# Patient Record
Sex: FEMALE | Race: WHITE | NOT HISPANIC OR LATINO | Employment: OTHER | URBAN - METROPOLITAN AREA
[De-identification: names, ages, dates, MRNs, and addresses within clinical notes are randomized per-mention and may not be internally consistent; named-entity substitution may affect disease eponyms.]

---

## 2017-01-24 ENCOUNTER — OFFICE VISIT - HEALTHEAST (OUTPATIENT)
Dept: FAMILY MEDICINE | Facility: CLINIC | Age: 67
End: 2017-01-24

## 2017-01-24 DIAGNOSIS — J06.9 URI (UPPER RESPIRATORY INFECTION): ICD-10-CM

## 2017-01-24 DIAGNOSIS — J02.9 SORE THROAT: ICD-10-CM

## 2017-01-24 ASSESSMENT — MIFFLIN-ST. JEOR: SCORE: 1316.47

## 2017-01-27 ENCOUNTER — COMMUNICATION - HEALTHEAST (OUTPATIENT)
Dept: FAMILY MEDICINE | Facility: CLINIC | Age: 67
End: 2017-01-27

## 2017-02-15 ENCOUNTER — COMMUNICATION - HEALTHEAST (OUTPATIENT)
Dept: ENDOCRINOLOGY | Facility: CLINIC | Age: 67
End: 2017-02-15

## 2017-02-15 DIAGNOSIS — E11.9 TYPE 2 DIABETES MELLITUS (H): ICD-10-CM

## 2017-02-27 ENCOUNTER — OFFICE VISIT - HEALTHEAST (OUTPATIENT)
Dept: FAMILY MEDICINE | Facility: CLINIC | Age: 67
End: 2017-02-27

## 2017-02-27 DIAGNOSIS — F41.9 ANXIETY: ICD-10-CM

## 2017-02-27 RX ORDER — ESTRADIOL 10 UG/1
TABLET VAGINAL
Refills: 2 | Status: SHIPPED | COMMUNITY
Start: 2017-02-22 | End: 2023-03-23

## 2017-02-27 ASSESSMENT — MIFFLIN-ST. JEOR: SCORE: 1321.01

## 2017-03-24 ENCOUNTER — HOSPITAL ENCOUNTER (OUTPATIENT)
Dept: MAMMOGRAPHY | Facility: HOSPITAL | Age: 67
Discharge: HOME | End: 2017-03-24
Attending: NURSE PRACTITIONER

## 2017-03-24 DIAGNOSIS — Z12.31 VISIT FOR SCREENING MAMMOGRAM: ICD-10-CM

## 2017-03-30 ENCOUNTER — COMMUNICATION - HEALTHEAST (OUTPATIENT)
Dept: FAMILY MEDICINE | Facility: CLINIC | Age: 67
End: 2017-03-30

## 2017-04-20 ENCOUNTER — AMBULATORY - HEALTHEAST (OUTPATIENT)
Dept: ENDOCRINOLOGY | Facility: CLINIC | Age: 67
End: 2017-04-20

## 2017-04-20 ENCOUNTER — COMMUNICATION - HEALTHEAST (OUTPATIENT)
Dept: ADMINISTRATIVE | Facility: CLINIC | Age: 67
End: 2017-04-20

## 2017-04-22 ENCOUNTER — COMMUNICATION - HEALTHEAST (OUTPATIENT)
Dept: SCHEDULING | Facility: CLINIC | Age: 67
End: 2017-04-22

## 2017-05-17 ENCOUNTER — COMMUNICATION - HEALTHEAST (OUTPATIENT)
Dept: ADMINISTRATIVE | Facility: CLINIC | Age: 67
End: 2017-05-17

## 2017-05-17 DIAGNOSIS — E11.9 TYPE 2 DIABETES MELLITUS (H): ICD-10-CM

## 2017-05-22 ENCOUNTER — COMMUNICATION - HEALTHEAST (OUTPATIENT)
Dept: FAMILY MEDICINE | Facility: CLINIC | Age: 67
End: 2017-05-22

## 2017-05-22 DIAGNOSIS — E78.5 HYPERLIPIDEMIA: ICD-10-CM

## 2017-05-30 ENCOUNTER — OFFICE VISIT - HEALTHEAST (OUTPATIENT)
Dept: FAMILY MEDICINE | Facility: CLINIC | Age: 67
End: 2017-05-30

## 2017-05-30 DIAGNOSIS — R19.7 DIARRHEA: ICD-10-CM

## 2017-05-30 ASSESSMENT — MIFFLIN-ST. JEOR: SCORE: 1311.94

## 2017-06-08 ENCOUNTER — COMMUNICATION - HEALTHEAST (OUTPATIENT)
Dept: ADMINISTRATIVE | Facility: CLINIC | Age: 67
End: 2017-06-08

## 2017-06-08 DIAGNOSIS — E11.9 TYPE II DIABETES MELLITUS (H): ICD-10-CM

## 2017-06-13 ENCOUNTER — COMMUNICATION - HEALTHEAST (OUTPATIENT)
Dept: ENDOCRINOLOGY | Facility: CLINIC | Age: 67
End: 2017-06-13

## 2017-06-13 DIAGNOSIS — E11.9 TYPE II DIABETES MELLITUS (H): ICD-10-CM

## 2017-06-14 ENCOUNTER — COMMUNICATION - HEALTHEAST (OUTPATIENT)
Dept: EDUCATION SERVICES | Facility: CLINIC | Age: 67
End: 2017-06-14

## 2017-06-14 DIAGNOSIS — E11.9 DIABETES MELLITUS TYPE 2, CONTROLLED (H): ICD-10-CM

## 2017-06-20 ENCOUNTER — AMBULATORY - HEALTHEAST (OUTPATIENT)
Dept: LAB | Facility: CLINIC | Age: 67
End: 2017-06-20

## 2017-06-20 DIAGNOSIS — E11.9 TYPE II DIABETES MELLITUS (H): ICD-10-CM

## 2017-06-20 LAB — HBA1C MFR BLD: 8.7 % (ref 3.5–6)

## 2017-06-29 ENCOUNTER — RECORDS - HEALTHEAST (OUTPATIENT)
Dept: ADMINISTRATIVE | Facility: OTHER | Age: 67
End: 2017-06-29

## 2017-06-29 ENCOUNTER — OFFICE VISIT - HEALTHEAST (OUTPATIENT)
Dept: ENDOCRINOLOGY | Facility: CLINIC | Age: 67
End: 2017-06-29

## 2017-06-29 DIAGNOSIS — E11.9 DIABETES MELLITUS TYPE 2, CONTROLLED (H): ICD-10-CM

## 2017-06-29 DIAGNOSIS — E11.9 TYPE 2 DIABETES MELLITUS (H): ICD-10-CM

## 2017-06-29 DIAGNOSIS — E11.9 TYPE II DIABETES MELLITUS (H): ICD-10-CM

## 2017-06-29 ASSESSMENT — MIFFLIN-ST. JEOR: SCORE: 1313.75

## 2017-07-05 ENCOUNTER — COMMUNICATION - HEALTHEAST (OUTPATIENT)
Dept: FAMILY MEDICINE | Facility: CLINIC | Age: 67
End: 2017-07-05

## 2017-08-21 ENCOUNTER — OFFICE VISIT - HEALTHEAST (OUTPATIENT)
Dept: SURGERY | Facility: CLINIC | Age: 67
End: 2017-08-21

## 2017-08-21 DIAGNOSIS — L08.9 ABRASION, LEG W/ INFECTION, LEFT, INITIAL ENCOUNTER: ICD-10-CM

## 2017-08-21 DIAGNOSIS — S80.812A ABRASION, LEG W/ INFECTION, LEFT, INITIAL ENCOUNTER: ICD-10-CM

## 2017-08-21 ASSESSMENT — MIFFLIN-ST. JEOR: SCORE: 1324.41

## 2017-08-31 ENCOUNTER — COMMUNICATION - HEALTHEAST (OUTPATIENT)
Dept: LAB | Facility: CLINIC | Age: 67
End: 2017-08-31

## 2017-08-31 DIAGNOSIS — E11.9 TYPE II DIABETES MELLITUS (H): ICD-10-CM

## 2017-10-02 ENCOUNTER — COMMUNICATION - HEALTHEAST (OUTPATIENT)
Dept: FAMILY MEDICINE | Facility: CLINIC | Age: 67
End: 2017-10-02

## 2017-10-02 DIAGNOSIS — K21.9 GERD (GASTROESOPHAGEAL REFLUX DISEASE): ICD-10-CM

## 2017-10-03 ENCOUNTER — COMMUNICATION - HEALTHEAST (OUTPATIENT)
Dept: FAMILY MEDICINE | Facility: CLINIC | Age: 67
End: 2017-10-03

## 2017-10-03 ENCOUNTER — AMBULATORY - HEALTHEAST (OUTPATIENT)
Dept: LAB | Facility: CLINIC | Age: 67
End: 2017-10-03

## 2017-10-03 ENCOUNTER — COMMUNICATION - HEALTHEAST (OUTPATIENT)
Dept: ENDOCRINOLOGY | Facility: CLINIC | Age: 67
End: 2017-10-03

## 2017-10-03 DIAGNOSIS — E11.9 TYPE 2 DIABETES MELLITUS (H): ICD-10-CM

## 2017-10-03 DIAGNOSIS — E11.9 TYPE II DIABETES MELLITUS (H): ICD-10-CM

## 2017-10-03 LAB
CHOLEST SERPL-MCNC: 282 MG/DL
FASTING STATUS PATIENT QL REPORTED: YES
HBA1C MFR BLD: 8.5 % (ref 3.5–6)
HDLC SERPL-MCNC: 60 MG/DL
LDLC SERPL CALC-MCNC: 172 MG/DL
TRIGL SERPL-MCNC: 252 MG/DL

## 2017-10-17 ENCOUNTER — OFFICE VISIT - HEALTHEAST (OUTPATIENT)
Dept: ENDOCRINOLOGY | Facility: CLINIC | Age: 67
End: 2017-10-17

## 2017-10-17 DIAGNOSIS — E11.9 TYPE II DIABETES MELLITUS (H): ICD-10-CM

## 2017-10-17 ASSESSMENT — MIFFLIN-ST. JEOR: SCORE: 1320.1

## 2017-11-13 ENCOUNTER — COMMUNICATION - HEALTHEAST (OUTPATIENT)
Dept: ENDOCRINOLOGY | Facility: CLINIC | Age: 67
End: 2017-11-13

## 2017-11-13 DIAGNOSIS — E11.9 TYPE 2 DIABETES MELLITUS (H): ICD-10-CM

## 2017-11-13 DIAGNOSIS — E11.9 TYPE II DIABETES MELLITUS (H): ICD-10-CM

## 2018-01-09 ENCOUNTER — COMMUNICATION - HEALTHEAST (OUTPATIENT)
Dept: FAMILY MEDICINE | Facility: CLINIC | Age: 68
End: 2018-01-09

## 2018-01-09 DIAGNOSIS — K21.9 GERD (GASTROESOPHAGEAL REFLUX DISEASE): ICD-10-CM

## 2018-01-18 ENCOUNTER — COMMUNICATION - HEALTHEAST (OUTPATIENT)
Dept: FAMILY MEDICINE | Facility: CLINIC | Age: 68
End: 2018-01-18

## 2018-01-18 DIAGNOSIS — E78.5 HYPERLIPIDEMIA: ICD-10-CM

## 2018-01-22 ENCOUNTER — RECORDS - HEALTHEAST (OUTPATIENT)
Dept: ADMINISTRATIVE | Facility: OTHER | Age: 68
End: 2018-01-22

## 2018-03-14 ENCOUNTER — COMMUNICATION - HEALTHEAST (OUTPATIENT)
Dept: ENDOCRINOLOGY | Facility: CLINIC | Age: 68
End: 2018-03-14

## 2018-03-26 ENCOUNTER — HOSPITAL ENCOUNTER (OUTPATIENT)
Dept: MAMMOGRAPHY | Facility: CLINIC | Age: 68
Discharge: HOME | End: 2018-03-26
Attending: FAMILY MEDICINE

## 2018-03-26 DIAGNOSIS — Z12.31 VISIT FOR SCREENING MAMMOGRAM: ICD-10-CM

## 2018-03-30 ENCOUNTER — AMBULATORY - HEALTHEAST (OUTPATIENT)
Dept: LAB | Facility: CLINIC | Age: 68
End: 2018-03-30

## 2018-03-30 ENCOUNTER — AMBULATORY - HEALTHEAST (OUTPATIENT)
Dept: ENDOCRINOLOGY | Facility: CLINIC | Age: 68
End: 2018-03-30

## 2018-03-30 DIAGNOSIS — E11.9 TYPE II DIABETES MELLITUS (H): ICD-10-CM

## 2018-03-30 LAB — HBA1C MFR BLD: 9.6 % (ref 3.5–6)

## 2018-04-10 ENCOUNTER — OFFICE VISIT - HEALTHEAST (OUTPATIENT)
Dept: ENDOCRINOLOGY | Facility: CLINIC | Age: 68
End: 2018-04-10

## 2018-04-10 ENCOUNTER — AMBULATORY - HEALTHEAST (OUTPATIENT)
Dept: ENDOCRINOLOGY | Facility: CLINIC | Age: 68
End: 2018-04-10

## 2018-04-10 DIAGNOSIS — E11.9 TYPE II DIABETES MELLITUS (H): ICD-10-CM

## 2018-04-10 DIAGNOSIS — E11.9 TYPE 2 DIABETES MELLITUS (H): ICD-10-CM

## 2018-04-10 DIAGNOSIS — E11.9 DIABETES MELLITUS TYPE 2, CONTROLLED (H): ICD-10-CM

## 2018-04-10 ASSESSMENT — MIFFLIN-ST. JEOR: SCORE: 1313.75

## 2018-04-17 ENCOUNTER — COMMUNICATION - HEALTHEAST (OUTPATIENT)
Dept: ADMINISTRATIVE | Facility: CLINIC | Age: 68
End: 2018-04-17

## 2018-04-17 ENCOUNTER — AMBULATORY - HEALTHEAST (OUTPATIENT)
Dept: EDUCATION SERVICES | Facility: CLINIC | Age: 68
End: 2018-04-17

## 2018-04-17 DIAGNOSIS — E11.9 TYPE 2 DIABETES MELLITUS (H): ICD-10-CM

## 2018-05-31 ENCOUNTER — COMMUNICATION - HEALTHEAST (OUTPATIENT)
Dept: FAMILY MEDICINE | Facility: CLINIC | Age: 68
End: 2018-05-31

## 2018-05-31 DIAGNOSIS — R19.7 DIARRHEA: ICD-10-CM

## 2018-06-01 ENCOUNTER — RECORDS - HEALTHEAST (OUTPATIENT)
Dept: ADMINISTRATIVE | Facility: OTHER | Age: 68
End: 2018-06-01

## 2018-06-15 ENCOUNTER — COMMUNICATION - HEALTHEAST (OUTPATIENT)
Dept: ADMINISTRATIVE | Facility: CLINIC | Age: 68
End: 2018-06-15

## 2018-06-15 DIAGNOSIS — E11.9 TYPE II DIABETES MELLITUS (H): ICD-10-CM

## 2018-07-14 ENCOUNTER — COMMUNICATION - HEALTHEAST (OUTPATIENT)
Dept: FAMILY MEDICINE | Facility: CLINIC | Age: 68
End: 2018-07-14

## 2018-07-14 DIAGNOSIS — K21.9 GERD (GASTROESOPHAGEAL REFLUX DISEASE): ICD-10-CM

## 2018-08-20 ENCOUNTER — RECORDS - HEALTHEAST (OUTPATIENT)
Dept: ADMINISTRATIVE | Facility: OTHER | Age: 68
End: 2018-08-20

## 2018-09-28 ENCOUNTER — COMMUNICATION - HEALTHEAST (OUTPATIENT)
Dept: FAMILY MEDICINE | Facility: CLINIC | Age: 68
End: 2018-09-28

## 2018-09-28 DIAGNOSIS — K21.9 GERD (GASTROESOPHAGEAL REFLUX DISEASE): ICD-10-CM

## 2018-09-28 DIAGNOSIS — E78.5 HYPERLIPIDEMIA: ICD-10-CM

## 2018-10-01 ENCOUNTER — RECORDS - HEALTHEAST (OUTPATIENT)
Dept: ADMINISTRATIVE | Facility: OTHER | Age: 68
End: 2018-10-01

## 2018-10-10 ENCOUNTER — AMBULATORY - HEALTHEAST (OUTPATIENT)
Dept: LAB | Facility: CLINIC | Age: 68
End: 2018-10-10

## 2018-10-10 DIAGNOSIS — E11.9 TYPE II DIABETES MELLITUS (H): ICD-10-CM

## 2018-10-10 LAB
ANION GAP SERPL CALCULATED.3IONS-SCNC: 9 MMOL/L (ref 5–18)
BUN SERPL-MCNC: 16 MG/DL (ref 8–22)
CALCIUM SERPL-MCNC: 9.8 MG/DL (ref 8.5–10.5)
CHLORIDE BLD-SCNC: 104 MMOL/L (ref 98–107)
CO2 SERPL-SCNC: 27 MMOL/L (ref 22–31)
CREAT SERPL-MCNC: 0.81 MG/DL (ref 0.6–1.1)
CREAT UR-MCNC: 101.1 MG/DL
GFR SERPL CREATININE-BSD FRML MDRD: >60 ML/MIN/1.73M2
GLUCOSE BLD-MCNC: 130 MG/DL (ref 70–125)
HBA1C MFR BLD: 8.6 % (ref 3.5–6)
MICROALBUMIN UR-MCNC: 0.82 MG/DL (ref 0–1.99)
MICROALBUMIN/CREAT UR: 8.1 MG/G
POTASSIUM BLD-SCNC: 3.9 MMOL/L (ref 3.5–5)
SODIUM SERPL-SCNC: 140 MMOL/L (ref 136–145)
TSH SERPL DL<=0.005 MIU/L-ACNC: 1.55 UIU/ML (ref 0.3–5)

## 2018-10-16 ENCOUNTER — OFFICE VISIT - HEALTHEAST (OUTPATIENT)
Dept: ENDOCRINOLOGY | Facility: CLINIC | Age: 68
End: 2018-10-16

## 2018-10-16 ENCOUNTER — AMBULATORY - HEALTHEAST (OUTPATIENT)
Dept: ENDOCRINOLOGY | Facility: CLINIC | Age: 68
End: 2018-10-16

## 2018-10-16 DIAGNOSIS — E11.9 TYPE II DIABETES MELLITUS (H): ICD-10-CM

## 2018-10-16 DIAGNOSIS — E11.9 TYPE 2 DIABETES MELLITUS (H): ICD-10-CM

## 2018-10-16 ASSESSMENT — MIFFLIN-ST. JEOR: SCORE: 1327.36

## 2018-10-23 ENCOUNTER — COMMUNICATION - HEALTHEAST (OUTPATIENT)
Dept: ADMINISTRATIVE | Facility: CLINIC | Age: 68
End: 2018-10-23

## 2018-11-15 ENCOUNTER — RECORDS - HEALTHEAST (OUTPATIENT)
Dept: ADMINISTRATIVE | Facility: OTHER | Age: 68
End: 2018-11-15

## 2018-12-18 ENCOUNTER — OFFICE VISIT - HEALTHEAST (OUTPATIENT)
Dept: EDUCATION SERVICES | Facility: CLINIC | Age: 68
End: 2018-12-18

## 2018-12-18 DIAGNOSIS — E11.9 TYPE II DIABETES MELLITUS (H): ICD-10-CM

## 2018-12-27 ENCOUNTER — OFFICE VISIT - HEALTHEAST (OUTPATIENT)
Dept: FAMILY MEDICINE | Facility: CLINIC | Age: 68
End: 2018-12-27

## 2018-12-27 DIAGNOSIS — J02.9 SORE THROAT: ICD-10-CM

## 2018-12-27 DIAGNOSIS — J06.9 VIRAL URI: ICD-10-CM

## 2018-12-27 LAB — DEPRECATED S PYO AG THROAT QL EIA: NORMAL

## 2018-12-27 ASSESSMENT — MIFFLIN-ST. JEOR: SCORE: 1332.63

## 2018-12-28 ENCOUNTER — COMMUNICATION - HEALTHEAST (OUTPATIENT)
Dept: FAMILY MEDICINE | Facility: CLINIC | Age: 68
End: 2018-12-28

## 2018-12-28 LAB — GROUP A STREP BY PCR: NORMAL

## 2019-01-17 ENCOUNTER — COMMUNICATION - HEALTHEAST (OUTPATIENT)
Dept: FAMILY MEDICINE | Facility: CLINIC | Age: 69
End: 2019-01-17

## 2019-01-17 DIAGNOSIS — K21.9 GERD (GASTROESOPHAGEAL REFLUX DISEASE): ICD-10-CM

## 2019-01-17 DIAGNOSIS — E78.5 HYPERLIPIDEMIA: ICD-10-CM

## 2019-02-13 ENCOUNTER — AMBULATORY - HEALTHEAST (OUTPATIENT)
Dept: ENDOCRINOLOGY | Facility: CLINIC | Age: 69
End: 2019-02-13

## 2019-02-13 DIAGNOSIS — E11.9 TYPE II DIABETES MELLITUS (H): ICD-10-CM

## 2019-02-21 ENCOUNTER — COMMUNICATION - HEALTHEAST (OUTPATIENT)
Dept: ADMINISTRATIVE | Facility: CLINIC | Age: 69
End: 2019-02-21

## 2019-02-25 ENCOUNTER — COMMUNICATION - HEALTHEAST (OUTPATIENT)
Dept: ADMINISTRATIVE | Facility: CLINIC | Age: 69
End: 2019-02-25

## 2019-02-25 DIAGNOSIS — E11.9 TYPE II DIABETES MELLITUS (H): ICD-10-CM

## 2019-03-04 ENCOUNTER — COMMUNICATION - HEALTHEAST (OUTPATIENT)
Dept: ENDOCRINOLOGY | Facility: CLINIC | Age: 69
End: 2019-03-04

## 2019-03-04 DIAGNOSIS — E11.9 TYPE II DIABETES MELLITUS (H): ICD-10-CM

## 2019-03-04 RX ORDER — FLASH GLUCOSE SENSOR
1 KIT MISCELLANEOUS
Qty: 1 EACH | Refills: 0 | Status: SHIPPED | OUTPATIENT
Start: 2019-03-04

## 2019-03-06 ENCOUNTER — COMMUNICATION - HEALTHEAST (OUTPATIENT)
Dept: ADMINISTRATIVE | Facility: CLINIC | Age: 69
End: 2019-03-06

## 2019-03-06 DIAGNOSIS — E11.65 TYPE 2 DIABETES MELLITUS WITH HYPERGLYCEMIA, WITH LONG-TERM CURRENT USE OF INSULIN (H): ICD-10-CM

## 2019-03-06 DIAGNOSIS — Z79.4 TYPE 2 DIABETES MELLITUS WITH HYPERGLYCEMIA, WITH LONG-TERM CURRENT USE OF INSULIN (H): ICD-10-CM

## 2019-03-07 RX ORDER — FLASH GLUCOSE SENSOR
1 KIT MISCELLANEOUS DAILY
Qty: 2 KIT | Refills: 5 | Status: SHIPPED | OUTPATIENT
Start: 2019-03-07 | End: 2023-02-08

## 2019-03-19 ENCOUNTER — RECORDS - HEALTHEAST (OUTPATIENT)
Dept: HEALTH INFORMATION MANAGEMENT | Facility: CLINIC | Age: 69
End: 2019-03-19

## 2019-03-25 ENCOUNTER — COMMUNICATION - HEALTHEAST (OUTPATIENT)
Dept: ADMINISTRATIVE | Facility: CLINIC | Age: 69
End: 2019-03-25

## 2019-03-29 ENCOUNTER — AMBULATORY - HEALTHEAST (OUTPATIENT)
Dept: LAB | Facility: CLINIC | Age: 69
End: 2019-03-29

## 2019-03-29 DIAGNOSIS — E11.9 TYPE II DIABETES MELLITUS (H): ICD-10-CM

## 2019-03-29 LAB
ANION GAP SERPL CALCULATED.3IONS-SCNC: 7 MMOL/L (ref 5–18)
BUN SERPL-MCNC: 13 MG/DL (ref 8–22)
CALCIUM SERPL-MCNC: 9.9 MG/DL (ref 8.5–10.5)
CHLORIDE BLD-SCNC: 104 MMOL/L (ref 98–107)
CO2 SERPL-SCNC: 30 MMOL/L (ref 22–31)
CREAT SERPL-MCNC: 0.86 MG/DL (ref 0.6–1.1)
CREAT UR-MCNC: 97.5 MG/DL
GFR SERPL CREATININE-BSD FRML MDRD: >60 ML/MIN/1.73M2
GLUCOSE BLD-MCNC: 300 MG/DL (ref 70–125)
HBA1C MFR BLD: 8.7 % (ref 3.5–6)
LDLC SERPL CALC-MCNC: 161 MG/DL
MICROALBUMIN UR-MCNC: 0.89 MG/DL (ref 0–1.99)
MICROALBUMIN/CREAT UR: 9.1 MG/G
POTASSIUM BLD-SCNC: 4 MMOL/L (ref 3.5–5)
SODIUM SERPL-SCNC: 141 MMOL/L (ref 136–145)

## 2019-04-16 ENCOUNTER — OFFICE VISIT - HEALTHEAST (OUTPATIENT)
Dept: ENDOCRINOLOGY | Facility: CLINIC | Age: 69
End: 2019-04-16

## 2019-04-16 ENCOUNTER — AMBULATORY - HEALTHEAST (OUTPATIENT)
Dept: ENDOCRINOLOGY | Facility: CLINIC | Age: 69
End: 2019-04-16

## 2019-04-16 DIAGNOSIS — E11.22 TYPE 2 DIABETES MELLITUS WITH STAGE 1 CHRONIC KIDNEY DISEASE, WITHOUT LONG-TERM CURRENT USE OF INSULIN (H): ICD-10-CM

## 2019-04-16 DIAGNOSIS — Z79.4 TYPE 2 DIABETES MELLITUS WITHOUT COMPLICATION, WITH LONG-TERM CURRENT USE OF INSULIN (H): ICD-10-CM

## 2019-04-16 DIAGNOSIS — E11.9 TYPE 2 DIABETES MELLITUS WITHOUT COMPLICATION, WITH LONG-TERM CURRENT USE OF INSULIN (H): ICD-10-CM

## 2019-04-16 DIAGNOSIS — N18.1 TYPE 2 DIABETES MELLITUS WITH STAGE 1 CHRONIC KIDNEY DISEASE, WITHOUT LONG-TERM CURRENT USE OF INSULIN (H): ICD-10-CM

## 2019-04-16 DIAGNOSIS — E11.9 TYPE II DIABETES MELLITUS (H): ICD-10-CM

## 2019-04-16 DIAGNOSIS — E11.9 TYPE 2 DIABETES MELLITUS (H): ICD-10-CM

## 2019-04-16 ASSESSMENT — MIFFLIN-ST. JEOR: SCORE: 1330.99

## 2019-05-16 ENCOUNTER — COMMUNICATION - HEALTHEAST (OUTPATIENT)
Dept: FAMILY MEDICINE | Facility: CLINIC | Age: 69
End: 2019-05-16

## 2019-05-16 DIAGNOSIS — F41.9 ANXIETY: ICD-10-CM

## 2019-05-16 DIAGNOSIS — R19.7 DIARRHEA: ICD-10-CM

## 2019-05-20 RX ORDER — DIAZEPAM 5 MG
TABLET ORAL
Qty: 30 TABLET | Refills: 1 | Status: SHIPPED | OUTPATIENT
Start: 2019-05-20 | End: 2023-02-08

## 2019-05-24 ENCOUNTER — RECORDS - HEALTHEAST (OUTPATIENT)
Dept: ADMINISTRATIVE | Facility: OTHER | Age: 69
End: 2019-05-24

## 2019-05-28 ENCOUNTER — RECORDS - HEALTHEAST (OUTPATIENT)
Dept: ADMINISTRATIVE | Facility: OTHER | Age: 69
End: 2019-05-28

## 2019-06-04 ENCOUNTER — RECORDS - HEALTHEAST (OUTPATIENT)
Dept: ADMINISTRATIVE | Facility: OTHER | Age: 69
End: 2019-06-04

## 2019-06-24 ENCOUNTER — COMMUNICATION - HEALTHEAST (OUTPATIENT)
Dept: FAMILY MEDICINE | Facility: CLINIC | Age: 69
End: 2019-06-24

## 2019-06-24 ENCOUNTER — RECORDS - HEALTHEAST (OUTPATIENT)
Dept: ADMINISTRATIVE | Facility: OTHER | Age: 69
End: 2019-06-24

## 2019-07-02 ENCOUNTER — RECORDS - HEALTHEAST (OUTPATIENT)
Dept: ADMINISTRATIVE | Facility: OTHER | Age: 69
End: 2019-07-02

## 2019-09-07 ENCOUNTER — COMMUNICATION - HEALTHEAST (OUTPATIENT)
Dept: SCHEDULING | Facility: CLINIC | Age: 69
End: 2019-09-07

## 2019-09-09 ENCOUNTER — COMMUNICATION - HEALTHEAST (OUTPATIENT)
Dept: FAMILY MEDICINE | Facility: CLINIC | Age: 69
End: 2019-09-09

## 2019-09-09 DIAGNOSIS — Z93.3 COLOSTOMY STATUS (H): ICD-10-CM

## 2019-09-17 ENCOUNTER — OFFICE VISIT - HEALTHEAST (OUTPATIENT)
Dept: WOUND CARE | Facility: HOSPITAL | Age: 69
End: 2019-09-17

## 2019-09-17 DIAGNOSIS — Z93.3 COLOSTOMY STATUS (H): ICD-10-CM

## 2019-10-07 ENCOUNTER — COMMUNICATION - HEALTHEAST (OUTPATIENT)
Dept: FAMILY MEDICINE | Facility: CLINIC | Age: 69
End: 2019-10-07

## 2019-10-11 ENCOUNTER — AMBULATORY - HEALTHEAST (OUTPATIENT)
Dept: LAB | Facility: CLINIC | Age: 69
End: 2019-10-11

## 2019-10-11 DIAGNOSIS — E11.22 TYPE 2 DIABETES MELLITUS WITH STAGE 1 CHRONIC KIDNEY DISEASE, WITHOUT LONG-TERM CURRENT USE OF INSULIN (H): ICD-10-CM

## 2019-10-11 DIAGNOSIS — N18.1 TYPE 2 DIABETES MELLITUS WITH STAGE 1 CHRONIC KIDNEY DISEASE, WITHOUT LONG-TERM CURRENT USE OF INSULIN (H): ICD-10-CM

## 2019-10-11 LAB
ANION GAP SERPL CALCULATED.3IONS-SCNC: 9 MMOL/L (ref 5–18)
BUN SERPL-MCNC: 17 MG/DL (ref 8–28)
CALCIUM SERPL-MCNC: 9.9 MG/DL (ref 8.5–10.5)
CHLORIDE BLD-SCNC: 105 MMOL/L (ref 98–107)
CO2 SERPL-SCNC: 26 MMOL/L (ref 22–31)
CREAT SERPL-MCNC: 0.94 MG/DL (ref 0.6–1.1)
GFR SERPL CREATININE-BSD FRML MDRD: 59 ML/MIN/1.73M2
GLUCOSE BLD-MCNC: 246 MG/DL (ref 70–125)
HBA1C MFR BLD: 8.6 % (ref 3.5–6)
POTASSIUM BLD-SCNC: 4.3 MMOL/L (ref 3.5–5)
SODIUM SERPL-SCNC: 140 MMOL/L (ref 136–145)

## 2019-10-16 ENCOUNTER — COMMUNICATION - HEALTHEAST (OUTPATIENT)
Dept: ENDOCRINOLOGY | Facility: CLINIC | Age: 69
End: 2019-10-16

## 2019-10-16 DIAGNOSIS — E11.9 TYPE II DIABETES MELLITUS (H): ICD-10-CM

## 2019-10-30 ENCOUNTER — OFFICE VISIT - HEALTHEAST (OUTPATIENT)
Dept: ENDOCRINOLOGY | Facility: CLINIC | Age: 69
End: 2019-10-30

## 2019-10-30 DIAGNOSIS — E11.9 TYPE 2 DIABETES MELLITUS WITHOUT COMPLICATION, WITH LONG-TERM CURRENT USE OF INSULIN (H): ICD-10-CM

## 2019-10-30 DIAGNOSIS — Z79.4 TYPE 2 DIABETES MELLITUS WITHOUT COMPLICATION, WITH LONG-TERM CURRENT USE OF INSULIN (H): ICD-10-CM

## 2019-10-30 ASSESSMENT — MIFFLIN-ST. JEOR: SCORE: 1335.52

## 2019-11-13 ENCOUNTER — TRANSFERRED RECORDS (OUTPATIENT)
Dept: HEALTH INFORMATION MANAGEMENT | Facility: CLINIC | Age: 69
End: 2019-11-13

## 2019-11-13 ENCOUNTER — RECORDS - HEALTHEAST (OUTPATIENT)
Dept: ADMINISTRATIVE | Facility: OTHER | Age: 69
End: 2019-11-13

## 2019-11-13 LAB
RETINOPATHY: NEGATIVE
RETINOPATHY: NEGATIVE

## 2019-11-14 ENCOUNTER — COMMUNICATION - HEALTHEAST (OUTPATIENT)
Dept: ENDOCRINOLOGY | Facility: CLINIC | Age: 69
End: 2019-11-14

## 2019-11-14 DIAGNOSIS — E11.9 DIABETES MELLITUS TYPE 2, CONTROLLED (H): ICD-10-CM

## 2019-11-18 ENCOUNTER — OFFICE VISIT - HEALTHEAST (OUTPATIENT)
Dept: FAMILY MEDICINE | Facility: CLINIC | Age: 69
End: 2019-11-18

## 2019-11-18 DIAGNOSIS — E11.9 TYPE 2 DIABETES MELLITUS WITHOUT COMPLICATION, WITH LONG-TERM CURRENT USE OF INSULIN (H): ICD-10-CM

## 2019-11-18 DIAGNOSIS — R07.89 CHEST PRESSURE: ICD-10-CM

## 2019-11-18 DIAGNOSIS — J06.9 VIRAL URI WITH COUGH: ICD-10-CM

## 2019-11-18 DIAGNOSIS — J02.9 SORE THROAT: ICD-10-CM

## 2019-11-18 DIAGNOSIS — Z79.4 TYPE 2 DIABETES MELLITUS WITHOUT COMPLICATION, WITH LONG-TERM CURRENT USE OF INSULIN (H): ICD-10-CM

## 2019-11-18 LAB — DEPRECATED S PYO AG THROAT QL EIA: NORMAL

## 2019-11-19 LAB — GROUP A STREP BY PCR: NORMAL

## 2019-11-20 ENCOUNTER — RECORDS - HEALTHEAST (OUTPATIENT)
Dept: HEALTH INFORMATION MANAGEMENT | Facility: CLINIC | Age: 69
End: 2019-11-20

## 2019-11-25 ENCOUNTER — COMMUNICATION - HEALTHEAST (OUTPATIENT)
Dept: ENDOCRINOLOGY | Facility: CLINIC | Age: 69
End: 2019-11-25

## 2019-11-25 DIAGNOSIS — E11.9 TYPE II DIABETES MELLITUS (H): ICD-10-CM

## 2020-01-09 ENCOUNTER — COMMUNICATION - HEALTHEAST (OUTPATIENT)
Dept: FAMILY MEDICINE | Facility: CLINIC | Age: 70
End: 2020-01-09

## 2020-01-09 DIAGNOSIS — R19.7 DIARRHEA: ICD-10-CM

## 2020-01-09 DIAGNOSIS — K21.9 GERD (GASTROESOPHAGEAL REFLUX DISEASE): ICD-10-CM

## 2020-01-15 ENCOUNTER — OFFICE VISIT - HEALTHEAST (OUTPATIENT)
Dept: FAMILY MEDICINE | Facility: CLINIC | Age: 70
End: 2020-01-15

## 2020-01-15 DIAGNOSIS — Z12.31 VISIT FOR SCREENING MAMMOGRAM: ICD-10-CM

## 2020-01-15 DIAGNOSIS — J01.90 ACUTE SINUSITIS WITH SYMPTOMS > 10 DAYS: ICD-10-CM

## 2020-02-12 ENCOUNTER — RECORDS - HEALTHEAST (OUTPATIENT)
Dept: ADMINISTRATIVE | Facility: OTHER | Age: 70
End: 2020-02-12

## 2020-02-24 ENCOUNTER — HOSPITAL ENCOUNTER (OUTPATIENT)
Dept: MAMMOGRAPHY | Facility: CLINIC | Age: 70
Discharge: HOME | End: 2020-02-24
Attending: FAMILY MEDICINE

## 2020-02-24 DIAGNOSIS — Z12.31 VISIT FOR SCREENING MAMMOGRAM: ICD-10-CM

## 2020-03-04 ENCOUNTER — AMBULATORY - HEALTHEAST (OUTPATIENT)
Dept: ENDOCRINOLOGY | Facility: CLINIC | Age: 70
End: 2020-03-04

## 2020-03-04 DIAGNOSIS — Z79.4 TYPE 2 DIABETES MELLITUS WITHOUT COMPLICATION, WITH LONG-TERM CURRENT USE OF INSULIN (H): ICD-10-CM

## 2020-03-04 DIAGNOSIS — E11.9 TYPE 2 DIABETES MELLITUS WITHOUT COMPLICATION, WITH LONG-TERM CURRENT USE OF INSULIN (H): ICD-10-CM

## 2020-03-29 ENCOUNTER — COMMUNICATION - HEALTHEAST (OUTPATIENT)
Dept: ENDOCRINOLOGY | Facility: CLINIC | Age: 70
End: 2020-03-29

## 2020-03-29 DIAGNOSIS — E11.9 TYPE II DIABETES MELLITUS (H): ICD-10-CM

## 2020-04-08 ENCOUNTER — COMMUNICATION - HEALTHEAST (OUTPATIENT)
Dept: FAMILY MEDICINE | Facility: CLINIC | Age: 70
End: 2020-04-08

## 2020-04-08 DIAGNOSIS — K21.9 GERD (GASTROESOPHAGEAL REFLUX DISEASE): ICD-10-CM

## 2020-04-16 ENCOUNTER — COMMUNICATION - HEALTHEAST (OUTPATIENT)
Dept: ENDOCRINOLOGY | Facility: CLINIC | Age: 70
End: 2020-04-16

## 2020-04-16 DIAGNOSIS — E11.9 TYPE II DIABETES MELLITUS (H): ICD-10-CM

## 2020-04-21 ENCOUNTER — AMBULATORY - HEALTHEAST (OUTPATIENT)
Dept: LAB | Facility: CLINIC | Age: 70
End: 2020-04-21

## 2020-04-21 ENCOUNTER — COMMUNICATION - HEALTHEAST (OUTPATIENT)
Dept: SCHEDULING | Facility: CLINIC | Age: 70
End: 2020-04-21

## 2020-04-21 DIAGNOSIS — E11.9 TYPE 2 DIABETES MELLITUS WITHOUT COMPLICATION, WITH LONG-TERM CURRENT USE OF INSULIN (H): ICD-10-CM

## 2020-04-21 DIAGNOSIS — Z79.4 TYPE 2 DIABETES MELLITUS WITHOUT COMPLICATION, WITH LONG-TERM CURRENT USE OF INSULIN (H): ICD-10-CM

## 2020-04-21 LAB
ANION GAP SERPL CALCULATED.3IONS-SCNC: 10 MMOL/L (ref 5–18)
BUN SERPL-MCNC: 12 MG/DL (ref 8–28)
CALCIUM SERPL-MCNC: 9.2 MG/DL (ref 8.5–10.5)
CHLORIDE BLD-SCNC: 102 MMOL/L (ref 98–107)
CO2 SERPL-SCNC: 23 MMOL/L (ref 22–31)
CREAT SERPL-MCNC: 0.96 MG/DL (ref 0.6–1.1)
CREAT UR-MCNC: 71.6 MG/DL
GFR SERPL CREATININE-BSD FRML MDRD: 57 ML/MIN/1.73M2
GLUCOSE BLD-MCNC: 443 MG/DL (ref 70–125)
HBA1C MFR BLD: 10.3 % (ref 3.5–6)
LDLC SERPL CALC-MCNC: 185 MG/DL
MICROALBUMIN UR-MCNC: 0.56 MG/DL (ref 0–1.99)
MICROALBUMIN/CREAT UR: 7.8 MG/G
POTASSIUM BLD-SCNC: 4.3 MMOL/L (ref 3.5–5)
SODIUM SERPL-SCNC: 135 MMOL/L (ref 136–145)

## 2020-04-24 ENCOUNTER — COMMUNICATION - HEALTHEAST (OUTPATIENT)
Dept: ENDOCRINOLOGY | Facility: CLINIC | Age: 70
End: 2020-04-24

## 2020-04-24 DIAGNOSIS — E11.9 TYPE II DIABETES MELLITUS (H): ICD-10-CM

## 2020-04-28 ENCOUNTER — OFFICE VISIT - HEALTHEAST (OUTPATIENT)
Dept: ENDOCRINOLOGY | Facility: CLINIC | Age: 70
End: 2020-04-28

## 2020-04-28 DIAGNOSIS — E11.9 TYPE 2 DIABETES MELLITUS (H): ICD-10-CM

## 2020-04-28 DIAGNOSIS — E11.9 TYPE II DIABETES MELLITUS (H): ICD-10-CM

## 2020-05-28 ENCOUNTER — RECORDS - HEALTHEAST (OUTPATIENT)
Dept: ADMINISTRATIVE | Facility: OTHER | Age: 70
End: 2020-05-28

## 2020-06-08 ENCOUNTER — RECORDS - HEALTHEAST (OUTPATIENT)
Dept: ADMINISTRATIVE | Facility: OTHER | Age: 70
End: 2020-06-08

## 2020-06-08 ENCOUNTER — AMBULATORY - HEALTHEAST (OUTPATIENT)
Dept: SURGERY | Facility: AMBULATORY SURGERY CENTER | Age: 70
End: 2020-06-08

## 2020-06-08 ENCOUNTER — COMMUNICATION - HEALTHEAST (OUTPATIENT)
Dept: FAMILY MEDICINE | Facility: CLINIC | Age: 70
End: 2020-06-08

## 2020-06-08 DIAGNOSIS — Z11.59 ENCOUNTER FOR SCREENING FOR OTHER VIRAL DISEASES: ICD-10-CM

## 2020-06-10 ENCOUNTER — NURSE TRIAGE (OUTPATIENT)
Dept: NURSING | Facility: CLINIC | Age: 70
End: 2020-06-10

## 2020-06-10 ENCOUNTER — OFFICE VISIT - HEALTHEAST (OUTPATIENT)
Dept: FAMILY MEDICINE | Facility: CLINIC | Age: 70
End: 2020-06-10

## 2020-06-10 DIAGNOSIS — Z01.818 PRE-OP EXAM: ICD-10-CM

## 2020-06-10 DIAGNOSIS — E11.9 TYPE 2 DIABETES MELLITUS WITHOUT COMPLICATION, WITH LONG-TERM CURRENT USE OF INSULIN (H): ICD-10-CM

## 2020-06-10 DIAGNOSIS — Z79.4 TYPE 2 DIABETES MELLITUS WITHOUT COMPLICATION, WITH LONG-TERM CURRENT USE OF INSULIN (H): ICD-10-CM

## 2020-06-10 DIAGNOSIS — K50.111 CROHN'S DISEASE OF COLON WITH RECTAL BLEEDING (H): ICD-10-CM

## 2020-06-10 DIAGNOSIS — Z93.3 COLOSTOMY STATUS (H): ICD-10-CM

## 2020-06-10 LAB
CREAT SERPL-MCNC: 0.91 MG/DL (ref 0.6–1.1)
GFR SERPL CREATININE-BSD FRML MDRD: >60 ML/MIN/1.73M2
HBA1C MFR BLD: 10.4 % (ref 3.5–6)
HGB BLD-MCNC: 10.2 G/DL (ref 12–16)

## 2020-06-10 ASSESSMENT — MIFFLIN-ST. JEOR: SCORE: 1304.23

## 2020-06-10 NOTE — ASSESSMENT & PLAN NOTE
Is currently taking Glargine 14 units two times a day, last note by Endocrine CNP indicates that she was to increase Glargine dose to 20 units two times a day. She was also to use slidding scale Humalog before meals. In regards to fasting in conjunction with Marissa Falcon; Pharm D; instructed patient to take her normal insulin Glargine doses the day before but then the morning of surgery to decrease Glargine by 1/2 (since she is only taking 14 units then to 7 units) She is to hold her Humalog during the day of Fasting.     4/2020 Hemglobin A1C was 10  Recheckedtoday and hemoglobin A1C was still 10.4.  Patient doesn't practice any sort of carbohydrate restriction and eats a diet very high in refined CHO, sugar sweetened beverages and candy bars. She is aware this is very directly contributing to the cause of diabetes Type 2. Offered that if she ever changes her mind regarding her dietary practices and would like to better control her diabetes that I would be willing to work with her. At this point she isn't interested.

## 2020-06-10 NOTE — TELEPHONE ENCOUNTER
Patient calls stating she has surgery on 6/17/20 and needs to get COVID19 testing beforehand. She was told the order was already placed.  Verified with [facility] nurse that order was in [facility] and transferred patient to [id number] Scheduling.     TOMMY Flannery RN

## 2020-06-11 LAB
ATRIAL RATE - MUSE: 89 BPM
DIASTOLIC BLOOD PRESSURE - MUSE: NORMAL
INTERPRETATION ECG - MUSE: NORMAL
P AXIS - MUSE: 44 DEGREES
PR INTERVAL - MUSE: 188 MS
QRS DURATION - MUSE: 82 MS
QT - MUSE: 390 MS
QTC - MUSE: 474 MS
R AXIS - MUSE: -24 DEGREES
SYSTOLIC BLOOD PRESSURE - MUSE: NORMAL
T AXIS - MUSE: 54 DEGREES
VENTRICULAR RATE- MUSE: 89 BPM

## 2020-06-14 ENCOUNTER — AMBULATORY - HEALTHEAST (OUTPATIENT)
Dept: FAMILY MEDICINE | Facility: CLINIC | Age: 70
End: 2020-06-14

## 2020-06-14 DIAGNOSIS — Z11.59 ENCOUNTER FOR SCREENING FOR OTHER VIRAL DISEASES: ICD-10-CM

## 2020-06-15 ASSESSMENT — MIFFLIN-ST. JEOR
SCORE: 1300.6
SCORE: 1300.6

## 2020-06-16 ENCOUNTER — ANESTHESIA - HEALTHEAST (OUTPATIENT)
Dept: SURGERY | Facility: AMBULATORY SURGERY CENTER | Age: 70
End: 2020-06-16

## 2020-06-17 ENCOUNTER — HOSPITAL ENCOUNTER (OUTPATIENT)
Dept: SURGERY | Facility: AMBULATORY SURGERY CENTER | Age: 70
Discharge: HOME | End: 2020-06-17
Attending: COLON & RECTAL SURGERY | Admitting: COLON & RECTAL SURGERY
Payer: COMMERCIAL

## 2020-06-17 ENCOUNTER — SURGERY - HEALTHEAST (OUTPATIENT)
Dept: SURGERY | Facility: AMBULATORY SURGERY CENTER | Age: 70
End: 2020-06-17

## 2020-06-17 DIAGNOSIS — K94.11 HEMORRHAGE FROM ILEOSTOMY (H): ICD-10-CM

## 2020-06-17 LAB
GLUCOSE BLDC GLUCOMTR-MCNC: 184 MG/DL (ref 70–125)
GLUCOSE BLDC GLUCOMTR-MCNC: 196 MG/DL (ref 70–125)

## 2020-06-17 ASSESSMENT — MIFFLIN-ST. JEOR
SCORE: 1300.6
SCORE: 1300.6

## 2020-06-19 ENCOUNTER — COMMUNICATION - HEALTHEAST (OUTPATIENT)
Dept: ADMINISTRATIVE | Facility: CLINIC | Age: 70
End: 2020-06-19

## 2020-06-30 ENCOUNTER — COMMUNICATION - HEALTHEAST (OUTPATIENT)
Dept: EDUCATION SERVICES | Facility: CLINIC | Age: 70
End: 2020-06-30

## 2020-07-02 ENCOUNTER — COMMUNICATION - HEALTHEAST (OUTPATIENT)
Dept: FAMILY MEDICINE | Facility: CLINIC | Age: 70
End: 2020-07-02

## 2020-07-02 DIAGNOSIS — K21.9 GERD (GASTROESOPHAGEAL REFLUX DISEASE): ICD-10-CM

## 2020-07-20 ENCOUNTER — RECORDS - HEALTHEAST (OUTPATIENT)
Dept: ADMINISTRATIVE | Facility: OTHER | Age: 70
End: 2020-07-20

## 2020-07-30 ENCOUNTER — RECORDS - HEALTHEAST (OUTPATIENT)
Dept: ADMINISTRATIVE | Facility: OTHER | Age: 70
End: 2020-07-30

## 2020-09-01 ENCOUNTER — COMMUNICATION - HEALTHEAST (OUTPATIENT)
Dept: ENDOCRINOLOGY | Facility: CLINIC | Age: 70
End: 2020-09-01

## 2020-09-01 DIAGNOSIS — E11.9 TYPE 2 DIABETES MELLITUS (H): ICD-10-CM

## 2020-09-02 ENCOUNTER — RECORDS - HEALTHEAST (OUTPATIENT)
Dept: ADMINISTRATIVE | Facility: OTHER | Age: 70
End: 2020-09-02

## 2020-09-03 ENCOUNTER — AMBULATORY - HEALTHEAST (OUTPATIENT)
Dept: ENDOCRINOLOGY | Facility: CLINIC | Age: 70
End: 2020-09-03

## 2020-09-03 DIAGNOSIS — Z79.4 TYPE 2 DIABETES MELLITUS WITHOUT COMPLICATION, WITH LONG-TERM CURRENT USE OF INSULIN (H): ICD-10-CM

## 2020-09-03 DIAGNOSIS — E11.9 TYPE 2 DIABETES MELLITUS WITHOUT COMPLICATION, WITH LONG-TERM CURRENT USE OF INSULIN (H): ICD-10-CM

## 2020-09-08 ENCOUNTER — COMMUNICATION - HEALTHEAST (OUTPATIENT)
Dept: FAMILY MEDICINE | Facility: CLINIC | Age: 70
End: 2020-09-08

## 2020-09-08 ENCOUNTER — RECORDS - HEALTHEAST (OUTPATIENT)
Dept: ADMINISTRATIVE | Facility: OTHER | Age: 70
End: 2020-09-08

## 2020-09-08 DIAGNOSIS — R19.7 DIARRHEA: ICD-10-CM

## 2020-09-08 LAB
ALT SERPL W/O P-5'-P-CCNC: 60 U/L (ref 0–78)
AST SERPL-CCNC: 52 U/L (ref 0–37)
HEP C HIM: NORMAL

## 2020-09-16 ENCOUNTER — COMMUNICATION - HEALTHEAST (OUTPATIENT)
Dept: ADMINISTRATIVE | Facility: CLINIC | Age: 70
End: 2020-09-16

## 2020-09-17 ENCOUNTER — RECORDS - HEALTHEAST (OUTPATIENT)
Dept: HEALTH INFORMATION MANAGEMENT | Facility: CLINIC | Age: 70
End: 2020-09-17

## 2020-09-21 ENCOUNTER — RECORDS - HEALTHEAST (OUTPATIENT)
Dept: ADMINISTRATIVE | Facility: OTHER | Age: 70
End: 2020-09-21

## 2020-10-07 ENCOUNTER — RECORDS - HEALTHEAST (OUTPATIENT)
Dept: ADMINISTRATIVE | Facility: OTHER | Age: 70
End: 2020-10-07

## 2020-10-16 ENCOUNTER — COMMUNICATION - HEALTHEAST (OUTPATIENT)
Dept: FAMILY MEDICINE | Facility: CLINIC | Age: 70
End: 2020-10-16

## 2020-10-16 DIAGNOSIS — R19.7 DIARRHEA: ICD-10-CM

## 2020-10-20 ENCOUNTER — RECORDS - HEALTHEAST (OUTPATIENT)
Dept: ADMINISTRATIVE | Facility: OTHER | Age: 70
End: 2020-10-20

## 2020-10-22 ENCOUNTER — OFFICE VISIT - HEALTHEAST (OUTPATIENT)
Dept: FAMILY MEDICINE | Facility: CLINIC | Age: 70
End: 2020-10-22

## 2020-10-22 DIAGNOSIS — M79.662 PAIN OF LEFT LOWER LEG: ICD-10-CM

## 2020-10-22 DIAGNOSIS — K50.119 CROHN'S DISEASE OF COLON WITH COMPLICATION (H): ICD-10-CM

## 2020-10-22 DIAGNOSIS — K74.69 OTHER CIRRHOSIS OF LIVER (H): ICD-10-CM

## 2020-10-22 DIAGNOSIS — E11.9 TYPE 2 DIABETES MELLITUS WITHOUT COMPLICATION, WITH LONG-TERM CURRENT USE OF INSULIN (H): ICD-10-CM

## 2020-10-22 DIAGNOSIS — Z79.4 TYPE 2 DIABETES MELLITUS WITHOUT COMPLICATION, WITH LONG-TERM CURRENT USE OF INSULIN (H): ICD-10-CM

## 2020-10-22 RX ORDER — DIPHENOXYLATE HCL/ATROPINE 2.5-.025MG
TABLET ORAL
Qty: 30 TABLET | Refills: 11 | Status: SHIPPED | OUTPATIENT
Start: 2020-10-22 | End: 2021-11-03

## 2020-10-22 ASSESSMENT — MIFFLIN-ST. JEOR: SCORE: 1300.6

## 2020-10-27 ENCOUNTER — AMBULATORY - HEALTHEAST (OUTPATIENT)
Dept: LAB | Facility: CLINIC | Age: 70
End: 2020-10-27

## 2020-10-27 DIAGNOSIS — E11.9 TYPE 2 DIABETES MELLITUS WITHOUT COMPLICATION, WITH LONG-TERM CURRENT USE OF INSULIN (H): ICD-10-CM

## 2020-10-27 DIAGNOSIS — Z79.4 TYPE 2 DIABETES MELLITUS WITHOUT COMPLICATION, WITH LONG-TERM CURRENT USE OF INSULIN (H): ICD-10-CM

## 2020-10-27 LAB
ANION GAP SERPL CALCULATED.3IONS-SCNC: 8 MMOL/L (ref 5–18)
BUN SERPL-MCNC: 16 MG/DL (ref 8–28)
CALCIUM SERPL-MCNC: 9.3 MG/DL (ref 8.5–10.5)
CHLORIDE BLD-SCNC: 105 MMOL/L (ref 98–107)
CO2 SERPL-SCNC: 25 MMOL/L (ref 22–31)
CREAT SERPL-MCNC: 1.04 MG/DL (ref 0.6–1.1)
GFR SERPL CREATININE-BSD FRML MDRD: 52 ML/MIN/1.73M2
GLUCOSE BLD-MCNC: 416 MG/DL (ref 70–125)
HBA1C MFR BLD: 10 %
POTASSIUM BLD-SCNC: 4.3 MMOL/L (ref 3.5–5)
SODIUM SERPL-SCNC: 138 MMOL/L (ref 136–145)

## 2020-11-03 ENCOUNTER — OFFICE VISIT - HEALTHEAST (OUTPATIENT)
Dept: ENDOCRINOLOGY | Facility: CLINIC | Age: 70
End: 2020-11-03

## 2020-11-03 DIAGNOSIS — Z79.4 TYPE 2 DIABETES MELLITUS WITH DIABETIC NEPHROPATHY, WITH LONG-TERM CURRENT USE OF INSULIN (H): ICD-10-CM

## 2020-11-03 DIAGNOSIS — N18.31 STAGE 3A CHRONIC KIDNEY DISEASE (H): ICD-10-CM

## 2020-11-03 DIAGNOSIS — E11.21 TYPE 2 DIABETES MELLITUS WITH DIABETIC NEPHROPATHY, WITH LONG-TERM CURRENT USE OF INSULIN (H): ICD-10-CM

## 2021-01-11 ENCOUNTER — RECORDS - HEALTHEAST (OUTPATIENT)
Dept: ADMINISTRATIVE | Facility: OTHER | Age: 71
End: 2021-01-11

## 2021-01-11 LAB
ALT SERPL W/O P-5'-P-CCNC: 50 U/L (ref 0–78)
AST SERPL-CCNC: 53 U/L (ref 0–37)

## 2021-01-14 ENCOUNTER — RECORDS - HEALTHEAST (OUTPATIENT)
Dept: HEALTH INFORMATION MANAGEMENT | Facility: CLINIC | Age: 71
End: 2021-01-14

## 2021-01-15 ENCOUNTER — COMMUNICATION - HEALTHEAST (OUTPATIENT)
Dept: ENDOCRINOLOGY | Facility: CLINIC | Age: 71
End: 2021-01-15

## 2021-01-15 DIAGNOSIS — E11.9 TYPE 2 DIABETES MELLITUS (H): ICD-10-CM

## 2021-01-15 RX ORDER — INSULIN GLARGINE 100 [IU]/ML
INJECTION, SOLUTION SUBCUTANEOUS
Qty: 45 ML | Refills: 1 | Status: SHIPPED | OUTPATIENT
Start: 2021-01-15 | End: 2021-12-27

## 2021-01-19 ENCOUNTER — RECORDS - HEALTHEAST (OUTPATIENT)
Dept: ADMINISTRATIVE | Facility: OTHER | Age: 71
End: 2021-01-19

## 2021-01-20 ENCOUNTER — RECORDS - HEALTHEAST (OUTPATIENT)
Dept: ADMINISTRATIVE | Facility: OTHER | Age: 71
End: 2021-01-20

## 2021-02-05 ENCOUNTER — COMMUNICATION - HEALTHEAST (OUTPATIENT)
Dept: FAMILY MEDICINE | Facility: CLINIC | Age: 71
End: 2021-02-05

## 2021-02-05 DIAGNOSIS — K21.9 GERD (GASTROESOPHAGEAL REFLUX DISEASE): ICD-10-CM

## 2021-02-05 RX ORDER — OMEPRAZOLE 40 MG/1
CAPSULE, DELAYED RELEASE ORAL
Qty: 90 CAPSULE | Refills: 2 | Status: SHIPPED | OUTPATIENT
Start: 2021-02-05 | End: 2021-12-23

## 2021-03-03 ENCOUNTER — AMBULATORY - HEALTHEAST (OUTPATIENT)
Dept: NURSING | Facility: CLINIC | Age: 71
End: 2021-03-03

## 2021-03-05 ENCOUNTER — HOSPITAL ENCOUNTER (OUTPATIENT)
Dept: WOUND CARE | Facility: CLINIC | Age: 71
Discharge: HOME | End: 2021-03-05
Attending: FAMILY MEDICINE | Admitting: FAMILY MEDICINE
Payer: COMMERCIAL

## 2021-03-05 PROCEDURE — G0463 HOSPITAL OUTPT CLINIC VISIT: HCPCS

## 2021-03-24 ENCOUNTER — AMBULATORY - HEALTHEAST (OUTPATIENT)
Dept: NURSING | Facility: CLINIC | Age: 71
End: 2021-03-24

## 2021-03-24 ENCOUNTER — COMMUNICATION - HEALTHEAST (OUTPATIENT)
Dept: FAMILY MEDICINE | Facility: CLINIC | Age: 71
End: 2021-03-24

## 2021-04-13 ENCOUNTER — HOSPITAL ENCOUNTER (OUTPATIENT)
Dept: MAMMOGRAPHY | Facility: CLINIC | Age: 71
Discharge: HOME | End: 2021-04-13
Attending: FAMILY MEDICINE

## 2021-04-13 DIAGNOSIS — Z12.31 VISIT FOR SCREENING MAMMOGRAM: ICD-10-CM

## 2021-04-22 ENCOUNTER — COMMUNICATION - HEALTHEAST (OUTPATIENT)
Dept: LAB | Facility: CLINIC | Age: 71
End: 2021-04-22

## 2021-04-22 DIAGNOSIS — Z79.4 TYPE 2 DIABETES MELLITUS WITHOUT COMPLICATION, WITH LONG-TERM CURRENT USE OF INSULIN (H): ICD-10-CM

## 2021-04-22 DIAGNOSIS — E11.9 TYPE 2 DIABETES MELLITUS WITHOUT COMPLICATION, WITH LONG-TERM CURRENT USE OF INSULIN (H): ICD-10-CM

## 2021-04-26 ENCOUNTER — RECORDS - HEALTHEAST (OUTPATIENT)
Dept: ADMINISTRATIVE | Facility: OTHER | Age: 71
End: 2021-04-26

## 2021-04-27 ENCOUNTER — AMBULATORY - HEALTHEAST (OUTPATIENT)
Dept: LAB | Facility: CLINIC | Age: 71
End: 2021-04-27

## 2021-04-27 DIAGNOSIS — E11.9 TYPE 2 DIABETES MELLITUS WITHOUT COMPLICATION, WITH LONG-TERM CURRENT USE OF INSULIN (H): ICD-10-CM

## 2021-04-27 DIAGNOSIS — Z79.4 TYPE 2 DIABETES MELLITUS WITHOUT COMPLICATION, WITH LONG-TERM CURRENT USE OF INSULIN (H): ICD-10-CM

## 2021-04-27 LAB
ANION GAP SERPL CALCULATED.3IONS-SCNC: 10 MMOL/L (ref 5–18)
BUN SERPL-MCNC: 13 MG/DL (ref 8–28)
CALCIUM SERPL-MCNC: 9.1 MG/DL (ref 8.5–10.5)
CHLORIDE BLD-SCNC: 104 MMOL/L (ref 98–107)
CO2 SERPL-SCNC: 23 MMOL/L (ref 22–31)
CREAT SERPL-MCNC: 0.92 MG/DL (ref 0.6–1.1)
CREAT UR-MCNC: 92.3 MG/DL
GFR SERPL CREATININE-BSD FRML MDRD: 60 ML/MIN/1.73M2
GLUCOSE BLD-MCNC: 382 MG/DL (ref 70–125)
HBA1C MFR BLD: 10.7 %
LDLC SERPL CALC-MCNC: 155 MG/DL
MICROALBUMIN UR-MCNC: 0.73 MG/DL (ref 0–1.99)
MICROALBUMIN/CREAT UR: 7.9 MG/G
POTASSIUM BLD-SCNC: 4.2 MMOL/L (ref 3.5–5)
SODIUM SERPL-SCNC: 137 MMOL/L (ref 136–145)

## 2021-05-04 ENCOUNTER — OFFICE VISIT - HEALTHEAST (OUTPATIENT)
Dept: ENDOCRINOLOGY | Facility: CLINIC | Age: 71
End: 2021-05-04

## 2021-05-04 DIAGNOSIS — E11.9 TYPE II DIABETES MELLITUS (H): ICD-10-CM

## 2021-05-04 DIAGNOSIS — Z79.4 TYPE 2 DIABETES MELLITUS WITH DIABETIC NEPHROPATHY, WITH LONG-TERM CURRENT USE OF INSULIN (H): ICD-10-CM

## 2021-05-04 DIAGNOSIS — E11.21 TYPE 2 DIABETES MELLITUS WITH DIABETIC NEPHROPATHY, WITH LONG-TERM CURRENT USE OF INSULIN (H): ICD-10-CM

## 2021-05-04 RX ORDER — CLOBETASOL PROPIONATE 0.5 MG/G
OINTMENT TOPICAL
Status: SHIPPED | COMMUNITY
Start: 2021-04-22 | End: 2021-07-28

## 2021-05-04 RX ORDER — METRONIDAZOLE 7.5 MG/G
GEL VAGINAL
Status: SHIPPED | COMMUNITY
Start: 2021-04-22 | End: 2021-07-28

## 2021-05-25 ENCOUNTER — RECORDS - HEALTHEAST (OUTPATIENT)
Dept: ADMINISTRATIVE | Facility: CLINIC | Age: 71
End: 2021-05-25

## 2021-05-26 ENCOUNTER — RECORDS - HEALTHEAST (OUTPATIENT)
Dept: ADMINISTRATIVE | Facility: CLINIC | Age: 71
End: 2021-05-26

## 2021-05-26 NOTE — PROGRESS NOTES
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.         Voices not complaints with Ileostomy and pouching system except for irritation @ MCJ  @ 6 o'clock          Machelle Pouching system: denies leakage and voices no interest in changing system       Date of last WOC photo: 3-5-21           RLQ Ileostomy                                                    Ileostomy J                         Stoma size: approx 1 1/2\"    Stoma appearance: rounded, red, moist, protrudes 2.5\", lumen @ apex    Mucocutaneous junction:  Healed with small area hyperplasia/irritation from 4-7 o'clock    Peristomal skin: generally intact with superficial varicies, no bleeding    Abdominal assessment:  Possible RLQ parastomal hernia    Output: Moderate semi-liquid    Pain:  Burning @ J site    WOC Assessment    Ileostomy: viable, functioning and slightly prolapsed  Myranda-stomal skin:  Small amt of hyperplasia vs Irritation from 4-7 o'clock                                Evidence of caput medusa without bleeding. Possbily aggravated by presence of hernia  Abdominal assessment: visible appearance of RLQ parastomal hernia  Prosthetic refitting:  No change to current system       WO Interventions/Plan     Interventions:  1. Silver nitrated area of hyperplasia from 4-7 o'clock  2. No interventions required for stomal prolapse and hernia  3. Education; discussed s/s to seek care for hernia and stomal prolapse: darkening of stoma mucosa, abd pain in area of hernia, no outpu;      -Monitor bleeding from varicies      -Reminded pt not to pull up barrier to tight to distal mucosa. This could be a contributing factor to MCJ irritation   4. Offered some updated supplies from [employer]. Pt not interested in changing system. Pt to " continue her routine changing     Follow-up  1. Pt to call with any ?/concerns  2. F/u prn appt as needed.      Mili Mittal RN, CWOCN

## 2021-05-27 ENCOUNTER — RECORDS - HEALTHEAST (OUTPATIENT)
Dept: ADMINISTRATIVE | Facility: OTHER | Age: 71
End: 2021-05-27

## 2021-05-27 NOTE — TELEPHONE ENCOUNTER
Patient received a new monitor and doesn't know how to use it.  She can't get in to see you until 4/17.

## 2021-05-27 NOTE — TELEPHONE ENCOUNTER
Spoke with pt. All questions were answered. Pt will call back if she has any more questions or trouble using the meter.

## 2021-05-27 NOTE — PROGRESS NOTES
"[facility]  ENDOCRINOLOGY    Diabetes Note 4/17/2019    Zara Webster, 1950, [id number]          Reason for visit      1. Type 2 diabetes mellitus (H)    2. Type II diabetes mellitus (H)        HPI     Zara Webster is a very pleasant 68 y.o. old female who presents for follow up.  SUMMARY:  Zara returns today in f/u for DM 2. Her A1c is up just a bit from her last and is 8.7.  She reports today that she now has a Freestyle Naila and that she loves it.  Report shows that she had an ave BG of 211 over the last two weeks.  Her levels are staying quite flat overnight, but rise during the day. She is checking quite often because it is easy to do so.  She is a self exclaimed \"Carbaholic\" and knows that her diet is heavy with it.  Because of her Colostomy and gut, she does better with more CHO than she should, and there are obvious consequences.  She is complaining about having to use more Humalog, which is causing weight gain.  She [place] and [place] 8 lbs.  She is about to start back into Golf season, and will be playing and walking a lot.  She reports that she has acquired an essential tremor that has been worked up, and is benign.        Blood glucose data:  Ave: 211,     Past Medical History     Patient Active Problem List   Diagnosis     Type II diabetes mellitus (H)     Post Colectomy     Hypercholesteremia     H/O Crohn's disease     BMI 28.0-28.9,adult     Aspirin contraindicated        Family History       family history includes Cancer in her father; Diabetes in her father and mother; Heart disease in her mother; Hyperlipidemia in her mother.    Social History      reports that she has never smoked. She has never used smokeless tobacco. She reports that she drinks alcohol. She reports that she does not use drugs.      Review of Systems     Patient has no polyuria or polydipsia, no chest pain, dyspnea or TIA's, no numbness, tingling or pain in extremities  Remainder negative " "except as noted in HPI.    Vital Signs     /74 (Patient Site: Right Arm, Patient Position: Sitting, Cuff Size: Adult Regular)   Pulse 94   Ht 5' 6\" (1.676 m)   Wt 176 lb 3.2 oz (79.9 kg)   LMP 03/24/1991   BMI 28.44 kg/m    Wt Readings from Last 3 Encounters:   04/16/19 176 lb 3.2 oz (79.9 kg)   12/27/18 176 lb 9 oz (80.1 kg)   10/16/18 175 lb 6.4 oz (79.6 kg)       Physical Exam     Constitutional:  Well developed, Well nourished  HENT:  Normocephalic,   Neck: Thyroid normal, No lymph nodes, Supple  Eyes:  PERRL, Conjunctiva pink  Respiratory:  Normal breath sounds, No respiratory distress  Cardiovascular:  Normal heart rate, Normal rhythm, No murmurs  GI:  Bowel sounds normal, Soft, No tenderness  Musculoskeletal:  No gross deformity or lesions, normal dorsalis pedis pulses  Skin: No acanthosis nigricans, lipoatrophy or lipodystrophy  Neurologic:  Alert & oriented x 3, nonfocal  Psychiatric:  Affect, Mood, Insight appropriate  Diabetic foot exam: no ulcers, charcot's or high risk calluses, Normal monofilament exam          Assessment     1. Type 2 diabetes mellitus (H)    2. Type II diabetes mellitus (H)        Plan     Zara's control has slightly worsened.  Her goal is 8. Instructed to increase her Lantus dosage to 16 units two times a day. She will increase her dosage at night by 2 units every other day, until she reaches a FBS of 150.  She will f/u with me in 6 months. Time spent with pt today: 25 min with >50% spent in counseling and coordination of care.          Katiana BASS Endocrinology  4/17/2019  6:56 AM        Lab Results     Hemoglobin A1c   Date Value Ref Range Status   03/29/2019 8.7 (H) 3.5 - 6.0 % Final   10/10/2018 8.6 (H) 3.5 - 6.0 % Final   03/30/2018 9.6 (H) 3.5 - 6.0 % Final   10/03/2017 8.5 (H) 3.5 - 6.0 % Final   06/20/2017 8.7 (H) 3.5 - 6.0 % Final     Creatinine   Date Value Ref Range Status   03/29/2019 0.86 0.60 - 1.10 mg/dL Final   10/10/2018 0.81 0.60 - 1.10 mg/dL " "Final   06/20/2017 0.84 0.60 - 1.10 mg/dL Final     Microalbumin, Random Urine   Date Value Ref Range Status   03/29/2019 0.89 0.00 - 1.99 mg/dL Final       Cholesterol   Date Value Ref Range Status   10/03/2017 282 (H) <=199 mg/dL Final     HDL Cholesterol   Date Value Ref Range Status   10/03/2017 60 >=50 mg/dL Final     LDL Calculated   Date Value Ref Range Status   10/03/2017 172 (H) <=129 mg/dL Final     Triglycerides   Date Value Ref Range Status   10/03/2017 252 (H) <=149 mg/dL Final       Lab Results   Component Value Date    ALT 73 (H) 08/14/2014    AST 61 (H) 08/14/2014    ALKPHOS 94 08/14/2014    BILITOT 0.5 08/14/2014         Current Medications     Outpatient Medications Prior to Visit   Medication Sig Dispense Refill     diphenoxylate-atropine (LOMOTIL) 2.5-0.025 mg per tablet TAKE 1 TABLET BY MOUTH 4 TIMES PER DAY AS NEEDED FOR DIARRHEA  30 tablet 0     flash glucose scanning reader (FREESTYLE VIDA 14 DAY READER) Misc Use 1 Units As Directed every 14 (fourteen) days. 1 each 0     flash glucose sensor (FREESTYLE VIDA 14 DAY SENSOR) Kit Use 1 Units As Directed daily. 2 kit 5     ibuprofen (ADVIL,MOTRIN) 200 MG tablet Take 400 mg by mouth 3 (three) times a day.       omeprazole (PRILOSEC) 40 MG capsule Take 1 capsule (40 mg) by mouth once daily  90 capsule 3     ostomy adhesive Pste Apply as needed 4 Tube 3     pen needle, diabetic 32 gauge x 5/32\" Ndle TEST BLOOD SUGARS UP TO 4 TIMES A DAY. 380 each 1     pravastatin (PRAVACHOL) 40 MG tablet TAKE 1 TABLET BY MOUTH EVERY NIGHT AT BEDTIME  90 tablet 3     YUVAFEM 10 mcg Tab INSERT 1 TABLET VAGINALLY TWICE WEEKLY, AS NEEDED  2     blood glucose test (CONTOUR NEXT TEST STRIPS) strips Use 1 each As Directed as needed (four times per day). Dispense brand per patient's insurance at pharmacy discretion. 200 strip 1     blood-glucose meter (CONTOUR NEXT METER) Misc Four times per day 1 each 1     generic lancets (ACCU-CHEK MULTICLIX LANCET) Use 1 each As " Directed as needed (four timers per dya). Dispense brand per patient's insurance at pharmacy discretion. 200 each 1     insulin glargine (LANTUS SOLOSTAR U-100 INSULIN) 100 unit/mL (3 mL) pen Take 14 units twice daily 30 mL 1     insulin lispro (HUMALOG KWIKPEN INSULIN) 100 unit/mL pen INJECT 6 UNITS UNDER THE SKIN WITH EACH MEAL UP TO 40 UNITS DAILY (Patient taking differently: INJECT 6 UNITS UNDER THE SKIN WITH EACH MEAL UP TO 60 UNITS DAILY      ) 55 mL 0     No facility-administered medications prior to visit.

## 2021-05-28 ENCOUNTER — RECORDS - HEALTHEAST (OUTPATIENT)
Dept: ADMINISTRATIVE | Facility: CLINIC | Age: 71
End: 2021-05-28

## 2021-05-28 NOTE — TELEPHONE ENCOUNTER
Controlled Substance Refill Request  Medication:   Requested Prescriptions     Pending Prescriptions Disp Refills     diphenoxylate-atropine (LOMOTIL) 2.5-0.025 mg per tablet [Pharmacy Med Name: Diphenoxylate-Atropine Oral Tablet 2.5-0.025 MG] 30 tablet 0     Sig: TAKE 1 TABLET BY MOUTH 4 TIMES PER DAY AS NEEDED FOR DIARRHEA     diazePAM (VALIUM) 5 MG tablet [Pharmacy Med Name: diazePAM Oral Tablet 5 MG] 30 tablet 1     Sig: TAKE 1 TABLET BY MOUTH EVERY 6 HOURS AS NEEDED FOR ANXIETY     Date Last Fill: 5/31/18 lomotil; diazepam not on med list  [facility]   Submit electronically to pharmacy  Controlled Substance Agreement on File:   Encounter-Level CSA Scan Date:    There are no encounter-level csa scan date.       Last office visit: Last office visit pertaining to requested medication was 12/27/18.

## 2021-05-29 NOTE — TELEPHONE ENCOUNTER
Medication Request  Medication name:      Disp Refills Start End    ostomy adhesive Pste 4 Tube 3 11/12/2015     Sig: Apply as needed    Class: Print      Ostomy bag    Ostomy powder. Stomahesive powder     Cavilon no sting flim     Pharmacy Name and Location: [facility]. fax number [phone]  Reason for request: As needed per the patient for ostomy supplies. Patient would like the same order as last time supplies were ordered.  When did you use medication last?:  6/24/2019  Patient offered appointment:  patient declined  Okay to leave a detailed message: yes

## 2021-05-30 VITALS — BODY MASS INDEX: 27.8 KG/M2 | HEIGHT: 66 IN | WEIGHT: 173 LBS

## 2021-05-30 VITALS — HEIGHT: 66 IN | WEIGHT: 174 LBS | BODY MASS INDEX: 27.97 KG/M2

## 2021-05-31 VITALS — WEIGHT: 173.8 LBS | HEIGHT: 66 IN | BODY MASS INDEX: 27.93 KG/M2

## 2021-05-31 VITALS — WEIGHT: 172.4 LBS | HEIGHT: 66 IN | BODY MASS INDEX: 27.71 KG/M2

## 2021-05-31 VITALS — BODY MASS INDEX: 27.64 KG/M2 | WEIGHT: 172 LBS | HEIGHT: 66 IN

## 2021-05-31 VITALS — WEIGHT: 174.75 LBS | BODY MASS INDEX: 28.09 KG/M2 | HEIGHT: 66 IN

## 2021-06-01 VITALS — BODY MASS INDEX: 27.71 KG/M2 | HEIGHT: 66 IN | WEIGHT: 172.4 LBS

## 2021-06-01 NOTE — TELEPHONE ENCOUNTER
"RN Assessment/Reason for Call:   Okay to leave Detailed Message  Patient calling in, colostomy 1987; bag chgd, stoma pulled away from skin.  Was changing the bag; tonight it really pulled. Under side of colostomy.  Bleeding 45\" 3 kotex pads.Bright red.  Denies dizziness or lightheadedness.  Still bleeding after 10 min of pressure.    RN Action/Disposition:  No Protocol; nurse  recommends go to ER..  Call back if worse symptoms  Discussed home care measures.  Offered flu immunization.  Agrees to plan.     Jenna Farley, PIPER    [facility] Triage/med refill  9/7/2019  9:38 PM      "

## 2021-06-01 NOTE — TELEPHONE ENCOUNTER
Referral Request  Type of referral: Ostomy Wound Care  Who s requesting: Patient  Why the request: She is having trouble with ostomy  Have you been seen for this request: N/A, she spoke to triage over the weekend for a problem  Does patient have a preference on a group/provider? [facility]  Okay to leave a detailed message?  Yes

## 2021-06-01 NOTE — PATIENT INSTRUCTIONS - HE
OUTPATIENT OSTOMY INSTRUCTIONS    Type of Stoma: Ileostomy    Supplier: [employer] [phone]    Equipment:   Product # Brand Description   Pouch  [id number] Coloplast 1 3/8 inch convex light   Flange      Paste 65257 Coloplast 1 1/8 inch protective seal   Powder      Deodorizer            Optional pouch 0765 Machelle 1 1/2 inch soft convex     Procedure:  Close the bottom of the pouch and then remove backings from adhesive surfaces.  Remove the soiled pouch and discard.  Wash skin with water and dry.  Apply ring: Around stoma  Then: Apply pouching system to stoma site and hold in place for 2-5 minutes.    Pouch change: Every 3 days  _____________________________________________________________________    [facility] Nurse Specialist: Ranjit Roach RN [facility]   Questions: [phone]      I have received a copy of these instructions, have had an opportunity to ask questions, and have had them answered to my satisfaction.    ________________________________________________________________  Patient Signature and Date    Follow-up Appointment: 1 year or as needed   To schedule an appointment call [facility] Central Scheduling at [phone]

## 2021-06-02 VITALS — BODY MASS INDEX: 28.38 KG/M2 | HEIGHT: 66 IN | WEIGHT: 176.56 LBS

## 2021-06-02 VITALS — BODY MASS INDEX: 28.19 KG/M2 | HEIGHT: 66 IN | WEIGHT: 175.4 LBS

## 2021-06-02 VITALS — BODY MASS INDEX: 28.32 KG/M2 | HEIGHT: 66 IN | WEIGHT: 176.2 LBS

## 2021-06-02 NOTE — TELEPHONE ENCOUNTER
Orders being requested: Stoma bag and rings under the bag  Reason service is needed/diagnosis: colostomy supplies  When are orders needed by: as soon as possible   Where to send Orders: [email or web address]   Okay to leave detailed message?  Yes  [phone]      FYI: Patient stated that [email or web address] had sent an order for the above and have not received an response. Patient stated to keep an eye out as she will call [facility] to have them refax the orders. Patient stated that this requires Obdulia Chamorro MD's signature and date.

## 2021-06-02 NOTE — TELEPHONE ENCOUNTER
"Form faxed to [employer] at [phone].  Pt notified form was faxed.  Copy in the \"jic\" and sent to scan for chart.    "

## 2021-06-02 NOTE — PROGRESS NOTES
[facility]  ENDOCRINOLOGY    Diabetes Note 11/3/2019    Zara Webster, 1950, [id number]          Reason for visit      1. Type 2 diabetes mellitus without complication, with long-term current use of insulin (H)        HPI     Zara Webster is a very pleasant 69 y.o. old female who presents for follow up.  SUMMARY:  Zara returns today in f/u for DM 2. Her current A1c is 8.6 and has been for the last year. There is something to be said for control. She is currently using the Freestyle Naila and with it, has been able to use her Humalog up to 8 times a day in order to micro-manage her BG elevations.  She continues to struggle with her diet 2/2 to her Crohn's for which she has a Colostomy.  She does better with a higher CHO content to her diet. She is trying to get more protein in with low fat turkey and cheese sticks. She is very active, walking 3-4 times a week and going to the Gym 3 times a week. She is taking 25-30 units of Lantus, twice a day. Her Freestyle download showed an ave BG of 180 over the last two weeks. She was above range 80% of the time and in target 20%.         Past Medical History     Patient Active Problem List   Diagnosis     Type II diabetes mellitus (H)     Post Colectomy     Hypercholesteremia     BMI 28.0-28.9,adult     Aspirin contraindicated     Crohn's disease of colon (H)        Family History       family history includes Cancer in her father; Diabetes in her father and mother; Heart disease in her mother; Hyperlipidemia in her mother.    Social History      reports that she has never smoked. She has never used smokeless tobacco. She reports current alcohol use. She reports that she does not use drugs.      Review of Systems     Patient has no polyuria or polydipsia, no chest pain, dyspnea or TIA's, no numbness, tingling or pain in extremities  Remainder negative except as noted in HPI.    Vital Signs     /74 (Patient Site: Right Arm, Patient Position: Sitting, Cuff Size:  "Adult Regular)   Pulse 96   Ht 5' 6\" (1.676 m)   Wt 177 lb 3.2 oz (80.4 kg)   LMP 03/24/1991   BMI 28.60 kg/m    Wt Readings from Last 3 Encounters:   10/30/19 177 lb 3.2 oz (80.4 kg)   04/16/19 176 lb 3.2 oz (79.9 kg)   12/27/18 176 lb 9 oz (80.1 kg)       Physical Exam     Constitutional:  Well developed, Well nourished  HENT:  Normocephalic,   Neck: Thyroid normal, No lymph nodes, Supple  Eyes:  PERRL, Conjunctiva pink  Respiratory:  Normal breath sounds, No respiratory distress  Cardiovascular:  Normal heart rate, Normal rhythm, No murmurs  GI:  Bowel sounds normal, Soft, No tenderness  Musculoskeletal:  No gross deformity or lesions, normal dorsalis pedis pulses  Skin: No acanthosis nigricans, lipoatrophy or lipodystrophy  Neurologic:  Alert & oriented x 3, nonfocal  Psychiatric:  Affect, Mood, Insight appropriate  Diabetic foot exam: no ulcers, charcot's or high risk calluses, Normal monofilament exam          Assessment     1. Type 2 diabetes mellitus without complication, with long-term current use of insulin (H)        Plan     Zara is currently stable. She will continue to use her Freestyle and manage with dosaging with her Humalog several times a day.  F/u with me in 6 months. Time spent with pt today: 25 min with >50% spent in counseling and coordination of care.          Katiana BASS Endocrinology  11/3/2019  6:43 AM        Lab Results     Hemoglobin A1c   Date Value Ref Range Status   10/11/2019 8.6 (H) 3.5 - 6.0 % Final   03/29/2019 8.7 (H) 3.5 - 6.0 % Final   10/10/2018 8.6 (H) 3.5 - 6.0 % Final   03/30/2018 9.6 (H) 3.5 - 6.0 % Final   10/03/2017 8.5 (H) 3.5 - 6.0 % Final     Creatinine   Date Value Ref Range Status   10/11/2019 0.94 0.60 - 1.10 mg/dL Final   03/29/2019 0.86 0.60 - 1.10 mg/dL Final   10/10/2018 0.81 0.60 - 1.10 mg/dL Final     Microalbumin, Random Urine   Date Value Ref Range Status   03/29/2019 0.89 0.00 - 1.99 mg/dL Final       Cholesterol   Date Value Ref Range Status " "  10/03/2017 282 (H) <=199 mg/dL Final     HDL Cholesterol   Date Value Ref Range Status   10/03/2017 60 >=50 mg/dL Final     LDL Calculated   Date Value Ref Range Status   10/03/2017 172 (H) <=129 mg/dL Final     Triglycerides   Date Value Ref Range Status   10/03/2017 252 (H) <=149 mg/dL Final       Lab Results   Component Value Date    ALT 73 (H) 08/14/2014    AST 61 (H) 08/14/2014    ALKPHOS 94 08/14/2014    BILITOT 0.5 08/14/2014         Current Medications     Outpatient Medications Prior to Visit   Medication Sig Dispense Refill     diphenoxylate-atropine (LOMOTIL) 2.5-0.025 mg per tablet TAKE 1 TABLET BY MOUTH 4 TIMES PER DAY AS NEEDED FOR DIARRHEA 30 tablet 0     flash glucose scanning reader (FREESTYLE VIDA 14 DAY READER) Misc Use 1 Units As Directed every 14 (fourteen) days. 1 each 0     flash glucose sensor (FREESTYLE VIDA 14 DAY SENSOR) Kit Use 1 Units As Directed daily. 2 kit 5     ibuprofen (ADVIL,MOTRIN) 200 MG tablet Take 400 mg by mouth 3 (three) times a day.       insulin glargine (LANTUS SOLOSTAR U-100 INSULIN) 100 unit/mL (3 mL) pen Take twice daily, up to 60 units 45 mL 5     insulin lispro (HUMALOG KWIKPEN INSULIN) 100 unit/mL pen INJECT 6 UNITS UNDER THE SKIN WITH EACH MEAL UP TO 60 UNITS DAILY 18 mL 0     omeprazole (PRILOSEC) 40 MG capsule Take 1 capsule (40 mg) by mouth once daily  90 capsule 3     ostomy adhesive Pste Apply as needed 4 Tube 3     pen needle, diabetic 32 gauge x 5/32\" Ndle TEST BLOOD SUGARS UP TO 4 TIMES A DAY. 380 each 1     pravastatin (PRAVACHOL) 40 MG tablet TAKE 1 TABLET BY MOUTH EVERY NIGHT AT BEDTIME  90 tablet 3     YUVAFEM 10 mcg Tab INSERT 1 TABLET VAGINALLY TWICE WEEKLY, AS NEEDED  2     diazePAM (VALIUM) 5 MG tablet TAKE 1 TABLET BY MOUTH EVERY 6 HOURS AS NEEDED FOR ANXIETY  (Patient taking differently: For flying      ) 30 tablet 1     No facility-administered medications prior to visit.            "

## 2021-06-03 ENCOUNTER — RECORDS - HEALTHEAST (OUTPATIENT)
Dept: ADMINISTRATIVE | Facility: CLINIC | Age: 71
End: 2021-06-03

## 2021-06-03 VITALS
SYSTOLIC BLOOD PRESSURE: 141 MMHG | TEMPERATURE: 97.9 F | HEART RATE: 108 BPM | DIASTOLIC BLOOD PRESSURE: 84 MMHG | WEIGHT: 174 LBS | BODY MASS INDEX: 28.08 KG/M2 | OXYGEN SATURATION: 96 % | RESPIRATION RATE: 18 BRPM

## 2021-06-03 VITALS
BODY MASS INDEX: 28.48 KG/M2 | HEART RATE: 96 BPM | HEIGHT: 66 IN | WEIGHT: 177.2 LBS | DIASTOLIC BLOOD PRESSURE: 74 MMHG | SYSTOLIC BLOOD PRESSURE: 124 MMHG

## 2021-06-04 VITALS
WEIGHT: 173 LBS | BODY MASS INDEX: 28.82 KG/M2 | WEIGHT: 173 LBS | BODY MASS INDEX: 28.82 KG/M2 | HEIGHT: 65 IN | HEIGHT: 65 IN

## 2021-06-04 VITALS
RESPIRATION RATE: 16 BRPM | HEART RATE: 94 BPM | WEIGHT: 173.8 LBS | SYSTOLIC BLOOD PRESSURE: 130 MMHG | BODY MASS INDEX: 28.96 KG/M2 | DIASTOLIC BLOOD PRESSURE: 83 MMHG | HEIGHT: 65 IN | TEMPERATURE: 97.8 F

## 2021-06-04 VITALS
DIASTOLIC BLOOD PRESSURE: 72 MMHG | BODY MASS INDEX: 28.44 KG/M2 | HEART RATE: 100 BPM | SYSTOLIC BLOOD PRESSURE: 126 MMHG | WEIGHT: 176.2 LBS | TEMPERATURE: 97.8 F

## 2021-06-05 VITALS
WEIGHT: 173 LBS | RESPIRATION RATE: 16 BRPM | BODY MASS INDEX: 28.82 KG/M2 | HEIGHT: 65 IN | SYSTOLIC BLOOD PRESSURE: 116 MMHG | TEMPERATURE: 97.9 F | DIASTOLIC BLOOD PRESSURE: 74 MMHG | HEART RATE: 108 BPM

## 2021-06-05 NOTE — TELEPHONE ENCOUNTER
Controlled Substance Refill Request  Medication Name:   Requested Prescriptions     Pending Prescriptions Disp Refills     diphenoxylate-atropine (LOMOTIL) 2.5-0.025 mg per tablet 30 tablet 0     Sig: TAKE 1 TABLET BY MOUTH 4 TIMES PER DAY AS NEEDED FOR DIARRHEA     omeprazole (PRILOSEC) 40 MG capsule 90 capsule 3     Sig: Take 1 capsule (40 mg) by mouth once daily     Date Last Fill:  5/20/19  #30 R-0  Requested Pharmacy: [facility]  Submit electronically to pharmacy  Controlled Substance Agreement on file:   Encounter-Level CSA Scan Date:    There are no encounter-level csa scan date.        Last office visit:  12/27/2018      Lizzie Banks RN Triage Nurse Advisor

## 2021-06-05 NOTE — TELEPHONE ENCOUNTER
Patient is using new pharmacy [facility] # [id number] .  Refill Request  Did you contact pharmacy: No  Medication name:   Requested Prescriptions     Pending Prescriptions Disp Refills     diphenoxylate-atropine (LOMOTIL) 2.5-0.025 mg per tablet 30 tablet 0     Sig: TAKE 1 TABLET BY MOUTH 4 TIMES PER DAY AS NEEDED FOR DIARRHEA     omeprazole (PRILOSEC) 40 MG capsule 90 capsule 3     Sig: Take 1 capsule (40 mg) by mouth once daily   Who prescribed the medication: Obdulia Chamorro MD  Requested Pharmacy: [facility] # [id number]  Is patient out of medication: Yes  Patient notified refills processed in 3 business days:  yes  Okay to leave a detailed message: no

## 2021-06-05 NOTE — TELEPHONE ENCOUNTER
RN cannot approve Refill Request    RN can NOT refill omeprazole  PCP messaged that patient is overdue for Office Visit. Last office visit: 5/30/2017 Obdulia Chamorro MD Last Physical: Visit date not found Last MTM visit: Visit date not found Last visit same specialty: 12/27/2018 Farheen Gonzalez DO.  Next visit within 3 mo: Visit date not found  Next physical within 3 mo: Visit date not found      Lizzie Banks, [facility] Triage/Med Refill 1/13/2020    Requested Prescriptions   Pending Prescriptions Disp Refills     diphenoxylate-atropine (LOMOTIL) 2.5-0.025 mg per tablet 30 tablet 0     Sig: TAKE 1 TABLET BY MOUTH 4 TIMES PER DAY AS NEEDED FOR DIARRHEA       Controlled Substances Refill Protocol Failed - 1/9/2020 11:37 AM        Failed - Route all Controlled Substance Requests to Provider        Failed - Patient has controlled substance agreement in past 12 months     Encounter-Level CSA Scan Date:    There are no encounter-level csa scan date.               Failed - Visit with PCP or prescribing provider visit in past 12 months      Last office visit with prescriber/PCP: 5/30/2017 Obdulia Chamorro MD OR same dept: Visit date not found OR same specialty: 12/27/2018 Farheen Gonzalez DO Last physical: Visit date not found Last MTM visit: Visit date not found    Next visit within 3 mo: Visit date not found  Next physical within 3 mo: Visit date not found  Prescriber OR PCP: Obdulia Chamorro MD  Last diagnosis associated with med order: 1. Diarrhea  - diphenoxylate-atropine (LOMOTIL) 2.5-0.025 mg per tablet; TAKE 1 TABLET BY MOUTH 4 TIMES PER DAY AS NEEDED FOR DIARRHEA  Dispense: 30 tablet; Refill: 0    2. GERD (gastroesophageal reflux disease)  - omeprazole (PRILOSEC) 40 MG capsule; Take 1 capsule (40 mg) by mouth once daily  Dispense: 90 capsule; Refill: 3               omeprazole (PRILOSEC) 40 MG capsule 90 capsule 3     Sig: Take 1 capsule (40 mg) by mouth once daily       GI Medications Refill Protocol Failed -  1/9/2020 11:37 AM        Failed - PCP or prescribing provider visit in last 12 or next 3 months.     Last office visit with prescriber/PCP: 5/30/2017 Obdulia Chamorro MD OR same dept: Visit date not found OR same specialty: 12/27/2018 Farheen Gonzalez DO  Last physical: Visit date not found Last MTM visit: Visit date not found   Next visit within 3 mo: Visit date not found  Next physical within 3 mo: Visit date not found  Prescriber OR PCP: Obdulia Chamorro MD  Last diagnosis associated with med order: 1. Diarrhea  - diphenoxylate-atropine (LOMOTIL) 2.5-0.025 mg per tablet; TAKE 1 TABLET BY MOUTH 4 TIMES PER DAY AS NEEDED FOR DIARRHEA  Dispense: 30 tablet; Refill: 0    2. GERD (gastroesophageal reflux disease)  - omeprazole (PRILOSEC) 40 MG capsule; Take 1 capsule (40 mg) by mouth once daily  Dispense: 90 capsule; Refill: 3    If protocol passes may refill for 12 months if within 3 months of last provider visit (or a total of 15 months).

## 2021-06-07 NOTE — TELEPHONE ENCOUNTER
[facility] lab calling ([phone])    Critical lab result- blood glucose 443 drawn today at 10:27 am.     History: Type 2 DM  PCP is Obdulia Chamorro MD at [facility].    Writer tried calling the patient x 2 to get a current blood glucose reading. Patient did not answer phone, left voicemail message to call us back when she receives message, did notify patient on message that a nurse will be here all night.     RN paged on call provider for [facility], Angela Del Angel MD via answering service at 9:15 pm to call RN back directly at [phone].    Provider called back- provider asked for patient's A1c level and recent blood glucose levels. Values given to provider.     Provider recommended when patient calls back to check if she is having symptoms of extremely high blood sugar, and triage accordingly, if patient is feeling stable, to recheck blood glucose and call the clinic tomorrow to set up a virtual visit with provider at the clinic tomorrow to discuss her diabetes and lab values. Provider wants me to route this message to patient's PCP.     Monica Freitas RN/[facility]        Reason for Disposition    Lab or radiology calling with CRITICAL test results    Protocols used: PCP CALL - NO TRIAGE-A-

## 2021-06-07 NOTE — TELEPHONE ENCOUNTER
Refill Approved    Rx renewed per Medication Renewal Policy. Medication was last renewed on 1.30.19.    Jo Ann Meyers, [facility] Connection Triage/Med Refill 4/9/2020     Requested Prescriptions   Pending Prescriptions Disp Refills     omeprazole (PRILOSEC) 40 MG capsule [Pharmacy Med Name: OMEPRAZOLE DR 40 MG CAPSULE] 90 capsule 0     Sig: TAKE 1 CAPSULE BY MOUTH ONCE DAILY       GI Medications Refill Protocol Passed - 4/8/2020  1:31 PM        Passed - PCP or prescribing provider visit in last 12 or next 3 months.     Last office visit with prescriber/PCP: 5/30/2017 Obdulia Chamorro MD OR same dept: 1/15/2020 Farheen Gonzalez DO OR same specialty: 1/15/2020 Farheen Gonzalez DO  Last physical: Visit date not found Last MTM visit: Visit date not found   Next visit within 3 mo: Visit date not found  Next physical within 3 mo: Visit date not found  Prescriber OR PCP: Obdulia Chamorro MD  Last diagnosis associated with med order: 1. GERD (gastroesophageal reflux disease)  - omeprazole (PRILOSEC) 40 MG capsule [Pharmacy Med Name: OMEPRAZOLE DR 40 MG CAPSULE]; TAKE 1 CAPSULE BY MOUTH ONCE DAILY  Dispense: 90 capsule; Refill: 0    If protocol passes may refill for 12 months if within 3 months of last provider visit (or a total of 15 months).

## 2021-06-07 NOTE — PROGRESS NOTES
"Zara Webster is a 69 y.o. female who is being evaluated via a billable telephone visit.      The patient has been notified of following:     \"This telephone visit will be conducted via a call between you and your physician/provider. We have found that certain health care needs can be provided without the need for a physical exam.  This service lets us provide the care you need with a short phone conversation.  If a prescription is necessary we can send it directly to your pharmacy.  If lab work is needed we can place an order for that and you can then stop by our lab to have the test done at a later time.    Telephone visits are billed at different rates depending on your insurance coverage. During this emergency period, for some insurers they may be billed the same as an in-person visit.  Please reach out to your insurance provider with any questions.    If during the course of the call the physician/provider feels a telephone visit is not appropriate, you will not be charged for this service.\"    Patient has given verbal consent to a Telephone visit? Yes    Patient would like to receive their AVS by AVS Preference: Mail a copy.    Additional provider notes:      Reason for visit      1. Type 2 diabetes mellitus (H)    2. Type II diabetes mellitus (H)        HPI     Zara Webster is a very pleasant 69 y.o. old female who presents for follow up.  SUMMARY:    Mitchel is contacted today in f/u for DM 2. Her current A1c is 10.3 and up significantly from 8.6.  She reports that she has been \"eating terrible\" and that she has been far less active because of the Winter and COVID.  She is currently taking 14 units of Lantus twice a day. She is also taking 6 units of insulin with meals, and in between meals, and in the middle of the night because her BG have been so high. She is using the Freestyle Naila system, but has written down her fasting numbers. She is having BG above 300 first thing in the morning. She also notes " "that she is having some numbness in one of her legs and states that it \"feels like water is running down it\".      Blood glucose data:  4-28  6:30a 250    4-27  7:30a 347    4-26  5:26a 302    4-25  6:00a 284    4-24  6:30a 265    4-23  5:30a 344    4-22  6:30a 304    4-21  5:30a 286      Past Medical History     Patient Active Problem List   Diagnosis     Type II diabetes mellitus (H)     Post Colectomy     Hypercholesteremia     BMI 28.0-28.9,adult     Aspirin contraindicated     Crohn's disease of colon (H)     Gastrointestinal hemorrhage        Family History       family history includes Cancer in her father; Diabetes in her father and mother; Heart disease in her mother; Hyperlipidemia in her mother.    Social History      reports that she has never smoked. She has never used smokeless tobacco. She reports current alcohol use. She reports that she does not use drugs.      Review of Systems     Patient has no polyuria or polydipsia, no chest pain, dyspnea or TIA's, no numbness, tingling or pain in extremities  Remainder negative except as noted in HPI.    Vital Signs     LMP 03/24/1991   Wt Readings from Last 3 Encounters:   01/15/20 176 lb 3.2 oz (79.9 kg)   11/18/19 174 lb (78.9 kg)   10/30/19 177 lb 3.2 oz (80.4 kg)             Assessment     1. Type 2 diabetes mellitus (H)    2. Type II diabetes mellitus (H)        Plan     Will increase her Lantus to 20 units two times a day and have her take 10 units of Novolog with meals.  Encouraged to increase the Lantus as needed, 2 units every other day until BG readings improve.  F/u with me in 6 months.   .       Lab Results     Hemoglobin A1c   Date Value Ref Range Status   04/21/2020 10.3 (H) 3.5 - 6.0 % Final   10/11/2019 8.6 (H) 3.5 - 6.0 % Final   03/29/2019 8.7 (H) 3.5 - 6.0 % Final   10/10/2018 8.6 (H) 3.5 - 6.0 % Final   03/30/2018 9.6 (H) 3.5 - 6.0 % Final     Creatinine   Date Value Ref Range Status   04/21/2020 0.96 0.60 - 1.10 mg/dL Final   10/11/2019 " "0.94 0.60 - 1.10 mg/dL Final   03/29/2019 0.86 0.60 - 1.10 mg/dL Final     Microalbumin, Random Urine   Date Value Ref Range Status   04/21/2020 0.56 0.00 - 1.99 mg/dL Final       Cholesterol   Date Value Ref Range Status   10/03/2017 282 (H) <=199 mg/dL Final     HDL Cholesterol   Date Value Ref Range Status   10/03/2017 60 >=50 mg/dL Final     LDL Calculated   Date Value Ref Range Status   10/03/2017 172 (H) <=129 mg/dL Final     Triglycerides   Date Value Ref Range Status   10/03/2017 252 (H) <=149 mg/dL Final       Lab Results   Component Value Date    ALT 73 (H) 08/14/2014    AST 61 (H) 08/14/2014    ALKPHOS 94 08/14/2014    BILITOT 0.5 08/14/2014         Current Medications     Outpatient Medications Prior to Visit   Medication Sig Dispense Refill     flash glucose scanning reader (FREESTYLE VIDA 14 DAY READER) Misc Use 1 Units As Directed every 14 (fourteen) days. 1 each 0     flash glucose sensor (FREESTYLE VIDA 14 DAY SENSOR) Kit Use 1 Units As Directed daily. 2 kit 5     ibuprofen (ADVIL,MOTRIN) 200 MG tablet Take 400 mg by mouth 3 (three) times a day.       insulin glargine (LANTUS SOLOSTAR U-100 INSULIN) 100 unit/mL (3 mL) pen Take twice daily, up to 60 units 45 mL 5     omeprazole (PRILOSEC) 40 MG capsule TAKE 1 CAPSULE BY MOUTH ONCE DAILY 90 capsule 0     ostomy adhesive Pste Apply as needed 4 Tube 3     pen needle, diabetic (ULTICARE PEN NEEDLE) 32 gauge x 5/32\" Ndle TEST BLOOD SUGARS UP TO 4 TIMES A DAY. 300 each 0     pravastatin (PRAVACHOL) 40 MG tablet TAKE 1 TABLET BY MOUTH EVERY NIGHT AT BEDTIME  90 tablet 3     insulin lispro (HUMALOG KWIKPEN INSULIN) 100 unit/mL pen INJECT 6 UNITS INTRAVENOUSLY WITH EACH MEALS UP TO 60 UNITS DAILY 3 adj dose pen 0     diazePAM (VALIUM) 5 MG tablet TAKE 1 TABLET BY MOUTH EVERY 6 HOURS AS NEEDED FOR ANXIETY  (Patient taking differently: For flying      ) 30 tablet 1     diphenoxylate-atropine (LOMOTIL) 2.5-0.025 mg per tablet TAKE 1 TABLET BY MOUTH 4 TIMES PER " DAY AS NEEDED FOR DIARRHEA 30 tablet 0     YUVAFEM 10 mcg Tab INSERT 1 TABLET VAGINALLY TWICE WEEKLY, AS NEEDED  2     No facility-administered medications prior to visit.        Total time of call between patient and provider was 20 minutes    Sonia NIX

## 2021-06-08 NOTE — ANESTHESIA POSTPROCEDURE EVALUATION
Patient: Zara Webster  Procedure(s):  ILEOSTOMY REVISION, PROCTOSCOPY, ILEOSCOPY  Anesthesia type: general    Patient location: Phase II Recovery  Last vitals:   Vitals Value Taken Time   /69 6/17/2020 10:30 AM   Temp 36.4  C (97.5  F) 6/17/2020  9:28 AM   Pulse 75 6/17/2020 11:31 AM   Resp 16 6/17/2020  9:28 AM   SpO2 98 % 6/17/2020 11:31 AM   Vitals shown include unvalidated device data.  Post vital signs: stable  Level of consciousness: awake and responds to simple questions  Post-anesthesia pain: pain controlled  Post-anesthesia nausea and vomiting: no  Pulmonary: unassisted, return to baseline  Cardiovascular: stable and blood pressure at baseline  Hydration: adequate  Anesthetic events: no    QCDR Measures:  ASA# 11 - Myranda-op Cardiac Arrest: ASA11B - Patient did NOT experience unanticipated cardiac arrest  ASA# 12 - Myranda-op Mortality Rate: ASA12B - Patient did NOT die  ASA# 13 - PACU Re-Intubation Rate: ASA13B - Patient did NOT require a new airway mgmt  ASA# 10 - Composite Anes Safety: ASA10A - No serious adverse event    Additional Notes:

## 2021-06-08 NOTE — PROGRESS NOTES
"Assessment/ Plan     1. Sore throat  Her rapid strep was negative.  Backup culture was sent and she will be notified only if positive.  I suspect this is all viral.  - Rapid Strep A Screen-Throat  - Group A Strep, RNA Direct Detection, Throat    2. URI (upper respiratory infection)  Patient symptoms and exam are consistent with viral process. No antibiotics are warranted at this time.  Discussed symptomatic cares they can try at home.  Recommended rest, push fluids, and ibuprofen or tylenol if fevers.  Advised to follow up in clinic if no improvement is symptoms over the next 1-2 weeks.  Would want to see them back sooner if have persistent fevers or worsening symptoms.        Subjective:       Zara Webster is a 66 y.o. female who presents for evaluation of an acute illness.  She states her symptoms started about 3 days ago.  She had been on a [place] [facility].  She started with a cough, headache, and sore throat.  She had a low-grade fever.  The fever has now resolved.  She states that there were some passengers that were sick on the cruise, but no specific exposures that she knows of.  She denies chest pain or shortness of breath.  She is a non-smoker.  She has no history of asthma or wheezing.  She has not had any vomiting or diarrhea.    The following portions of the patient's history were reviewed and updated as appropriate: allergies, current medications, past family history, past medical history, past social history, past surgical history and problem list.    Review of Systems   A 12 point comprehensive review of systems was negative except as noted.      Current Outpatient Prescriptions   Medication Sig Dispense Refill     BD ULTRA-FINE LOLY PEN NEEDLES 32 gauge x 5/32\" Ndle USE 1 PEN NEEDLE WITH EACH INJECTION EVERY NIGHT AT BEDTIME AS DIRECTED 100 each 1     blood glucose test (ACCU-CHEK SMARTVIEW TEST STRIP) strips Please test 4 times daily 100 strip 5     CHROM JOSS/BRINDAL BERRY (GARCINIA " "CAMBOGIA ORAL) Take by mouth.       diphenoxylate-atropine (LOMOTIL) 2.5-0.025 mg per tablet Take 1 tablet by mouth 4 (four) times a day as needed for diarrhea.       ibuprofen (ADVIL,MOTRIN) 200 MG tablet Take 400 mg by mouth 3 (three) times a day.       insulin glargine (LANTUS SOLOSTAR) 100 unit/mL (3 mL) pen Inject 20 Units under the skin bedtime. Do not mix Lantus with any other insulin 10 adj dose pen 0     insulin lispro (HUMALOG KWIKPEN) 100 unit/mL pen 6 units with each meal; up to 30 units daily 5 adj dose pen 0     insulin needles, disposable, 32 x 5/32 \" Ndle TEST BLOOD SUGARS UP TO 4 TIMES A DAY. 380 each 1     lancets Misc 3 (three) times a day. Use 1 lancet. Accu-Chek FastClix Lancets Miscellaneous       omeprazole (PRILOSEC) 40 MG capsule TAKE ONE CAPSULE BY MOUTH EVERY DAY 90 capsule 3     ostomy adhesive Pste Apply as needed 4 Tube 3     pravastatin (PRAVACHOL) 40 MG tablet TAKE 1 TABLET BY MOUTH EVERY NIGHT AT BEDTIME 90 tablet 3     No current facility-administered medications for this visit.        Objective:        Visit Vitals     /80     Pulse 72     Temp 98.7  F (37.1  C) (Oral)     Resp 14     Ht 5' 6\" (1.676 m)     Wt 173 lb (78.5 kg)     BMI 27.92 kg/m2         General appearance: alert, appears stated age and cooperative  Head: Normocephalic, without obvious abnormality, atraumatic  Eyes: conjunctivae/corneas clear.  Ears: normal TM's and external ear canals both ears  Nose: Nares normal. Septum midline. Mucosa normal. No drainage or sinus tenderness.  Throat: lips, mucosa, and tongue normal; teeth and gums normal  Neck: no adenopathy  Lungs: clear to auscultation bilaterally  Heart: regular rate and rhythm, S1, S2 normal, no murmur, click, rub or gallop      Recent Results (from the past 168 hour(s))   Rapid Strep A Screen-Throat   Result Value Ref Range    Rapid Strep A Antigen No Group A Strep detected No Group A Strep detected          This note has been dictated using voice " recognition software. Any grammatical or context distortions are unintentional and inherent to the software

## 2021-06-08 NOTE — BRIEF OP NOTE
[place] [facility]    Brief Operative Note    Pre-operative diagnosis: Hemorrhage from ileostomy (H) [K94.11]  Post-operative diagnosis Same as pre-operative diagnosis    Procedure: ILEOSTOMY REVISION, PROCTOSCOPY, ILEOSCOPY  Surgeon: Surgeon(s) and Role:     * Devang Moon MD - Primary  Anesthesia: General   Estimated Blood Loss: Less than 10 ml    Drains:      Specimens:   ID Type Source Tests Collected by Time   A :  Biopsy Rectum, Biopsy SURGICAL PATHOLOGY EXAM Devang Moon MD 6/17/2020 0842     Findings:   Brisk bleeding from inferior aspect of mucocutaneous junction.  Complications: None.  Implants:         @[facility]@

## 2021-06-08 NOTE — OP NOTE
DATE OF SERVICE: 06/17/2020    PREOPERATIVE DIAGNOSIS:  Ileostomy hemorrhage.    POSTOPERATIVE DIAGNOSIS:  Ileostomy hemorrhage.    PROCEDURE:  Ileostomy revision and proctoscopy.    SURGEON:  Devang Moon MD    ANESTHESIA:  General.    HISTORY:  The patient is a woman who is status post  near total proctocolectomy with  end ileostomy for Crohn's disease many, many years ago.  Recently, she has developed  profuse peristomal bleeding.  She has not had recent ileoscopy or evaluation of her  residual small rectal cuff.  Presents for ileoscopy, possible revision of the  ileostomy to control hemorrhage and proctoscopy.  Risks, benefits, expected outcome  discussed.  She is eager to proceed.    DESCRIPTION OF PROCEDURE:  After the uneventful induction of general endotracheal  anesthesia, the patient was placed comfortably in supine position on operating  table.  The ileostomy was performed with the  pediatric colonoscope up to about 15  cm and that appeared that the mucosa of the ileum was pristine.  We then prepped and  draped the ileostomy and evaluated it.  There was some granulation tissue in the  sides and inferior aspect of this fairly protuberant ____.  The ileostomy protrudes  about 5 cm above the mucocutaneous junction.  The process of fulgurating this  granulation tissue profuse bleeding was encountered in multiple areas along the  mucocutaneous junction inferiorly.  This seemed to be abnormal.  As best I could,  devascularized the inferior aspect of the ileostomy and then reapproximated with  interrupted 2-0 and 3-0 Vicryl.  Hemostasis was achieved.  Confirmation was not  changed significantly.  The hope is that this vascular interruption and  reapproximation will cut down on the abnormal ____.  It is my clinical impression  that this represents an element of portal hypertension and she will need to be  evaluated for portal hypertension before any further revision of the ileostomy is  envisioned.   Clean  ileostomy appliance was applied.  Patient returned to recovery  room in good condition.      NADIA BEST MD  sn  D 06/17/2020 08:57:10  T 06/17/2020 10:20:17  R 06/17/2020 15:56:10  [id number]        cc:NADIA LUNA MD

## 2021-06-08 NOTE — TELEPHONE ENCOUNTER
New Appointment Needed  What is the reason for the visit:    Pre-Op Appt Request  When is the surgery? :  6/17  Where is the surgery?:   [facility]  Who is the surgeon? :  Dr. Garrett  What type of surgery is being done?: exploratory surgery belly  Provider Preference: PCP only  How soon do you need to be seen?: Monday 6/15 or Tuesday 6/16 is preferred.   Waitlist offered?: No  Okay to leave a detailed message:  Yes    PRE OP - EXPLORATORY SURGERY [place] 6/17 AT [facility] BEST

## 2021-06-08 NOTE — ANESTHESIA PREPROCEDURE EVALUATION
Anesthesia Evaluation      Patient summary reviewed   No history of anesthetic complications     Airway   Mallampati: II   Pulmonary - negative ROS and normal exam                          Cardiovascular - negative ROS and normal exam  Rhythm: regular  Rate: normal,         Neuro/Psych - negative ROS     Endo/Other    (+) diabetes mellitus (A1c=10.4) poorly controlled,      Comments: anemia    GI/Hepatic/Renal - negative ROS      Other findings: Results for HUSAM MCGILL (MRN [id number]) as of 6/17/2020 07:30    6/14/2020 09:09  2019-nCOV: Not Detected  COVID-19 VIRUS SPECIMEN SOURCE: Nasopharyngeal  Results for HUSAM MCGILL (MRN [id number]) as of 6/17/2020 07:30    6/10/2020 14:04  Creatinine: 0.91Results for HUSAM MCGILL (MRN [id number]) as of 6/17/2020 07:30    6/10/2020 14:04  Hemoglobin: 10.2 (L)    GFR MDRD Af Amer: >60  GFR MDRD Non Af Amer: >60  Hemoglobin A1c: 10.4 (H)        Dental - normal exam                        Anesthesia Plan  Planned anesthetic: general endotracheal  GAETT  Declines scopolamine  Antiemetics  Soft bite block  Positioning TBD by surgeon  Tylenol po pre op  toradol at the end of the case if okay with the surgeon  ASA 2   Induction: intravenous   Anesthetic plan and risks discussed with: patient    Post-op plan: routine recovery

## 2021-06-08 NOTE — ANESTHESIA CARE TRANSFER NOTE
Last vitals:   Vitals:    06/17/20 0859   BP: 138/63   Pulse: 86   Resp: 16   Temp: 36.7  C (98  F)   SpO2: 100%     Patient's level of consciousness is drowsy  Spontaneous respirations: yes  Maintains airway independently: yes  Dentition unchanged: yes  Oropharynx: oropharynx clear of all foreign objects    QCDR Measures:  ASA# 20 - Surgical Safety Checklist: WHO surgical safety checklist completed prior to induction    PQRS# 430 - Adult PONV Prevention: 4558F - Pt received => 2 anti-emetic agents (different classes) preop & intraop  ASA# 8 - Peds PONV Prevention: NA - Not pediatric patient, not GA or 2 or more risk factors NOT present  PQRS# 424 - Myranda-op Temp Management: 4559F - At least one body temp DOCUMENTED => 35.5C or 95.9F within required timeframe  PQRS# 426 - PACU Transfer Protocol: - Transfer of care checklist used  ASA# 14 - Acute Post-op Pain: ASA14B - Patient did NOT experience pain >= 7 out of 10

## 2021-06-08 NOTE — PROGRESS NOTES
Scheduled Procedure: Exploratory for colostomy  Surgery Date:  6/17  Surgery Location: [facility], fax [phone]    Surgeon:  Dr Moon    Assessment/Plan:     1. Pre-op exam  - Electrocardiogram Perform and Read  - COVID-19 Virus Antibody; Future  - Hemoglobin  - Creatinine    2. Colostomy status (H)  Bleeding around the stoma site, scheduled for surgery    3. History of Crohn's disease of colon with rectal bleeding (H)  Scheduled for surgery    4. Type 2 diabetes mellitus uncontrolled, with long-term current use of insulin (H)  - Glycosylated Hemoglobin A1C    Is currently taking Glargine 14 units two times a day, last note by Endocrine CNP indicates that she was to increase Glargine dose to 20 units two times a day. She was also to use sliding scale Humalog before meals. In regards to fasting before surgery in conjunction with Marissa Falcon; Pharm D; instructed patient to take her normal insulin Glargine doses the day before surgery  but then the morning of surgery to decrease Glargine by 1/2 (since she is only taking 14 units then to 7 units) She is to hold her Humalog during the day of Fasting.     4/2020 Hemglobin A1C was 10  Rechecked today and hemoglobin A1C was still 10.4.  Patient doesn't practice any sort of carbohydrate restriction and eats a diet very high in refined CHO, sugar sweetened beverages and candy bars. She is aware this is very directly contributing to the cause of diabetes Type 2. Offered that if she ever changes her mind regarding her dietary practices and would like to better control her diabetes that I would be willing to work with her. At this point she isn't interested.     Surgical Procedure Risk: moderatere    Have you had prior anesthesia?: Yes  Have you or any family members had a previous anesthesia reaction:  No  Do you or any family members have a history of a clotting or bleeding disorder?: No  Cardiac Risk Assessment: Geriatric Sensitive Perioperative Cardiac  risk: 0.7%; has uncontrolled DMT2, but otherwise EKG is normal sinus rhythm no other history of CAD, no current chest pain,or  exertional shortness of breath. Can do heavy work around the house such as scrubbing floors or lifting or moving heavy furniture or climb two flights of stairs (between 4 and 10 METs).        PT APPROVED FOR SURGERY APPROVAL GIVEN to proceed with proposed procedure, without further diagnostic evaluation.    Please Note: Patient has DMT2 that is uncontrolled; Hgb A1C in 4/2020 and drawn again today was 10. She is fully aware that she doesn't practice any sort of carbohydrate restricted diet that is necessary to control DMT2. In April her insulin was increased to 20 units of Glargine two times a day by the endocrine CNP  but she is only dosing herself 14 IU two times a day and uses short acting humalog before breakfast, lunch and dinner. Preoperative, during surgery, and posteroperatively she may need to have better glucose control that she has currently. In conjunction with Marissa Falcon, PharmD we instructed her to use her night dose of Glargine but 1/2 her morning dose to 10 units of Glargine (or 7 units as patient is using), to hold humalog while fasting.      Functional Status: Independent  Patient plans to recover at home with family.     Subjective:      Zara Webtser is a 69 y.o. female who presents for a preoperative consultation.  She has a history of colostomy for 30 years; original colectomy from colitis. She has increased bleeding from around the stoma; patient states the stoma itself is fine but she continues to lose blood from around the stoma site. She is to have a exploritory surgery to find out where the bleeding is coming from and to try to fix it. Her hemoglobin was low last week when she tried to give blood. Hemoglobin is lower this week than last time is was checked. 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     All other systems reviewed and  "are negative, other than those listed in the HPI.    Pertinent History  Do you have difficulty breathing or chest pain after walking up a flight of stairs: No  History of obstructive sleep apnea: No  Steroid use in the last 6 months: No  Frequent Aspirin/NSAID use: No  Prior Blood Transfusion: No  Prior Blood Transfusion Reaction: No  If for some reason prior to, during or after the procedure, if it is medically indicated, would you be willing to have a blood transfusion?:  There is no transfusion refusal.    Current Outpatient Medications   Medication Sig Dispense Refill     diazePAM (VALIUM) 5 MG tablet TAKE 1 TABLET BY MOUTH EVERY 6 HOURS AS NEEDED FOR ANXIETY  (Patient taking differently: For flying      ) 30 tablet 1     diphenoxylate-atropine (LOMOTIL) 2.5-0.025 mg per tablet TAKE 1 TABLET BY MOUTH 4 TIMES PER DAY AS NEEDED FOR DIARRHEA 30 tablet 0     flash glucose scanning reader (FREESTYLE VIDA 14 DAY READER) Misc Use 1 Units As Directed every 14 (fourteen) days. 1 each 0     flash glucose sensor (FREESTYLE VIDA 14 DAY SENSOR) Kit Use 1 Units As Directed daily. 2 kit 5     ibuprofen (ADVIL,MOTRIN) 200 MG tablet Take 400 mg by mouth 3 (three) times a day.       insulin glargine (LANTUS SOLOSTAR U-100 INSULIN) 100 unit/mL (3 mL) pen Take twice daily, up to 60 units 45 mL 5     insulin lispro (HUMALOG KWIKPEN INSULIN) 100 unit/mL pen INJECT 6 UNITS INTRAVENOUSLY WITH EACH MEALS UP TO 60 UNITS DAILY 54 mL 1     omeprazole (PRILOSEC) 40 MG capsule TAKE 1 CAPSULE BY MOUTH ONCE DAILY 90 capsule 0     ostomy adhesive Pste Apply as needed 4 Tube 3     pen needle, diabetic (ULTICARE PEN NEEDLE) 32 gauge x 5/32\" Ndle TEST BLOOD SUGARS UP TO 4 TIMES A DAY. 300 each 0     pravastatin (PRAVACHOL) 40 MG tablet TAKE 1 TABLET BY MOUTH EVERY NIGHT AT BEDTIME  90 tablet 3     YUVAFEM 10 mcg Tab INSERT 1 TABLET VAGINALLY TWICE WEEKLY, AS NEEDED  2     No current facility-administered medications for this visit.     "     Allergies   Allergen Reactions     Compazine [Prochlorperazine]      Metformin      Prochlorperazine Edisylate      Victoza [Liraglutide]        Patient Active Problem List   Diagnosis     Type II diabetes mellitus (H)     Post Colectomy     Hypercholesteremia     BMI 28.0-28.9,adult     Aspirin contraindicated     Crohn's disease of colon (H)     Gastrointestinal hemorrhage       No past medical history on file.    Past Surgical History:   Procedure Laterality Date     BREAST EXCISIONAL BIOPSY Left 1980     BREAST EXCISIONAL BIOPSY Left 1985     HYSTERECTOMY  1991     KIDNEY STONE REMOVAL      NOT SURE WHICH SIDE, DR. CHANEY     OOPHORECTOMY Bilateral 1991     IN REMOVAL COLON/ILEOSTOMY      Description: Total Abdominal Colectomy;  Recorded: 12/15/2011;     IN REMOVAL COLON/PROCTECTOMY/ILEOSTOMY      Description: Total Proctocolectomy;  Recorded: 08/10/2011;     IN VAG HYST, W/VAGINECTOMY      Description: Vaginal Hysterectomy With Colpectomy;  Recorded: 08/14/2014;       Social History     Socioeconomic History     Marital status:      Spouse name: Not on file     Number of children: Not on file     Years of education: Not on file     Highest education level: Not on file   Occupational History     Not on file   Social Needs     Financial resource strain: Not on file     Food insecurity     Worry: Not on file     Inability: Not on file     Transportation needs     Medical: Not on file     Non-medical: Not on file   Tobacco Use     Smoking status: Never Smoker     Smokeless tobacco: Never Used   Substance and Sexual Activity     Alcohol use: Yes     Comment: A glass of wine 1-2 times week     Drug use: No     Sexual activity: Not on file   Lifestyle     Physical activity     Days per week: Not on file     Minutes per session: Not on file     Stress: Not on file   Relationships     Social connections     Talks on phone: Not on file     Gets together: Not on file     Attends Gnosticism service: Not on file  "    Active member of club or organization: Not on file     Attends meetings of clubs or organizations: Not on file     Relationship status: Not on file     Intimate partner violence     Fear of current or ex partner: Not on file     Emotionally abused: Not on file     Physically abused: Not on file     Forced sexual activity: Not on file   Other Topics Concern     Not on file   Social History Narrative     Not on file       Patient Care Team:  Obdulia Chamorro MD as PCP - Farheen Elise DO as Assigned PCP          Objective:     Vitals:    06/10/20 1303   BP: 130/83   Pulse: 94   Resp: 16   Temp: 97.8  F (36.6  C)   TempSrc: Oral   Weight: 173 lb 12.8 oz (78.8 kg)   Height: 5' 5\" (1.651 m)   LMP: 03/24/1991         Physical Exam:  General Appearance:  Alert, cooperative, no distress, appears stated age   Head:  Normocephalic, without obvious abnormality, atraumatic   Eyes:  PERRL, conjunctiva/corneas clear, EOM's intact   Ears:  Normal TM's and external ear canals, both ears   Nose: Nares normal, septum midline, mucosa normal, no drainage   Throat: Lips, mucosa, and tongue normal; teeth and gums normal   Neck: Supple, symmetrical, trachea midline, no adenopathy, thyroid: not enlarged, symmetric, no tenderness/mass/nodules   Back:   Symmetric, no curvature, ROM normal, no CVA tenderness   Lungs:   Clear to auscultation bilaterally, respirations unlabored   Chest Wall:  No tenderness or deformity   Heart:  Regular rate and rhythm, S1, S2 normal, no murmur, rub or gallop   Abdomen:   Soft, non-tender, bowel sounds active all four quadrants,  no masses, no organomegaly   Extremities: Extremities normal, atraumatic, no cyanosis or edema   Skin: Skin color, texture, turgor normal, no rashes or lesions   Lymph nodes: Cervical and supraclavicular normal   Neurologic: Normal,Coordinated, smooth gait, balance, rapid alternating movements, sensory functioning, and cranial nerves II-XII grossly intact. DTR's+2 " bilaterally brachioradialis, knee, ankle.          Patient instructions:  Pravastatin and omeprazole take the day before surgery but not the day of surgery.  Insulin take normal Glargine dose the day before surgery; take 1/2 dose (7 units of Glargine) in the morning of Surgery; Hold humalog day of surgery while fasted.     No ibuprofen or aspirin from now until surgery.  EKG: self reviewed normal sinus rhythm     Labs:  Recent Results (from the past 24 hour(s))   Electrocardiogram Perform and Read   Result Value Ref Range    SYSTOLIC BLOOD PRESSURE      DIASTOLIC BLOOD PRESSURE      VENTRICULAR RATE 89 BPM    ATRIAL RATE 89 BPM    P-R INTERVAL 188 ms    QRS DURATION 82 ms    Q-T INTERVAL 390 ms    QTC CALCULATION (BEZET) 474 ms    P Axis 44 degrees    R AXIS -24 degrees    T AXIS 54 degrees    MUSE DIAGNOSIS       Normal sinus rhythm  Normal ECG  When compared with ECG of 08-AUG-2011 10:22,  No significant change was found     Hemoglobin   Result Value Ref Range    Hemoglobin 10.2 (L) 12.0 - 16.0 g/dL   Creatinine   Result Value Ref Range    Creatinine 0.91 0.60 - 1.10 mg/dL    GFR MDRD Af Amer >60 >60 mL/min/1.73m2    GFR MDRD Non Af Amer >60 >60 mL/min/1.73m2   Glycosylated Hemoglobin A1C   Result Value Ref Range    Hemoglobin A1c 10.4 (H) 3.5 - 6.0 %         Immunization History   Administered Date(s) Administered     Hep A, Adult IM (19yr & older) 11/09/2010     Influenza high dose,seasonal,PF, 65+ yrs 10/04/2016, 10/26/2017     Influenza, inj, historic,unspecified 11/02/2015     Influenza, seasonal,quad inj 6-35 mos 09/20/2010     Pneumo Conj 13-V (2010&after) 10/04/2016     Tdap 08/18/2010     Typhoid, Inj, Inactive 11/09/2010     ZOSTER, LIVE 10/04/2016           Electronically signed by Bell Gomes CNP 06/10/20 1:01 PM

## 2021-06-08 NOTE — PATIENT INSTRUCTIONS - HE
Pravastatin and omeprazole take the day before surgery but not the day of surgery.  Insulin take normal Glargine dose the day before surgery; take 1/2 dose (7 units of Glargine) in the morning of Surgery; Hold humalog day of surgery while fasted.     No ibuprofen or aspirin from now until surgery.

## 2021-06-08 NOTE — H&P (VIEW-ONLY)
Scheduled Procedure: Exploratory for colostomy  Surgery Date:  6/17  Surgery Location: [facility], fax [phone]    Surgeon:  Dr Moon    Assessment/Plan:     1. Pre-op exam  - Electrocardiogram Perform and Read  - COVID-19 Virus Antibody; Future  - Hemoglobin  - Creatinine    2. Colostomy status (H)  Bleeding around the stoma site, scheduled for surgery    3. History of Crohn's disease of colon with rectal bleeding (H)  Scheduled for surgery    4. Type 2 diabetes mellitus uncontrolled, with long-term current use of insulin (H)  - Glycosylated Hemoglobin A1C    Is currently taking Glargine 14 units two times a day, last note by Endocrine CNP indicates that she was to increase Glargine dose to 20 units two times a day. She was also to use sliding scale Humalog before meals. In regards to fasting before surgery in conjunction with Marissa Falcon; Pharm D; instructed patient to take her normal insulin Glargine doses the day before surgery  but then the morning of surgery to decrease Glargine by 1/2 (since she is only taking 14 units then to 7 units) She is to hold her Humalog during the day of Fasting.     4/2020 Hemglobin A1C was 10  Rechecked today and hemoglobin A1C was still 10.4.  Patient doesn't practice any sort of carbohydrate restriction and eats a diet very high in refined CHO, sugar sweetened beverages and candy bars. She is aware this is very directly contributing to the cause of diabetes Type 2. Offered that if she ever changes her mind regarding her dietary practices and would like to better control her diabetes that I would be willing to work with her. At this point she isn't interested.     Surgical Procedure Risk: moderatere    Have you had prior anesthesia?: Yes  Have you or any family members had a previous anesthesia reaction:  No  Do you or any family members have a history of a clotting or bleeding disorder?: No  Cardiac Risk Assessment: Geriatric Sensitive Perioperative Cardiac  risk: 0.7%; has uncontrolled DMT2, but otherwise EKG is normal sinus rhythm no other history of CAD, no current chest pain,or  exertional shortness of breath. Can do heavy work around the house such as scrubbing floors or lifting or moving heavy furniture or climb two flights of stairs (between 4 and 10 METs).        PT APPROVED FOR SURGERY APPROVAL GIVEN to proceed with proposed procedure, without further diagnostic evaluation.    Please Note: Patient has DMT2 that is uncontrolled; Hgb A1C in 4/2020 and drawn again today was 10. She is fully aware that she doesn't practice any sort of carbohydrate restricted diet that is necessary to control DMT2. In April her insulin was increased to 20 units of Glargine two times a day by the endocrine CNP  but she is only dosing herself 14 IU two times a day and uses short acting humalog before breakfast, lunch and dinner. Preoperative, during surgery, and posteroperatively she may need to have better glucose control that she has currently. In conjunction with Marissa Falcon, PharmD we instructed her to use her night dose of Glargine but 1/2 her morning dose to 10 units of Glargine (or 7 units as patient is using), to hold humalog while fasting.      Functional Status: Independent  Patient plans to recover at home with family.     Subjective:      Zara Webster is a 69 y.o. female who presents for a preoperative consultation.  She has a history of colostomy for 30 years; original colectomy from colitis. She has increased bleeding from around the stoma; patient states the stoma itself is fine but she continues to lose blood from around the stoma site. She is to have a exploritory surgery to find out where the bleeding is coming from and to try to fix it. Her hemoglobin was low last week when she tried to give blood. Hemoglobin is lower this week than last time is was checked. 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     All other systems reviewed and  "are negative, other than those listed in the HPI.    Pertinent History  Do you have difficulty breathing or chest pain after walking up a flight of stairs: No  History of obstructive sleep apnea: No  Steroid use in the last 6 months: No  Frequent Aspirin/NSAID use: No  Prior Blood Transfusion: No  Prior Blood Transfusion Reaction: No  If for some reason prior to, during or after the procedure, if it is medically indicated, would you be willing to have a blood transfusion?:  There is no transfusion refusal.    Current Outpatient Medications   Medication Sig Dispense Refill     diazePAM (VALIUM) 5 MG tablet TAKE 1 TABLET BY MOUTH EVERY 6 HOURS AS NEEDED FOR ANXIETY  (Patient taking differently: For flying      ) 30 tablet 1     diphenoxylate-atropine (LOMOTIL) 2.5-0.025 mg per tablet TAKE 1 TABLET BY MOUTH 4 TIMES PER DAY AS NEEDED FOR DIARRHEA 30 tablet 0     flash glucose scanning reader (FREESTYLE VIDA 14 DAY READER) Misc Use 1 Units As Directed every 14 (fourteen) days. 1 each 0     flash glucose sensor (FREESTYLE VIDA 14 DAY SENSOR) Kit Use 1 Units As Directed daily. 2 kit 5     ibuprofen (ADVIL,MOTRIN) 200 MG tablet Take 400 mg by mouth 3 (three) times a day.       insulin glargine (LANTUS SOLOSTAR U-100 INSULIN) 100 unit/mL (3 mL) pen Take twice daily, up to 60 units 45 mL 5     insulin lispro (HUMALOG KWIKPEN INSULIN) 100 unit/mL pen INJECT 6 UNITS INTRAVENOUSLY WITH EACH MEALS UP TO 60 UNITS DAILY 54 mL 1     omeprazole (PRILOSEC) 40 MG capsule TAKE 1 CAPSULE BY MOUTH ONCE DAILY 90 capsule 0     ostomy adhesive Pste Apply as needed 4 Tube 3     pen needle, diabetic (ULTICARE PEN NEEDLE) 32 gauge x 5/32\" Ndle TEST BLOOD SUGARS UP TO 4 TIMES A DAY. 300 each 0     pravastatin (PRAVACHOL) 40 MG tablet TAKE 1 TABLET BY MOUTH EVERY NIGHT AT BEDTIME  90 tablet 3     YUVAFEM 10 mcg Tab INSERT 1 TABLET VAGINALLY TWICE WEEKLY, AS NEEDED  2     No current facility-administered medications for this visit.     "     Allergies   Allergen Reactions     Compazine [Prochlorperazine]      Metformin      Prochlorperazine Edisylate      Victoza [Liraglutide]        Patient Active Problem List   Diagnosis     Type II diabetes mellitus (H)     Post Colectomy     Hypercholesteremia     BMI 28.0-28.9,adult     Aspirin contraindicated     Crohn's disease of colon (H)     Gastrointestinal hemorrhage       No past medical history on file.    Past Surgical History:   Procedure Laterality Date     BREAST EXCISIONAL BIOPSY Left 1980     BREAST EXCISIONAL BIOPSY Left 1985     HYSTERECTOMY  1991     KIDNEY STONE REMOVAL      NOT SURE WHICH SIDE, DR. CHANEY     OOPHORECTOMY Bilateral 1991     HI REMOVAL COLON/ILEOSTOMY      Description: Total Abdominal Colectomy;  Recorded: 12/15/2011;     HI REMOVAL COLON/PROCTECTOMY/ILEOSTOMY      Description: Total Proctocolectomy;  Recorded: 08/10/2011;     HI VAG HYST, W/VAGINECTOMY      Description: Vaginal Hysterectomy With Colpectomy;  Recorded: 08/14/2014;       Social History     Socioeconomic History     Marital status:      Spouse name: Not on file     Number of children: Not on file     Years of education: Not on file     Highest education level: Not on file   Occupational History     Not on file   Social Needs     Financial resource strain: Not on file     Food insecurity     Worry: Not on file     Inability: Not on file     Transportation needs     Medical: Not on file     Non-medical: Not on file   Tobacco Use     Smoking status: Never Smoker     Smokeless tobacco: Never Used   Substance and Sexual Activity     Alcohol use: Yes     Comment: A glass of wine 1-2 times week     Drug use: No     Sexual activity: Not on file   Lifestyle     Physical activity     Days per week: Not on file     Minutes per session: Not on file     Stress: Not on file   Relationships     Social connections     Talks on phone: Not on file     Gets together: Not on file     Attends Confucianism service: Not on file  "    Active member of club or organization: Not on file     Attends meetings of clubs or organizations: Not on file     Relationship status: Not on file     Intimate partner violence     Fear of current or ex partner: Not on file     Emotionally abused: Not on file     Physically abused: Not on file     Forced sexual activity: Not on file   Other Topics Concern     Not on file   Social History Narrative     Not on file       Patient Care Team:  Obdulia Chamorro MD as PCP - Farheen Elise DO as Assigned PCP          Objective:     Vitals:    06/10/20 1303   BP: 130/83   Pulse: 94   Resp: 16   Temp: 97.8  F (36.6  C)   TempSrc: Oral   Weight: 173 lb 12.8 oz (78.8 kg)   Height: 5' 5\" (1.651 m)   LMP: 03/24/1991         Physical Exam:  General Appearance:  Alert, cooperative, no distress, appears stated age   Head:  Normocephalic, without obvious abnormality, atraumatic   Eyes:  PERRL, conjunctiva/corneas clear, EOM's intact   Ears:  Normal TM's and external ear canals, both ears   Nose: Nares normal, septum midline, mucosa normal, no drainage   Throat: Lips, mucosa, and tongue normal; teeth and gums normal   Neck: Supple, symmetrical, trachea midline, no adenopathy, thyroid: not enlarged, symmetric, no tenderness/mass/nodules   Back:   Symmetric, no curvature, ROM normal, no CVA tenderness   Lungs:   Clear to auscultation bilaterally, respirations unlabored   Chest Wall:  No tenderness or deformity   Heart:  Regular rate and rhythm, S1, S2 normal, no murmur, rub or gallop   Abdomen:   Soft, non-tender, bowel sounds active all four quadrants,  no masses, no organomegaly   Extremities: Extremities normal, atraumatic, no cyanosis or edema   Skin: Skin color, texture, turgor normal, no rashes or lesions   Lymph nodes: Cervical and supraclavicular normal   Neurologic: Normal,Coordinated, smooth gait, balance, rapid alternating movements, sensory functioning, and cranial nerves II-XII grossly intact. DTR's+2 " bilaterally brachioradialis, knee, ankle.          Patient instructions:  Pravastatin and omeprazole take the day before surgery but not the day of surgery.  Insulin take normal Glargine dose the day before surgery; take 1/2 dose (7 units of Glargine) in the morning of Surgery; Hold humalog day of surgery while fasted.     No ibuprofen or aspirin from now until surgery.  EKG: self reviewed normal sinus rhythm     Labs:  Recent Results (from the past 24 hour(s))   Electrocardiogram Perform and Read   Result Value Ref Range    SYSTOLIC BLOOD PRESSURE      DIASTOLIC BLOOD PRESSURE      VENTRICULAR RATE 89 BPM    ATRIAL RATE 89 BPM    P-R INTERVAL 188 ms    QRS DURATION 82 ms    Q-T INTERVAL 390 ms    QTC CALCULATION (BEZET) 474 ms    P Axis 44 degrees    R AXIS -24 degrees    T AXIS 54 degrees    MUSE DIAGNOSIS       Normal sinus rhythm  Normal ECG  When compared with ECG of 08-AUG-2011 10:22,  No significant change was found     Hemoglobin   Result Value Ref Range    Hemoglobin 10.2 (L) 12.0 - 16.0 g/dL   Creatinine   Result Value Ref Range    Creatinine 0.91 0.60 - 1.10 mg/dL    GFR MDRD Af Amer >60 >60 mL/min/1.73m2    GFR MDRD Non Af Amer >60 >60 mL/min/1.73m2   Glycosylated Hemoglobin A1C   Result Value Ref Range    Hemoglobin A1c 10.4 (H) 3.5 - 6.0 %         Immunization History   Administered Date(s) Administered     Hep A, Adult IM (19yr & older) 11/09/2010     Influenza high dose,seasonal,PF, 65+ yrs 10/04/2016, 10/26/2017     Influenza, inj, historic,unspecified 11/02/2015     Influenza, seasonal,quad inj 6-35 mos 09/20/2010     Pneumo Conj 13-V (2010&after) 10/04/2016     Tdap 08/18/2010     Typhoid, Inj, Inactive 11/09/2010     ZOSTER, LIVE 10/04/2016           Electronically signed by Bell Gomes CNP 06/10/20 1:01 PM

## 2021-06-09 NOTE — TELEPHONE ENCOUNTER
Refill Approved    Rx renewed per Medication Renewal Policy. Medication was last renewed on 4/9/2020.    Mendy Gao, [facility] Triage/Med Refill 7/2/2020     Requested Prescriptions   Pending Prescriptions Disp Refills     omeprazole (PRILOSEC) 40 MG capsule [Pharmacy Med Name: OMEPRAZOLE DR 40 MG CAPSULE] 90 capsule 0     Sig: TAKE 1 CAPSULE BY MOUTH EVERY DAY       GI Medications Refill Protocol Passed - 7/2/2020 12:31 AM        Passed - PCP or prescribing provider visit in last 12 or next 3 months.     Last office visit with prescriber/PCP: 5/30/2017 Obdulia Chamorro MD OR same dept: 1/15/2020 Farheen Gonzalez DO OR same specialty: 1/15/2020 Farheen Gonzalez DO  Last physical: Visit date not found Last MTM visit: Visit date not found   Next visit within 3 mo: Visit date not found  Next physical within 3 mo: Visit date not found  Prescriber OR PCP: Obdulia Chamorro MD  Last diagnosis associated with med order: 1. GERD (gastroesophageal reflux disease)  - omeprazole (PRILOSEC) 40 MG capsule [Pharmacy Med Name: OMEPRAZOLE DR 40 MG CAPSULE]; TAKE 1 CAPSULE BY MOUTH EVERY DAY  Dispense: 90 capsule; Refill: 0    If protocol passes may refill for 12 months if within 3 months of last provider visit (or a total of 15 months).

## 2021-06-09 NOTE — TELEPHONE ENCOUNTER
Telephoned patient to follow up and check on BG readings after insulin adjustment made on 6/19/20  Patient reported she is doing well- blood sugars have been improved since increase and she has had no hypoglycemic episodes  Did not have specific blood sugars available at time of call to go over   Zara stated she had no further questions/ concerns and I advised her to call if something came up     Call was <15 minutes in [facility]

## 2021-06-09 NOTE — TELEPHONE ENCOUNTER
Patient had surgery this week and her blood sugars have been high ever since.    Please advise on what to do.    Ayala @ [phone]

## 2021-06-09 NOTE — PROGRESS NOTES
Assessment/ Plan     1. Anxiety/phobia  Patient specifically has a fear of flying.  She has used diazepam in the past and it has worked well.  She does not want to be on any long-term medication as it is only related to flying.  She plans on traveling to [place] and then going to the [facility].  She does not want the symptoms to inhibit her activities.  I did warn that it can cause drowsiness, not to mix with alcohol or drive while using the product.  We did briefly discuss about seeing a counselor for specific phobia, but she would like to hold off for now.  - diazePAM (VALIUM) 5 MG tablet; Take 1 tablet (5 mg total) by mouth every 6 (six) hours as needed for anxiety.  Dispense: 30 tablet; Refill: 2      Subjective:       Zara Webster is a 66 y.o. female who presents for discussion of treatment of anxiety.  Patient states she has very specific phobia of flying on airplanes.  She has been really good about this and frequently flu without any difficulties.  Now she states over the last year she does have an overwhelming fear when she is flying.  She just retired this last year and wants to do a lot of things.  She does not want to have this keeping her from doing things that she wants to do.  She has used diazepam in the past and it worked well for her.  She did not have any adverse effects.  We did discuss that she should not mix with alcohol or drive while she is under the influence.  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  She states that her blood sugars have been under better control.  She has been seen at the endocrinology office.  She just joined the [employer] and is exercising 4 times a week and her insulin needs have come down.  She is not quite due for an A1c today.    The following portions of the patient's history were reviewed and updated as appropriate: allergies, current medications, past family history, past medical history, past social history, past surgical history and problem list.    Review of Systems  "  A 12 point comprehensive review of systems was negative except as noted.      Current Outpatient Prescriptions   Medication Sig Dispense Refill     BD ULTRA-FINE LOLY PEN NEEDLES 32 gauge x 5/32\" Ndle USE 1 PEN NEEDLE WITH EACH INJECTION EVERY NIGHT AT BEDTIME AS DIRECTED 100 each 1     blood glucose test (ACCU-CHEK SMARTVIEW TEST STRIP) strips Please test 4 times daily 360 strip 1     diphenoxylate-atropine (LOMOTIL) 2.5-0.025 mg per tablet Take 1 tablet by mouth 4 (four) times a day as needed for diarrhea.       ibuprofen (ADVIL,MOTRIN) 200 MG tablet Take 400 mg by mouth 3 (three) times a day.       insulin glargine (LANTUS SOLOSTAR) 100 unit/mL (3 mL) pen Inject 20 Units under the skin bedtime. Do not mix Lantus with any other insulin 10 adj dose pen 0     insulin lispro (HUMALOG KWIKPEN) 100 unit/mL pen 6 units with each meal; up to 30 units daily 5 adj dose pen 0     insulin needles, disposable, 32 x 5/32 \" Ndle TEST BLOOD SUGARS UP TO 4 TIMES A DAY. 380 each 1     lancets Misc 3 (three) times a day. Use 1 lancet. Accu-Chek FastClix Lancets Miscellaneous       omeprazole (PRILOSEC) 40 MG capsule TAKE ONE CAPSULE BY MOUTH EVERY DAY 90 capsule 3     ostomy adhesive Pste Apply as needed 4 Tube 3     pravastatin (PRAVACHOL) 40 MG tablet TAKE 1 TABLET BY MOUTH EVERY NIGHT AT BEDTIME 90 tablet 3     YUVAFEM 10 mcg Tab INSERT 1 TABLET VAGINALLY TWICE WEEKLY  2     CHROM JOSS/BRINDAL BERRY (GARCINIA CAMBOGIA ORAL) Take by mouth.       diazePAM (VALIUM) 5 MG tablet Take 1 tablet (5 mg total) by mouth every 6 (six) hours as needed for anxiety. 30 tablet 2     No current facility-administered medications for this visit.        Objective:        Visit Vitals     /76 (Patient Site: Right Arm, Patient Position: Sitting, Cuff Size: Adult Regular)     Pulse 100     Temp 98  F (36.7  C) (Oral)     Resp 16     Ht 5' 6\" (1.676 m)     Wt 174 lb (78.9 kg)     BMI 28.08 kg/m2         General appearance: alert, appears stated " age and cooperative  DEJAN 7 equals 6, only related to flying.    No results found for this or any previous visit (from the past 168 hour(s)).       This note has been dictated using voice recognition software. Any grammatical or context distortions are unintentional and inherent to the software

## 2021-06-09 NOTE — TELEPHONE ENCOUNTER
Was able to reach patient margie in afternoon - per her report, she ahd a revision of colostomy on 6/17 and blood sugars since then have been high- consistently in the 200 -300 range  Reviewed current insulin doses with her  She is taking 14 units of Lantus BID and 6 units of Novolog with meals ( will administer an additional 5- 6 units in evening or at HS if BG are still high)   Informed her that in doing my chart review ogf her last visit with Harmony Zee NP  On 4/28/20 she had been instructed to increase Lantus to 20 units BID and 10 units of Novolog with meals   Zara stated she had not done this.  Instructed her today to increase Lantus to 16 units BID and if fasting blood sugars remained over 200 in the next 2-3 following days to increase again by 2 units to 18 units BID. In addition increase Novolog from 6 units to 8 units. She stated she felt comfortable doing this - instructed to call back if, after these increases BG remain elevated

## 2021-06-10 NOTE — PROGRESS NOTES
Assessment/ Plan     1. Diarrhea  Patient has now had diarrhea for several months that she cannot get older.  She has a past history of Crohn's disease and has an ostomy.  In the past the Lomotil has helped with flares.  She has had about 6 flares in the last 2 years.  She does not feel like she is dehydrated at this point.  She will keep me posted if things are not improving or if becoming more dehydrated.  Would consider GI consult at that time.  She has a follow-up appointment with endocrinologist for her diabetes, so would like to wait on blood work.  - diphenoxylate-atropine (LOMOTIL) 2.5-0.025 mg per tablet; Take 1 tablet by mouth 4 (four) times a day as needed for diarrhea.  Dispense: 30 tablet; Refill: 1      Subjective:       Zara Webster is a 66 y.o. female who presents for discussion of diarrhea.  Patient has had a colostomy now for 35 years.  She had fairly severe Crohn's disease.  She does not think the left anything behind that could come affected.  She states now for the last couple months she has had ongoing watery diarrhea.  There has been no blood in the stool.  She has not felt sick in any way.  She has been pushing fluids and keeping hydrated.  She denies feeling tachycardic or lightheaded.  In the past, she is used Lomotil for flares.  She has had about 6 flares in the last 2 years.  She ran out of medication and it was actually  from .  She wants to start with a refill first.  She thinks she is okay not doing labs today and would like to wait until she sees the endocrinologist in a couple of weeks.    The following portions of the patient's history were reviewed and updated as appropriate: allergies, current medications, past family history, past medical history, past social history, past surgical history and problem list.    Review of Systems   A 12 point comprehensive review of systems was negative except as noted.      Current Outpatient Prescriptions   Medication Sig Dispense  "Refill     BD ULTRA-FINE LOLY PEN NEEDLES 32 gauge x 5/32\" Ndle USE 1 PEN NEEDLE WITH EACH INJECTION EVERY NIGHT AT BEDTIME AS DIRECTED 100 each 1     blood glucose test (ACCU-CHEK SMARTVIEW TEST STRIP) strips Please test 4 times daily 360 strip 1     diphenoxylate-atropine (LOMOTIL) 2.5-0.025 mg per tablet Take 1 tablet by mouth 4 (four) times a day as needed for diarrhea. 30 tablet 1     ibuprofen (ADVIL,MOTRIN) 200 MG tablet Take 400 mg by mouth 3 (three) times a day.       insulin glargine (LANTUS SOLOSTAR) 100 unit/mL (3 mL) pen Inject 20 Units under the skin at bedtime. Do not mix Lantus with any other insulin 6 adj dose pen 0     insulin lispro (HUMALOG KWIKPEN) 100 unit/mL pen 6 units with each meal; up to 30 units daily 5 adj dose pen 0     insulin needles, disposable, 32 x 5/32 \" Ndle TEST BLOOD SUGARS UP TO 4 TIMES A DAY. 380 each 1     lancets Misc 3 (three) times a day. Use 1 lancet. Accu-Chek FastClix Lancets Miscellaneous       omeprazole (PRILOSEC) 40 MG capsule TAKE ONE CAPSULE BY MOUTH EVERY DAY 90 capsule 0     ostomy adhesive Pste Apply as needed 4 Tube 3     pravastatin (PRAVACHOL) 40 MG tablet TAKE 1 TABLET BY MOUTH EVERY NIGHT AT BEDTIME 90 tablet 1     YUVAFEM 10 mcg Tab INSERT 1 TABLET VAGINALLY TWICE WEEKLY  2     No current facility-administered medications for this visit.        Objective:      /70  Pulse 100  Temp 98.1  F (36.7  C) (Oral)   Resp 18  Ht 5' 6\" (1.676 m)  Wt 172 lb (78 kg)  LMP 03/24/1991  BMI 27.76 kg/m2      General appearance: alert, appears stated age and cooperative  Lungs: clear to auscultation bilaterally  Heart: regular rate and rhythm, S1, S2 normal, no murmur, click, rub or gallop  Abdomen: soft, non-tender; bowel sounds normal; no masses,  no organomegaly    No results found for this or any previous visit (from the past 168 hour(s)).       This note has been dictated using voice recognition software. Any grammatical or context distortions are " unintentional and inherent to the software

## 2021-06-11 NOTE — TELEPHONE ENCOUNTER
Patient called. Please fax clinical notes to [facility]. They need this information to process refill request for the  14 day ra sensors.    Fax @ [phone]    Zara @ [phone]

## 2021-06-11 NOTE — TELEPHONE ENCOUNTER
Controlled Substance Refill Request  Medication Name:   Requested Prescriptions     Pending Prescriptions Disp Refills     diphenoxylate-atropine (LOMOTIL) 2.5-0.025 mg per tablet [Pharmacy Med Name: DIPHENOXYLATE-ATROP 2.5-0.025] 30 tablet 0     Sig: TAKE 1 TABLET BY MOUTH 4 TIMES PER DAY AS NEEDED FOR DIARRHEA     Date Last Fill: 1/16/20  Requested [facility]acy: [facility]  Submit electronically to pharmacy  Controlled Substance Agreement on file:   Encounter-Level CSA Scan Date:    There are no encounter-level csa scan date.        Last office visit:  6/10/20

## 2021-06-11 NOTE — PROGRESS NOTES
[facility]  ENDOCRINOLOGY    Diabetes Note 7/6/2017    Zara Webster, 1950, [id number]          Reason for visit      1. Type 2 diabetes mellitus    2. Diabetes mellitus type 2, controlled    3. Type II diabetes mellitus        HPI     Zara Webster is a very pleasant 66 y.o. old female who presents for follow up.  SUMMARY:  Zara returns today in f/u for DM 2.  Her A1c has traveled up to 8.7.  She continues to play golf frequently and remains active, but given her meter download, with only 9 readings on it, it is clear that she is not paying enough attention.  She should be testing with every meal and getting a fasting number first thing.  Of the 9 tests, she has an average BG of 191. Her weight is up and she has a hard time of things because of her colectomy.  She states that she is hungry all of the time and that if she doesn't load up on CHO that she doesn't stay satiated.  She eats ice cream at night frequently, and she is a known [employer] consumer.  She does take insulin when she eats, but is usually unaware of her blood sugars.  Blood glucose data:  Ave 191 SD: 27    Past Medical History     Patient Active Problem List   Diagnosis     Type II diabetes mellitus     Post Colectomy     Hypercholesteremia     H/O Crohn's disease     BMI 28.0-28.9,adult        Family History       family history includes Cancer in her father; Diabetes in her father and mother; Heart disease in her mother; Hyperlipidemia in her mother.    Social History      reports that she has never smoked. She has never used smokeless tobacco. She reports that she drinks alcohol. She reports that she does not use illicit drugs.      Review of Systems     Patient has no polyuria or polydipsia, no chest pain, dyspnea or TIA's, no numbness, tingling or pain in extremities  Remainder negative except as noted in HPI.    Vital Signs     /70 (Patient Site: Right Arm, Patient Position: Sitting, Cuff Size: Adult Regular)  Pulse  "80  Ht 5' 6\" (1.676 m)  Wt 172 lb 6.4 oz (78.2 kg)  LMP 03/24/1991  BMI 27.83 kg/m2  Wt Readings from Last 3 Encounters:   06/29/17 172 lb 6.4 oz (78.2 kg)   05/30/17 172 lb (78 kg)   02/27/17 174 lb (78.9 kg)       Physical Exam     Constitutional:  Well developed, Well nourished  HENT:  Normocephalic,   Neck: Thyroid normal, No lymph nodes, Supple  Eyes:  PERRL, Conjunctiva pink  Respiratory:  Normal breath sounds, No respiratory distress  Cardiovascular:  Normal heart rate, Normal rhythm, No murmurs  GI:  Bowel sounds normal, Soft, No tenderness  Musculoskeletal:  No gross deformity or lesions, normal dorsalis pedis pulses  Skin: No acanthosis nigricans, lipoatrophy or lipodystrophy  Neurologic:  Alert & oriented x 3, nonfocal  Psychiatric:  Affect, Mood, Insight appropriate  Diabetic foot exam: no ulcers, charcot's or high risk calluses, Normal monofilament exam          Assessment     1. Type 2 diabetes mellitus    2. Diabetes mellitus type 2, controlled    3. Type II diabetes mellitus        Plan     Zara really needs to be testing more often and this is a common theme with her.  If she were more aware of her blood sugars, she would be doing more about them.  I have also instructed her to split her Lantus dose in half and take half in the morning (10 units) and half in the evening, 10 units. I will see her back in 3 months.  Time spent with pt today: 25 min with >50% spent in counseling and coordination of care.         Katiana BASS Endocrinology  7/6/2017  10:10 AM        Lab Results     Hemoglobin A1c   Date Value Ref Range Status   06/20/2017 8.7 (H) 3.5 - 6.0 % Final   12/12/2016 8.0 (H) 3.5 - 6.0 % Final   09/07/2016 8.2 (H) 3.5 - 6.0 % Final   04/27/2015 8.5 (H) 3.5 - 6.0 % Final   03/16/2015 10.2 (H) 3.5 - 6.0 % Final     Creatinine   Date Value Ref Range Status   06/20/2017 0.84 0.60 - 1.10 mg/dL Final   09/07/2016 0.81 0.60 - 1.10 mg/dL Final   08/14/2014 0.84 0.60 - 1.10 mg/dL Final " "    Microalbumin, Random Urine   Date Value Ref Range Status   09/07/2016 4.55 (H) 0.00 - 1.99 mg/dL Final       Cholesterol   Date Value Ref Range Status   08/14/2014 265 (H) <=199 mg/dL Final     HDL Cholesterol   Date Value Ref Range Status   08/14/2014 58 >=40 mg/dL Final     LDL Calculated   Date Value Ref Range Status   08/14/2014 172 (H) 0 - 129 mg/dL Final     Triglycerides   Date Value Ref Range Status   08/14/2014 173 (H) <=149 mg/dL Final       Lab Results   Component Value Date    ALT 73 (H) 08/14/2014    AST 61 (H) 08/14/2014    ALKPHOS 94 08/14/2014    BILITOT 0.5 08/14/2014         Current Medications     Outpatient Medications Prior to Visit   Medication Sig Dispense Refill     BD ULTRA-FINE LOLY PEN NEEDLES 32 gauge x 5/32\" Ndle USE WITH EACH INJECTION EVERY NIGHT AT BEDTIME AS DIRECTED 100 each 2     diphenoxylate-atropine (LOMOTIL) 2.5-0.025 mg per tablet Take 1 tablet by mouth 4 (four) times a day as needed for diarrhea. 30 tablet 1     ibuprofen (ADVIL,MOTRIN) 200 MG tablet Take 400 mg by mouth 3 (three) times a day.       lancets Misc 3 (three) times a day. Use 1 lancet. Accu-Chek FastClix Lancets Miscellaneous       omeprazole (PRILOSEC) 40 MG capsule TAKE ONE CAPSULE BY MOUTH EVERY DAY 90 capsule 0     ostomy adhesive Pste Apply as needed 4 Tube 3     pravastatin (PRAVACHOL) 40 MG tablet TAKE 1 TABLET BY MOUTH EVERY NIGHT AT BEDTIME 90 tablet 1     YUVAFEM 10 mcg Tab INSERT 1 TABLET VAGINALLY TWICE WEEKLY  2     blood glucose test (ACCU-CHEK SMARTVIEW TEST STRIP) strips Please test 4 times daily 360 strip 1     insulin glargine (LANTUS SOLOSTAR) 100 unit/mL (3 mL) pen Inject 20 Units under the skin at bedtime. Do not mix Lantus with any other insulin 6 adj dose pen 0     insulin lispro (HUMALOG KWIKPEN) 100 unit/mL pen 6 units with each meal; up to 30 units daily 5 adj dose pen 1     insulin needles, disposable, 32 x 5/32 \" Ndle TEST BLOOD SUGARS UP TO 4 TIMES A DAY. 380 each 1     No " facility-administered medications prior to visit.
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
## 2021-06-12 NOTE — TELEPHONE ENCOUNTER
Controlled Substance Refill Request  Medication Name:   Requested Prescriptions     Pending Prescriptions Disp Refills     diphenoxylate-atropine (LOMOTIL) 2.5-0.025 mg per tablet [Pharmacy Med Name: DIPHENOXYLATE-ATROP 2.5-0.025] 30 tablet 0     Sig: TAKE 1 TABLET BY MOUTH 4 TIMES PER DAY AS NEEDED FOR DIARRHEA     Date Last Fill: 9/9/20  [facility]  Submit electronically to pharmacy  Controlled Substance Agreement on file:   Encounter-Level CSA Scan Date:    There are no encounter-level csa scan date.        Last office visit:  6/10/20  Blessing Beckett RN, MA  [facility]    Triage Nurse Advisor

## 2021-06-12 NOTE — PROGRESS NOTES
Assessment/ Plan     1. Pain of left lower leg  Zara is having some symptoms of pain shooting down her left leg that sounds consistent with radiculopathy versus sciatica.  She prefers to treat this conservatively.  We will try Medrol Dosepak and she continue with ibuprofen.  She has some exercises that she will do at home.  If she is not having any improvement in symptoms, could consider formal physical therapy.  Marijuana consider imaging, but she is very claustrophobic and would prefer to avoid an MRI if at all possible.  She is not having any red flag symptoms such as bowel and bladder dysfunction or weakness.  - methylPREDNISolone (MEDROL DOSEPACK) 4 mg tablet; follow package directions  Dispense: 21 tablet; Refill: 0    2. Type 2 diabetes mellitus without complication, with long-term current use of insulin (H)  She is following with endocrinology.  She has not had very good control of her diabetes with her most recent A1c of 10.    3. Crohn's disease of colon with complication (H)  She has a colectomy and recently had a revision over the summer.  She treats her symptoms with omeprazole and Lomotil.  She needs refills today.    Subjective:       Zara Webster is a 70 y.o. female who presents for discussion of left leg pain.  She states that about 20 years ago she had a work-related back injury.  She remembers getting some steroid injections and it was quite helpful.  She is now dealing with some left leg pain for the last month or so.  She will have a shooting pain that starts in her left buttock goes down her leg into her foot and toes.  Sometimes she will feel pins-and-needles.  She typically feels it after she has been sitting for a long time or laying down for long time and then gets up.  She states it takes about 5 to 10 minutes when she is up and moving for the symptoms to resolve.  She is active, playing golf and doing a lot of walking.  It does not bother her when she is walking.  She is not having  "any weakness.  She not had any bowel or bladder dysfunction.  She prefers to treat this conservatively.  She is been taking some ibuprofen.  She was recently diagnosed with cirrhosis of unclear etiology and portal hypertension.  We discussed avoiding Tylenol.    Relevant past medical, family, surgical, and social history reviewed with patient, unless noted in HPI, not pertinent for this visit.  Medications were discussed and reconciled.   Review of Systems   A 12 point comprehensive review of systems was negative except as noted.      Current Outpatient Medications   Medication Sig Dispense Refill     flash glucose scanning reader (FREESTYLE VIDA 14 DAY READER) Misc Use 1 Units As Directed every 14 (fourteen) days. 1 each 0     flash glucose sensor (FREESTYLE VIDA 14 DAY SENSOR) Kit Use 1 Units As Directed daily. 2 kit 5     ibuprofen (ADVIL,MOTRIN) 200 MG tablet Take 400 mg by mouth 3 (three) times a day.       insulin glargine (LANTUS SOLOSTAR U-100 INSULIN) 100 unit/mL (3 mL) pen Take twice daily, up to 60 units 45 mL 0     insulin lispro (HUMALOG KWIKPEN INSULIN) 100 unit/mL pen INJECT 6 UNITS INTRAVENOUSLY WITH EACH MEALS UP TO 60 UNITS DAILY 54 mL 1     omeprazole (PRILOSEC) 40 MG capsule TAKE 1 CAPSULE BY MOUTH EVERY DAY 90 capsule 1     ostomy adhesive Pste Apply as needed 4 Tube 3     pen needle, diabetic (ULTICARE PEN NEEDLE) 32 gauge x 5/32\" Ndle TEST BLOOD SUGARS UP TO 4 TIMES A DAY. 300 each 0     YUVAFEM 10 mcg Tab INSERT 1 TABLET VAGINALLY TWICE WEEKLY, AS NEEDED  2     diazePAM (VALIUM) 5 MG tablet TAKE 1 TABLET BY MOUTH EVERY 6 HOURS AS NEEDED FOR ANXIETY  (Patient taking differently: For flying      ) 30 tablet 1     diphenoxylate-atropine (LOMOTIL) 2.5-0.025 mg per tablet TAKE 1 TABLET BY MOUTH 4 TIMES PER DAY AS NEEDED FOR DIARRHEA 30 tablet 11     methylPREDNISolone (MEDROL DOSEPACK) 4 mg tablet follow package directions 21 tablet 0     No current facility-administered medications for this " "visit.        Objective:      /74   Pulse (!) 108   Temp 97.9  F (36.6  C) (Oral)   Resp 16   Ht 5' 5\" (1.651 m)   Wt 173 lb (78.5 kg)   LMP 03/24/1991   BMI 28.79 kg/m        General appearance: alert, appears stated age and cooperative  Lungs: clear to auscultation bilaterally  Heart: regular rate and rhythm, S1, S2 normal, no murmur, click, rub or gallop  Back: Full range of motion without difficulty.  No tenderness to palpation.  Mild tenderness over left buttock.  Full strength of the lower extremities, 5 out of 5 throughout.  Reflexes are 2+ and symmetric.  Negative straight leg raise.    No results found for this or any previous visit (from the past 168 hour(s)).       This note has been dictated using voice recognition software. Any grammatical or context distortions are unintentional and inherent to the software  "

## 2021-06-12 NOTE — PROGRESS NOTES
"Zara Webster is a 70 y.o. female who is being evaluated via a billable telephone visit.      The patient has been notified of following:     \"This telephone visit will be conducted via a call between you and your physician/provider. We have found that certain health care needs can be provided without the need for a physical exam.  This service lets us provide the care you need with a short phone conversation.  If a prescription is necessary we can send it directly to your pharmacy.  If lab work is needed we can place an order for that and you can then stop by our lab to have the test done at a later time.    Telephone visits are billed at different rates depending on your insurance coverage. During this emergency period, for some insurers they may be billed the same as an in-person visit.  Please reach out to your insurance provider with any questions.    If during the course of the call the physician/provider feels a telephone visit is not appropriate, you will not be charged for this service.\"    Patient has given verbal consent to a Telephone visit? Yes    What phone number would you like to be contacted at? [phone]    Patient would like to receive their AVS by AVS Preference: Mail a copy.    Additional provider notes:       Reason for visit      1. Type 2 diabetes mellitus with diabetic nephropathy, with long-term current use of insulin (H)    2. Stage 3a chronic kidney disease        HPI     Zara Webster is a very pleasant 70 y.o. old female who presents for follow up.  SUMMARY:    Zara is contacted today in f/u for DM 2. Her current A1c is 10 and is a bit lower than her last at 10.4. She reports that this is \"all about her diet\". She doesn't eat any veggies or fruit 2/2 taste and her Colostomy. She reports that she \"lives on CHO\".  She recently had bleeding around the stoma and had to have some repair work done. In the meantime, it was found that she has cirrhosis and portal hypertension. She notes " that she is feeling well and remains quite active. She loves to play Golf and will be doing so this week. She also goes to the  3-4 times a week for Silver Sneakers. She is taking 14 units of Lantus two times a day and she also takes Humalog, 6 units just about every  4 hours. She has just started to show changes in her GFR.     Blood Sugar Readings    Average 8:00a 192  Average 8:00p 237  Average glucose 234      Past Medical History     Patient Active Problem List   Diagnosis     Type II diabetes mellitus (H)     Post Colectomy     Hypercholesteremia     BMI 28.0-28.9,adult     Aspirin contraindicated     Crohn's disease of colon (H)     Gastrointestinal hemorrhage     Other cirrhosis of liver (H) - unclear etiology,[facility]     Chronic kidney disease, stage 3        Family History       family history includes Cancer in her father; Diabetes in her father and mother; Heart disease in her mother; Hyperlipidemia in her mother.    Social History      reports that she has never smoked. She has never used smokeless tobacco. She reports current alcohol use. She reports that she does not use drugs.      Review of Systems     Patient has no polyuria or polydipsia, no chest pain, dyspnea or TIA's, no numbness, tingling or pain in extremities  Remainder negative except as noted in HPI.    Vital Signs     LMP 03/24/1991   Wt Readings from Last 3 Encounters:   10/22/20 173 lb (78.5 kg)   06/17/20 173 lb (78.5 kg)   06/10/20 173 lb 12.8 oz (78.8 kg)             Assessment     1. Type 2 diabetes mellitus with diabetic nephropathy, with long-term current use of insulin (H)    2. Stage 3a chronic kidney disease        Plan     Instructed to increase her Lantus doses to 18 units two times a day. She will continue with her Humalog as she has been doing. Encouraged her to work on her diet. F/u with me in 6 months.         Lab Results     Hemoglobin A1c   Date Value Ref Range Status   10/27/2020 10.0 (H) <=5.6 % Final     Comment:      "Normal <5.7% Prediabete 5.7-6.4% Diabletes 6.5% or higher - adopted from ADA consensus guidelines   06/10/2020 10.4 (H) 3.5 - 6.0 % Final   04/21/2020 10.3 (H) 3.5 - 6.0 % Final   10/11/2019 8.6 (H) 3.5 - 6.0 % Final   03/29/2019 8.7 (H) 3.5 - 6.0 % Final     Creatinine   Date Value Ref Range Status   10/27/2020 1.04 0.60 - 1.10 mg/dL Final   06/10/2020 0.91 0.60 - 1.10 mg/dL Final   04/21/2020 0.96 0.60 - 1.10 mg/dL Final     Microalbumin, Random Urine   Date Value Ref Range Status   04/21/2020 0.56 0.00 - 1.99 mg/dL Final       Cholesterol   Date Value Ref Range Status   10/03/2017 282 (H) <=199 mg/dL Final     HDL Cholesterol   Date Value Ref Range Status   10/03/2017 60 >=50 mg/dL Final     LDL Calculated   Date Value Ref Range Status   10/03/2017 172 (H) <=129 mg/dL Final     Triglycerides   Date Value Ref Range Status   10/03/2017 252 (H) <=149 mg/dL Final       Lab Results   Component Value Date    ALT 60 09/08/2020    AST 52 (!) 09/08/2020    ALKPHOS 94 08/14/2014    BILITOT 0.5 08/14/2014         Current Medications     Outpatient Medications Prior to Visit   Medication Sig Dispense Refill     flash glucose scanning reader (FREESTYLE VIDA 14 DAY READER) Misc Use 1 Units As Directed every 14 (fourteen) days. 1 each 0     flash glucose sensor (FREESTYLE VIDA 14 DAY SENSOR) Kit Use 1 Units As Directed daily. 2 kit 5     ibuprofen (ADVIL,MOTRIN) 200 MG tablet Take 400 mg by mouth 3 (three) times a day.       insulin glargine (LANTUS SOLOSTAR U-100 INSULIN) 100 unit/mL (3 mL) pen Take twice daily, up to 60 units 45 mL 0     insulin lispro (HUMALOG KWIKPEN INSULIN) 100 unit/mL pen INJECT 6 UNITS INTRAVENOUSLY WITH EACH MEALS UP TO 60 UNITS DAILY 54 mL 1     omeprazole (PRILOSEC) 40 MG capsule TAKE 1 CAPSULE BY MOUTH EVERY DAY 90 capsule 1     ostomy adhesive Pste Apply as needed 4 Tube 3     pen needle, diabetic (ULTICARE PEN NEEDLE) 32 gauge x 5/32\" Ndle TEST BLOOD SUGARS UP TO 4 TIMES A DAY. 300 each 0     " diazePAM (VALIUM) 5 MG tablet TAKE 1 TABLET BY MOUTH EVERY 6 HOURS AS NEEDED FOR ANXIETY  (Patient taking differently: For flying      ) 30 tablet 1     diphenoxylate-atropine (LOMOTIL) 2.5-0.025 mg per tablet TAKE 1 TABLET BY MOUTH 4 TIMES PER DAY AS NEEDED FOR DIARRHEA 30 tablet 11     YUVAFEM 10 mcg Tab INSERT 1 TABLET VAGINALLY TWICE WEEKLY, AS NEEDED  2     No facility-administered medications prior to visit.                Phone call duration: 18 minutes    Sonia NIX

## 2021-06-13 NOTE — PROGRESS NOTES
[facility]  ENDOCRINOLOGY    Diabetes Note 10/17/2017    Zara Webster, 1950, [id number]          Reason for visit      1. Type II diabetes mellitus        HPI     Zara Webster is a very pleasant 67 y.o. old female who presents for follow up.  SUMMARY:  Zara returns today in f/u for DM 2.  She looks good today.  Her A1c is down a smidge from 8.7 to 8.5.  She reports that she has gained weight.  She is quite active, and plans a round of golf in a couple of days.  She has joined the [employer] and is there at least 4 times a week, walking.  She isn't very happy about the weight gain, so that prompts the conversation about how difficult it becomes as women age to lose weight without working a lot harder at it.  She is having some sort of freezing procedure in a couple of weeks.  We will see what that brings for her.  She comments that if she eats ice cream before bed, she ends up awakening about 1:00 AM and if she doesn't, she will sleep through the night.  That can't possibly be contributory to her weight gain?! She does tend to eat a lot of CHO because it is easier on her system - she has a Colostomy.  Her meter download shows numbers between 133 and 211.  She does not test as she should and we have discussed this.  She is taking 20 units of Lantus daily, and about 6 units of Humalog with meals. She has had no hypoglycemia.      Blood glucose data:  133 - 211    Past Medical History     Patient Active Problem List   Diagnosis     Type II diabetes mellitus     Post Colectomy     Hypercholesteremia     H/O Crohn's disease     BMI 28.0-28.9,adult        Family History       family history includes Cancer in her father; Diabetes in her father and mother; Heart disease in her mother; Hyperlipidemia in her mother.    Social History      reports that she has never smoked. She has never used smokeless tobacco. She reports that she drinks alcohol. She reports that she does not use illicit drugs.      Review of Systems  "    Patient has no polyuria or polydipsia, no chest pain, dyspnea or TIA's, no numbness, tingling or pain in extremities  Remainder negative except as noted in HPI.    Vital Signs     /68 (Patient Site: Right Arm, Patient Position: Sitting, Cuff Size: Adult Regular)  Pulse 78  Ht 5' 6\" (1.676 m)  Wt 173 lb 12.8 oz (78.8 kg)  LMP 03/24/1991  BMI 28.05 kg/m2  Wt Readings from Last 3 Encounters:   10/17/17 173 lb 12.8 oz (78.8 kg)   08/21/17 174 lb 12 oz (79.3 kg)   06/29/17 172 lb 6.4 oz (78.2 kg)       Physical Exam     Constitutional:  Well developed, Well nourished  HENT:  Normocephalic,   Neck: Thyroid normal, No lymph nodes, Supple  Eyes:  PERRL, Conjunctiva pink  Respiratory:  Normal breath sounds, No respiratory distress  Cardiovascular:  Normal heart rate, Normal rhythm, No murmurs  GI:  Bowel sounds normal, Soft, No tenderness  Musculoskeletal:  No gross deformity or lesions, normal dorsalis pedis pulses  Skin: No acanthosis nigricans, lipoatrophy or lipodystrophy  Neurologic:  Alert & oriented x 3, nonfocal  Psychiatric:  Affect, Mood, Insight appropriate  Diabetic foot exam: no ulcers, charcot's or high risk calluses, Normal monofilament exam          Assessment     1. Type II diabetes mellitus        Plan     We discussed weight loss and food choices today.  I think that with a few adjustments in behaviors, she could get her A1c down below 8.  That remains to be seen, however.  I have encouraged her to continue with her movement and to step away from the ice cream.  She will continue with the same insulin dosages.  F/u with me in 6 months.  Time spent with pt today: 25 min with >50% spent in counseling and coordination of care.        Katiana BASS Endocrinology  10/17/2017  9:32 AM        Lab Results     Hemoglobin A1c   Date Value Ref Range Status   10/03/2017 8.5 (H) 3.5 - 6.0 % Final   06/20/2017 8.7 (H) 3.5 - 6.0 % Final   12/12/2016 8.0 (H) 3.5 - 6.0 % Final   09/07/2016 8.2 (H) 3.5 " "- 6.0 % Final   04/27/2015 8.5 (H) 3.5 - 6.0 % Final     Creatinine   Date Value Ref Range Status   06/20/2017 0.84 0.60 - 1.10 mg/dL Final   09/07/2016 0.81 0.60 - 1.10 mg/dL Final   08/14/2014 0.84 0.60 - 1.10 mg/dL Final     Microalbumin, Random Urine   Date Value Ref Range Status   10/03/2017 1.91 0.00 - 1.99 mg/dL Final       Cholesterol   Date Value Ref Range Status   10/03/2017 282 (H) <=199 mg/dL Final     HDL Cholesterol   Date Value Ref Range Status   10/03/2017 60 >=50 mg/dL Final     LDL Calculated   Date Value Ref Range Status   10/03/2017 172 (H) <=129 mg/dL Final     Triglycerides   Date Value Ref Range Status   10/03/2017 252 (H) <=149 mg/dL Final       Lab Results   Component Value Date    ALT 73 (H) 08/14/2014    AST 61 (H) 08/14/2014    ALKPHOS 94 08/14/2014    BILITOT 0.5 08/14/2014         Current Medications     Outpatient Medications Prior to Visit   Medication Sig Dispense Refill     BD ULTRA-FINE LOLY PEN NEEDLES 32 gauge x 5/32\" Ndle USE WITH EACH INJECTION EVERY NIGHT AT BEDTIME AS DIRECTED 100 each 2     blood glucose test (ACCU-CHEK SMARTVIEW TEST STRIP) strips Please test 4 times daily 360 strip 1     diphenoxylate-atropine (LOMOTIL) 2.5-0.025 mg per tablet Take 1 tablet by mouth 4 (four) times a day as needed for diarrhea. 30 tablet 1     ibuprofen (ADVIL,MOTRIN) 200 MG tablet Take 400 mg by mouth 3 (three) times a day.       insulin glargine (LANTUS SOLOSTAR) 100 unit/mL (3 mL) pen Inject 20 Units under the skin at bedtime. Do not mix Lantus with any other insulin 5 adj dose pen 5     insulin lispro (HUMALOG KWIKPEN) 100 unit/mL pen 6 units with each meal; up to 30 units daily 5 adj dose pen 5     lancets Misc 3 (three) times a day. Use 1 lancet. Accu-Chek FastClix Lancets Miscellaneous       omeprazole (PRILOSEC) 40 MG capsule Take 1 capsule (40 mg total) by mouth daily. 90 capsule 0     ostomy adhesive Pste Apply as needed 4 Tube 3     pen needle, diabetic 32 gauge x 5/32\" Ndle TEST " BLOOD SUGARS UP TO 4 TIMES A DAY. 380 each 1     pravastatin (PRAVACHOL) 40 MG tablet TAKE 1 TABLET BY MOUTH EVERY NIGHT AT BEDTIME 90 tablet 1     YUVAFEM 10 mcg Tab INSERT 1 TABLET VAGINALLY TWICE WEEKLY  2     cephalexin (KEFLEX) 250 MG capsule Take 3 capsules (750 mg total) by mouth 2 (two) times a day. 20 capsule 1     No facility-administered medications prior to visit.

## 2021-06-15 NOTE — TELEPHONE ENCOUNTER
Refill Approved    Rx renewed per Medication Renewal Policy. Medication was last renewed on 7/2/20.    Patrick Blount, [facility] Triage/Med Refill 2/5/2021     Requested Prescriptions   Pending Prescriptions Disp Refills     omeprazole (PRILOSEC) 40 MG capsule 90 capsule 1     Sig: TAKE 1 CAPSULE BY MOUTH EVERY DAY       GI Medications Refill Protocol Passed - 2/5/2021 11:06 AM        Passed - PCP or prescribing provider visit in last 12 or next 3 months.     Last office visit with prescriber/PCP: 10/22/2020 Obdulia Chamorro MD OR same dept: 10/22/2020 Obdulia Chamorro MD OR same specialty: 10/22/2020 Obdulia Chamorro MD  Last physical: Visit date not found Last MTM visit: Visit date not found   Next visit within 3 mo: Visit date not found  Next physical within 3 mo: Visit date not found  Prescriber OR PCP: Obdulia Chamorro MD  Last diagnosis associated with med order: 1. GERD (gastroesophageal reflux disease)  - omeprazole (PRILOSEC) 40 MG capsule; TAKE 1 CAPSULE BY MOUTH EVERY DAY  Dispense: 90 capsule; Refill: 1    If protocol passes may refill for 12 months if within 3 months of last provider visit (or a total of 15 months).

## 2021-06-16 PROBLEM — Z53.09 ASPIRIN CONTRAINDICATED: Status: ACTIVE | Noted: 2019-04-02

## 2021-06-16 PROBLEM — K74.69 OTHER CIRRHOSIS OF LIVER (H): Status: ACTIVE | Noted: 2020-10-22

## 2021-06-16 PROBLEM — K50.10 CROHN'S DISEASE OF COLON (H): Status: ACTIVE | Noted: 2019-11-03

## 2021-06-16 PROBLEM — K92.2 GASTROINTESTINAL HEMORRHAGE: Status: ACTIVE | Noted: 2020-04-29

## 2021-06-16 PROBLEM — N18.30 CHRONIC KIDNEY DISEASE, STAGE 3 (H): Status: ACTIVE | Noted: 2020-11-03

## 2021-06-16 NOTE — TELEPHONE ENCOUNTER
Incoming call from [facility] prior authorization team, wondering what the diagnoses is for diphenoxylate-atropine (LOMOTIL) 2.5-0.025 mg, and if patient has tried or failed any other antidiarrheal medications. Informed them diarrhea R19.7 is the diagnoses.  I reviewed patient's medication history and I did not see any other antidiarrheal medications listed.

## 2021-06-17 NOTE — PATIENT INSTRUCTIONS - HE
Patient Instructions by Kristal Medley CNP at 11/18/2019 10:30 AM     Author: Kristal Medley CNP Service: -- Author Type: Nurse Practitioner    Filed: 11/18/2019 11:30 AM Encounter Date: 11/18/2019 Status: Addendum    : Kristal Medley CNP (Nurse Practitioner)    Related Notes: Original Note by Kristal Medley CNP (Nurse Practitioner) filed at 11/18/2019 11:30 AM       Consider talking to family doctor re: chest pain.      Dayquil and Nyquil.      Return if fevers.  Recheck your throat if still painful once most of the cold is better.          Patient Education     Viral Upper Respiratory Illness (Adult)  You have a viral upper respiratory illness (URI), which is another term for the common cold. This illness is contagious during the first few days. It is spread through the air by coughing and sneezing. It may also be spread by direct contact (touching the sick person and then touching your own eyes, nose, or mouth). Frequent handwashing will decrease risk of spread. Most viral illnesses go away within 7 to 10 days with rest and simple home remedies. Sometimes the illness may last for several weeks. Antibiotics will not kill a virus, and they are generally not prescribed for this condition.    Home care    If symptoms are severe, rest at home for the first 2 to 3 days. When you resume activity, don't let yourself get too tired.    Avoid being exposed to cigarette smoke (yours or others).    You may use acetaminophen or ibuprofen to control pain and fever, unless another medicine was prescribed. If you have chronic liver or kidney disease, have ever had a stomach ulcer or gastrointestinal bleeding, or are taking blood-thinning medicines, talk with your healthcare provider before using these medicines. Aspirin should never be given to anyone under 18 years of age who is ill with a viral infection or fever. It may cause severe liver or brain damage.    Your appetite may be poor, so a light diet is fine.  Avoid dehydration by drinking 6 to 8 glasses of fluids per day (water, soft drinks, juices, tea, or soup). Extra fluids will help loosen secretions in the nose and lungs.    Over-the-counter cold medicines will not shorten the length of time youre sick, but they may be helpful for the following symptoms: cough, sore throat, and nasal and sinus congestion. (Note: Do not use decongestants if you have high blood pressure.)  Follow-up care  Follow up with your healthcare provider, or as advised.  When to seek medical advice  Call your healthcare provider right away if any of these occur:    Cough with lots of colored sputum (mucus)    Severe headache; face, neck, or ear pain    Difficulty swallowing due to throat pain    Fever of 100.4 F (38 C) or higher, or as directed by your healthcare provider  Call 911  Call 911 if any of these occur:    Chest pain, shortness of breath, wheezing, or difficulty breathing    Coughing up blood    Inability to swallow due to throat pain  Date Last Reviewed: 9/13/2015 2000-2017 [employer] [address] [place] [address], [place] [address]. All rights reserved. This information is not intended as a substitute for professional medical care. Always follow your healthcare professional's instructions.

## 2021-06-17 NOTE — PROGRESS NOTES
"[facility]  ENDOCRINOLOGY    Diabetes Note 4/10/2018    Zara Webster, 1950, [id number]          Reason for visit      1. Type II diabetes mellitus    2. Type 2 diabetes mellitus    3. Diabetes mellitus type 2, controlled        HPI     Zara Webster is a very pleasant 67 y.o. old female who presents for follow up.  SUMMARY:  Zara returns today in f/u for DM 2.  Her A1c is the highest that it has been in some time.  It is 9.6, up from 8.5 last Fall.  She notes that she has been taking more insulin, because of the elevations.  She is working on not eating after 6 PM.  She reports that she had the flu in the last 3 months, for about 4 days, and it was the gastro kind.  She is paying a lot of money for her insulin ($1,000) a month and isn't terribly happy about it.  She cannot take GLP-1s.  Her meter download shows that she is not testing more than twice a day.  Most of her numbers are in the higher 100s and low to mid two hundreds.     Blood glucose data:      Past Medical History     Patient Active Problem List   Diagnosis     Type II diabetes mellitus     Post Colectomy     Hypercholesteremia     H/O Crohn's disease     BMI 28.0-28.9,adult        Family History       family history includes Cancer in her father; Diabetes in her father and mother; Heart disease in her mother; Hyperlipidemia in her mother.    Social History      reports that she has never smoked. She has never used smokeless tobacco. She reports that she drinks alcohol. She reports that she does not use illicit drugs.      Review of Systems     Patient has no polyuria or polydipsia, no chest pain, dyspnea or TIA's, no numbness, tingling or pain in extremities  Remainder negative except as noted in HPI.    Vital Signs     /82 (Patient Site: Right Arm, Patient Position: Sitting, Cuff Size: Adult Regular)  Pulse 84  Ht 5' 6\" (1.676 m)  Wt 172 lb 6.4 oz (78.2 kg)  LMP 03/24/1991  BMI 27.83 kg/m2  Wt Readings from Last 3 Encounters: "   04/10/18 172 lb 6.4 oz (78.2 kg)   10/17/17 173 lb 12.8 oz (78.8 kg)   08/21/17 174 lb 12 oz (79.3 kg)       Physical Exam     Constitutional:  Well developed, Well nourished  HENT:  Normocephalic,   Neck: Thyroid normal, No lymph nodes, Supple  Eyes:  PERRL, Conjunctiva pink  Respiratory:  Normal breath sounds, No respiratory distress  Cardiovascular:  Normal heart rate, Normal rhythm, No murmurs  GI:  Bowel sounds normal, Soft, No tenderness  Musculoskeletal:  No gross deformity or lesions, normal dorsalis pedis pulses  Skin: No acanthosis nigricans, lipoatrophy or lipodystrophy  Neurologic:  Alert & oriented x 3, nonfocal  Psychiatric:  Affect, Mood, Insight appropriate      Assessment     1. Type II diabetes mellitus    2. Type 2 diabetes mellitus    3. Diabetes mellitus type 2, controlled        Plan       I have instructed Zara to split her insulin dosage and increase to 15 units BID. Hopefully, this and her return to the links as it warms up, her numbers will drop back to where they need to be.  She will f/u with me in 6 months.  Refills provided today.  Time spent with pt today: 25 min with >50% spent in counseling and coordination of care.      Katiana BASS Endocrinology  4/10/2018  12:48 PM          Lab Results     Hemoglobin A1c   Date Value Ref Range Status   03/30/2018 9.6 (H) 3.5 - 6.0 % Final   10/03/2017 8.5 (H) 3.5 - 6.0 % Final   06/20/2017 8.7 (H) 3.5 - 6.0 % Final   12/12/2016 8.0 (H) 3.5 - 6.0 % Final   09/07/2016 8.2 (H) 3.5 - 6.0 % Final     Creatinine   Date Value Ref Range Status   06/20/2017 0.84 0.60 - 1.10 mg/dL Final   09/07/2016 0.81 0.60 - 1.10 mg/dL Final   08/14/2014 0.84 0.60 - 1.10 mg/dL Final     Microalbumin, Random Urine   Date Value Ref Range Status   10/03/2017 1.91 0.00 - 1.99 mg/dL Final       Cholesterol   Date Value Ref Range Status   10/03/2017 282 (H) <=199 mg/dL Final     HDL Cholesterol   Date Value Ref Range Status   10/03/2017 60 >=50 mg/dL Final     LDL  "Calculated   Date Value Ref Range Status   10/03/2017 172 (H) <=129 mg/dL Final     Triglycerides   Date Value Ref Range Status   10/03/2017 252 (H) <=149 mg/dL Final       Lab Results   Component Value Date    ALT 73 (H) 08/14/2014    AST 61 (H) 08/14/2014    ALKPHOS 94 08/14/2014    BILITOT 0.5 08/14/2014         Current Medications     Outpatient Medications Prior to Visit   Medication Sig Dispense Refill     diphenoxylate-atropine (LOMOTIL) 2.5-0.025 mg per tablet Take 1 tablet by mouth 4 (four) times a day as needed for diarrhea. 30 tablet 1     ibuprofen (ADVIL,MOTRIN) 200 MG tablet Take 400 mg by mouth 3 (three) times a day.       lancets Misc 3 (three) times a day. Use 1 lancet. Accu-Chek FastClix Lancets Miscellaneous       omeprazole (PRILOSEC) 40 MG capsule Take 1 capsule (40 mg) by mouth daily. 90 capsule 1     ostomy adhesive Pste Apply as needed 4 Tube 3     pravastatin (PRAVACHOL) 40 MG tablet TAKE 1 TABLET BY MOUTH EVERY NIGHT AT BEDTIME 90 tablet 1     YUVAFEM 10 mcg Tab INSERT 1 TABLET VAGINALLY TWICE WEEKLY  2     BD ULTRA-FINE LOLY PEN NEEDLES 32 gauge x 5/32\" Ndle USE WITH EACH INJECTION EVERY NIGHT AT BEDTIME AS DIRECTED 100 each 2     blood glucose test (ACCU-CHEK SMARTVIEW TEST STRIP) strips Please test 4 times daily 360 strip 1     HUMALOG KWIKPEN 100 unit/mL pen INJECT 6 UNITS UNDER THE SKIN WITH EACH MEAL UP TO 30 UNITS DAILY (Patient taking differently: INJECT 12 UNITS UNDER THE SKIN WITH EACH MEAL UP TO 60 UNITS DAILY) 15 mL 1     LANTUS SOLOSTAR 100 unit/mL (3 mL) pen INJECT 20 UNITS SUBCUTANEOUSLY EVERY NIGHT AT BEDTIME 30 mL 1     pen needle, diabetic 32 gauge x 5/32\" Ndle TEST BLOOD SUGARS UP TO 4 TIMES A DAY. 380 each 1     No facility-administered medications prior to visit.            "

## 2021-06-17 NOTE — PROGRESS NOTES
"Zara Webster is a 70 y.o. female who is being evaluated via a billable telephone visit.      What phone number would you like to be contacted at? [phone]  How would you like to obtain your AVS? AVS Preference: [facility].       Reason for visit      1. Type 2 diabetes mellitus with diabetic nephropathy, with long-term current use of insulin (H)    2. Type II diabetes mellitus (H)        HPI     Zara Webster is a very pleasant 70 y.o. old female who presents for follow up.  SUMMARY:    Zara is contacted today in f/u for DM 2. Her A1c is 10.7 this time and up from her last at 10. She reports that this is \"all her diet\". She has had the better part of a year to make changes, as this has been discussed. She reports that her attention went to what happened to her stoma and that \"I just haven't really put energy into the Diabetes.\"  We are noticing some mild Renal function changes. She is taking 10-14 units of Lantus two times a day and takes about 6 units of Humalog about every 4 hours. She is using the Freestyle Naila to follow her BG. She has had both doses of the COVID vaccine.       Blood glucose data:  Blood glucose readings for last week    226  281  334  211  389  213  280  158  256  260  228  224      Past Medical History     Patient Active Problem List   Diagnosis     Type II diabetes mellitus (H)     Post Colectomy     Hypercholesteremia     BMI 28.0-28.9,adult     Aspirin contraindicated     Crohn's disease of colon (H)     Gastrointestinal hemorrhage     Other cirrhosis of liver (H) - unclear etiology,[facility]     Chronic kidney disease, stage 3        Family History       family history includes Cancer in her father; Diabetes in her father and mother; Heart disease in her mother; Hyperlipidemia in her mother.    Social History      reports that she has never smoked. She has never used smokeless tobacco. She reports current alcohol use. She reports that she does not use drugs.      Review of Systems "     Patient has no polyuria or polydipsia, no chest pain, dyspnea or TIA's, no numbness, tingling or pain in extremities  Remainder negative except as noted in HPI.    Vital Signs     LMP 03/24/1991   Wt Readings from Last 3 Encounters:   10/22/20 173 lb (78.5 kg)   06/17/20 173 lb (78.5 kg)   06/10/20 173 lb 12.8 oz (78.8 kg)       Physical Exam           Assessment     1. Type 2 diabetes mellitus with diabetic nephropathy, with long-term current use of insulin (H)    2. Type II diabetes mellitus (H)        Plan     Will add Jardiance 10 mg to her medications today. Discussed possible side effects and expectations. She will continue with her current insulin doses. F/u with me in 6 months.     Katiana BASS Endocrinology  5/4/2021  9:43 AM        Lab Results     Hemoglobin A1c   Date Value Ref Range Status   04/27/2021 10.7 (H) <=5.6 % Final   10/27/2020 10.0 (H) <=5.6 % Final     Comment:     Normal <5.7% Prediabete 5.7-6.4% Diabletes 6.5% or higher - adopted from ADA consensus guidelines   06/10/2020 10.4 (H) 3.5 - 6.0 % Final   04/21/2020 10.3 (H) 3.5 - 6.0 % Final   10/11/2019 8.6 (H) 3.5 - 6.0 % Final     Creatinine   Date Value Ref Range Status   04/27/2021 0.92 0.60 - 1.10 mg/dL Final   10/27/2020 1.04 0.60 - 1.10 mg/dL Final   06/10/2020 0.91 0.60 - 1.10 mg/dL Final     Microalbumin, Random Urine   Date Value Ref Range Status   04/27/2021 0.73 0.00 - 1.99 mg/dL Final       Cholesterol   Date Value Ref Range Status   10/03/2017 282 (H) <=199 mg/dL Final     HDL Cholesterol   Date Value Ref Range Status   10/03/2017 60 >=50 mg/dL Final     LDL Calculated   Date Value Ref Range Status   10/03/2017 172 (H) <=129 mg/dL Final     Triglycerides   Date Value Ref Range Status   10/03/2017 252 (H) <=149 mg/dL Final       Lab Results   Component Value Date    ALT 50 01/11/2021    AST 53 (!) 01/11/2021    ALKPHOS 94 08/14/2014    BILITOT 0.5 08/14/2014         Current Medications     Outpatient Medications Prior  "to Visit   Medication Sig Dispense Refill     diazePAM (VALIUM) 5 MG tablet TAKE 1 TABLET BY MOUTH EVERY 6 HOURS AS NEEDED FOR ANXIETY  (Patient taking differently: For flying      ) 30 tablet 1     diphenoxylate-atropine (LOMOTIL) 2.5-0.025 mg per tablet TAKE 1 TABLET BY MOUTH 4 TIMES PER DAY AS NEEDED FOR DIARRHEA 30 tablet 11     flash glucose scanning reader (FREESTYLE VIDA 14 DAY READER) Misc Use 1 Units As Directed every 14 (fourteen) days. 1 each 0     flash glucose sensor (FREESTYLE VIDA 14 DAY SENSOR) Kit Use 1 Units As Directed daily. 2 kit 5     ibuprofen (ADVIL,MOTRIN) 200 MG tablet Take 400 mg by mouth 3 (three) times a day.       insulin glargine (LANTUS SOLOSTAR U-100 INSULIN) 100 unit/mL (3 mL) pen INJECT SUBCUTANEOUSLY TWICE DAILY, UP TO 60 UNITS 45 mL 1     omeprazole (PRILOSEC) 40 MG capsule TAKE 1 CAPSULE BY MOUTH EVERY DAY 90 capsule 2     ostomy adhesive Pste Apply as needed 4 Tube 3     pen needle, diabetic (ULTICARE PEN NEEDLE) 32 gauge x 5/32\" Ndle TEST BLOOD SUGARS UP TO 4 TIMES A DAY. 300 each 0     insulin lispro (HUMALOG KWIKPEN INSULIN) 100 unit/mL pen INJECT 6 UNITS INTRAVENOUSLY WITH EACH MEALS UP TO 60 UNITS DAILY 54 mL 1     clobetasoL (TEMOVATE) 0.05 % ointment        metroNIDAZOLE (METROGEL) 0.75 % vaginal gel        YUVAFEM 10 mcg Tab INSERT 1 TABLET VAGINALLY TWICE WEEKLY, AS NEEDED  2     No facility-administered medications prior to visit.            Phone call duration: 20 minutes    "

## 2021-06-20 ENCOUNTER — HEALTH MAINTENANCE LETTER (OUTPATIENT)
Age: 71
End: 2021-06-20

## 2021-06-21 NOTE — PROGRESS NOTES
"[facility]  ENDOCRINOLOGY    Diabetes Note 10/17/2018    Zara Webster, 1950, [id number]          Reason for visit      1. Type II diabetes mellitus (H)    2. Type 2 diabetes mellitus (H)        HPI     Zara Webster is a very pleasant 68 y.o. old female who presents for follow up.  SUMMARY:  Zara returns today in f/u for DM 2.  Her current A1c is 8.6, which is down from 9.6.  She reports that her mother passed recently. She states that it has actually improved her stress level and that this has helped her blood sugars. She is not testing her blood sugars regularly, and there is little to download from her meter today. She continues taking 14 units of Lantus twice daily, and between 6 and 8 units of Humalog with meals. She always mentions that she eats a lot of CHO, largely because of her lack of Colon and problematic digestive tract.      Blood glucose data:  Unavailable    Past Medical History     Patient Active Problem List   Diagnosis     Type II diabetes mellitus (H)     Post Colectomy     Hypercholesteremia     H/O Crohn's disease     BMI 28.0-28.9,adult        Family History       family history includes Cancer in her father; Diabetes in her father and mother; Heart disease in her mother; Hyperlipidemia in her mother.    Social History      reports that she has never smoked. She has never used smokeless tobacco. She reports that she drinks alcohol. She reports that she does not use illicit drugs.      Review of Systems     Patient has no polyuria or polydipsia, no chest pain, dyspnea or TIA's, no numbness, tingling or pain in extremities  Remainder negative except as noted in HPI.    Vital Signs     /70 (Patient Site: Right Arm, Patient Position: Sitting, Cuff Size: Adult Regular)  Pulse 88  Ht 5' 6\" (1.676 m)  Wt 175 lb 6.4 oz (79.6 kg)  LMP 03/24/1991  BMI 28.31 kg/m2  Wt Readings from Last 3 Encounters:   10/16/18 175 lb 6.4 oz (79.6 kg)   04/10/18 172 lb 6.4 oz (78.2 kg)   10/17/17 " 173 lb 12.8 oz (78.8 kg)       Physical Exam     Constitutional:  Well developed, Well nourished  HENT:  Normocephalic,   Neck: Thyroid normal, No lymph nodes, Supple  Eyes:  PERRL, Conjunctiva pink  Respiratory:  Normal breath sounds, No respiratory distress  Cardiovascular:  Normal heart rate, Normal rhythm, No murmurs  GI:  Bowel sounds normal, Soft, No tenderness  Musculoskeletal:  No gross deformity or lesions, normal dorsalis pedis pulses  Skin: No acanthosis nigricans, lipoatrophy or lipodystrophy  Neurologic:  Alert & oriented x 3, nonfocal  Psychiatric:  Affect, Mood, Insight appropriate  Diabetic foot exam: no ulcers, charcot's or high risk calluses, Normal monofilament exam        Assessment     1. Type II diabetes mellitus (H)    2. Type 2 diabetes mellitus (H)        Plan     Zara will continue doing what she has been doing to manage her BG.  Encouraged per usual to keep up the movement, although this changes in the winter because of not being able to play Golf.  She will f/u with me in 6 months. Time spent with pt today: 25 min with >50% spent in counseling and coordination of care.        Katiana BASS Endocrinology  10/17/2018  7:13 AM      Lab Results     Hemoglobin A1c   Date Value Ref Range Status   10/10/2018 8.6 (H) 3.5 - 6.0 % Final   03/30/2018 9.6 (H) 3.5 - 6.0 % Final   10/03/2017 8.5 (H) 3.5 - 6.0 % Final   06/20/2017 8.7 (H) 3.5 - 6.0 % Final   12/12/2016 8.0 (H) 3.5 - 6.0 % Final     Creatinine   Date Value Ref Range Status   10/10/2018 0.81 0.60 - 1.10 mg/dL Final   06/20/2017 0.84 0.60 - 1.10 mg/dL Final   09/07/2016 0.81 0.60 - 1.10 mg/dL Final     Microalbumin, Random Urine   Date Value Ref Range Status   10/10/2018 0.82 0.00 - 1.99 mg/dL Final       Cholesterol   Date Value Ref Range Status   10/03/2017 282 (H) <=199 mg/dL Final     HDL Cholesterol   Date Value Ref Range Status   10/03/2017 60 >=50 mg/dL Final     LDL Calculated   Date Value Ref Range Status   10/03/2017 172  "(H) <=129 mg/dL Final     Triglycerides   Date Value Ref Range Status   10/03/2017 252 (H) <=149 mg/dL Final       Lab Results   Component Value Date    ALT 73 (H) 08/14/2014    AST 61 (H) 08/14/2014    ALKPHOS 94 08/14/2014    BILITOT 0.5 08/14/2014         Current Medications     Outpatient Medications Prior to Visit   Medication Sig Dispense Refill     blood glucose test (ACCU-CHEK SMARTVIEW TEST STRIP) strips Please test 4 times daily 360 strip 1     diphenoxylate-atropine (LOMOTIL) 2.5-0.025 mg per tablet TAKE 1 TABLET BY MOUTH 4 TIMES PER DAY AS NEEDED FOR DIARRHEA  30 tablet 0     ibuprofen (ADVIL,MOTRIN) 200 MG tablet Take 400 mg by mouth 3 (three) times a day.       lancets Misc 3 (three) times a day. Use 1 lancet. Accu-Chek FastClix Lancets Miscellaneous       omeprazole (PRILOSEC) 40 MG capsule Take 1 capsule (40 mg) by mouth once daily 90 capsule 0     ostomy adhesive Pste Apply as needed 4 Tube 3     pen needle, diabetic 32 gauge x 5/32\" Ndle TEST BLOOD SUGARS UP TO 4 TIMES A DAY. 380 each 1     pravastatin (PRAVACHOL) 40 MG tablet TAKE 1 TABLET BY MOUTH EVERY NIGHT AT BEDTIME 90 tablet 0     YUVAFEM 10 mcg Tab INSERT 1 TABLET VAGINALLY TWICE WEEKLY  2     insulin glargine (LANTUS SOLOSTAR U-100 INSULIN) 100 unit/mL (3 mL) pen Take 15 units twice daily (Patient taking differently: Take 14 units twice daily) 30 mL 1     insulin lispro (HUMALOG KWIKPEN INSULIN) 100 unit/mL pen INJECT 12 UNITS UNDER THE SKIN WITH EACH MEAL UP TO 60 UNITS DAILY (Patient taking differently: INJECT 6 UNITS UNDER THE SKIN WITH EACH MEAL UP TO 40 UNITS DAILY) 55 mL 0     No facility-administered medications prior to visit.            "

## 2021-06-22 NOTE — PROGRESS NOTES
Assessment: pt seen today for Naila start. She just received everything in the mail. Pt was able to insert her own sensor w/o difficulty. Reviewed 12 hour warm up. Reviewed basics of using scanner, troubleshooting and when to check BG.   Pt to change sensor every 14 days.   She is currently taking lantus 14 units two times a day and Humalog 6 units with meals. She notes she is usually taking it 6x/day though, every time she eats. Pt denies any symptoms of low BG. Discussed stacking insulin and risk for lows. Pt states she has really never had trouble with lows her whole life. Hopefully, naila will provide valuable insight in to how any insulin adjustments can be made.     Plan: call if any issues with Naila, bring Naila to f/u visits with Harmony so this can be uploaded     Subjective and Objective:      Zara Webster is referred by Harmony Goodwin NP for Diabetes Education.     Lab Results   Component Value Date    HGBA1C 8.6 (H) 10/10/2018       Follow up:   PRN with CDE       Education:     Monitoring   Meter (per above goals): Assessed and Discussed  Monitoring: Assessed and Discussed  BG goals: Discussed    Nutrition Management  Nutrition Management: Assessed and Discussed  Weight: Not addressed  Portions/Balance: Assessed and Discussed  Carb ID/Count: Assessed and Discussed  Label Reading: Assessed and Discussed  Heart Healthy Fats: Not addressed  Menu Planning: Assessed and Discussed  Dining Out: Assessed and Discussed  Physical Activity: Assessed and Discussed  Medications: Discussed  Orals: Discussed  Injected Medications: Discussed   Storage/Exp:Not addressed   Site Rotation: Discussed   Sites Assessed: yes    Diabetes Disease Process: Not addressed    Acute Complications: Prevent, Detect, Treat:  Hypoglycemia: Assessed and Discussed  Hyperglycemia: Assessed and Discussed  Sick Days: Discussed  Driving: Not addressed    Chronic Complications  Foot Care:Not addressed  Skin Care: Not addressed  Eye: Not  addressed  ABC: Discussed  Teeth:Not addressed  Goal Setting and Problem Solving: Assessed and Discussed  Barriers: Assessed and Discussed  Psychosocial Adjustments: Assessed and Discussed      Time spent with the patient: 60 minutes for diabetes education and counseling.   Previous Education: yes  Visit Type:DSMT  Hours Remaining: DSMT 1 and MNT 1      Keara Vasquez  12/18/2018      I agree with the aforementioned diabetes plan.  Katiana PENA [facility] Endocrinology  12/18/2018  3:09 PM

## 2021-06-22 NOTE — PROGRESS NOTES
[facility] Clinic Note    Zara Webster  1950   [id number]    Zara Webster is a 68 y.o. female presenting to discuss the following:     CC:   Chief Complaint   Patient presents with     Cough     Sore Throat       HPI:  Not feeling well for last few days, started Meagan Zakia. Around sick family members.   Having severe sore throat, rib pain from coughing, pain bilateral lower anterior ribs.   Is using Nyquil and Dayquil, using humidifier.     No fevers or chills. A little fatigued. Appetite is good.   Has rhinorrhea, productive cough, a little ear pain and pressure.   No shortness of breath.     HEALTH MAINTENANCE:   - flu shot: had flu shot this year.   - pneumonia shot: Had PCV-13 in 2016, due for PSV-23.   - Shingles Shot: Has Zostavax 2016, has not had Shingrix  - DXA scan: is interested once feeling better     ROS:   See HPI above.     PMH:   Patient Active Problem List   Diagnosis     Type II diabetes mellitus (H)     Post Colectomy     Hypercholesteremia     H/O Crohn's disease     BMI 28.0-28.9,adult     MEDICATIONS:   Current Outpatient Medications on File Prior to Visit   Medication Sig Dispense Refill     blood glucose test (ACCU-CHEK SMARTVIEW TEST STRIP) strips Please test 4 times daily 360 strip 1     flash glucose scanning reader (FREESTYLE VIDA 14 DAY READER) Misc Use 1 Units As Directed every 14 (fourteen) days. 1 each 0     flash glucose sensor (FREESTYLE VIDA 14 DAY SENSOR) Kit Use 1 Units As Directed daily. 1 kit 11     insulin glargine (LANTUS SOLOSTAR U-100 INSULIN) 100 unit/mL (3 mL) pen Take 14 units twice daily 30 mL 1     insulin lispro (HUMALOG KWIKPEN INSULIN) 100 unit/mL pen INJECT 6 UNITS UNDER THE SKIN WITH EACH MEAL UP TO 40 UNITS DAILY 55 mL 0     lancets Misc 3 (three) times a day. Use 1 lancet. Accu-Chek FastClix Lancets Miscellaneous       omeprazole (PRILOSEC) 40 MG capsule Take 1 capsule (40 mg) by mouth once daily 90 capsule 0     ostomy adhesive Pste Apply as  "needed 4 Tube 3     pen needle, diabetic 32 gauge x 5/32\" Ndle TEST BLOOD SUGARS UP TO 4 TIMES A DAY. 380 each 1     pravastatin (PRAVACHOL) 40 MG tablet TAKE 1 TABLET BY MOUTH EVERY NIGHT AT BEDTIME 90 tablet 0     YUVAFEM 10 mcg Tab INSERT 1 TABLET VAGINALLY TWICE WEEKLY  2     diphenoxylate-atropine (LOMOTIL) 2.5-0.025 mg per tablet TAKE 1 TABLET BY MOUTH 4 TIMES PER DAY AS NEEDED FOR DIARRHEA  30 tablet 0     ibuprofen (ADVIL,MOTRIN) 200 MG tablet Take 400 mg by mouth 3 (three) times a day.       No current facility-administered medications on file prior to visit.        ALLERGIES:  Allergies   Allergen Reactions     Compazine [Prochlorperazine]      Metformin      Prochlorperazine Edisylate      Victoza [Liraglutide]        PHYSICAL EXAM:   /70   Pulse 93   Temp 98.4  F (36.9  C) (Oral)   Resp 20   Ht 5' 6\" (1.676 m)   Wt 176 lb 9 oz (80.1 kg)   LMP 03/24/1991   SpO2 96%   BMI 28.50 kg/m     GENERAL: Zara is a pleasant, overweight female, appears stated age, no acute distress.  HEENT: Sclera white, no eye discharge, bilateral clear rhinorrhea present, bilateral tympanic membranes are dull without bulging or retraction.  Oropharynx is mildly erythematous.  No cervical lymphadenopathy.  HEART: Regular rate and rhythm, no murmurs.  LUNGS: Clear to auscultation bilaterally, unlabored.    LABS:   Recent Results (from the past 240 hour(s))   Rapid Strep A Screen-Throat   Result Value Ref Range    Rapid Strep A Antigen No Group A Strep detected, presumptive negative No Group A Strep detected, presumptive negative     ASSESSMENT & PLAN:     Zara Webster is a 68-year-old female presenting today for evaluation of upper respiratory symptoms.    1. Sore throat  - Rapid Strep A Screen-Throat  - Group A Strep, RNA Direct Detection, Throat    2. Viral URI  Symptoms are consistent with a viral upper respiratory infection.  Recommended symptomatic treatment with ibuprofen, Tylenol, cough drops, tea with honey " to soothe throat, and cough syrup as needed.    We will contact her if strep confirmation comes back positive and prescribe antibiotics.    If symptoms are not improving by the middle of next week, she should let me know.     HM: Discussed health maintenance today.  -She states she is up-to-date on flu shot.  -I recommended she check with her insurance company to see where shingles shot is cheapest to get, in clinic versus pharmacy.  -Recommended she receive the PPSV23 when feeling better, can make nurse only visit for this.  -Additionally recommended DEXA scan to evaluate for osteoporosis/osteopenia.  She has a history of kidney stones and therefore does not like the idea of taking a calcium supplement.  She will contact us to arrange this when she would like to proceed.    RTC:    Farheen Gonzalez, DO

## 2021-06-23 NOTE — TELEPHONE ENCOUNTER
Refill Approved    Rx renewed per Medication Renewal Policy. Medication was last renewed on 9/30/18.    Kate Guzman, [facility] Triage/Med Refill 1/18/2019     Requested Prescriptions   Pending Prescriptions Disp Refills     pravastatin (PRAVACHOL) 40 MG tablet [Pharmacy Med Name: Pravastatin Sodium Oral Tablet 40 MG] 90 tablet 0     Sig: TAKE 1 TABLET BY MOUTH EVERY NIGHT AT BEDTIME    Statins Refill Protocol (Hmg CoA Reductase Inhibitors) Passed - 1/17/2019  9:05 AM       Passed - PCP or prescribing provider visit in past 12 months     Last office visit with prescriber/PCP: 5/30/2017 Obdulia Chamorro MD OR same dept: 12/27/2018 Farheen Gonzalez DO OR same specialty: 12/27/2018 Farheen Gonzalez DO  Last physical: Visit date not found Last MTM visit: Visit date not found   Next visit within 3 mo: Visit date not found  Next physical within 3 mo: Visit date not found  Prescriber OR PCP: Obdulia Chamorro MD  Last diagnosis associated with med order: 1. Hyperlipidemia  - pravastatin (PRAVACHOL) 40 MG tablet [Pharmacy Med Name: Pravastatin Sodium Oral Tablet 40 MG]; TAKE 1 TABLET BY MOUTH EVERY NIGHT AT BEDTIME  Dispense: 90 tablet; Refill: 0    2. GERD (gastroesophageal reflux disease)  - omeprazole (PRILOSEC) 40 MG capsule [Pharmacy Med Name: Omeprazole Oral Capsule Delayed Release 40 MG]; Take 1 capsule (40 mg) by mouth once daily  Dispense: 90 capsule; Refill: 0    If protocol passes may refill for 12 months if within 3 months of last provider visit (or a total of 15 months).             omeprazole (PRILOSEC) 40 MG capsule [Pharmacy Med Name: Omeprazole Oral Capsule Delayed Release 40 MG] 90 capsule 0     Sig: Take 1 capsule (40 mg) by mouth once daily    GI Medications Refill Protocol Passed - 1/17/2019  9:05 AM       Passed - PCP or prescribing provider visit in last 12 or next 3 months.    Last office visit with prescriber/PCP: 5/30/2017 Obdulia Chamorro MD OR same dept: 12/27/2018 Farheen Gonzalez DO OR same  specialty: 12/27/2018 Farheen Gonzalez DO  Last physical: Visit date not found Last MTM visit: Visit date not found   Next visit within 3 mo: Visit date not found  Next physical within 3 mo: Visit date not found  Prescriber OR PCP: Obdulia Chamorro MD  Last diagnosis associated with med order: 1. Hyperlipidemia  - pravastatin (PRAVACHOL) 40 MG tablet [Pharmacy Med Name: Pravastatin Sodium Oral Tablet 40 MG]; TAKE 1 TABLET BY MOUTH EVERY NIGHT AT BEDTIME  Dispense: 90 tablet; Refill: 0    2. GERD (gastroesophageal reflux disease)  - omeprazole (PRILOSEC) 40 MG capsule [Pharmacy Med Name: Omeprazole Oral Capsule Delayed Release 40 MG]; Take 1 capsule (40 mg) by mouth once daily  Dispense: 90 capsule; Refill: 0    If protocol passes may refill for 12 months if within 3 months of last provider visit (or a total of 15 months).

## 2021-06-24 NOTE — TELEPHONE ENCOUNTER
I called the pt, she informs me that she woke up very dizzy and nauseous. The pt also reports a HA and mild weakness. She denies a fever or polyuria. The pt has no other sx. The pt has taken her usual dose of humalog. The pt BG when she awoke was 200 and is now 257. The pt reports that her BG is usually between 200-300. I informed that I will speak to ADRIANA Antunez and contact her back.

## 2021-06-24 NOTE — TELEPHONE ENCOUNTER
ROEL TEAM    In regards of: Naila 10 day disc?    Patients [employer] called. And states she will need a PA for refills. [place] will be going out of the [place], leaving 03.08[place]2019.    Please send PA request as URGENT so that pt can get it in a timely manner.    [phone], provider PA number.    Patient can be reached @ [phone]

## 2021-06-24 NOTE — TELEPHONE ENCOUNTER
I called the pt, she said no to all of the below questions. I recommend she contact her PCP, especially if the dizziness continues.

## 2021-06-24 NOTE — TELEPHONE ENCOUNTER
I called the pharmacy, they do not know if the insurance covers the 14 day. I will initiate a PA via cover my meds.

## 2021-06-24 NOTE — TELEPHONE ENCOUNTER
Monica- Nurse Care Manager w/ [facility] called.    Prescription for Free Style Sensor will need to be sent to a DME facility and not the pharmacy. Patient has worked with [facility] previously.  DME facility will be in touch with [facility] if PA is needed. Please watch for a f/u fax.     Please submit prescription to patients preferred DME.       Monica @ [phone]

## 2021-06-24 NOTE — TELEPHONE ENCOUNTER
Doesn't sound like diabetes related per se - anything else going on?  Diarrhea? Weird food yesterday? Been around anyone with a GI bug? Eat some spoiled food?

## 2021-06-24 NOTE — TELEPHONE ENCOUNTER
[facility] Team    In regards of: blood glucose test (ACCU-CHEK SMARTVIEW TEST STRIP) strips    Due to her insurance change, will no longer cover it. They will cover a different test strips. But patient patients to use this one. [facility] pharmacy will send over some paperwork. Please review and fill out asap.    She will be leaving the country Friday 03.08.2019    She can be reached , ok to  msg

## 2021-06-24 NOTE — TELEPHONE ENCOUNTER
Attempted to contact [facility] pharmacist to inquire on situation but pharmacist was on break until 2:30ish.  Will try calling after that to inquire if PA is required since we have yet to see paperwork from them.  Also inquire if Naila needs PA because it's 10 day rather than 14 day.

## 2021-06-25 ENCOUNTER — AMBULATORY - HEALTHEAST (OUTPATIENT)
Dept: LAB | Facility: CLINIC | Age: 71
End: 2021-06-25

## 2021-06-25 DIAGNOSIS — Z01.818 PREOP GENERAL PHYSICAL EXAM: ICD-10-CM

## 2021-06-26 LAB
SARS-COV-2 PCR COMMENT: NORMAL
SARS-COV-2 RNA SPEC QL NAA+PROBE: NEGATIVE
SARS-COV-2 VIRUS SPECIMEN SOURCE: NORMAL

## 2021-06-27 ENCOUNTER — COMMUNICATION - HEALTHEAST (OUTPATIENT)
Dept: SCHEDULING | Facility: CLINIC | Age: 71
End: 2021-06-27

## 2021-06-28 NOTE — PROGRESS NOTES
Progress Notes by Kristal Medley CNP at 11/18/2019 10:30 AM     Author: Kristal Medley CNP Service: -- Author Type: Nurse Practitioner    Filed: 11/20/2019  8:51 AM Encounter Date: 11/18/2019 Status: Signed    : Kristal Medley CNP (Nurse Practitioner)       Chief Complaint   Patient presents with   ? Cough     sore throat, noticed over the weekend, no fever, no headache (pressure)       ASSESSMENT & PLAN:   Diagnoses and all orders for this visit:    Viral URI with cough    Sore throat  -     Rapid Strep A Screen-Throat swab  -     Group A Strep, RNA Direct Detection, Throat    Chest pressure    Type 2 diabetes mellitus without complication, with long-term current use of insulin (H)        MDM:  Patient mostly concerned about ruling out strep due to recent exposure from grandchildren.  Patient is a type II diabetic with suboptimal control reporting a 30 minutes of chest pressure last night.  Patient states she believes it was related to anxiety as she did feel anxious at that time.  Patient states she has not experienced chest pain recently with working out or activity.  Offered work-up to include EKG and troponin.  Patient states she is not concerned and will follow-up if this returns.    Strep is negative today. Vital signs, history, and presentation with normal lung sounds consistent with viral URI.      Supportive care discussed.  See discharge instructions below for specific recommendations given.    At the end of the encounter, I discussed results, diagnosis, medications. Discussed red flags for immediate return to clinic/ER, as well as indications for follow up if no improvement. Patient and/or caregiver understood and agreed to plan. Patient was stable for discharge.    SUBJECTIVE    HPI:  HPI  Zara Webster presents to the walk-in clinic with sore throat starting 6 days ago.  Had chest pressure last night as well that she attributes to anxiety - has had workup for similar several years ago  which was neg.  Does go to the [facility] and hasn't had CP with exercising lately.      Associated with: strep exposure from grandkids, cough productive of yellow sputum, hx DM type II.   Glucoses under control.  Non smoker.     Strep neg upon entry into room.     Denies: fevers      See ROS for additional symptoms and/or pertinent negatives.       History obtained from the patient.    No past medical history on file.    Active Ambulatory (Non-Hospital) Problems    Diagnosis   ? Crohn's disease of colon (H)   ? Aspirin contraindicated   ? Hypercholesteremia   ? BMI 28.0-28.9,adult   ? Type II diabetes mellitus (H)   ? Post Colectomy       Family History   Problem Relation Age of Onset   ? Diabetes Mother    ? Heart disease Mother    ? Hyperlipidemia Mother    ? Diabetes Father    ? Cancer Father         malignant gallbladder       Social History     Tobacco Use   ? Smoking status: Never Smoker   ? Smokeless tobacco: Never Used   Substance Use Topics   ? Alcohol use: Yes     Comment: A glass of wine 1-2 times week       Review of Systems   Constitutional: Negative for fever.   HENT: Positive for congestion, ear pain (pressure ), rhinorrhea and sore throat.    Eyes: Negative for discharge.   Respiratory: Positive for cough.    Cardiovascular: Positive for chest pain (at night pressure in chest x 30 minutes last night ).       OBJECTIVE    Vitals:    11/18/19 1021 11/18/19 1023   BP: 145/79 141/84   Pulse: (!) 108    Resp: 18    Temp: 97.9  F (36.6  C)    TempSrc: Oral    SpO2: 96%    Weight: 174 lb (78.9 kg)        Physical Exam  Constitutional:       General: She is not in acute distress.     Appearance: She is well-developed.   HENT:      Right Ear: External ear normal.      Left Ear: External ear normal.      Nose: Congestion present.      Mouth/Throat:      Pharynx: Posterior oropharyngeal erythema (mild ) present.   Eyes:      General:         Right eye: No discharge.         Left eye: No discharge.       Conjunctiva/sclera: Conjunctivae normal.   Cardiovascular:      Rate and Rhythm: Normal rate and regular rhythm.      Pulses: Normal pulses.      Heart sounds: Normal heart sounds.   Pulmonary:      Effort: Pulmonary effort is normal.      Breath sounds: Normal breath sounds.   Musculoskeletal: Normal range of motion.   Skin:     General: Skin is warm and dry.      Capillary Refill: Capillary refill takes less than 2 seconds.   Neurological:      Mental Status: She is alert and oriented to person, place, and time.   Psychiatric:         Behavior: Behavior normal.         Thought Content: Thought content normal.         Judgment: Judgment normal.         Labs:  Recent Results (from the past 240 hour(s))   Rapid Strep A Screen-Throat swab   Result Value Ref Range    Rapid Strep A Antigen No Group A Strep detected, presumptive negative No Group A Strep detected, presumptive negative   Group A Strep, RNA Direct Detection, Throat   Result Value Ref Range    Group A Strep by PCR No Group A Strep rRNA detected No Group A Strep rRNA detected         Radiology:    No results found.    PATIENT INSTRUCTIONS:   Patient Instructions   Consider talking to family doctor re: chest pain.      Dayquil and Nyquil.      Return if fevers.  Recheck your throat if still painful once most of the cold is better.          Patient Education     Viral Upper Respiratory Illness (Adult)  You have a viral upper respiratory illness (URI), which is another term for the common cold. This illness is contagious during the first few days. It is spread through the air by coughing and sneezing. It may also be spread by direct contact (touching the sick person and then touching your own eyes, nose, or mouth). Frequent handwashing will decrease risk of spread. Most viral illnesses go away within 7 to 10 days with rest and simple home remedies. Sometimes the illness may last for several weeks. Antibiotics will not kill a virus, and they are generally not  prescribed for this condition.    Home care    If symptoms are severe, rest at home for the first 2 to 3 days. When you resume activity, don't let yourself get too tired.    Avoid being exposed to cigarette smoke (yours or others).    You may use acetaminophen or ibuprofen to control pain and fever, unless another medicine was prescribed. If you have chronic liver or kidney disease, have ever had a stomach ulcer or gastrointestinal bleeding, or are taking blood-thinning medicines, talk with your healthcare provider before using these medicines. Aspirin should never be given to anyone under 18 years of age who is ill with a viral infection or fever. It may cause severe liver or brain damage.    Your appetite may be poor, so a light diet is fine. Avoid dehydration by drinking 6 to 8 glasses of fluids per day (water, soft drinks, juices, tea, or soup). Extra fluids will help loosen secretions in the nose and lungs.    Over-the-counter cold medicines will not shorten the length of time youre sick, but they may be helpful for the following symptoms: cough, sore throat, and nasal and sinus congestion. (Note: Do not use decongestants if you have high blood pressure.)  Follow-up care  Follow up with your healthcare provider, or as advised.  When to seek medical advice  Call your healthcare provider right away if any of these occur:    Cough with lots of colored sputum (mucus)    Severe headache; face, neck, or ear pain    Difficulty swallowing due to throat pain    Fever of 100.4 F (38 C) or higher, or as directed by your healthcare provider  Call 911  Call 911 if any of these occur:    Chest pain, shortness of breath, wheezing, or difficulty breathing    Coughing up blood    Inability to swallow due to throat pain  Date Last Reviewed: 9/13/2015 2000-2017 [employer]. [address] [place] [address], [place] [address]. All rights reserved. This information is not intended as a substitute for professional medical  care. Always follow your healthcare professional's instructions.

## 2021-06-28 NOTE — PROGRESS NOTES
Progress Notes by Ranjit Roach RN, WOC at 9/17/2019  9:30 AM     Author: Ranjit Roach RN, WOC Service: -- Author Type: Registered Nurse    Filed: 9/17/2019 12:08 PM Encounter Date: 9/17/2019 Status: Signed    : Ranjit Roach RN, WOC (Registered Nurse)       OUTPATIENT OSTOMY ASSESSMENT    Patients mode of arrival: Ambulatory    INTAKE  Type of Stoma: Permanent Ileostomy  Anticipated date of takedown: NA    Diagnosis Pertinent to Stoma:Crohn's Disease Date of Diagnosis: In her 20s  Type of Surgery: Ileostomy    Surgery Date: 1987  Surgeon[facility]: [facility]    Purpose of this visit:Leaking Problem and Peristomal Complications    Present for Teaching Session: Patient   Present: NA    Pertinent Information: Significant bleeding over the weekend    Current Equipment:    Product # Brand Description   Pouch [address] 1 1/8 inch convex   Flange      Paste   Convatec paste   Powder      Deodorizer                    Pouch Change Frequency: Every 2-3 days  Provider of Care: Emptying: self Pouch Change: self    ASSESSMENT  Stoma Size: Oval 1 1/8 x 1 1/4 inches  Protrusion: 5cm-patient reports her stoma has always protruded this much and this is not a prolapse  Vascularity: Pink, one granuloma at 9-10 o'clock measuring 0.8x0.5cm, scattered areas of pinpoint bleeding on stoma.  Also appears to have a healed laceration to stoma from ill fitting pouch.  Mucocutaneous Juncture: Intact  Peristomal Skin: Intact, does have some scarring and skin dips slightly at 8-9'clock    Wound: No  Current Wound Care: NA    TREATMENT  Silver Nitrate to : granuloma and bleeding areas # of Sticks used: 1    INTERVENTIONS  Refitting done, Educated on peristomal skin treatment and Educated on pouch change procedure  Miscellaneous Interventions: Signed up for [employer] [facility] or [employer]    INSTRUCTIONS GIVEN  WOC Role, Anatomy, Clothing, Fluids: Drink 6-8 glasses of fluids daily ,  Pouching Products: Showed pouching system  and Ordering supplies    PLAN  Change in Supplies: See Outpatient Ostomy Instructions      Total Time Spent with Patient: 35 minutes.  Education time: 15 minutes.

## 2021-07-03 NOTE — ADDENDUM NOTE
Addendum Note by Mariam Trejo RN at 3/7/2019  2:47 PM     Author: Mariam Trejo RN Service: -- Author Type: Registered Nurse    Filed: 3/7/2019  2:47 PM Encounter Date: 2/25/2019 Status: Signed    : Mariam Trejo RN (Registered Nurse)    Addended by: MARIAM TREJO on: 3/7/2019 02:47 PM        Modules accepted: Orders

## 2021-07-13 ENCOUNTER — RECORDS - HEALTHEAST (OUTPATIENT)
Dept: ADMINISTRATIVE | Facility: CLINIC | Age: 71
End: 2021-07-13

## 2021-07-14 ENCOUNTER — TRANSFERRED RECORDS (OUTPATIENT)
Dept: HEALTH INFORMATION MANAGEMENT | Facility: CLINIC | Age: 71
End: 2021-07-14

## 2021-07-14 LAB
ALT SERPL-CCNC: 66 U/L (ref 0–78)
AST SERPL-CCNC: 68 U/L (ref 0–37)
CREATININE (EXTERNAL): 0.87 MG/DL (ref 0.6–1.02)
GLUCOSE (EXTERNAL): 399 MG/DL (ref 74–100)
INR (EXTERNAL): 1 (ref 0.9–1.1)
POTASSIUM (EXTERNAL): 4.2 MMOL/L (ref 3.5–5.1)

## 2021-07-19 ENCOUNTER — TRANSFERRED RECORDS (OUTPATIENT)
Dept: HEALTH INFORMATION MANAGEMENT | Facility: CLINIC | Age: 71
End: 2021-07-19

## 2021-07-21 ENCOUNTER — RECORDS - HEALTHEAST (OUTPATIENT)
Dept: ADMINISTRATIVE | Facility: CLINIC | Age: 71
End: 2021-07-21

## 2021-07-28 ENCOUNTER — OFFICE VISIT (OUTPATIENT)
Dept: FAMILY MEDICINE | Facility: CLINIC | Age: 71
End: 2021-07-28
Payer: COMMERCIAL

## 2021-07-28 VITALS
HEIGHT: 65 IN | HEART RATE: 84 BPM | WEIGHT: 168 LBS | BODY MASS INDEX: 27.99 KG/M2 | SYSTOLIC BLOOD PRESSURE: 132 MMHG | DIASTOLIC BLOOD PRESSURE: 70 MMHG

## 2021-07-28 DIAGNOSIS — M54.16 LUMBAR RADICULOPATHY: Primary | ICD-10-CM

## 2021-07-28 PROCEDURE — 99213 OFFICE O/P EST LOW 20 MIN: CPT | Performed by: FAMILY MEDICINE

## 2021-07-28 RX ORDER — BACLOFEN 10 MG/1
5-10 TABLET ORAL 3 TIMES DAILY PRN
Qty: 30 TABLET | Refills: 1 | Status: SHIPPED | OUTPATIENT
Start: 2021-07-28 | End: 2021-09-22

## 2021-07-28 ASSESSMENT — MIFFLIN-ST. JEOR: SCORE: 1272.92

## 2021-07-28 NOTE — PROGRESS NOTES
Assessment & Plan     1. Lumbar radiculopathy  - baclofen (LIORESAL) 10 MG tablet; Take 0.5-1 tablets (5-10 mg) by mouth 3 times daily as needed for muscle spasms  Dispense: 30 tablet; Refill: 1      Distribution of symptoms consistent with L5 or S1 dermatome.  History of remote steroid injections for back pain and radiculopathy. No red flag symptoms present to warrant urgent MRI.  S/p Medrol dose pack (had old prescription at home), symptoms improved but not resolved.  NSAIDS relatively contraindicated.  Takes tylenol, hepatic dose per GI specialist.  Recommend trial of muscle relaxers. Continue with gentle back stretches.  If symptoms not improving, recommend referral to physical therapy and to the spine clinic.  I will check in with patient in 1 week to follow up symptoms.    Return in about 1 month (around 8/28/2021) for medicare wellness exam / sooner as needed .    Farheen Gonzalez, [facility]    Yobani Zuñiga is a 71 year old who presents for the following health issues:    Chief Complaints and History of Present Illnesses   Patient presents with     Numbness     Left leg       HPI     Had TIPS procedure 3 weeks ago. Saw Dr. Chamorro for preop, had mentioned numb sensation lateral left leg.  Feels numb, is painful, buzzes, feels like ice cube down leg. Tried medrol dose pack, somewhat helpful but not really.     Does have to sit on stool when making coffee. Prior operating nurse, used to being on her feet all day. Once goes for walk, fine for the rest of the day. Bothersome from 7-9am.     Symptoms present for the last 2 weeks. Prior back injury from nursing career, has had [place] injections. Retired last 3 years. Hasn't had back problems since.  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     No bladder incontinence. Symptoms localized to the left size.   Starts middle of butt, down lateral leg down to the ankle. Feels like ice cube or hot poker, foot  "buzzes.    Review of Systems   See HPI above.       Objective    /70   Pulse 84   Ht 1.651 m (5' 5\")   Wt 76.2 kg (168 lb)   LMP  (LMP Unknown)   BMI 27.96 kg/m    Body mass index is 27.96 kg/m .  Physical Exam   GENERAL: healthy, alert and no distress  EYES: Eyes grossly normal to inspection, PERRL and conjunctivae and sclerae normal  RESP: lungs clear to auscultation - no rales, rhonchi or wheezes  CV: regular rate and rhythm, normal S1 S2, no S3 or S4, no murmur, click or rub, no peripheral edema and peripheral pulses strong  ABDOMEN: soft, nontender, no hepatosplenomegaly, no masses and bowel sounds normal  MS: normal muscle tone, normal range of motion, no edema and peripheral pulses normal. Positive straight leg raise test on the left.  NEURO: Strength 5/5 L2-S1 bilaterally, sensation grossly intact L2-S1. Patellar DTR 1/4 bilaterally. Normal gait.    "

## 2021-08-16 ENCOUNTER — TELEPHONE (OUTPATIENT)
Dept: ENDOCRINOLOGY | Facility: CLINIC | Age: 71
End: 2021-08-16

## 2021-08-16 ENCOUNTER — OFFICE VISIT (OUTPATIENT)
Dept: FAMILY MEDICINE | Facility: CLINIC | Age: 71
End: 2021-08-16
Payer: COMMERCIAL

## 2021-08-16 ENCOUNTER — NURSE TRIAGE (OUTPATIENT)
Dept: NURSING | Facility: CLINIC | Age: 71
End: 2021-08-16

## 2021-08-16 VITALS
DIASTOLIC BLOOD PRESSURE: 70 MMHG | HEIGHT: 65 IN | HEART RATE: 97 BPM | WEIGHT: 163.9 LBS | RESPIRATION RATE: 14 BRPM | OXYGEN SATURATION: 96 % | TEMPERATURE: 98.6 F | BODY MASS INDEX: 27.31 KG/M2 | SYSTOLIC BLOOD PRESSURE: 132 MMHG

## 2021-08-16 DIAGNOSIS — G57.02 PIRIFORMIS SYNDROME, LEFT: Primary | ICD-10-CM

## 2021-08-16 PROCEDURE — 99213 OFFICE O/P EST LOW 20 MIN: CPT | Performed by: FAMILY MEDICINE

## 2021-08-16 RX ORDER — GABAPENTIN 300 MG/1
300 CAPSULE ORAL 2 TIMES DAILY PRN
Qty: 60 CAPSULE | Refills: 1 | Status: SHIPPED | OUTPATIENT
Start: 2021-08-16 | End: 2021-09-22

## 2021-08-16 ASSESSMENT — MIFFLIN-ST. JEOR: SCORE: 1253.71

## 2021-08-16 NOTE — TELEPHONE ENCOUNTER
Left detailed message for pt that Harmony is out of office today, encouraged pt to call PCP to be seen today or could go to urgent care or ER to have leg pain further evaluated.     Notified pt I would still sent pts message to Harmony to review when she is back in clinic and to call back with questions

## 2021-08-16 NOTE — TELEPHONE ENCOUNTER
"Pt calling to report worsening left leg pain affecting her ability to sit or stand for long periods of time. She cancelled plans she had today, which she reports is \"unusual for her\".   She was seen by her PCP for this pain approximately 10 days ago and completed steroids and this has not resolved.     Disposition: See today in office. Care advise given. Pt will go to ER if no appointments are available.     Erin Benson RN  [facility] Nurse Advisor 8:57 AM 8/16/2021    Reason for Disposition    Back pain radiating (shooting) into leg(s)    SEVERE back pain (e.g., excruciating, unable to do any normal activities) and not improved after pain medicine and CARE ADVICE    Additional Information    Negative: Looks like a broken bone or dislocated joint (e.g., crooked or deformed)    Negative: Sounds like a life-threatening emergency to the triager    Negative: Followed a hip injury    Negative: Followed a knee injury    Negative: Followed an ankle or foot injury    Negative: Passed out (i.e., fainted, collapsed and was not responding)    Negative: Shock suspected (e.g., cold/pale/clammy skin, too weak to stand, low BP, rapid pulse)    Negative: Sounds like a life-threatening emergency to the triager    Negative: Major injury to the back (e.g., MVA, fall > 10 feet or 3 meters, penetrating injury, etc.)    Negative: Pain in the upper back over the ribs (rib cage) that radiates (travels) into the chest    Negative: Pain in the upper back over the ribs (rib cage) and worsened by coughing (or clearly increases with breathing)    Negative: SEVERE back pain of sudden onset and age > 60    Negative: SEVERE abdominal pain (e.g., excruciating)    Negative: Abdominal pain and age > 60    Negative: Unable to urinate (or only a few drops) and bladder feels very full    Negative: Loss of bladder or bowel control (urine or bowel incontinence; wetting self, leaking stool) of new onset    Negative: Numbness (loss of sensation) in " groin or rectal area    Negative: Blood in urine (red, pink, or tea-colored)    Negative: Pain radiates into groin, scrotum    Negative: Vomiting and pain over lower ribs of back (i.e., flank - kidney area)    Negative: Weakness of a leg or foot (e.g., unable to bear weight, dragging foot)    Negative: Patient sounds very sick or weak to the triager    Negative: Fever > 100.4 F (38.0 C) and flank pain    Negative: Pain or burning with passing urine (urination)    Protocols used: LEG PAIN-A-OH, BACK PAIN-A-OH    COVID 19 Nurse Triage Plan/Patient Instructions    Please be aware that novel coronavirus (COVID-19) may be circulating in the community. If you develop symptoms such as fever, cough, or SOB or if you have concerns about the presence of another infection including coronavirus (COVID-19), please contact your health care provider or visit [email or web address].     Disposition/Instructions    In-Person Visit with provider recommended. Reference Visit Selection Guide.    Thank you for taking steps to prevent the spread of this virus.  o Limit your contact with others.  o Wear a simple mask to cover your cough.  o Wash your hands well and often.    Resources    [facility]: About COVID-19: [email or web address]    CDC: What to Do If You're Sick: www.cdc.gov/coronavirus/2019-ncov/about/steps-when-sick.html    CDC: Ending Home Isolation: www.cdc.gov/coronavirus/2019-ncov/hcp/disposition-in-home-patients.html     CDC: Caring for Someone: www.cdc.gov/coronavirus/2019-ncov/if-you-are-sick/care-for-someone.html     [facility]: Interim Guidance for Hospital Discharge to Home: www.health.[email or web address]/diseases/coronavirus/hcp/hospdischarge.pdf    [facility] clinical trials (COVID-19 research studies): [email or web address]     Below are the COVID-19 hotlines at the [place] Department of Health ([facility]). Interpreters are available.   o For health questions: Call  [phone] or 1-149.413.9463 (7 a.m. to 7 p.m.)  o For questions about schools and childcare: Call 381-726-1682 or 1-831.622.4393 (7 a.m. to 7 p.m.)

## 2021-08-16 NOTE — PROGRESS NOTES
"Assessment & Plan     Piriformis syndrome, left  Handouts about piriformis syndrome and home exercises.   - Physical Therapy Referral; Future  - gabapentin (NEURONTIN) 300 MG capsule; Take 1 capsule (300 mg) by mouth 2 times daily as needed (leg pain)   :[id number]}     BMI:   Estimated body mass index is 27.31 kg/m  as calculated from the following:    Height as of this encounter: 1.65 m (5' 4.96\").    Weight as of this encounter: 74.3 kg (163 lb 14.4 oz).       Jeaneth Whitney MD  [facility]    Yobani Webster is a 71 year old female who presents today for the following   health issues:    She is here with Left numbness leg and Pain 10/10 off and on for 3 weeks (she [place])  \"Feels like hot poker and other times like ice cube\"  Pain with sitting and walking and standing  Lying on heating pad helps  Sleeps w/o pain except to get up to go to the bathroom and standing or sitting on toilet is painful  Seen on 7/28/21 and given baclofen for lumbar radiculopathy- did not help  Activity during the day seems to help  No injury recently   Back injury 25 yrs ago s/p [place] injection that resolved the pain   She feels the sx are diabetes related - Last A1c 11.1 despite being on insulin    Colostomy for 40 years   It was bleeding and had a procedure at [facility] for a TIPS and stent in liver via jugular with IR about 3 weeks ago         Review of Systems     Please see above.  The rest of the review of systems are negative for all systems.    Current Outpatient Medications   Medication Instructions     baclofen (LIORESAL) 5-10 mg, Oral, 3 TIMES DAILY PRN     diazePAM (VALIUM) 5 MG tablet [DIAZEPAM (VALIUM) 5 MG TABLET] TAKE 1 TABLET BY MOUTH EVERY 6 HOURS AS NEEDED FOR ANXIETY      diphenoxylate-atropine (LOMOTIL) 2.5-0.025 mg per tablet [DIPHENOXYLATE-ATROPINE (LOMOTIL) 2.5-0.025 MG PER TABLET] TAKE 1 TABLET BY MOUTH 4 TIMES PER DAY AS NEEDED FOR DIARRHEA " "    flash glucose scanning reader (FREESTYLE VIDA 14 DAY READER) Misc 1 Units, Does not apply, EVERY 14 DAYS     flash glucose sensor (FREESTYLE VIDA 14 DAY SENSOR) Kit 1 Units, Does not apply, DAILY     insulin glargine (LANTUS SOLOSTAR U-100 INSULIN) 100 unit/mL (3 mL) pen [INSULIN GLARGINE (LANTUS SOLOSTAR U-100 INSULIN) 100 UNIT/ML (3 ML) PEN] INJECT SUBCUTANEOUSLY TWICE DAILY, UP TO 60 UNITS     insulin lispro (HUMALOG KWIKPEN INSULIN) 100 unit/mL pen [INSULIN LISPRO (HUMALOG KWIKPEN INSULIN) 100 UNIT/ML PEN] INJECT 6 UNITS INTRAVENOUSLY WITH EACH MEALS UP TO 60 UNITS DAILY     omeprazole (PRILOSEC) 40 MG capsule [OMEPRAZOLE (PRILOSEC) 40 MG CAPSULE] TAKE 1 CAPSULE BY MOUTH EVERY DAY     ostomy adhesive Pste [OSTOMY ADHESIVE PSTE] Apply as needed     pen needle, diabetic (ULTICARE PEN NEEDLE) 32 gauge x 5/32\" Ndle [PEN NEEDLE, DIABETIC (ULTICARE PEN NEEDLE) 32 GAUGE X 5/32\" NDLE] TEST BLOOD SUGARS UP TO 4 TIMES A DAY.     YUVAFEM 10 mcg Tab [YUVAFEM 10 MCG TAB] INSERT 1 TABLET VAGINALLY TWICE WEEKLY, AS NEEDED         Objective   Vitals:    08/16/21 1532   BP: 132/70   Pulse: 97   Resp: 14   Temp: 98.6  F (37  C)   TempSrc: Oral   SpO2: 96%   Weight: 74.3 kg (163 lb 14.4 oz)   Height: 1.65 m (5' 4.96\")       Body mass index is 27.31 kg/m .    Physical Exam    OBJECTIVE:  PHYSICAL EXAM   General Appearance: Awake and alert, in no acute distress  HEENT: neck is supple  CV: regular rate  Resp: No respiratory distress. Breathing comfortably  Skin: no rashes noted  MS: normal back exam, able to bend forward and touch toes and extend back, twist at the waist and bend side-to-side, negative straight leg raises, no bony tenderness along the spine but pain to palpate buttock and along sciatic nerve          Recent Results (from the past 1008 hour(s))   Glucose (External Result)    Collection Time: 07/14/21 12:33 PM   Result Value Ref Range    Glucose (External) 399 (A) 74 - 100 mg/dL   Creatinine (External Result)    " Collection Time: 07/14/21 12:33 PM   Result Value Ref Range    Creatinine (External) 0.87 0.60 - 1.02 mg/dL   Potassium (External Result)    Collection Time: 07/14/21 12:33 PM   Result Value Ref Range    Potassium (External) 4.2 3.5 - 5.1 mmol/L   AST (External Result)    Collection Time: 07/14/21 12:33 PM   Result Value Ref Range    AST (External) 68 (A) 0 - 37 U/L   ALT (External Result)    Collection Time: 07/14/21 12:33 PM   Result Value Ref Range    ALT (External) 66 0 - 78 U/L   INR (External Result)    Collection Time: 07/14/21 12:33 PM   Result Value Ref Range    INR (External) 1.0 0.9 - 1.1

## 2021-08-16 NOTE — TELEPHONE ENCOUNTER
fyi- Patient called asking to speak with Harmony about leg pain. She can lay down but can't stand on it. This has been going on for about a week now and her PCP has given her muscle relaxer, but it's not working. She is thinking about going to the ER, but wanted Harmony's opinion to make sure she is going down the right path. I informed her  Harmony is not in on Mondays and the message will go to her CSS team. She said that since Harmony is not in today, she will most likely do something else or call some one else since she is in pain today.

## 2021-08-17 ENCOUNTER — TELEPHONE (OUTPATIENT)
Dept: FAMILY MEDICINE | Facility: CLINIC | Age: 71
End: 2021-08-17

## 2021-08-17 ENCOUNTER — HOSPITAL ENCOUNTER (OUTPATIENT)
Dept: PHYSICAL THERAPY | Facility: REHABILITATION | Age: 71
End: 2021-08-17
Attending: FAMILY MEDICINE
Payer: COMMERCIAL

## 2021-08-17 DIAGNOSIS — G57.02 PIRIFORMIS SYNDROME, LEFT: ICD-10-CM

## 2021-08-17 DIAGNOSIS — G57.02 PIRIFORMIS SYNDROME, LEFT: Primary | ICD-10-CM

## 2021-08-17 PROCEDURE — 97140 MANUAL THERAPY 1/> REGIONS: CPT | Mod: GP | Performed by: PHYSICAL THERAPIST

## 2021-08-17 PROCEDURE — 97110 THERAPEUTIC EXERCISES: CPT | Mod: GP | Performed by: PHYSICAL THERAPIST

## 2021-08-17 PROCEDURE — 97161 PT EVAL LOW COMPLEX 20 MIN: CPT | Mod: GP | Performed by: PHYSICAL THERAPIST

## 2021-08-17 NOTE — PROGRESS NOTES
Rehabilitation Services          OUTPATIENT PHYSICAL THERAPY ORTHOPEDIC EVALUATION  PLAN OF TREATMENT FOR OUTPATIENT REHABILITATION  (COMPLETE FOR INITIAL CLAIMS ONLY)  Patient's Last Name, First Name, M.I.  YOB: 1950  DarinZara  [place]    Provider s Name:  Abbi Kemp PT   Medical Record No.  [id number]   Start of Care Date:  (P) 08/17/21   Onset Date:  (P) 07/27/21   Type:     _X__PT   ___OT   ___SLP Medical Diagnosis:  (P) piriformis syndrome     PT Diagnosis:  (P) left superior sacral shear with LBP, SI dysfunction and tight piriformis   Visits from SOC:  1      _________________________________________________________________________________  Plan of Treatment/Functional Goals:  (P) balance training, gait training, joint mobilization, manual therapy, neuromuscular re-education, ROM, strengthening, stretching     (P) Cryotherapy, Electrical stimulation, Hot packs, TENS, Traction, Ultrasound  (P) check precautions first  Goals  Goal Identifier: (P) HEP  Goal Description: (P) The patient will demonstrate independence in HEP to aid in home management of symptoms.in 12 weeks  Target Date: (P) 11/15/21    Goal Identifier: (P) sitting straight  Goal Description: (P) The patient will demonstrate proper posture with sitting for 30 minutes keeping both feet on the floor so as to keep her back in a neutral position. using back support if needed in 12 weeks  Target Date: (P) 11/15/21    Goal Identifier: (P) stand with weight evenly on both feet  Goal Description: (P) The patient will stand with weight through both of her legs evenly and stand for 10 minutes with only 3-4/10 pain  Target Date: (P) 11/15/21       Therapy Frequency:  (P) 2 times/Week  Predicted Duration of Therapy Intervention:  (P) 12    Abbi Kemp PT                 I CERTIFY THE NEED FOR THESE SERVICES FURNISHED UNDER        THIS PLAN OF TREATMENT AND WHILE UNDER MY CARE     (Physician co-signature of this document  indicates review and certification of the therapy plan).                       Certification Date From:  (P) 08/17/21   Certification Date To:  (P) 11/15/21    Referring Provider:  YOANA) Jeaneth Whitney MD    Initial Assessment        See Epic Evaluation Start of Care Date: (P) 08/17/21 08/17/21 0800   General Information   Type of Visit Initial OP Ortho PT Evaluation   Start of Care Date 08/17/21   Referring Physician Jeaneth Whitney MD   Patient/Family Goals Statement out of pain   Orders Evaluate and Treat   Orders Comment piriformis syndrome   Date of Order 08/16/21   Certification Required? Yes   Medical Diagnosis piriformis syndrome   Surgical/Medical history reviewed Other (see commments)   Precautions/Limitations   (stent in liver pust in June 29)   Special Instructions stent in liver pust in June 29   General Information Comments patient has a colostomy bag   Body Part(s)   Body Part(s) Hip;Lumbar Spine/SI   Presentation and Etiology   Pertinent history of current problem (include personal factors and/or comorbidities that impact the POC) 3 weeks ago started having pain when she would get up to go to the bathroom at night pain in the buttokc and the lateral thigh to the ankle and it was 10/10 numbness tingling and pain.  laying to standing the pain have to sit to put hair tnad make up on and and sit in the  bathtub.  pain for 1 hour in the AM better as the day goes on. Able to drive herself here.  Typically don't drive early.  decreasing her walk.  now doing 1 mile and previously was doing 3 miles.  Have gabapentin and it helped her sleep.  diabetic uncontrolled as of yet.  more pain with crossing her left over her right leg.  pain is on the left side. Standing tolerance pain with initially standing and it gets worse as she stands.  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  unable to go to [employer] .    Impairments A. Pain;B. Decreased WB tolerance;F. Decreased strength and endurance;J.  Burning;K. Numbness;L. Tingling   Functional Limitations perform activities of daily living;perform desired leisure / sports activities   Symptom Location left buttock and left lateral thigh and lower leg   How/Where did it occur From insidious onset   Onset date of current episode/exacerbation 07/27/21   Chronicity New   Pain rating (0-10 point scale) Best (/10);Worst (/10)   Best (/10) 3/10   Worst (/10) 10/10   Pain quality A. Sharp;D. Burning;E. Shooting   Frequency of pain/symptoms A. Constant   Pain/symptoms are: Worse in the morning   Pain/symptoms exacerbated by B. Walking;K. Home tasks   Pain/symptoms eased by C. Rest;K. Other   Pain eased by comment LAYING   Progression of symptoms since onset: Worsened   Prior Level of Function   Prior Level of Function-Mobility WNL   Fall Risk Screen   Fall screen completed by PT   Have you fallen 2 or more times in the past year? No   Have you fallen and had an injury in the past year? No   Is patient a fall risk? No   Abuse Screen (yes response referral indicated)   Feels Unsafe at Home or Work/School no   Feels Threatened by Someone no   Does Anyone Try to Keep You From Having Contact with Others or Doing Things Outside Your Home? no   Physical Signs of Abuse Present no   System Outcome Measures   Outcome Measures Low Back Pain (see [place] and [place])   Hip Objective Findings   Side (if bilateral, select both right and left) Left   Observation patient sits with her legs crossed right over the left and is able to cross the left over the right but has some discomfort.   Posture sits crossed legged and leaning to the left   Lumbar Spine/SI Objective Findings   Observation left high illiac crest and + left forward bend decreased weightbearing on the left with standing pain 4/10 pain. right rotated L5 and then neutral then right rotated T11,T10 9,8,7,6,5. then left rotated T4,3,2,level T1 when assessed in standing .    Hip Screen IR on the left pain on the lateral thigh    Hip Flexion (L2) Strength 5/5   Knee Flexion Strength 5/5 right 5-/5 left   Knee Extension (L3) Strength 5/5 bilaterally   Ankle Dorsiflexion (L4) Strength 5/5 bilaterally   Great Toe Extension (L5) Strength 5/5 bilaterally   SLR + at 60 degrees pain in the posterior hamstring region , 70 degrees on the right no pain   Lumbar/SI Special Tests Comments left high illiac crest and + left forward bend    Palpation decreased weightbearing on the left with standing pain 4/10 pain. right rotated L5 and then neutral then right rotated T11,T10 9,8,7,6,5. then left rotated T4,3,2,level T1 when assessed in standing .    Planned Therapy Interventions   Planned Therapy Interventions balance training;gait training;joint mobilization;manual therapy;neuromuscular re-education;ROM;strengthening;stretching   Planned Modality Interventions   Planned Modality Interventions Cryotherapy;Electrical stimulation;Hot packs;TENS;Traction;Ultrasound   Planned Modality Interventions Comments check precautions first   Clinical Impression   Criteria for Skilled Therapeutic Interventions Met yes, treatment indicated   PT Diagnosis left superior sacral shear with LBP, SI dysfunction and tight piriformis   Influenced by the following impairments A. Pain;B. Decreased WB tolerance;F. Decreased strength and endurance;J. Burning;K. Numbness;L. Tingling   Functional limitations due to impairments perform activities of daily living;perform desired leisure / sports activities   Clinical Presentation Stable/Uncomplicated   Clinical Decision Making (Complexity) Low complexity   Therapy Frequency 2 times/Week   Predicted Duration of Therapy Intervention (days/wks) 12   Risk & Benefits of therapy have been explained Yes   Patient, Family & other staff in agreement with plan of care Yes   ORTHO GOALS   PT Ortho Eval Goals 1;2;3   Ortho Goal 1   Goal Identifier HEP   Goal Description The patient will demonstrate independence in HEP to aid in home management of  symptoms.in 12 weeks   Target Date 11/15/21   Ortho Goal 2   Goal Identifier sitting straight   Goal Description The patient will demonstrate proper posture with sitting for 30 minutes keeping both feet on the floor so as to keep her back in a neutral position. using back support if needed in 12 weeks   Target Date 11/15/21   Ortho Goal 3   Goal Identifier stand with weight evenly on both feet   Goal Description The patient will stand with weight through both of her legs evenly and stand for 10 minutes with only 3-4/10 pain   Target Date 11/15/21   Total Evaluation Time   PT Eval, Low Complexity Minutes (39812) 35   Therapy Certification   Certification date from 08/17/21   Certification date to 11/15/21   Medical Diagnosis piriformis syndrome

## 2021-08-17 NOTE — TELEPHONE ENCOUNTER
Reason for Call: Request for an order or referral:    Order or referral being requested: Acupuncture      Date needed: as soon as possible    Has the patient been seen by the PCP for this problem? Not Applicable    Additional comments: Patient stated that she started her PT already but she prefer the acupuncture instead if pcp can put in an order for that and schedule it.    Phone number Patient can be reached at:  Home number on file [phone] (home)    Best Time:  Anytime     Can we leave a detailed message on this number?  YES    Call taken on 8/17/2021 at 12:15 PM by MINE Adams

## 2021-08-17 NOTE — TELEPHONE ENCOUNTER
Referral for acupuncture at [place] [facility] ordered.   She should be getting a call from them but also Please give patient the number for that clinic.       Thanks,   Dr. Jeaneth Whitney  8/17/2021

## 2021-08-31 ENCOUNTER — TELEPHONE (OUTPATIENT)
Dept: PHYSICAL THERAPY | Facility: REHABILITATION | Age: 71
End: 2021-08-31

## 2021-08-31 NOTE — TELEPHONE ENCOUNTER
Spoke with the patient and she reported that she scheduled herself too tight and was doing a lot better and the ice was working well for her but the pelvic tilts caused her to bleed again under her colostomy.  She will call to reschedule.    Melissa Kemp PT

## 2021-09-22 ENCOUNTER — TRANSFERRED RECORDS (OUTPATIENT)
Dept: HEALTH INFORMATION MANAGEMENT | Facility: CLINIC | Age: 71
End: 2021-09-22

## 2021-09-22 ENCOUNTER — TELEPHONE (OUTPATIENT)
Dept: FAMILY MEDICINE | Facility: CLINIC | Age: 71
End: 2021-09-22

## 2021-09-22 ENCOUNTER — OFFICE VISIT (OUTPATIENT)
Dept: FAMILY MEDICINE | Facility: CLINIC | Age: 71
End: 2021-09-22
Payer: COMMERCIAL

## 2021-09-22 VITALS
TEMPERATURE: 97.9 F | SYSTOLIC BLOOD PRESSURE: 106 MMHG | DIASTOLIC BLOOD PRESSURE: 68 MMHG | BODY MASS INDEX: 27.81 KG/M2 | WEIGHT: 166.9 LBS | RESPIRATION RATE: 12 BRPM | HEART RATE: 72 BPM | HEIGHT: 65 IN

## 2021-09-22 DIAGNOSIS — Z01.818 PREOPERATIVE EXAMINATION: Primary | ICD-10-CM

## 2021-09-22 DIAGNOSIS — Z93.2 ILEOSTOMY STATUS (H): ICD-10-CM

## 2021-09-22 DIAGNOSIS — K50.118 CROHN'S DISEASE OF COLON WITH OTHER COMPLICATION (H): ICD-10-CM

## 2021-09-22 DIAGNOSIS — K74.60 NON-ALCOHOLIC CIRRHOSIS (H): ICD-10-CM

## 2021-09-22 DIAGNOSIS — Z79.4 TYPE 2 DIABETES MELLITUS WITH HYPERGLYCEMIA, WITH LONG-TERM CURRENT USE OF INSULIN (H): ICD-10-CM

## 2021-09-22 DIAGNOSIS — Z01.818 PREOP GENERAL PHYSICAL EXAM: ICD-10-CM

## 2021-09-22 DIAGNOSIS — E11.65 TYPE 2 DIABETES MELLITUS WITH HYPERGLYCEMIA, WITH LONG-TERM CURRENT USE OF INSULIN (H): ICD-10-CM

## 2021-09-22 LAB
ANION GAP SERPL CALCULATED.3IONS-SCNC: 12 MMOL/L (ref 5–18)
ATRIAL RATE - MUSE: 74 BPM
BUN SERPL-MCNC: 10 MG/DL (ref 8–28)
CALCIUM SERPL-MCNC: 10.1 MG/DL (ref 8.5–10.5)
CHLORIDE BLD-SCNC: 106 MMOL/L (ref 98–107)
CO2 SERPL-SCNC: 24 MMOL/L (ref 22–31)
CREAT SERPL-MCNC: 0.81 MG/DL (ref 0.6–1.1)
DIASTOLIC BLOOD PRESSURE - MUSE: 68 MMHG
GFR SERPL CREATININE-BSD FRML MDRD: 73 ML/MIN/1.73M2
GLUCOSE BLD-MCNC: 162 MG/DL (ref 70–125)
HGB BLD-MCNC: 11.8 G/DL (ref 11.7–15.7)
INTERPRETATION ECG - MUSE: NORMAL
P AXIS - MUSE: 36 DEGREES
POTASSIUM BLD-SCNC: 3.7 MMOL/L (ref 3.5–5)
PR INTERVAL - MUSE: 202 MS
QRS DURATION - MUSE: 84 MS
QT - MUSE: 428 MS
QTC - MUSE: 475 MS
R AXIS - MUSE: -23 DEGREES
SODIUM SERPL-SCNC: 142 MMOL/L (ref 136–145)
SYSTOLIC BLOOD PRESSURE - MUSE: 106 MMHG
T AXIS - MUSE: 38 DEGREES
VENTRICULAR RATE- MUSE: 74 BPM

## 2021-09-22 PROCEDURE — 93010 ELECTROCARDIOGRAM REPORT: CPT | Performed by: INTERNAL MEDICINE

## 2021-09-22 PROCEDURE — 85018 HEMOGLOBIN: CPT | Performed by: FAMILY MEDICINE

## 2021-09-22 PROCEDURE — 80048 BASIC METABOLIC PNL TOTAL CA: CPT | Performed by: FAMILY MEDICINE

## 2021-09-22 PROCEDURE — 93005 ELECTROCARDIOGRAM TRACING: CPT | Performed by: FAMILY MEDICINE

## 2021-09-22 PROCEDURE — 36415 COLL VENOUS BLD VENIPUNCTURE: CPT | Performed by: FAMILY MEDICINE

## 2021-09-22 PROCEDURE — 99214 OFFICE O/P EST MOD 30 MIN: CPT | Performed by: FAMILY MEDICINE

## 2021-09-22 RX ORDER — EMPAGLIFLOZIN 10 MG/1
10 TABLET, FILM COATED ORAL DAILY
COMMUNITY
Start: 2021-05-04 | End: 2022-03-22

## 2021-09-22 ASSESSMENT — MIFFLIN-ST. JEOR: SCORE: 1263.96

## 2021-09-22 NOTE — PATIENT INSTRUCTIONS

## 2021-09-22 NOTE — PROGRESS NOTES
[place] [address]  [facility] [address]  Phone: [phone]  Fax: [phone]  [facility]  Pre-op Performing Provider: VENITA LEGER      PREOPERATIVE EVALUATION:  Today's date: 9/22/2021    Zara Webster is a 71 year old female who presents for a preoperative evaluation.    Surgical Information:  Surgery/Procedure: TIPPS Revision  Surgery Location: [facility]  Surgeon: Dr. Dontrell Benito  Surgery Date: 9/27/21  Time of Surgery: AM  Where patient plans to recover: At home with family  Fax number for surgical facility: [phone]    Type of Anesthesia Anticipated: to be determined    Assessment & Plan     The proposed surgical procedure is considered LOW risk.    Problem List Items Addressed This Visit        Digestive    Crohn's disease of colon (H)    Relevant Medications    empagliflozin (JARDIANCE) 10 MG TABS tablet    Other Relevant Orders    Hemoglobin (Completed)    Non-alcoholic cirrhosis (H)     Continue care with GI            Endocrine    Type II diabetes mellitus (H)     Overall home readings have improved over the past 2 months.  She is found much better control with the use of continuous glucose monitoring.  Continue current plan.         Relevant Medications    empagliflozin (JARDIANCE) 10 MG TABS tablet    Other Relevant Orders    Hemoglobin A1c POCT    Basic metabolic panel       Other    Ileostomy status (H)      Other Visit Diagnoses     Preoperative examination    -  Primary    Relevant Orders    EKG 12-lead, tracing only (Completed)    Hemoglobin A1c POCT    Basic metabolic panel    Hemoglobin (Completed)    Preop general physical exam                   Risks and Recommendations:  The patient has the following additional risks and recommendations for perioperative complications:   - No identified additional risk factors other than previously addressed      RECOMMENDATION:  APPROVAL  GIVEN to proceed with proposed procedure pending review of diagnostic evaluation.      Subjective     HPI related to upcoming procedure: Patient with longstanding history of Crohn's disease and total colectomy with permanent ileostomy.  However continues to get blood in her colostomy bag.  This is the second time she will have this procedure done.  The first was successful in reducing the amount of blood.  She is hopeful that with this next procedure it will fully resolve.  Notes from her gastroenterologist and colorectal surgeon were reviewed.  Of note her diabetes by her home glucometer readings are now below 150 in the morning with the addition of Jardiance.  She is taking daily.  Also with continuous glucometer she has better control with her insulin usage as well.  She is also improved up on her diet though she admits she still occasionally will have a soda she is not doing them as regularly as she was prior.    Preop Questions 9/22/2021   1. Have you ever had a heart attack or stroke? No   2. Have you ever had surgery on your heart or blood vessels, such as a stent placement, a coronary artery bypass, or surgery on an artery in your head, neck, heart, or legs? No   3. Do you have chest pain with activity? No   4. Do you have a history of  heart failure? No   5. Do you currently have a cold, bronchitis or symptoms of other infection? No   6. Do you have a cough, shortness of breath, or wheezing? No   7. Do you or anyone in your family have previous history of blood clots? No   8. Do you or does anyone in your family have a serious bleeding problem such as prolonged bleeding following surgeries or cuts? No   9. Have you ever had problems with anemia or been told to take iron pills? No   10. Have you had any abnormal blood loss such as black, tarry or bloody stools, or abnormal vaginal bleeding? YES - this is the reason for the planned procedure   11. Have you ever had a blood transfusion? No   12. Are you  willing to have a blood transfusion if it is medically needed before, during, or after your surgery? Yes   13. Have you or any of your relatives ever had problems with anesthesia? No   14. Do you have sleep apnea, excessive snoring or daytime drowsiness? No   15. Do you have any artifical heart valves or other implanted medical devices like a pacemaker, defibrillator, or continuous glucose monitor? No   16. Do you have artificial joints? No   17. Are you allergic to latex? No       Health Care Directive:  Patient does not have a Health Care Directive or Living Will: Patient states has Advance Directive and will bring in a copy to clinic.    Preoperative Review of :   reviewed - controlled substances reflected in medication list.      Review of Systems   All other systems reviewed and are negative.        Patient Active Problem List    Diagnosis Date Noted     Chronic kidney disease, stage 3 11/03/2020     Priority: Medium     Other cirrhosis of liver (H) - unclear etiology,[facility] 10/22/2020     Priority: Medium     Type II diabetes mellitus (H)      Priority: Medium     Created by Conversion         Gastrointestinal hemorrhage 04/29/2020     Priority: Medium     Crohn's disease of colon (H) 11/03/2019     Priority: Medium     Aspirin contraindicated 04/02/2019     Priority: Medium     Hypercholesteremia 10/06/2016     Priority: Medium     BMI 28.0-28.9,adult 10/06/2016     Priority: Medium     Post Colectomy      Priority: Medium     Created by Conversion          Past Medical History:   Diagnosis Date     Anemia      Diabetes mellitus (H)      Past Surgical History:   Procedure Laterality Date     BREAST EXCISIONAL BIOPSY Left 1980     BREAST EXCISIONAL BIOPSY Left 1985     C REMOVAL COLON/ILEOSTOMY      Description: Total Abdominal Colectomy;  Recorded: 12/15/2011;     C REMOVAL COLON/PROCTECTOMY/ILEOSTOMY      Description: Total Proctocolectomy;  Recorded: 08/10/2011;     C VAG HYST, W/VAGINECTOMY       "Description: Vaginal Hysterectomy With Colpectomy;  Recorded: 08/14/2014;     HYSTERECTOMY  1991     OOPHORECTOMY Bilateral 1991     OTHER SURGICAL HISTORY      KIDNEY STONE REMOVALNOT SURE WHICH SIDE, DR. SHIV SALINAS PROCTOSIGMOIDOSCOPY,RIGID,DIAGNOS N/A 6/17/2020    Procedure: ILEOSTOMY REVISION, PROCTOSCOPY, ILEOSCOPY;  Surgeon: Devang Moon MD;  [facility];  Service: General     Current Outpatient Medications   Medication Sig Dispense Refill     diazePAM (VALIUM) 5 MG tablet [DIAZEPAM (VALIUM) 5 MG TABLET] TAKE 1 TABLET BY MOUTH EVERY 6 HOURS AS NEEDED FOR ANXIETY  30 tablet 1     diphenoxylate-atropine (LOMOTIL) 2.5-0.025 mg per tablet [DIPHENOXYLATE-ATROPINE (LOMOTIL) 2.5-0.025 MG PER TABLET] TAKE 1 TABLET BY MOUTH 4 TIMES PER DAY AS NEEDED FOR DIARRHEA 30 tablet 11     empagliflozin (JARDIANCE) 10 MG TABS tablet Take 10 mg by mouth daily       flash glucose scanning reader (FREESTYLE VIDA 14 DAY READER) Misc [FLASH GLUCOSE SCANNING READER (FREESTYLE VIDA 14 DAY READER) MISC] Use 1 Units As Directed every 14 (fourteen) days. 1 each 0     flash glucose sensor (FREESTYLE VIDA 14 DAY SENSOR) Kit [FLASH GLUCOSE SENSOR (FREESTYLE VIDA 14 DAY SENSOR) KIT] Use 1 Units As Directed daily. 2 kit 5     insulin glargine (LANTUS SOLOSTAR U-100 INSULIN) 100 unit/mL (3 mL) pen [INSULIN GLARGINE (LANTUS SOLOSTAR U-100 INSULIN) 100 UNIT/ML (3 ML) PEN] INJECT SUBCUTANEOUSLY TWICE DAILY, UP TO 60 UNITS 45 mL 1     insulin lispro (HUMALOG KWIKPEN INSULIN) 100 unit/mL pen [INSULIN LISPRO (HUMALOG KWIKPEN INSULIN) 100 UNIT/ML PEN] INJECT 6 UNITS INTRAVENOUSLY WITH EACH MEALS UP TO 60 UNITS DAILY 60 mL 3     omeprazole (PRILOSEC) 40 MG capsule [OMEPRAZOLE (PRILOSEC) 40 MG CAPSULE] TAKE 1 CAPSULE BY MOUTH EVERY DAY 90 capsule 2     ostomy adhesive Pste [OSTOMY ADHESIVE PSTE] Apply as needed 4 Tube 3     pen needle, diabetic (ULTICARE PEN NEEDLE) 32 gauge x 5/32\" Ndle [PEN NEEDLE, DIABETIC (ULTICARE PEN " "NEEDLE) 32 GAUGE X 5/32\" NDLE] TEST BLOOD SUGARS UP TO 4 TIMES A DAY. 300 each 0     YUVAFEM 10 mcg Tab [YUVAFEM 10 MCG TAB] INSERT 1 TABLET VAGINALLY TWICE WEEKLY, AS NEEDED  2     baclofen (LIORESAL) 10 MG tablet Take 0.5-1 tablets (5-10 mg) by mouth 3 times daily as needed for muscle spasms 30 tablet 1     gabapentin (NEURONTIN) 300 MG capsule Take 1 capsule (300 mg) by mouth 2 times daily as needed (leg pain) 60 capsule 1       Allergies   Allergen Reactions     Prochlorperazine Edisylate [Prochlorperazine] Other (See Comments)     Saw things     Compazine [Prochlorperazine] Unknown     Metformin Unknown     Victoza [Liraglutide] Unknown        Social History     Tobacco Use     Smoking status: Never Smoker     Smokeless tobacco: Never Used   Substance Use Topics     Alcohol use: Not Currently       History   Drug Use No         Objective     /68 (BP Location: Left arm, Patient Position: Sitting, Cuff Size: Adult Large)   Pulse 72   Temp 97.9  F (36.6  C) (Oral)   Resp 12   Ht 1.645 m (5' 4.75\")   Wt 75.7 kg (166 lb 14.4 oz)   LMP  (LMP Unknown)   Breastfeeding No   BMI 27.99 kg/m      Physical Exam  Vitals and nursing note reviewed.   Constitutional:       General: She is not in acute distress.     Appearance: Normal appearance.   HENT:      Head: Normocephalic and atraumatic.      Right Ear: Tympanic membrane, ear canal and external ear normal.      Left Ear: Tympanic membrane, ear canal and external ear normal.      Nose: Nose normal.      Mouth/Throat:      Mouth: Mucous membranes are moist.      Pharynx: Oropharynx is clear. No oropharyngeal exudate.   Eyes:      General: No scleral icterus.     Extraocular Movements: Extraocular movements intact.      Conjunctiva/sclera: Conjunctivae normal.   Neck:      Vascular: No carotid bruit.   Cardiovascular:      Rate and Rhythm: Normal rate and regular rhythm.      Pulses: Normal pulses.      Heart sounds: Normal heart sounds. No murmur heard.   No " friction rub. No gallop.    Pulmonary:      Effort: Pulmonary effort is normal.      Breath sounds: Normal breath sounds. No wheezing, rhonchi or rales.   Musculoskeletal:         General: No swelling. Normal range of motion.      Cervical back: Normal range of motion.      Right lower leg: No edema.      Left lower leg: No edema.   Skin:     General: Skin is warm and dry.      Capillary Refill: Capillary refill takes less than 2 seconds.      Findings: No rash.   Neurological:      General: No focal deficit present.      Mental Status: She is alert and oriented to person, place, and time.      Cranial Nerves: No cranial nerve deficit.      Gait: Gait is intact. Gait normal.      Deep Tendon Reflexes: Reflexes normal.      Reflex Scores:       Bicep reflexes are 2+ on the right side and 2+ on the left side.       Patellar reflexes are 2+ on the right side and 2+ on the left side.  Psychiatric:         Mood and Affect: Mood normal.         Thought Content: Thought content normal.           Recent Labs   Lab Test 07/14/21  1233 06/22/21  1016 04/27/21  0849 10/27/20  0947 06/10/20  1404   HGB  --  10.9*  --   --  10.2*   PLT  --  104*  --   --   --    INR 1.0  --   --   --   --    NA  --  135* 137   < >  --    POTASSIUM  --  4.1 4.2   < >  --    CR  --  0.89 0.92   < > 0.91   A1C  --  11.1* 10.7*   < > 10.4*    < > = values in this interval not displayed.        Diagnostics:  Labs pending at this time.  Results will be reviewed when available.   EKG required for age and not completed in the last 90 days.     Revised Cardiac Risk Index (RCRI):  The patient has the following serious cardiovascular risks for perioperative complications:   - No serious cardiac risks = 0 points     RCRI Interpretation: 0 points: Class I (very low risk - 0.4% complication rate)           Signed Electronically by: Jayant Weir DO  Copy of this evaluation report is provided to requesting physician.

## 2021-09-22 NOTE — ASSESSMENT & PLAN NOTE
Overall home readings have improved over the past 2 months.  She is found much better control with the use of continuous glucose monitoring.  Continue current plan.

## 2021-09-22 NOTE — TELEPHONE ENCOUNTER
Reason for Call:  Same Day Appointment, Requested Provider:  PRE OP    PCP: Siria, [facility]    Reason for visit: PRE OP    Duration of symptoms: UNKNOWN    Have you been treated for this in the past? UNKNOWN    Additional comments: PLEASE CALL PT, TO SCHEDULE PRE OP WITH DR. LUNA, SURGERY DATE 9/27/21 WITH DR[facility] K[facility]    Can we leave a detailed message on this number? YES    Phone number patient can be reached at: Home number on file [phone] (home)    Best Time: ANYTIME    Call taken on 9/22/2021 at 9:34 AM by Adali Barrios

## 2021-11-01 DIAGNOSIS — R19.7 DIARRHEA: ICD-10-CM

## 2021-11-02 NOTE — TELEPHONE ENCOUNTER
Routing refill request to provider for review/approval because:  Controlled substance request.  Clarify PCP in EPIC    Last Written Prescription Date:  10/22/20  Last Fill Quantity: 30,  # refills: 11   Last office visit provider:  9/22/21     Requested Prescriptions   Pending Prescriptions Disp Refills     diphenoxylate-atropine (LOMOTIL) 2.5-0.025 MG tablet [Pharmacy Med Name: Diphenoxylate-Atropine Oral Tablet 2.5-0.025 MG] 30 tablet 0     Sig: TAKE ONE TABLET BY MOUTH FOUR TIMES DAILY AS NEEDED FOR DIARRHEA       There is no refill protocol information for this order          Ranjana Schrader RN 11/02/21 3:25 PM

## 2021-11-03 RX ORDER — DIPHENOXYLATE HCL/ATROPINE 2.5-.025MG
TABLET ORAL
Qty: 30 TABLET | Refills: 0 | Status: SHIPPED | OUTPATIENT
Start: 2021-11-03 | End: 2021-12-27

## 2021-11-03 NOTE — TELEPHONE ENCOUNTER
Due for follow up in clinic for diabetes.    30 day refill sent to pharmacy for bridge.    Please contact and help arrange appointment. And please arrange appointment with the provider she is determining to be her PCP (Devonte Saxena Bowen or Me)

## 2021-11-15 ENCOUNTER — TELEPHONE (OUTPATIENT)
Dept: ENDOCRINOLOGY | Facility: CLINIC | Age: 71
End: 2021-11-15
Payer: COMMERCIAL

## 2021-11-15 NOTE — TELEPHONE ENCOUNTER
Yes and yes. The appointment would have to be on a Tuesday in [facility]. Whenever there is an opening.

## 2021-11-15 NOTE — TELEPHONE ENCOUNTER
Hello!    Will we DB somewhere? As I believe the CGM refill appts, need to be in-clinic, right? With labs 1 week prior as well?

## 2021-11-17 NOTE — TELEPHONE ENCOUNTER
Date: 12/14/2021 Status: Scheduled   Time: 8:00 AM Length: 30   Visit Type: RETURN DIABETES [[id number]] Copay: $0.00   Provider: Katiana Goodwin NP [facility]   Bill Area: Diabetic Education [facility]       Pt states that she will run out before appt date. Only has enough for 2 weeks.

## 2021-12-08 ENCOUNTER — TELEPHONE (OUTPATIENT)
Dept: ENDOCRINOLOGY | Facility: CLINIC | Age: 71
End: 2021-12-08
Payer: COMMERCIAL

## 2021-12-08 DIAGNOSIS — Z79.4 TYPE 2 DIABETES MELLITUS WITH HYPERGLYCEMIA, WITH LONG-TERM CURRENT USE OF INSULIN (H): Primary | ICD-10-CM

## 2021-12-08 DIAGNOSIS — E11.65 TYPE 2 DIABETES MELLITUS WITH HYPERGLYCEMIA, WITH LONG-TERM CURRENT USE OF INSULIN (H): Primary | ICD-10-CM

## 2021-12-08 NOTE — TELEPHONE ENCOUNTER
Patient has upcoming appointment with Harmony Goodwin NP in Endocrinology on 12/14/21.  The patient has not completed ordered A1c lab.      Please call patient to schedule a lab appointment at least 48 hours prior to clinic appointment or have patient reschedule clinic appointment with a lab appointment preferably 1 week prior.Thank you.

## 2021-12-10 ENCOUNTER — LAB (OUTPATIENT)
Dept: LAB | Facility: CLINIC | Age: 71
End: 2021-12-10
Payer: COMMERCIAL

## 2021-12-10 DIAGNOSIS — E11.65 TYPE 2 DIABETES MELLITUS WITH HYPERGLYCEMIA, WITH LONG-TERM CURRENT USE OF INSULIN (H): ICD-10-CM

## 2021-12-10 DIAGNOSIS — Z79.4 TYPE 2 DIABETES MELLITUS WITH HYPERGLYCEMIA, WITH LONG-TERM CURRENT USE OF INSULIN (H): ICD-10-CM

## 2021-12-10 LAB — HBA1C MFR BLD: 11 % (ref 0–5.6)

## 2021-12-10 PROCEDURE — 83036 HEMOGLOBIN GLYCOSYLATED A1C: CPT

## 2021-12-10 PROCEDURE — 36415 COLL VENOUS BLD VENIPUNCTURE: CPT

## 2021-12-14 ENCOUNTER — OFFICE VISIT (OUTPATIENT)
Dept: ENDOCRINOLOGY | Facility: CLINIC | Age: 71
End: 2021-12-14
Payer: COMMERCIAL

## 2021-12-14 VITALS
WEIGHT: 165.7 LBS | BODY MASS INDEX: 27.79 KG/M2 | HEART RATE: 96 BPM | SYSTOLIC BLOOD PRESSURE: 128 MMHG | DIASTOLIC BLOOD PRESSURE: 66 MMHG

## 2021-12-14 DIAGNOSIS — Z79.4 TYPE 2 DIABETES MELLITUS WITH HYPERGLYCEMIA, WITH LONG-TERM CURRENT USE OF INSULIN (H): Primary | ICD-10-CM

## 2021-12-14 DIAGNOSIS — E11.65 TYPE 2 DIABETES MELLITUS WITH HYPERGLYCEMIA, WITH LONG-TERM CURRENT USE OF INSULIN (H): Primary | ICD-10-CM

## 2021-12-14 PROBLEM — K75.81 NONALCOHOLIC STEATOHEPATITIS: Status: ACTIVE | Noted: 2021-07-19

## 2021-12-14 PROBLEM — R93.2 ABNORMAL LIVER ULTRASOUND: Status: ACTIVE | Noted: 2021-12-14

## 2021-12-14 PROBLEM — K76.6 PORTAL HYPERTENSION (H): Status: ACTIVE | Noted: 2021-12-14

## 2021-12-14 PROCEDURE — 95251 CONT GLUC MNTR ANALYSIS I&R: CPT | Performed by: NURSE PRACTITIONER

## 2021-12-14 PROCEDURE — 99214 OFFICE O/P EST MOD 30 MIN: CPT | Performed by: NURSE PRACTITIONER

## 2021-12-14 NOTE — PROGRESS NOTES
"  [facility] ENDOCRINOLOGY    Diabetes Note 12/14/2021    Zara Webster, 1950, [id number]          Reason for visit      1. Type 2 diabetes mellitus with hyperglycemia, with long-term current use of insulin (H)        HPI     Zara Webster is a very pleasant 71 year old old female who presents for follow up.  SUMMARY:    Zara is here today in f/u for DM 2. Her current A1c is 11. She is using the Naila 2 and the download shows that she was in range only 2% of the time over the last two weeks. She had an ave BG of 323. She has had a Colostomy for over 30 years and had some recent problems with bleeding. They did a revision, and if she is careful, she doesn't have any bleeding. It is because of this that she does a lot better with CHO than other foods, especially roughage. She continues to take 14 units of Lantus BID and takes 6 units of Humalog with meals, and for correction. Jardiance was ordered for her at the last appointment and because she was afraid of the side effects that were listed, so she didn't start it. She reports today that she is experiencing a \"buzzing\" in her feet.       Blood glucose data:      Past Medical History     Patient Active Problem List   Diagnosis     Type II diabetes mellitus (H)     Post Colectomy     Hypercholesteremia     BMI 28.0-28.9,adult     Aspirin contraindicated     Crohn's disease of colon (H)     Gastrointestinal hemorrhage     Other cirrhosis of liver (H) - unclear etiology,[facility]     Chronic kidney disease, stage 3 (H)     Ileostomy status (H)     Non-alcoholic cirrhosis (H)     Abnormal liver ultrasound     Nonalcoholic steatohepatitis     Portal hypertension (H)        Family History       family history includes Cancer in her father; Diabetes in her father and mother; Heart Disease in her mother; Hyperlipidemia in her mother; No Known Problems in her sister and sister.    Social History      reports that she has never smoked. She has never used smokeless " tobacco. She reports previous alcohol use. She reports that she does not use drugs.      Review of Systems     Patient has no polyuria or polydipsia, no chest pain, dyspnea or TIA's, no numbness, tingling or pain in extremities  Remainder negative except as noted in HPI.      Vital Signs     /66 (BP Location: Right arm, Patient Position: Sitting, Cuff Size: Adult Regular)   Pulse 96   Wt 75.2 kg (165 lb 11.2 oz)   LMP  (LMP Unknown)   BMI 27.79 kg/m    Wt Readings from Last 3 Encounters:   12/14/21 75.2 kg (165 lb 11.2 oz)   09/22/21 75.7 kg (166 lb 14.4 oz)   08/16/21 74.3 kg (163 lb 14.4 oz)       Physical Exam     Constitutional:  Well developed, Well nourished  HENT:  Normocephalic,   Neck: Thyroid normal, No lymph nodes, Supple  Eyes:  PERRL, Conjunctiva pink  Respiratory:  Normal breath sounds, No respiratory distress  Cardiovascular:  Normal heart rate, Normal rhythm, No murmurs  GI:  Bowel sounds normal, Soft, No tenderness  Musculoskeletal:  No gross deformity or lesions, normal dorsalis pedis pulses  Skin: No acanthosis nigricans, lipoatrophy or lipodystrophy  Neurologic:  Alert & oriented x 3, nonfocal  Psychiatric:  Affect, Mood, Insight appropriate        Assessment     1. Type 2 diabetes mellitus with hyperglycemia, with long-term current use of insulin (H)        Plan     She is going to start the Jardiance. No changes to her Insulin doses. F/u with me in 3 months.      Katiana Goodwin NP   Endocrinology  12/14/2021  12:21 PM          Lab Results     Microalbumin Urine mg/dL   Date Value Ref Range Status   04/27/2021 0.73 0.00 - 1.99 mg/dL Final       Cholesterol   Date Value Ref Range Status   10/03/2017 282 (H) <=199 mg/dL Final     Direct Measure HDL   Date Value Ref Range Status   10/03/2017 60 >=50 mg/dL Final     Triglycerides   Date Value Ref Range Status   10/03/2017 252 (H) <=149 mg/dL Final             Current Medications     Outpatient Medications Prior to Visit   Medication Sig  "Dispense Refill     diazePAM (VALIUM) 5 MG tablet [DIAZEPAM (VALIUM) 5 MG TABLET] TAKE 1 TABLET BY MOUTH EVERY 6 HOURS AS NEEDED FOR ANXIETY  30 tablet 1     diphenoxylate-atropine (LOMOTIL) 2.5-0.025 MG tablet TAKE ONE TABLET BY MOUTH FOUR TIMES DAILY AS NEEDED FOR DIARRHEA  30 tablet 0     flash glucose scanning reader (FREESTYLE VIDA 14 DAY READER) Misc [FLASH GLUCOSE SCANNING READER (FREESTYLE VIDA 14 DAY READER) MISC] Use 1 Units As Directed every 14 (fourteen) days. 1 each 0     flash glucose sensor (FREESTYLE VIDA 14 DAY SENSOR) Kit [FLASH GLUCOSE SENSOR (FREESTYLE VIDA 14 DAY SENSOR) KIT] Use 1 Units As Directed daily. 2 kit 5     insulin glargine (LANTUS SOLOSTAR U-100 INSULIN) 100 unit/mL (3 mL) pen [INSULIN GLARGINE (LANTUS SOLOSTAR U-100 INSULIN) 100 UNIT/ML (3 ML) PEN] INJECT SUBCUTANEOUSLY TWICE DAILY, UP TO 60 UNITS 45 mL 1     insulin lispro (HUMALOG KWIKPEN INSULIN) 100 unit/mL pen [INSULIN LISPRO (HUMALOG KWIKPEN INSULIN) 100 UNIT/ML PEN] INJECT 6 UNITS INTRAVENOUSLY WITH EACH MEALS UP TO 60 UNITS DAILY 60 mL 3     omeprazole (PRILOSEC) 40 MG capsule [OMEPRAZOLE (PRILOSEC) 40 MG CAPSULE] TAKE 1 CAPSULE BY MOUTH EVERY DAY 90 capsule 2     pen needle, diabetic (ULTICARE PEN NEEDLE) 32 gauge x 5/32\" Ndle [PEN NEEDLE, DIABETIC (ULTICARE PEN NEEDLE) 32 GAUGE X 5/32\" NDLE] TEST BLOOD SUGARS UP TO 4 TIMES A DAY. 300 each 0     YUVAFEM 10 mcg Tab [YUVAFEM 10 MCG TAB] INSERT 1 TABLET VAGINALLY TWICE WEEKLY, AS NEEDED  2     empagliflozin (JARDIANCE) 10 MG TABS tablet Take 10 mg by mouth daily (Patient not taking: Reported on 12/14/2021)       ostomy adhesive Pste [OSTOMY ADHESIVE PSTE] Apply as needed 4 Tube 3     No facility-administered medications prior to visit.                         "

## 2021-12-23 DIAGNOSIS — R19.7 DIARRHEA: ICD-10-CM

## 2021-12-23 DIAGNOSIS — K21.9 GERD (GASTROESOPHAGEAL REFLUX DISEASE): ICD-10-CM

## 2021-12-23 RX ORDER — OMEPRAZOLE 40 MG/1
CAPSULE, DELAYED RELEASE ORAL
Qty: 90 CAPSULE | Refills: 0 | Status: SHIPPED | OUTPATIENT
Start: 2021-12-23 | End: 2022-04-13

## 2021-12-25 NOTE — TELEPHONE ENCOUNTER
Routing refill request to provider for review/approval because:  Drug not on the [facility] refill protocol     Last Written Prescription Date:  11/3/21  Last Fill Quantity: 30,  # refills: 0   Last office visit provider:  9/22/21     Requested Prescriptions   Pending Prescriptions Disp Refills     diphenoxylate-atropine (LOMOTIL) 2.5-0.025 MG tablet [Pharmacy Med Name: Diphenoxylate-Atropine Oral Tablet 2.5-0.025 MG] 30 tablet 0     Sig: TAKE 1 TABLET BY MOUTH 4 TIMES DAILY AS NEEDED FOR DIARRHEA       There is no refill protocol information for this order          Monica Degroot RN 12/25/21 11:33 AM

## 2021-12-27 ENCOUNTER — TELEPHONE (OUTPATIENT)
Dept: FAMILY MEDICINE | Facility: CLINIC | Age: 71
End: 2021-12-27
Payer: COMMERCIAL

## 2021-12-27 DIAGNOSIS — E11.9 TYPE 2 DIABETES MELLITUS (H): ICD-10-CM

## 2021-12-27 RX ORDER — DIPHENOXYLATE HCL/ATROPINE 2.5-.025MG
TABLET ORAL
Qty: 30 TABLET | Refills: 0 | Status: SHIPPED | OUTPATIENT
Start: 2021-12-27 | End: 2023-05-30

## 2021-12-27 RX ORDER — INSULIN GLARGINE 100 [IU]/ML
14 INJECTION, SOLUTION SUBCUTANEOUS 2 TIMES DAILY
Qty: 45 ML | Refills: 0 | Status: SHIPPED | OUTPATIENT
Start: 2021-12-27 | End: 2021-12-28

## 2021-12-27 NOTE — TELEPHONE ENCOUNTER
PA needed for Diphenoxylate-Atropine 2.5-0.025mg Tab    [employer]  [phone]  ID: [id number]

## 2021-12-27 NOTE — TELEPHONE ENCOUNTER
I saw patient once for a preop in September that was needed when PCP was not available. I do not feel comfortable refilling as I only went over her history well enough for preop clearance and not to be able to determine the long term use of the medication.

## 2021-12-28 ENCOUNTER — TELEPHONE (OUTPATIENT)
Dept: ENDOCRINOLOGY | Facility: CLINIC | Age: 71
End: 2021-12-28
Payer: COMMERCIAL

## 2021-12-28 DIAGNOSIS — E11.9 TYPE 2 DIABETES MELLITUS (H): ICD-10-CM

## 2021-12-28 RX ORDER — INSULIN GLARGINE 100 [IU]/ML
14 INJECTION, SOLUTION SUBCUTANEOUS 2 TIMES DAILY
Qty: 30 ML | Refills: 0 | Status: SHIPPED | OUTPATIENT
Start: 2021-12-28 | End: 2022-03-22

## 2021-12-28 NOTE — TELEPHONE ENCOUNTER
[facility] Call Center    Phone Message    May a detailed message be left on voicemail: yes     Reason for Call: Medication Question or concern regarding medication   Prescription Clarification  Name of Medication: Lantus  Prescribing Provider: Katiana Goodwin   Pharmacy: [id number] - [place] [address] [address]   What on the order needs clarification? Dosage clarification needed is it  14 units 2 times a day or 60 units daily please call to confirm correct dosage           Action Taken: Other: ENDO    Travel Screening: Not Applicable

## 2021-12-28 NOTE — TELEPHONE ENCOUNTER
Central Prior Authorization Team   Phone: [phone]    PA Initiation    Medication: Diphenoxylate-Atropine 2.5-0.025MG tablets  Insurance Company: [facility] - Phone [phone] Fax [phone]  Pharmacy Filling the Rx: [facility][id number] - [place], [place] - [facility]  Filling Pharmacy Phone: [phone]  Filling Pharmacy Fax:    Start Date: 12/28/2021

## 2021-12-31 NOTE — TELEPHONE ENCOUNTER
PRIOR AUTHORIZATION DENIED    Medication: Diphenoxylate-Atropine 2.5-0.025MG tablets    Denial Date: 12/31/2021    Denial Rational:          Appeal Information:  If provider would like to appeal please provide a letter of medical necessity and route back to PA team.

## 2022-01-03 ENCOUNTER — TELEPHONE (OUTPATIENT)
Dept: FAMILY MEDICINE | Facility: CLINIC | Age: 72
End: 2022-01-03
Payer: COMMERCIAL

## 2022-01-03 DIAGNOSIS — R19.7 DIARRHEA DUE TO MALABSORPTION: Primary | ICD-10-CM

## 2022-01-03 DIAGNOSIS — K90.9 DIARRHEA DUE TO MALABSORPTION: Primary | ICD-10-CM

## 2022-01-03 NOTE — TELEPHONE ENCOUNTER
Patients insurance company is denying covering the   diphenoxylate-atropine (LOMOTIL) 2.5-0.025 MG tablet  They told the patient it is too strong of a medication.  They recommended her take Loperamide capsule.  Patient is ok with trying that medication.  Can Dr Chamorro please prescribe that to her?  Please send to   [id number] - [place], [place] - [address]

## 2022-01-05 RX ORDER — LOPERAMIDE HYDROCHLORIDE 2 MG/1
2 TABLET ORAL 4 TIMES DAILY PRN
Qty: 120 TABLET | Refills: 3 | Status: SHIPPED | OUTPATIENT
Start: 2022-01-05 | End: 2023-02-02

## 2022-01-11 ENCOUNTER — MEDICAL CORRESPONDENCE (OUTPATIENT)
Dept: HEALTH INFORMATION MANAGEMENT | Facility: CLINIC | Age: 72
End: 2022-01-11
Payer: COMMERCIAL

## 2022-01-21 NOTE — PROGRESS NOTES
[facility] Rehabilitation Service    Outpatient Physical Therapy Discharge Note  Patient: Zara Webster  : 1950    Beginning/End Dates of Reporting Period:  21 to 21 telephone call Spoke with the patient and she reported that she scheduled herself too tight and was doing a lot better and the ice was working well for her but the pelvic tilts caused her to bleed again under her colostomy.  She will call to reschedule.  No call back so will discharge PT at this time.     Referring Provider: Jeaneth Campbell Diagnosis: left superior sacral shear with LBP, SI dysfunction and tight piriformis     Client Self Report: see evaluation    Objective Measurements:  Objective Measure: lumbar and SI position     Objective Measure: FW bend test     Objective Measure: standing and sitting positioning         Goals:  Goal Identifier HEP   Goal Description The patient will demonstrate independence in HEP to aid in home management of symptoms.in 12 weeks   Target Date 11/15/21   Date Met      Progress (detail required for progress note):       Goal Identifier sitting straight   Goal Description The patient will demonstrate proper posture with sitting for 30 minutes keeping both feet on the floor so as to keep her back in a neutral position. using back support if needed in 12 weeks   Target Date 11/15/21   Date Met      Progress (detail required for progress note):       Goal Identifier stand with weight evenly on both feet   Goal Description The patient will stand with weight through both of her legs evenly and stand for 10 minutes with only 3-4/10 pain   Target Date 11/15/21   Date Met      Progress (detail required for progress note):       Goal Identifier     Goal Description     Target Date     Date Met      Progress (detail required for progress note):       Goal Identifier     Goal Description     Target Date     Date Met       Progress (detail required for progress note):       Goal Identifier     Goal Description     Target Date     Date Met      Progress (detail required for progress note):       Goal Identifier     Goal Description     Target Date     Date Met      Progress (detail required for progress note):       Goal Identifier     Goal Description     Target Date     Date Met      Progress (detail required for progress note):             Plan:  Discharge from therapy.    Discharge:    Reason for Discharge: Patient chooses to discontinue therapy.    Discharge Plan: Patient to continue home program.     08/17/21 0800   Signing Clinician's Name / Credentials   Signing clinician's name / credentials Melissa Viridiana PT   Session Number   Session Number 1   Progress Note/Recertification   Progress Note Due Date 09/16/21   Recertification Due Date 11/15/21   Adult Goals   PT Ortho Eval Goals 1;2;3   Ortho Goal 1   Goal Identifier HEP   Goal Description The patient will demonstrate independence in HEP to aid in home management of symptoms.in 12 weeks   Target Date 11/15/21   Ortho Goal 2   Goal Identifier sitting straight   Goal Description The patient will demonstrate proper posture with sitting for 30 minutes keeping both feet on the floor so as to keep her back in a neutral position. using back support if needed in 12 weeks   Target Date 11/15/21   Ortho Goal 3   Goal Identifier stand with weight evenly on both feet   Goal Description The patient will stand with weight through both of her legs evenly and stand for 10 minutes with only 3-4/10 pain   Target Date 11/15/21   Subjective Report   Subjective Report see evaluation   Objective Measures   Objective Measures Objective Measure 1;Objective Measure 2;Objective Measure 3   Objective Measure 1   Objective Measure lumbar and SI position   Objective Measure 2   Objective Measure FW bend test   Objective Measure 3   Objective Measure standing and sitting positioning   System  Outcome Measures   Outcome Measures Low Back Pain (see [facility] and [place])   Treatment Interventions   Interventions Therapeutic Procedure/Exercise;Manual Therapy   Therapeutic Procedure/exercise   Therapeutic Procedures: strength, endurance, ROM, flexibillity minutes (42059) 20   Skilled Intervention lateral fascial line stretching,  piriformis stretch, prone lying and or EDVIN   Patient Response patient was able to get the SI to move normally after doing the exercises and the manual treatment   Manual Therapy   Manual Therapy: Mobilization, MFR, MLD, friction massage minutes (06694) 10   Skilled Intervention gentle manual STM, MFR to the lateral fascial lline as well as to the piriformis    Plan   Home program lateral fascial line stretching,  piriformis stretch, prone lying and or EDVIN   Plan for next session Add in Pelvic tilt if not already given to the patient, Reassess spine position and SI as well as forward bend go over HEP discuss with patient proper neutal spine posiition in sitting and standing go over body mechanics as well as add in stabilization and hip strengthening,  assess dura and nerve gliding and also assess the hip as needed , check IT band and psoas   Total Session Time   Timed Code Treatment Minutes 30   Total Treatment Time (sum of timed and untimed services) 60   AMBULATORY CLINICS ONLY-MEDICAL AND TREATMENT DIAGNOSIS   Medical Diagnosis piriformis syndrome   PT Diagnosis left superior sacral shear with LBP, SI dysfunction and tight piriformis

## 2022-01-21 NOTE — ADDENDUM NOTE
Encounter addended by: Abbi Kemp, PT on: 1/21/2022 5:00 PM   Actions taken: Pend clinical note, Flowsheet accepted, Clinical Note Signed, Episode resolved

## 2022-03-08 ENCOUNTER — TELEPHONE (OUTPATIENT)
Dept: ENDOCRINOLOGY | Facility: CLINIC | Age: 72
End: 2022-03-08
Payer: COMMERCIAL

## 2022-03-08 DIAGNOSIS — E55.9 VITAMIN D DEFICIENCY: ICD-10-CM

## 2022-03-08 DIAGNOSIS — E11.9 TYPE 2 DIABETES MELLITUS (H): Primary | ICD-10-CM

## 2022-03-08 NOTE — TELEPHONE ENCOUNTER
Patient has upcoming virtual appointment with Harmony Goodwin NP in Endocrinology on 3/22/22.  The patient has not completed ordered labs.      Please call patient to schedule a lab appointment at least 48 hours prior to clinic appointment or have patient reschedule clinic appointment with a lab appointment preferably 1 week prior.Thank you.

## 2022-03-21 ENCOUNTER — LAB (OUTPATIENT)
Dept: LAB | Facility: CLINIC | Age: 72
End: 2022-03-21
Payer: COMMERCIAL

## 2022-03-21 DIAGNOSIS — E11.21 TYPE 2 DIABETES MELLITUS WITH DIABETIC NEPHROPATHY, WITH LONG-TERM CURRENT USE OF INSULIN (H): ICD-10-CM

## 2022-03-21 DIAGNOSIS — E11.65 TYPE 2 DIABETES MELLITUS WITH HYPERGLYCEMIA, WITH LONG-TERM CURRENT USE OF INSULIN (H): ICD-10-CM

## 2022-03-21 DIAGNOSIS — Z79.4 TYPE 2 DIABETES MELLITUS WITH DIABETIC NEPHROPATHY, WITH LONG-TERM CURRENT USE OF INSULIN (H): ICD-10-CM

## 2022-03-21 DIAGNOSIS — E11.9 TYPE 2 DIABETES MELLITUS (H): ICD-10-CM

## 2022-03-21 DIAGNOSIS — Z79.4 TYPE 2 DIABETES MELLITUS WITH HYPERGLYCEMIA, WITH LONG-TERM CURRENT USE OF INSULIN (H): ICD-10-CM

## 2022-03-21 DIAGNOSIS — E55.9 VITAMIN D DEFICIENCY: ICD-10-CM

## 2022-03-21 LAB
ALBUMIN SERPL-MCNC: 3.3 G/DL (ref 3.5–5)
ALP SERPL-CCNC: 214 U/L (ref 45–120)
ALT SERPL W P-5'-P-CCNC: 34 U/L (ref 0–45)
ANION GAP SERPL CALCULATED.3IONS-SCNC: 12 MMOL/L (ref 5–18)
AST SERPL W P-5'-P-CCNC: 60 U/L (ref 0–40)
BILIRUB SERPL-MCNC: 0.4 MG/DL (ref 0–1)
BUN SERPL-MCNC: 10 MG/DL (ref 8–28)
CALCIUM SERPL-MCNC: 9.3 MG/DL (ref 8.5–10.5)
CHLORIDE BLD-SCNC: 110 MMOL/L (ref 98–107)
CO2 SERPL-SCNC: 20 MMOL/L (ref 22–31)
CREAT SERPL-MCNC: 0.81 MG/DL (ref 0.6–1.1)
GFR SERPL CREATININE-BSD FRML MDRD: 77 ML/MIN/1.73M2
GLUCOSE BLD-MCNC: 187 MG/DL (ref 70–125)
HBA1C MFR BLD: 8.1 % (ref 0–5.6)
POTASSIUM BLD-SCNC: 4 MMOL/L (ref 3.5–5)
PROT SERPL-MCNC: 6.8 G/DL (ref 6–8)
SODIUM SERPL-SCNC: 142 MMOL/L (ref 136–145)

## 2022-03-21 PROCEDURE — 82306 VITAMIN D 25 HYDROXY: CPT

## 2022-03-21 PROCEDURE — 36415 COLL VENOUS BLD VENIPUNCTURE: CPT

## 2022-03-21 PROCEDURE — 83036 HEMOGLOBIN GLYCOSYLATED A1C: CPT

## 2022-03-21 PROCEDURE — 80053 COMPREHEN METABOLIC PANEL: CPT

## 2022-03-22 ENCOUNTER — OFFICE VISIT (OUTPATIENT)
Dept: ENDOCRINOLOGY | Facility: CLINIC | Age: 72
End: 2022-03-22
Payer: COMMERCIAL

## 2022-03-22 VITALS
DIASTOLIC BLOOD PRESSURE: 60 MMHG | SYSTOLIC BLOOD PRESSURE: 126 MMHG | BODY MASS INDEX: 27.74 KG/M2 | HEART RATE: 84 BPM | WEIGHT: 165.4 LBS

## 2022-03-22 DIAGNOSIS — E11.41 TYPE 2 DIABETES MELLITUS WITH DIABETIC MONONEUROPATHY, WITH LONG-TERM CURRENT USE OF INSULIN (H): ICD-10-CM

## 2022-03-22 DIAGNOSIS — R74.8 ELEVATED ALKALINE PHOSPHATASE LEVEL: ICD-10-CM

## 2022-03-22 DIAGNOSIS — E11.65 TYPE 2 DIABETES MELLITUS WITH HYPERGLYCEMIA, WITH LONG-TERM CURRENT USE OF INSULIN (H): Primary | ICD-10-CM

## 2022-03-22 DIAGNOSIS — Z79.4 TYPE 2 DIABETES MELLITUS WITH HYPERGLYCEMIA, WITH LONG-TERM CURRENT USE OF INSULIN (H): Primary | ICD-10-CM

## 2022-03-22 DIAGNOSIS — Z79.4 TYPE 2 DIABETES MELLITUS WITH DIABETIC MONONEUROPATHY, WITH LONG-TERM CURRENT USE OF INSULIN (H): ICD-10-CM

## 2022-03-22 DIAGNOSIS — E55.9 VITAMIN D DEFICIENCY: Primary | ICD-10-CM

## 2022-03-22 LAB — DEPRECATED CALCIDIOL+CALCIFEROL SERPL-MC: 25 UG/L (ref 30–80)

## 2022-03-22 PROCEDURE — 99213 OFFICE O/P EST LOW 20 MIN: CPT | Performed by: NURSE PRACTITIONER

## 2022-03-22 RX ORDER — INSULIN GLARGINE 100 [IU]/ML
14 INJECTION, SOLUTION SUBCUTANEOUS 2 TIMES DAILY
Qty: 30 ML | Refills: 0 | Status: SHIPPED | OUTPATIENT
Start: 2022-03-22 | End: 2022-08-09

## 2022-03-22 RX ORDER — INSULIN LISPRO 100 [IU]/ML
INJECTION, SOLUTION INTRAVENOUS; SUBCUTANEOUS
Qty: 30 ML | Refills: 3 | Status: SHIPPED | OUTPATIENT
Start: 2022-03-22 | End: 2022-09-22

## 2022-03-22 NOTE — PROGRESS NOTES
"[facility] ENDOCRINOLOGY    Diabetes Note 3/22/2022    Zara Webster, 1950, [id number]          Reason for visit      1. Type 2 diabetes mellitus with hyperglycemia, with long-term current use of insulin (H)    2. Type 2 diabetes mellitus with diabetic mononeuropathy, with long-term current use of insulin (H)        HPI     Zara Webster is a very pleasant 71 year old old female who presents for follow up.  SUMMARY:    Zara is here today in f/u for DM 2. Her A1c is currently 8.1 and down considerably from her last at 11. She started Jardiance 10 mg at her last appointment and it has clearly done a great job for her. She has managed to cut her Humalog dosing from 7-8 times a day to 3-4 times a day. She remain on 14 units of Lantus BID. She notes that she has been eating less and trying to continue with her walking. She reports no hypoglycemia. Her Naila report shows that she was within range 57% of the time over the last two weeks.  She was only \"very High\" 1% of the time, which is quite an improvement. She notes that she is now having some symptoms of Neuropathy in her feet. She reports a sensation of \"buzzing\". This is not keeping her up at night and she isn't ready to start treatment at this time. She is being treated for PRAJAPATI currently and noted, her Alk Phos was elevated with her CMP. I have added on a Vit D level to see if this is associated with low Vit D, or PRAJAPATI.       Blood glucose data:      Past Medical History     Patient Active Problem List   Diagnosis     Type II diabetes mellitus (H)     Post Colectomy     Hypercholesteremia     BMI 28.0-28.9,adult     Aspirin contraindicated     Crohn's disease of colon (H)     Gastrointestinal hemorrhage     Other cirrhosis of liver (H) - unclear etiology,[facility]     Chronic kidney disease, stage 3 (H)     Ileostomy status (H)     Non-alcoholic cirrhosis (H)     Abnormal liver ultrasound     Nonalcoholic steatohepatitis     Portal hypertension (H)    "     Family History       family history includes Cancer in her father; Diabetes in her father and mother; Heart Disease in her mother; Hyperlipidemia in her mother; No Known Problems in her sister and sister.    Social History      reports that she has never smoked. She has never used smokeless tobacco. She reports previous alcohol use. She reports that she does not use drugs.      Review of Systems     Patient has no polyuria or polydipsia, no chest pain, dyspnea or TIA's, no numbness, tingling or pain in extremities  Remainder negative except as noted in HPI.      Vital Signs     /60 (BP Location: Right arm, Patient Position: Sitting, Cuff Size: Adult Regular)   Pulse 84   Wt 75 kg (165 lb 6.4 oz)   LMP  (LMP Unknown)   BMI 27.74 kg/m    Wt Readings from Last 3 Encounters:   03/22/22 75 kg (165 lb 6.4 oz)   12/14/21 75.2 kg (165 lb 11.2 oz)   09/22/21 75.7 kg (166 lb 14.4 oz)       Physical Exam     Constitutional:  Well developed, Well nourished  HENT:  Normocephalic,   Neck: normal in appearance  Eyes:  PERRL, Conjunctiva pink  Respiratory:  No respiratory distress  Skin: No acanthosis nigricans, lipoatrophy or lipodystrophy  Neurologic:  Alert & oriented x 3, nonfocal  Psychiatric:  Affect, Mood, Insight appropriate          Assessment     1. Type 2 diabetes mellitus with hyperglycemia, with long-term current use of insulin (H)    2. Type 2 diabetes mellitus with diabetic mononeuropathy, with long-term current use of insulin (H)        Plan     Zara's BG control has improved considerably. She will remain on her current medication regimen.  We will find out about her Vit D level and start her on Ergocalciferol as needed. F/u with me in 6 months.      Katiana Goodwin NP   Endocrinology  3/22/2022  10:08 AM        Lab Results     Microalbumin Urine mg/dL   Date Value Ref Range Status   04/27/2021 0.73 0.00 - 1.99 mg/dL Final       Cholesterol   Date Value Ref Range Status   10/03/2017 282 (H) <=199  "mg/dL Final     Direct Measure HDL   Date Value Ref Range Status   10/03/2017 60 >=50 mg/dL Final     Triglycerides   Date Value Ref Range Status   10/03/2017 252 (H) <=149 mg/dL Final             Current Medications     Outpatient Medications Prior to Visit   Medication Sig Dispense Refill     diazePAM (VALIUM) 5 MG tablet [DIAZEPAM (VALIUM) 5 MG TABLET] TAKE 1 TABLET BY MOUTH EVERY 6 HOURS AS NEEDED FOR ANXIETY  30 tablet 1     diphenoxylate-atropine (LOMOTIL) 2.5-0.025 MG tablet TAKE 1 TABLET BY MOUTH 4 TIMES DAILY AS NEEDED FOR DIARRHEA 30 tablet 0     flash glucose scanning reader (FREESTYLE VIDA 14 DAY READER) Misc [FLASH GLUCOSE SCANNING READER (FREESTYLE VIDA 14 DAY READER) MISC] Use 1 Units As Directed every 14 (fourteen) days. 1 each 0     flash glucose sensor (FREESTYLE VIDA 14 DAY SENSOR) Kit [FLASH GLUCOSE SENSOR (FREESTYLE VIDA 14 DAY SENSOR) KIT] Use 1 Units As Directed daily. 2 kit 5     loperamide (IMODIUM A-D) 2 MG tablet Take 1 tablet (2 mg) by mouth 4 times daily as needed for diarrhea 120 tablet 3     omeprazole (PRILOSEC) 40 MG DR capsule TAKE ONE CAPSULE BY MOUTH ONCE DAILY 90 capsule 0     ostomy adhesive Pste [OSTOMY ADHESIVE PSTE] Apply as needed 4 Tube 3     pen needle, diabetic (ULTICARE PEN NEEDLE) 32 gauge x 5/32\" Ndle [PEN NEEDLE, DIABETIC (ULTICARE PEN NEEDLE) 32 GAUGE X 5/32\" NDLE] TEST BLOOD SUGARS UP TO 4 TIMES A DAY. 300 each 0     YUVAFEM 10 mcg Tab [YUVAFEM 10 MCG TAB] INSERT 1 TABLET VAGINALLY TWICE WEEKLY, AS NEEDED  2     empagliflozin (JARDIANCE) 10 MG TABS tablet Take 10 mg by mouth daily        insulin glargine (LANTUS SOLOSTAR) 100 UNIT/ML pen Inject 14 Units Subcutaneous 2 times daily 30 mL 0     insulin lispro (HUMALOG KWIKPEN INSULIN) 100 unit/mL pen [INSULIN LISPRO (HUMALOG KWIKPEN INSULIN) 100 UNIT/ML PEN] INJECT 6 UNITS INTRAVENOUSLY WITH EACH MEALS UP TO 60 UNITS DAILY (Patient taking differently: 3 Units ) 60 mL 3     No facility-administered medications prior " to visit.

## 2022-03-22 NOTE — LETTER
"    3/22/2022         RE: Zara Webster  [address] [place]  [place] [address]        Dear Colleague,    Thank you for referring your patient, Zara Webster, to the [facility]. Please see a copy of my visit note below.    [facility] ENDOCRINOLOGY    Diabetes Note 3/22/2022    Zara Webster, 1950, [id number]          Reason for visit      1. Type 2 diabetes mellitus with hyperglycemia, with long-term current use of insulin (H)    2. Type 2 diabetes mellitus with diabetic mononeuropathy, with long-term current use of insulin (H)        HPI     Zara Webster is a very pleasant 71 year old old female who presents for follow up.  SUMMARY:    Zara is here today in f/u for DM 2. Her A1c is currently 8.1 and down considerably from her last at 11. She started Jardiance 10 mg at her last appointment and it has clearly done a great job for her. She has managed to cut her Humalog dosing from 7-8 times a day to 3-4 times a day. She remain on 14 units of Lantus BID. She notes that she has been eating less and trying to continue with her walking. She reports no hypoglycemia. Her Naila report shows that she was within range 57% of the time over the last two weeks.  She was only \"very High\" 1% of the time, which is quite an improvement. She notes that she is now having some symptoms of Neuropathy in her feet. She reports a sensation of \"buzzing\". This is not keeping her up at night and she isn't ready to start treatment at this time. She is being treated for PRAJAPATI currently and noted, her Alk Phos was elevated with her CMP. I have added on a Vit D level to see if this is associated with low Vit D, or PRAJAPATI.       Blood glucose data:      Past Medical History     Patient Active Problem List   Diagnosis     Type II diabetes mellitus (H)     Post Colectomy     Hypercholesteremia     BMI 28.0-28.9,adult     Aspirin contraindicated     Crohn's disease of colon (H)     Gastrointestinal " hemorrhage     Other cirrhosis of liver (H) - unclear etiology,[facility]     Chronic kidney disease, stage 3 (H)     Ileostomy status (H)     Non-alcoholic cirrhosis (H)     Abnormal liver ultrasound     Nonalcoholic steatohepatitis     Portal hypertension (H)        Family History       family history includes Cancer in her father; Diabetes in her father and mother; Heart Disease in her mother; Hyperlipidemia in her mother; No Known Problems in her sister and sister.    Social History      reports that she has never smoked. She has never used smokeless tobacco. She reports previous alcohol use. She reports that she does not use drugs.      Review of Systems     Patient has no polyuria or polydipsia, no chest pain, dyspnea or TIA's, no numbness, tingling or pain in extremities  Remainder negative except as noted in HPI.      Vital Signs     /60 (BP Location: Right arm, Patient Position: Sitting, Cuff Size: Adult Regular)   Pulse 84   Wt 75 kg (165 lb 6.4 oz)   LMP  (LMP Unknown)   BMI 27.74 kg/m    Wt Readings from Last 3 Encounters:   03/22/22 75 kg (165 lb 6.4 oz)   12/14/21 75.2 kg (165 lb 11.2 oz)   09/22/21 75.7 kg (166 lb 14.4 oz)       Physical Exam     Constitutional:  Well developed, Well nourished  HENT:  Normocephalic,   Neck: normal in appearance  Eyes:  PERRL, Conjunctiva pink  Respiratory:  No respiratory distress  Skin: No acanthosis nigricans, lipoatrophy or lipodystrophy  Neurologic:  Alert & oriented x 3, nonfocal  Psychiatric:  Affect, Mood, Insight appropriate          Assessment     1. Type 2 diabetes mellitus with hyperglycemia, with long-term current use of insulin (H)    2. Type 2 diabetes mellitus with diabetic mononeuropathy, with long-term current use of insulin (H)        Plan     Zara's BG control has improved considerably. She will remain on her current medication regimen.  We will find out about her Vit D level and start her on Ergocalciferol as needed. F/u with me in 6  "months.      Katiana Goodwin NP   Endocrinology  3/22/2022  10:08 AM        Lab Results     Microalbumin Urine mg/dL   Date Value Ref Range Status   04/27/2021 0.73 0.00 - 1.99 mg/dL Final       Cholesterol   Date Value Ref Range Status   10/03/2017 282 (H) <=199 mg/dL Final     Direct Measure HDL   Date Value Ref Range Status   10/03/2017 60 >=50 mg/dL Final     Triglycerides   Date Value Ref Range Status   10/03/2017 252 (H) <=149 mg/dL Final             Current Medications     Outpatient Medications Prior to Visit   Medication Sig Dispense Refill     diazePAM (VALIUM) 5 MG tablet [DIAZEPAM (VALIUM) 5 MG TABLET] TAKE 1 TABLET BY MOUTH EVERY 6 HOURS AS NEEDED FOR ANXIETY  30 tablet 1     diphenoxylate-atropine (LOMOTIL) 2.5-0.025 MG tablet TAKE 1 TABLET BY MOUTH 4 TIMES DAILY AS NEEDED FOR DIARRHEA 30 tablet 0     flash glucose scanning reader (FREESTYLE VIDA 14 DAY READER) Misc [FLASH GLUCOSE SCANNING READER (FREESTYLE VIDA 14 DAY READER) MISC] Use 1 Units As Directed every 14 (fourteen) days. 1 each 0     flash glucose sensor (FREESTYLE VIDA 14 DAY SENSOR) Kit [FLASH GLUCOSE SENSOR (FREESTYLE VIDA 14 DAY SENSOR) KIT] Use 1 Units As Directed daily. 2 kit 5     loperamide (IMODIUM A-D) 2 MG tablet Take 1 tablet (2 mg) by mouth 4 times daily as needed for diarrhea 120 tablet 3     omeprazole (PRILOSEC) 40 MG DR capsule TAKE ONE CAPSULE BY MOUTH ONCE DAILY 90 capsule 0     ostomy adhesive Pste [OSTOMY ADHESIVE PSTE] Apply as needed 4 Tube 3     pen needle, diabetic (ULTICARE PEN NEEDLE) 32 gauge x 5/32\" Ndle [PEN NEEDLE, DIABETIC (ULTICARE PEN NEEDLE) 32 GAUGE X 5/32\" NDLE] TEST BLOOD SUGARS UP TO 4 TIMES A DAY. 300 each 0     YUVAFEM 10 mcg Tab [YUVAFEM 10 MCG TAB] INSERT 1 TABLET VAGINALLY TWICE WEEKLY, AS NEEDED  2     empagliflozin (JARDIANCE) 10 MG TABS tablet Take 10 mg by mouth daily        insulin glargine (LANTUS SOLOSTAR) 100 UNIT/ML pen Inject 14 Units Subcutaneous 2 times daily 30 mL 0     insulin " lispro (HUMALOG KWIKPEN INSULIN) 100 unit/mL pen [INSULIN LISPRO (HUMALOG KWIKPEN INSULIN) 100 UNIT/ML PEN] INJECT 6 UNITS INTRAVENOUSLY WITH EACH MEALS UP TO 60 UNITS DAILY (Patient taking differently: 3 Units ) 60 mL 3     No facility-administered medications prior to visit.                       Again, thank you for allowing me to participate in the care of your patient.        Sincerely,        Katiana Goodwin NP

## 2022-03-24 ENCOUNTER — TRANSFERRED RECORDS (OUTPATIENT)
Dept: HEALTH INFORMATION MANAGEMENT | Facility: CLINIC | Age: 72
End: 2022-03-24
Payer: COMMERCIAL

## 2022-03-24 LAB
ALT SERPL-CCNC: 31 IU/L (ref 0–32)
AST SERPL-CCNC: 54 IU/L (ref 0–40)
CREATININE (EXTERNAL): 0.65 MG/DL (ref 0.57–1)
GFR ESTIMATED (EXTERNAL): 93 ML/MIN/1.73
INR (EXTERNAL): 1 (ref 0.9–1.2)

## 2022-04-11 DIAGNOSIS — K21.9 GERD (GASTROESOPHAGEAL REFLUX DISEASE): ICD-10-CM

## 2022-04-13 RX ORDER — OMEPRAZOLE 40 MG/1
CAPSULE, DELAYED RELEASE ORAL
Qty: 90 CAPSULE | Refills: 0 | Status: SHIPPED | OUTPATIENT
Start: 2022-04-13 | End: 2022-06-30

## 2022-04-13 NOTE — TELEPHONE ENCOUNTER
"Last Written Prescription Date:  12/23/2021  Last Fill Quantity: 90,  # refills: 0   Last office visit provider:  06/22/2021     Requested Prescriptions   Pending Prescriptions Disp Refills     omeprazole (PRILOSEC) 40 MG DR capsule [Pharmacy Med Name: Omeprazole Oral Capsule Delayed Release 40 MG] 90 capsule 0     Sig: TAKE 1 CAPSULE BY MOUTH ONCE DAILY       PPI Protocol Passed - 4/11/2022  9:26 AM        Passed - Not on Clopidogrel (unless Pantoprazole ordered)        Passed - No diagnosis of osteoporosis on record        Passed - Recent (12 mo) or future (30 days) visit within the authorizing provider's specialty     Patient has had an office visit with the authorizing provider or a provider within the authorizing providers department within the previous 12 mos or has a future within next 30 days. See \"Patient Info\" tab in inbasket, or \"Choose Columns\" in Meds & Orders section of the refill encounter.              Passed - Medication is active on med list        Passed - Patient is age 18 or older        Passed - No active pregnacy on record        Passed - No positive pregnancy test in past 12 months             Vonnie Rai RN 04/13/22 9:30 AM  "

## 2022-04-18 ENCOUNTER — ANCILLARY PROCEDURE (OUTPATIENT)
Dept: MAMMOGRAPHY | Facility: CLINIC | Age: 72
End: 2022-04-18
Attending: FAMILY MEDICINE
Payer: COMMERCIAL

## 2022-04-18 DIAGNOSIS — Z12.31 VISIT FOR SCREENING MAMMOGRAM: ICD-10-CM

## 2022-04-18 PROCEDURE — 77067 SCR MAMMO BI INCL CAD: CPT

## 2022-04-26 ENCOUNTER — HOSPITAL ENCOUNTER (OUTPATIENT)
Dept: MRI IMAGING | Facility: HOSPITAL | Age: 72
Discharge: HOME | End: 2022-04-26
Attending: INTERNAL MEDICINE | Admitting: INTERNAL MEDICINE
Payer: COMMERCIAL

## 2022-04-26 DIAGNOSIS — K83.8 DILATED CBD, ACQUIRED: ICD-10-CM

## 2022-04-26 PROCEDURE — A9585 GADOBUTROL INJECTION: HCPCS | Performed by: INTERNAL MEDICINE

## 2022-04-26 PROCEDURE — 74183 MRI ABD W/O CNTR FLWD CNTR: CPT

## 2022-04-26 PROCEDURE — 255N000002 HC RX 255 OP 636: Performed by: INTERNAL MEDICINE

## 2022-04-26 RX ORDER — GADOBUTROL 604.72 MG/ML
8 INJECTION INTRAVENOUS ONCE
Status: COMPLETED | OUTPATIENT
Start: 2022-04-26 | End: 2022-04-26

## 2022-04-26 RX ADMIN — GADOBUTROL 8 ML: 604.72 INJECTION INTRAVENOUS at 19:49

## 2022-05-06 ENCOUNTER — TRANSFERRED RECORDS (OUTPATIENT)
Dept: HEALTH INFORMATION MANAGEMENT | Facility: CLINIC | Age: 72
End: 2022-05-06
Payer: COMMERCIAL

## 2022-05-16 ENCOUNTER — TRANSFERRED RECORDS (OUTPATIENT)
Dept: HEALTH INFORMATION MANAGEMENT | Facility: CLINIC | Age: 72
End: 2022-05-16
Payer: COMMERCIAL

## 2022-06-14 ENCOUNTER — TELEPHONE (OUTPATIENT)
Dept: ENDOCRINOLOGY | Facility: CLINIC | Age: 72
End: 2022-06-14
Payer: COMMERCIAL

## 2022-06-14 NOTE — TELEPHONE ENCOUNTER
[facility] Call Center    Phone Message    May a detailed message be left on voicemail: yes     Reason for Call: Form or Letter   Type or form/letter needing completion:  clinical notes    Provider: Christa Valencia form needed: whenever possible  Once completed: Fax form to: [phone]     Jyoti chauhan [facility] most recent clinical notes sent. Call [phone] with questions.   account# [id number]    Action Taken: Message routed to:  Other: endo    Travel Screening: Not Applicable

## 2022-06-20 NOTE — TELEPHONE ENCOUNTER
Marcela from [facility] calling need more info on pt adjustments.     Ph: [phone]  Fax: [phone]

## 2022-06-20 NOTE — TELEPHONE ENCOUNTER
Spoke to lyric hernandez, stating a form was sent over.     Notes are missing the amount of adjustments a day pt is doing - informed them notes do state 3-4 times.     Lexii also states there was something else from part B that was missing but unable  To verify what was missing.     Will fax form back- may take 1-2 days.

## 2022-06-30 DIAGNOSIS — K21.9 GERD (GASTROESOPHAGEAL REFLUX DISEASE): ICD-10-CM

## 2022-06-30 RX ORDER — OMEPRAZOLE 40 MG/1
CAPSULE, DELAYED RELEASE ORAL
Qty: 90 CAPSULE | Refills: 0 | Status: SHIPPED | OUTPATIENT
Start: 2022-06-30 | End: 2022-10-24

## 2022-07-01 NOTE — TELEPHONE ENCOUNTER
"Last Written Prescription Date:  4/13/22  Last Fill Quantity: 90,  # refills: 0   Last office visit provider:  9/22/21     Requested Prescriptions   Pending Prescriptions Disp Refills     omeprazole (PRILOSEC) 40 MG DR capsule [Pharmacy Med Name: Omeprazole Oral Capsule Delayed Release 40 MG] 90 capsule 0     Sig: TAKE 1 CAPSULE BY MOUTH ONCE DAILY       PPI Protocol Passed - 6/30/2022  7:55 AM        Passed - Not on Clopidogrel (unless Pantoprazole ordered)        Passed - No diagnosis of osteoporosis on record        Passed - Recent (12 mo) or future (30 days) visit within the authorizing provider's specialty     Patient has had an office visit with the authorizing provider or a provider within the authorizing providers department within the previous 12 mos or has a future within next 30 days. See \"Patient Info\" tab in inbasket, or \"Choose Columns\" in Meds & Orders section of the refill encounter.              Passed - Medication is active on med list        Passed - Patient is age 18 or older        Passed - No active pregnacy on record        Passed - No positive pregnancy test in past 12 months             Blessing Beckett 06/30/22 7:55 PM  "

## 2022-07-12 ENCOUNTER — TELEPHONE (OUTPATIENT)
Dept: ENDOCRINOLOGY | Facility: CLINIC | Age: 72
End: 2022-07-12

## 2022-07-12 NOTE — TELEPHONE ENCOUNTER
Spoke with patient and she preferred application be emailed to her. Emailed to [email or web address] and included my contact info should she have issues/questions. She will complete and send to BI directly.     Faxed provider portion to rodrigue at [phone].

## 2022-07-29 NOTE — TELEPHONE ENCOUNTER
Called [facility] ([phone]) and spoke with Sanaz griffin. No record of patient or provider portion on file.

## 2022-08-05 NOTE — TELEPHONE ENCOUNTER
Called [facility] ([phone]) and spoke with repJo Ann, no record of patient or provider portion on file.

## 2022-08-08 DIAGNOSIS — E11.65 TYPE 2 DIABETES MELLITUS WITH HYPERGLYCEMIA, WITH LONG-TERM CURRENT USE OF INSULIN (H): ICD-10-CM

## 2022-08-08 DIAGNOSIS — Z79.4 TYPE 2 DIABETES MELLITUS WITH HYPERGLYCEMIA, WITH LONG-TERM CURRENT USE OF INSULIN (H): ICD-10-CM

## 2022-08-09 RX ORDER — INSULIN GLARGINE 100 [IU]/ML
INJECTION, SOLUTION SUBCUTANEOUS
Qty: 20 ML | Refills: 0 | Status: SHIPPED | OUTPATIENT
Start: 2022-08-09 | End: 2022-09-22

## 2022-08-10 ENCOUNTER — TELEPHONE (OUTPATIENT)
Dept: ENDOCRINOLOGY | Facility: CLINIC | Age: 72
End: 2022-08-10

## 2022-08-10 NOTE — TELEPHONE ENCOUNTER
[facility] Assistance Program form mailed to patient per her request.

## 2022-08-11 NOTE — TELEPHONE ENCOUNTER
Received the following fax from [employer] [facility] Patient Assistance Program.  States they can't process the request at this time due to missing information.  Called and spoke with patient.  She confirms that she has yet to send in her Proof of Income.  Gave patient the contact information over the phone and mailed her a copy of the below form.  She will call them and send them the necessary information.

## 2022-09-18 ENCOUNTER — HEALTH MAINTENANCE LETTER (OUTPATIENT)
Age: 72
End: 2022-09-18

## 2022-09-19 ENCOUNTER — LAB (OUTPATIENT)
Dept: LAB | Facility: CLINIC | Age: 72
End: 2022-09-19
Payer: COMMERCIAL

## 2022-09-19 DIAGNOSIS — E55.9 VITAMIN D DEFICIENCY: ICD-10-CM

## 2022-09-19 DIAGNOSIS — Z79.4 TYPE 2 DIABETES MELLITUS WITH DIABETIC MONONEUROPATHY, WITH LONG-TERM CURRENT USE OF INSULIN (H): Primary | ICD-10-CM

## 2022-09-19 DIAGNOSIS — R74.8 ELEVATED ALKALINE PHOSPHATASE LEVEL: ICD-10-CM

## 2022-09-19 DIAGNOSIS — E11.41 TYPE 2 DIABETES MELLITUS WITH DIABETIC MONONEUROPATHY, WITH LONG-TERM CURRENT USE OF INSULIN (H): Primary | ICD-10-CM

## 2022-09-19 LAB
ALBUMIN SERPL BCG-MCNC: 3.8 G/DL (ref 3.5–5.2)
ALP SERPL-CCNC: 192 U/L (ref 35–104)
ALT SERPL W P-5'-P-CCNC: 42 U/L (ref 10–35)
ANION GAP SERPL CALCULATED.3IONS-SCNC: 12 MMOL/L (ref 7–15)
AST SERPL W P-5'-P-CCNC: 55 U/L (ref 10–35)
BILIRUB SERPL-MCNC: 0.6 MG/DL
BUN SERPL-MCNC: 11.6 MG/DL (ref 8–23)
CALCIUM SERPL-MCNC: 9.3 MG/DL (ref 8.8–10.2)
CHLORIDE SERPL-SCNC: 105 MMOL/L (ref 98–107)
CREAT SERPL-MCNC: 0.66 MG/DL (ref 0.51–0.95)
DEPRECATED CALCIDIOL+CALCIFEROL SERPL-MC: 29 UG/L (ref 20–75)
DEPRECATED HCO3 PLAS-SCNC: 22 MMOL/L (ref 22–29)
GFR SERPL CREATININE-BSD FRML MDRD: >90 ML/MIN/1.73M2
GLUCOSE SERPL-MCNC: 282 MG/DL (ref 70–99)
HBA1C MFR BLD: 8.8 % (ref 0–5.6)
POTASSIUM SERPL-SCNC: 3.8 MMOL/L (ref 3.4–5.3)
PROT SERPL-MCNC: 7 G/DL (ref 6.4–8.3)
SODIUM SERPL-SCNC: 139 MMOL/L (ref 136–145)

## 2022-09-19 PROCEDURE — 36415 COLL VENOUS BLD VENIPUNCTURE: CPT

## 2022-09-19 PROCEDURE — 80053 COMPREHEN METABOLIC PANEL: CPT

## 2022-09-19 PROCEDURE — 83036 HEMOGLOBIN GLYCOSYLATED A1C: CPT

## 2022-09-19 PROCEDURE — 82306 VITAMIN D 25 HYDROXY: CPT

## 2022-09-22 ENCOUNTER — OFFICE VISIT (OUTPATIENT)
Dept: ENDOCRINOLOGY | Facility: CLINIC | Age: 72
End: 2022-09-22
Payer: COMMERCIAL

## 2022-09-22 VITALS — BODY MASS INDEX: 28.01 KG/M2 | SYSTOLIC BLOOD PRESSURE: 120 MMHG | WEIGHT: 167 LBS | DIASTOLIC BLOOD PRESSURE: 60 MMHG

## 2022-09-22 DIAGNOSIS — E11.41 TYPE 2 DIABETES MELLITUS WITH DIABETIC MONONEUROPATHY, WITH LONG-TERM CURRENT USE OF INSULIN (H): Primary | ICD-10-CM

## 2022-09-22 DIAGNOSIS — Z79.4 TYPE 2 DIABETES MELLITUS WITH DIABETIC MONONEUROPATHY, WITH LONG-TERM CURRENT USE OF INSULIN (H): Primary | ICD-10-CM

## 2022-09-22 PROBLEM — K83.9 DISORDER OF BILIARY TRACT: Status: ACTIVE | Noted: 2022-04-04

## 2022-09-22 PROCEDURE — 99213 OFFICE O/P EST LOW 20 MIN: CPT | Performed by: NURSE PRACTITIONER

## 2022-09-22 RX ORDER — INSULIN GLARGINE 100 [IU]/ML
INJECTION, SOLUTION SUBCUTANEOUS
Qty: 20 ML | Refills: 5 | Status: SHIPPED | OUTPATIENT
Start: 2022-09-22 | End: 2022-10-07

## 2022-09-22 RX ORDER — INSULIN LISPRO 100 [IU]/ML
INJECTION, SOLUTION INTRAVENOUS; SUBCUTANEOUS
Qty: 30 ML | Refills: 5 | Status: SHIPPED | OUTPATIENT
Start: 2022-09-22 | End: 2023-06-23

## 2022-09-22 ASSESSMENT — ENCOUNTER SYMPTOMS
HYPOTENSION: 0
SYNCOPE: 0
LEG PAIN: 0
EXERCISE INTOLERANCE: 0
SLEEP DISTURBANCES DUE TO BREATHING: 0
ORTHOPNEA: 0
LIGHT-HEADEDNESS: 0
HYPERTENSION: 0
PALPITATIONS: 0

## 2022-09-22 NOTE — PROGRESS NOTES
"[facility] ENDOCRINOLOGY    Diabetes Note 9/22/2022    Zara Webster, 1950, [id number]          Reason for visit      1. Type 2 diabetes mellitus with diabetic mononeuropathy, with long-term current use of insulin (H)        HPI     Zara Webster is a very pleasant 72 year old old female who presents for follow up.  SUMMARY:    Zara is here today in f/u for DM 2. Her current A1c is 8.8 and up from her previous at 8.1. She has been having fun with family all summer and \"making memories\". This includes a lot of eating. She steers towards CHO anyway because of her Ileostomy because her gut handles that better than other choices. She reports today that she would like to stop the Jardiance. It has been causing a lot of Candidiasis and she is just done with all of that. She is a little disappointed because she was taking less insulin while on it. She is taking Lantus, 14 units BID. She takes Humalog 4-5 times a day to manage. Her Naila download shows that she does pretty well overnight, but remains high during the daylight hours. She was within range only 26% of the time over the last two weeks, with an ave BG of 245. She has also been having some family stressors that have been weighing on her. She gave up wine because of the PRAJAPATI dx, but is substituting full strength Pepsi. She reports that she \"just drinks the small ones\".       Blood glucose data:      Past Medical History     Patient Active Problem List   Diagnosis     Type II diabetes mellitus (H)     Post Colectomy     Hypercholesteremia     BMI 28.0-28.9,adult     Aspirin contraindicated     Crohn's disease of colon (H)     Gastrointestinal hemorrhage     Other cirrhosis of liver (H) - unclear etiology,[facility]     Chronic kidney disease, stage 3 (H)     Ileostomy status (H)     Non-alcoholic cirrhosis (H)     Abnormal liver ultrasound     Nonalcoholic steatohepatitis     Portal hypertension (H)     Disorder of biliary tract        Family History "       family history includes Cancer in her father; Diabetes in her father and mother; Heart Disease in her mother; Hyperlipidemia in her mother; No Known Problems in her sister and sister.    Social History      reports that she has never smoked. She has never used smokeless tobacco. She reports previous alcohol use. She reports that she does not use drugs.      Review of Systems     Patient has no polyuria or polydipsia, no chest pain, dyspnea or TIA's, no numbness, tingling or pain in extremities  Remainder negative except as noted in HPI.      Vital Signs     /60 (BP Location: Right arm, Patient Position: Sitting, Cuff Size: Adult Large)   Wt 75.8 kg (167 lb)   LMP  (LMP Unknown)   BMI 28.01 kg/m    Wt Readings from Last 3 Encounters:   09/22/22 75.8 kg (167 lb)   03/22/22 75 kg (165 lb 6.4 oz)   12/14/21 75.2 kg (165 lb 11.2 oz)       Physical Exam     Constitutional:  Well developed, Well nourished  HENT:  Normocephalic,   Neck: Thyroid normal, No lymph nodes, Supple  Eyes:  PERRL, Conjunctiva pink  Respiratory:  Normal breath sounds, No respiratory distress  Cardiovascular:  Normal heart rate, Normal rhythm, No murmurs  GI:  Bowel sounds normal, Soft, No tenderness  Musculoskeletal:  No gross deformity or lesions, normal dorsalis pedis pulses  Skin: No acanthosis nigricans, lipoatrophy or lipodystrophy, mild dermopathy on inside of L ankle  Neurologic:  Alert & oriented x 3, nonfocal  Psychiatric:  Affect, Mood, Insight appropriate  Diabetic foot exam: no ulcers, charcot's or high risk calluses,         Assessment     1. Type 2 diabetes mellitus with diabetic mononeuropathy, with long-term current use of insulin (H)        Plan       When the family stuff settles down, she will be able to focus on herself again. No changes to her medication regimen. She will f/u with me in 6 months.       Katiana Goodwin NP   Endocrinology  9/22/2022  1:07 PM        Lab Results     Microalbumin Urine mg/dL   Date  "Value Ref Range Status   04/27/2021 0.73 0.00 - 1.99 mg/dL Final       Cholesterol   Date Value Ref Range Status   10/03/2017 282 (H) <=199 mg/dL Final     Direct Measure HDL   Date Value Ref Range Status   10/03/2017 60 >=50 mg/dL Final     Triglycerides   Date Value Ref Range Status   10/03/2017 252 (H) <=149 mg/dL Final             Current Medications     Outpatient Medications Prior to Visit   Medication Sig Dispense Refill     diazePAM (VALIUM) 5 MG tablet [DIAZEPAM (VALIUM) 5 MG TABLET] TAKE 1 TABLET BY MOUTH EVERY 6 HOURS AS NEEDED FOR ANXIETY  30 tablet 1     diphenoxylate-atropine (LOMOTIL) 2.5-0.025 MG tablet TAKE 1 TABLET BY MOUTH 4 TIMES DAILY AS NEEDED FOR DIARRHEA 30 tablet 0     empagliflozin (JARDIANCE) 10 MG TABS tablet Take 1 tablet (10 mg) by mouth daily 90 tablet 3     flash glucose scanning reader (FREESTYLE VIDA 14 DAY READER) Misc [FLASH GLUCOSE SCANNING READER (FREESTYLE VIDA 14 DAY READER) MISC] Use 1 Units As Directed every 14 (fourteen) days. 1 each 0     flash glucose sensor (FREESTYLE VIDA 14 DAY SENSOR) Kit [FLASH GLUCOSE SENSOR (FREESTYLE VIDA 14 DAY SENSOR) KIT] Use 1 Units As Directed daily. 2 kit 5     loperamide (IMODIUM A-D) 2 MG tablet Take 1 tablet (2 mg) by mouth 4 times daily as needed for diarrhea 120 tablet 3     omeprazole (PRILOSEC) 40 MG DR capsule TAKE 1 CAPSULE BY MOUTH ONCE DAILY 90 capsule 0     ostomy adhesive Pste [OSTOMY ADHESIVE PSTE] Apply as needed 4 Tube 3     pen needle, diabetic (ULTICARE PEN NEEDLE) 32 gauge x 5/32\" Ndle [PEN NEEDLE, DIABETIC (ULTICARE PEN NEEDLE) 32 GAUGE X 5/32\" NDLE] TEST BLOOD SUGARS UP TO 4 TIMES A DAY. 300 each 0     YUVAFEM 10 mcg Tab [YUVAFEM 10 MCG TAB] INSERT 1 TABLET VAGINALLY TWICE WEEKLY, AS NEEDED  2     insulin lispro (HUMALOG KWIKPEN) 100 UNIT/ML (1 unit dial) KWIKPEN Take 6 units up to 4 times daily for BG management 30 mL 3     LANTUS SOLOSTAR 100 UNIT/ML soln INJECT 14 UNITS SUBCUTANEOUSLY 2 TIMES A DAY 20 mL 0     No " facility-administered medications prior to visit.             Naila:                Answers for HPI/ROS submitted by the patient on 9/22/2022  General Symptoms: No  Skin Symptoms: No  HENT Symptoms: No  EYE SYMPTOMS: No  HEART SYMPTOMS: Yes  LUNG SYMPTOMS: No  INTESTINAL SYMPTOMS: No  URINARY SYMPTOMS: No  GYNECOLOGIC SYMPTOMS: No  BREAST SYMPTOMS: No  SKELETAL SYMPTOMS: No  BLOOD SYMPTOMS: No  NERVOUS SYSTEM SYMPTOMS: No  MENTAL HEALTH SYMPTOMS: No  Chest pain or pressure: Yes  Fast or irregular heartbeat: No  Pain in legs with walking: No  Trouble breathing while lying down: No  Fingers or toes appear blue: No  High blood pressure: No  Low blood pressure: No  Fainting: No  Murmurs: No  Pacemaker: No  Varicose veins: Yes  Edema or swelling: No  Wake up at night with shortness of breath: No  Light-headedness: No  Exercise intolerance: No

## 2022-09-22 NOTE — LETTER
"    9/22/2022         RE: Zara Webster  [address] [place]  [place] [id number]        Dear Colleague,    Thank you for referring your patient, Zara Webster, to the [facility]. Please see a copy of my visit note below.    [facility] ENDOCRINOLOGY    Diabetes Note 9/22/2022    Zara Webster, 1950, [id number]          Reason for visit      1. Type 2 diabetes mellitus with diabetic mononeuropathy, with long-term current use of insulin (H)        HPI     Zara Webster is a very pleasant 72 year old old female who presents for follow up.  SUMMARY:    Zara is here today in f/u for DM 2. Her current A1c is 8.8 and up from her previous at 8.1. She has been having fun with family all summer and \"making memories\". This includes a lot of eating. She steers towards CHO anyway because of her Ileostomy because her gut handles that better than other choices. She reports today that she would like to stop the Jardiance. It has been causing a lot of Candidiasis and she is just done with all of that. She is a little disappointed because she was taking less insulin while on it. She is taking Lantus, 14 units BID. She takes Humalog 4-5 times a day to manage. Her Naila download shows that she does pretty well overnight, but remains high during the daylight hours. She was within range only 26% of the time over the last two weeks, with an ave BG of 245. She has also been having some family stressors that have been weighing on her. She gave up wine because of the PRAJAPATI dx, but is substituting full strength Pepsi. She reports that she \"just drinks the small ones\".       Blood glucose data:      Past Medical History     Patient Active Problem List   Diagnosis     Type II diabetes mellitus (H)     Post Colectomy     Hypercholesteremia     BMI 28.0-28.9,adult     Aspirin contraindicated     Crohn's disease of colon (H)     Gastrointestinal hemorrhage     Other cirrhosis of liver (H) - " unclear etiology,[facility]     Chronic kidney disease, stage 3 (H)     Ileostomy status (H)     Non-alcoholic cirrhosis (H)     Abnormal liver ultrasound     Nonalcoholic steatohepatitis     Portal hypertension (H)     Disorder of biliary tract        Family History       family history includes Cancer in her father; Diabetes in her father and mother; Heart Disease in her mother; Hyperlipidemia in her mother; No Known Problems in her sister and sister.    Social History      reports that she has never smoked. She has never used smokeless tobacco. She reports previous alcohol use. She reports that she does not use drugs.      Review of Systems     Patient has no polyuria or polydipsia, no chest pain, dyspnea or TIA's, no numbness, tingling or pain in extremities  Remainder negative except as noted in HPI.      Vital Signs     /60 (BP Location: Right arm, Patient Position: Sitting, Cuff Size: Adult Large)   Wt 75.8 kg (167 lb)   LMP  (LMP Unknown)   BMI 28.01 kg/m    Wt Readings from Last 3 Encounters:   09/22/22 75.8 kg (167 lb)   03/22/22 75 kg (165 lb 6.4 oz)   12/14/21 75.2 kg (165 lb 11.2 oz)       Physical Exam     Constitutional:  Well developed, Well nourished  HENT:  Normocephalic,   Neck: Thyroid normal, No lymph nodes, Supple  Eyes:  PERRL, Conjunctiva pink  Respiratory:  Normal breath sounds, No respiratory distress  Cardiovascular:  Normal heart rate, Normal rhythm, No murmurs  GI:  Bowel sounds normal, Soft, No tenderness  Musculoskeletal:  No gross deformity or lesions, normal dorsalis pedis pulses  Skin: No acanthosis nigricans, lipoatrophy or lipodystrophy, mild dermopathy on inside of L ankle  Neurologic:  Alert & oriented x 3, nonfocal  Psychiatric:  Affect, Mood, Insight appropriate  Diabetic foot exam: no ulcers, charcot's or high risk calluses,         Assessment     1. Type 2 diabetes mellitus with diabetic mononeuropathy, with long-term current use of insulin (H)        Plan       When the  "family stuff settles down, she will be able to focus on herself again. No changes to her medication regimen. She will f/u with me in 6 months.       Katiana Goodwin NP  [facility] Endocrinology  9/22/2022  1:07 PM        Lab Results     Microalbumin Urine mg/dL   Date Value Ref Range Status   04/27/2021 0.73 0.00 - 1.99 mg/dL Final       Cholesterol   Date Value Ref Range Status   10/03/2017 282 (H) <=199 mg/dL Final     Direct Measure HDL   Date Value Ref Range Status   10/03/2017 60 >=50 mg/dL Final     Triglycerides   Date Value Ref Range Status   10/03/2017 252 (H) <=149 mg/dL Final             Current Medications     Outpatient Medications Prior to Visit   Medication Sig Dispense Refill     diazePAM (VALIUM) 5 MG tablet [DIAZEPAM (VALIUM) 5 MG TABLET] TAKE 1 TABLET BY MOUTH EVERY 6 HOURS AS NEEDED FOR ANXIETY  30 tablet 1     diphenoxylate-atropine (LOMOTIL) 2.5-0.025 MG tablet TAKE 1 TABLET BY MOUTH 4 TIMES DAILY AS NEEDED FOR DIARRHEA 30 tablet 0     empagliflozin (JARDIANCE) 10 MG TABS tablet Take 1 tablet (10 mg) by mouth daily 90 tablet 3     flash glucose scanning reader (FREESTYLE VIDA 14 DAY READER) Misc [FLASH GLUCOSE SCANNING READER (FREESTYLE VIDA 14 DAY READER) MISC] Use 1 Units As Directed every 14 (fourteen) days. 1 each 0     flash glucose sensor (FREESTYLE VIDA 14 DAY SENSOR) Kit [FLASH GLUCOSE SENSOR (FREESTYLE VIDA 14 DAY SENSOR) KIT] Use 1 Units As Directed daily. 2 kit 5     loperamide (IMODIUM A-D) 2 MG tablet Take 1 tablet (2 mg) by mouth 4 times daily as needed for diarrhea 120 tablet 3     omeprazole (PRILOSEC) 40 MG DR capsule TAKE 1 CAPSULE BY MOUTH ONCE DAILY 90 capsule 0     ostomy adhesive Pste [OSTOMY ADHESIVE PSTE] Apply as needed 4 Tube 3     pen needle, diabetic (ULTICARE PEN NEEDLE) 32 gauge x 5/32\" Ndle [PEN NEEDLE, DIABETIC (ULTICARE PEN NEEDLE) 32 GAUGE X 5/32\" NDLE] TEST BLOOD SUGARS UP TO 4 TIMES A DAY. 300 each 0     YUVAFEM 10 mcg Tab [YUVAFEM 10 MCG TAB] INSERT 1 TABLET " VAGINALLY TWICE WEEKLY, AS NEEDED  2     insulin lispro (HUMALOG KWIKPEN) 100 UNIT/ML (1 unit dial) KWIKPEN Take 6 units up to 4 times daily for BG management 30 mL 3     LANTUS SOLOSTAR 100 UNIT/ML soln INJECT 14 UNITS SUBCUTANEOUSLY 2 TIMES A DAY 20 mL 0     No facility-administered medications prior to visit.             Naila:                Answers for HPI/ROS submitted by the patient on 9/22/2022  General Symptoms: No  Skin Symptoms: No  HENT Symptoms: No  EYE SYMPTOMS: No  HEART SYMPTOMS: Yes  LUNG SYMPTOMS: No  INTESTINAL SYMPTOMS: No  URINARY SYMPTOMS: No  GYNECOLOGIC SYMPTOMS: No  BREAST SYMPTOMS: No  SKELETAL SYMPTOMS: No  BLOOD SYMPTOMS: No  NERVOUS SYSTEM SYMPTOMS: No  MENTAL HEALTH SYMPTOMS: No  Chest pain or pressure: Yes  Fast or irregular heartbeat: No  Pain in legs with walking: No  Trouble breathing while lying down: No  Fingers or toes appear blue: No  High blood pressure: No  Low blood pressure: No  Fainting: No  Murmurs: No  Pacemaker: No  Varicose veins: Yes  Edema or swelling: No  Wake up at night with shortness of breath: No  Light-headedness: No  Exercise intolerance: No          Again, thank you for allowing me to participate in the care of your patient.        Sincerely,        Katiana Goodwin NP

## 2022-09-28 RX ORDER — INSULIN GLARGINE 100 [IU]/ML
INJECTION, SOLUTION SUBCUTANEOUS
Qty: 15 ML | Refills: 0 | OUTPATIENT
Start: 2022-09-28

## 2022-09-29 ENCOUNTER — OFFICE VISIT (OUTPATIENT)
Dept: FAMILY MEDICINE | Facility: CLINIC | Age: 72
End: 2022-09-29
Payer: COMMERCIAL

## 2022-09-29 VITALS
SYSTOLIC BLOOD PRESSURE: 131 MMHG | HEIGHT: 65 IN | HEART RATE: 85 BPM | RESPIRATION RATE: 16 BRPM | WEIGHT: 170.8 LBS | BODY MASS INDEX: 28.46 KG/M2 | DIASTOLIC BLOOD PRESSURE: 63 MMHG

## 2022-09-29 DIAGNOSIS — K50.118 CROHN'S DISEASE OF COLON WITH OTHER COMPLICATION (H): ICD-10-CM

## 2022-09-29 DIAGNOSIS — E11.41 TYPE 2 DIABETES MELLITUS WITH DIABETIC MONONEUROPATHY, WITH LONG-TERM CURRENT USE OF INSULIN (H): ICD-10-CM

## 2022-09-29 DIAGNOSIS — Z93.2 ILEOSTOMY STATUS (H): ICD-10-CM

## 2022-09-29 DIAGNOSIS — R07.9 CHEST PAIN, UNSPECIFIED TYPE: Primary | ICD-10-CM

## 2022-09-29 DIAGNOSIS — Z79.4 TYPE 2 DIABETES MELLITUS WITH DIABETIC MONONEUROPATHY, WITH LONG-TERM CURRENT USE OF INSULIN (H): ICD-10-CM

## 2022-09-29 DIAGNOSIS — N18.31 STAGE 3A CHRONIC KIDNEY DISEASE (H): ICD-10-CM

## 2022-09-29 DIAGNOSIS — R25.1 TREMOR: ICD-10-CM

## 2022-09-29 DIAGNOSIS — F41.9 ANXIETY: ICD-10-CM

## 2022-09-29 PROBLEM — K76.6 PORTAL HYPERTENSION (H): Status: RESOLVED | Noted: 2021-12-14 | Resolved: 2022-09-29

## 2022-09-29 LAB
ATRIAL RATE - MUSE: 77 BPM
CREAT UR-MCNC: 37.1 MG/DL
DIASTOLIC BLOOD PRESSURE - MUSE: NORMAL MMHG
INTERPRETATION ECG - MUSE: NORMAL
MICROALBUMIN UR-MCNC: <12 MG/L
MICROALBUMIN/CREAT UR: NORMAL MG/G{CREAT}
P AXIS - MUSE: 44 DEGREES
PR INTERVAL - MUSE: 194 MS
QRS DURATION - MUSE: 86 MS
QT - MUSE: 420 MS
QTC - MUSE: 475 MS
R AXIS - MUSE: -42 DEGREES
SYSTOLIC BLOOD PRESSURE - MUSE: NORMAL MMHG
T AXIS - MUSE: 64 DEGREES
TSH SERPL DL<=0.005 MIU/L-ACNC: 0.86 UIU/ML (ref 0.3–4.2)
VENTRICULAR RATE- MUSE: 77 BPM

## 2022-09-29 PROCEDURE — 36415 COLL VENOUS BLD VENIPUNCTURE: CPT | Performed by: FAMILY MEDICINE

## 2022-09-29 PROCEDURE — 84443 ASSAY THYROID STIM HORMONE: CPT | Performed by: FAMILY MEDICINE

## 2022-09-29 PROCEDURE — 93010 ELECTROCARDIOGRAM REPORT: CPT | Performed by: INTERNAL MEDICINE

## 2022-09-29 PROCEDURE — 93005 ELECTROCARDIOGRAM TRACING: CPT | Performed by: FAMILY MEDICINE

## 2022-09-29 PROCEDURE — 82043 UR ALBUMIN QUANTITATIVE: CPT | Performed by: FAMILY MEDICINE

## 2022-09-29 PROCEDURE — 99214 OFFICE O/P EST MOD 30 MIN: CPT | Performed by: FAMILY MEDICINE

## 2022-09-29 RX ORDER — INSULIN GLARGINE 100 [IU]/ML
INJECTION, SOLUTION SUBCUTANEOUS
Qty: 20 ML | Refills: 5 | OUTPATIENT
Start: 2022-09-29

## 2022-09-29 RX ORDER — HYDROXYZINE PAMOATE 25 MG/1
25 CAPSULE ORAL 3 TIMES DAILY PRN
Qty: 60 CAPSULE | Refills: 3 | Status: SHIPPED | OUTPATIENT
Start: 2022-09-29 | End: 2023-03-23

## 2022-09-29 RX ORDER — PROPRANOLOL HCL 60 MG
60 CAPSULE, EXTENDED RELEASE 24HR ORAL DAILY
Qty: 90 CAPSULE | Refills: 3 | Status: SHIPPED | OUTPATIENT
Start: 2022-09-29 | End: 2023-08-31

## 2022-09-29 NOTE — TELEPHONE ENCOUNTER
Pending Prescriptions:                       Disp   Refills    insulin glargine (LANTUS SOLOSTAR) 100 UN*20 mL  5            Sig: INJECT 14 UNITS SUBCUTANEOUSLY 2 TIMES A DAY    Message from pharmacy: We don't have ny Rx's on file please send new Rx.

## 2022-09-29 NOTE — PROGRESS NOTES
Assessment/ Plan     1. Chest pain, unspecified type  Zara had a several month history of atypical chest pain.  This is typically happening at night and she feels like it is related to stress.  However, she does have risk factors and that she has had uncontrolled type 2 diabetes for many years.  Her EKG has some new changes from a year ago.  Therefore we will send her for a nuclear medicine treadmill stress test.  I would want to rule out coronary artery disease.  If it is just anxiety, we will have her treated as needed with hydroxyzine.  - EKG 12-lead, tracing only  - [place] MPI Treadmill; Future    2. Tremor  Patient has an essential tremor.  She seen neurology in the past and she has no concerns for Parkinson's.  Its to the point where it is affecting things so would like to try medication.  We will start with propranolol 60 mg.  She can titrate this up to a maximum of 320 over time if needed.  We will check a TSH as she has not had this done in a long time.  If the propranolol is not effective, can try primidone.  - propranolol ER (INDERAL LA) 60 MG 24 hr capsule; Take 1 capsule (60 mg) by mouth daily  Dispense: 90 capsule; Refill: 3  - TSH with free T4 reflex; Future  - TSH with free T4 reflex    3. Anxiety  She has been struggling with some anxiety and occasionally is taking a Xanax that we had given her for flying.  We will switch this over to hydroxyzine as this would be a safer option for her at this point.  She does not think it is bad enough that she would want to take a controller type anxiety medication.  She states it is happening maybe twice a week.  - hydrOXYzine (VISTARIL) 25 MG capsule; Take 1 capsule (25 mg) by mouth 3 times daily as needed for anxiety  Dispense: 60 capsule; Refill: 3    4. Type 2 diabetes mellitus with diabetic mononeuropathy, with long-term current use of insulin (H)  She follows with an endocrinologist and her A1c is currently around 8.9.      5. Ileostomy status (H)  She had a  ileostomy secondary to complications with Crohn's disease.  She follows with [facility].    6. Crohn's disease of colon with other complication (H)  As above    7. Stage 3a chronic kidney disease (H)  Her kidney function has been stable.      Subjective:      Zara Webster is a 72 year old female who presents for several issues.  The first of which is her tremor.  She states she has had a tremor that is been pretty slight for a long time.  She did see a neurologist at one point and they labeled it and essential tremor.  It just has progressed and is more bothersome now.  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  Her writing has gotten really bad.  She has no other signs or symptoms of Parkinson's or movement disorder.  Its to the point where she think she would like to try treatment.    She is also been having some chest pain.  Its left sided chest pain that will radiate into her right jaw.  It happens at nighttime.  She feels like it is related to stress and anxiety because its typically when it bothers her.  She does not have any diaphoresis or shortness of breath.  She does not have any nausea with it.  If she takes a half a Valium that seems to help.  She is pretty active and that she bikes and she will walk and 18-hole golf course and does not have any symptoms at that time.  Her risk factor is uncontrolled diabetes for many years.    Relevant past medical, family, surgical, and social history reviewed with patient, unless noted in HPI, not pertinent for this visit.  Medications were discussed and reconciled.   Review of Systems   A 12 point comprehensive review of systems was negative except as noted.      Current Outpatient Medications   Medication Sig Dispense Refill     hydrOXYzine (VISTARIL) 25 MG capsule Take 1 capsule (25 mg) by mouth 3 times daily as needed for anxiety 60 capsule 3     propranolol ER (INDERAL LA) 60 MG 24 hr capsule Take 1 capsule (60 mg) by mouth  "daily 90 capsule 3     diazePAM (VALIUM) 5 MG tablet [DIAZEPAM (VALIUM) 5 MG TABLET] TAKE 1 TABLET BY MOUTH EVERY 6 HOURS AS NEEDED FOR ANXIETY  30 tablet 1     diphenoxylate-atropine (LOMOTIL) 2.5-0.025 MG tablet TAKE 1 TABLET BY MOUTH 4 TIMES DAILY AS NEEDED FOR DIARRHEA 30 tablet 0     flash glucose scanning reader (FREESTYLE VIDA 14 DAY READER) Misc [FLASH GLUCOSE SCANNING READER (FREESTYLE VIDA 14 DAY READER) MISC] Use 1 Units As Directed every 14 (fourteen) days. 1 each 0     flash glucose sensor (FREESTYLE VIDA 14 DAY SENSOR) Kit [FLASH GLUCOSE SENSOR (FREESTYLE VIDA 14 DAY SENSOR) KIT] Use 1 Units As Directed daily. 2 kit 5     insulin glargine (LANTUS SOLOSTAR) 100 UNIT/ML pen INJECT 14 UNITS SUBCUTANEOUSLY 2 TIMES A DAY 20 mL 5     insulin lispro (HUMALOG KWIKPEN) 100 UNIT/ML (1 unit dial) KWIKPEN Take 6 units up to 4 times daily for BG management 30 mL 5     loperamide (IMODIUM A-D) 2 MG tablet Take 1 tablet (2 mg) by mouth 4 times daily as needed for diarrhea 120 tablet 3     omeprazole (PRILOSEC) 40 MG DR capsule TAKE 1 CAPSULE BY MOUTH ONCE DAILY 90 capsule 0     ostomy adhesive Pste [OSTOMY ADHESIVE PSTE] Apply as needed 4 Tube 3     pen needle, diabetic (ULTICARE PEN NEEDLE) 32 gauge x 5/32\" Ndle [PEN NEEDLE, DIABETIC (ULTICARE PEN NEEDLE) 32 GAUGE X 5/32\" NDLE] TEST BLOOD SUGARS UP TO 4 TIMES A DAY. 300 each 0     YUVAFEM 10 mcg Tab [YUVAFEM 10 MCG TAB] INSERT 1 TABLET VAGINALLY TWICE WEEKLY, AS NEEDED  2         Objective:     /63   Pulse 85   Resp 16   Ht 1.645 m (5' 4.75\")   Wt 77.5 kg (170 lb 12.8 oz)   LMP  (LMP Unknown)   BMI 28.64 kg/m      Body mass index is 28.64 kg/m .       General appearance: alert, appears stated age and cooperative  Lungs: clear to auscultation bilaterally  Heart: regular rate and rhythm, S1, S2 normal, no murmur, click, rub or gallop  Extremities: Mild tremor, worse with intention  Gait: Normal  Muscle movements are smooth, no cogwheeling    EKG: " Personally reviewed, she does have some changes when compared to previous.    Recent Results (from the past 168 hour(s))   EKG 12-lead, tracing only   Result Value Ref Range    Systolic Blood Pressure  mmHg    Diastolic Blood Pressure  mmHg    Ventricular Rate 77 BPM    Atrial Rate 77 BPM    HI Interval 194 ms    QRS Duration 86 ms     ms    QTc 475 ms    P Axis 44 degrees    R AXIS -42 degrees    T Axis 64 degrees    Interpretation ECG       Sinus rhythm  Left axis deviation  Minimal voltage criteria for LVH, may be normal variant  Septal infarct , age undetermined  Abnormal ECG  When compared with ECG of 22-SEP-2021 15:06,  Septal infarct is now Present            This note has been dictated using voice recognition software. Any grammatical or context distortions are unintentional and inherent to the software  Answers for HPI/ROS submitted by the patient on 9/29/2022  How many servings of fruits and vegetables do you eat daily?: 4 or more  On average, how many sweetened beverages do you drink each day (Examples: soda, juice, sweet tea, etc.  Do NOT count diet or artificially sweetened beverages)?: 3  How many minutes a day do you exercise enough to make your heart beat faster?: 30 to 60  How many days a week do you exercise enough to make your heart beat faster?: 6  How many days per week do you miss taking your medication?: 0  What is the reason for your visit today?: Heart

## 2022-10-04 ASSESSMENT — ANXIETY QUESTIONNAIRES
5. BEING SO RESTLESS THAT IT IS HARD TO SIT STILL: NOT AT ALL
7. FEELING AFRAID AS IF SOMETHING AWFUL MIGHT HAPPEN: SEVERAL DAYS
3. WORRYING TOO MUCH ABOUT DIFFERENT THINGS: MORE THAN HALF THE DAYS
1. FEELING NERVOUS, ANXIOUS, OR ON EDGE: MORE THAN HALF THE DAYS
GAD7 TOTAL SCORE: 6
4. TROUBLE RELAXING: SEVERAL DAYS
GAD7 TOTAL SCORE: 6
IF YOU CHECKED OFF ANY PROBLEMS ON THIS QUESTIONNAIRE, HOW DIFFICULT HAVE THESE PROBLEMS MADE IT FOR YOU TO DO YOUR WORK, TAKE CARE OF THINGS AT HOME, OR GET ALONG WITH OTHER PEOPLE: SOMEWHAT DIFFICULT
2. NOT BEING ABLE TO STOP OR CONTROL WORRYING: NOT AT ALL
6. BECOMING EASILY ANNOYED OR IRRITABLE: NOT AT ALL

## 2022-10-05 ENCOUNTER — TELEPHONE (OUTPATIENT)
Dept: ENDOCRINOLOGY | Facility: CLINIC | Age: 72
End: 2022-10-05

## 2022-10-05 ENCOUNTER — MEDICAL CORRESPONDENCE (OUTPATIENT)
Dept: HEALTH INFORMATION MANAGEMENT | Facility: CLINIC | Age: 72
End: 2022-10-05

## 2022-10-05 NOTE — TELEPHONE ENCOUNTER
The following form completed and faxed to [employer] at fax# [phone] for patient's CGM.

## 2022-10-21 ENCOUNTER — HOSPITAL ENCOUNTER (OUTPATIENT)
Dept: NUCLEAR MEDICINE | Facility: HOSPITAL | Age: 72
Discharge: HOME | End: 2022-10-21
Attending: FAMILY MEDICINE
Payer: COMMERCIAL

## 2022-10-21 ENCOUNTER — HOSPITAL ENCOUNTER (OUTPATIENT)
Dept: CARDIOLOGY | Facility: HOSPITAL | Age: 72
Discharge: HOME | End: 2022-10-21
Attending: FAMILY MEDICINE
Payer: COMMERCIAL

## 2022-10-21 ENCOUNTER — TELEPHONE (OUTPATIENT)
Dept: FAMILY MEDICINE | Facility: CLINIC | Age: 72
End: 2022-10-21

## 2022-10-21 DIAGNOSIS — K21.9 GERD (GASTROESOPHAGEAL REFLUX DISEASE): ICD-10-CM

## 2022-10-21 DIAGNOSIS — R07.9 CHEST PAIN, UNSPECIFIED TYPE: ICD-10-CM

## 2022-10-21 LAB
CV STRESS CURRENT BP HE: NORMAL
CV STRESS CURRENT HR HE: 101
CV STRESS CURRENT HR HE: 104
CV STRESS CURRENT HR HE: 110
CV STRESS CURRENT HR HE: 117
CV STRESS CURRENT HR HE: 123
CV STRESS CURRENT HR HE: 126
CV STRESS CURRENT HR HE: 132
CV STRESS CURRENT HR HE: 138
CV STRESS CURRENT HR HE: 138
CV STRESS CURRENT HR HE: 141
CV STRESS CURRENT HR HE: 141
CV STRESS CURRENT HR HE: 80
CV STRESS CURRENT HR HE: 87
CV STRESS CURRENT HR HE: 90
CV STRESS CURRENT HR HE: 90
CV STRESS CURRENT HR HE: 91
CV STRESS CURRENT HR HE: 92
CV STRESS CURRENT HR HE: 93
CV STRESS CURRENT HR HE: 93
CV STRESS CURRENT HR HE: 99
CV STRESS DEVIATION TIME HE: NORMAL
CV STRESS ECHO PERCENT HR HE: NORMAL
CV STRESS EXERCISE STAGE HE: NORMAL
CV STRESS EXERCISE STAGE REACHED HE: NORMAL
CV STRESS FINAL RESTING BP HE: NORMAL
CV STRESS FINAL RESTING HR HE: 90
CV STRESS MAX HR HE: 141
CV STRESS MAX TREADMILL GRADE HE: 12
CV STRESS MAX TREADMILL SPEED HE: 2.5
CV STRESS PEAK DIA BP HE: NORMAL
CV STRESS PEAK SYS BP HE: NORMAL
CV STRESS PHASE HE: NORMAL
CV STRESS PROTOCOL HE: NORMAL
CV STRESS RESTING PT POSITION HE: NORMAL
CV STRESS RESTING PT POSITION HE: NORMAL
CV STRESS ST DEVIATION AMOUNT HE: NORMAL
CV STRESS ST DEVIATION ELEVATION HE: NORMAL
CV STRESS ST EVELATION AMOUNT HE: NORMAL
CV STRESS TEST TYPE HE: NORMAL
CV STRESS TOTAL STAGE TIME MIN 1 HE: NORMAL
RATE PRESSURE PRODUCT: NORMAL
STRESS ANGINA INDEX: 0
STRESS ECHO BASELINE DIASTOLIC HE: 72
STRESS ECHO BASELINE HR: 80
STRESS ECHO BASELINE SYSTOLIC BP: 144
STRESS ECHO CALCULATED PERCENT HR: 96 %
STRESS ECHO LAST STRESS DIASTOLIC BP: 70
STRESS ECHO LAST STRESS HR: 141
STRESS ECHO LAST STRESS SYSTOLIC BP: 180
STRESS ECHO POST ESTIMATED WORKLOAD: 6.4
STRESS ECHO POST EXERCISE DUR MIN: 4
STRESS ECHO POST EXERCISE DUR SEC: 30
STRESS ECHO TARGET HR: 147

## 2022-10-21 PROCEDURE — 93016 CV STRESS TEST SUPVJ ONLY: CPT | Performed by: INTERNAL MEDICINE

## 2022-10-21 PROCEDURE — 343N000001 HC RX 343: Performed by: FAMILY MEDICINE

## 2022-10-21 PROCEDURE — 93017 CV STRESS TEST TRACING ONLY: CPT

## 2022-10-21 PROCEDURE — 78452 HT MUSCLE IMAGE SPECT MULT: CPT

## 2022-10-21 PROCEDURE — A9500 TC99M SESTAMIBI: HCPCS | Performed by: FAMILY MEDICINE

## 2022-10-21 PROCEDURE — 78452 HT MUSCLE IMAGE SPECT MULT: CPT | Mod: 26 | Performed by: GENERAL ACUTE CARE HOSPITAL

## 2022-10-21 PROCEDURE — 93018 CV STRESS TEST I&R ONLY: CPT | Performed by: GENERAL ACUTE CARE HOSPITAL

## 2022-10-21 RX ADMIN — Medication 31.3 MILLICURIE: at 08:42

## 2022-10-21 RX ADMIN — Medication 8.6 MILLICURIE: at 07:15

## 2022-10-21 NOTE — TELEPHONE ENCOUNTER
Reason for Call:  Other results    Detailed comments: patient wondering about her stress test done earlier today. My chart said results were ready but patient didn't see any online. Read the interpretation to her. Advised not a doctor so just reading the report. 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 Read results. No need to send message to provider she said.     Phone Number Patient can be reached at: Home number on file [phone] (home)    Best Time: anytime     Can we leave a detailed message on this number? YES    Call taken on 10/21/2022 at 3:22 PM by Devi Clements

## 2022-10-24 RX ORDER — OMEPRAZOLE 40 MG/1
40 CAPSULE, DELAYED RELEASE ORAL DAILY
Qty: 90 CAPSULE | Refills: 3 | Status: SHIPPED | OUTPATIENT
Start: 2022-10-24 | End: 2023-08-31

## 2022-10-24 NOTE — TELEPHONE ENCOUNTER
"Last Written Prescription Date:  6/30/22  Last Fill Quantity: 90,  # refills: 0   Last office visit provider:  9/29/22     Requested Prescriptions   Pending Prescriptions Disp Refills     omeprazole (PRILOSEC) 40 MG DR capsule [Pharmacy Med Name: Omeprazole Oral Capsule Delayed Release 40 MG] 90 capsule 0     Sig: TAKE 1 CAPSULE BY MOUTH ONCE DAILY       PPI Protocol Passed - 10/24/2022  1:52 PM        Passed - Not on Clopidogrel (unless Pantoprazole ordered)        Passed - No diagnosis of osteoporosis on record        Passed - Recent (12 mo) or future (30 days) visit within the authorizing provider's specialty     Patient has had an office visit with the authorizing provider or a provider within the authorizing providers department within the previous 12 mos or has a future within next 30 days. See \"Patient Info\" tab in inbasket, or \"Choose Columns\" in Meds & Orders section of the refill encounter.              Passed - Medication is active on med list        Passed - Patient is age 18 or older        Passed - No active pregnacy on record        Passed - No positive pregnancy test in past 12 months             Patrick Blount RN 10/24/22 1:53 PM  "

## 2022-11-14 DIAGNOSIS — E11.9 DIABETES MELLITUS TYPE 2, CONTROLLED (H): ICD-10-CM

## 2022-11-14 RX ORDER — PEN NEEDLE, DIABETIC 32GX 5/32"
NEEDLE, DISPOSABLE MISCELLANEOUS
Qty: 300 EACH | Refills: 0 | Status: SHIPPED | OUTPATIENT
Start: 2022-11-14 | End: 2024-02-15

## 2022-11-15 ENCOUNTER — HOSPITAL ENCOUNTER (OUTPATIENT)
Dept: ULTRASOUND IMAGING | Facility: HOSPITAL | Age: 72
Discharge: HOME | End: 2022-11-15
Attending: INTERNAL MEDICINE
Payer: COMMERCIAL

## 2022-11-15 ENCOUNTER — LAB (OUTPATIENT)
Dept: LAB | Facility: HOSPITAL | Age: 72
End: 2022-11-15
Attending: INTERNAL MEDICINE
Payer: COMMERCIAL

## 2022-11-15 DIAGNOSIS — K74.60 LIVER CIRRHOSIS SECONDARY TO NASH (NONALCOHOLIC STEATOHEPATITIS) (H): Primary | ICD-10-CM

## 2022-11-15 DIAGNOSIS — K74.60 UNSPECIFIED CIRRHOSIS OF LIVER (H): ICD-10-CM

## 2022-11-15 DIAGNOSIS — K74.60 LIVER CIRRHOSIS SECONDARY TO NASH (H): ICD-10-CM

## 2022-11-15 DIAGNOSIS — K75.81 LIVER CIRRHOSIS SECONDARY TO NASH (NONALCOHOLIC STEATOHEPATITIS) (H): Primary | ICD-10-CM

## 2022-11-15 DIAGNOSIS — K75.81 LIVER CIRRHOSIS SECONDARY TO NASH (H): ICD-10-CM

## 2022-11-15 LAB
AFP SERPL-MCNC: 3.2 NG/ML
ALBUMIN SERPL BCG-MCNC: 3.7 G/DL (ref 3.5–5.2)
ALP SERPL-CCNC: 201 U/L (ref 35–104)
ALT SERPL W P-5'-P-CCNC: 33 U/L (ref 10–35)
AST SERPL W P-5'-P-CCNC: 53 U/L (ref 10–35)
BILIRUB DIRECT SERPL-MCNC: <0.2 MG/DL (ref 0–0.3)
BILIRUB SERPL-MCNC: 0.7 MG/DL
CREAT SERPL-MCNC: 0.58 MG/DL (ref 0.51–0.95)
ERYTHROCYTE [DISTWIDTH] IN BLOOD BY AUTOMATED COUNT: 15.5 % (ref 10–15)
FERRITIN SERPL-MCNC: 13 NG/ML (ref 11–328)
GFR SERPL CREATININE-BSD FRML MDRD: >90 ML/MIN/1.73M2
HCT VFR BLD AUTO: 38.1 % (ref 35–47)
HGB BLD-MCNC: 11.9 G/DL (ref 11.7–15.7)
INR PPP: 1.03 (ref 0.85–1.15)
IRON BINDING CAPACITY (ROCHE): 377 UG/DL (ref 240–430)
IRON SATN MFR SERPL: 19 % (ref 15–46)
IRON SERPL-MCNC: 71 UG/DL (ref 37–145)
MCH RBC QN AUTO: 27.3 PG (ref 26.5–33)
MCHC RBC AUTO-ENTMCNC: 31.2 G/DL (ref 31.5–36.5)
MCV RBC AUTO: 87 FL (ref 78–100)
PLATELET # BLD AUTO: 149 10E3/UL (ref 150–450)
PROT SERPL-MCNC: 6.9 G/DL (ref 6.4–8.3)
RBC # BLD AUTO: 4.36 10E6/UL (ref 3.8–5.2)
WBC # BLD AUTO: 3.9 10E3/UL (ref 4–11)

## 2022-11-15 PROCEDURE — 83550 IRON BINDING TEST: CPT

## 2022-11-15 PROCEDURE — 80076 HEPATIC FUNCTION PANEL: CPT

## 2022-11-15 PROCEDURE — 85610 PROTHROMBIN TIME: CPT

## 2022-11-15 PROCEDURE — 36415 COLL VENOUS BLD VENIPUNCTURE: CPT

## 2022-11-15 PROCEDURE — 82105 ALPHA-FETOPROTEIN SERUM: CPT

## 2022-11-15 PROCEDURE — 82728 ASSAY OF FERRITIN: CPT

## 2022-11-15 PROCEDURE — 85027 COMPLETE CBC AUTOMATED: CPT

## 2022-11-15 PROCEDURE — 82565 ASSAY OF CREATININE: CPT

## 2022-11-15 PROCEDURE — 93975 VASCULAR STUDY: CPT

## 2022-11-21 ENCOUNTER — OFFICE VISIT (OUTPATIENT)
Dept: CARDIOLOGY | Facility: CLINIC | Age: 72
End: 2022-11-21
Payer: COMMERCIAL

## 2022-11-21 VITALS
HEIGHT: 65 IN | WEIGHT: 171 LBS | BODY MASS INDEX: 28.49 KG/M2 | HEART RATE: 100 BPM | RESPIRATION RATE: 16 BRPM | SYSTOLIC BLOOD PRESSURE: 132 MMHG | DIASTOLIC BLOOD PRESSURE: 68 MMHG

## 2022-11-21 DIAGNOSIS — R07.9 CHEST PAIN, UNSPECIFIED TYPE: Primary | ICD-10-CM

## 2022-11-21 PROCEDURE — 99204 OFFICE O/P NEW MOD 45 MIN: CPT | Performed by: INTERNAL MEDICINE

## 2022-11-21 RX ORDER — CLOBETASOL PROPIONATE 0.5 MG/G
OINTMENT TOPICAL
COMMUNITY
Start: 2022-11-19 | End: 2023-03-23

## 2022-11-21 NOTE — LETTER
"11/21/2022    Obdulia Chamorro MD  [address]  [facility] [address]    RE: Zara Webster       Dear Colleague,     I had the pleasure of seeing Zara Webster in the [facility].    HEART CARE ENCOUNTER NOTE      [facility]  [phone]      Assessment/Recommendations   Assessment:   1.Left upper chest pain, probably musculoskeletal origin. Exercise nuclear myocardial perfusion scan looks normal.   2. \"Fullness\" under left pectoral muscle, probably normal.    Plan:   1.Will get CXR to r/o pulmonary or bone lesion  2. Echocardiogram to r/o structural cardial lesion  If both of these are normal no further cardiac workup planned.    Bartolo Gorman MD on 11/21/2022 at 9:33 AM             History of Present Illness/Subjective    HPI: Zara Webster is a 72 year old female with no prior cardiac history referred for evaluation of left-sided chest pain.  She is normally pretty active physically she can walk 18 holes of golf without limitation.  More recently she has been having an aching sensation in the left upper chest area usually when she lays down at night.  It wakes her up at 2 or 3 in the morning and she is unable to get to sleep without taking some Tylenol.  She does not use aspirin or nonsteroidals due to her history of nonalcoholic cirrhosis.  Her cardiac risk factor profile aside from diabetes is negligible.  No history of hypertension.  There is no family history of premature coronary disease.  She has never been a smoker.    Past Medical History:   Diagnosis Date     Anemia      Diabetes mellitus (H)        Exercise Nuke study reviewed:   The exercise nuclear stress test is negative for inducible myocardial ischemia or infarction.     The stress electrocardiogram is negative for inducible ischemic EKG changes.     The patient's exercise capacity is average for age and gender. The patient exercised for 4:30 minutes on the Doron protocol, achieving 6.4 METs and 95% " "the age-predicted maximum heart rate. The patient did not experience chest pain.     The left ventricular ejection fraction at stress is greater than 70%.     The patient is at a low risk of future cardiac ischemic events.     There is no prior study for comparison.       Physical Examination  Review of Systems   Vitals: /68 (BP Location: Right arm, Patient Position: Sitting, Cuff Size: Adult Regular)   Pulse 100   Resp 16   Ht 1.638 m (5' 4.5\")   Wt 77.6 kg (171 lb)   LMP  (LMP Unknown)   BMI 28.90 kg/m    BMI= Body mass index is 28.9 kg/m .  Wt Readings from Last 3 Encounters:   11/21/22 77.6 kg (171 lb)   09/29/22 77.5 kg (170 lb 12.8 oz)   09/22/22 75.8 kg (167 lb)     Pleasant female no apparent distress.  Lungs are clear.  Left pectoral area feels firm compared to the right but no palpable mass or nodule.  Palpation does partially reproduce the pain.  There is no bony crepitance.  CV: Rhythm is regular.  No significant murmurs since venous pressure looks normal.  Abdomen is soft nontender.  She has an ileostomy in place.  Extremities have no edema the ankles.  Pedal pulses are normal.  Radial pulses are full and equal.     Please refer above for cardiac ROS details.        Medical History  Surgical History Family History Social History   Past Medical History:   Diagnosis Date     Anemia      Diabetes mellitus (H)      Past Surgical History:   Procedure Laterality Date     BREAST EXCISIONAL BIOPSY Left 1980     BREAST EXCISIONAL BIOPSY Left 1985     HYSTERECTOMY  1991     OOPHORECTOMY Bilateral 1991     OTHER SURGICAL HISTORY      KIDNEY STONE REMOVALNOT SURE WHICH SIDE, DR. SHIV SALINAS PROCTOSIGMOIDOSCOPY,RIGID,DIAGNOS N/A 6/17/2020    Procedure: ILEOSTOMY REVISION, PROCTOSCOPY, ILEOSCOPY;  Surgeon: Devang Moon MD;  Location: [facility];  Service: General     ZZC REMOVAL COLON/ILEOSTOMY      Description: Total Abdominal Colectomy;  Recorded: 12/15/2011;     ZZC REMOVAL " COLON/PROCTECTOMY/ILEOSTOMY      Description: Total Proctocolectomy;  Recorded: 08/10/2011;     ZZC VAG HYST, W/VAGINECTOMY      Description: Vaginal Hysterectomy With Colpectomy;  Recorded: 08/14/2014;     Family History   Problem Relation Age of Onset     Diabetes Mother      Heart Disease Mother      Hyperlipidemia Mother      Diabetes Father      Cancer Father         malignant gallbladder     No Known Problems Sister      No Known Problems Sister         Social History     Socioeconomic History     Marital status:      Spouse name: Not on file     Number of children: 2     Years of education: 16     Highest education level: Bachelor's degree (e.g., BA, AB, BS)   Occupational History     Occupation: Retired   Tobacco Use     Smoking status: Never     Smokeless tobacco: Never   Vaping Use     Vaping Use: Never used   Substance and Sexual Activity     Alcohol use: Not Currently     Drug use: No     Sexual activity: Not on file   Other Topics Concern     Not on file   Social History Narrative     Not on file     Social Determinants of Health     Financial Resource Strain: Not on file   Food Insecurity: Not on file   Transportation Needs: Not on file   Physical Activity: Not on file   Stress: Not on file   Social Connections: Not on file   Intimate Partner Violence: Not on file   Housing Stability: Not on file           Medications  Allergies   Current Outpatient Medications   Medication Sig Dispense Refill     BD LOLY U/F 32G X 4 MM insulin pen needle TEST 4 TIMES A  each 0     clobetasol (TEMOVATE) 0.05 % external ointment APPLY A THIN LAYER TO THE AFFECTED AREA daily FOR 4-6 weeks,  then as needed       diazePAM (VALIUM) 5 MG tablet [DIAZEPAM (VALIUM) 5 MG TABLET] TAKE 1 TABLET BY MOUTH EVERY 6 HOURS AS NEEDED FOR ANXIETY  30 tablet 1     diphenoxylate-atropine (LOMOTIL) 2.5-0.025 MG tablet TAKE 1 TABLET BY MOUTH 4 TIMES DAILY AS NEEDED FOR DIARRHEA 30 tablet 0     flash glucose scanning reader  (FREESTYLE VIDA 14 DAY READER) Misc [FLASH GLUCOSE SCANNING READER (FREESTYLE VIDA 14 DAY READER) MISC] Use 1 Units As Directed every 14 (fourteen) days. 1 each 0     flash glucose sensor (FREESTYLE VIDA 14 DAY SENSOR) Kit [FLASH GLUCOSE SENSOR (FREESTYLE VIDA 14 DAY SENSOR) KIT] Use 1 Units As Directed daily. 2 kit 5     insulin lispro (HUMALOG KWIKPEN) 100 UNIT/ML (1 unit dial) KWIKPEN Take 6 units up to 4 times daily for BG management 30 mL 5     LANTUS SOLOSTAR 100 UNIT/ML soln INJECT 14 UNITS SUBCUTANEOUSLY 2 TIMES A DAY 30 mL 1     loperamide (IMODIUM A-D) 2 MG tablet Take 1 tablet (2 mg) by mouth 4 times daily as needed for diarrhea 120 tablet 3     omeprazole (PRILOSEC) 40 MG DR capsule Take 1 capsule (40 mg) by mouth daily 90 capsule 3     ostomy adhesive Pste [OSTOMY ADHESIVE PSTE] Apply as needed 4 Tube 3     YUVAFEM 10 mcg Tab [YUVAFEM 10 MCG TAB] INSERT 1 TABLET VAGINALLY TWICE WEEKLY, AS NEEDED  2     hydrOXYzine (VISTARIL) 25 MG capsule Take 1 capsule (25 mg) by mouth 3 times daily as needed for anxiety (Patient not taking: Reported on 11/21/2022) 60 capsule 3     propranolol ER (INDERAL LA) 60 MG 24 hr capsule Take 1 capsule (60 mg) by mouth daily (Patient not taking: Reported on 11/21/2022) 90 capsule 3       Allergies   Allergen Reactions     Prochlorperazine Edisylate [Prochlorperazine] Other (See Comments)     Saw things     Compazine [Prochlorperazine] Unknown          Lab Results    Chemistry/lipid CBC Cardiac Enzymes/BNP/TSH/INR   Recent Labs   Lab Test 04/27/21  0849 03/29/19  1444 10/03/17  0842   CHOL  --   --  282*   HDL  --   --  60   *   < > 172*   TRIG  --   --  252*    < > = values in this interval not displayed.     Recent Labs   Lab Test 04/27/21  0849 04/21/20  1027 03/29/19  1444   * 185* 161*     Recent Labs   Lab Test 11/15/22  0812 09/19/22  0820   NA  --  139   POTASSIUM  --  3.8   CHLORIDE  --  105   CO2  --  22   GLC  --  282*   BUN  --  11.6   CR 0.58 0.66    GFRESTIMATED >90 >90   HANNA  --  9.3     Recent Labs   Lab Test 11/15/22  0812 09/19/22  0820 03/21/22  1404   CR 0.58 0.66 0.81     Recent Labs   Lab Test 09/19/22  0823 03/21/22  1404 12/10/21  1415   A1C 8.8* 8.1* 11.0*          Recent Labs   Lab Test 11/15/22  0807   WBC 3.9*   HGB 11.9   HCT 38.1   MCV 87   *     Recent Labs   Lab Test 11/15/22  0807 09/22/21  1544 06/22/21  1016   HGB 11.9 11.8 10.9*    No results for input(s): TROPONINI in the last 69315 hours.  No results for input(s): BNP, NTBNPI, NTBNP in the last 64142 hours.  Recent Labs   Lab Test 09/29/22  1005   TSH 0.86     Recent Labs   Lab Test 11/15/22  0812 03/24/22  1202 07/14/21  1233   INR 1.03 1.0 1.0        Bartolo Gorman MD  Thank you for allowing me to participate in the care of your patient.      Sincerely,     Bartolo Gorman MD     [facility]  cc:   Referred Self,

## 2022-11-21 NOTE — PROGRESS NOTES
"  HEART CARE ENCOUNTER NOTE      [facility]  [phone]      Assessment/Recommendations   Assessment:   1.Left upper chest pain, probably musculoskeletal origin. Exercise nuclear myocardial perfusion scan looks normal.   2. \"Fullness\" under left pectoral muscle, probably normal.    Plan:   1.Will get CXR to r/o pulmonary or bone lesion  2. Echocardiogram to r/o structural cardial lesion  If both of these are normal no further cardiac workup planned.    Bartolo Gorman MD on 11/21/2022 at 9:33 AM             History of Present Illness/Subjective    HPI: Zara Webster is a 72 year old female with no prior cardiac history referred for evaluation of left-sided chest pain.  She is normally pretty active physically she can walk 18 holes of golf without limitation.  More recently she has been having an aching sensation in the left upper chest area usually when she lays down at night.  It wakes her up at 2 or 3 in the morning and she is unable to get to sleep without taking some Tylenol.  She does not use aspirin or nonsteroidals due to her history of nonalcoholic cirrhosis.  Her cardiac risk factor profile aside from diabetes is negligible.  No history of hypertension.  There is no family history of premature coronary disease.  She has never been a smoker.    Past Medical History:   Diagnosis Date     Anemia      Diabetes mellitus (H)        Exercise Nuke study reviewed:   The exercise nuclear stress test is negative for inducible myocardial ischemia or infarction.     The stress electrocardiogram is negative for inducible ischemic EKG changes.     The patient's exercise capacity is average for age and gender. The patient exercised for 4:30 minutes on the Doron protocol, achieving 6.4 METs and 95% the age-predicted maximum heart rate. The patient did not experience chest pain.     The left ventricular ejection fraction at stress is greater than 70%.     The patient is at a low risk of future " "cardiac ischemic events.     There is no prior study for comparison.       Physical Examination  Review of Systems   Vitals: /68 (BP Location: Right arm, Patient Position: Sitting, Cuff Size: Adult Regular)   Pulse 100   Resp 16   Ht 1.638 m (5' 4.5\")   Wt 77.6 kg (171 lb)   LMP  (LMP Unknown)   BMI 28.90 kg/m    BMI= Body mass index is 28.9 kg/m .  Wt Readings from Last 3 Encounters:   11/21/22 77.6 kg (171 lb)   09/29/22 77.5 kg (170 lb 12.8 oz)   09/22/22 75.8 kg (167 lb)     Pleasant female no apparent distress.  Lungs are clear.  Left pectoral area feels firm compared to the right but no palpable mass or nodule.  Palpation does partially reproduce the pain.  There is no bony crepitance.  CV: Rhythm is regular.  No significant murmurs since venous pressure looks normal.  Abdomen is soft nontender.  She has an ileostomy in place.  Extremities have no edema the ankles.  Pedal pulses are normal.  Radial pulses are full and equal.     Please refer above for cardiac ROS details.        Medical History  Surgical History Family History Social History   Past Medical History:   Diagnosis Date     Anemia      Diabetes mellitus (H)      Past Surgical History:   Procedure Laterality Date     BREAST EXCISIONAL BIOPSY Left 1980     BREAST EXCISIONAL BIOPSY Left 1985     HYSTERECTOMY  1991     OOPHORECTOMY Bilateral 1991     OTHER SURGICAL HISTORY      KIDNEY STONE REMOVALNOT SURE WHICH SIDE, DR. SHIV SALINAS PROCTOSIGMOIDOSCOPY,RIGID,DIAGNOS N/A 6/17/2020    Procedure: ILEOSTOMY REVISION, PROCTOSCOPY, ILEOSCOPY;  Surgeon: Devang Moon MD;  Location: [facility];  Service: General     [facility] [email or web address] REMOVAL COLON/ILEOSTOMY      Description: Total Abdominal Colectomy;  Recorded: 12/15/2011;     ZZC REMOVAL COLON/PROCTECTOMY/ILEOSTOMY      Description: Total Proctocolectomy;  Recorded: 08/10/2011;     ZZC VAG HYST, W/VAGINECTOMY      Description: Vaginal Hysterectomy With Colpectomy;  Recorded: 08/14/2014;     " Family History   Problem Relation Age of Onset     Diabetes Mother      Heart Disease Mother      Hyperlipidemia Mother      Diabetes Father      Cancer Father         malignant gallbladder     No Known Problems Sister      No Known Problems Sister         Social History     Socioeconomic History     Marital status:      Spouse name: Not on file     Number of children: 2     Years of education: 16     Highest education level: Bachelor's degree (e.g., BA, AB, BS)   Occupational History     Occupation: Retired   Tobacco Use     Smoking status: Never     Smokeless tobacco: Never   Vaping Use     Vaping Use: Never used   Substance and Sexual Activity     Alcohol use: Not Currently     Drug use: No     Sexual activity: Not on file   Other Topics Concern     Not on file   Social History Narrative     Not on file     Social Determinants of Health     Financial Resource Strain: Not on file   Food Insecurity: Not on file   Transportation Needs: Not on file   Physical Activity: Not on file   Stress: Not on file   Social Connections: Not on file   Intimate Partner Violence: Not on file   Housing Stability: Not on file           Medications  Allergies   Current Outpatient Medications   Medication Sig Dispense Refill     BD LOLY U/F 32G X 4 MM insulin pen needle TEST 4 TIMES A  each 0     clobetasol (TEMOVATE) 0.05 % external ointment APPLY A THIN LAYER TO THE AFFECTED AREA daily FOR 4-6 weeks,  then as needed       diazePAM (VALIUM) 5 MG tablet [DIAZEPAM (VALIUM) 5 MG TABLET] TAKE 1 TABLET BY MOUTH EVERY 6 HOURS AS NEEDED FOR ANXIETY  30 tablet 1     diphenoxylate-atropine (LOMOTIL) 2.5-0.025 MG tablet TAKE 1 TABLET BY MOUTH 4 TIMES DAILY AS NEEDED FOR DIARRHEA 30 tablet 0     flash glucose scanning reader (FREESTYLE VIDA 14 DAY READER) Misc [FLASH GLUCOSE SCANNING READER (FREESTYLE VIDA 14 DAY READER) MISC] Use 1 Units As Directed every 14 (fourteen) days. 1 each 0     flash glucose sensor (FREESTYLE VIDA 14  DAY SENSOR) Kit [FLASH GLUCOSE SENSOR (FREESTYLE VIDA 14 DAY SENSOR) KIT] Use 1 Units As Directed daily. 2 kit 5     insulin lispro (HUMALOG KWIKPEN) 100 UNIT/ML (1 unit dial) KWIKPEN Take 6 units up to 4 times daily for BG management 30 mL 5     LANTUS SOLOSTAR 100 UNIT/ML soln INJECT 14 UNITS SUBCUTANEOUSLY 2 TIMES A DAY 30 mL 1     loperamide (IMODIUM A-D) 2 MG tablet Take 1 tablet (2 mg) by mouth 4 times daily as needed for diarrhea 120 tablet 3     omeprazole (PRILOSEC) 40 MG DR capsule Take 1 capsule (40 mg) by mouth daily 90 capsule 3     ostomy adhesive Pste [OSTOMY ADHESIVE PSTE] Apply as needed 4 Tube 3     YUVAFEM 10 mcg Tab [YUVAFEM 10 MCG TAB] INSERT 1 TABLET VAGINALLY TWICE WEEKLY, AS NEEDED  2     hydrOXYzine (VISTARIL) 25 MG capsule Take 1 capsule (25 mg) by mouth 3 times daily as needed for anxiety (Patient not taking: Reported on 11/21/2022) 60 capsule 3     propranolol ER (INDERAL LA) 60 MG 24 hr capsule Take 1 capsule (60 mg) by mouth daily (Patient not taking: Reported on 11/21/2022) 90 capsule 3       Allergies   Allergen Reactions     Prochlorperazine Edisylate [Prochlorperazine] Other (See Comments)     Saw things     Compazine [Prochlorperazine] Unknown          Lab Results    Chemistry/lipid CBC Cardiac Enzymes/BNP/TSH/INR   Recent Labs   Lab Test 04/27/21  0849 03/29/19  1444 10/03/17  0842   CHOL  --   --  282*   HDL  --   --  60   *   < > 172*   TRIG  --   --  252*    < > = values in this interval not displayed.     Recent Labs   Lab Test 04/27/21  0849 04/21/20  1027 03/29/19  1444   * 185* 161*     Recent Labs   Lab Test 11/15/22  0812 09/19/22  0820   NA  --  139   POTASSIUM  --  3.8   CHLORIDE  --  105   CO2  --  22   GLC  --  282*   BUN  --  11.6   CR 0.58 0.66   GFRESTIMATED >90 >90   HANNA  --  9.3     Recent Labs   Lab Test 11/15/22  0812 09/19/22  0820 03/21/22  1404   CR 0.58 0.66 0.81     Recent Labs   Lab Test 09/19/22  0823 03/21/22  1404 12/10/21  1415   A1C  8.8* 8.1* 11.0*          Recent Labs   Lab Test 11/15/22  0807   WBC 3.9*   HGB 11.9   HCT 38.1   MCV 87   *     Recent Labs   Lab Test 11/15/22  0807 09/22/21  1544 06/22/21  1016   HGB 11.9 11.8 10.9*    No results for input(s): TROPONINI in the last 47063 hours.  No results for input(s): BNP, NTBNPI, NTBNP in the last 32178 hours.  Recent Labs   Lab Test 09/29/22  1005   TSH 0.86     Recent Labs   Lab Test 11/15/22  0812 03/24/22  1202 07/14/21  1233   INR 1.03 1.0 1.0        Bartolo Gorman MD

## 2022-11-21 NOTE — PATIENT INSTRUCTIONS
Nuclear stress test looks normal.  Will get a CXR and echo cardiogram to complete the evaluation and rule out other causes of chest pain.  3. No new medicines

## 2022-11-22 ENCOUNTER — TRANSFERRED RECORDS (OUTPATIENT)
Dept: HEALTH INFORMATION MANAGEMENT | Facility: CLINIC | Age: 72
End: 2022-11-22

## 2022-11-29 ENCOUNTER — TRANSFERRED RECORDS (OUTPATIENT)
Dept: HEALTH INFORMATION MANAGEMENT | Facility: CLINIC | Age: 72
End: 2022-11-29

## 2022-12-09 ENCOUNTER — TRANSFERRED RECORDS (OUTPATIENT)
Dept: HEALTH INFORMATION MANAGEMENT | Facility: CLINIC | Age: 72
End: 2022-12-09

## 2022-12-09 LAB — RETINOPATHY: NEGATIVE

## 2022-12-15 ENCOUNTER — TELEPHONE (OUTPATIENT)
Dept: ENDOCRINOLOGY | Facility: CLINIC | Age: 72
End: 2022-12-15

## 2022-12-21 ENCOUNTER — HOSPITAL ENCOUNTER (OUTPATIENT)
Dept: RADIOLOGY | Facility: HOSPITAL | Age: 72
Discharge: HOME | End: 2022-12-21
Attending: INTERNAL MEDICINE
Payer: COMMERCIAL

## 2022-12-21 ENCOUNTER — HOSPITAL ENCOUNTER (OUTPATIENT)
Dept: CARDIOLOGY | Facility: HOSPITAL | Age: 72
Discharge: HOME | End: 2022-12-21
Attending: INTERNAL MEDICINE
Payer: COMMERCIAL

## 2022-12-21 DIAGNOSIS — R07.9 CHEST PAIN, UNSPECIFIED TYPE: ICD-10-CM

## 2022-12-21 LAB — LVEF ECHO: NORMAL

## 2022-12-21 PROCEDURE — 71046 X-RAY EXAM CHEST 2 VIEWS: CPT

## 2022-12-21 PROCEDURE — 93306 TTE W/DOPPLER COMPLETE: CPT

## 2022-12-21 PROCEDURE — 93306 TTE W/DOPPLER COMPLETE: CPT | Mod: 26 | Performed by: INTERNAL MEDICINE

## 2022-12-28 ENCOUNTER — TELEPHONE (OUTPATIENT)
Dept: FAMILY MEDICINE | Facility: CLINIC | Age: 72
End: 2022-12-28

## 2022-12-28 NOTE — TELEPHONE ENCOUNTER
Reason for Call:  Other appointment    Detailed comments: Patient has a virtual appointment end of January but would like to be seen sooner if possible virtual or in person    Phone Number Patient can be reached at: Cell number on file:    Telephone Information:   Mobile [phone]       Best Time: Anytime    Can we leave a detailed message on this number? YES    Call taken on 12/28/2022 at 2:03 PM by Edelmira Jones

## 2022-12-29 ENCOUNTER — OFFICE VISIT (OUTPATIENT)
Dept: FAMILY MEDICINE | Facility: CLINIC | Age: 72
End: 2022-12-29
Payer: COMMERCIAL

## 2022-12-29 VITALS
WEIGHT: 170.2 LBS | SYSTOLIC BLOOD PRESSURE: 144 MMHG | BODY MASS INDEX: 28.36 KG/M2 | RESPIRATION RATE: 16 BRPM | DIASTOLIC BLOOD PRESSURE: 70 MMHG | HEART RATE: 87 BPM | HEIGHT: 65 IN | OXYGEN SATURATION: 99 %

## 2022-12-29 DIAGNOSIS — K74.60 NON-ALCOHOLIC CIRRHOSIS (H): ICD-10-CM

## 2022-12-29 DIAGNOSIS — R07.89 ATYPICAL CHEST PAIN: Primary | ICD-10-CM

## 2022-12-29 PROCEDURE — 99213 OFFICE O/P EST LOW 20 MIN: CPT | Performed by: FAMILY MEDICINE

## 2022-12-29 NOTE — TELEPHONE ENCOUNTER
Dr. Chamorro,    Scheduling note refers to going over test results. Unsure how critical it is that she is seen sooner. Her recent echo and CXR were normal.    Sukhwinder Card RN     [facility]

## 2022-12-29 NOTE — PROGRESS NOTES
Assessment/ Plan     1. Atypical chest pain  Zara returns for follow-up of some atypical chest pain that she has had for about 6 months.  Her cardiac work-up was negative including a nuclear stress test, echocardiogram, and chest x-ray.  Its mainly happening at night when she is laying down 2-3 times per week with no associated trigger.  We discussed further work-up which would possibly include a chest CT and if that was normal, referral to GI.  She is up-to-date on mammogram so no concerns for a breast cause.  When she takes a half of Valium, this typically helps with the discomfort.  I am wondering if possibly she is having esophageal spasms.  She does see GI every 6 months for her cirrhosis and will bring this up to them the next time she sees them.  She did not want to pursue any further work-up at this time but will let me know if things are becoming more severe, would then take neck steps.    2. Non-alcoholic cirrhosis (H)  Follows with GI.  3.  Essential tremor.  When I saw her last time I sent in a prescription of propranolol for essential tremor.  She admits that she forgot to start this but would like to try it.  We will start at 60 mg and can increase this to 120 if needed.    Subjective:      Zara Webster is a 72 year old female who presents for follow-up of chest pain.  When I saw her in September she had been having symptoms for couple of months.  She describes it as under her left breast and occasionally goes into her jaw.  It can be quite severe in nature.  She states a couple of times she almost called 911 and then it went away.  It is always with laying down at night.  Its not when she first falls asleep but when she wakes up in the middle of the night.  She does have some reflux and takes Prilosec and it does not feel like her typical reflux symptoms.  She thought maybe when I talked her last time that when she was more stressed out it seemed to happen more often.  If she takes a Tylenol, it  really does not help with the discomfort.  She has noticed if she takes a half of Valium, that seems to help resolve the discomfort.  She had a nuclear stress test, echocardiogram, and chest x-ray.  She is up-to-date on her mammogram.  She saw cardiology and they thought it was musculoskeletal.  She does see GI on a regular basis due to her cirrhosis.    Relevant past medical, family, surgical, and social history reviewed with patient, unless noted in HPI, not pertinent for this visit.  Medications were discussed and reconciled.   Review of Systems   A 12 point comprehensive review of systems was negative except as noted.      Current Outpatient Medications   Medication Sig Dispense Refill     BD LOLY U/F 32G X 4 MM insulin pen needle TEST 4 TIMES A  each 0     clobetasol (TEMOVATE) 0.05 % external ointment APPLY A THIN LAYER TO THE AFFECTED AREA daily FOR 4-6 weeks,  then as needed       diazePAM (VALIUM) 5 MG tablet [DIAZEPAM (VALIUM) 5 MG TABLET] TAKE 1 TABLET BY MOUTH EVERY 6 HOURS AS NEEDED FOR ANXIETY  30 tablet 1     diphenoxylate-atropine (LOMOTIL) 2.5-0.025 MG tablet TAKE 1 TABLET BY MOUTH 4 TIMES DAILY AS NEEDED FOR DIARRHEA 30 tablet 0     flash glucose scanning reader (FREESTYLE VIDA 14 DAY READER) Misc [FLASH GLUCOSE SCANNING READER (FREESTYLE VIDA 14 DAY READER) MISC] Use 1 Units As Directed every 14 (fourteen) days. 1 each 0     flash glucose sensor (FREESTYLE VIDA 14 DAY SENSOR) Kit [FLASH GLUCOSE SENSOR (FREESTYLE VIDA 14 DAY SENSOR) KIT] Use 1 Units As Directed daily. 2 kit 5     hydrOXYzine (VISTARIL) 25 MG capsule Take 1 capsule (25 mg) by mouth 3 times daily as needed for anxiety 60 capsule 3     insulin lispro (HUMALOG KWIKPEN) 100 UNIT/ML (1 unit dial) KWIKPEN Take 6 units up to 4 times daily for BG management 30 mL 5     LANTUS SOLOSTAR 100 UNIT/ML soln INJECT 14 UNITS SUBCUTANEOUSLY 2 TIMES A DAY 30 mL 1     loperamide (IMODIUM A-D) 2 MG tablet Take 1 tablet (2 mg) by mouth 4  "times daily as needed for diarrhea 120 tablet 3     omeprazole (PRILOSEC) 40 MG DR capsule Take 1 capsule (40 mg) by mouth daily 90 capsule 3     ostomy adhesive Pste [OSTOMY ADHESIVE PSTE] Apply as needed 4 Tube 3     propranolol ER (INDERAL LA) 60 MG 24 hr capsule Take 1 capsule (60 mg) by mouth daily 90 capsule 3     YUVAFEM 10 mcg Tab [YUVAFEM 10 MCG TAB] INSERT 1 TABLET VAGINALLY TWICE WEEKLY, AS NEEDED  2         Objective:     BP (!) 144/70   Pulse 87   Resp 16   Ht 1.638 m (5' 4.5\")   Wt 77.2 kg (170 lb 3.2 oz)   LMP  (LMP Unknown)   SpO2 99%   BMI 28.76 kg/m      Body mass index is 28.76 kg/m .       General appearance: alert, appears stated age and cooperative    No results found for this or any previous visit (from the past 168 hour(s)).       This note has been dictated using voice recognition software. Any grammatical or context distortions are unintentional and inherent to the software  Answers for HPI/ROS submitted by the patient on 12/29/2022  What is the reason for your visit today? : results of cardiac workup  How many servings of fruits and vegetables do you eat daily?: 2-3  On average, how many sweetened beverages do you drink each day (Examples: soda, juice, sweet tea, etc.  Do NOT count diet or artificially sweetened beverages)?: 2  How many minutes a day do you exercise enough to make your heart beat faster?: 20 to 29  How many days a week do you exercise enough to make your heart beat faster?: 6  How many days per week do you miss taking your medication?: 0      "

## 2023-02-01 ENCOUNTER — TELEPHONE (OUTPATIENT)
Dept: ENDOCRINOLOGY | Facility: CLINIC | Age: 73
End: 2023-02-01
Payer: COMMERCIAL

## 2023-02-01 DIAGNOSIS — K90.9 DIARRHEA DUE TO MALABSORPTION: ICD-10-CM

## 2023-02-01 DIAGNOSIS — R19.7 DIARRHEA DUE TO MALABSORPTION: ICD-10-CM

## 2023-02-01 NOTE — TELEPHONE ENCOUNTER
Received a request from [facility] Medical Documentation Team stating they are providing the patient with Continous Glucose Monitoring supplies and are requesting last clinic notes for date range 7/31/22 to present to satisfy Medicare requirements.     Clinic notes from 9/22/22 signed and dated by the provider.  Faxed to # [phone].  # [phone].

## 2023-02-02 DIAGNOSIS — R19.7 DIARRHEA DUE TO MALABSORPTION: Primary | ICD-10-CM

## 2023-02-02 DIAGNOSIS — K90.9 DIARRHEA DUE TO MALABSORPTION: Primary | ICD-10-CM

## 2023-02-02 RX ORDER — LOPERAMIDE HCL 2 MG
2 CAPSULE ORAL 4 TIMES DAILY PRN
Qty: 120 CAPSULE | Refills: 3 | Status: SHIPPED | OUTPATIENT
Start: 2023-02-02 | End: 2023-03-22

## 2023-02-02 RX ORDER — LOPERAMIDE HYDROCHLORIDE 2 MG/1
2 TABLET ORAL 4 TIMES DAILY PRN
Qty: 120 TABLET | Refills: 3 | Status: SHIPPED | OUTPATIENT
Start: 2023-02-02 | End: 2023-06-06

## 2023-02-02 NOTE — TELEPHONE ENCOUNTER
Routing refill request to provider for review/approval because:  Drug not on the [facility] refill protocol     Last Written Prescription Date:  1/5/22  Last Fill Quantity: 120,  # refills: 3   Last office visit provider:  12/29/22     Requested Prescriptions   Pending Prescriptions Disp Refills     loperamide (IMODIUM A-D) 2 MG tablet 120 tablet 3     Sig: Take 1 tablet (2 mg) by mouth 4 times daily as needed for diarrhea       There is no refill protocol information for this order          Patrick Blount RN 02/02/23 9:21 AM

## 2023-02-02 NOTE — TELEPHONE ENCOUNTER
Received fax from pharmacy - requesting that we resend for capsules per insurance. I cued up. Thanks

## 2023-02-03 DIAGNOSIS — F41.9 ANXIETY: ICD-10-CM

## 2023-02-08 RX ORDER — DIAZEPAM 5 MG
TABLET ORAL
Qty: 30 TABLET | Refills: 0 | Status: SHIPPED | OUTPATIENT
Start: 2023-02-08 | End: 2023-03-23

## 2023-02-27 ENCOUNTER — MEDICAL CORRESPONDENCE (OUTPATIENT)
Dept: HEALTH INFORMATION MANAGEMENT | Facility: CLINIC | Age: 73
End: 2023-02-27

## 2023-03-13 ENCOUNTER — DOCUMENTATION ONLY (OUTPATIENT)
Dept: LAB | Facility: CLINIC | Age: 73
End: 2023-03-13
Payer: COMMERCIAL

## 2023-03-13 DIAGNOSIS — Z13.220 SCREENING FOR HYPERLIPIDEMIA: ICD-10-CM

## 2023-03-13 DIAGNOSIS — E11.9 TYPE II DIABETES MELLITUS (H): ICD-10-CM

## 2023-03-13 NOTE — PROGRESS NOTES
Zara Webster has an upcoming lab appointment:    Future Appointments   Date Time Provider Department Center   3/20/2023  8:30 AM TS LAB [facility]ABR [facility]   3/22/2023  8:00 AM [facility]   4/20/2023 10:00 AM [id number] [facility] MPLW     Patient is scheduled for the following lab(s):     Lipid and A1C already ordered per HMPO. Harmony Goodwin NP to please review and add any other needed labs. Thanks!     There is no order available. Please review and place either future orders or HMPO (Review of Health Maintenance Protocol Orders), as appropriate.    Megha Arzola

## 2023-03-20 ENCOUNTER — LAB (OUTPATIENT)
Dept: LAB | Facility: CLINIC | Age: 73
End: 2023-03-20
Payer: COMMERCIAL

## 2023-03-20 DIAGNOSIS — Z13.220 SCREENING FOR HYPERLIPIDEMIA: ICD-10-CM

## 2023-03-20 DIAGNOSIS — E11.9 TYPE II DIABETES MELLITUS (H): ICD-10-CM

## 2023-03-20 LAB
CHOLEST SERPL-MCNC: 243 MG/DL
HBA1C MFR BLD: 11.7 % (ref 0–5.6)
HDLC SERPL-MCNC: 58 MG/DL
LDLC SERPL CALC-MCNC: 137 MG/DL
NONHDLC SERPL-MCNC: 185 MG/DL
TRIGL SERPL-MCNC: 238 MG/DL

## 2023-03-20 PROCEDURE — 80061 LIPID PANEL: CPT

## 2023-03-20 PROCEDURE — 83036 HEMOGLOBIN GLYCOSYLATED A1C: CPT

## 2023-03-20 PROCEDURE — 36415 COLL VENOUS BLD VENIPUNCTURE: CPT

## 2023-03-22 ENCOUNTER — OFFICE VISIT (OUTPATIENT)
Dept: ENDOCRINOLOGY | Facility: CLINIC | Age: 73
End: 2023-03-22
Payer: COMMERCIAL

## 2023-03-22 VITALS
WEIGHT: 167.8 LBS | DIASTOLIC BLOOD PRESSURE: 60 MMHG | SYSTOLIC BLOOD PRESSURE: 120 MMHG | HEART RATE: 72 BPM | BODY MASS INDEX: 28.36 KG/M2

## 2023-03-22 DIAGNOSIS — E11.41 TYPE 2 DIABETES MELLITUS WITH DIABETIC MONONEUROPATHY, WITH LONG-TERM CURRENT USE OF INSULIN (H): ICD-10-CM

## 2023-03-22 DIAGNOSIS — Z79.4 TYPE 2 DIABETES MELLITUS WITH DIABETIC MONONEUROPATHY, WITH LONG-TERM CURRENT USE OF INSULIN (H): ICD-10-CM

## 2023-03-22 PROCEDURE — 99214 OFFICE O/P EST MOD 30 MIN: CPT | Performed by: NURSE PRACTITIONER

## 2023-03-22 RX ORDER — INFLUENZA A VIRUS A/MICHIGAN/45/2015 X-275 (H1N1) ANTIGEN (FORMALDEHYDE INACTIVATED), INFLUENZA A VIRUS A/SINGAPORE/INFIMH-16-0019/2016 IVR-186 (H3N2) ANTIGEN (FORMALDEHYDE INACTIVATED), INFLUENZA B VIRUS B/PHUKET/3073/2013 ANTIGEN (FORMALDEHYDE INACTIVATED), AND INFLUENZA B VIRUS B/MARYLAND/15/2016 BX-69A ANTIGEN (FORMALDEHYDE INACTIVATED) 60; 60; 60; 60 UG/.7ML; UG/.7ML; UG/.7ML; UG/.7ML
INJECTION, SUSPENSION INTRAMUSCULAR
COMMUNITY
Start: 2022-10-18 | End: 2023-05-30

## 2023-03-22 NOTE — PROGRESS NOTES
"[facility] ENDOCRINOLOGY    Diabetes Note 3/23/2023    Zara Webster, 1950, [id number]          Reason for visit      1. Type 2 diabetes mellitus with diabetic mononeuropathy, with long-term current use of insulin (H)        HPI     Zara Webster is a very pleasant 72 year old old female who presents for follow up.  SUMMARY:    Zara is here today in f/u for DM 2. Her current A1c is 11.7 and a bit reflective of some recent fun in [place].  Her last was 8.8. [place] the [place] Her Naila download shows a GMI of 7.5 for the last two weeks, so there's that. Her TIR was 57%.  She continues to take Lantus, 14 units BID. She uses Humalog and takes according to what she is eating.  Her diet is CHO heavy 2/2 her Colostomy as things \"just turn out better\" that way. She has started Propranolol for an essential tremor, and states that it has been helping a lot. 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 She has stopping drinking all together because of PRAJAPATI and things are stable. She is having no problems referable to her feet.       Blood glucose data:      Past Medical History     Patient Active Problem List   Diagnosis     Type II diabetes mellitus (H)     Post Colectomy     Hypercholesteremia     BMI 28.0-28.9,adult     Aspirin contraindicated     Crohn's disease of colon (H)     Gastrointestinal hemorrhage     Other cirrhosis of liver (H) - unclear etiology,[facility]     Chronic kidney disease, stage 3 (H)     Ileostomy status (H)     Non-alcoholic cirrhosis (H)     Abnormal liver ultrasound     Nonalcoholic steatohepatitis     Disorder of biliary tract        Family History       family history includes Cancer in her father; Diabetes in her father and mother; Heart Disease in her mother; Hyperlipidemia in her mother; No Known Problems in her sister and sister.    Social History      reports that she has never smoked. She has never used smokeless " tobacco. She reports that she does not currently use alcohol. She reports that she does not use drugs.      Review of Systems     Patient has no polyuria or polydipsia, no chest pain, dyspnea or TIA's, no numbness, tingling or pain in extremities  Remainder negative except as noted in HPI.      Vital Signs     /60   Pulse 72   Wt 76.1 kg (167 lb 12.8 oz)   LMP  (LMP Unknown)   BMI 28.36 kg/m    Wt Readings from Last 3 Encounters:   03/22/23 76.1 kg (167 lb 12.8 oz)   12/29/22 77.2 kg (170 lb 3.2 oz)   11/21/22 77.6 kg (171 lb)       Physical Exam     Constitutional:  Well developed, Well nourished  HENT:  Normocephalic,   Neck: Thyroid normal, No lymph nodes, Supple  Eyes:  PERRL, Conjunctiva pink  Respiratory:  Normal breath sounds, No respiratory distress  Cardiovascular:  Normal heart rate, Normal rhythm, No murmurs  GI:  Bowel sounds normal, Soft, No tenderness  Musculoskeletal:  No gross deformity or lesions, normal dorsalis pedis pulses  Skin: No acanthosis nigricans, lipoatrophy or lipodystrophy  Neurologic:  Alert & oriented x 3, nonfocal  Psychiatric:  Affect, Mood, Insight appropriate  Diabetic foot exam: no ulcers, charcot's or high risk calluses,         Assessment     1. Type 2 diabetes mellitus with diabetic mononeuropathy, with long-term current use of insulin (H)        Plan     Zara's current control is good. She is going to keep her  with her overnight instead of somewhere else in the house, so I can see her overnight data. No changes to her medication regimen today. F/u with me in 6 months.           Katiana Goodwin NP   Endocrinology  3/23/2023  8:59 AM      Lab Results     Microalbumin Urine mg/dL   Date Value Ref Range Status   04/27/2021 0.73 0.00 - 1.99 mg/dL Final       Cholesterol   Date Value Ref Range Status   03/20/2023 243 (H) <200 mg/dL Final     Direct Measure HDL   Date Value Ref Range Status   03/20/2023 58 >=50 mg/dL Final     Triglycerides   Date Value Ref  Range Status   03/20/2023 238 (H) <150 mg/dL Final       [unfilled]      Current Medications     Outpatient Medications Prior to Visit   Medication Sig Dispense Refill     BD LOLY U/F 32G X 4 MM insulin pen needle TEST 4 TIMES A  each 0     continuous blood glucose monitoring (FREESTYLE VIDA) sensor 1 Units daily 2 each 3     diphenoxylate-atropine (LOMOTIL) 2.5-0.025 MG tablet TAKE 1 TABLET BY MOUTH 4 TIMES DAILY AS NEEDED FOR DIARRHEA 30 tablet 0     flash glucose scanning reader (FREESTYLE VIDA 14 DAY READER) Misc [FLASH GLUCOSE SCANNING READER (FREESTYLE VIDA 14 DAY READER) MISC] Use 1 Units As Directed every 14 (fourteen) days. 1 each 0     insulin lispro (HUMALOG KWIKPEN) 100 UNIT/ML (1 unit dial) KWIKPEN Take 6 units up to 4 times daily for BG management 30 mL 5     LANTUS SOLOSTAR 100 UNIT/ML soln INJECT 14 UNITS SUBCUTANEOUSLY 2 TIMES A DAY 30 mL 1     loperamide (IMODIUM A-D) 2 MG tablet Take 1 tablet (2 mg) by mouth 4 times daily as needed for diarrhea 120 tablet 3     omeprazole (PRILOSEC) 40 MG DR capsule Take 1 capsule (40 mg) by mouth daily 90 capsule 3     ostomy adhesive Pste [OSTOMY ADHESIVE PSTE] Apply as needed 4 Tube 3     propranolol ER (INDERAL LA) 60 MG 24 hr capsule Take 1 capsule (60 mg) by mouth daily 90 capsule 3     FLUZONE HIGH-DOSE QUADRIVALENT 0.7 ML KADE injection        clobetasol (TEMOVATE) 0.05 % external ointment APPLY A THIN LAYER TO THE AFFECTED AREA daily FOR 4-6 weeks,  then as needed (Patient not taking: Reported on 3/22/2023)       diazepam (VALIUM) 5 MG tablet TAKE 1 TABLET BY MOUTH EVERY 6 HOURS AS NEEDED FOR ANXIETY (Patient not taking: Reported on 3/22/2023) 30 tablet 0     hydrOXYzine (VISTARIL) 25 MG capsule Take 1 capsule (25 mg) by mouth 3 times daily as needed for anxiety (Patient not taking: Reported on 3/22/2023) 60 capsule 3     loperamide (IMODIUM) 2 MG capsule Take 1 capsule (2 mg) by mouth 4 times daily as needed for diarrhea 120 capsule 3      YUVAFEM 10 mcg Tab [YUVAFEM 10 MCG TAB] INSERT 1 TABLET VAGINALLY TWICE WEEKLY, AS NEEDED (Patient not taking: Reported on 3/22/2023)  2     No facility-administered medications prior to visit.

## 2023-03-22 NOTE — LETTER
"    3/22/2023         RE: Zara Webster  [address] [place]  [place] [address]        Dear Colleague,    Thank you for referring your patient, Zara Webster, to the [facility]. Please see a copy of my visit note below.    [facility] ENDOCRINOLOGY    Diabetes Note 3/23/2023    Zara Webster, 1950, [id number]          Reason for visit      1. Type 2 diabetes mellitus with diabetic mononeuropathy, with long-term current use of insulin (H)        HPI     Zara Webster is a very pleasant 72 year old old female who presents for follow up.  SUMMARY:    Zara is here today in f/u for DM 2. Her current A1c is 11.7 and a bit reflective of some recent fun in [place].  Her last was 8.8. [place] the [place] Her Naila download shows a GMI of 7.5 for the last two weeks, so there's that. Her TIR was 57%.  She continues to take Lantus, 14 units BID. She uses Humalog and takes according to what she is eating.  Her diet is CHO heavy 2/2 her Colostomy as things \"just turn out better\" that way. She has started Propranolol for an essential tremor, and states that it has been helping a lot. 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 She has stopping drinking all together because of PRAJAPATI and things are stable. She is having no problems referable to her feet.       Blood glucose data:      Past Medical History     Patient Active Problem List   Diagnosis     Type II diabetes mellitus (H)     Post Colectomy     Hypercholesteremia     BMI 28.0-28.9,adult     Aspirin contraindicated     Crohn's disease of colon (H)     Gastrointestinal hemorrhage     Other cirrhosis of liver (H) - unclear etiology,[facility]     Chronic kidney disease, stage 3 (H)     Ileostomy status (H)     Non-alcoholic cirrhosis (H)     Abnormal liver ultrasound     Nonalcoholic steatohepatitis     Disorder of biliary tract        Family History       family history " includes Cancer in her father; Diabetes in her father and mother; Heart Disease in her mother; Hyperlipidemia in her mother; No Known Problems in her sister and sister.    Social History      reports that she has never smoked. She has never used smokeless tobacco. She reports that she does not currently use alcohol. She reports that she does not use drugs.      Review of Systems     Patient has no polyuria or polydipsia, no chest pain, dyspnea or TIA's, no numbness, tingling or pain in extremities  Remainder negative except as noted in HPI.      Vital Signs     /60   Pulse 72   Wt 76.1 kg (167 lb 12.8 oz)   LMP  (LMP Unknown)   BMI 28.36 kg/m    Wt Readings from Last 3 Encounters:   03/22/23 76.1 kg (167 lb 12.8 oz)   12/29/22 77.2 kg (170 lb 3.2 oz)   11/21/22 77.6 kg (171 lb)       Physical Exam     Constitutional:  Well developed, Well nourished  HENT:  Normocephalic,   Neck: Thyroid normal, No lymph nodes, Supple  Eyes:  PERRL, Conjunctiva pink  Respiratory:  Normal breath sounds, No respiratory distress  Cardiovascular:  Normal heart rate, Normal rhythm, No murmurs  GI:  Bowel sounds normal, Soft, No tenderness  Musculoskeletal:  No gross deformity or lesions, normal dorsalis pedis pulses  Skin: No acanthosis nigricans, lipoatrophy or lipodystrophy  Neurologic:  Alert & oriented x 3, nonfocal  Psychiatric:  Affect, Mood, Insight appropriate  Diabetic foot exam: no ulcers, charcot's or high risk calluses,         Assessment     1. Type 2 diabetes mellitus with diabetic mononeuropathy, with long-term current use of insulin (H)        Plan     Zara's current control is good. She is going to keep her  with her overnight instead of somewhere else in the house, so I can see her overnight data. No changes to her medication regimen today. F/u with me in 6 months.           Katiana Goodwin NP   Endocrinology  3/23/2023  8:59 AM      Lab Results     Microalbumin Urine mg/dL   Date Value Ref Range  Status   04/27/2021 0.73 0.00 - 1.99 mg/dL Final       Cholesterol   Date Value Ref Range Status   03/20/2023 243 (H) <200 mg/dL Final     Direct Measure HDL   Date Value Ref Range Status   03/20/2023 58 >=50 mg/dL Final     Triglycerides   Date Value Ref Range Status   03/20/2023 238 (H) <150 mg/dL Final       [unfilled]      Current Medications     Outpatient Medications Prior to Visit   Medication Sig Dispense Refill     BD LOLY U/F 32G X 4 MM insulin pen needle TEST 4 TIMES A  each 0     continuous blood glucose monitoring (FREESTYLE VIDA) sensor 1 Units daily 2 each 3     diphenoxylate-atropine (LOMOTIL) 2.5-0.025 MG tablet TAKE 1 TABLET BY MOUTH 4 TIMES DAILY AS NEEDED FOR DIARRHEA 30 tablet 0     flash glucose scanning reader (FREESTYLE VIDA 14 DAY READER) Misc [FLASH GLUCOSE SCANNING READER (FREESTYLE VIDA 14 DAY READER) MISC] Use 1 Units As Directed every 14 (fourteen) days. 1 each 0     insulin lispro (HUMALOG KWIKPEN) 100 UNIT/ML (1 unit dial) KWIKPEN Take 6 units up to 4 times daily for BG management 30 mL 5     LANTUS SOLOSTAR 100 UNIT/ML soln INJECT 14 UNITS SUBCUTANEOUSLY 2 TIMES A DAY 30 mL 1     loperamide (IMODIUM A-D) 2 MG tablet Take 1 tablet (2 mg) by mouth 4 times daily as needed for diarrhea 120 tablet 3     omeprazole (PRILOSEC) 40 MG DR capsule Take 1 capsule (40 mg) by mouth daily 90 capsule 3     ostomy adhesive Pste [OSTOMY ADHESIVE PSTE] Apply as needed 4 Tube 3     propranolol ER (INDERAL LA) 60 MG 24 hr capsule Take 1 capsule (60 mg) by mouth daily 90 capsule 3     FLUZONE HIGH-DOSE QUADRIVALENT 0.7 ML KADE injection        clobetasol (TEMOVATE) 0.05 % external ointment APPLY A THIN LAYER TO THE AFFECTED AREA daily FOR 4-6 weeks,  then as needed (Patient not taking: Reported on 3/22/2023)       diazepam (VALIUM) 5 MG tablet TAKE 1 TABLET BY MOUTH EVERY 6 HOURS AS NEEDED FOR ANXIETY (Patient not taking: Reported on 3/22/2023) 30 tablet 0     hydrOXYzine (VISTARIL) 25 MG capsule  Take 1 capsule (25 mg) by mouth 3 times daily as needed for anxiety (Patient not taking: Reported on 3/22/2023) 60 capsule 3     loperamide (IMODIUM) 2 MG capsule Take 1 capsule (2 mg) by mouth 4 times daily as needed for diarrhea 120 capsule 3     YUVAFEM 10 mcg Tab [YUVAFEM 10 MCG TAB] INSERT 1 TABLET VAGINALLY TWICE WEEKLY, AS NEEDED (Patient not taking: Reported on 3/22/2023)  2     No facility-administered medications prior to visit.                     Again, thank you for allowing me to participate in the care of your patient.        Sincerely,        Katiana Goodwin NP

## 2023-04-20 ENCOUNTER — ANCILLARY PROCEDURE (OUTPATIENT)
Dept: MAMMOGRAPHY | Facility: CLINIC | Age: 73
End: 2023-04-20
Attending: FAMILY MEDICINE
Payer: COMMERCIAL

## 2023-04-20 DIAGNOSIS — Z12.31 VISIT FOR SCREENING MAMMOGRAM: ICD-10-CM

## 2023-04-20 PROCEDURE — 77067 SCR MAMMO BI INCL CAD: CPT

## 2023-04-21 ENCOUNTER — MEDICAL CORRESPONDENCE (OUTPATIENT)
Dept: HEALTH INFORMATION MANAGEMENT | Facility: CLINIC | Age: 73
End: 2023-04-21
Payer: COMMERCIAL

## 2023-04-26 ENCOUNTER — LAB (OUTPATIENT)
Dept: LAB | Facility: HOSPITAL | Age: 73
End: 2023-04-26
Attending: INTERNAL MEDICINE
Payer: COMMERCIAL

## 2023-04-26 ENCOUNTER — HOSPITAL ENCOUNTER (OUTPATIENT)
Dept: ULTRASOUND IMAGING | Facility: HOSPITAL | Age: 73
Discharge: HOME | End: 2023-04-26
Attending: INTERNAL MEDICINE
Payer: COMMERCIAL

## 2023-04-26 DIAGNOSIS — Z79.4 TYPE 2 DIABETES MELLITUS WITH DIABETIC MONONEUROPATHY, WITH LONG-TERM CURRENT USE OF INSULIN (H): ICD-10-CM

## 2023-04-26 DIAGNOSIS — K74.60 UNSPECIFIED CIRRHOSIS OF LIVER (H): ICD-10-CM

## 2023-04-26 DIAGNOSIS — E11.41 TYPE 2 DIABETES MELLITUS WITH DIABETIC MONONEUROPATHY, WITH LONG-TERM CURRENT USE OF INSULIN (H): ICD-10-CM

## 2023-04-26 DIAGNOSIS — K74.60 LIVER CIRRHOSIS SECONDARY TO NASH (H): ICD-10-CM

## 2023-04-26 DIAGNOSIS — K75.81 LIVER CIRRHOSIS SECONDARY TO NASH (H): ICD-10-CM

## 2023-04-26 LAB
ALBUMIN SERPL BCG-MCNC: 3.6 G/DL (ref 3.5–5.2)
ALP SERPL-CCNC: 211 U/L (ref 35–104)
ALT SERPL W P-5'-P-CCNC: 32 U/L (ref 10–35)
ANION GAP SERPL CALCULATED.3IONS-SCNC: <1 MMOL/L (ref 7–15)
AST SERPL W P-5'-P-CCNC: 60 U/L (ref 10–35)
BILIRUB SERPL-MCNC: 1 MG/DL
BUN SERPL-MCNC: 11.3 MG/DL (ref 8–23)
CALCIUM SERPL-MCNC: 9.5 MG/DL (ref 8.8–10.2)
CHLORIDE SERPL-SCNC: 104 MMOL/L (ref 98–107)
CREAT SERPL-MCNC: 0.61 MG/DL (ref 0.51–0.95)
DEPRECATED CALCIDIOL+CALCIFEROL SERPL-MC: 27 UG/L (ref 20–75)
DEPRECATED HCO3 PLAS-SCNC: 34 MMOL/L (ref 22–29)
GFR SERPL CREATININE-BSD FRML MDRD: >90 ML/MIN/1.73M2
GLUCOSE SERPL-MCNC: 223 MG/DL (ref 70–99)
HBA1C MFR BLD: 11.8 %
POTASSIUM SERPL-SCNC: 3.8 MMOL/L (ref 3.4–5.3)
PROT SERPL-MCNC: 7 G/DL (ref 6.4–8.3)
SODIUM SERPL-SCNC: 138 MMOL/L (ref 136–145)
TSH SERPL DL<=0.005 MIU/L-ACNC: 0.95 UIU/ML (ref 0.3–4.2)

## 2023-04-26 PROCEDURE — 84443 ASSAY THYROID STIM HORMONE: CPT

## 2023-04-26 PROCEDURE — 82306 VITAMIN D 25 HYDROXY: CPT

## 2023-04-26 PROCEDURE — 80053 COMPREHEN METABOLIC PANEL: CPT

## 2023-04-26 PROCEDURE — 36415 COLL VENOUS BLD VENIPUNCTURE: CPT

## 2023-04-26 PROCEDURE — 76705 ECHO EXAM OF ABDOMEN: CPT

## 2023-04-26 PROCEDURE — 83036 HEMOGLOBIN GLYCOSYLATED A1C: CPT

## 2023-05-09 DIAGNOSIS — Z79.4 TYPE 2 DIABETES MELLITUS WITH DIABETIC MONONEUROPATHY, WITH LONG-TERM CURRENT USE OF INSULIN (H): ICD-10-CM

## 2023-05-09 DIAGNOSIS — E11.41 TYPE 2 DIABETES MELLITUS WITH DIABETIC MONONEUROPATHY, WITH LONG-TERM CURRENT USE OF INSULIN (H): ICD-10-CM

## 2023-05-09 RX ORDER — INSULIN GLARGINE 100 [IU]/ML
INJECTION, SOLUTION SUBCUTANEOUS
Qty: 30 ML | Refills: 0 | Status: SHIPPED | OUTPATIENT
Start: 2023-05-09 | End: 2023-09-05

## 2023-05-09 NOTE — TELEPHONE ENCOUNTER
"Requested Prescriptions   Pending Prescriptions Disp Refills     LANTUS SOLOSTAR 100 UNIT/ML soln [Pharmacy Med Name: Lantus SoloStar Subcutaneous Solution Pen-injector 100 UNIT/ML] 30 mL 0     Sig: INJECT 14 UNITS SUBCUTANEOUSLY 2 TIMES A DAY       Long Acting Insulin Protocol Passed - 5/9/2023  1:56 PM        Passed - Serum creatinine on file in past 12 months     Recent Labs   Lab Test 04/26/23  0910   CR 0.61       Ok to refill medication if creatinine is low          Passed - HgbA1C in past 3 or 6 months     If HgbA1C is 8 or greater, it needs to be on file within the past 3 months.  If less than 8, must be on file within the past 6 months.     Recent Labs   Lab Test 04/26/23  0910   A1C 11.8*             Passed - Medication is active on med list        Passed - Patient is age 18 or older        Passed - Recent (6 mo) or future (30 days) visit within the authorizing provider's specialty     Patient had office visit in the last 6 months or has a visit in the next 30 days with authorizing provider or within the authorizing provider's specialty.  See \"Patient Info\" tab in inbasket, or \"Choose Columns\" in Meds & Orders section of the refill encounter.                 "

## 2023-05-30 ENCOUNTER — VIRTUAL VISIT (OUTPATIENT)
Dept: FAMILY MEDICINE | Facility: CLINIC | Age: 73
End: 2023-05-30
Payer: COMMERCIAL

## 2023-05-30 DIAGNOSIS — K50.118 CROHN'S DISEASE OF COLON WITH OTHER COMPLICATION (H): ICD-10-CM

## 2023-05-30 DIAGNOSIS — K90.9 DIARRHEA DUE TO MALABSORPTION: ICD-10-CM

## 2023-05-30 DIAGNOSIS — R19.7 DIARRHEA DUE TO MALABSORPTION: ICD-10-CM

## 2023-05-30 DIAGNOSIS — Z93.2 ILEOSTOMY STATUS (H): ICD-10-CM

## 2023-05-30 DIAGNOSIS — R19.7 DIARRHEA DUE TO MALABSORPTION: Primary | ICD-10-CM

## 2023-05-30 DIAGNOSIS — G25.0 ESSENTIAL TREMOR: ICD-10-CM

## 2023-05-30 DIAGNOSIS — K74.60 NON-ALCOHOLIC CIRRHOSIS (H): ICD-10-CM

## 2023-05-30 DIAGNOSIS — K90.9 DIARRHEA DUE TO MALABSORPTION: Primary | ICD-10-CM

## 2023-05-30 PROBLEM — N18.30 CHRONIC KIDNEY DISEASE, STAGE 3 (H): Status: RESOLVED | Noted: 2020-11-03 | Resolved: 2023-05-30

## 2023-05-30 PROCEDURE — 99213 OFFICE O/P EST LOW 20 MIN: CPT | Mod: VID | Performed by: FAMILY MEDICINE

## 2023-05-30 RX ORDER — TERCONAZOLE 0.4 %
CREAM WITH APPLICATOR VAGINAL
COMMUNITY
Start: 2023-05-13 | End: 2023-08-31

## 2023-05-30 RX ORDER — LOPERAMIDE HYDROCHLORIDE 2 MG/1
2 TABLET ORAL 4 TIMES DAILY PRN
Qty: 120 TABLET | Refills: 3 | OUTPATIENT
Start: 2023-05-30

## 2023-05-30 RX ORDER — CLOBETASOL PROPIONATE 0.5 MG/G
OINTMENT TOPICAL PRN
COMMUNITY
Start: 2023-05-13

## 2023-05-30 RX ORDER — ESTRADIOL 10 UG/1
INSERT VAGINAL
COMMUNITY
Start: 2023-05-13 | End: 2023-08-31

## 2023-05-30 RX ORDER — DIPHENOXYLATE HCL/ATROPINE 2.5-.025MG
1 TABLET ORAL 4 TIMES DAILY PRN
Qty: 30 TABLET | Refills: 3 | Status: SHIPPED | OUTPATIENT
Start: 2023-05-30 | End: 2024-04-09

## 2023-05-30 NOTE — PROGRESS NOTES
Zara is a 72 year old who is being evaluated via a billable video visit.      How would you like to obtain your AVS? MyChart  If the video visit is dropped, the invitation should be resent by: Send to e-mail at: [email or web address]  Will anyone else be joining your video visit? No        1. Diarrhea due to malabsorption  Zara is asking for refills of Lomotil.  She only uses this a handful of times a year but needs a refill.  She has had a colostomy for over 40 years for a history of Crohn's disease and has followed with [place] [facility].  - diphenoxylate-atropine (LOMOTIL) 2.5-0.025 MG tablet; Take 1 tablet by mouth 4 times daily as needed for diarrhea  Dispense: 30 tablet; Refill: 3    2. Essential tremor  Improved with propranolol 60 mg.    3. Ileostomy status (H)  She no longer needs colonoscopies.    4. Non-alcoholic cirrhosis (H)  Stable, follows with GI    5. Crohn's disease of colon with other complication (H)  Status post colectomy.      Subjective   Zara is a 72 year old, presenting for the following health issues:  Med check        5/30/2023     4:41 PM   Additional Questions   Roomed by      HPI   Zara presents virtually for medication check.  She would like refills of Lomotil.  This had been prescribed by her gastroenterologist.  She follows with them regularly but just needs some refills.  She only uses it a few times a year.  She has a colectomy and a colostomy bag for the last 40 years from having Crohn's disease.  She is following with her type 2 diabetes with endocrine.  When I saw her last, she is struggling with an essential tremor.  It we started on propranolol and she states this has been super helpful.  He feels like the dose is adequate.      Objective           Vitals:  No vitals were obtained today due to virtual visit.    Physical Exam   GENERAL: Healthy, alert and no distress  PSYCH: Mentation appears normal, affect normal/bright, judgement and insight intact, normal  speech and appearance well-groomed.            Video-Visit Details    Type of service:  Video Visit   Video Start Time: 5:14  Video End Time:5:27 PM    Originating Location (pt. Location): Home  Distant Location (provider location):  On-site  Platform used for Video Visit: Lucas

## 2023-05-31 ENCOUNTER — TRANSFERRED RECORDS (OUTPATIENT)
Dept: HEALTH INFORMATION MANAGEMENT | Facility: CLINIC | Age: 73
End: 2023-05-31
Payer: COMMERCIAL

## 2023-06-02 ENCOUNTER — TRANSFERRED RECORDS (OUTPATIENT)
Dept: HEALTH INFORMATION MANAGEMENT | Facility: CLINIC | Age: 73
End: 2023-06-02
Payer: COMMERCIAL

## 2023-06-05 ENCOUNTER — TRANSFERRED RECORDS (OUTPATIENT)
Dept: HEALTH INFORMATION MANAGEMENT | Facility: CLINIC | Age: 73
End: 2023-06-05
Payer: COMMERCIAL

## 2023-06-05 DIAGNOSIS — K90.9 DIARRHEA DUE TO MALABSORPTION: ICD-10-CM

## 2023-06-05 DIAGNOSIS — R19.7 DIARRHEA DUE TO MALABSORPTION: ICD-10-CM

## 2023-06-06 RX ORDER — LOPERAMIDE HYDROCHLORIDE 2 MG/1
2 TABLET ORAL 4 TIMES DAILY PRN
Qty: 120 TABLET | Refills: 3 | Status: SHIPPED | OUTPATIENT
Start: 2023-06-06

## 2023-06-12 ENCOUNTER — HOSPITAL ENCOUNTER (OUTPATIENT)
Dept: MRI IMAGING | Facility: HOSPITAL | Age: 73
Discharge: HOME | End: 2023-06-12
Attending: INTERNAL MEDICINE | Admitting: INTERNAL MEDICINE
Payer: COMMERCIAL

## 2023-06-12 DIAGNOSIS — K76.9 LIVER LESION: ICD-10-CM

## 2023-06-12 PROCEDURE — 255N000002 HC RX 255 OP 636: Performed by: INTERNAL MEDICINE

## 2023-06-12 PROCEDURE — A9585 GADOBUTROL INJECTION: HCPCS | Performed by: INTERNAL MEDICINE

## 2023-06-12 PROCEDURE — 74183 MRI ABD W/O CNTR FLWD CNTR: CPT

## 2023-06-12 RX ORDER — GADOBUTROL 604.72 MG/ML
8 INJECTION INTRAVENOUS ONCE
Status: COMPLETED | OUTPATIENT
Start: 2023-06-12 | End: 2023-06-12

## 2023-06-12 RX ADMIN — GADOBUTROL 8 ML: 604.72 INJECTION INTRAVENOUS at 06:53

## 2023-06-21 ENCOUNTER — TELEPHONE (OUTPATIENT)
Dept: FAMILY MEDICINE | Facility: CLINIC | Age: 73
End: 2023-06-21
Payer: COMMERCIAL

## 2023-06-21 NOTE — TELEPHONE ENCOUNTER
Reason for Call:  Appointment Request    Patient requesting this type of appt: Pre-op    Requested provider: Obdulia Chamorro    Reason patient unable to be scheduled: Not within requested timeframe    When does patient want to be seen/preferred time: 1-2 days    Comments: Pt is having a procedure at [facility] for a liver tumor on 6/28. Pt was wondering if she could be fit in with her PCP.    Could we send this information to you in [facility] or would you prefer to receive a phone call?:   Patient would prefer a phone call   Okay to leave a detailed message?: Yes at Home number on file [phone] (home)    Call taken on 6/21/2023 at 3:06 PM by Selma Lopez

## 2023-06-21 NOTE — TELEPHONE ENCOUNTER
Please review request - do you think we could squeeze her into a 20 minute? There is a 40min (2 same day slots) next Tuesday too.  Not sure how close we should cut to biopsy scheduled on that Wednesday though.

## 2023-06-23 DIAGNOSIS — E11.41 TYPE 2 DIABETES MELLITUS WITH DIABETIC MONONEUROPATHY, WITH LONG-TERM CURRENT USE OF INSULIN (H): Primary | ICD-10-CM

## 2023-06-23 DIAGNOSIS — Z79.4 TYPE 2 DIABETES MELLITUS WITH DIABETIC MONONEUROPATHY, WITH LONG-TERM CURRENT USE OF INSULIN (H): Primary | ICD-10-CM

## 2023-06-23 RX ORDER — INSULIN LISPRO 100 [IU]/ML
INJECTION, SOLUTION INTRAVENOUS; SUBCUTANEOUS
Qty: 30 ML | Refills: 0 | Status: SHIPPED | OUTPATIENT
Start: 2023-06-23 | End: 2023-11-30

## 2023-06-23 NOTE — TELEPHONE ENCOUNTER
"Requested Prescriptions   Pending Prescriptions Disp Refills     insulin lispro (HUMALOG KWIKPEN) 100 UNIT/ML (1 unit dial) KWIKPEN [Pharmacy Med Name: HumaLOG KwikPen Subcutaneous Solution Pen-injector 100 UNIT/ML] 30 mL 0     Sig: inject 6 units subcutaneously up to 4 times daily for BG management       Short Acting Insulin Protocol Passed - 6/23/2023  9:23 AM        Passed - Serum creatinine on file in past 12 months     Recent Labs   Lab Test 04/26/23  0910   CR 0.61       Ok to refill medication if creatinine is low          Passed - HgbA1C in past 3 or 6 months     If HgbA1C is 8 or greater, it needs to be on file within the past 3 months.  If less than 8, must be on file within the past 6 months.     Recent Labs   Lab Test 04/26/23  0910   A1C 11.8*             Passed - Medication is active on med list        Passed - Patient is age 18 or older        Passed - Recent (6 mo) or future (30 days) visit within the authorizing provider's specialty     Patient had office visit in the last 6 months or has a visit in the next 30 days with authorizing provider or within the authorizing provider's specialty.  See \"Patient Info\" tab in inbasket, or \"Choose Columns\" in Meds & Orders section of the refill encounter.                 "

## 2023-06-26 ENCOUNTER — OFFICE VISIT (OUTPATIENT)
Dept: FAMILY MEDICINE | Facility: CLINIC | Age: 73
End: 2023-06-26
Payer: COMMERCIAL

## 2023-06-26 VITALS
HEIGHT: 65 IN | DIASTOLIC BLOOD PRESSURE: 58 MMHG | SYSTOLIC BLOOD PRESSURE: 115 MMHG | BODY MASS INDEX: 28.46 KG/M2 | TEMPERATURE: 97.8 F | HEART RATE: 75 BPM | WEIGHT: 170.8 LBS | RESPIRATION RATE: 16 BRPM | OXYGEN SATURATION: 96 %

## 2023-06-26 DIAGNOSIS — Z79.4 TYPE 2 DIABETES MELLITUS WITH DIABETIC MONONEUROPATHY, WITH LONG-TERM CURRENT USE OF INSULIN (H): ICD-10-CM

## 2023-06-26 DIAGNOSIS — K74.60 NON-ALCOHOLIC CIRRHOSIS (H): ICD-10-CM

## 2023-06-26 DIAGNOSIS — Z01.818 PREOP GENERAL PHYSICAL EXAM: Primary | ICD-10-CM

## 2023-06-26 DIAGNOSIS — E11.41 TYPE 2 DIABETES MELLITUS WITH DIABETIC MONONEUROPATHY, WITH LONG-TERM CURRENT USE OF INSULIN (H): ICD-10-CM

## 2023-06-26 DIAGNOSIS — R93.2 ABNORMAL LIVER ULTRASOUND: ICD-10-CM

## 2023-06-26 DIAGNOSIS — G25.0 ESSENTIAL TREMOR: ICD-10-CM

## 2023-06-26 LAB
ANION GAP SERPL CALCULATED.3IONS-SCNC: 8 MMOL/L (ref 7–15)
BUN SERPL-MCNC: 11.1 MG/DL (ref 8–23)
CALCIUM SERPL-MCNC: 9.7 MG/DL (ref 8.8–10.2)
CHLORIDE SERPL-SCNC: 100 MMOL/L (ref 98–107)
CREAT SERPL-MCNC: 0.68 MG/DL (ref 0.51–0.95)
DEPRECATED HCO3 PLAS-SCNC: 23 MMOL/L (ref 22–29)
GFR SERPL CREATININE-BSD FRML MDRD: >90 ML/MIN/1.73M2
GLUCOSE SERPL-MCNC: 488 MG/DL (ref 70–99)
HGB BLD-MCNC: 12.1 G/DL (ref 11.7–15.7)
POTASSIUM SERPL-SCNC: 4.7 MMOL/L (ref 3.4–5.3)
SODIUM SERPL-SCNC: 131 MMOL/L (ref 136–145)

## 2023-06-26 PROCEDURE — 85018 HEMOGLOBIN: CPT | Performed by: FAMILY MEDICINE

## 2023-06-26 PROCEDURE — 36415 COLL VENOUS BLD VENIPUNCTURE: CPT | Performed by: FAMILY MEDICINE

## 2023-06-26 PROCEDURE — 80048 BASIC METABOLIC PNL TOTAL CA: CPT | Performed by: FAMILY MEDICINE

## 2023-06-26 PROCEDURE — 99214 OFFICE O/P EST MOD 30 MIN: CPT | Performed by: FAMILY MEDICINE

## 2023-06-26 NOTE — PROGRESS NOTES
[facility]  [address] [facility] [address]  Phone: [phone]  Fax: [phone]  Primary Provider: Obdulia Luna  Pre-op Performing Provider: OBDULIA LUNA      PREOPERATIVE EVALUATION:  Today's date: 6/26/2023    Zara Webster is a 72 year old female who presents for a preoperative evaluation.      6/26/2023     9:20 AM   Additional Questions   Roomed by      Surgical Information:  Surgery/Procedure: Liver biopsy  Surgery Location: [facility]  Surgeon: Dr. TABITHA Chopra  Surgery Date: 6/28/23  Time of Surgery: TBD  Where patient plans to recover: At home with family  Fax number for surgical facility: [phone]      1. Preop general physical exam  Zara is approved for surgery with general anesthesia.  She will hold her mealtime insulin and her morning long-term insulin.  Just take two thirds the amount the night before.  - Hemoglobin; Future  - Basic metabolic panel; Future  - Hemoglobin  - Basic metabolic panel    2. Non-alcoholic cirrhosis (H)  Follows with [facility].    3. Abnormal liver ultrasound      4. Type 2 diabetes mellitus with diabetic mononeuropathy, with long-term current use of insulin (H)  She follows with endocrinology but her diabetes is not under good control.  Most recent A1c was over 11.  She will talk to her endocrinologist when she goes in next month but may be she be a good candidate for GLP-1.    5. Essential tremor  Improved with starting propranolol.        Subjective       HPI related to upcoming procedure: Zara presents for preop physical today.  She has been following with [place] [facility] for nonalcoholic cirrhosis.  She had some type of mass on her liver noted that they have been following.  She is planning on having a biopsy performed.  She has had no problems with anesthesia in the past.  She has no known coronary artery disease and denies chest pain or shortness of breath.  She has type 2 diabetes and follows with  endocrinology.  She does mealtime and  long-acting insulin.  Despite this, her A1c is not under adequate control with her most recent A1c over 11.  She has an essential tremor that has now improved with starting propranolol.        6/26/2023     9:18 AM   Preop Questions   1. Have you ever had a heart attack or stroke? No   2. Have you ever had surgery on your heart or blood vessels, such as a stent placement, a coronary artery bypass, or surgery on an artery in your head, neck, heart, or legs? No   3. Do you have chest pain with activity? No   4. Do you have a history of  heart failure? No   5. Do you currently have a cold, bronchitis or symptoms of other infection? No   6. Do you have a cough, shortness of breath, or wheezing? No   7. Do you or anyone in your family have previous history of blood clots? No   8. Do you or does anyone in your family have a serious bleeding problem such as prolonged bleeding following surgeries or cuts? No   9. Have you ever had problems with anemia or been told to take iron pills? No   10. Have you had any abnormal blood loss such as black, tarry or bloody stools, or abnormal vaginal bleeding? No   11. Have you ever had a blood transfusion? No   12. Are you willing to have a blood transfusion if it is medically needed before, during, or after your surgery? Yes   13. Have you or any of your relatives ever had problems with anesthesia? No   14. Do you have sleep apnea, excessive snoring or daytime drowsiness? No   15. Do you have any artifical heart valves or other implanted medical devices like a pacemaker, defibrillator, or continuous glucose monitor? No   16. Do you have artificial joints? No   17. Are you allergic to latex? No       Health Care Directive:  Patient does not have a Health Care Directive or Living Will: Patient states has Advance Directive and will bring in a copy to clinic.    Preoperative Review of :   reviewed - controlled substances reflected in medication  list.      Status of Chronic Conditions:  See problem list for active medical problems.  Problems all longstanding and stable, except as noted/documented.  See ROS for pertinent symptoms related to these conditions.      Review of Systems  CONSTITUTIONAL: NEGATIVE for fever, chills, change in weight  INTEGUMENTARY/SKIN: NEGATIVE for worrisome rashes, moles or lesions  EYES: NEGATIVE for vision changes or irritation  ENT/MOUTH: NEGATIVE for ear, mouth and throat problems  RESP: NEGATIVE for significant cough or SOB  CV: NEGATIVE for chest pain, palpitations or peripheral edema  GI: NEGATIVE for nausea, abdominal pain, heartburn, or change in bowel habits  : NEGATIVE for frequency, dysuria, or hematuria  MUSCULOSKELETAL: NEGATIVE for significant arthralgias or myalgia  NEURO: NEGATIVE for weakness, dizziness or paresthesias  ENDOCRINE: NEGATIVE for temperature intolerance, skin/hair changes  HEME: NEGATIVE for bleeding problems  PSYCHIATRIC: NEGATIVE for changes in mood or affect    Patient Active Problem List    Diagnosis Date Noted     Essential tremor 05/30/2023     Priority: Medium     Disorder of biliary tract 04/04/2022     Priority: Medium     Abnormal liver ultrasound 12/14/2021     Priority: Medium     Ileostomy status (H) 09/22/2021     Priority: Medium     Non-alcoholic cirrhosis (H) 09/22/2021     Priority: Medium     Nonalcoholic steatohepatitis 07/19/2021     Priority: Medium     Other cirrhosis of liver (H) - unclear etiology,[facility] 10/22/2020     Priority: Medium     Type II diabetes mellitus (H)      Priority: Medium     Created by Conversion         Gastrointestinal hemorrhage 04/29/2020     Priority: Medium     Crohn's disease of colon (H) 11/03/2019     Priority: Medium     Aspirin contraindicated 04/02/2019     Priority: Medium     Hypercholesteremia 10/06/2016     Priority: Medium     BMI 28.0-28.9,adult 10/06/2016     Priority: Medium     Post Colectomy      Priority: Medium     Created by  Conversion          Past Medical History:   Diagnosis Date     Anemia      Diabetes mellitus (H)      Past Surgical History:   Procedure Laterality Date     BREAST EXCISIONAL BIOPSY Left 1980     BREAST EXCISIONAL BIOPSY Left 1985     HYSTERECTOMY  1991     OOPHORECTOMY Bilateral 1991     OTHER SURGICAL HISTORY      KIDNEY STONE REMOVALNOT SURE WHICH SIDE, DR. SHIV SALINAS PROCTOSIGMOIDOSCOPY,RIGID,DIAGNOS N/A 6/17/2020    Procedure: ILEOSTOMY REVISION, PROCTOSCOPY, ILEOSCOPY;  Surgeon: Devang Moon MD;  Location: [facility];  Service: General     ZZC REMOVAL COLON/ILEOSTOMY      Description: Total Abdominal Colectomy;  Recorded: 12/15/2011;     ZZC REMOVAL COLON/PROCTECTOMY/ILEOSTOMY      Description: Total Proctocolectomy;  Recorded: 08/10/2011;     ZZC VAG HYST, W/VAGINECTOMY      Description: Vaginal Hysterectomy With Colpectomy;  Recorded: 08/14/2014;     Current Outpatient Medications   Medication Sig Dispense Refill     BD LOLY U/F 32G X 4 MM insulin pen needle TEST 4 TIMES A  each 0     clobetasol (TEMOVATE) 0.05 % external ointment APPLY A THIN LAYER TO THE AFFECTED AREA daily x 4-6 weeks then as needed.*       continuous blood glucose monitoring (FREESTYLE VIDA) sensor 1 Units daily 2 each 3     diphenoxylate-atropine (LOMOTIL) 2.5-0.025 MG tablet Take 1 tablet by mouth 4 times daily as needed for diarrhea 30 tablet 3     estradiol (VAGIFEM) 10 MCG TABS vaginal tablet Insert 1 tablet by vaginal route nightly*       flash glucose scanning reader (FREESTYLE VIDA 14 DAY READER) Misc [FLASH GLUCOSE SCANNING READER (FREESTYLE VIDA 14 DAY READER) MISC] Use 1 Units As Directed every 14 (fourteen) days. 1 each 0     insulin lispro (HUMALOG KWIKPEN) 100 UNIT/ML (1 unit dial) KWIKPEN inject 6 units subcutaneously up to 4 times daily for BG management 30 mL 0     LANTUS SOLOSTAR 100 UNIT/ML soln INJECT 14 UNITS SUBCUTANEOUSLY 2 TIMES A DAY 30 mL 0     loperamide (IMODIUM A-D) 2 MG tablet Take 1  "tablet (2 mg) by mouth 4 times daily as needed for diarrhea 120 tablet 3     omeprazole (PRILOSEC) 40 MG DR capsule Take 1 capsule (40 mg) by mouth daily 90 capsule 3     ostomy adhesive Pste [OSTOMY ADHESIVE PSTE] Apply as needed 4 Tube 3     propranolol ER (INDERAL LA) 60 MG 24 hr capsule Take 1 capsule (60 mg) by mouth daily 90 capsule 3     terconazole (TERAZOL 7) 0.4 % vaginal cream Insert 1 applicatorful every day by vaginal route for 7 days.*         Allergies   Allergen Reactions     Prochlorperazine Edisylate [Prochlorperazine] Other (See Comments)     Saw things     Compazine [Prochlorperazine] Unknown        Social History     Tobacco Use     Smoking status: Never     Smokeless tobacco: Never   Substance Use Topics     Alcohol use: Not Currently       History   Drug Use No         Objective     /58   Pulse 75   Temp 97.8  F (36.6  C)   Resp 16   Ht 1.638 m (5' 4.5\")   Wt 77.5 kg (170 lb 12.8 oz)   LMP  (LMP Unknown)   SpO2 96%   BMI 28.86 kg/m      Physical Exam    GENERAL APPEARANCE: healthy, alert and no distress     EYES: EOMI, PERRL     HENT: ear canals and TM's normal and nose and mouth without ulcers or lesions     NECK: no adenopathy, no asymmetry, masses, or scars and thyroid normal to palpation     RESP: lungs clear to auscultation - no rales, rhonchi or wheezes     CV: regular rates and rhythm, normal S1 S2, no S3 or S4 and no murmur, click or rub     ABDOMEN:  soft, nontender, no HSM or masses and bowel sounds normal     MS: extremities normal- no gross deformities noted, no evidence of inflammation in joints, FROM in all extremities.     SKIN: no suspicious lesions or rashes     NEURO: Normal strength and tone, sensory exam grossly normal, mentation intact and speech normal     PSYCH: mentation appears normal. and affect normal/bright     LYMPHATICS: No cervical adenopathy    Recent Labs   Lab Test 04/26/23  0910 03/20/23  0829 11/15/22  0812 11/15/22  0807 09/19/22  0823 " 09/19/22  0820 03/24/22  1202 12/10/21  1415 09/22/21  1544   HGB  --   --   --  11.9  --   --   --   --  11.8   PLT  --   --   --  149*  --   --   --   --   --    INR  --   --  1.03  --   --   --  1.0  --   --      --   --   --   --  139  --    < > 142   POTASSIUM 3.8  --   --   --   --  3.8  --    < > 3.7   CR 0.61  --  0.58  --   --  0.66  --    < > 0.81   A1C 11.8* 11.7*  --   --    < >  --   --    < >  --     < > = values in this interval not displayed.        Diagnostics:  Recent Results (from the past 24 hour(s))   Hemoglobin    Collection Time: 06/26/23  9:48 AM   Result Value Ref Range    Hemoglobin 12.1 11.7 - 15.7 g/dL      No EKG required, no history of coronary heart disease, significant arrhythmia, peripheral arterial disease or other structural heart disease.    Revised Cardiac Risk Index (RCRI):  The patient has the following serious cardiovascular risks for perioperative complications:   - Diabetes Mellitus (on Insulin) = 1 point     RCRI Interpretation: 1 point: Class II (low risk - 0.9% complication rate)           Signed Electronically by: Obdulia Chamorro MD  Copy of this evaluation report is provided to requesting physician.

## 2023-07-24 ENCOUNTER — LAB REQUISITION (OUTPATIENT)
Dept: LAB | Facility: CLINIC | Age: 73
End: 2023-07-24
Payer: COMMERCIAL

## 2023-07-24 DIAGNOSIS — R30.0 DYSURIA: ICD-10-CM

## 2023-07-24 PROCEDURE — 87086 URINE CULTURE/COLONY COUNT: CPT | Mod: ORL | Performed by: OBSTETRICS & GYNECOLOGY

## 2023-07-26 LAB — BACTERIA UR CULT: NORMAL

## 2023-08-28 ENCOUNTER — TELEPHONE (OUTPATIENT)
Dept: FAMILY MEDICINE | Facility: CLINIC | Age: 73
End: 2023-08-28
Payer: COMMERCIAL

## 2023-08-28 RX ORDER — KETOROLAC TROMETHAMINE 10 MG/1
TABLET, FILM COATED ORAL
COMMUNITY
Start: 2023-08-07 | End: 2023-08-31

## 2023-08-28 NOTE — TELEPHONE ENCOUNTER
Patient calling in with concern over running out of pain medication s/p liver biopsy and microwave treatment.    She has been taking Toradol daily and was told that she should reach out to PCP to discuss ongoing pain management. She wants to avoid narcotic pain medicine. She has taken acetaminophen without much relief.    She is agreeable to coming in for a visit to discuss further.    Please advise,    Sukhwinder Card RN     [facility]

## 2023-08-29 NOTE — TELEPHONE ENCOUNTER
Called and spoke with patient. She currently has 2 Toradol left and is worried that she will end up in significant pain between now and her currently scheduled 9/7 visit with PCP. Had gotten the prescription from an interventional radiologist who defers further refills to PCP. Scheduled patient with PCP for 8/31 to discuss medication.    Natasha Roth RN

## 2023-08-29 NOTE — TELEPHONE ENCOUNTER
Typically whoever does the procedure is supposed to manage the pain.  Are they not willing to refill her meds?  I would need to see her to discuss if she wants me to do that.

## 2023-08-31 ENCOUNTER — OFFICE VISIT (OUTPATIENT)
Dept: FAMILY MEDICINE | Facility: CLINIC | Age: 73
End: 2023-08-31
Payer: COMMERCIAL

## 2023-08-31 ENCOUNTER — TELEPHONE (OUTPATIENT)
Dept: FAMILY MEDICINE | Facility: CLINIC | Age: 73
End: 2023-08-31

## 2023-08-31 VITALS
DIASTOLIC BLOOD PRESSURE: 64 MMHG | SYSTOLIC BLOOD PRESSURE: 124 MMHG | WEIGHT: 165.6 LBS | HEIGHT: 65 IN | OXYGEN SATURATION: 96 % | TEMPERATURE: 98 F | HEART RATE: 68 BPM | BODY MASS INDEX: 27.59 KG/M2 | RESPIRATION RATE: 16 BRPM

## 2023-08-31 DIAGNOSIS — C22.0 HCC (HEPATOCELLULAR CARCINOMA) (H): Primary | ICD-10-CM

## 2023-08-31 DIAGNOSIS — R25.1 TREMOR: ICD-10-CM

## 2023-08-31 DIAGNOSIS — K21.9 GASTROESOPHAGEAL REFLUX DISEASE WITHOUT ESOPHAGITIS: ICD-10-CM

## 2023-08-31 PROCEDURE — 99213 OFFICE O/P EST LOW 20 MIN: CPT | Performed by: FAMILY MEDICINE

## 2023-08-31 RX ORDER — PROPRANOLOL HCL 60 MG
60 CAPSULE, EXTENDED RELEASE 24HR ORAL DAILY
Qty: 90 CAPSULE | Refills: 3 | Status: SHIPPED | OUTPATIENT
Start: 2023-08-31 | End: 2023-12-05

## 2023-08-31 RX ORDER — OMEPRAZOLE 40 MG/1
40 CAPSULE, DELAYED RELEASE ORAL DAILY
Qty: 90 CAPSULE | Refills: 3 | Status: ON HOLD | OUTPATIENT
Start: 2023-08-31 | End: 2024-02-04

## 2023-08-31 RX ORDER — KETOROLAC TROMETHAMINE 10 MG/1
TABLET, FILM COATED ORAL
Qty: 20 TABLET | Refills: 1 | Status: SHIPPED | OUTPATIENT
Start: 2023-08-31 | End: 2023-12-21

## 2023-08-31 NOTE — TELEPHONE ENCOUNTER
FYI - Status Update    Who is Calling: patient    Update: Patient called in to see if we called in her Toradol. I did tell her that we did send this off but that we are just waiting on a prior auth to go through for the prescription.      Does caller want a call/response back: Yes     Could we send this information to you in [facility] or would you prefer to receive a phone call?:   Patient would prefer a phone call   Okay to leave a detailed message?: Yes at Cell number on file:    Telephone Information:   Mobile [phone]

## 2023-08-31 NOTE — PROGRESS NOTES
Assessment/ Plan     1. HCC (hepatocellular carcinoma) (H)  Zara is having some ongoing chest pain after having a radioablation of hepatocellular carcinoma about a month ago.  It is slowly improving and she has no red flag symptoms such as shortness of breath, fever, etc.  The Toradol she is taking just once to 2 times per day in the evenings if it is bothersome.  Refills are sent today.  She will let me know if anything changes such as worsening symptoms or not improving.  - ketorolac (TORADOL) 10 MG tablet; Take 1 Tablet  by mouth every 6 hours if needed for Pain for up to 5 days. Maximum of  4 tablets (40 mg) in 24 hours.*  Dispense: 20 tablet; Refill: 1    2. Tremor  This is improved her essential tremor.  - propranolol ER (INDERAL LA) 60 MG 24 hr capsule; Take 1 capsule (60 mg) by mouth daily  Dispense: 90 capsule; Refill: 3    3. Gastroesophageal reflux disease without esophagitis  Refill sent.  - omeprazole (PRILOSEC) 40 MG DR capsule; Take 1 capsule (40 mg) by mouth daily  Dispense: 90 capsule; Refill: 3      Subjective:      Zara Webster is a 73 year old female who presents for discussion of pain.  She was diagnosed with hepatocellular carcinoma several months ago.  Around a month ago, she had a procedure through interventional radiology on the tumor.  Sounds like they did some type of ablation procedure.  She states since then, she has had a chest pain/pressure.  It is bilateral around her rib cage.  It is really mainly bothering her at night when she settles down for the evening.  She has been playing golf and walking 18 holes without any chest pain or shortness of breath.  She has not had any fevers or cough.  They gave her just a handful of Toradol and that is been helpful but now she is running out and they would not refill it.  She is really only taking it most days once in the evening.  If its not too bad she will take a Tylenol or and ibuprofen.  It sounds like she can have another similar  "procedure in November.    Relevant past medical, family, surgical, and social history reviewed with patient, unless noted in HPI, not pertinent for this visit.  Medications were discussed and reconciled.   Review of Systems   A 12 point comprehensive review of systems was negative except as noted.      Current Outpatient Medications   Medication Sig Dispense Refill    BD LOLY U/F 32G X 4 MM insulin pen needle TEST 4 TIMES A  each 0    clobetasol (TEMOVATE) 0.05 % external ointment APPLY A THIN LAYER TO THE AFFECTED AREA daily x 4-6 weeks then as needed.*      continuous blood glucose monitoring (FREESTYLE VIDA) sensor 1 Units daily 2 each 3    diphenoxylate-atropine (LOMOTIL) 2.5-0.025 MG tablet Take 1 tablet by mouth 4 times daily as needed for diarrhea 30 tablet 3    flash glucose scanning reader (FREESTYLE VIDA 14 DAY READER) Misc [FLASH GLUCOSE SCANNING READER (FREESTYLE VIDA 14 DAY READER) MISC] Use 1 Units As Directed every 14 (fourteen) days. 1 each 0    insulin lispro (HUMALOG KWIKPEN) 100 UNIT/ML (1 unit dial) KWIKPEN inject 6 units subcutaneously up to 4 times daily for BG management 30 mL 0    ketorolac (TORADOL) 10 MG tablet Take 1 Tablet  by mouth every 6 hours if needed for Pain for up to 5 days. Maximum of  4 tablets (40 mg) in 24 hours.* 20 tablet 1    LANTUS SOLOSTAR 100 UNIT/ML soln INJECT 14 UNITS SUBCUTANEOUSLY 2 TIMES A DAY 30 mL 0    loperamide (IMODIUM A-D) 2 MG tablet Take 1 tablet (2 mg) by mouth 4 times daily as needed for diarrhea 120 tablet 3    omeprazole (PRILOSEC) 40 MG DR capsule Take 1 capsule (40 mg) by mouth daily 90 capsule 3    ostomy adhesive Pste [OSTOMY ADHESIVE PSTE] Apply as needed 4 Tube 3    propranolol ER (INDERAL LA) 60 MG 24 hr capsule Take 1 capsule (60 mg) by mouth daily 90 capsule 3         Objective:     /64   Pulse 68   Temp 98  F (36.7  C)   Resp 16   Ht 1.638 m (5' 4.5\")   Wt 75.1 kg (165 lb 9.6 oz)   LMP  (LMP Unknown)   SpO2 96%   BMI " 27.99 kg/m      Body mass index is 27.99 kg/m .       General appearance: alert, appears stated age and cooperative    Lungs: clear to auscultation bilaterally  Heart: regular rate and rhythm, S1, S2 normal, no murmur, click, rub or gallop  Abdomen: soft, non-tender; bowel sounds normal; no masses,  no organomegaly      No results found for this or any previous visit (from the past 168 hour(s)).       This note has been dictated using voice recognition software. Any grammatical or context distortions are unintentional and inherent to the software

## 2023-09-01 ENCOUNTER — TELEPHONE (OUTPATIENT)
Dept: FAMILY MEDICINE | Facility: CLINIC | Age: 73
End: 2023-09-01
Payer: COMMERCIAL

## 2023-09-01 DIAGNOSIS — R07.89 ATYPICAL CHEST PAIN: Primary | ICD-10-CM

## 2023-09-04 DIAGNOSIS — E11.41 TYPE 2 DIABETES MELLITUS WITH DIABETIC MONONEUROPATHY, WITH LONG-TERM CURRENT USE OF INSULIN (H): ICD-10-CM

## 2023-09-04 DIAGNOSIS — Z79.4 TYPE 2 DIABETES MELLITUS WITH DIABETIC MONONEUROPATHY, WITH LONG-TERM CURRENT USE OF INSULIN (H): ICD-10-CM

## 2023-09-05 DIAGNOSIS — E11.41 TYPE 2 DIABETES MELLITUS WITH DIABETIC MONONEUROPATHY, WITH LONG-TERM CURRENT USE OF INSULIN (H): ICD-10-CM

## 2023-09-05 DIAGNOSIS — Z79.4 TYPE 2 DIABETES MELLITUS WITH DIABETIC MONONEUROPATHY, WITH LONG-TERM CURRENT USE OF INSULIN (H): ICD-10-CM

## 2023-09-05 RX ORDER — INSULIN GLARGINE 100 [IU]/ML
INJECTION, SOLUTION SUBCUTANEOUS
Qty: 30 ML | Refills: 0 | OUTPATIENT
Start: 2023-09-05

## 2023-09-05 RX ORDER — INSULIN GLARGINE 100 [IU]/ML
INJECTION, SOLUTION SUBCUTANEOUS
Qty: 30 ML | Refills: 0 | Status: SHIPPED | OUTPATIENT
Start: 2023-09-05 | End: 2024-01-02

## 2023-09-05 NOTE — TELEPHONE ENCOUNTER
PRIOR AUTHORIZATION DENIED    Medication: KETOROLAC TROMETHAMINE 10 MG PO TABS  Insurance Company: [employer] [place] - Phone [phone] Fax [phone]  Denial Date: 9/1/2023  Denial Rational:     Appeal Information:     Patient Notified: No

## 2023-09-05 NOTE — TELEPHONE ENCOUNTER
Can you please let patient know that her PA was denied for Toradol.  You can ask her if she wants to try any of the alternatives.  I did recommend either diclofenac or Celebrex if she wants to try either of those.  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

## 2023-09-05 NOTE — TELEPHONE ENCOUNTER
"  Requested Prescriptions   Pending Prescriptions Disp Refills    LANTUS SOLOSTAR 100 UNIT/ML soln [Pharmacy Med Name: Lantus SoloStar Subcutaneous Solution Pen-injector 100 UNIT/ML] 30 mL 0     Sig: INJECT 14 UNITS SUBCUTANEOUSLY 2 TIMES A DAY       Long Acting Insulin Protocol Failed - 9/5/2023  2:10 PM        Failed - HgbA1C in past 3 or 6 months     If HgbA1C is 8 or greater, it needs to be on file within the past 3 months.  If less than 8, must be on file within the past 6 months.     Recent Labs   Lab Test 04/26/23  0910   A1C 11.8*             Passed - Serum creatinine on file in past 12 months     Recent Labs   Lab Test 06/26/23  0948   CR 0.68       Ok to refill medication if creatinine is low          Passed - Medication is active on med list        Passed - Patient is age 18 or older        Passed - Recent (6 mo) or future (30 days) visit within the authorizing provider's specialty     Patient had office visit in the last 6 months or has a visit in the next 30 days with authorizing provider or within the authorizing provider's specialty.  See \"Patient Info\" tab in inbasket, or \"Choose Columns\" in Meds & Orders section of the refill encounter.                 "

## 2023-09-05 NOTE — TELEPHONE ENCOUNTER
"  Requested Prescriptions   Pending Prescriptions Disp Refills    LANTUS SOLOSTAR 100 UNIT/ML soln [Pharmacy Med Name: Lantus SoloStar Subcutaneous Solution Pen-injector 100 UNIT/ML] 30 mL 0     Sig: INJECT 14 UNITS SUBCUTANEOUSLY 2 TIMES A DAY       Long Acting Insulin Protocol Failed - 9/4/2023  8:14 AM        Failed - HgbA1C in past 3 or 6 months     If HgbA1C is 8 or greater, it needs to be on file within the past 3 months.  If less than 8, must be on file within the past 6 months.     Recent Labs   Lab Test 04/26/23  0910   A1C 11.8*             Passed - Serum creatinine on file in past 12 months     Recent Labs   Lab Test 06/26/23  0948   CR 0.68       Ok to refill medication if creatinine is low          Passed - Medication is active on med list        Passed - Patient is age 18 or older        Passed - Recent (6 mo) or future (30 days) visit within the authorizing provider's specialty     Patient had office visit in the last 6 months or has a visit in the next 30 days with authorizing provider or within the authorizing provider's specialty.  See \"Patient Info\" tab in inbasket, or \"Choose Columns\" in Meds & Orders section of the refill encounter.                 "

## 2023-09-06 RX ORDER — CELECOXIB 100 MG/1
100 CAPSULE ORAL 2 TIMES DAILY
Qty: 60 CAPSULE | Refills: 0 | Status: SHIPPED | OUTPATIENT
Start: 2023-09-06 | End: 2023-12-05

## 2023-09-06 NOTE — TELEPHONE ENCOUNTER
Called and spoke with patient, relayed PA decision. Patient stated understanding and would be interested in trying the Celebrex or diclofenac, whichever one PCP feels like she would benefit the most from. Pharmacy pended, please advise.    Natasha Roth RN

## 2023-09-15 ENCOUNTER — DOCUMENTATION ONLY (OUTPATIENT)
Dept: ENDOCRINOLOGY | Facility: CLINIC | Age: 73
End: 2023-09-15
Payer: COMMERCIAL

## 2023-09-15 DIAGNOSIS — E11.41 TYPE 2 DIABETES MELLITUS WITH DIABETIC MONONEUROPATHY, WITH LONG-TERM CURRENT USE OF INSULIN (H): Primary | ICD-10-CM

## 2023-09-15 DIAGNOSIS — Z79.4 TYPE 2 DIABETES MELLITUS WITH DIABETIC MONONEUROPATHY, WITH LONG-TERM CURRENT USE OF INSULIN (H): Primary | ICD-10-CM

## 2023-09-15 NOTE — PROGRESS NOTES
Zara MONICA Webster has an upcoming lab appointment:    Future Appointments   Date Time Provider Department Center   9/19/2023  8:30 AM \[facility] [place] [facility]\"" LAB [facility]   9/26/2023  8:30 AM [facility] Goodwin [facility]     Patient is scheduled for the following lab(s): [facility]    There is no order available. Please review and place either future orders or HMPO (Review of Health Maintenance Protocol Orders), as appropriate.    Health Maintenance Due   Topic    MICROALBUMIN     ANNUAL REVIEW OF HM ORDERS      Juliana Hannah    
no

## 2023-09-19 ENCOUNTER — LAB (OUTPATIENT)
Dept: LAB | Facility: CLINIC | Age: 73
End: 2023-09-19
Payer: COMMERCIAL

## 2023-09-19 DIAGNOSIS — Z79.4 TYPE 2 DIABETES MELLITUS WITH DIABETIC MONONEUROPATHY, WITH LONG-TERM CURRENT USE OF INSULIN (H): ICD-10-CM

## 2023-09-19 DIAGNOSIS — E11.41 TYPE 2 DIABETES MELLITUS WITH DIABETIC MONONEUROPATHY, WITH LONG-TERM CURRENT USE OF INSULIN (H): ICD-10-CM

## 2023-09-19 LAB
ANION GAP SERPL CALCULATED.3IONS-SCNC: 9 MMOL/L (ref 7–15)
BUN SERPL-MCNC: 11.4 MG/DL (ref 8–23)
CALCIUM SERPL-MCNC: 9.4 MG/DL (ref 8.8–10.2)
CHLORIDE SERPL-SCNC: 103 MMOL/L (ref 98–107)
CREAT SERPL-MCNC: 0.6 MG/DL (ref 0.51–0.95)
DEPRECATED HCO3 PLAS-SCNC: 23 MMOL/L (ref 22–29)
EGFRCR SERPLBLD CKD-EPI 2021: >90 ML/MIN/1.73M2
GLUCOSE SERPL-MCNC: 381 MG/DL (ref 70–99)
HBA1C MFR BLD: 13.5 % (ref 0–5.6)
POTASSIUM SERPL-SCNC: 4.1 MMOL/L (ref 3.4–5.3)
SODIUM SERPL-SCNC: 135 MMOL/L (ref 136–145)

## 2023-09-19 PROCEDURE — 80048 BASIC METABOLIC PNL TOTAL CA: CPT

## 2023-09-19 PROCEDURE — 83036 HEMOGLOBIN GLYCOSYLATED A1C: CPT

## 2023-09-19 PROCEDURE — 36415 COLL VENOUS BLD VENIPUNCTURE: CPT

## 2023-09-26 ENCOUNTER — OFFICE VISIT (OUTPATIENT)
Dept: ENDOCRINOLOGY | Facility: CLINIC | Age: 73
End: 2023-09-26
Payer: COMMERCIAL

## 2023-09-26 VITALS
BODY MASS INDEX: 27.73 KG/M2 | SYSTOLIC BLOOD PRESSURE: 126 MMHG | HEART RATE: 82 BPM | DIASTOLIC BLOOD PRESSURE: 60 MMHG | WEIGHT: 164.1 LBS | OXYGEN SATURATION: 94 %

## 2023-09-26 DIAGNOSIS — Z79.4 TYPE 2 DIABETES MELLITUS WITH DIABETIC MONONEUROPATHY, WITH LONG-TERM CURRENT USE OF INSULIN (H): Primary | ICD-10-CM

## 2023-09-26 DIAGNOSIS — B37.31 CANDIDIASIS OF VAGINA: ICD-10-CM

## 2023-09-26 DIAGNOSIS — E11.41 TYPE 2 DIABETES MELLITUS WITH DIABETIC MONONEUROPATHY, WITH LONG-TERM CURRENT USE OF INSULIN (H): Primary | ICD-10-CM

## 2023-09-26 PROBLEM — K76.9 LESION OF LIVER: Status: ACTIVE | Noted: 2023-09-26

## 2023-09-26 PROCEDURE — 99214 OFFICE O/P EST MOD 30 MIN: CPT | Performed by: NURSE PRACTITIONER

## 2023-09-26 RX ORDER — FLUCONAZOLE 150 MG/1
TABLET ORAL
Qty: 2 TABLET | Refills: 2 | Status: SHIPPED | OUTPATIENT
Start: 2023-09-26 | End: 2023-11-21

## 2023-09-26 RX ORDER — TERCONAZOLE 8 MG/G
CREAM VAGINAL
COMMUNITY
Start: 2023-09-25 | End: 2023-12-05

## 2023-09-26 NOTE — LETTER
"    9/26/2023         RE: Zara Webster  [address] [place]  [place] [address]        Dear Colleague,    Thank you for referring your patient, Zara Webster, to the [facility]. Please see a copy of my visit note below.    [facility] ENDOCRINOLOGY    Diabetes Note 9/26/2023    Zara Webster, 1950, [id number]          Reason for visit      1. Type 2 diabetes mellitus with diabetic mononeuropathy, with long-term current use of insulin (H)    2. Candidiasis of vagina        HPI     Zara Webster is a very pleasant 73 year old old female who presents for follow up.  SUMMARY:    Zara is seen today in follow-up for DM 2. Her current A1c is 13.5 and increased from her last at 11.8.  She is using the [employer] ([employer] 14 day CGM, which was downloaded and data reviewed.    Active time for CGM was 17%. She reports that she only uses it when she \"feels\" like something is wrong. Of this time period, she had no TIR, and was \"very high\". During that 17% of the time, her BG was 435.  GMI was not calculated.     She reports that she \"kind of eats pretty much the same way\" all of the time, so she doesn't feel that she needs to keep an eye on her BG.     She notes that she has been drinking \"2 regular Sprites and a Pepsi\" a day, in lori of not drinking any alcoholic beverages because of her Liver.     She remains on Lantus, 7 units BID and takes about 6 units of Humalog 4 times a day.     Pt is endorsing neuropathy in her feet. They are not painful.     Pt reports that she saw her GYN yesterday and that she is being treated for Vaginal Candidiasis. She is interested in getting Diflucan as \"what [she] gives me usually doesn't work\".     She has recently had a \"microwave ablation\" to a lesion in her Liver that went from \"the size of a Ping Pong Ball\" to the \"size of a Baseball\".  She has been given 1-4 years of survival.       Blood glucose data:      Past Medical History     Patient Active Problem " List   Diagnosis     Type II diabetes mellitus (H)     Post Colectomy     Hypercholesteremia     BMI 28.0-28.9,adult     Aspirin contraindicated     Crohn's disease of colon (H)     Gastrointestinal hemorrhage     Other cirrhosis of liver (H) - unclear etiology,[facility]     Ileostomy status (H)     Non-alcoholic cirrhosis (H)     Abnormal liver ultrasound     Nonalcoholic steatohepatitis     Disorder of biliary tract     Essential tremor     HCC (hepatocellular carcinoma) (H)     Lesion of liver        Family History       family history includes Cancer in her father; Diabetes in her father and mother; Heart Disease in her mother; Hyperlipidemia in her mother; No Known Problems in her sister and sister.    Social History      reports that she has never smoked. She has never used smokeless tobacco. She reports that she does not currently use alcohol. She reports that she does not use drugs.      Review of Systems     Patient has no polyuria or polydipsia, no chest pain, dyspnea or TIA's, no numbness, tingling or pain in extremities  Remainder negative except as noted in HPI.    Vital Signs     /60 (BP Location: Right arm, Patient Position: Sitting, Cuff Size: Adult Regular)   Pulse 82   Wt 74.4 kg (164 lb 1.6 oz)   LMP  (LMP Unknown)   SpO2 94%   BMI 27.73 kg/m    Wt Readings from Last 3 Encounters:   09/26/23 74.4 kg (164 lb 1.6 oz)   08/31/23 75.1 kg (165 lb 9.6 oz)   06/26/23 77.5 kg (170 lb 12.8 oz)       Physical Exam     Constitutional:  Well developed, Well nourished  HENT:  Normocephalic,   Neck: normal in appearance  Eyes:  PERRL, Conjunctiva pink  Respiratory:  No respiratory distress  Skin: No acanthosis nigricans, lipoatrophy or lipodystrophy  Neurologic:  Alert & oriented x 3, nonfocal  Psychiatric:  Affect, Mood, Insight appropriate        Assessment     1. Type 2 diabetes mellitus with diabetic mononeuropathy, with long-term current use of insulin (H)    2. Candidiasis of vagina        Plan  "    Zara is going to \"work on\" her BG. I recommended that she actually use the Anila to watch her BG. She will try.     Diflucan ordered for the Vaginal Candidiasis.     Follow-up with me in 3 months.         Katiana Goodwin NP  [facility]  9/26/2023  8:59 AM    Lab Results     Microalbumin Urine mg/dL   Date Value Ref Range Status   04/27/2021 0.73 0.00 - 1.99 mg/dL Final       Cholesterol   Date Value Ref Range Status   03/20/2023 243 (H) <200 mg/dL Final     Direct Measure HDL   Date Value Ref Range Status   03/20/2023 58 >=50 mg/dL Final     Triglycerides   Date Value Ref Range Status   03/20/2023 238 (H) <150 mg/dL Final       [unfilled]      Current Medications     Outpatient Medications Prior to Visit   Medication Sig Dispense Refill     BD LOLY U/F 32G X 4 MM insulin pen needle TEST 4 TIMES A  each 0     celecoxib (CELEBREX) 100 MG capsule Take 1 capsule (100 mg) by mouth 2 times daily 60 capsule 0     clobetasol (TEMOVATE) 0.05 % external ointment APPLY A THIN LAYER TO THE AFFECTED AREA daily x 4-6 weeks then as needed.*       continuous blood glucose monitoring (FREESTYLE NAILA) sensor 1 Units daily 2 each 3     diphenoxylate-atropine (LOMOTIL) 2.5-0.025 MG tablet Take 1 tablet by mouth 4 times daily as needed for diarrhea 30 tablet 3     flash glucose scanning reader (FREESTYLE NAILA 14 DAY READER) Misc [FLASH GLUCOSE SCANNING READER (FREESTYLE NAILA 14 DAY READER) MISC] Use 1 Units As Directed every 14 (fourteen) days. 1 each 0     insulin lispro (HUMALOG KWIKPEN) 100 UNIT/ML (1 unit dial) KWIKPEN inject 6 units subcutaneously up to 4 times daily for BG management 30 mL 0     ketorolac (TORADOL) 10 MG tablet Take 1 Tablet  by mouth every 6 hours if needed for Pain for up to 5 days. Maximum of  4 tablets (40 mg) in 24 hours.* 20 tablet 1     LANTUS SOLOSTAR 100 UNIT/ML soln INJECT 14 UNITS SUBCUTANEOUSLY 2 TIMES A DAY 30 mL 0     loperamide (IMODIUM A-D) 2 MG tablet Take 1 tablet (2 mg) by " mouth 4 times daily as needed for diarrhea 120 tablet 3     omeprazole (PRILOSEC) 40 MG DR capsule Take 1 capsule (40 mg) by mouth daily 90 capsule 3     ostomy adhesive Pste [OSTOMY ADHESIVE PSTE] Apply as needed 4 Tube 3     propranolol ER (INDERAL LA) 60 MG 24 hr capsule Take 1 capsule (60 mg) by mouth daily 90 capsule 3     terconazole (TERAZOL 3) 0.8 % vaginal cream        No facility-administered medications prior to visit.                   Again, thank you for allowing me to participate in the care of your patient.        Sincerely,        Katiana Goodwin NP

## 2023-09-26 NOTE — PROGRESS NOTES
"[facility] ENDOCRINOLOGY    Diabetes Note 9/26/2023    Zara Webster, 1950, [id number]          Reason for visit      1. Type 2 diabetes mellitus with diabetic mononeuropathy, with long-term current use of insulin (H)    2. Candidiasis of vagina        HPI     Zara Webster is a very pleasant 73 year old old female who presents for follow up.  SUMMARY:    Zara is seen today in follow-up for DM 2. Her current A1c is 13.5 and increased from her last at 11.8.  She is using the [employer] 14 day CGM, which was downloaded and data reviewed.    Active time for CGM was 17%. She reports that she only uses it when she \"feels\" like something is wrong. Of this time period, she had no TIR, and was \"very high\". During that 17% of the time, her BG was 435.  GMI was not calculated.     She reports that she \"kind of eats pretty much the same way\" all of the time, so she doesn't feel that she needs to keep an eye on her BG.     She notes that she has been drinking \"2 regular Sprites and a Pepsi\" a day, in lori of not drinking any alcoholic beverages because of her Liver.     She remains on Lantus, 7 units BID and takes about 6 units of Humalog 4 times a day.     Pt is endorsing neuropathy in her feet. They are not painful.     Pt reports that she saw her GYN yesterday and that she is being treated for Vaginal Candidiasis. She is interested in getting Diflucan as \"what [she] gives me usually doesn't work\".     She has recently had a \"microwave ablation\" to a lesion in her Liver that went from \"the size of a Ping Pong Ball\" to the \"size of a Baseball\".  She has been given 1-4 years of survival.       Blood glucose data:      Past Medical History     Patient Active Problem List   Diagnosis    Type II diabetes mellitus (H)    Post Colectomy    Hypercholesteremia    BMI 28.0-28.9,adult    Aspirin contraindicated    Crohn's disease of colon (H)    Gastrointestinal hemorrhage    Other cirrhosis of liver (H) - unclear " "etiology,[facility]    Ileostomy status (H)    Non-alcoholic cirrhosis (H)    Abnormal liver ultrasound    Nonalcoholic steatohepatitis    Disorder of biliary tract    Essential tremor    HCC (hepatocellular carcinoma) (H)    Lesion of liver        Family History       family history includes Cancer in her father; Diabetes in her father and mother; Heart Disease in her mother; Hyperlipidemia in her mother; No Known Problems in her sister and sister.    Social History      reports that she has never smoked. She has never used smokeless tobacco. She reports that she does not currently use alcohol. She reports that she does not use drugs.      Review of Systems     Patient has no polyuria or polydipsia, no chest pain, dyspnea or TIA's, no numbness, tingling or pain in extremities  Remainder negative except as noted in HPI.    Vital Signs     /60 (BP Location: Right arm, Patient Position: Sitting, Cuff Size: Adult Regular)   Pulse 82   Wt 74.4 kg (164 lb 1.6 oz)   LMP  (LMP Unknown)   SpO2 94%   BMI 27.73 kg/m    Wt Readings from Last 3 Encounters:   09/26/23 74.4 kg (164 lb 1.6 oz)   08/31/23 75.1 kg (165 lb 9.6 oz)   06/26/23 77.5 kg (170 lb 12.8 oz)       Physical Exam     Constitutional:  Well developed, Well nourished  HENT:  Normocephalic,   Neck: normal in appearance  Eyes:  PERRL, Conjunctiva pink  Respiratory:  No respiratory distress  Skin: No acanthosis nigricans, lipoatrophy or lipodystrophy  Neurologic:  Alert & oriented x 3, nonfocal  Psychiatric:  Affect, Mood, Insight appropriate        Assessment     1. Type 2 diabetes mellitus with diabetic mononeuropathy, with long-term current use of insulin (H)    2. Candidiasis of vagina        Plan     Zara is going to \"work on\" her BG. I recommended that she actually use the Naila to watch her BG. She will try.     Diflucan ordered for the Vaginal Candidiasis.     Follow-up with me in 3 months.         Katiana Goodwin NP  [facility]  9/26/2023  8:59 " AM    Lab Results     Microalbumin Urine mg/dL   Date Value Ref Range Status   04/27/2021 0.73 0.00 - 1.99 mg/dL Final       Cholesterol   Date Value Ref Range Status   03/20/2023 243 (H) <200 mg/dL Final     Direct Measure HDL   Date Value Ref Range Status   03/20/2023 58 >=50 mg/dL Final     Triglycerides   Date Value Ref Range Status   03/20/2023 238 (H) <150 mg/dL Final       [unfilled]      Current Medications     Outpatient Medications Prior to Visit   Medication Sig Dispense Refill    BD LOLY U/F 32G X 4 MM insulin pen needle TEST 4 TIMES A  each 0    celecoxib (CELEBREX) 100 MG capsule Take 1 capsule (100 mg) by mouth 2 times daily 60 capsule 0    clobetasol (TEMOVATE) 0.05 % external ointment APPLY A THIN LAYER TO THE AFFECTED AREA daily x 4-6 weeks then as needed.*      continuous blood glucose monitoring (FREESTYLE VIDA) sensor 1 Units daily 2 each 3    diphenoxylate-atropine (LOMOTIL) 2.5-0.025 MG tablet Take 1 tablet by mouth 4 times daily as needed for diarrhea 30 tablet 3    flash glucose scanning reader (FREESTYLE VIDA 14 DAY READER) Misc [FLASH GLUCOSE SCANNING READER (FREESTYLE VIDA 14 DAY READER) MISC] Use 1 Units As Directed every 14 (fourteen) days. 1 each 0    insulin lispro (HUMALOG KWIKPEN) 100 UNIT/ML (1 unit dial) KWIKPEN inject 6 units subcutaneously up to 4 times daily for BG management 30 mL 0    ketorolac (TORADOL) 10 MG tablet Take 1 Tablet  by mouth every 6 hours if needed for Pain for up to 5 days. Maximum of  4 tablets (40 mg) in 24 hours.* 20 tablet 1    LANTUS SOLOSTAR 100 UNIT/ML soln INJECT 14 UNITS SUBCUTANEOUSLY 2 TIMES A DAY 30 mL 0    loperamide (IMODIUM A-D) 2 MG tablet Take 1 tablet (2 mg) by mouth 4 times daily as needed for diarrhea 120 tablet 3    omeprazole (PRILOSEC) 40 MG DR capsule Take 1 capsule (40 mg) by mouth daily 90 capsule 3    ostomy adhesive Pste [OSTOMY ADHESIVE PSTE] Apply as needed 4 Tube 3    propranolol ER (INDERAL LA) 60 MG 24 hr capsule Take  1 capsule (60 mg) by mouth daily 90 capsule 3    terconazole (TERAZOL 3) 0.8 % vaginal cream        No facility-administered medications prior to visit.

## 2023-10-02 ENCOUNTER — TRANSFERRED RECORDS (OUTPATIENT)
Dept: HEALTH INFORMATION MANAGEMENT | Facility: CLINIC | Age: 73
End: 2023-10-02
Payer: COMMERCIAL

## 2023-10-13 ENCOUNTER — TELEPHONE (OUTPATIENT)
Dept: ENDOCRINOLOGY | Facility: CLINIC | Age: 73
End: 2023-10-13
Payer: COMMERCIAL

## 2023-10-13 NOTE — TELEPHONE ENCOUNTER
I called the pt, she has refills available and should contact her pharmacy to process. Pt understands.

## 2023-10-13 NOTE — TELEPHONE ENCOUNTER
[facility] Call Center    Phone Message    May a detailed message be left on voicemail: yes     Reason for Call: Other: Pt called in regards to the antibiotics she was prescribed last time for vaginal pain and itching-Diflucan. She's experiencing those symptoms again and would like to request a refill if possible. Please call pt back to discuss     Action Taken: Other: [facility]    Travel Screening: Not Applicable

## 2023-11-20 DIAGNOSIS — E11.41 TYPE 2 DIABETES MELLITUS WITH DIABETIC MONONEUROPATHY, WITH LONG-TERM CURRENT USE OF INSULIN (H): ICD-10-CM

## 2023-11-20 DIAGNOSIS — Z79.4 TYPE 2 DIABETES MELLITUS WITH DIABETIC MONONEUROPATHY, WITH LONG-TERM CURRENT USE OF INSULIN (H): ICD-10-CM

## 2023-11-20 RX ORDER — FLUCONAZOLE 150 MG/1
TABLET ORAL
Qty: 2 TABLET | Refills: 0 | OUTPATIENT
Start: 2023-11-20

## 2023-11-20 NOTE — TELEPHONE ENCOUNTER
Pt requesting call back to discuss script of Diflucan. Writer read the [employer] message to the pt and pt stated that her PCP knows nothing about the medication and pt stated she had a discussion with Katiana Goodwin and this medication works for the pt and pt would like to get another refill

## 2023-11-21 DIAGNOSIS — Z79.4 TYPE 2 DIABETES MELLITUS WITH DIABETIC MONONEUROPATHY, WITH LONG-TERM CURRENT USE OF INSULIN (H): ICD-10-CM

## 2023-11-21 DIAGNOSIS — E11.41 TYPE 2 DIABETES MELLITUS WITH DIABETIC MONONEUROPATHY, WITH LONG-TERM CURRENT USE OF INSULIN (H): ICD-10-CM

## 2023-11-21 RX ORDER — FLUCONAZOLE 150 MG/1
TABLET ORAL
Qty: 2 TABLET | Refills: 2 | Status: SHIPPED | OUTPATIENT
Start: 2023-11-21 | End: 2024-02-04

## 2023-11-22 ENCOUNTER — TELEPHONE (OUTPATIENT)
Dept: NURSING | Facility: CLINIC | Age: 73
End: 2023-11-22
Payer: COMMERCIAL

## 2023-11-22 NOTE — TELEPHONE ENCOUNTER
Pt needing to get an appointment scheduled to have a H&P done before her upcoming sx.     Scheduling stated they didn't have anything before the end of the yr and transferred to [facility]. Writer transferred pt to [facility] to see if they have any slots they can fit her in to. No need for triage. Message routed to clinic.         Alma Fierro RN on 11/22/2023 at 3:59 PM

## 2023-11-22 NOTE — TELEPHONE ENCOUNTER
Per chart review, patient has since been scheduled with a  provider for pre-op physical on 12/5. Writer will close this encounter.    Natasha Roth RN

## 2023-11-30 DIAGNOSIS — Z79.4 TYPE 2 DIABETES MELLITUS WITH DIABETIC MONONEUROPATHY, WITH LONG-TERM CURRENT USE OF INSULIN (H): ICD-10-CM

## 2023-11-30 DIAGNOSIS — E11.41 TYPE 2 DIABETES MELLITUS WITH DIABETIC MONONEUROPATHY, WITH LONG-TERM CURRENT USE OF INSULIN (H): ICD-10-CM

## 2023-11-30 RX ORDER — INSULIN LISPRO 100 [IU]/ML
INJECTION, SOLUTION INTRAVENOUS; SUBCUTANEOUS
Qty: 30 ML | Refills: 0 | Status: SHIPPED | OUTPATIENT
Start: 2023-11-30 | End: 2024-02-15

## 2023-11-30 NOTE — TELEPHONE ENCOUNTER
"      Requested Prescriptions   Pending Prescriptions Disp Refills    insulin lispro (HUMALOG KWIKPEN) 100 UNIT/ML (1 unit dial) KWIKPEN [Pharmacy Med Name: HumaLOG KwikPen Subcutaneous Solution Pen-injector 100 UNIT/ML] 30 mL 0     Sig: Inject 6 units subcutaneously up to 4 times daily for BG management       Short Acting Insulin Protocol Passed - 11/30/2023 10:04 AM        Passed - Serum creatinine on file in past 12 months     Recent Labs   Lab Test 09/19/23  0817   CR 0.60       Ok to refill medication if creatinine is low          Passed - HgbA1C in past 3 or 6 months     If HgbA1C is 8 or greater, it needs to be on file within the past 3 months.  If less than 8, must be on file within the past 6 months.     Recent Labs   Lab Test 09/19/23 0817   A1C 13.5*             Passed - Medication is active on med list        Passed - Patient is age 18 or older        Passed - Recent (6 mo) or future (30 days) visit within the authorizing provider's specialty     Patient had office visit in the last 6 months or has a visit in the next 30 days with authorizing provider or within the authorizing provider's specialty.  See \"Patient Info\" tab in inbasket, or \"Choose Columns\" in Meds & Orders section of the refill encounter.                 "

## 2023-12-05 ENCOUNTER — OFFICE VISIT (OUTPATIENT)
Dept: FAMILY MEDICINE | Facility: CLINIC | Age: 73
End: 2023-12-05
Payer: COMMERCIAL

## 2023-12-05 ENCOUNTER — TELEPHONE (OUTPATIENT)
Dept: ENDOCRINOLOGY | Facility: CLINIC | Age: 73
End: 2023-12-05

## 2023-12-05 VITALS
HEART RATE: 90 BPM | HEIGHT: 65 IN | SYSTOLIC BLOOD PRESSURE: 128 MMHG | TEMPERATURE: 98.3 F | OXYGEN SATURATION: 96 % | RESPIRATION RATE: 22 BRPM | BODY MASS INDEX: 27.92 KG/M2 | WEIGHT: 167.6 LBS | DIASTOLIC BLOOD PRESSURE: 70 MMHG

## 2023-12-05 DIAGNOSIS — Z01.818 PREOPERATIVE EXAMINATION: Primary | ICD-10-CM

## 2023-12-05 DIAGNOSIS — E11.65 TYPE 2 DIABETES MELLITUS WITH HYPERGLYCEMIA, WITH LONG-TERM CURRENT USE OF INSULIN (H): ICD-10-CM

## 2023-12-05 DIAGNOSIS — C22.0 HCC (HEPATOCELLULAR CARCINOMA) (H): ICD-10-CM

## 2023-12-05 DIAGNOSIS — K50.118 CROHN'S DISEASE OF COLON WITH OTHER COMPLICATION (H): ICD-10-CM

## 2023-12-05 DIAGNOSIS — Z12.11 SCREEN FOR COLON CANCER: ICD-10-CM

## 2023-12-05 DIAGNOSIS — Z93.2 ILEOSTOMY STATUS (H): ICD-10-CM

## 2023-12-05 DIAGNOSIS — R07.89 ATYPICAL CHEST PAIN: ICD-10-CM

## 2023-12-05 DIAGNOSIS — K74.60 NON-ALCOHOLIC CIRRHOSIS (H): ICD-10-CM

## 2023-12-05 DIAGNOSIS — Z79.4 TYPE 2 DIABETES MELLITUS WITH DIABETIC MONONEUROPATHY, WITH LONG-TERM CURRENT USE OF INSULIN (H): Primary | ICD-10-CM

## 2023-12-05 DIAGNOSIS — Z23 ENCOUNTER FOR IMMUNIZATION: ICD-10-CM

## 2023-12-05 DIAGNOSIS — R60.9 EDEMA, UNSPECIFIED TYPE: ICD-10-CM

## 2023-12-05 DIAGNOSIS — Z79.4 TYPE 2 DIABETES MELLITUS WITH HYPERGLYCEMIA, WITH LONG-TERM CURRENT USE OF INSULIN (H): ICD-10-CM

## 2023-12-05 DIAGNOSIS — K74.69 OTHER CIRRHOSIS OF LIVER (H): ICD-10-CM

## 2023-12-05 DIAGNOSIS — E11.41 TYPE 2 DIABETES MELLITUS WITH DIABETIC MONONEUROPATHY, WITH LONG-TERM CURRENT USE OF INSULIN (H): Primary | ICD-10-CM

## 2023-12-05 LAB — HBA1C MFR BLD: 14.1 % (ref 0–5.6)

## 2023-12-05 PROCEDURE — 83880 ASSAY OF NATRIURETIC PEPTIDE: CPT | Mod: GZ | Performed by: PHYSICIAN ASSISTANT

## 2023-12-05 PROCEDURE — 82570 ASSAY OF URINE CREATININE: CPT | Performed by: PHYSICIAN ASSISTANT

## 2023-12-05 PROCEDURE — 99214 OFFICE O/P EST MOD 30 MIN: CPT | Mod: 25 | Performed by: PHYSICIAN ASSISTANT

## 2023-12-05 PROCEDURE — 36415 COLL VENOUS BLD VENIPUNCTURE: CPT | Performed by: PHYSICIAN ASSISTANT

## 2023-12-05 PROCEDURE — 93005 ELECTROCARDIOGRAM TRACING: CPT | Performed by: PHYSICIAN ASSISTANT

## 2023-12-05 PROCEDURE — 83036 HEMOGLOBIN GLYCOSYLATED A1C: CPT | Performed by: PHYSICIAN ASSISTANT

## 2023-12-05 PROCEDURE — 82043 UR ALBUMIN QUANTITATIVE: CPT | Performed by: PHYSICIAN ASSISTANT

## 2023-12-05 RX ORDER — RESPIRATORY SYNCYTIAL VIRUS VACCINE 120MCG/0.5
0.5 KIT INTRAMUSCULAR ONCE
Qty: 1 EACH | Refills: 0 | Status: CANCELLED | OUTPATIENT
Start: 2023-12-05 | End: 2023-12-05

## 2023-12-05 NOTE — PROGRESS NOTES
[facility]  [address] [facility] [address]  Phone: [phone]  Fax: [phone]  Primary Provider: Obdulia Chamorro  Pre-op Performing Provider: SAMRA CORTES      PREOPERATIVE EVALUATION:  Today's date: 12/5/2023    Zara is a 73 year old, presenting for the following:  Pre-Op Exam and Chest Pain (Has been having chest pain since Feb.  Mostly at night.  No dizziness or lightheadedness, headaches, vision changes.  Had to leave [employer] due to pain in chest and not feeling well. )        12/5/2023     1:07 PM   Additional Questions   Roomed by JESS Noble CMA([facility])       Surgical Information:  Surgery/Procedure: ENDOSCOPIC RETROGRADE CHOLANGIOPANCREATOGRAPHY   Surgery Location: [facility]  Surgeon: Dr. Atif Barclay  Surgery Date: 12/062023  Time of Surgery: 8:00am  Where patient plans to recover: At home with family  Fax number for surgical facility: Fax [phone]    Assessment & Plan     The proposed surgical procedure is considered INTERMEDIATE risk.    Preoperative examination  HCC (hepatocellular carcinoma) (H)  Type 2 diabetes mellitus with hyperglycemia, with long-term current use of insulin (H)  - Albumin Random Urine Quantitative with Creat Ratio; Future  - Adult Eye  Referral; Future  - Hemoglobin A1c; Future  - Albumin Random Urine Quantitative with Creat Ratio  - Hemoglobin A1c  - EKG 12-lead, tracing only  - Adult Cardiology Eval  Referral; Future  Patient is here today for pre op for HCC treatment.  Surgery is scheduled for 12/16/23 at 8 am.  Unfortunately her A1C is 14.1 and she complains of new onset edema with persistent atypical chest pain.  Recommend holding off on surgery, contact Endocrinologist to help gain rapid control of her glucose.  Urgent referral to cardiology provided with labs and imaging.    Non-alcoholic cirrhosis (H)  Other cirrhosis of liver (H) - unclear etiology,[facility]  Crohn's disease of  colon with other complication (H)  Ileostomy status (H)    Edema, unspecified type  New problem, +1 edema on exam along with atypical chest pain.  EKG normal with no change from previous.  Updated BNP today and ECHO, referral to cardiology given. Urgent referral given need for liver procedure.  - BNP-N terminal pro; Future  - Echocardiogram Complete; Future  - BNP-N terminal pro  - Adult Cardiology Eval  Referral; Future          - No identified additional risk factors other than previously addressed      RECOMMENDATION:  Surgery is NOT recommended due to uncontrolled diabetes. Stabilization required prior to elective surgery.             Subjective       HPI related to upcoming procedure: Zara presents for preop physical today.  She has been following with [place] [facility] hepatocellular carcinoma; undergoing multiple ablations.        12/5/2023    12:54 PM   Preop Questions   1. Have you ever had a heart attack or stroke? No   2. Have you ever had surgery on your heart or blood vessels, such as a stent placement, a coronary artery bypass, or surgery on an artery in your head, neck, heart, or legs? No   3. Do you have chest pain with activity? No   4. Do you have a history of  heart failure? No   5. Do you currently have a cold, bronchitis or symptoms of other infection? No   6. Do you have a cough, shortness of breath, or wheezing? No   7. Do you or anyone in your family have previous history of blood clots? No   8. Do you or does anyone in your family have a serious bleeding problem such as prolonged bleeding following surgeries or cuts? No   9. Have you ever had problems with anemia or been told to take iron pills? No   10. Have you had any abnormal blood loss such as black, tarry or bloody stools, or abnormal vaginal bleeding? No   11. Have you ever had a blood transfusion? No   12. Are you willing to have a blood transfusion if it is medically needed before, during, or after your surgery?  Yes   13. Have you or any of your relatives ever had problems with anesthesia? No   14. Do you have sleep apnea, excessive snoring or daytime drowsiness? No   15. Do you have any artifical heart valves or other implanted medical devices like a pacemaker, defibrillator, or continuous glucose monitor? No   16. Do you have artificial joints? No   17. Are you allergic to latex? No       Health Care Directive:  Patient does not have a Health Care Directive or Living Will: Discussed advance care planning with patient; information given to patient to review.    Preoperative Review of :   reviewed - no record of controlled substances prescribed.      Status of Chronic Conditions:  DIABETES - Patient has a longstanding history of DiabetesType Type II . Patient is being treated with insulin injections and denies significant side effects. Control has been poor. Complicating factors include but are not limited to: neuropathy.     Review of Systems  CONSTITUTIONAL: NEGATIVE for fever, chills, change in weight  INTEGUMENTARY/SKIN: NEGATIVE for worrisome rashes, moles or lesions ( some new skin spots on legs)  EYES: NEGATIVE for vision changes or irritation  ENT/MOUTH: NEGATIVE for ear, mouth and throat problems  RESP: NEGATIVE for significant cough or SOB  CV: NEGATIVE for + chest pain, palpitations or + peripheral edema  GI: NEGATIVE for nausea, abdominal pain, heartburn, or change in bowel habits  : NEGATIVE for frequency, dysuria, or hematuria  MUSCULOSKELETAL: NEGATIVE for significant arthralgias or myalgia  NEURO: NEGATIVE for weakness, dizziness or paresthesias  ENDOCRINE: NEGATIVE for temperature intolerance, skin/hair changes  HEME: NEGATIVE for bleeding problems  PSYCHIATRIC: + anxious    Patient Active Problem List    Diagnosis Date Noted    Lesion of liver 09/26/2023     Priority: Medium    HCC (hepatocellular carcinoma) (H) 08/31/2023     Priority: Medium    Essential tremor 05/30/2023     Priority: Medium     Disorder of biliary tract 04/04/2022     Priority: Medium    Abnormal liver ultrasound 12/14/2021     Priority: Medium    Ileostomy status (H) 09/22/2021     Priority: Medium    Non-alcoholic cirrhosis (H) 09/22/2021     Priority: Medium    Nonalcoholic steatohepatitis 07/19/2021     Priority: Medium    Other cirrhosis of liver (H) - unclear etiology,[facility] 10/22/2020     Priority: Medium    Type II diabetes mellitus (H)      Priority: Medium     Created by Conversion        Gastrointestinal hemorrhage 04/29/2020     Priority: Medium    Crohn's disease of colon (H) 11/03/2019     Priority: Medium    Aspirin contraindicated 04/02/2019     Priority: Medium    Hypercholesteremia 10/06/2016     Priority: Medium    BMI 28.0-28.9,adult 10/06/2016     Priority: Medium    Post Colectomy      Priority: Medium     Created by Conversion          Past Medical History:   Diagnosis Date    Anemia     Diabetes mellitus (H)      Past Surgical History:   Procedure Laterality Date    BREAST EXCISIONAL BIOPSY Left 1980    BREAST EXCISIONAL BIOPSY Left 1985    HYSTERECTOMY  1991    OOPHORECTOMY Bilateral 1991    OTHER SURGICAL HISTORY      KIDNEY STONE REMOVALNOT SURE WHICH SIDE, DR. CHANEY    [place] PROCTOSIGMOIDOSCOPY,RIGID,DIAGNOS N/A 6/17/2020    Procedure: ILEOSTOMY REVISION, PROCTOSCOPY, ILEOSCOPY;  Surgeon: Devang Moon MD;  Location: [facility];  Service: General    ZZC REMOVAL COLON/ILEOSTOMY      Description: Total Abdominal Colectomy;  Recorded: 12/15/2011;    ZZC REMOVAL COLON/PROCTECTOMY/ILEOSTOMY      Description: Total Proctocolectomy;  Recorded: 08/10/2011;    ZZC VAG HYST, W/VAGINECTOMY      Description: Vaginal Hysterectomy With Colpectomy;  Recorded: 08/14/2014;     Current Outpatient Medications   Medication Sig Dispense Refill    BD LOLY U/F 32G X 4 MM insulin pen needle TEST 4 TIMES A  each 0    clobetasol (TEMOVATE) 0.05 % external ointment APPLY A THIN LAYER TO THE AFFECTED AREA daily x 4-6 weeks  then as needed.*      continuous blood glucose monitoring (FREESTYLE VIDA) sensor 1 Units daily 2 each 3    diphenoxylate-atropine (LOMOTIL) 2.5-0.025 MG tablet Take 1 tablet by mouth 4 times daily as needed for diarrhea 30 tablet 3    flash glucose scanning reader (FREESTYLE VIDA 14 DAY READER) Misc [FLASH GLUCOSE SCANNING READER (FREESTYLE VIDA 14 DAY READER) MISC] Use 1 Units As Directed every 14 (fourteen) days. 1 each 0    fluconazole (DIFLUCAN) 150 MG tablet Take one tablet now, and if no improvement in 3 days, take another. 2 tablet 2    insulin lispro (HUMALOG KWIKPEN) 100 UNIT/ML (1 unit dial) KWIKPEN Inject 6 units subcutaneously up to 4 times daily for BG management 30 mL 0    ketorolac (TORADOL) 10 MG tablet Take 1 Tablet  by mouth every 6 hours if needed for Pain for up to 5 days. Maximum of  4 tablets (40 mg) in 24 hours.* 20 tablet 1    LANTUS SOLOSTAR 100 UNIT/ML soln INJECT 14 UNITS SUBCUTANEOUSLY 2 TIMES A DAY 30 mL 0    loperamide (IMODIUM A-D) 2 MG tablet Take 1 tablet (2 mg) by mouth 4 times daily as needed for diarrhea 120 tablet 3    omeprazole (PRILOSEC) 40 MG DR capsule Take 1 capsule (40 mg) by mouth daily 90 capsule 3    ostomy adhesive Pste [OSTOMY ADHESIVE PSTE] Apply as needed 4 Tube 3    celecoxib (CELEBREX) 100 MG capsule Take 1 capsule (100 mg) by mouth 2 times daily (Patient not taking: Reported on 12/5/2023) 60 capsule 0    propranolol ER (INDERAL LA) 60 MG 24 hr capsule Take 1 capsule (60 mg) by mouth daily (Patient not taking: Reported on 12/5/2023) 90 capsule 3    terconazole (TERAZOL 3) 0.8 % vaginal cream  (Patient not taking: Reported on 12/5/2023)         Allergies   Allergen Reactions    Prochlorperazine Edisylate [Prochlorperazine] Other (See Comments)     Saw things    Compazine [Prochlorperazine] Unknown        Social History     Tobacco Use    Smoking status: Never     Passive exposure: Never    Smokeless tobacco: Never   Substance Use Topics    Alcohol use: Not  "Currently     History   Drug Use No         Objective     /70   Pulse 90   Temp 98.3  F (36.8  C) (Oral)   Resp 22   Ht 1.645 m (5' 4.75\")   Wt 76 kg (167 lb 9.6 oz)   LMP  (LMP Unknown)   SpO2 96%   BMI 28.11 kg/m      Physical Exam    GENERAL APPEARANCE: healthy, alert and no distress     EYES: EOMI, PERRL     HENT: ear canals and TM's normal and nose and mouth without ulcers or lesions     NECK: no adenopathy, no asymmetry, masses, or scars and thyroid normal to palpation     RESP: lungs clear to auscultation - no rales, rhonchi or wheezes     CV: regular rates and rhythm, normal S1 S2, no S3 or S4 and no murmur, click or rub     ABDOMEN:  soft, nontender, no HSM or masses and bowel sounds normal     MS: extremities normal- no gross deformities noted, no evidence of inflammation in joints, FROM in all extremities.     SKIN: no suspicious lesions or rashes     NEURO: Normal strength and tone, sensory exam grossly normal, mentation intact and speech normal     PSYCH: mentation appears normal. and affect normal/bright     LYMPHATICS: No cervical adenopathy    Recent Labs   Lab Test 09/19/23  0817 06/26/23  0948 04/26/23  0910 03/20/23  0829 11/15/22  0812 11/15/22  0807 09/19/22  0820 03/24/22  1202   HGB  --  12.1  --   --   --  11.9  --   --    PLT  --   --   --   --   --  149*  --   --    INR  --   --   --   --  1.03  --   --  1.0   * 131* 138  --   --   --    < >  --    POTASSIUM 4.1 4.7 3.8  --   --   --    < >  --    CR 0.60 0.68 0.61  --  0.58  --    < >  --    A1C 13.5*  --  11.8*   < >  --   --    < >  --     < > = values in this interval not displayed.        Diagnostics:  Labs pending at this time.  Results will be reviewed when available.   EKG: appears normal, NSR, normal axis, normal intervals, no acute ST/T changes c/w ischemia, no LVH by voltage criteria, unchanged from previous tracings    Revised Cardiac Risk Index (RCRI):  The patient has the following serious cardiovascular " risks for perioperative complications:   - Diabetes Mellitus (on Insulin) = 1 point     RCRI Interpretation: 1 point: Class II (low risk - 0.9% complication rate)         Signed Electronically by: Liliana Gonzalez PA-C  Copy of this evaluation report is provided to requesting physician.

## 2023-12-05 NOTE — TELEPHONE ENCOUNTER
[facility] Call Center    Phone Message    May a detailed message be left on voicemail: yes     Reason for Call: Her liver cancer surgery was cancelled because her A1C is 14, she needs to get on something that will reduce her A1C so she can have surgery ASAP, please call.       Action Taken: Message routed to:  [facility] Endo    Travel Screening: Not Applicable

## 2023-12-06 ENCOUNTER — TELEPHONE (OUTPATIENT)
Dept: ENDOCRINOLOGY | Facility: CLINIC | Age: 73
End: 2023-12-06
Payer: COMMERCIAL

## 2023-12-06 LAB
ATRIAL RATE - MUSE: 83 BPM
CREAT UR-MCNC: 23.9 MG/DL
DIASTOLIC BLOOD PRESSURE - MUSE: NORMAL MMHG
INTERPRETATION ECG - MUSE: NORMAL
MICROALBUMIN UR-MCNC: <12 MG/L
MICROALBUMIN/CREAT UR: NORMAL MG/G{CREAT}
NT-PROBNP SERPL-MCNC: 61 PG/ML (ref 0–900)
P AXIS - MUSE: 41 DEGREES
PR INTERVAL - MUSE: 192 MS
QRS DURATION - MUSE: 78 MS
QT - MUSE: 400 MS
QTC - MUSE: 470 MS
R AXIS - MUSE: -37 DEGREES
SYSTOLIC BLOOD PRESSURE - MUSE: NORMAL MMHG
T AXIS - MUSE: 47 DEGREES
VENTRICULAR RATE- MUSE: 83 BPM

## 2023-12-06 PROCEDURE — 93010 ELECTROCARDIOGRAM REPORT: CPT | Performed by: INTERNAL MEDICINE

## 2023-12-06 RX ORDER — TIRZEPATIDE 12.5 MG/.5ML
12.5 INJECTION, SOLUTION SUBCUTANEOUS
Qty: 1 ML | Refills: 0 | Status: SHIPPED | OUTPATIENT
Start: 2023-12-06 | End: 2023-12-21

## 2023-12-06 RX ORDER — TIRZEPATIDE 2.5 MG/.5ML
2.5 INJECTION, SOLUTION SUBCUTANEOUS
Qty: 1 ML | Refills: 0 | Status: SHIPPED | OUTPATIENT
Start: 2023-12-06 | End: 2023-12-21

## 2023-12-06 RX ORDER — TIRZEPATIDE 7.5 MG/.5ML
7.5 INJECTION, SOLUTION SUBCUTANEOUS
Qty: 1 ML | Refills: 0 | Status: SHIPPED | OUTPATIENT
Start: 2023-12-06 | End: 2023-12-21

## 2023-12-06 RX ORDER — TIRZEPATIDE 5 MG/.5ML
5 INJECTION, SOLUTION SUBCUTANEOUS
Qty: 1 ML | Refills: 0 | Status: SHIPPED | OUTPATIENT
Start: 2023-12-06 | End: 2024-01-23

## 2023-12-06 RX ORDER — TIRZEPATIDE 15 MG/.5ML
15 INJECTION, SOLUTION SUBCUTANEOUS
Qty: 1 ML | Refills: 0 | Status: SHIPPED | OUTPATIENT
Start: 2023-12-06 | End: 2023-12-21

## 2023-12-06 RX ORDER — TIRZEPATIDE 10 MG/.5ML
10 INJECTION, SOLUTION SUBCUTANEOUS
Qty: 1 ML | Refills: 0 | Status: SHIPPED | OUTPATIENT
Start: 2023-12-06 | End: 2023-12-21

## 2023-12-06 NOTE — TELEPHONE ENCOUNTER
PA Initiation    Medication: MOUNJARO 2.5 MG/0.5ML SC SOPN  Insurance Company: [employer] [place] - Phone [phone] Fax [phone]  Pharmacy Filling the Rx:    Filling Pharmacy Phone:    Filling Pharmacy Fax:    Start Date: 12/6/2023

## 2023-12-06 NOTE — TELEPHONE ENCOUNTER
Pt called back and I discussed the below. Pt would like to try Mounjaro. I also advised the pt to watch what she is eating and drinking and exercise if she is able. Pt understands.

## 2023-12-07 NOTE — TELEPHONE ENCOUNTER
Spoke to pt- and informed we are waiting for insurance to respond. Pt verbalized understanding. Pt will call if she wants to see how much it may be OOP to get started. For now pt will wait to hear from insurance.

## 2023-12-07 NOTE — TELEPHONE ENCOUNTER
Patient is calling for an update and that this is urgent to move forward with her liver surgery.

## 2023-12-08 ENCOUNTER — TELEPHONE (OUTPATIENT)
Dept: ENDOCRINOLOGY | Facility: CLINIC | Age: 73
End: 2023-12-08
Payer: COMMERCIAL

## 2023-12-08 DIAGNOSIS — Z79.4 TYPE 2 DIABETES MELLITUS WITH DIABETIC MONONEUROPATHY, WITH LONG-TERM CURRENT USE OF INSULIN (H): Primary | ICD-10-CM

## 2023-12-08 DIAGNOSIS — E11.41 TYPE 2 DIABETES MELLITUS WITH DIABETIC MONONEUROPATHY, WITH LONG-TERM CURRENT USE OF INSULIN (H): Primary | ICD-10-CM

## 2023-12-08 NOTE — TELEPHONE ENCOUNTER
Prior Authorization Approval    Medication: TRULICITY 0.75 MG/0.5ML SC SOPN  Authorization Effective Date: 9/9/2023  Authorization Expiration Date: 12/8/2024  Approved Dose/Quantity: 2  Reference #: [id number]   Insurance Company: [employer] [place] - Phone [phone] Fax [phone]  Expected CoPay: $    CoPay Card Available:      Financial Assistance Needed:    Which Pharmacy is filling the prescription:    Pharmacy Notified:    Patient Notified:

## 2023-12-08 NOTE — TELEPHONE ENCOUNTER
PRIOR AUTHORIZATION DENIED    Medication: MOUNJARO 2.5 MG/0.5ML SC SOPN  Insurance Company: [employer] [place] - Phone [phone] Fax [phone]  Denial Date: 12/7/2023  Denial Reason(s):   Appeal Information:   Patient Notified:

## 2023-12-08 NOTE — TELEPHONE ENCOUNTER
PA Initiation    Medication: TRULICITY 0.75 MG/0.5ML SC SOPN  Insurance Company: [employer] [place] - Phone [phone] Fax [phone]  Pharmacy Filling the Rx:    Filling Pharmacy Phone:    Filling Pharmacy Fax:    Start Date: 12/8/2023

## 2023-12-19 ENCOUNTER — HOSPITAL ENCOUNTER (OUTPATIENT)
Dept: CARDIOLOGY | Facility: CLINIC | Age: 73
Discharge: HOME | End: 2023-12-19
Attending: PHYSICIAN ASSISTANT | Admitting: PHYSICIAN ASSISTANT
Payer: COMMERCIAL

## 2023-12-19 DIAGNOSIS — R60.9 EDEMA, UNSPECIFIED TYPE: ICD-10-CM

## 2023-12-19 LAB — LVEF ECHO: NORMAL

## 2023-12-19 PROCEDURE — 93306 TTE W/DOPPLER COMPLETE: CPT

## 2023-12-19 PROCEDURE — 93306 TTE W/DOPPLER COMPLETE: CPT | Mod: 26 | Performed by: INTERNAL MEDICINE

## 2023-12-21 ENCOUNTER — OFFICE VISIT (OUTPATIENT)
Dept: CARDIOLOGY | Facility: CLINIC | Age: 73
End: 2023-12-21
Attending: PHYSICIAN ASSISTANT
Payer: COMMERCIAL

## 2023-12-21 VITALS
HEART RATE: 83 BPM | WEIGHT: 162 LBS | RESPIRATION RATE: 16 BRPM | OXYGEN SATURATION: 97 % | BODY MASS INDEX: 27.17 KG/M2 | DIASTOLIC BLOOD PRESSURE: 68 MMHG | SYSTOLIC BLOOD PRESSURE: 138 MMHG

## 2023-12-21 DIAGNOSIS — E11.41 TYPE 2 DIABETES MELLITUS WITH DIABETIC MONONEUROPATHY, WITH LONG-TERM CURRENT USE OF INSULIN (H): ICD-10-CM

## 2023-12-21 DIAGNOSIS — R60.0 PERIPHERAL EDEMA: ICD-10-CM

## 2023-12-21 DIAGNOSIS — Z79.4 TYPE 2 DIABETES MELLITUS WITH DIABETIC MONONEUROPATHY, WITH LONG-TERM CURRENT USE OF INSULIN (H): ICD-10-CM

## 2023-12-21 DIAGNOSIS — E78.00 HYPERCHOLESTEREMIA: ICD-10-CM

## 2023-12-21 DIAGNOSIS — R07.89 ATYPICAL CHEST PAIN: Primary | ICD-10-CM

## 2023-12-21 DIAGNOSIS — K50.118 CROHN'S DISEASE OF COLON WITH OTHER COMPLICATION (H): ICD-10-CM

## 2023-12-21 DIAGNOSIS — C22.0 HCC (HEPATOCELLULAR CARCINOMA) (H): ICD-10-CM

## 2023-12-21 PROCEDURE — 99204 OFFICE O/P NEW MOD 45 MIN: CPT | Performed by: INTERNAL MEDICINE

## 2023-12-21 NOTE — LETTER
12/21/2023    Obdulia Chamorro MD  [address]  [facility] [address]    RE: Zara Webster       Dear Colleague,     I had the pleasure of seeing Zara Webster in the [facility].    [facility] Office Consult     Assessment:   (R07.89) Atypical chest pain  (primary encounter diagnosis)  Comment: Atypical, comes on at rest and goes away with upright posture, Tylenol as well as water.  Not effort related to release, able to walk and play 18 holes of golf without angina or shortness of breath.  Lastly, has a normal stress nuclear from slightly over a year ago.  Doubt there is significant ischemic coronary artery disease at this point in time.    (R60.9) Peripheral edema  Comment: On several occasions echo which showed ejection fraction of 60 to 65%, nuclear suggest greater than 70%.  Valves are intact.  This is not due to heart failure or valvular issues.  Lastly, given the fact that her A1c is still high I would refrain from diuretic therapy as this would cause higher sugars for the short-term.  This is not a contraindication to proceeding with ERCP.    (C22.0) HCC (hepatocellular carcinoma) (H)  Comment: Needs an ERCP and sent here for preoperative evaluation and clearance.  No cardiac contraindication to her proceeding with ERCP from a cardiac standpoint as she has no coronary artery disease, no ongoing heart failure, active order 4 METS daily, and this procedure is not high risk or done under general anesthesia per se.    (E11.41,  Z79.4) Type 2 diabetes mellitus with diabetic mononeuropathy, with long-term current use of insulin (H)  Comment: So noted with significantly increased hemoglobin A1c of 14 and defer to primary with her now on Trulicity.    (E78.00) Hypercholesteremia  Comment: Total cholesterol 243 with an LDL of 137 which is acceptable.    (K50.118) Crohn's disease of colon with other complication (H)  Comment: So noted with prior colectomy getting along  well.     Plan:   1.  No further cardiac testing.  2.  Would recommend elevated legs decrease water from 10 glasses a day to 6 glasses a day.  3.  Refrain from sugars, salty beverages, soft drinks.  Would also refrain from extra salt.  Consider compression stockings.  4.  No cardiac contraindication ERCP.  5.  Follow-up only as needed.    History of Present Illness:   Thank you for asking the [facility] team to see Zara Webster a 73 year old  female  in consultation  to evaluate preoperative status.   Patient is had Crohn's disease with colectomy, is now nonalcoholic cirrhosis and hepatocellular carcinoma and is noted to have some sludge in needing ERCP.  She has chest discomfort for years now, describes a tightness when she is lying down, better when she stands up and takes water and an aspirin.  There is no effort related angina, nor was there any effort related shortness of breath, PND, orthopnea.  She also does note over the last few months some mild bipedal edema at the end of the day.    Past Medical History:     Past Medical History:   Diagnosis Date    Anemia     Diabetes mellitus (H)  Hepatocellular carcinoma  Nonalcoholic cirrhosis  Crohn's disease status colectomy  Pure hypercholesterolemia      Past history is negative for tuberculosis, myocardial infarction,  rheumatic fever, hypertension, cerebrovascular accident, chronic kidney disease, peptic ulcer disease, chronic obstructive pulmonary disease, or thyroid disorder.    Past Surgical History:     Past Surgical History:   Procedure Laterality Date    BREAST EXCISIONAL BIOPSY Left 1980    BREAST EXCISIONAL BIOPSY Left 1985    HYSTERECTOMY  1991    OOPHORECTOMY Bilateral 1991    OTHER SURGICAL HISTORY      KIDNEY STONE REMOVALNOT SURE WHICH SIDE, DR. SHIV SALINAS PROCTOSIGMOIDOSCOPY,RIGID,DIAGNOS N/A 6/17/2020    Procedure: ILEOSTOMY REVISION, PROCTOSCOPY, ILEOSCOPY;  Surgeon: Devang Moon MD;  Location: [facility];  Service:  General    ZZC REMOVAL COLON/ILEOSTOMY      Description: Total Abdominal Colectomy;  Recorded: 12/15/2011;    ZZC REMOVAL COLON/PROCTECTOMY/ILEOSTOMY      Description: Total Proctocolectomy;  Recorded: 08/10/2011;    ZZC VAG HYST, W/VAGINECTOMY      Description: Vaginal Hysterectomy With Colpectomy;  Recorded: 08/14/2014;     Family History:   Family history negative for heart disease.    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  Reports that she has never smoked. She has never been exposed to tobacco smoke. She has never used smokeless tobacco. She reports that she does not currently use alcohol. She reports that she does not use drugs. The primary care physician is Obdulia Chamorro    Meds:   Scheduled Meds:  Current Outpatient Medications   Medication Sig Dispense Refill    clobetasol (TEMOVATE) 0.05 % external ointment APPLY A THIN LAYER TO THE AFFECTED AREA daily x 4-6 weeks then as needed.*      continuous blood glucose monitoring (FREESTYLE VIDA) sensor 1 Units daily 2 each 3    diphenoxylate-atropine (LOMOTIL) 2.5-0.025 MG tablet Take 1 tablet by mouth 4 times daily as needed for diarrhea 30 tablet 3    dulaglutide (TRULICITY) 0.75 MG/0.5ML pen Inject 0.75 mg Subcutaneous every 7 days 2 mL 1    flash glucose scanning reader (FREESTYLE VIDA 14 DAY READER) Misc [FLASH GLUCOSE SCANNING READER (FREESTYLE VIDA 14 DAY READER) MISC] Use 1 Units As Directed every 14 (fourteen) days. 1 each 0    fluconazole (DIFLUCAN) 150 MG tablet Take one tablet now, and if no improvement in 3 days, take another. 2 tablet 2    insulin lispro (HUMALOG KWIKPEN) 100 UNIT/ML (1 unit dial) KWIKPEN Inject 6 units subcutaneously up to 4 times daily for BG management 30 mL 0    LANTUS SOLOSTAR 100 UNIT/ML soln INJECT 14 UNITS SUBCUTANEOUSLY 2 TIMES A DAY  30 mL 0    loperamide (IMODIUM A-D) 2 MG tablet Take 1 tablet (2 mg) by mouth 4 times daily as needed for diarrhea 120 tablet 3    omeprazole (PRILOSEC) 40 MG DR capsule Take 1 capsule (40 mg) by mouth daily 90 capsule 3    ostomy adhesive Pste [OSTOMY ADHESIVE PSTE] Apply as needed 4 Tube 3    BD LOLY U/F 32G X 4 MM insulin pen needle TEST 4 TIMES A  each 0    tirzepatide (MOUNJARO) 5 MG/0.5ML pen Inject 5 mg Subcutaneous every 7 days (Patient not taking: Reported on 12/21/2023) 1 mL 0       PRN Meds:.    Allergies:   Prochlorperazine edisylate [prochlorperazine] and Compazine [prochlorperazine]    Objective:      Physical Exam  73.5 kg (162 lb)     Body mass index is 27.17 kg/m .  /68 (BP Location: Left arm, Patient Position: Sitting, Cuff Size: Adult Regular)   Pulse 83   Resp 16   Wt 73.5 kg (162 lb)   LMP  (LMP Unknown)   SpO2 97%   BMI 27.17 kg/m      General Appearance:   Alert, cooperative and in no acute distress.   HEENT:  No scleral icterus; the mucous membranes were pink and moist.   Neck: JVP normal. No thyromegaly. No HJR   Chest: The spine was straight. The chest was symmetric.   Lungs:   Respirations unlabored; the lungs are clear to auscultation.   Cardiovascular:   S1 and S2 without murmur, clicks or rubs. Brachial, radial, carotid and posterior tibial pulses are intact and symetrical.  No carotid bruits noted   Abdomen:  No organomegaly, masses, bruits, or tenderness. Bowels sounds are present   Extremities: No cyanosis, clubbing, or edema.   Skin: No xanthelasma.   Neurologic: Mood and affect are appropriate.         Lab Reviewed Personally by myself  Lab Results   Component Value Date     09/19/2023    CO2 23 09/19/2023    CO2 20 03/21/2022    BUN 11.4 09/19/2023    BUN 10 03/21/2022     Lab Results   Component Value Date    WBC 3.9 11/15/2022    HGB 12.1 06/26/2023    HCT 38.1 11/15/2022    MCV 87 11/15/2022     11/15/2022     Lab Results   Component Value Date  "   CHOL 243 03/20/2023    TRIG 238 03/20/2023    HDL 58 03/20/2023     No results found for: \"BNP\"    ECG personally reviewed by myself shows sinus rhythm, leftward axis, no acute changes.     Review of Systems:     Review of Systems:   Enc Vitals  BP: 138/68  Pulse: 83  Resp: 16  SpO2: 97 %  Weight: 73.5 kg (162 lb)                                              Thank you for allowing me to participate in the care of your patient.      Sincerely,     SHANE HARRIS MD     [facility]  cc:   Liliana Gonzalez PA-C  [address] [place] [address]      "

## 2023-12-21 NOTE — PATIENT INSTRUCTIONS
Ms. Zara Webster,  It certainly was nice to meet you today.  Per our conversation you can have the ERCP from a heart point of view as I doubt you have any coronary artery disease with a normal ejection fraction and normal stress test as well as no effort related angina.  As for swelling heart works well and no valve issues and given high A1c would hold off on diuretic for now but no issues with procedure.  Arnoldo Olea

## 2023-12-21 NOTE — PROGRESS NOTES
[facility] Heart Care Office Consult     Assessment:   (R07.89) Atypical chest pain  (primary encounter diagnosis)  Comment: Atypical, comes on at rest and goes away with upright posture, Tylenol as well as water.  Not effort related to release, able to walk and play 18 holes of golf without angina or shortness of breath.  Lastly, has a normal stress nuclear from slightly over a year ago.  Doubt there is significant ischemic coronary artery disease at this point in time.    (R60.9) Peripheral edema  Comment: On several occasions echo which showed ejection fraction of 60 to 65%, nuclear suggest greater than 70%.  Valves are intact.  This is not due to heart failure or valvular issues.  Lastly, given the fact that her A1c is still high I would refrain from diuretic therapy as this would cause higher sugars for the short-term.  This is not a contraindication to proceeding with ERCP.    (C22.0) HCC (hepatocellular carcinoma) (H)  Comment: Needs an ERCP and sent here for preoperative evaluation and clearance.  No cardiac contraindication to her proceeding with ERCP from a cardiac standpoint as she has no coronary artery disease, no ongoing heart failure, active order 4 METS daily, and this procedure is not high risk or done under general anesthesia per se.    (E11.41,  Z79.4) Type 2 diabetes mellitus with diabetic mononeuropathy, with long-term current use of insulin (H)  Comment: So noted with significantly increased hemoglobin A1c of 14 and defer to primary with her now on Trulicity.    (E78.00) Hypercholesteremia  Comment: Total cholesterol 243 with an LDL of 137 which is acceptable.    (K50.118) Crohn's disease of colon with other complication (H)  Comment: So noted with prior colectomy getting along well.     Plan:   1.  No further cardiac testing.  2.  Would recommend elevated legs decrease water from 10 glasses a day to 6 glasses a day.  3.  Refrain from sugars, salty beverages, soft drinks.  Would also  refrain from extra salt.  Consider compression stockings.  4.  No cardiac contraindication ERCP.  5.  Follow-up only as needed.    History of Present Illness:   Thank you for asking the [facility] Heart Care team to see Zara Webster a 73 year old  female  in consultation  to evaluate preoperative status.   Patient is had Crohn's disease with colectomy, is now nonalcoholic cirrhosis and hepatocellular carcinoma and is noted to have some sludge in needing ERCP.  She has chest discomfort for years now, describes a tightness when she is lying down, better when she stands up and takes water and an aspirin.  There is no effort related angina, nor was there any effort related shortness of breath, PND, orthopnea.  She also does note over the last few months some mild bipedal edema at the end of the day.    Past Medical History:     Past Medical History:   Diagnosis Date    Anemia     Diabetes mellitus (H)  Hepatocellular carcinoma  Nonalcoholic cirrhosis  Crohn's disease status colectomy  Pure hypercholesterolemia      Past history is negative for tuberculosis, myocardial infarction,  rheumatic fever, hypertension, cerebrovascular accident, chronic kidney disease, peptic ulcer disease, chronic obstructive pulmonary disease, or thyroid disorder.    Past Surgical History:     Past Surgical History:   Procedure Laterality Date    BREAST EXCISIONAL BIOPSY Left 1980    BREAST EXCISIONAL BIOPSY Left 1985    HYSTERECTOMY  1991    OOPHORECTOMY Bilateral 1991    OTHER SURGICAL HISTORY      KIDNEY STONE REMOVALNOT SURE WHICH SIDE, DR. SHIV SALINAS PROCTOSIGMOIDOSCOPY,RIGID,DIAGNOS N/A 6/17/2020    Procedure: ILEOSTOMY REVISION, PROCTOSCOPY, ILEOSCOPY;  Surgeon: Devang Moon MD;  Location: [facility];  Service: General    ZZC REMOVAL COLON/ILEOSTOMY      Description: Total Abdominal Colectomy;  Recorded: 12/15/2011;    ZZC REMOVAL COLON/PROCTECTOMY/ILEOSTOMY      Description: Total Proctocolectomy;  Recorded:  08/10/2011;    [facility] VAG HYST, W/VAGINECTOMY      Description: Vaginal Hysterectomy With Colpectomy;  Recorded: 08/14/2014;     Family History:   Family history negative for heart disease.    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  Reports that she has never smoked. She has never been exposed to tobacco smoke. She has never used smokeless tobacco. She reports that she does not currently use alcohol. She reports that she does not use drugs. The primary care physician is Obdulia Chamorro    Meds:   Scheduled Meds:  Current Outpatient Medications   Medication Sig Dispense Refill    clobetasol (TEMOVATE) 0.05 % external ointment APPLY A THIN LAYER TO THE AFFECTED AREA daily x 4-6 weeks then as needed.*      continuous blood glucose monitoring (FREESTYLE VIDA) sensor 1 Units daily 2 each 3    diphenoxylate-atropine (LOMOTIL) 2.5-0.025 MG tablet Take 1 tablet by mouth 4 times daily as needed for diarrhea 30 tablet 3    dulaglutide (TRULICITY) 0.75 MG/0.5ML pen Inject 0.75 mg Subcutaneous every 7 days 2 mL 1    flash glucose scanning reader (FREESTYLE VIDA 14 DAY READER) Misc [FLASH GLUCOSE SCANNING READER (FREESTYLE VIDA 14 DAY READER) MISC] Use 1 Units As Directed every 14 (fourteen) days. 1 each 0    fluconazole (DIFLUCAN) 150 MG tablet Take one tablet now, and if no improvement in 3 days, take another. 2 tablet 2    insulin lispro (HUMALOG KWIKPEN) 100 UNIT/ML (1 unit dial) KWIKPEN Inject 6 units subcutaneously up to 4 times daily for BG management 30 mL 0    LANTUS SOLOSTAR 100 UNIT/ML soln INJECT 14 UNITS SUBCUTANEOUSLY 2 TIMES A DAY 30 mL 0    loperamide (IMODIUM A-D) 2 MG tablet Take 1 tablet (2 mg) by mouth 4 times daily as needed for diarrhea 120 tablet 3    omeprazole (PRILOSEC) 40 MG DR capsule Take 1 capsule (40 mg) by mouth  "daily 90 capsule 3    ostomy adhesive Pste [OSTOMY ADHESIVE PSTE] Apply as needed 4 Tube 3    BD LOLY U/F 32G X 4 MM insulin pen needle TEST 4 TIMES A  each 0    tirzepatide (MOUNJARO) 5 MG/0.5ML pen Inject 5 mg Subcutaneous every 7 days (Patient not taking: Reported on 12/21/2023) 1 mL 0       PRN Meds:.    Allergies:   Prochlorperazine edisylate [prochlorperazine] and Compazine [prochlorperazine]    Objective:      Physical Exam  73.5 kg (162 lb)     Body mass index is 27.17 kg/m .  /68 (BP Location: Left arm, Patient Position: Sitting, Cuff Size: Adult Regular)   Pulse 83   Resp 16   Wt 73.5 kg (162 lb)   LMP  (LMP Unknown)   SpO2 97%   BMI 27.17 kg/m      General Appearance:   Alert, cooperative and in no acute distress.   HEENT:  No scleral icterus; the mucous membranes were pink and moist.   Neck: JVP normal. No thyromegaly. No HJR   Chest: The spine was straight. The chest was symmetric.   Lungs:   Respirations unlabored; the lungs are clear to auscultation.   Cardiovascular:   S1 and S2 without murmur, clicks or rubs. Brachial, radial, carotid and posterior tibial pulses are intact and symetrical.  No carotid bruits noted   Abdomen:  No organomegaly, masses, bruits, or tenderness. Bowels sounds are present   Extremities: No cyanosis, clubbing, or edema.   Skin: No xanthelasma.   Neurologic: Mood and affect are appropriate.         Lab Reviewed Personally by myself  Lab Results   Component Value Date     09/19/2023    CO2 23 09/19/2023    CO2 20 03/21/2022    BUN 11.4 09/19/2023    BUN 10 03/21/2022     Lab Results   Component Value Date    WBC 3.9 11/15/2022    HGB 12.1 06/26/2023    HCT 38.1 11/15/2022    MCV 87 11/15/2022     11/15/2022     Lab Results   Component Value Date    CHOL 243 03/20/2023    TRIG 238 03/20/2023    HDL 58 03/20/2023     No results found for: \"BNP\"    ECG personally reviewed by myself shows sinus rhythm, leftward axis, no acute changes.     Review of " Systems:     Review of Systems:   Enc Vitals  BP: 138/68  Pulse: 83  Resp: 16  SpO2: 97 %  Weight: 73.5 kg (162 lb)

## 2023-12-22 ENCOUNTER — TELEPHONE (OUTPATIENT)
Dept: CARDIOLOGY | Facility: CLINIC | Age: 73
End: 2023-12-22
Payer: COMMERCIAL

## 2023-12-22 NOTE — TELEPHONE ENCOUNTER
[facility] Call Center    Phone Message    May a detailed message be left on voicemail: yes     Reason for Call: Medication Question or concern regarding medication   Prescription Clarification  Name of Medication: tirzepatide (MOUNJARO)  denied by medicare, trulicity ordered by endocrinology  Prescribing Provider: unknown   Pharmacy:    [id number] - [place], [place] - [address] [place] [address]   What on the order needs clarification? Please revise AVS to reflect this change      Action Taken: Other: cardio    Travel Screening: Not Applicable

## 2023-12-29 DIAGNOSIS — E11.41 TYPE 2 DIABETES MELLITUS WITH DIABETIC MONONEUROPATHY, WITH LONG-TERM CURRENT USE OF INSULIN (H): ICD-10-CM

## 2023-12-29 DIAGNOSIS — Z79.4 TYPE 2 DIABETES MELLITUS WITH DIABETIC MONONEUROPATHY, WITH LONG-TERM CURRENT USE OF INSULIN (H): ICD-10-CM

## 2024-01-02 ENCOUNTER — TELEPHONE (OUTPATIENT)
Dept: FAMILY MEDICINE | Facility: CLINIC | Age: 74
End: 2024-01-02
Payer: COMMERCIAL

## 2024-01-02 DIAGNOSIS — E11.41 TYPE 2 DIABETES MELLITUS WITH DIABETIC MONONEUROPATHY, WITH LONG-TERM CURRENT USE OF INSULIN (H): Primary | ICD-10-CM

## 2024-01-02 DIAGNOSIS — Z79.4 TYPE 2 DIABETES MELLITUS WITH DIABETIC MONONEUROPATHY, WITH LONG-TERM CURRENT USE OF INSULIN (H): Primary | ICD-10-CM

## 2024-01-02 RX ORDER — INSULIN GLARGINE 100 [IU]/ML
INJECTION, SOLUTION SUBCUTANEOUS
Qty: 15 ML | Refills: 0 | Status: SHIPPED | OUTPATIENT
Start: 2024-01-02 | End: 2024-02-04

## 2024-01-02 NOTE — TELEPHONE ENCOUNTER
"      Requested Prescriptions   Pending Prescriptions Disp Refills    LANTUS SOLOSTAR 100 UNIT/ML soln [Pharmacy Med Name: Lantus SoloStar Subcutaneous Solution Pen-injector 100 UNIT/ML] 30 mL 0     Sig: INJECT 14 UNITS SUBCUTANEOUSLY 2 TIMES A DAY       Long Acting Insulin Protocol Passed - 12/29/2023  6:38 PM        Passed - Serum creatinine on file in past 12 months     Recent Labs   Lab Test 09/19/23  0817   CR 0.60       Ok to refill medication if creatinine is low          Passed - HgbA1C in past 3 or 6 months     If HgbA1C is 8 or greater, it needs to be on file within the past 3 months.  If less than 8, must be on file within the past 6 months.     Recent Labs   Lab Test 12/05/23  1403   A1C 14.1*             Passed - Medication is active on med list        Passed - Patient is age 18 or older        Passed - Recent (6 mo) or future (30 days) visit within the authorizing provider's specialty     Patient had office visit in the last 6 months or has a visit in the next 30 days with authorizing provider or within the authorizing provider's specialty.  See \"Patient Info\" tab in inbasket, or \"Choose Columns\" in Meds & Orders section of the refill encounter.                 "

## 2024-01-02 NOTE — TELEPHONE ENCOUNTER
General Call    Contacts         Type Contact Phone/Fax    01/02/2024 03:10 PM CST Phone (Incoming) Zara Webster (Self) [phone] (M)          Reason for Call:  patient question    What are your questions or concerns:  Patient would like to know if Dr. Chamorro would like her to have her A1C tested again before her next pre op physical on 1/23. Please call back to inform.    Date of last appointment with provider: n/a    Could we send this information to you in [employer] or would you prefer to receive a phone call?:   Patient would prefer a phone call   Okay to leave a detailed message?: Yes at Cell number on file:    Telephone Information:   Mobile [phone]

## 2024-01-04 ENCOUNTER — PATIENT OUTREACH (OUTPATIENT)
Dept: CARE COORDINATION | Facility: CLINIC | Age: 74
End: 2024-01-04
Payer: COMMERCIAL

## 2024-01-05 ENCOUNTER — TRANSFERRED RECORDS (OUTPATIENT)
Dept: HEALTH INFORMATION MANAGEMENT | Facility: CLINIC | Age: 74
End: 2024-01-05
Payer: COMMERCIAL

## 2024-01-05 LAB — RETINOPATHY: NEGATIVE

## 2024-01-17 ENCOUNTER — LAB (OUTPATIENT)
Dept: LAB | Facility: CLINIC | Age: 74
End: 2024-01-17
Payer: COMMERCIAL

## 2024-01-17 DIAGNOSIS — E11.41 TYPE 2 DIABETES MELLITUS WITH DIABETIC MONONEUROPATHY, WITH LONG-TERM CURRENT USE OF INSULIN (H): ICD-10-CM

## 2024-01-17 DIAGNOSIS — Z79.4 TYPE 2 DIABETES MELLITUS WITH DIABETIC MONONEUROPATHY, WITH LONG-TERM CURRENT USE OF INSULIN (H): ICD-10-CM

## 2024-01-17 LAB — HBA1C MFR BLD: 12.2 % (ref 0–5.6)

## 2024-01-17 PROCEDURE — 36415 COLL VENOUS BLD VENIPUNCTURE: CPT

## 2024-01-17 PROCEDURE — 83036 HEMOGLOBIN GLYCOSYLATED A1C: CPT

## 2024-01-18 ENCOUNTER — PATIENT OUTREACH (OUTPATIENT)
Dept: CARE COORDINATION | Facility: CLINIC | Age: 74
End: 2024-01-18
Payer: COMMERCIAL

## 2024-01-18 DIAGNOSIS — Z79.4 TYPE 2 DIABETES MELLITUS WITH DIABETIC MONONEUROPATHY, WITH LONG-TERM CURRENT USE OF INSULIN (H): ICD-10-CM

## 2024-01-18 DIAGNOSIS — E11.41 TYPE 2 DIABETES MELLITUS WITH DIABETIC MONONEUROPATHY, WITH LONG-TERM CURRENT USE OF INSULIN (H): ICD-10-CM

## 2024-01-18 NOTE — TELEPHONE ENCOUNTER
General Call      Reason for Call:  Patient called in and wanted to know if she should keep taking her trulicity. She told me she just ran out of this and her surgery is scheduled for 1-26-24.   Please urgently advise, pharmacy attached if refill is appropriate.  Katiana Christa saw her last for this refill.       Could we send this information to you in [employer] or would you prefer to receive a phone call?:   Patient would prefer a phone call   Okay to leave a detailed message?: Yes at Cell number on file:    Telephone Information:   Mobile [phone]

## 2024-01-23 ENCOUNTER — OFFICE VISIT (OUTPATIENT)
Dept: FAMILY MEDICINE | Facility: CLINIC | Age: 74
End: 2024-01-23
Payer: COMMERCIAL

## 2024-01-23 VITALS
SYSTOLIC BLOOD PRESSURE: 126 MMHG | BODY MASS INDEX: 26.59 KG/M2 | RESPIRATION RATE: 18 BRPM | HEIGHT: 65 IN | DIASTOLIC BLOOD PRESSURE: 65 MMHG | WEIGHT: 159.6 LBS | TEMPERATURE: 98.6 F | OXYGEN SATURATION: 98 % | HEART RATE: 81 BPM

## 2024-01-23 DIAGNOSIS — E11.41 TYPE 2 DIABETES MELLITUS WITH DIABETIC MONONEUROPATHY, WITH LONG-TERM CURRENT USE OF INSULIN (H): ICD-10-CM

## 2024-01-23 DIAGNOSIS — K74.60 NON-ALCOHOLIC CIRRHOSIS (H): ICD-10-CM

## 2024-01-23 DIAGNOSIS — Z93.3 COLOSTOMY STATUS (H): ICD-10-CM

## 2024-01-23 DIAGNOSIS — K50.118 CROHN'S DISEASE OF COLON WITH OTHER COMPLICATION (H): ICD-10-CM

## 2024-01-23 DIAGNOSIS — C22.0 HCC (HEPATOCELLULAR CARCINOMA) (H): ICD-10-CM

## 2024-01-23 DIAGNOSIS — Z01.818 PREOP GENERAL PHYSICAL EXAM: Primary | ICD-10-CM

## 2024-01-23 DIAGNOSIS — Z79.4 TYPE 2 DIABETES MELLITUS WITH DIABETIC MONONEUROPATHY, WITH LONG-TERM CURRENT USE OF INSULIN (H): ICD-10-CM

## 2024-01-23 LAB
ALBUMIN SERPL BCG-MCNC: 3.1 G/DL (ref 3.5–5.2)
ALP SERPL-CCNC: 362 U/L (ref 40–150)
ALT SERPL W P-5'-P-CCNC: 35 U/L (ref 0–50)
ANION GAP SERPL CALCULATED.3IONS-SCNC: 8 MMOL/L (ref 7–15)
AST SERPL W P-5'-P-CCNC: 68 U/L (ref 0–45)
BILIRUB SERPL-MCNC: 1.4 MG/DL
BUN SERPL-MCNC: 9.9 MG/DL (ref 8–23)
CALCIUM SERPL-MCNC: 9 MG/DL (ref 8.8–10.2)
CHLORIDE SERPL-SCNC: 101 MMOL/L (ref 98–107)
CREAT SERPL-MCNC: 0.55 MG/DL (ref 0.51–0.95)
DEPRECATED HCO3 PLAS-SCNC: 26 MMOL/L (ref 22–29)
EGFRCR SERPLBLD CKD-EPI 2021: >90 ML/MIN/1.73M2
ERYTHROCYTE [DISTWIDTH] IN BLOOD BY AUTOMATED COUNT: 14.3 % (ref 10–15)
GLUCOSE SERPL-MCNC: 385 MG/DL (ref 70–99)
HCT VFR BLD AUTO: 37.2 % (ref 35–47)
HGB BLD-MCNC: 11.8 G/DL (ref 11.7–15.7)
MCH RBC QN AUTO: 30.6 PG (ref 26.5–33)
MCHC RBC AUTO-ENTMCNC: 31.7 G/DL (ref 31.5–36.5)
MCV RBC AUTO: 97 FL (ref 78–100)
PLATELET # BLD AUTO: 157 10E3/UL (ref 150–450)
POTASSIUM SERPL-SCNC: 3.9 MMOL/L (ref 3.4–5.3)
PROT SERPL-MCNC: 6.9 G/DL (ref 6.4–8.3)
RBC # BLD AUTO: 3.85 10E6/UL (ref 3.8–5.2)
SODIUM SERPL-SCNC: 135 MMOL/L (ref 135–145)
WBC # BLD AUTO: 4.2 10E3/UL (ref 4–11)

## 2024-01-23 PROCEDURE — 85027 COMPLETE CBC AUTOMATED: CPT | Performed by: FAMILY MEDICINE

## 2024-01-23 PROCEDURE — 36415 COLL VENOUS BLD VENIPUNCTURE: CPT | Performed by: FAMILY MEDICINE

## 2024-01-23 PROCEDURE — 99214 OFFICE O/P EST MOD 30 MIN: CPT | Performed by: FAMILY MEDICINE

## 2024-01-23 PROCEDURE — 80053 COMPREHEN METABOLIC PANEL: CPT | Performed by: FAMILY MEDICINE

## 2024-01-23 RX ORDER — PROPRANOLOL HYDROCHLORIDE 60 MG/1
60 TABLET ORAL DAILY
COMMUNITY
End: 2024-02-04

## 2024-01-23 NOTE — PROGRESS NOTES
Preoperative Evaluation  [facility]  [address] [facility] [address]  Phone: [phone]  Fax: [phone]  Primary Provider: Obdulia Luna  Pre-op Performing Provider: OBDULIA LUNA  Jan 23, 2024       Zara is a 73 year old, presenting for the following:  Pre-Op Exam        1/23/2024     8:40 AM   Additional Questions   Roomed by      Surgical Information  Surgery/Procedure:     Procedure Laterality Anesthesia   ENDOSCOPIC RETROGRADE CHOLANGIOPANCREATOGRAPHY AND N/A MAC   ENDOSCOPIC ULTRASOUND          Surgery Location: [facility]  Surgeon: Dr. SHYAM Pradhan  Surgery Date: 1/26/24  Time of Surgery: 11:15am  Where patient plans to recover: At home with family  Fax number for surgical facility: Note does not need to be faxed, will be available electronically in Epic.  [facility] fax [phone]    1. Preop general physical exam  Zara is approved for her procedure with general anesthesia.  Her blood sugars are still not ideal, but as this is a diagnostic procedure and not surgery, I believe it is safe to proceed.  We will have close follow-up with her to try to get her blood sugars under better control fairly quickly.  She will hold all her insulin in the morning of her surgery and only to half a dose of Lantus the night before.  She did receive cardiac clearance from her cardiologist as she was having some nonspecific chest pain that they felt was noncardiac.  - CBC with platelets; Future  - Comprehensive metabolic panel; Future  - CBC with platelets  - Comprehensive metabolic panel    2. HCC (hepatocellular carcinoma) (H)      3. Type 2 diabetes mellitus with diabetic mononeuropathy, with long-term current use of insulin (H)  Her diabetes is not under great control.  She did bring her A1c down from 14-12 with starting Trulicity.  However, her blood sugars are still in the 300 range.  She has not made any adjustments to her insulin for unclear  reasons.  Will have her increase her Lantus from 14 units twice daily to 16 units twice daily for the next 2 to 3 days.  Would then again have her increase until we get her blood sugars in a better range.  I think Trulicity would be a great option for her long-term to get her numbers under better control.  I message her endocrinology nurse practitioner.  I also think she would benefit from seeing the diabetes educator.    4. Post Colectomy      5. Crohn's disease of colon with other complication (H)  Status post colectomy.    6. Non-alcoholic cirrhosis (H)        Subjective       HPI related to upcoming procedure:   Zara returns for preop physical.  She saw one of my colleagues about 6 weeks ago for preop but was having some nonspecific chest pain and uncontrolled blood sugars so was not approved.  She did have an appointment with cardiology and they felt that this was atypical chest pain and were not concerned about coronary artery disease and gave clearance.  Her endocrinology nurse practitioner had her do 4 doses of the starting dose of Trulicity and she has now been off it for 2 weeks.  She has not made any adjustments of her long-acting insulin for unclear reasons.  She wears a CGM and her numbers have been in the 300s.  We discussed making adjustments to that until we can get her blood sugars lower.  I also think she would benefit from Trulicity long-term.  She otherwise has had no recent illnesses or exposures.  She has had no problems with anesthesia in the past.      1/23/2024     8:42 AM   Preop Questions   1. Have you ever had a heart attack or stroke? No   2. Have you ever had surgery on your heart or blood vessels, such as a stent placement, a coronary artery bypass, or surgery on an artery in your head, neck, heart, or legs? No   3. Do you have chest pain with activity? No   4. Do you have a history of  heart failure? No   5. Do you currently have a cold, bronchitis or symptoms of other infection? No    6. Do you have a cough, shortness of breath, or wheezing? No   7. Do you or anyone in your family have previous history of blood clots? No   8. Do you or does anyone in your family have a serious bleeding problem such as prolonged bleeding following surgeries or cuts? No   9. Have you ever had problems with anemia or been told to take iron pills? No   10. Have you had any abnormal blood loss such as black, tarry or bloody stools, or abnormal vaginal bleeding? No   11. Have you ever had a blood transfusion? No   12. Are you willing to have a blood transfusion if it is medically needed before, during, or after your surgery? Yes   13. Have you or any of your relatives ever had problems with anesthesia? No   14. Do you have sleep apnea, excessive snoring or daytime drowsiness? No   15. Do you have any artifical heart valves or other implanted medical devices like a pacemaker, defibrillator, or continuous glucose monitor? No   16. Do you have artificial joints? No   17. Are you allergic to latex? No       Health Care Directive  Patient does not have a Health Care Directive or Living Will: Discussed advance care planning with patient; however, patient declined at this time.    Preoperative Review of    reviewed - no record of controlled substances prescribed.      Status of Chronic Conditions:  See problem list for active medical problems.  Problems all longstanding and stable, except as noted/documented.  See ROS for pertinent symptoms related to these conditions.    Patient Active Problem List    Diagnosis Date Noted    Atypical chest pain 12/21/2023     Priority: Medium    Peripheral edema 12/21/2023     Priority: Medium    Lesion of liver 09/26/2023     Priority: Medium    HCC (hepatocellular carcinoma) (H) 08/31/2023     Priority: Medium    Essential tremor 05/30/2023     Priority: Medium    Disorder of biliary tract 04/04/2022     Priority: Medium    Abnormal liver ultrasound 12/14/2021     Priority: Medium     Ileostomy status (H) 09/22/2021     Priority: Medium    Non-alcoholic cirrhosis (H) 09/22/2021     Priority: Medium    Nonalcoholic steatohepatitis 07/19/2021     Priority: Medium    Other cirrhosis of liver (H) - unclear etiology,[facility] 10/22/2020     Priority: Medium    Type II diabetes mellitus (H)      Priority: Medium     Created by Conversion        Gastrointestinal hemorrhage 04/29/2020     Priority: Medium    Crohn's disease of colon (H) 11/03/2019     Priority: Medium    Aspirin contraindicated 04/02/2019     Priority: Medium    Hypercholesteremia 10/06/2016     Priority: Medium    BMI 28.0-28.9,adult 10/06/2016     Priority: Medium    Post Colectomy      Priority: Medium     Created by Conversion          Past Medical History:   Diagnosis Date    Anemia     Diabetes mellitus (H)      Past Surgical History:   Procedure Laterality Date    BREAST EXCISIONAL BIOPSY Left 1980    BREAST EXCISIONAL BIOPSY Left 1985    HYSTERECTOMY  1991    OOPHORECTOMY Bilateral 1991    OTHER SURGICAL HISTORY      KIDNEY STONE REMOVALNOT SURE WHICH SIDE, DR. SHIV SALINAS PROCTOSIGMOIDOSCOPY,RIGID,DIAGNOS N/A 6/17/2020    Procedure: ILEOSTOMY REVISION, PROCTOSCOPY, ILEOSCOPY;  Surgeon: Devang Moon MD;  Location: [facility];  Service: General    ZZC REMOVAL COLON/ILEOSTOMY      Description: Total Abdominal Colectomy;  Recorded: 12/15/2011;    ZZC REMOVAL COLON/PROCTECTOMY/ILEOSTOMY      Description: Total Proctocolectomy;  Recorded: 08/10/2011;    ZZC VAG HYST, W/VAGINECTOMY      Description: Vaginal Hysterectomy With Colpectomy;  Recorded: 08/14/2014;     Current Outpatient Medications   Medication Sig Dispense Refill    BD LOLY U/F 32G X 4 MM insulin pen needle TEST 4 TIMES A  each 0    clobetasol (TEMOVATE) 0.05 % external ointment APPLY A THIN LAYER TO THE AFFECTED AREA daily x 4-6 weeks then as needed.*      continuous blood glucose monitoring (FREESTYLE VIDA) sensor 1 Units daily 2 each 3     "diphenoxylate-atropine (LOMOTIL) 2.5-0.025 MG tablet Take 1 tablet by mouth 4 times daily as needed for diarrhea 30 tablet 3    dulaglutide (TRULICITY) 0.75 MG/0.5ML pen Inject 0.75 mg Subcutaneous every 7 days 2 mL 1    flash glucose scanning reader (FREESTYLE VIDA 14 DAY READER) Misc [FLASH GLUCOSE SCANNING READER (FREESTYLE VIDA 14 DAY READER) MISC] Use 1 Units As Directed every 14 (fourteen) days. 1 each 0    fluconazole (DIFLUCAN) 150 MG tablet Take one tablet now, and if no improvement in 3 days, take another. 2 tablet 2    insulin glargine (LANTUS SOLOSTAR) 100 UNIT/ML pen INJECT 7 UNITS SUBCUTANEOUSLY 2 TIMES A DAY (Patient taking differently: 14 Units INJECT 7 UNITS SUBCUTANEOUSLY 2 TIMES A DAY) 15 mL 0    insulin lispro (HUMALOG KWIKPEN) 100 UNIT/ML (1 unit dial) KWIKPEN Inject 6 units subcutaneously up to 4 times daily for BG management 30 mL 0    loperamide (IMODIUM A-D) 2 MG tablet Take 1 tablet (2 mg) by mouth 4 times daily as needed for diarrhea 120 tablet 3    omeprazole (PRILOSEC) 40 MG DR capsule Take 1 capsule (40 mg) by mouth daily 90 capsule 3    ostomy adhesive Pste [OSTOMY ADHESIVE PSTE] Apply as needed 4 Tube 3    propranolol (INDERAL) 60 MG tablet Take 60 mg by mouth daily         Allergies   Allergen Reactions    Prochlorperazine Edisylate [Prochlorperazine] Other (See Comments)     Saw things    Compazine [Prochlorperazine] Unknown        Social History     Tobacco Use    Smoking status: Never     Passive exposure: Never    Smokeless tobacco: Never   Substance Use Topics    Alcohol use: Not Currently       History   Drug Use No         Review of Systems    Review of Systems  Constitutional, HEENT, cardiovascular, pulmonary, GI, , musculoskeletal, neuro, skin, endocrine and psych systems are negative, except as otherwise noted.  Objective    /65   Pulse 81   Temp 98.6  F (37  C)   Resp 18   Ht 1.645 m (5' 4.75\")   Wt 72.4 kg (159 lb 9.6 oz)   LMP  (LMP Unknown)   SpO2 98%  " " BMI 26.76 kg/m     Estimated body mass index is 26.76 kg/m  as calculated from the following:    Height as of this encounter: 1.645 m (5' 4.75\").    Weight as of this encounter: 72.4 kg (159 lb 9.6 oz).  Physical Exam  GENERAL: alert and no distress  EYES: Eyes grossly normal to inspection, PERRL and conjunctivae and sclerae normal  HENT: ear canals and TM's normal, nose and mouth without ulcers or lesions  NECK: no adenopathy, no asymmetry, masses, or scars  RESP: lungs clear to auscultation - no rales, rhonchi or wheezes  CV: regular rate and rhythm, normal S1 S2, no S3 or S4, no murmur, click or rub, no peripheral edema  ABDOMEN: soft, nontender, no hepatosplenomegaly, no masses and bowel sounds normal  MS: no gross musculoskeletal defects noted, no edema  SKIN: no suspicious lesions or rashes  NEURO: Normal strength and tone, mentation intact and speech normal  PSYCH: mentation appears normal, affect normal/bright  LYMPH: no cervical, supraclavicular, axillary, or inguinal adenopathy    Recent Labs   Lab Test 01/17/24  0745 12/05/23  1403 09/19/23  0817 06/26/23  0948 03/20/23  0829 11/15/22  0812 11/15/22  0807 09/19/22  0820 03/24/22  1202   HGB  --   --   --  12.1  --   --  11.9  --   --    PLT  --   --   --   --   --   --  149*  --   --    INR  --   --   --   --   --  1.03  --   --  1.0   NA  --   --  135* 131*   < >  --   --    < >  --    POTASSIUM  --   --  4.1 4.7   < >  --   --    < >  --    CR  --   --  0.60 0.68   < > 0.58  --    < >  --    A1C 12.2* 14.1* 13.5*  --    < >  --   --    < >  --     < > = values in this interval not displayed.        Diagnostics  Recent Results (from the past 24 hour(s))   CBC with platelets    Collection Time: 01/23/24  9:19 AM   Result Value Ref Range    WBC Count 4.2 4.0 - 11.0 10e3/uL    RBC Count 3.85 3.80 - 5.20 10e6/uL    Hemoglobin 11.8 11.7 - 15.7 g/dL    Hematocrit 37.2 35.0 - 47.0 %    MCV 97 78 - 100 fL    MCH 30.6 26.5 - 33.0 pg    MCHC 31.7 31.5 - 36.5 " g/dL    RDW 14.3 10.0 - 15.0 %    Platelet Count 157 150 - 450 10e3/uL   Comprehensive metabolic panel    Collection Time: 01/23/24  9:19 AM   Result Value Ref Range    Sodium 135 135 - 145 mmol/L    Potassium 3.9 3.4 - 5.3 mmol/L    Carbon Dioxide (CO2) 26 22 - 29 mmol/L    Anion Gap 8 7 - 15 mmol/L    Urea Nitrogen 9.9 8.0 - 23.0 mg/dL    Creatinine 0.55 0.51 - 0.95 mg/dL    GFR Estimate >90 >60 mL/min/1.73m2    Calcium 9.0 8.8 - 10.2 mg/dL    Chloride 101 98 - 107 mmol/L    Glucose 385 (H) 70 - 99 mg/dL    Alkaline Phosphatase 362 (H) 40 - 150 U/L    AST 68 (H) 0 - 45 U/L    ALT 35 0 - 50 U/L    Protein Total 6.9 6.4 - 8.3 g/dL    Albumin 3.1 (L) 3.5 - 5.2 g/dL    Bilirubin Total 1.4 (H) <=1.2 mg/dL      No EKG required for low risk surgery (cataract, skin procedure, breast biopsy, etc).    Revised Cardiac Risk Index (RCRI)  The patient has the following serious cardiovascular risks for perioperative complications:   - Diabetes Mellitus (on Insulin) = 1 point     RCRI Interpretation: 1 point: Class II (low risk - 0.9% complication rate)         Signed Electronically by: Obdulia Chamorro MD  Copy of this evaluation report is provided to requesting physician.

## 2024-01-26 ENCOUNTER — APPOINTMENT (OUTPATIENT)
Dept: RADIOLOGY | Facility: HOSPITAL | Age: 74
End: 2024-01-26
Attending: INTERNAL MEDICINE
Payer: COMMERCIAL

## 2024-01-26 ENCOUNTER — HOSPITAL ENCOUNTER (OUTPATIENT)
Facility: HOSPITAL | Age: 74
Discharge: HOME | End: 2024-01-26
Attending: INTERNAL MEDICINE | Admitting: INTERNAL MEDICINE
Payer: COMMERCIAL

## 2024-01-26 ENCOUNTER — ANESTHESIA EVENT (OUTPATIENT)
Dept: SURGERY | Facility: HOSPITAL | Age: 74
End: 2024-01-26
Payer: COMMERCIAL

## 2024-01-26 ENCOUNTER — ANESTHESIA (OUTPATIENT)
Dept: SURGERY | Facility: HOSPITAL | Age: 74
End: 2024-01-26
Payer: COMMERCIAL

## 2024-01-26 VITALS
SYSTOLIC BLOOD PRESSURE: 103 MMHG | TEMPERATURE: 97.8 F | OXYGEN SATURATION: 95 % | DIASTOLIC BLOOD PRESSURE: 57 MMHG | RESPIRATION RATE: 16 BRPM | BODY MASS INDEX: 26.21 KG/M2 | HEART RATE: 84 BPM | WEIGHT: 156.3 LBS

## 2024-01-26 LAB
GLUCOSE BLDC GLUCOMTR-MCNC: 159 MG/DL (ref 70–99)
GLUCOSE BLDC GLUCOMTR-MCNC: 192 MG/DL (ref 70–99)
GLUCOSE BLDC GLUCOMTR-MCNC: 280 MG/DL (ref 70–99)
GLUCOSE BLDC GLUCOMTR-MCNC: 283 MG/DL (ref 70–99)
UPPER EUS: NORMAL

## 2024-01-26 PROCEDURE — 250N000025 HC SEVOFLURANE, PER MIN: Performed by: INTERNAL MEDICINE

## 2024-01-26 PROCEDURE — 360N000076 HC SURGERY LEVEL 3, PER MIN: Performed by: INTERNAL MEDICINE

## 2024-01-26 PROCEDURE — 250N000012 HC RX MED GY IP 250 OP 636 PS 637: Performed by: ANESTHESIOLOGY

## 2024-01-26 PROCEDURE — 710N000009 HC RECOVERY PHASE 1, LEVEL 1, PER MIN: Performed by: INTERNAL MEDICINE

## 2024-01-26 PROCEDURE — 370N000017 HC ANESTHESIA TECHNICAL FEE, PER MIN: Performed by: INTERNAL MEDICINE

## 2024-01-26 PROCEDURE — 250N000009 HC RX 250: Performed by: NURSE ANESTHETIST, CERTIFIED REGISTERED

## 2024-01-26 PROCEDURE — 258N000003 HC RX IP 258 OP 636: Performed by: NURSE ANESTHETIST, CERTIFIED REGISTERED

## 2024-01-26 PROCEDURE — 272N000001 HC OR GENERAL SUPPLY STERILE: Performed by: INTERNAL MEDICINE

## 2024-01-26 PROCEDURE — 710N000012 HC RECOVERY PHASE 2, PER MINUTE: Performed by: INTERNAL MEDICINE

## 2024-01-26 PROCEDURE — 250N000011 HC RX IP 250 OP 636: Performed by: NURSE ANESTHETIST, CERTIFIED REGISTERED

## 2024-01-26 PROCEDURE — 258N000003 HC RX IP 258 OP 636: Performed by: ANESTHESIOLOGY

## 2024-01-26 PROCEDURE — 999N000141 HC STATISTIC PRE-PROCEDURE NURSING ASSESSMENT: Performed by: INTERNAL MEDICINE

## 2024-01-26 PROCEDURE — 82962 GLUCOSE BLOOD TEST: CPT

## 2024-01-26 RX ORDER — ONDANSETRON 2 MG/ML
INJECTION INTRAMUSCULAR; INTRAVENOUS PRN
Status: DISCONTINUED | OUTPATIENT
Start: 2024-01-26 | End: 2024-01-27

## 2024-01-26 RX ORDER — FENTANYL CITRATE 50 UG/ML
INJECTION, SOLUTION INTRAMUSCULAR; INTRAVENOUS PRN
Status: DISCONTINUED | OUTPATIENT
Start: 2024-01-26 | End: 2024-01-27

## 2024-01-26 RX ORDER — SODIUM CHLORIDE, SODIUM LACTATE, POTASSIUM CHLORIDE, CALCIUM CHLORIDE 600; 310; 30; 20 MG/100ML; MG/100ML; MG/100ML; MG/100ML
INJECTION, SOLUTION INTRAVENOUS CONTINUOUS
Status: DISCONTINUED | OUTPATIENT
Start: 2024-01-26 | End: 2024-01-26 | Stop reason: HOSPADM

## 2024-01-26 RX ORDER — LIDOCAINE HYDROCHLORIDE 10 MG/ML
INJECTION, SOLUTION INFILTRATION; PERINEURAL PRN
Status: DISCONTINUED | OUTPATIENT
Start: 2024-01-26 | End: 2024-01-27

## 2024-01-26 RX ORDER — INDOMETHACIN 50 MG/1
100 SUPPOSITORY RECTAL
Status: DISCONTINUED | OUTPATIENT
Start: 2024-01-26 | End: 2024-01-26 | Stop reason: HOSPADM

## 2024-01-26 RX ORDER — HYDROMORPHONE HYDROCHLORIDE 1 MG/ML
0.2 INJECTION, SOLUTION INTRAMUSCULAR; INTRAVENOUS; SUBCUTANEOUS EVERY 5 MIN PRN
Status: DISCONTINUED | OUTPATIENT
Start: 2024-01-26 | End: 2024-01-26 | Stop reason: HOSPADM

## 2024-01-26 RX ORDER — NALOXONE HYDROCHLORIDE 0.4 MG/ML
0.2 INJECTION, SOLUTION INTRAMUSCULAR; INTRAVENOUS; SUBCUTANEOUS
Status: DISCONTINUED | OUTPATIENT
Start: 2024-01-26 | End: 2024-01-26 | Stop reason: HOSPADM

## 2024-01-26 RX ORDER — INSULIN LISPRO 100 [IU]/ML
6 INJECTION, SOLUTION INTRAVENOUS; SUBCUTANEOUS ONCE
Status: DISCONTINUED | OUTPATIENT
Start: 2024-01-26 | End: 2024-01-26

## 2024-01-26 RX ORDER — LIDOCAINE 40 MG/G
CREAM TOPICAL
Status: DISCONTINUED | OUTPATIENT
Start: 2024-01-26 | End: 2024-01-26 | Stop reason: HOSPADM

## 2024-01-26 RX ORDER — FENTANYL CITRATE 50 UG/ML
25 INJECTION, SOLUTION INTRAMUSCULAR; INTRAVENOUS EVERY 5 MIN PRN
Status: DISCONTINUED | OUTPATIENT
Start: 2024-01-26 | End: 2024-01-26 | Stop reason: HOSPADM

## 2024-01-26 RX ORDER — PROPOFOL 10 MG/ML
INJECTION, EMULSION INTRAVENOUS PRN
Status: DISCONTINUED | OUTPATIENT
Start: 2024-01-26 | End: 2024-01-27

## 2024-01-26 RX ORDER — NALOXONE HYDROCHLORIDE 0.4 MG/ML
0.4 INJECTION, SOLUTION INTRAMUSCULAR; INTRAVENOUS; SUBCUTANEOUS
Status: DISCONTINUED | OUTPATIENT
Start: 2024-01-26 | End: 2024-01-26 | Stop reason: HOSPADM

## 2024-01-26 RX ORDER — ONDANSETRON 4 MG/1
4 TABLET, ORALLY DISINTEGRATING ORAL EVERY 30 MIN PRN
Status: DISCONTINUED | OUTPATIENT
Start: 2024-01-26 | End: 2024-01-26 | Stop reason: HOSPADM

## 2024-01-26 RX ORDER — NICOTINE POLACRILEX 4 MG
15-30 LOZENGE BUCCAL
Status: DISCONTINUED | OUTPATIENT
Start: 2024-01-26 | End: 2024-01-26 | Stop reason: HOSPADM

## 2024-01-26 RX ORDER — HYDROMORPHONE HYDROCHLORIDE 1 MG/ML
0.4 INJECTION, SOLUTION INTRAMUSCULAR; INTRAVENOUS; SUBCUTANEOUS EVERY 5 MIN PRN
Status: DISCONTINUED | OUTPATIENT
Start: 2024-01-26 | End: 2024-01-26 | Stop reason: HOSPADM

## 2024-01-26 RX ORDER — ONDANSETRON 2 MG/ML
4 INJECTION INTRAMUSCULAR; INTRAVENOUS EVERY 30 MIN PRN
Status: DISCONTINUED | OUTPATIENT
Start: 2024-01-26 | End: 2024-01-26 | Stop reason: HOSPADM

## 2024-01-26 RX ORDER — OXYCODONE HYDROCHLORIDE 5 MG/1
5 TABLET ORAL
Status: DISCONTINUED | OUTPATIENT
Start: 2024-01-26 | End: 2024-01-26 | Stop reason: HOSPADM

## 2024-01-26 RX ORDER — OXYCODONE HYDROCHLORIDE 5 MG/1
10 TABLET ORAL
Status: DISCONTINUED | OUTPATIENT
Start: 2024-01-26 | End: 2024-01-26 | Stop reason: HOSPADM

## 2024-01-26 RX ORDER — DEXTROSE MONOHYDRATE 25 G/50ML
25-50 INJECTION, SOLUTION INTRAVENOUS
Status: DISCONTINUED | OUTPATIENT
Start: 2024-01-26 | End: 2024-01-26 | Stop reason: HOSPADM

## 2024-01-26 RX ORDER — FENTANYL CITRATE 50 UG/ML
50 INJECTION, SOLUTION INTRAMUSCULAR; INTRAVENOUS EVERY 5 MIN PRN
Status: DISCONTINUED | OUTPATIENT
Start: 2024-01-26 | End: 2024-01-26 | Stop reason: HOSPADM

## 2024-01-26 RX ADMIN — LIDOCAINE HYDROCHLORIDE 3 ML: 10 INJECTION, SOLUTION INFILTRATION; PERINEURAL at 11:43

## 2024-01-26 RX ADMIN — DEXMEDETOMIDINE HYDROCHLORIDE 20 MCG: 100 INJECTION, SOLUTION INTRAVENOUS at 11:43

## 2024-01-26 RX ADMIN — PHENYLEPHRINE HYDROCHLORIDE 100 MCG: 10 INJECTION INTRAVENOUS at 12:10

## 2024-01-26 RX ADMIN — FENTANYL CITRATE 50 MCG: 50 INJECTION INTRAMUSCULAR; INTRAVENOUS at 11:55

## 2024-01-26 RX ADMIN — SUGAMMADEX 280 MG: 100 INJECTION, SOLUTION INTRAVENOUS at 12:19

## 2024-01-26 RX ADMIN — PROPOFOL 150 MG: 10 INJECTION, EMULSION INTRAVENOUS at 11:54

## 2024-01-26 RX ADMIN — SODIUM CHLORIDE, POTASSIUM CHLORIDE, SODIUM LACTATE AND CALCIUM CHLORIDE: 600; 310; 30; 20 INJECTION, SOLUTION INTRAVENOUS at 11:03

## 2024-01-26 RX ADMIN — ROCURONIUM BROMIDE 30 MG: 50 INJECTION, SOLUTION INTRAVENOUS at 11:55

## 2024-01-26 RX ADMIN — INSULIN ASPART 6 UNITS: 100 INJECTION, SOLUTION INTRAVENOUS; SUBCUTANEOUS at 11:03

## 2024-01-26 RX ADMIN — ONDANSETRON 4 MG: 2 INJECTION INTRAMUSCULAR; INTRAVENOUS at 12:15

## 2024-01-26 RX ADMIN — Medication 100 MG: at 11:55

## 2024-01-26 ASSESSMENT — ACTIVITIES OF DAILY LIVING (ADL)
ADLS_ACUITY_SCORE: 35
ADLS_ACUITY_SCORE: 35

## 2024-01-26 NOTE — ANESTHESIA PREPROCEDURE EVALUATION
Anesthesia Pre-Procedure Evaluation    Patient: Zara Webster   MRN: [id number] : 1950        Procedure : Procedure(s):  ENDOSCOPIC RETROGRADE CHOLANGIOPANCREATOGRAPHY AND  ENDOSCOPIC ULTRASOUND          Past Medical History:   Diagnosis Date    Anemia     Atypical chest pain     Crohn's disease (H)     Diabetes mellitus (H)     Disorder of biliary tract     Essential tremor     Gastrointestinal hemorrhage     Hepatocellular carcinoma (H)     Hypercholesteremia     Ileostomy status (H)     Lesion of liver     Non-alcoholic cirrhosis (H)       Past Surgical History:   Procedure Laterality Date    BREAST EXCISIONAL BIOPSY Left     BREAST EXCISIONAL BIOPSY Left 1985    HYSTERECTOMY  1991    OOPHORECTOMY Bilateral     OTHER SURGICAL HISTORY      KIDNEY STONE REMOVALNOT SURE WHICH SIDE, DR. SHIV SALINAS PROCTOSIGMOIDOSCOPY,RIGID,DIAGNOS N/A 2020    Procedure: ILEOSTOMY REVISION, PROCTOSCOPY, ILEOSCOPY;  Surgeon: Devang Moon MD;  Location: [facility];  Service: General    ZZC REMOVAL COLON/ILEOSTOMY      Description: Total Abdominal Colectomy;  Recorded: 12/15/2011;    ZZC REMOVAL COLON/PROCTECTOMY/ILEOSTOMY      Description: Total Proctocolectomy;  Recorded: 08/10/2011;    ZZC VAG HYST, W/VAGINECTOMY      Description: Vaginal Hysterectomy With Colpectomy;  Recorded: 2014;      Allergies   Allergen Reactions    Prochlorperazine Edisylate [Prochlorperazine] Other (See Comments)     Saw things    Compazine [Prochlorperazine] Unknown      Social History     Tobacco Use    Smoking status: Never     Passive exposure: Never    Smokeless tobacco: Never   Substance Use Topics    Alcohol use: Not Currently      Wt Readings from Last 1 Encounters:   24 70.9 kg (156 lb 4.8 oz)        Anesthesia Evaluation   Pt has had prior anesthetic.         ROS/MED HX  ENT/Pulmonary:  - neg pulmonary ROS     Neurologic:  - neg neurologic ROS     Cardiovascular:  - neg cardiovascular ROS    "  METS/Exercise Tolerance: >4 METS    Hematologic:  - neg hematologic  ROS     Musculoskeletal:  - neg musculoskeletal ROS     GI/Hepatic: Comment: HCC  Cirrhosis s/p shunt    (+)           hepatitis  liver disease,       Renal/Genitourinary:  - neg Renal ROS     Endo: Comment: Elevated A1C under active management. Patient is undergoing continued management and treatment of her DM2.     (+)  type II DM,                    Psychiatric/Substance Use:  - neg psychiatric ROS     Infectious Disease:  - neg infectious disease ROS     Malignancy:  - neg malignancy ROS     Other:  - neg other ROS          Physical Exam    Airway        Mallampati: II   TM distance: > 3 FB   Neck ROM: full   Mouth opening: > 3 cm    Respiratory Devices and Support         Dental  no notable dental history         Cardiovascular             Pulmonary               Other findings: Glu 283    OUTSIDE LABS:  CBC:   Lab Results   Component Value Date    WBC 4.2 01/23/2024    WBC 3.9 (L) 11/15/2022    HGB 11.8 01/23/2024    HGB 12.1 06/26/2023    HCT 37.2 01/23/2024    HCT 38.1 11/15/2022     01/23/2024     (L) 11/15/2022     BMP:   Lab Results   Component Value Date     01/23/2024     (L) 09/19/2023    POTASSIUM 3.9 01/23/2024    POTASSIUM 4.1 09/19/2023    CHLORIDE 101 01/23/2024    CHLORIDE 103 09/19/2023    CO2 26 01/23/2024    CO2 23 09/19/2023    BUN 9.9 01/23/2024    BUN 11.4 09/19/2023    CR 0.55 01/23/2024    CR 0.60 09/19/2023     (H) 01/26/2024     (H) 01/23/2024     COAGS:   Lab Results   Component Value Date    INR 1.03 11/15/2022     POC: No results found for: \"BGM\", \"HCG\", \"HCGS\"  HEPATIC:   Lab Results   Component Value Date    ALBUMIN 3.1 (L) 01/23/2024    PROTTOTAL 6.9 01/23/2024    ALT 35 01/23/2024    AST 68 (H) 01/23/2024    ALKPHOS 362 (H) 01/23/2024    BILITOTAL 1.4 (H) 01/23/2024     OTHER:   Lab Results   Component Value Date    A1C 12.2 (H) 01/17/2024    HANNA 9.0 01/23/2024    TSH " "0.95 04/26/2023       Anesthesia Plan    ASA Status:  3    NPO Status:  NPO Appropriate    Anesthesia Type: General.     - Airway: ETT   Induction: Intravenous, Propofol.   Maintenance: Balanced.        Consents    Anesthesia Plan(s) and associated risks, benefits, and realistic alternatives discussed. Questions answered and patient/representative(s) expressed understanding.     - Discussed: Risks, Benefits and Alternatives for BOTH SEDATION and the PROCEDURE were discussed     - Discussed with:  Patient       - Patient is DNR/DNI Status: No          Postoperative Care    Pain management: IV analgesics, Oral pain medications.   PONV prophylaxis: Ondansetron (or other 5HT-3)     Comments:    Other Comments: GETA  Zofran for PONV    Will proceed with procedure despite her elevated glucose given the chronicity of her hyperglycemia and her glucose being markedly lower than her usual average. She previously had her procedure cancelled for her elevated A1C and has shown good brandt in management and will continue to work towards tighter glucose control. We will treat her glucose perioperatively with insulin with a goal of <200           Keenan Swanson MD    I have reviewed the pertinent notes and labs in the chart from the past 30 days and (re)examined the patient.  Any updates or changes from those notes are reflected in this note.     # Hyponatremia: Lowest Na = 135 mmol/L in last 30 days, will monitor as appropriate      # Hypoalbuminemia: Lowest albumin = 3.1 g/dL in the past 30 days , will monitor as appropriate     # DMII: A1C = 12.2 % (Ref range: 0.0 - 5.6 %) within past 6 months  # Overweight: Estimated body mass index is 26.21 kg/m  as calculated from the following:    Height as of 1/23/24: 1.645 m (5' 4.75\").    Weight as of this encounter: 70.9 kg (156 lb 4.8 oz).      "

## 2024-01-26 NOTE — ANESTHESIA PROCEDURE NOTES
Airway       Patient location during procedure: OR       Procedure Start/Stop Times: 1/26/2024 11:58 AM  Staff -        CRNA: Kenya Quan APRN CRNA       Performed By: CRNA  Consent for Airway        Urgency: elective  Indications and Patient Condition       Indications for airway management: valente-procedural       Induction type:intravenous       Mask difficulty assessment: 2 - vent by mask + OA or adjuvant +/- NMBA    Final Airway Details       Final airway type: endotracheal airway       Successful airway: Oral and ETT - single  Endotracheal Airway Details        ETT size (mm): 7.0       Cuffed: yes       Cuff volume (mL): 5       Successful intubation technique: direct laryngoscopy       DL Blade Type: Dennis 2       Grade View of Cords: 2 (lower 1/3 of glottic opening seen, cricoid pressure used)       Adjucts: stylet       Position: Right       Measured from: lips       Secured at (cm): 22       Bite block used: None    Post intubation assessment        Placement verified by: capnometry, equal breath sounds and chest rise        Number of attempts at approach: 1       Number of other approaches attempted: 0       Secured with: tape       Ease of procedure: easy       Dentition: Intact and Unchanged       Dental guard used and removed. Dental Guard Type: Standard White.    Medication(s) Administered   Medication Administration Time: 1/26/2024 11:58 AM

## 2024-01-26 NOTE — H&P
The History and Physical has been reviewed, the patient has been examined and no changes have occurred in the patient's condition since the H & P was completed.     Santi Pradhan MD  [facility]  [phone]

## 2024-01-26 NOTE — ANESTHESIA CARE TRANSFER NOTE
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    Anesthesia Type:   General     Note:    Oropharynx: oropharynx clear of all foreign objects and spontaneously breathing  Level of Consciousness: awake  Oxygen Supplementation: face mask  Level of Supplemental Oxygen (L/min / FiO2): 10  Independent Airway: airway patency satisfactory and stable  Dentition: dentition unchanged  Vital Signs Stable: post-procedure vital signs reviewed and stable  Report to RN Given: handoff report given  Patient transferred to: PACU    Handoff Report: Identifed the Patient, Identified the Reponsible Provider, Reviewed the pertinent medical history, Discussed the surgical course, Reviewed Intra-OP anesthesia mangement and issues during anesthesia, Set expectations for post-procedure period and Allowed opportunity for questions and acknowledgement of understanding    Vitals:  Vitals Value Taken Time   /55 01/26/24 1230   Temp 36.4  C (97.6  F) 01/26/24 1230   Pulse 86 01/26/24 1232   Resp 17 01/26/24 1232   SpO2 98 % 01/26/24 1232   Vitals shown include unfiled device data.    Electronically Signed By: VENANCIO Howard CRNA  January 26, 2024  12:34 PM

## 2024-01-27 NOTE — ANESTHESIA POSTPROCEDURE EVALUATION
Patient: Zara Webster    Procedure: Procedure(s):  ENDOSCOPIC ULTRASOUND       Anesthesia Type:  General    Note:  Disposition: Outpatient   Postop Pain Control: Uneventful            Sign Out: Well controlled pain   PONV: No   Neuro/Psych: Uneventful            Sign Out: Acceptable/Baseline neuro status   Airway/Respiratory: Uneventful            Sign Out: Acceptable/Baseline resp. status   CV/Hemodynamics: Uneventful            Sign Out: Acceptable CV status; No obvious hypovolemia; No obvious fluid overload   Other NRE: NONE   DID A NON-ROUTINE EVENT OCCUR? No    Event details/Postop Comments:  Per chart review           Last vitals:  Vitals Value Taken Time   /56 01/26/24 1300   Temp 36.4  C (97.6  F) 01/26/24 1230   Pulse 85 01/26/24 1309   Resp 17 01/26/24 1309   SpO2 95 % 01/26/24 1309   Vitals shown include unfiled device data.    Electronically Signed By: Ihsan Giraldo MD  January 27, 2024  7:05 AM

## 2024-02-03 ENCOUNTER — APPOINTMENT (OUTPATIENT)
Dept: CT IMAGING | Facility: HOSPITAL | Age: 74
End: 2024-02-03
Attending: STUDENT IN AN ORGANIZED HEALTH CARE EDUCATION/TRAINING PROGRAM
Payer: COMMERCIAL

## 2024-02-03 ENCOUNTER — HOSPITAL ENCOUNTER (OUTPATIENT)
Facility: HOSPITAL | Age: 74
Setting detail: OBSERVATION
Discharge: HOME | End: 2024-02-04
Attending: STUDENT IN AN ORGANIZED HEALTH CARE EDUCATION/TRAINING PROGRAM | Admitting: STUDENT IN AN ORGANIZED HEALTH CARE EDUCATION/TRAINING PROGRAM
Payer: COMMERCIAL

## 2024-02-03 DIAGNOSIS — K21.9 GASTROESOPHAGEAL REFLUX DISEASE WITHOUT ESOPHAGITIS: ICD-10-CM

## 2024-02-03 DIAGNOSIS — R11.2 NAUSEA AND VOMITING, UNSPECIFIED VOMITING TYPE: ICD-10-CM

## 2024-02-03 LAB
ALBUMIN SERPL BCG-MCNC: 3 G/DL (ref 3.5–5.2)
ALP SERPL-CCNC: 361 U/L (ref 40–150)
ALT SERPL W P-5'-P-CCNC: 37 U/L (ref 0–50)
ANION GAP SERPL CALCULATED.3IONS-SCNC: 8 MMOL/L (ref 7–15)
AST SERPL W P-5'-P-CCNC: ABNORMAL U/L
B-OH-BUTYR SERPL-SCNC: <0.18 MMOL/L
BASOPHILS # BLD AUTO: 0 10E3/UL (ref 0–0.2)
BASOPHILS NFR BLD AUTO: 1 %
BILIRUB DIRECT SERPL-MCNC: 0.57 MG/DL (ref 0–0.3)
BILIRUB SERPL-MCNC: 1.3 MG/DL
BUN SERPL-MCNC: 7.5 MG/DL (ref 8–23)
CALCIUM SERPL-MCNC: 8.8 MG/DL (ref 8.8–10.2)
CHLORIDE SERPL-SCNC: 103 MMOL/L (ref 98–107)
CREAT SERPL-MCNC: 0.61 MG/DL (ref 0.51–0.95)
DEPRECATED HCO3 PLAS-SCNC: 26 MMOL/L (ref 22–29)
EGFRCR SERPLBLD CKD-EPI 2021: >90 ML/MIN/1.73M2
EOSINOPHIL # BLD AUTO: 0.2 10E3/UL (ref 0–0.7)
EOSINOPHIL NFR BLD AUTO: 3 %
ERYTHROCYTE [DISTWIDTH] IN BLOOD BY AUTOMATED COUNT: 14.4 % (ref 10–15)
GLUCOSE BLDC GLUCOMTR-MCNC: 304 MG/DL (ref 70–99)
GLUCOSE SERPL-MCNC: 313 MG/DL (ref 70–99)
HCT VFR BLD AUTO: 38.3 % (ref 35–47)
HGB BLD-MCNC: 12.1 G/DL (ref 11.7–15.7)
IMM GRANULOCYTES # BLD: 0 10E3/UL
IMM GRANULOCYTES NFR BLD: 0 %
LIPASE SERPL-CCNC: 42 U/L (ref 13–60)
LYMPHOCYTES # BLD AUTO: 1.4 10E3/UL (ref 0.8–5.3)
LYMPHOCYTES NFR BLD AUTO: 23 %
MAGNESIUM SERPL-MCNC: 1.9 MG/DL (ref 1.7–2.3)
MCH RBC QN AUTO: 29.9 PG (ref 26.5–33)
MCHC RBC AUTO-ENTMCNC: 31.6 G/DL (ref 31.5–36.5)
MCV RBC AUTO: 95 FL (ref 78–100)
MONOCYTES # BLD AUTO: 0.5 10E3/UL (ref 0–1.3)
MONOCYTES NFR BLD AUTO: 9 %
NEUTROPHILS # BLD AUTO: 3.8 10E3/UL (ref 1.6–8.3)
NEUTROPHILS NFR BLD AUTO: 64 %
NRBC # BLD AUTO: 0 10E3/UL
NRBC BLD AUTO-RTO: 0 /100
NT-PROBNP SERPL-MCNC: <36 PG/ML (ref 0–900)
PLATELET # BLD AUTO: 197 10E3/UL (ref 150–450)
POTASSIUM SERPL-SCNC: 4 MMOL/L (ref 3.4–5.3)
PROT SERPL-MCNC: 6.9 G/DL (ref 6.4–8.3)
RBC # BLD AUTO: 4.05 10E6/UL (ref 3.8–5.2)
SODIUM SERPL-SCNC: 137 MMOL/L (ref 135–145)
TROPONIN T SERPL HS-MCNC: 9 NG/L
WBC # BLD AUTO: 5.9 10E3/UL (ref 4–11)

## 2024-02-03 PROCEDURE — 71275 CT ANGIOGRAPHY CHEST: CPT | Mod: MA

## 2024-02-03 PROCEDURE — 96375 TX/PRO/DX INJ NEW DRUG ADDON: CPT | Mod: 59

## 2024-02-03 PROCEDURE — 82962 GLUCOSE BLOOD TEST: CPT

## 2024-02-03 PROCEDURE — 258N000003 HC RX IP 258 OP 636: Performed by: STUDENT IN AN ORGANIZED HEALTH CARE EDUCATION/TRAINING PROGRAM

## 2024-02-03 PROCEDURE — 83735 ASSAY OF MAGNESIUM: CPT | Performed by: EMERGENCY MEDICINE

## 2024-02-03 PROCEDURE — 84460 ALANINE AMINO (ALT) (SGPT): CPT | Performed by: EMERGENCY MEDICINE

## 2024-02-03 PROCEDURE — 250N000011 HC RX IP 250 OP 636: Performed by: STUDENT IN AN ORGANIZED HEALTH CARE EDUCATION/TRAINING PROGRAM

## 2024-02-03 PROCEDURE — 99285 EMERGENCY DEPT VISIT HI MDM: CPT | Mod: 25

## 2024-02-03 PROCEDURE — 84484 ASSAY OF TROPONIN QUANT: CPT | Performed by: EMERGENCY MEDICINE

## 2024-02-03 PROCEDURE — 83690 ASSAY OF LIPASE: CPT | Performed by: EMERGENCY MEDICINE

## 2024-02-03 PROCEDURE — 80048 BASIC METABOLIC PNL TOTAL CA: CPT | Performed by: EMERGENCY MEDICINE

## 2024-02-03 PROCEDURE — 96361 HYDRATE IV INFUSION ADD-ON: CPT

## 2024-02-03 PROCEDURE — 96376 TX/PRO/DX INJ SAME DRUG ADON: CPT

## 2024-02-03 PROCEDURE — 82010 KETONE BODYS QUAN: CPT | Performed by: EMERGENCY MEDICINE

## 2024-02-03 PROCEDURE — 83880 ASSAY OF NATRIURETIC PEPTIDE: CPT | Performed by: EMERGENCY MEDICINE

## 2024-02-03 PROCEDURE — 36415 COLL VENOUS BLD VENIPUNCTURE: CPT | Performed by: EMERGENCY MEDICINE

## 2024-02-03 PROCEDURE — 96374 THER/PROPH/DIAG INJ IV PUSH: CPT | Mod: 59

## 2024-02-03 PROCEDURE — 93005 ELECTROCARDIOGRAM TRACING: CPT | Performed by: EMERGENCY MEDICINE

## 2024-02-03 PROCEDURE — 85025 COMPLETE CBC W/AUTO DIFF WBC: CPT | Performed by: EMERGENCY MEDICINE

## 2024-02-03 PROCEDURE — 84155 ASSAY OF PROTEIN SERUM: CPT | Performed by: EMERGENCY MEDICINE

## 2024-02-03 RX ORDER — HYDROMORPHONE HYDROCHLORIDE 1 MG/ML
0.5 INJECTION, SOLUTION INTRAMUSCULAR; INTRAVENOUS; SUBCUTANEOUS
Status: DISCONTINUED | OUTPATIENT
Start: 2024-02-03 | End: 2024-02-04 | Stop reason: HOSPADM

## 2024-02-03 RX ORDER — IOPAMIDOL 755 MG/ML
100 INJECTION, SOLUTION INTRAVASCULAR ONCE
Status: COMPLETED | OUTPATIENT
Start: 2024-02-03 | End: 2024-02-03

## 2024-02-03 RX ORDER — ONDANSETRON 2 MG/ML
4 INJECTION INTRAMUSCULAR; INTRAVENOUS ONCE
Status: COMPLETED | OUTPATIENT
Start: 2024-02-03 | End: 2024-02-03

## 2024-02-03 RX ADMIN — HYDROMORPHONE HYDROCHLORIDE 0.5 MG: 1 INJECTION, SOLUTION INTRAMUSCULAR; INTRAVENOUS; SUBCUTANEOUS at 22:23

## 2024-02-03 RX ADMIN — ONDANSETRON 4 MG: 2 INJECTION INTRAMUSCULAR; INTRAVENOUS at 22:04

## 2024-02-03 RX ADMIN — SODIUM CHLORIDE, POTASSIUM CHLORIDE, SODIUM LACTATE AND CALCIUM CHLORIDE 1000 ML: 600; 310; 30; 20 INJECTION, SOLUTION INTRAVENOUS at 22:04

## 2024-02-03 RX ADMIN — IOPAMIDOL 90 ML: 755 INJECTION, SOLUTION INTRAVENOUS at 22:37

## 2024-02-03 RX ADMIN — HYDROMORPHONE HYDROCHLORIDE 0.5 MG: 1 INJECTION, SOLUTION INTRAMUSCULAR; INTRAVENOUS; SUBCUTANEOUS at 22:56

## 2024-02-03 ASSESSMENT — ACTIVITIES OF DAILY LIVING (ADL): ADLS_ACUITY_SCORE: 35

## 2024-02-04 ENCOUNTER — APPOINTMENT (OUTPATIENT)
Dept: CT IMAGING | Facility: HOSPITAL | Age: 74
End: 2024-02-04
Attending: STUDENT IN AN ORGANIZED HEALTH CARE EDUCATION/TRAINING PROGRAM
Payer: COMMERCIAL

## 2024-02-04 VITALS
RESPIRATION RATE: 18 BRPM | HEART RATE: 78 BPM | OXYGEN SATURATION: 91 % | DIASTOLIC BLOOD PRESSURE: 65 MMHG | TEMPERATURE: 97.4 F | SYSTOLIC BLOOD PRESSURE: 132 MMHG

## 2024-02-04 PROBLEM — R11.2 NAUSEA AND VOMITING, UNSPECIFIED VOMITING TYPE: Status: ACTIVE | Noted: 2024-02-04

## 2024-02-04 PROBLEM — R11.10 INTRACTABLE VOMITING: Status: ACTIVE | Noted: 2024-02-04

## 2024-02-04 LAB
AFP SERPL-MCNC: 4 NG/ML
ALBUMIN SERPL BCG-MCNC: 3 G/DL (ref 3.5–5.2)
ALP SERPL-CCNC: 343 U/L (ref 40–150)
ALT SERPL W P-5'-P-CCNC: 34 U/L (ref 0–50)
ANION GAP SERPL CALCULATED.3IONS-SCNC: 5 MMOL/L (ref 7–15)
AST SERPL W P-5'-P-CCNC: 65 U/L (ref 0–45)
ATRIAL RATE - MUSE: 56 BPM
BILIRUB SERPL-MCNC: 1.5 MG/DL
BUN SERPL-MCNC: 7.2 MG/DL (ref 8–23)
CALCIUM SERPL-MCNC: 9 MG/DL (ref 8.8–10.2)
CHLORIDE SERPL-SCNC: 102 MMOL/L (ref 98–107)
CREAT SERPL-MCNC: 0.54 MG/DL (ref 0.51–0.95)
DEPRECATED HCO3 PLAS-SCNC: 32 MMOL/L (ref 22–29)
DIASTOLIC BLOOD PRESSURE - MUSE: NORMAL MMHG
EGFRCR SERPLBLD CKD-EPI 2021: >90 ML/MIN/1.73M2
ERYTHROCYTE [DISTWIDTH] IN BLOOD BY AUTOMATED COUNT: 14.6 % (ref 10–15)
GLUCOSE BLDC GLUCOMTR-MCNC: 221 MG/DL (ref 70–99)
GLUCOSE BLDC GLUCOMTR-MCNC: 222 MG/DL (ref 70–99)
GLUCOSE BLDC GLUCOMTR-MCNC: 238 MG/DL (ref 70–99)
GLUCOSE SERPL-MCNC: 245 MG/DL (ref 70–99)
HCT VFR BLD AUTO: 39.3 % (ref 35–47)
HGB BLD-MCNC: 12.4 G/DL (ref 11.7–15.7)
INTERPRETATION ECG - MUSE: NORMAL
MCH RBC QN AUTO: 30.2 PG (ref 26.5–33)
MCHC RBC AUTO-ENTMCNC: 31.6 G/DL (ref 31.5–36.5)
MCV RBC AUTO: 96 FL (ref 78–100)
P AXIS - MUSE: 28 DEGREES
PLATELET # BLD AUTO: 193 10E3/UL (ref 150–450)
POTASSIUM SERPL-SCNC: 3.5 MMOL/L (ref 3.4–5.3)
PR INTERVAL - MUSE: 228 MS
PROT SERPL-MCNC: 7 G/DL (ref 6.4–8.3)
QRS DURATION - MUSE: 84 MS
QT - MUSE: 476 MS
QTC - MUSE: 459 MS
R AXIS - MUSE: -36 DEGREES
RBC # BLD AUTO: 4.11 10E6/UL (ref 3.8–5.2)
SODIUM SERPL-SCNC: 139 MMOL/L (ref 135–145)
SYSTOLIC BLOOD PRESSURE - MUSE: NORMAL MMHG
T AXIS - MUSE: 43 DEGREES
TROPONIN T SERPL HS-MCNC: 9 NG/L
VENTRICULAR RATE- MUSE: 56 BPM
WBC # BLD AUTO: 6.7 10E3/UL (ref 4–11)

## 2024-02-04 PROCEDURE — G0378 HOSPITAL OBSERVATION PER HR: HCPCS

## 2024-02-04 PROCEDURE — 250N000011 HC RX IP 250 OP 636: Performed by: STUDENT IN AN ORGANIZED HEALTH CARE EDUCATION/TRAINING PROGRAM

## 2024-02-04 PROCEDURE — 250N000012 HC RX MED GY IP 250 OP 636 PS 637: Performed by: STUDENT IN AN ORGANIZED HEALTH CARE EDUCATION/TRAINING PROGRAM

## 2024-02-04 PROCEDURE — 80053 COMPREHEN METABOLIC PANEL: CPT | Performed by: STUDENT IN AN ORGANIZED HEALTH CARE EDUCATION/TRAINING PROGRAM

## 2024-02-04 PROCEDURE — 36415 COLL VENOUS BLD VENIPUNCTURE: CPT | Performed by: STUDENT IN AN ORGANIZED HEALTH CARE EDUCATION/TRAINING PROGRAM

## 2024-02-04 PROCEDURE — 82105 ALPHA-FETOPROTEIN SERUM: CPT | Performed by: INTERNAL MEDICINE

## 2024-02-04 PROCEDURE — 99223 1ST HOSP IP/OBS HIGH 75: CPT | Performed by: STUDENT IN AN ORGANIZED HEALTH CARE EDUCATION/TRAINING PROGRAM

## 2024-02-04 PROCEDURE — 96376 TX/PRO/DX INJ SAME DRUG ADON: CPT

## 2024-02-04 PROCEDURE — 74177 CT ABD & PELVIS W/CONTRAST: CPT | Mod: MG

## 2024-02-04 PROCEDURE — 250N000013 HC RX MED GY IP 250 OP 250 PS 637: Performed by: STUDENT IN AN ORGANIZED HEALTH CARE EDUCATION/TRAINING PROGRAM

## 2024-02-04 PROCEDURE — 82962 GLUCOSE BLOOD TEST: CPT

## 2024-02-04 PROCEDURE — 84484 ASSAY OF TROPONIN QUANT: CPT | Performed by: STUDENT IN AN ORGANIZED HEALTH CARE EDUCATION/TRAINING PROGRAM

## 2024-02-04 PROCEDURE — 85027 COMPLETE CBC AUTOMATED: CPT | Performed by: STUDENT IN AN ORGANIZED HEALTH CARE EDUCATION/TRAINING PROGRAM

## 2024-02-04 RX ORDER — HYDRALAZINE HYDROCHLORIDE 20 MG/ML
10 INJECTION INTRAMUSCULAR; INTRAVENOUS EVERY 4 HOURS PRN
Status: DISCONTINUED | OUTPATIENT
Start: 2024-02-04 | End: 2024-02-04 | Stop reason: HOSPADM

## 2024-02-04 RX ORDER — OMEPRAZOLE 40 MG/1
40 CAPSULE, DELAYED RELEASE ORAL AT BEDTIME
Qty: 90 CAPSULE | Refills: 3 | Status: SHIPPED | OUTPATIENT
Start: 2024-02-04

## 2024-02-04 RX ORDER — OXYCODONE HYDROCHLORIDE 5 MG/1
10 TABLET ORAL EVERY 4 HOURS PRN
Status: DISCONTINUED | OUTPATIENT
Start: 2024-02-04 | End: 2024-02-04 | Stop reason: HOSPADM

## 2024-02-04 RX ORDER — ONDANSETRON 4 MG/1
4 TABLET, ORALLY DISINTEGRATING ORAL EVERY 6 HOURS PRN
Status: DISCONTINUED | OUTPATIENT
Start: 2024-02-04 | End: 2024-02-04 | Stop reason: HOSPADM

## 2024-02-04 RX ORDER — ONDANSETRON 2 MG/ML
4 INJECTION INTRAMUSCULAR; INTRAVENOUS EVERY 6 HOURS PRN
Status: DISCONTINUED | OUTPATIENT
Start: 2024-02-04 | End: 2024-02-04 | Stop reason: HOSPADM

## 2024-02-04 RX ORDER — ONDANSETRON 2 MG/ML
4 INJECTION INTRAMUSCULAR; INTRAVENOUS ONCE
Status: COMPLETED | OUTPATIENT
Start: 2024-02-04 | End: 2024-02-04

## 2024-02-04 RX ORDER — PROCHLORPERAZINE MALEATE 5 MG
5 TABLET ORAL EVERY 6 HOURS PRN
Status: DISCONTINUED | OUTPATIENT
Start: 2024-02-04 | End: 2024-02-04 | Stop reason: HOSPADM

## 2024-02-04 RX ORDER — ACETAMINOPHEN 325 MG/1
650 TABLET ORAL EVERY 4 HOURS PRN
Status: DISCONTINUED | OUTPATIENT
Start: 2024-02-04 | End: 2024-02-04 | Stop reason: HOSPADM

## 2024-02-04 RX ORDER — OXYCODONE HYDROCHLORIDE 5 MG/1
5 TABLET ORAL EVERY 4 HOURS PRN
Status: DISCONTINUED | OUTPATIENT
Start: 2024-02-04 | End: 2024-02-04 | Stop reason: HOSPADM

## 2024-02-04 RX ORDER — BISACODYL 10 MG
10 SUPPOSITORY, RECTAL RECTAL DAILY PRN
Status: DISCONTINUED | OUTPATIENT
Start: 2024-02-04 | End: 2024-02-04 | Stop reason: HOSPADM

## 2024-02-04 RX ORDER — HYDROMORPHONE HCL IN WATER/PF 6 MG/30 ML
0.2 PATIENT CONTROLLED ANALGESIA SYRINGE INTRAVENOUS
Status: DISCONTINUED | OUTPATIENT
Start: 2024-02-04 | End: 2024-02-04 | Stop reason: HOSPADM

## 2024-02-04 RX ORDER — PANTOPRAZOLE SODIUM 40 MG/1
40 TABLET, DELAYED RELEASE ORAL
Status: DISCONTINUED | OUTPATIENT
Start: 2024-02-04 | End: 2024-02-04 | Stop reason: HOSPADM

## 2024-02-04 RX ORDER — HYDROMORPHONE HCL IN WATER/PF 6 MG/30 ML
0.4 PATIENT CONTROLLED ANALGESIA SYRINGE INTRAVENOUS
Status: DISCONTINUED | OUTPATIENT
Start: 2024-02-04 | End: 2024-02-04 | Stop reason: HOSPADM

## 2024-02-04 RX ORDER — METOCLOPRAMIDE HYDROCHLORIDE 5 MG/ML
5 INJECTION INTRAMUSCULAR; INTRAVENOUS EVERY 6 HOURS PRN
Status: DISCONTINUED | OUTPATIENT
Start: 2024-02-04 | End: 2024-02-04 | Stop reason: HOSPADM

## 2024-02-04 RX ORDER — POLYETHYLENE GLYCOL 3350 17 G/17G
17 POWDER, FOR SOLUTION ORAL 2 TIMES DAILY PRN
Status: DISCONTINUED | OUTPATIENT
Start: 2024-02-04 | End: 2024-02-04 | Stop reason: HOSPADM

## 2024-02-04 RX ORDER — DEXTROSE MONOHYDRATE 25 G/50ML
25-50 INJECTION, SOLUTION INTRAVENOUS
Status: DISCONTINUED | OUTPATIENT
Start: 2024-02-04 | End: 2024-02-04 | Stop reason: HOSPADM

## 2024-02-04 RX ORDER — ACETAMINOPHEN 650 MG/1
650 SUPPOSITORY RECTAL EVERY 4 HOURS PRN
Status: DISCONTINUED | OUTPATIENT
Start: 2024-02-04 | End: 2024-02-04 | Stop reason: HOSPADM

## 2024-02-04 RX ORDER — NICOTINE POLACRILEX 4 MG
15-30 LOZENGE BUCCAL
Status: DISCONTINUED | OUTPATIENT
Start: 2024-02-04 | End: 2024-02-04 | Stop reason: HOSPADM

## 2024-02-04 RX ORDER — PROCHLORPERAZINE 25 MG
12.5 SUPPOSITORY, RECTAL RECTAL EVERY 12 HOURS PRN
Status: DISCONTINUED | OUTPATIENT
Start: 2024-02-04 | End: 2024-02-04 | Stop reason: HOSPADM

## 2024-02-04 RX ORDER — IOPAMIDOL 755 MG/ML
80 INJECTION, SOLUTION INTRAVASCULAR ONCE
Status: COMPLETED | OUTPATIENT
Start: 2024-02-04 | End: 2024-02-04

## 2024-02-04 RX ADMIN — INSULIN ASPART 2 UNITS: 100 INJECTION, SOLUTION INTRAVENOUS; SUBCUTANEOUS at 08:31

## 2024-02-04 RX ADMIN — IOPAMIDOL 80 ML: 755 INJECTION, SOLUTION INTRAVENOUS at 00:37

## 2024-02-04 RX ADMIN — PANTOPRAZOLE SODIUM 40 MG: 40 TABLET, DELAYED RELEASE ORAL at 06:44

## 2024-02-04 RX ADMIN — ONDANSETRON 4 MG: 2 INJECTION INTRAMUSCULAR; INTRAVENOUS at 00:08

## 2024-02-04 RX ADMIN — INSULIN GLARGINE 14 UNITS: 100 INJECTION, SOLUTION SUBCUTANEOUS at 08:31

## 2024-02-04 ASSESSMENT — ACTIVITIES OF DAILY LIVING (ADL)
DEPENDENT_IADLS:: INDEPENDENT
ADLS_ACUITY_SCORE: 20
ADLS_ACUITY_SCORE: 35
ADLS_ACUITY_SCORE: 20
ADLS_ACUITY_SCORE: 35
ADLS_ACUITY_SCORE: 20
ADLS_ACUITY_SCORE: 20

## 2024-02-04 ASSESSMENT — ENCOUNTER SYMPTOMS
VOMITING: 1
NAUSEA: 1

## 2024-02-04 NOTE — PROGRESS NOTES
PRIMARY DIAGNOSIS: Intractable nausea, vomiting    OUTPATIENT/OBSERVATION GOALS TO BE MET BEFORE DISCHARGE  1. Orthostatic performed: N/A    2. Tolerating PO fluid and/or antibiotics (if applicable): Yes    3. Nausea/Vomiting/Diarrhea symptoms improved: Yes    4. Pain status: Pain free.    5. Return to near baseline physical activity: Yes    Discharge Planner Nurse   Safe discharge environment identified: Yes  Barriers to discharge: Yes- further work up and possible GI or heme/onc consultation for new liver mass.        Entered by: Jo Ann Ghosh RN 02/04/2024 4:29 AM     Please review provider order for any additional goals.   Nurse to notify provider when observation goals have been met and patient is ready for discharge.

## 2024-02-04 NOTE — PROGRESS NOTES
Care Management Discharge Note    Discharge Date: 02/04/2024       Discharge Disposition: Home    Discharge Services: None    Discharge DME: None    Discharge Transportation: family or friend will provide    Patient/family educated on Medicare website which has current facility and service quality ratings: no    Education Provided on the Discharge Plan: Yes    Patient/Family in Agreement with the Plan: yes      Additional Information:  Discharge anticipated. No further CM needs identified.    MADHAV Ramirez

## 2024-02-04 NOTE — ED NOTES
Pt vomiting again. Second dose zofran ordered and given. IVF completed. Denies pain at this time. CT ordered and patient updated with plan.

## 2024-02-04 NOTE — PLAN OF CARE
PRIMARY DIAGNOSIS: ACUTE PAIN  OUTPATIENT/OBSERVATION GOALS TO BE MET BEFORE DISCHARGE:  1. Pain Status: Pain free.    2. Return to near baseline physical activity: Yes    3. Cleared for discharge by consultants (if involved): N/A    Discharge Planner Nurse   Safe discharge environment identified: Yes  Barriers to discharge: No       Entered by: Ranjit Arango RN 02/04/2024 10:41 AM

## 2024-02-04 NOTE — PLAN OF CARE
Problem: Adult Inpatient Plan of Care  Goal: Optimal Comfort and Wellbeing  Outcome: Progressing     Problem: Pain Acute  Goal: Optimal Pain Control and Function  Outcome: Progressing     Problem: Comorbidity Management  Goal: Blood Glucose Levels Within Targeted Range  Outcome: Progressing     Problem: Nausea and Vomiting  Goal: Nausea and Vomiting Relief  Outcome: Progressing  Intervention: Prevent and Manage Nausea and Vomiting  Recent Flowsheet Documentation  Taken 2/4/2024 0330 by Jo Ann Ghosh RN  Nausea/Vomiting Interventions: sips of clear liquids given   Goal Outcome Evaluation:      Pt arrived to floor from ED at 0315. Ambulatory and alert, oriented x4. Pt here with intractable nausea, vomiting and chest pain. Denies chest pain, abdominal pain and nausea since arrival to floor- stated pain meds and zofran effective in ED. X3 home insulin pens sent to pharmacy for storage. Bloodsugar 328- 1 unit s/s administered by patient. 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 Potential GI consult for new liver mass seen on CT scan. Right PIV patent, SL. Drinking water without nausea. Has colostomy which pt manages and has had since 1990s from severe crohns. Pt ambulatory and states she will decline SCD's for now, she will ask for them later if she would like them, she is familiar with them.     Temp: 97.4  F (36.3  C) Temp src: Oral BP: 132/65 Pulse: 78   Resp: 18 SpO2: 91 % O2 Device: None (Room air)

## 2024-02-04 NOTE — CONSULTS
[facility]    Zara Webster   [address] [place] [address]  73 year old female    Admission Date/Time: 2/3/2024  9:45 PM    Primary Care Provider:  Obdulia Chamorro    Requesting Physician: Dmitry Blood MD      CHIEF COMPLAINT:   Nausea and vomiting    REASON FOR THE CONSULT: Nausea and vomiting    HPI:   Zara Webster is a 73 year old female   73-year-old female patient with history of Yañez cirrhosis and HCC, Crohn's disease diagnosed more than 30 years ago, status post total proctocolectomy with end ileostomy, history of peristomal bleeding status post TIPS on 6/29/2021.  TIPS was revised in September 2022 in the main portal venous and to facilitate the treatment of varices and for any future interventions with an uncovered stent.  She also had embolization of parastomal varices.  She was noted to have a 3 cm lesion in segment 2 and 2022 and had biopsy which confirmed moderately differentiated hepatocellular carcinoma.  She underwent CT-guided microwave ablation.  Patient had MRI done on 12/5/2023 that reported interval percutaneous microwave ablation of HCC with no evidence of recurrent/residual disease.  It was reported that she had worsening of compression or stricture at the site of the tumor in segment 2 with extensive dilation of the bile ducts in segment 2.  She was also noted to have 1.2 cm lesion in the caudate lobe, consistent with LIRADS 3.  She underwent EUS on 1/26/2024 that reported normal extrahepatic bile ducts diameter.    Patient reports that she was in her usual state of health until last night when she started to have diffuse chest pain around 9 PM, and developed intractable nausea and vomiting after that.  No abdominal pain.  No nausea or vomiting since 3 AM.  Last dose of antiemetic was at midnight.  Doing well at this time.    CT scan abdomen with contrast done on presentation showed patent stent in the main portal vein, patent TIPS, redemonstration  of cirrhosis with large left hepatic lobe lesion obstructing the regional intrahepatic ducts and segments 2/3 most compatible with a primary hepatic malignancy.        REVIEW OF SYSTEMS:  Review of Systems   Cardiovascular:  Positive for chest pain.   Gastrointestinal:  Positive for nausea and vomiting.       PAST MEDICAL HISTORY:  Past Medical History:   Diagnosis Date    Anemia     Atypical chest pain     Crohn's disease (H)     Diabetes mellitus (H)     Disorder of biliary tract     Essential tremor     Gastrointestinal hemorrhage     Hepatocellular carcinoma (H)     Hypercholesteremia     Ileostomy status (H)     Lesion of liver     Non-alcoholic cirrhosis (H)        PAST SURGICAL HISTORY:  Past Surgical History:   Procedure Laterality Date    BREAST EXCISIONAL BIOPSY Left 1980    BREAST EXCISIONAL BIOPSY Left 1985    ESOPHAGOSCOPY, GASTROSCOPY, DUODENOSCOPY (EGD), COMBINED N/A 1/26/2024    Procedure: ENDOSCOPIC ULTRASOUND;  Surgeon: Santi rPadhan MD;  Location: [facility]    HYSTERECTOMY  1991    OOPHORECTOMY Bilateral 1991    OTHER SURGICAL HISTORY      KIDNEY STONE REMOVALNOT SURE WHICH SIDE, DR. CHANEY    [place] PROCTOSIGMOIDOSCOPY,RIGID,DIAGNOS N/A 6/17/2020    Procedure: ILEOSTOMY REVISION, PROCTOSCOPY, ILEOSCOPY;  Surgeon: Devang Moon MD;  Location: [facility];  Service: General    ZZC REMOVAL COLON/ILEOSTOMY      Description: Total Abdominal Colectomy;  Recorded: 12/15/2011;    ZZC REMOVAL COLON/PROCTECTOMY/ILEOSTOMY      Description: Total Proctocolectomy;  Recorded: 08/10/2011;    ZZC VAG HYST, W/VAGINECTOMY      Description: Vaginal Hysterectomy With Colpectomy;  Recorded: 08/14/2014;       FAMILY HISTORY:  Family History   Problem Relation Age of Onset    Diabetes Mother     Heart Disease Mother     Hyperlipidemia Mother     Diabetes Father     Cancer Father         malignant gallbladder    No Known Problems Sister     No Known Problems Sister        SOCIAL  HISTORY:  Social History     Tobacco Use    Smoking status: Never     Passive exposure: Never    Smokeless tobacco: Never   Substance Use Topics    Alcohol use: Not Currently       ALLERGIES/SENSITIVITIES:  Prochlorperazine edisylate [prochlorperazine] and Compazine [prochlorperazine]    MEDICATIONS:  No current outpatient medications on file.         PHYSICAL EXAM:  /65 (BP Location: Left arm)   Pulse 78   Temp 97.4  F (36.3  C) (Oral)   Resp 18   LMP  (LMP Unknown)   SpO2 91%   There is no height or weight on file to calculate BMI.  General: A&O, NAD, non-toxic appearing  Eyes: no icterus or conjunctivitis  ENT: oropharynx clear without ulcerations  Neck/Thyroid: supple, no masses  Pulmonary: CTA B  Cardiovascular: RR, S1, S2  Gastrointestinal: Soft, NTTP, no masses  Skin: no jaundice/petechiae/rashes  Lymph nodes: No cervical or supraclavicular lymphadenopathy  Extremities: No edema      LABORATORY DATA:  CBC:  Recent Labs   Lab Test 02/04/24  0932   WBC 6.7   RBC 4.11   HGB 12.4   HCT 39.3   MCV 96   MCH 30.2   MCHC 31.6   RDW 14.6           BMP:  Recent Labs   Lab 02/04/24  0932 02/04/24  0808 02/04/24  0328 02/03/24  2159     --   --  137   POTASSIUM 3.5  --   --  4.0   CHLORIDE 102  --   --  103   CO2 32*  --   --  26   * 221* 238* 313*   CR 0.54  --   --  0.61   BUN 7.2*  --   --  7.5*       INR:  Recent Labs   Lab Test 11/15/22  0812   INR 1.03       Liver and Pancreas panel:  Recent Labs   Lab 02/04/24  0932 02/03/24  2159   AST 65*  --    ALT 34 37   ALKPHOS 343* 361*   BILITOTAL 1.5* 1.3*   LIPASE  --  42         IMAGING:    CT Abdomen Pelvis w Contrast    Result Date: 2/4/2024  EXAM: CT ABDOMEN PELVIS W CONTRAST LOCATION: [facility] DATE: 2/4/2024 INDICATION: Possible thrombus in the main portal vein stent on prior CT. Evaluate patency of portal vein stent and TIPS COMPARISON: 02/03/2024 TECHNIQUE: CT scan of the abdomen and pelvis was performed  following injection of IV contrast. Multiplanar reformats were obtained. Dose reduction techniques were used. CONTRAST: isovue 370 75 mL FINDINGS: Main portal vein stent and TIPS are patent. Low-density suspicious for thrombus in the main portal vein stent seen on recent CT represents flow artifact on arterial scan. All other additional findings unchanged from recent comparison CT, including cirrhosis, large left hepatic lobe mass causing severe regional biliary ductal dilatation in segment 2/3 of the liver, and cholelithiasis. Trace perihepatic ascites present.     IMPRESSION: 1. Patent stent in the main portal vein. Patent TIPS. 2. Redemonstration of cirrhosis with a large left hepatic lobe lesion obstructing the regional intrahepatic ducts in segments 2/3, most compatible with primary hepatic malignancy.    CTA Chest Abdomen Pelvis w Contrast    Result Date: 2/3/2024  EXAM: CTA CHEST ABDOMEN PELVIS W CONTRAST LOCATION: [facility] DATE: 2/3/2024 INDICATION: chest pain radiating to the back COMPARISON: CT on 04/07/2011; MRI on 06/12/2023 TECHNIQUE: CT angiogram chest abdomen pelvis during arterial phase of injection of IV contrast. 2D and 3D MIP reconstructions were performed by the CT technologist. Dose reduction techniques were used. CONTRAST: 90 ML Isovue 370 FINDINGS: CT ANGIOGRAM CHEST, ABDOMEN, AND PELVIS: No aortic wall hematoma, aneurysm or dissection. Mild aortic atherosclerotic calcifications. Celiac artery, SMA, single bilateral renal arteries, VENUS and bilateral iliofemoral arteries are fully patent. LUNGS AND PLEURA: Mild atelectasis in the bilateral lower lobes. No consolidation, pleural effusion or pneumothorax. MEDIASTINUM/AXILLAE: Normal. CORONARY ARTERY CALCIFICATION: Mild in the right coronary artery distribution. HEPATOBILIARY: Cirrhosis of the liver again seen, similar in morphology as prior MRI. A left hepatic lobe lesion has increased in size measuring 5.5 x 4.5 cm,  previously 2.5 x 2.5 cm, with increased, severe ductal dilatation in segment 2/3 due to obstruction by this mass. A main portal vein stent and TIPS are present. The main portal vein stent appears patent, however intraluminal thrombus may be present. Patency of the TIPS is difficult to assess on this arterial phase study. Tiny stones are seen in the gallbladder without acute cholecystitis. PANCREAS: Normal. SPLEEN: Normal. ADRENAL GLANDS: Normal. KIDNEYS/BLADDER: Bilateral nonobstructing renal stones present, largest 4 mm on the left. Small bilateral renal cysts measuring up to 1.5 cm on the right do not require follow-up. Urinary bladder is normal. BOWEL: Status post colectomy with right lower quadrant end ileostomy. LYMPH NODES: Mildly prominent lymph nodes in the gastrohepatic ligament, nonspecific but may be reactive. PELVIC ORGANS: Status post hysterectomy. No abnormal adnexal masses. MUSCULOSKELETAL: Multilevel mild degenerative endplate changes and mild grade 1 degenerative anterolisthesis of L4 and L5. Mild degenerative disc space narrowing with vacuum disc at L5/S1. No suspicious osseous lesions or acute fractures.     IMPRESSION: 1.  No acute process or acute vascular findings including aortic wall hematoma/dissection in the chest, abdomen and pelvis. 2.  Cirrhotic morphology of the liver redemonstrated with increased size of left hepatic lobe lesion causing increased postobstructive biliary ductal dilatation in liver segments 2/3, now severe, likely a primary hepatic malignancy in the setting of cirrhosis. 3.  Probable thrombus formation within the main portal vein stent. 4.  Cholelithiasis and bilateral nephrolithiasis.       CC: [facility], Obdulia Chamorro    ASSESSMENT AND RECOMMENDATIONS:   Zara Webster  is a 73 year old female with:  PRAJAPATI cirrhosis with HCC diagnosed 2023 status post microwave ablation therapy.  She had MRI imaging in early December 2023 at [facility] that showed no viable  tumor, and showed dilated bile ducts in segment 2.  EUS done recently with no concerning finding.  She presented with intractable nausea and vomiting of 6 hours duration following an episode of chest pain.  Currently asymptomatic.  CT scan of the abdomen was done and showed patent stent in the main portal vein, patent TIPS, large left hepatic lobe lesion obstructing the regional intrahepatic ducts in segment 2.  Her liver enzymes and bilirubin are stable.    Patient is currently asymptomatic.  I discussed her imaging with radiology and asked radiology to review MRI done at [facility] from December 2, 2023 2 compared with current CT scan.  Dr. Nash from radiology obtained imaging from December from [place] and compared it to current CT scan and confirmed that there is no progression or changes since that time.    Recommendations:  Obtain AFP.  We will follow-up that number.  No further intervention from our standpoint.  In regards to her chest pain which preceded her nausea and vomiting, I would defer management to the primary team.  Reported that she had negative EKG and echo in December because of similar chest pain, but no stress testing.  GI will sign off at this time.  All the above was discussed with the patient and her son at the bedside.  I discussed with advanced endoscopy on-call the case above and they assisted in proving recommendations.              Mkii Dangelo MD  Thank you for the opportunity to participate in the care of this patient.   Please feel free to call me with any questions or concerns.  Phone number [phone] or if after 5PM [phone].

## 2024-02-04 NOTE — ED TRIAGE NOTES
Zara presents to ED for evaluation of CP. Patient roomed on arrival. Patient complains of chest pain that was relieved with vomiting and hyperglycemia. Reports highest BG was in the 400s per patient.    Hx of liver cancer   Triage Assessment (Adult)       Row Name 02/03/24 5791          Triage Assessment    Airway WDL WDL        Respiratory WDL    Respiratory WDL WDL        Skin Circulation/Temperature WDL    Skin Circulation/Temperature WDL WDL        Cardiac WDL    Cardiac WDL X;chest pain        Chest Pain Assessment    Chest Pain Location epigastric     Duration since 1900

## 2024-02-04 NOTE — ED NOTES
[facility] ED Handoff Report    ED Chief Complaint: Nausea/vomiting    ED Diagnosis:  (R11.2) Nausea and vomiting, unspecified vomiting type  Comment: In with intractable nausea and vomiting. Zofran effective, movement seems to make worse. Pain controlled with dilaudid  Plan: symptom control of nausea and pain. No GI intervention at this time       PMH:    Past Medical History:   Diagnosis Date    Anemia     Atypical chest pain     Crohn's disease (H)     Diabetes mellitus (H)     Disorder of biliary tract     Essential tremor     Gastrointestinal hemorrhage     Hepatocellular carcinoma (H)     Hypercholesteremia     Ileostomy status (H)     Lesion of liver     Non-alcoholic cirrhosis (H)         Code Status:  No CPR- Do NOT Intubate     Falls Risk: Yes Band: Not applicable    Current Living Situation/Residence: lives with a significant other     Elimination Status: Continent: independent, colostomy bag     Activity Level: SBA    Patients Preferred Language:  English     Needed: No    Vital Signs:  BP (!) 145/66   Pulse 84   Temp 97.5  F (36.4  C) (Oral)   Resp 16   LMP  (LMP Unknown)   SpO2 96%      Cardiac Rhythm: NSR/SB    Pain Score: 0/10    Is the Patient Confused:  No    Last Food or Drink: 02/04/24 at 0000    Focused Assessment:  Pain in upper abdomen, nausea and vomiting. Patient has significant history for crohns for which she has a colostomy. New liver CA which has been ablated. She has a portal vein stent which is patent per CT scans. No changes in bowel habits. VSS    Tests Performed: Done: Labs and Imaging    Treatments Provided:  zofran x2, dilaudid x2, fluids, ctx2    Family Dynamics/Concerns: No    Family Updated On Visitor Policy: Yes    Plan of Care Communicated to Family: Yes    Who Was Updated about Plan of Care: significant other and patient    Belongings Checklist Done and Signed by Patient: No    Belongings Sent with Patient: clothing    Medications sent with patient:  None    Covid: asymptomatic , NA    Additional Information: denies nausea or pain prior to leaving unit. Discussed plan of care with patient who was understanding of plan of care.    RN: Elinor Barnes RN   2/4/2024 2:59 AM

## 2024-02-04 NOTE — MEDICATION SCRIBE - ADMISSION MEDICATION HISTORY
Medication Scribe Admission Medication History    Admission medication history is complete. The information provided in this note is only as accurate as the sources available at the time of the update.    Information Source(s): Patient via in-person    Pertinent Information: Patient reported taking Lantus 14 units bid (previous dose 7 units bid)    Changes made to PTA medication list:  Added: None  Deleted: Propranolol 60 mg tablet (per patietn)  Changed: Lantus from 7 units bid to 14 units bid (per patient)    Medication Affordability:       Allergies reviewed with patient and updates made in EHR: yes    Medication History Completed By: Ita Gonzales 2/4/2024 1:01 AM    PTA Med List   Medication Sig Last Dose    clobetasol (TEMOVATE) 0.05 % external ointment Apply topically as needed Unknown at prn    diphenoxylate-atropine (LOMOTIL) 2.5-0.025 MG tablet Take 1 tablet by mouth 4 times daily as needed for diarrhea Unknown at prn    dulaglutide (TRULICITY) 0.75 MG/0.5ML pen Inject 0.75 mg Subcutaneous every 7 days 1/30/2024 at am    insulin glargine (LANTUS PEN) 100 UNIT/ML pen Inject 14 Units Subcutaneous 2 times daily 2/3/2024 at pm    insulin lispro (HUMALOG KWIKPEN) 100 UNIT/ML (1 unit dial) KWIKPEN Inject 6 units subcutaneously up to 4 times daily for BG management 2/3/2024 at pm    loperamide (IMODIUM A-D) 2 MG tablet Take 1 tablet (2 mg) by mouth 4 times daily as needed for diarrhea Unknown at prn    omeprazole (PRILOSEC) 40 MG DR capsule Take 1 capsule (40 mg) by mouth daily 2/3/2024 at am

## 2024-02-04 NOTE — H&P
"[facility]    History and Physical - Hospitalist Service       Date of Admission:  2/3/2024    Assessment & Plan      Zara Webster is a 73F presents with epigastric/chest discomfort, vomiting; pmhx HCC, crohns (s/p total colectomy with ostomy), IDDM2, HLD,; admitted to observation for intractable nausea/vomiting.    #intractable nausea/vomiting  #biliary dilation/obstruction  #hepatic carcinoma  Known HCC, now with progressive biliary post obstructive dilation, likely the cause of her active discomfort/nausea and vomiting.  -CMP trend  -tylenol PO PRN  -oxycodone/dilaudid PO/IV PRN  -zofran/compazine/reglan PO/IV PRN  -GI notified of admission  -declines palliative involvement at this time    #DM2  -sliding scale insulin  -glargine 14u subcutaneous BID    #code status  Has capacity to make medical decisions. Has very high medical literacy. Very clear in her desires and goals of treatment. Preference to symptomatic treatment, would not like to pursue chemotherapy--she would like to keep her hair.  -DNR/DNI         Observation Goals: -diagnostic tests and consults completed and resulted, -vital signs normal or at patient baseline, Nurse to notify provider when observation goals have been met and patient is ready for discharge.  Diet: Regular Diet Adult  DVT Prophylaxis: Pneumatic Compression Devices  Caro Catheter: Not present  Lines: None     Cardiac Monitoring: None  Code Status: No CPR- Do NOT Intubate    Clinically Significant Risk Factors Present on Admission              # Hypoalbuminemia: Lowest albumin = 3 g/dL at 2/3/2024  9:59 PM, will monitor as appropriate         # DMII: A1C = 12.2 % (Ref range: 0.0 - 5.6 %) within past 6 months    # Overweight: Estimated body mass index is 26.21 kg/m  as calculated from the following:    Height as of 1/23/24: 1.645 m (5' 4.75\").    Weight as of 1/26/24: 70.9 kg (156 lb 4.8 oz).              Disposition Plan      Expected Discharge Date: " 02/05/2024                  Luisito Peralta MD  Hospitalist Service  [facility]  Securely message with [employer] (more info)  Text page via [employer] Paging/Directory     ______________________________________________________________________    Chief Complaint   Nausea/vomiting, chest discomfort    History is obtained from the patient    History of Present Illness   Zara Webster is a 73F presents with epigastric/chest discomfort, vomiting; pmhx HCC, crohns (s/p total colectomy with ostomy), IDDM2, HLD,; admitted to observation for intractable nausea/vomiting.      Diagnosed with hepatocellular carcinoma approximate 1 year ago.  Since then she has opted for surveillance, symptomatic management.  She recently underwent ERCP with biliary stent placement approximately 1 week ago.  Since then she has had progressive nausea with associated vomiting.  No fever/chills, dyspnea, lightheaded/dizziness.  Presented specifically after development of epigastric/chest discomfort in association to the nausea and vomiting.    Regular and her desires for treatment.  She would not desire chemotherapy or advanced surgeries or other symptomatic management as she would like to focus on quality of life and keeping her hair. She has already made advanced plans financially as well as with her family.      Past Medical History    Past Medical History:   Diagnosis Date    Anemia     Atypical chest pain     Crohn's disease (H)     Diabetes mellitus (H)     Disorder of biliary tract     Essential tremor     Gastrointestinal hemorrhage     Hepatocellular carcinoma (H)     Hypercholesteremia     Ileostomy status (H)     Lesion of liver     Non-alcoholic cirrhosis (H)        Past Surgical History   Past Surgical History:   Procedure Laterality Date    BREAST EXCISIONAL BIOPSY Left 1980    BREAST EXCISIONAL BIOPSY Left 1985    ESOPHAGOSCOPY, GASTROSCOPY, DUODENOSCOPY (EGD), COMBINED N/A 1/26/2024    Procedure: ENDOSCOPIC  ULTRASOUND;  Surgeon: Santi Pradhan MD;  Location: [facility] [place]    HYSTERECTOMY      OOPHORECTOMY Bilateral     OTHER SURGICAL HISTORY      KIDNEY STONE REMOVALNOT SURE WHICH SIDE, DR. CHANEY    [place] PROCTOSIGMOIDOSCOPY,RIGID,DIAGNOS N/A 2020    Procedure: ILEOSTOMY REVISION, PROCTOSCOPY, ILEOSCOPY;  Surgeon: Devang Moon MD;  Location: [facility];  Service: General    ZZC REMOVAL COLON/ILEOSTOMY      Description: Total Abdominal Colectomy;  Recorded: 12/15/2011;    ZZC REMOVAL COLON/PROCTECTOMY/ILEOSTOMY      Description: Total Proctocolectomy;  Recorded: 08/10/2011;    ZZC VAG HYST, W/VAGINECTOMY      Description: Vaginal Hysterectomy With Colpectomy;  Recorded: 2014;       Prior to Admission Medications   Prior to Admission Medications   Prescriptions Last Dose Informant Patient Reported? Taking?   BD LOLY U/F 32G X 4 MM insulin pen needle  Self No No   Sig: TEST 4 TIMES A DAY   clobetasol (TEMOVATE) 0.05 % external ointment Unknown at prn Self Yes Yes   Sig: Apply topically as needed   continuous blood glucose monitoring (FREESTYLE VIDA) sensor  Self No No   Si Units daily   diphenoxylate-atropine (LOMOTIL) 2.5-0.025 MG tablet Unknown at prn Self No Yes   Sig: Take 1 tablet by mouth 4 times daily as needed for diarrhea   dulaglutide (TRULICITY) 0.75 MG/0.5ML pen 2024 at am Self No Yes   Sig: Inject 0.75 mg Subcutaneous every 7 days   flash glucose scanning reader (FREESTYLE VIDA 14 DAY READER) Misc  Self No No   Sig: [FLASH GLUCOSE SCANNING READER (FREESTYLE VIDA 14 DAY READER) MISC] Use 1 Units As Directed every 14 (fourteen) days.   insulin glargine (LANTUS PEN) 100 UNIT/ML pen 2/3/2024 at pm Self Yes Yes   Sig: Inject 14 Units Subcutaneous 2 times daily   insulin lispro (HUMALOG KWIKPEN) 100 UNIT/ML (1 unit dial) KWIKPEN 2/3/2024 at pm Self No Yes   Sig: Inject 6 units subcutaneously up to 4 times daily for BG management   loperamide (IMODIUM A-D) 2 MG  tablet Unknown at prn Self No Yes   Sig: Take 1 tablet (2 mg) by mouth 4 times daily as needed for diarrhea   omeprazole (PRILOSEC) 40 MG DR capsule 2/3/2024 at am Self No Yes   Sig: Take 1 capsule (40 mg) by mouth daily   ostomy adhesive Pste  Self No No   Sig: [OSTOMY ADHESIVE PSTE] Apply as needed      Facility-Administered Medications: None        Review of Systems    The 10 point Review of Systems is negative other than noted in the HPI or here.      Physical Exam   Vital Signs: Temp: 97.5  F (36.4  C) Temp src: Oral BP: (!) 145/66 Pulse: 84   Resp: 16 SpO2: 96 %      Weight: 0 lbs 0 oz    Constitutional: awake, alert, cooperative, no apparent distress, and appears stated age  Respiratory: CTAB  Cardiovascular: RRR no m/g/r  GI: soft, nondistended, nontender; palpation increases nausea  Musculoskeletal: shoulder/elbow flexion/extension 5/5 bilaterally and symmetric; ambulatory  Neurologic: AOx4, no focal deficits    Medical Decision Making       45 MINUTES SPENT BY ME on the date of service doing chart review, history, exam, documentation & further activities per the note.  MANAGEMENT DISCUSSED with the following over the past 24 hours: patient   NOTE(S)/MEDICAL RECORDS REVIEWED over the past 24 hours: outpatient cardiology, outpatient FM  Tests ORDERED & REVIEWED in the past 24 hours:  - See lab/imaging results included in the data section of the note      Data     I have personally reviewed the following data over the past 24 hrs:    5.9  \   12.1   / 197     137 103 7.5 (L) /  313 (H)   4.0 26 0.61 \     ALT: 37 AST: N/A AP: 361 (H) TBILI: 1.3 (H)   ALB: 3.0 (L) TOT PROTEIN: 6.9 LIPASE: 42     Trop: 9 BNP: <36       Imaging results reviewed over the past 24 hrs:   Recent Results (from the past 24 hour(s))   CTA Chest Abdomen Pelvis w Contrast    Narrative    EXAM: CTA CHEST ABDOMEN PELVIS W CONTRAST  LOCATION: [facility]  DATE: 2/3/2024    INDICATION: chest pain radiating to the  back  COMPARISON: CT on 04/07/2011; MRI on 06/12/2023  TECHNIQUE: CT angiogram chest abdomen pelvis during arterial phase of injection of IV contrast. 2D and 3D MIP reconstructions were performed by the CT technologist. Dose reduction techniques were used.   CONTRAST: 90 ML Isovue 370    FINDINGS:   CT ANGIOGRAM CHEST, ABDOMEN, AND PELVIS: No aortic wall hematoma, aneurysm or dissection. Mild aortic atherosclerotic calcifications. Celiac artery, SMA, single bilateral renal arteries, VENUS and bilateral iliofemoral arteries are fully patent.    LUNGS AND PLEURA: Mild atelectasis in the bilateral lower lobes. No consolidation, pleural effusion or pneumothorax.    MEDIASTINUM/AXILLAE: Normal.    CORONARY ARTERY CALCIFICATION: Mild in the right coronary artery distribution.    HEPATOBILIARY: Cirrhosis of the liver again seen, similar in morphology as prior MRI. A left hepatic lobe lesion has increased in size measuring 5.5 x 4.5 cm, previously 2.5 x 2.5 cm, with increased, severe ductal dilatation in segment 2/3 due to   obstruction by this mass. A main portal vein stent and TIPS are present. The main portal vein stent appears patent, however intraluminal thrombus may be present. Patency of the TIPS is difficult to assess on this arterial phase study. Tiny stones are   seen in the gallbladder without acute cholecystitis.    PANCREAS: Normal.    SPLEEN: Normal.    ADRENAL GLANDS: Normal.    KIDNEYS/BLADDER: Bilateral nonobstructing renal stones present, largest 4 mm on the left. Small bilateral renal cysts measuring up to 1.5 cm on the right do not require follow-up. Urinary bladder is normal.    BOWEL: Status post colectomy with right lower quadrant end ileostomy.    LYMPH NODES: Mildly prominent lymph nodes in the gastrohepatic ligament, nonspecific but may be reactive.    PELVIC ORGANS: Status post hysterectomy. No abnormal adnexal masses.    MUSCULOSKELETAL: Multilevel mild degenerative endplate changes and mild grade  1 degenerative anterolisthesis of L4 and L5. Mild degenerative disc space narrowing with vacuum disc at L5/S1. No suspicious osseous lesions or acute fractures.        Impression    IMPRESSION:    1.  No acute process or acute vascular findings including aortic wall hematoma/dissection in the chest, abdomen and pelvis.    2.  Cirrhotic morphology of the liver redemonstrated with increased size of left hepatic lobe lesion causing increased postobstructive biliary ductal dilatation in liver segments 2/3, now severe, likely a primary hepatic malignancy in the setting of   cirrhosis.    3.  Probable thrombus formation within the main portal vein stent.    4.  Cholelithiasis and bilateral nephrolithiasis.       CT Abdomen Pelvis w Contrast    Narrative    EXAM: CT ABDOMEN PELVIS W CONTRAST  LOCATION: [facility]  DATE: 2/4/2024    INDICATION: Possible thrombus in the main portal vein stent on prior CT. Evaluate patency of portal vein stent and TIPS  COMPARISON: 02/03/2024  TECHNIQUE: CT scan of the abdomen and pelvis was performed following injection of IV contrast. Multiplanar reformats were obtained. Dose reduction techniques were used.  CONTRAST: isovue 370 75 mL    FINDINGS:   Main portal vein stent and TIPS are patent. Low-density suspicious for thrombus in the main portal vein stent seen on recent CT represents flow artifact on arterial scan. All other additional findings unchanged from recent comparison CT, including   cirrhosis, large left hepatic lobe mass causing severe regional biliary ductal dilatation in segment 2/3 of the liver, and cholelithiasis. Trace perihepatic ascites present.        Impression    IMPRESSION:     1. Patent stent in the main portal vein. Patent TIPS.    2. Redemonstration of cirrhosis with a large left hepatic lobe lesion obstructing the regional intrahepatic ducts in segments 2/3, most compatible with primary hepatic malignancy.

## 2024-02-04 NOTE — CONSULTS
Care Management Initial Consult    General Information  Assessment completed with: Patient,    Type of CM/SW Visit: Initial Assessment    Primary Care Provider verified and updated as needed:     Readmission within the last 30 days: no previous admission in last 30 days      Reason for Consult: discharge planning  Advance Care Planning:            Communication Assessment  Patient's communication style: spoken language (English or Bilingual)    Hearing Difficulty or Deaf: no   Wear Glasses or Blind: yes    Cognitive  Cognitive/Neuro/Behavioral: WDL                      Living Environment:   People in home: friend(s)     Current living Arrangements: house      Able to return to prior arrangements: yes       Family/Social Support:  Care provided by: self  Provides care for: no one  Marital Status:   Children, Other (specify) (friends)          Description of Support System: Supportive, Involved    Support Assessment: Adequate family and caregiver support, Adequate social supports    Current Resources:   Patient receiving home care services: No     Community Resources: None  Equipment currently used at home: none  Supplies currently used at home: None    Employment/Financial:  Employment Status: retired        Financial Concerns:             Does the patient's insurance plan have a 3 day qualifying hospital stay waiver?  Yes     Which insurance plan 3 day waiver is available? Alternative insurance waiver    Will the waiver be used for post-acute placement? No    Lifestyle & Psychosocial Needs:  Social Determinants of Health     Food Insecurity: Low Risk  (1/23/2024)    Food Insecurity     Within the past 12 months, did you worry that your food would run out before you got money to buy more?: No     Within the past 12 months, did the food you bought just not last and you didn t have money to get more?: No   Depression: Not at risk (1/23/2024)    PHQ-2     PHQ-2 Score: 0   Housing Stability: Low Risk  (1/23/2024)     Housing Stability     Do you have housing? : Yes     Are you worried about losing your housing?: No   Tobacco Use: Low Risk  (1/26/2024)    Patient History     Smoking Tobacco Use: Never     Smokeless Tobacco Use: Never     Passive Exposure: Never   Financial Resource Strain: Low Risk  (1/23/2024)    Financial Resource Strain     Within the past 12 months, have you or your family members you live with been unable to get utilities (heat, electricity) when it was really needed?: No   Alcohol Use: Not on file   Transportation Needs: Low Risk  (1/23/2024)    Transportation Needs     Within the past 12 months, has lack of transportation kept you from medical appointments, getting your medicines, non-medical meetings or appointments, work, or from getting things that you need?: No   Physical Activity: Not on file   Interpersonal Safety: Low Risk  (12/5/2023)    Interpersonal Safety     Do you feel physically and emotionally safe where you currently live?: Yes     Within the past 12 months, have you been hit, slapped, kicked or otherwise physically hurt by someone?: No     Within the past 12 months, have you been humiliated or emotionally abused in other ways by your partner or ex-partner?: No   Stress: Not on file   Social Connections: Not on file       Functional Status:  Prior to admission patient needed assistance:   Dependent ADLs:: Independent  Dependent IADLs:: Independent       Mental Health Status:          Chemical Dependency Status:                Values/Beliefs:  Spiritual, Cultural Beliefs, Jainism Practices, Values that affect care:                 Additional Information:  SW met with pt for initial assessment. Pt reports that she is  [place] She reports she is normally independent with I/ADLs and has good support from her children. 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 She reports that she has cancer and has been dealing with that over the last six  months. She declines needs at discharge.     Megha Delarosa [facility] Medical CenterSW

## 2024-02-04 NOTE — PLAN OF CARE
Goal Outcome Evaluation:    Discharged home via family member. Send home with pt her home insulin pens x 3 and explained all discharge material.

## 2024-02-04 NOTE — ED PROVIDER NOTES
EMERGENCY DEPARTMENT ENCOUNTER      NAME: Zara Webster  AGE: 73 year old female  YOB: 1950  MRN: [id number]  EVALUATION DATE & TIME: 2/3/2024  9:45 PM    PCP: Obdulia Chamorro    ED PROVIDER: Abhay Tilley MD      Chief Complaint   Patient presents with    Chest Pain    Hyperglycemia    Vomiting         FINAL IMPRESSION:  1. Nausea and vomiting, unspecified vomiting type          ED COURSE & MEDICAL DECISION MAKING:      10:02 PM I met with the patient, obtained history, performed physical exam, and discussed ED plan.  11:28 PM I rechecked on the patient and updated them on lab results and the plan.  11:37 PM I spoke to Dr. Dangelo with GI.  12:00 AM I spoke to Dr. Rivera with [facility].    Pertinent Labs & Imaging studies reviewed. (See chart for details)  73 year old female presents to the Emergency Department for evaluation of chest pain.    ED Course as of 02/04/24 0143   Sat Feb 03, 2024 2159 EKG is sinus bradycardia with first-degree AV block, rate of 56, no ST segment changes indicative of emergent ischemia.  No T wave inversions.   2216 Patient is a 73-year-old female with past medical history significant for Crohn's disease, liver cancer but is not metastatic, and currently in remission after radioablation.  She had suddenly developed bilateral chest pain that radiated to the back with associated nausea and vomiting, and has an exam that is positive for upper abdominal tenderness, in addition to petechiae in her legs and arms, which she states is new for the past 2 weeks.  Differential diagnosis includes aortic dissection, ACS, pancreatitis, biliary pathology (she was recently evaluated for a stone in her bile duct), gastritis/esophagitis.  She also was hyperglycemic, so could be diabetic emergency.  Treating with fluids, Zofran, Dilaudid while evaluating the above differential.   2247 BNP undetectable.  Troponin is 9, and will repeat at the 2-hour given the duration of symptoms.   No ketones detected, and no acidosis on BMP to suggest diabetic emergency, and is likely isolated hyperglycemia, reportedly at the patient's baseline.  No electrolyte abnormalities or kidney injury.  No leukocytosis or anemia.   2248 LFTs are unchanged from 11 days ago.   2323 CTA does not show abnormalities with aorta or pulmonary arteries.  However it does show a severe obstruction, likely from her mass near her bile duct.  It also appears that her TIPS portal vein stent may be clotted.  [facility] paged for consultation.   2344 [facility] called back, reviewed CT findings with myself, and stated with her recent EUS and no changes in LFTs, there is likely no acute intervention to be done at this time.  They were unable to comment on the portal vein stent possible thrombus. The radiology read discusses that it is patent, but can't exclude thrombus in the findings of the report. Will discuss with rads.   Sun Feb 04, 2024   0004 Brinklow Rads: likely small thrombus, but can get CT abd w/ contrast to better investigate   0058 CT with contrast shows that the stent is patent, in addition to the TIPSS.   0058 Second troponin is unchanged.   0121 Reassessed patient, still symptomatic.  Will admit to the hospital for observation and for GI to see tomorrow.       Medical Decision Making  Obtained supplemental history:Supplemental history obtained?: Documented in chart  Reviewed external records: External records reviewed?: Inpatient Record: 8/31/23, 12/21/23, 1/23/24  Care impacted by chronic illness:Cancer/Chemotherapy and Diabetes  Care significantly affected by social determinants of health:N/A  Did you consider but not order tests?: Work up considered but not performed and documented in chart, if applicable  Did you interpret images independently?: Independent interpretation of ECG and images noted in documentation, when applicable.  Consultation discussion with other provider:Did you involve another provider (consultant,  , pharmacy, etc.)?: I discussed the care with another health care provider, see documentation for details.  Admit.        At the conclusion of the encounter I discussed the results of all of the tests and the disposition. The questions were answered. The patient or family acknowledged understanding and was agreeable with the care plan.     0 minutes of critical care time     MEDICATIONS GIVEN IN THE EMERGENCY:  Medications   HYDROmorphone (PF) (DILAUDID) injection 0.5 mg (0.5 mg Intravenous $Given 2/3/24 2256)   lactated ringers BOLUS 1,000 mL (0 mLs Intravenous Stopped 2/4/24 0003)   ondansetron (ZOFRAN) injection 4 mg (4 mg Intravenous $Given 2/3/24 2204)   iopamidol (ISOVUE-370) solution 100 mL (90 mLs Intravenous $Given 2/3/24 2237)   ondansetron (ZOFRAN) injection 4 mg (4 mg Intravenous $Given 2/4/24 0008)   iopamidol (ISOVUE-370) solution 80 mL (80 mLs Intravenous $Given 2/4/24 0037)       NEW PRESCRIPTIONS STARTED AT TODAY'S ER VISIT  New Prescriptions    No medications on file          =================================================================    HPI    Patient information was obtained from: patient, friend    Use of : N/A        Zara Webster is a 73 year old female with a pertinent history of liver cancer, T2DM, and Crohn's disease who presents to this ED via walk-in for evaluation of chest pain. At 1900, the patient had sudden onset of lower chest pain radiating to her back as well as nausea and vomiting. She took Tylenol but it did not help. Her liver cancer is not metastatic. The patient also mentions finding a gall stone blocking the liver through ERCP but it was decided to not remove it due to risk of infection from surgery. Denies history of heart attack or heart failure. Denies fever, cough, bloody stool, dysuria, hematuria, abnormal stool output, or any other complaints at this time.    Per chart review,  1/23/24 The patient visited urgent care for preop general physical exam.  She will hold all her insulin in the morning of her surgery and only to half a dose of Lantus the night before. She did receive cardiac clearance from her cardiologist as she was having some nonspecific chest pain that they felt was noncardiac. She did bring her A1c down from 14-12 with starting Trulicity. However, her blood sugars are still in the 300 range. She has not made any adjustments to her insulin for unclear reasons. Will have her increase her Lantus from 14 units twice daily to 16 units twice daily for the next 2 to 3 days.     12/21/23 The patient visited cardiology for atypical chest pain. Take Tylenol and water. Lastly, given the fact that her A1c is still high I would refrain from diuretic therapy as this would cause higher sugars for the short-term. Significantly increased hemoglobin A1c of 14 and defer to primary with her now on Trulicity. Total cholesterol 243 with an LDL of 137 which is acceptable.     8/31/23 The patient visited urgent care for chest pain after having a radioablation of hepatocellular carcinoma about a month ago. Refills for Toradol for carcinoma and Prilosec for GERD.    PAST MEDICAL HISTORY:  Past Medical History:   Diagnosis Date    Anemia     Atypical chest pain     Crohn's disease (H)     Diabetes mellitus (H)     Disorder of biliary tract     Essential tremor     Gastrointestinal hemorrhage     Hepatocellular carcinoma (H)     Hypercholesteremia     Ileostomy status (H)     Lesion of liver     Non-alcoholic cirrhosis (H)        PAST SURGICAL HISTORY:  Past Surgical History:   Procedure Laterality Date    BREAST EXCISIONAL BIOPSY Left 1980    BREAST EXCISIONAL BIOPSY Left 1985    ESOPHAGOSCOPY, GASTROSCOPY, DUODENOSCOPY (EGD), COMBINED N/A 1/26/2024    Procedure: ENDOSCOPIC ULTRASOUND;  Surgeon: Santi Pradhan MD;  Location: [facility] OR    HYSTERECTOMY  1991    OOPHORECTOMY Bilateral 1991    OTHER SURGICAL HISTORY      KIDNEY STONE REMOVALNOT SURE WHICH  SIDE, DR. CHANEY    OR PROCTOSIGMOIDOSCOPY,RIGID,DIAGNOS N/A 6/17/2020    Procedure: ILEOSTOMY REVISION, PROCTOSCOPY, ILEOSCOPY;  Surgeon: Devang Moon MD;  Location: [facility];  Service: General    ZZC REMOVAL COLON/ILEOSTOMY      Description: Total Abdominal Colectomy;  Recorded: 12/15/2011;    ZZC REMOVAL COLON/PROCTECTOMY/ILEOSTOMY      Description: Total Proctocolectomy;  Recorded: 08/10/2011;    ZZC VAG HYST, W/VAGINECTOMY      Description: Vaginal Hysterectomy With Colpectomy;  Recorded: 08/14/2014;           CURRENT MEDICATIONS:    clobetasol (TEMOVATE) 0.05 % external ointment  diphenoxylate-atropine (LOMOTIL) 2.5-0.025 MG tablet  dulaglutide (TRULICITY) 0.75 MG/0.5ML pen  insulin glargine (LANTUS PEN) 100 UNIT/ML pen  insulin lispro (HUMALOG KWIKPEN) 100 UNIT/ML (1 unit dial) KWIKPEN  loperamide (IMODIUM A-D) 2 MG tablet  omeprazole (PRILOSEC) 40 MG DR capsule  BD LOLY U/F 32G X 4 MM insulin pen needle  continuous blood glucose monitoring (FREESTYLE VIDA) sensor  flash glucose scanning reader (FREESTYLE VIDA 14 DAY READER) Misc  ostomy adhesive Pste        ALLERGIES:  Allergies   Allergen Reactions    Prochlorperazine Edisylate [Prochlorperazine] Other (See Comments)     Saw things    Compazine [Prochlorperazine] Unknown       FAMILY HISTORY:  Family History   Problem Relation Age of Onset    Diabetes Mother     Heart Disease Mother     Hyperlipidemia Mother     Diabetes Father     Cancer Father         malignant gallbladder    No Known Problems Sister     No Known Problems Sister        SOCIAL HISTORY:   Social History     Socioeconomic History    Marital status:     Number of children: 2    Years of education: 16    Highest education level: Bachelor's degree (e.g., BA, AB, BS)   Occupational History    Occupation: Retired   Tobacco Use    Smoking status: Never     Passive exposure: Never    Smokeless tobacco: Never   Vaping Use    Vaping Use: Never used   Substance and Sexual  Activity    Alcohol use: Not Currently    Drug use: No     Social Determinants of Health     Financial Resource Strain: Low Risk  (1/23/2024)    Financial Resource Strain     Within the past 12 months, have you or your family members you live with been unable to get utilities (heat, electricity) when it was really needed?: No   Food Insecurity: Low Risk  (1/23/2024)    Food Insecurity     Within the past 12 months, did you worry that your food would run out before you got money to buy more?: No     Within the past 12 months, did the food you bought just not last and you didn t have money to get more?: No   Transportation Needs: Low Risk  (1/23/2024)    Transportation Needs     Within the past 12 months, has lack of transportation kept you from medical appointments, getting your medicines, non-medical meetings or appointments, work, or from getting things that you need?: No   Interpersonal Safety: Low Risk  (12/5/2023)    Interpersonal Safety     Do you feel physically and emotionally safe where you currently live?: Yes     Within the past 12 months, have you been hit, slapped, kicked or otherwise physically hurt by someone?: No     Within the past 12 months, have you been humiliated or emotionally abused in other ways by your partner or ex-partner?: No   Housing Stability: Low Risk  (1/23/2024)    Housing Stability     Do you have housing? : Yes     Are you worried about losing your housing?: No       VITALS:  BP (!) 145/66   Pulse 84   Temp 97.5  F (36.4  C) (Oral)   Resp 16   LMP  (LMP Unknown)   SpO2 96%     PHYSICAL EXAM    Physical Exam  Vitals and nursing note reviewed.   Constitutional:       General: She is not in acute distress.     Appearance: Normal appearance. She is normal weight. She is not ill-appearing.   HENT:      Head: Normocephalic and atraumatic.      Nose: Nose normal.      Mouth/Throat:      Mouth: Mucous membranes are dry.      Pharynx: Oropharynx is clear.   Eyes:      General: No  scleral icterus.     Extraocular Movements: Extraocular movements intact.      Conjunctiva/sclera: Conjunctivae normal.      Pupils: Pupils are equal, round, and reactive to light.   Cardiovascular:      Rate and Rhythm: Normal rate and regular rhythm.      Pulses: Normal pulses.      Heart sounds: Normal heart sounds. No murmur heard.  Pulmonary:      Effort: Pulmonary effort is normal. No respiratory distress.      Breath sounds: Normal breath sounds.   Abdominal:      General: Abdomen is flat. There is no distension.      Palpations: Abdomen is soft.      Tenderness: There is abdominal tenderness (entire upper abd). There is no right CVA tenderness, left CVA tenderness, guarding or rebound.      Comments: Colostomy bag in RLQ c/d/i   Musculoskeletal:         General: Normal range of motion.      Cervical back: Normal range of motion and neck supple. No rigidity or tenderness.      Right lower leg: No edema.      Left lower leg: No edema.   Skin:     General: Skin is warm and dry.      Capillary Refill: Capillary refill takes less than 2 seconds.      Coloration: Skin is not jaundiced or pale.      Findings: Rash (petechial rash over distal extremities) present.   Neurological:      General: No focal deficit present.      Mental Status: She is alert and oriented to person, place, and time. Mental status is at baseline.   Psychiatric:         Mood and Affect: Mood normal.         Behavior: Behavior normal.         Thought Content: Thought content normal.         Judgment: Judgment normal.            LAB:  All pertinent labs reviewed and interpreted.  Results for orders placed or performed during the hospital encounter of 02/03/24   CTA Chest Abdomen Pelvis w Contrast    Impression    IMPRESSION:    1.  No acute process or acute vascular findings including aortic wall hematoma/dissection in the chest, abdomen and pelvis.    2.  Cirrhotic morphology of the liver redemonstrated with increased size of left hepatic lobe  lesion causing increased postobstructive biliary ductal dilatation in liver segments 2/3, now severe, likely a primary hepatic malignancy in the setting of   cirrhosis.    3.  Probable thrombus formation within the main portal vein stent.    4.  Cholelithiasis and bilateral nephrolithiasis.       CT Abdomen Pelvis w Contrast    Impression    IMPRESSION:     1. Patent stent in the main portal vein. Patent TIPS.    2. Redemonstration of cirrhosis with a large left hepatic lobe lesion obstructing the regional intrahepatic ducts in segments 2/3, most compatible with primary hepatic malignancy.   Basic metabolic panel   Result Value Ref Range    Sodium 137 135 - 145 mmol/L    Potassium 4.0 3.4 - 5.3 mmol/L    Chloride 103 98 - 107 mmol/L    Carbon Dioxide (CO2) 26 22 - 29 mmol/L    Anion Gap 8 7 - 15 mmol/L    Urea Nitrogen 7.5 (L) 8.0 - 23.0 mg/dL    Creatinine 0.61 0.51 - 0.95 mg/dL    GFR Estimate >90 >60 mL/min/1.73m2    Calcium 8.8 8.8 - 10.2 mg/dL    Glucose 313 (H) 70 - 99 mg/dL   Result Value Ref Range    Troponin T, High Sensitivity 9 <=14 ng/L   Result Value Ref Range    Magnesium 1.9 1.7 - 2.3 mg/dL   Nt probnp inpatient (BNP)   Result Value Ref Range    N terminal Pro BNP Inpatient <36 0 - 900 pg/mL   Ketone Beta-Hydroxybutyrate Quantitative   Result Value Ref Range    Ketone (Beta-Hydroxybutyrate) Quantitative <0.18 <=0.30 mmol/L   Hepatic function panel   Result Value Ref Range    Protein Total 6.9 6.4 - 8.3 g/dL    Albumin 3.0 (L) 3.5 - 5.2 g/dL    Bilirubin Total 1.3 (H) <=1.2 mg/dL    Alkaline Phosphatase 361 (H) 40 - 150 U/L    AST      ALT 37 0 - 50 U/L    Bilirubin Direct 0.57 (H) 0.00 - 0.30 mg/dL   Result Value Ref Range    Lipase 42 13 - 60 U/L   CBC with platelets and differential   Result Value Ref Range    WBC Count 5.9 4.0 - 11.0 10e3/uL    RBC Count 4.05 3.80 - 5.20 10e6/uL    Hemoglobin 12.1 11.7 - 15.7 g/dL    Hematocrit 38.3 35.0 - 47.0 %    MCV 95 78 - 100 fL    MCH 29.9 26.5 - 33.0 pg     MCHC 31.6 31.5 - 36.5 g/dL    RDW 14.4 10.0 - 15.0 %    Platelet Count 197 150 - 450 10e3/uL    % Neutrophils 64 %    % Lymphocytes 23 %    % Monocytes 9 %    % Eosinophils 3 %    % Basophils 1 %    % Immature Granulocytes 0 %    NRBCs per 100 WBC 0 <1 /100    Absolute Neutrophils 3.8 1.6 - 8.3 10e3/uL    Absolute Lymphocytes 1.4 0.8 - 5.3 10e3/uL    Absolute Monocytes 0.5 0.0 - 1.3 10e3/uL    Absolute Eosinophils 0.2 0.0 - 0.7 10e3/uL    Absolute Basophils 0.0 0.0 - 0.2 10e3/uL    Absolute Immature Granulocytes 0.0 <=0.4 10e3/uL    Absolute NRBCs 0.0 10e3/uL   Glucose by meter   Result Value Ref Range    GLUCOSE BY METER POCT 304 (H) 70 - 99 mg/dL   Result Value Ref Range    Troponin T, High Sensitivity 9 <=14 ng/L   ECG 12-LEAD WITH MUSE (LHE)   Result Value Ref Range    Systolic Blood Pressure  mmHg    Diastolic Blood Pressure  mmHg    Ventricular Rate 56 BPM    Atrial Rate 56 BPM    NV Interval 228 ms    QRS Duration 84 ms     ms    QTc 459 ms    P Axis 28 degrees    R AXIS -36 degrees    T Axis 43 degrees    Interpretation ECG       Sinus bradycardia with 1st degree A-V block  Left axis deviation  RSR' or QR pattern in V1 suggests right ventricular conduction delay  Minimal voltage criteria for LVH, may be normal variant  Abnormal ECG  When compared with ECG of 05-DEC-2023 13:29,  NV interval has increased  Vent. rate has decreased BY  27 BPM  RSR' pattern in V1 is now Present  Confirmed by SEE ED PROVIDER NOTE FOR, ECG INTERPRETATION ([id number]),  EDNA GARZON ([id number]) on 2/4/2024 12:23:19 AM         RADIOLOGY:  Reviewed all pertinent imaging. Please see official radiology report.  CT Abdomen Pelvis w Contrast   Final Result   IMPRESSION:       1. Patent stent in the main portal vein. Patent TIPS.      2. Redemonstration of cirrhosis with a large left hepatic lobe lesion obstructing the regional intrahepatic ducts in segments 2/3, most compatible with primary hepatic malignancy.      CTA Chest Abdomen  Pelvis w Contrast   Final Result   IMPRESSION:      1.  No acute process or acute vascular findings including aortic wall hematoma/dissection in the chest, abdomen and pelvis.      2.  Cirrhotic morphology of the liver redemonstrated with increased size of left hepatic lobe lesion causing increased postobstructive biliary ductal dilatation in liver segments 2/3, now severe, likely a primary hepatic malignancy in the setting of    cirrhosis.      3.  Probable thrombus formation within the main portal vein stent.      4.  Cholelithiasis and bilateral nephrolithiasis.                PROCEDURES:   None      [facility] Documentation:   CMS Diagnoses:               IMukul, am serving as a scribe to document services personally performed by Abhay Tilley MD based on my observation and the provider's statements to me. I, Abhay Tilley MD, attest that Mukul Valentin is acting in a scribe capacity, has observed my performance of the services and has documented them in accordance with my direction.    Abhay Tilley MD  [facility] EMERGENCY DEPARTMENT  [facility] [facility] [id number] [phone]       Abhay Tilley MD  02/04/24 0143

## 2024-02-05 ENCOUNTER — PATIENT OUTREACH (OUTPATIENT)
Dept: CARE COORDINATION | Facility: CLINIC | Age: 74
End: 2024-02-05
Payer: COMMERCIAL

## 2024-02-05 NOTE — PROGRESS NOTES
"Clinic Care Coordination Contact  [facility]: Post-Discharge Note  SITUATION                                                      Admission:    Admission Date: 02/03/24   Reason for Admission: Chest Pain    Hyperglycemia   Vomiting  Discharge:   Discharge Date: 02/04/24  Discharge Diagnosis: Nausea and vomiting, unspecified vomiting type    Intractable vomiting    BACKGROUND                                                      Per hospital discharge summary and inpatient provider notes:  Zara Webster is a 73F presents with epigastric/chest discomfort, vomiting; pmhx HCC, crohns (s/p total colectomy with ostomy), IDDM2, HLD,; admitted to observation for intractable nausea/vomiting.        Diagnosed with hepatocellular carcinoma approximate 1 year ago.  Since then she has opted for surveillance, symptomatic management.  She recently underwent ERCP with biliary stent placement approximately 1 week ago.  Since then she has had progressive nausea with associated vomiting.  No fever/chills, dyspnea, lightheaded/dizziness.  Presented specifically after development of epigastric/chest discomfort in association to the nausea and vomiting.     Regular and her desires for treatment.  She would not desire chemotherapy or advanced surgeries or other symptomatic management as she would like to focus on quality of life and keeping her hair. She has already made advanced plans financially as well as with her family.    ASSESSMENT           Discharge Assessment  How are you doing now that you are home?: Spoke with Zara briefly.  She stated she is \"all fixed up\" and \"feeling better\".  She denied any additional needs.  How are your symptoms? (Red Flag symptoms escalate to triage hotline per guidelines): Improved  Do you feel your condition is stable enough to be safe at home until your provider visit?: Yes  Does the patient have their discharge instructions? : Yes  Does the patient have questions regarding their discharge " instructions? : No  Were you started on any new medications or were there changes to any of your previous medications? : Yes  Does the patient have all of their medications?: Yes  Do you have questions regarding any of your medications? : No  Do you have all of your needed medical supplies or equipment (DME)?  (i.e. oxygen tank, CPAP, cane, etc.): Yes  Discharge follow-up appointment scheduled within 14 calendar days? : No  Is patient agreeable to assistance with scheduling? : No (Attempted to assist with scheduling and patient declined)    Post-op ([facility] CTA Only)  If the patient had a surgery or procedure, do they have any questions for a nurse?: No    Post-op (Clinicians Only)  Did the patient have surgery or a procedure: No  Eating & Drinking: eating and drinking without complaints/concerns  PO Intake: regular diet    Spoke with Zarakat thorpe for TCM post hospital phone call.  See note above.  She declined any CCC needs.  She stated the N&V has completely resolved.  Attempted to assist with scheduling a PCP appointment and she stated she would complete this on her own.    PLAN                                                      Outpatient Plan:  Patient to call and schedule PCP appointment independently.    Future Appointments   Date Time Provider Department Center   3/18/2024  8:00 AM [facility]   3/26/2024  9:00 AM [facility]         For any urgent concerns, please contact our 24 hour nurse triage line: [phone] ([phone])         Krystal Fallon RN

## 2024-02-06 DIAGNOSIS — Z79.4 TYPE 2 DIABETES MELLITUS WITH DIABETIC MONONEUROPATHY, WITH LONG-TERM CURRENT USE OF INSULIN (H): Primary | ICD-10-CM

## 2024-02-06 DIAGNOSIS — E11.41 TYPE 2 DIABETES MELLITUS WITH DIABETIC MONONEUROPATHY, WITH LONG-TERM CURRENT USE OF INSULIN (H): Primary | ICD-10-CM

## 2024-02-07 DIAGNOSIS — Z79.4 TYPE 2 DIABETES MELLITUS WITH DIABETIC MONONEUROPATHY, WITH LONG-TERM CURRENT USE OF INSULIN (H): ICD-10-CM

## 2024-02-07 DIAGNOSIS — E11.41 TYPE 2 DIABETES MELLITUS WITH DIABETIC MONONEUROPATHY, WITH LONG-TERM CURRENT USE OF INSULIN (H): ICD-10-CM

## 2024-02-07 RX ORDER — FLUCONAZOLE 150 MG/1
TABLET ORAL
Qty: 2 TABLET | Refills: 0 | Status: SHIPPED | OUTPATIENT
Start: 2024-02-07 | End: 2024-03-13

## 2024-02-15 ENCOUNTER — NURSE TRIAGE (OUTPATIENT)
Dept: NURSING | Facility: CLINIC | Age: 74
End: 2024-02-15
Payer: COMMERCIAL

## 2024-02-15 DIAGNOSIS — E11.41 TYPE 2 DIABETES MELLITUS WITH DIABETIC MONONEUROPATHY, WITH LONG-TERM CURRENT USE OF INSULIN (H): ICD-10-CM

## 2024-02-15 DIAGNOSIS — Z79.4 TYPE 2 DIABETES MELLITUS WITH DIABETIC MONONEUROPATHY, WITH LONG-TERM CURRENT USE OF INSULIN (H): ICD-10-CM

## 2024-02-15 RX ORDER — INSULIN LISPRO 100 [IU]/ML
INJECTION, SOLUTION INTRAVENOUS; SUBCUTANEOUS
Qty: 30 ML | Refills: 0 | Status: SHIPPED | OUTPATIENT
Start: 2024-02-15 | End: 2024-08-27

## 2024-02-15 NOTE — TELEPHONE ENCOUNTER
She can get refills from either of us.  I will need you to clarify what doses of insulin both long-acting and short acting that she is currently using so we make sure we give her enough.  Also, if she tolerated the starting dose of Trulicity, I would recommend increasing to the next dose.  Cueing up these prescriptions would be super helpful, thank you

## 2024-02-15 NOTE — TELEPHONE ENCOUNTER
Incoming call from patient.  Clarified patient's request.  Patient states that she has been working hard to get her blood sugars under control as noted with improvement in A1c.    However, patient states that she is running out of her insulin glargine and insulin Humalog as well as her Trulicity.    She will be leaving to go to [place] for 6 weeks starting on February 28 and is trying to figure out if she needs to be asking PCP for refills or if this needs to come from endocrinology. She is open to a visit if needed.    Sukhwinder Card RN     [facility]

## 2024-02-15 NOTE — TELEPHONE ENCOUNTER
Called patient and verified medication dosages:    Pended 90 day supplies.    Patient is OKAY increasing Trulicity  Patient is taking insulin glargine, 14 units BID  Patient is taking insulin lispro 6 units QID  Also pended new script to provide enough pen needles for her 2 insulin pens assuming 6 injections per day.    Pended meds here.    Sukhwinder Card RN     [facility]

## 2024-02-15 NOTE — TELEPHONE ENCOUNTER
Patient is calling and is diabetic and has been diagnosed with liver cancer and is going through [facility].  Obdulia Sandoval is handling all her pre op visits.  A1C has been chanigng due to Trulicty that MD Obdulia Chamorro has ordered.   Patient is requesting to speak with [facility] direct regarding medication as she is out of medication.        Reason for Disposition   Health information question, no triage required and triager able to answer question    Additional Information   Negative: Nursing judgment   Negative: Nursing judgment   Negative: Nursing judgment   Negative: Requesting lab results and adult stable (no new symptoms, not worsening)   Negative: Requesting referral to a specialist   Negative: Questions about durable medical equipment ordered and triager unable to answer   Negative: Requesting regular office appointment and adult stable (no new symptoms, not worsening)    Protocols used: Information Only Call - No Triage-A-[place]

## 2024-02-21 ENCOUNTER — TELEPHONE (OUTPATIENT)
Dept: FAMILY MEDICINE | Facility: CLINIC | Age: 74
End: 2024-02-21

## 2024-02-21 ENCOUNTER — VIRTUAL VISIT (OUTPATIENT)
Dept: EDUCATION SERVICES | Facility: CLINIC | Age: 74
End: 2024-02-21
Attending: NURSE PRACTITIONER
Payer: COMMERCIAL

## 2024-02-21 DIAGNOSIS — Z79.4 TYPE 2 DIABETES MELLITUS WITH DIABETIC MONONEUROPATHY, WITH LONG-TERM CURRENT USE OF INSULIN (H): ICD-10-CM

## 2024-02-21 DIAGNOSIS — Z79.4 TYPE 2 DIABETES MELLITUS WITH DIABETIC MONONEUROPATHY, WITH LONG-TERM CURRENT USE OF INSULIN (H): Primary | ICD-10-CM

## 2024-02-21 DIAGNOSIS — E11.41 TYPE 2 DIABETES MELLITUS WITH DIABETIC MONONEUROPATHY, WITH LONG-TERM CURRENT USE OF INSULIN (H): ICD-10-CM

## 2024-02-21 DIAGNOSIS — E11.41 TYPE 2 DIABETES MELLITUS WITH DIABETIC MONONEUROPATHY, WITH LONG-TERM CURRENT USE OF INSULIN (H): Primary | ICD-10-CM

## 2024-02-21 PROCEDURE — G0108 DIAB MANAGE TRN  PER INDIV: HCPCS | Mod: 93 | Performed by: DIETITIAN, REGISTERED

## 2024-02-21 NOTE — PROGRESS NOTES
Diabetes Self-Management Education & Support    Presents for: Individual review    Type of Service: Telephone Visit    Originating Location (Patient Location): Home  Distant Location (Provider Location): Offsite  Mode of Communication:  Telephone    Telephone Visit Start Time:  09:00 AM  Telephone Visit End Time (telephone visit stop time): 09:46 AM    How would patient like to obtain AVS? Sebas      REPORTS:  Patient is unable to download Naila CGM reader device. Patient reported glucose data from the device:       Average Glucose: 304 mg/dL (last 7 days)   12am-6am: 295 mg/dL   6am-12pm: 311 mg/dL     Time in Target (last 7 days)   Above: 100%   In Target: 0%   Below: 0%     Patient reports just starting a new sensor    Pt verbalized understanding of concepts discussed and recommendations provided today.       Continue education with the following diabetes management concepts: Healthy Eating, Monitoring, Taking Medication, Problem Solving, and Reducing Risks    ASSESSMENT:  Met with Zara today regarding her diabetes. Zara is using the Naila 14 day sensor/reader and is ready to upgrade in March as it has been 5 years. We will discuss at next visit as she has a few months worth of sensors currently. Encouraged patient to scan more often. 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 She does feel like her glucose has come down now that she has started on Trulicity. Patient has not increased her insulin dosage in some time - she is on the same dosage as she was back in September 2023. Patient with new diagnosis of Liver cancer - hoping to improve her glucose control.  Patient was drinking 6 cans of soda/day and has been working on reducing - she is now down to 2 cans/day. Congratulated her on this change.   Reviewed insulin use and correction scale. Patient has never used one, but this could help improve glucose control.   Glucose Patterns & Trends:  Hyperglycemia,  "weekend- premeal, postmeal, and nocturnal and weekday- premeal, postmeal, and nocturnal  Time in Target: 0%    PLAN  Check glucose 4 times daily  Increase Lantus by 10% to 15 units BID  Increase Humalog by 10% to 8 units TID plus correction scale 1:40 >150 mg/dL  4.   Continue Trulicity at 1.5 mg/week    Topics to cover at upcoming visits: Healthy Eating, Being Active, Monitoring, Taking Medication, Problem Solving, Reducing Risks, and Healthy Coping    Follow-up: 4 weeks    See Care Plan for co-developed, patient-state behavior change goals.  AVS provided for patient today.    Education Materials Provided:  [id number] Healthy Living with Diabetes Book and My Plate Planner  Will mail    SUBJECTIVE/OBJECTIVE:  Presents for: Individual review  Accompanied by: Self  Focus of Visit: Monitoring, Taking Medication  Diabetes type: Type 2  Date of diagnosis: 6 years  Disease course: Improving  How confident are you filling out medical forms by yourself:: Quite a bit  Diabetes management related comments/concerns: How to improve glucose  Difficulty affording diabetes medication?: No  Difficulty affording diabetes testing supplies?: No  Other concerns:: None  Cultural Influences/Ethnic Background:  Not  or       Diabetes Symptoms & Complications:  Diabetes Related Symptoms: Polydipsia (increased thirst), Polyphagia (increased hunger), Polyuria (increased urination)  Weight trend: Stable  Symptom course: Stable  Disease course: Improving  Complications assessed today?: No    Patient Problem List and Family Medical History reviewed for relevant medical history, current medical status, and diabetes risk factors.    Vitals:  LMP  (LMP Unknown)   Estimated body mass index is 26.21 kg/m  as calculated from the following:    Height as of 1/23/24: 1.645 m (5' 4.75\").    Weight as of 1/26/24: 70.9 kg (156 lb 4.8 oz).   Last 3 BP:   BP Readings from Last 3 Encounters:   02/04/24 132/65   01/26/24 103/57   01/23/24 " "126/65       History   Smoking Status    Never   Smokeless Tobacco    Never       Labs:  Lab Results   Component Value Date    A1C 12.2 01/17/2024     Lab Results   Component Value Date     02/04/2024     02/04/2024     03/21/2022     Lab Results   Component Value Date     03/20/2023     04/27/2021     Direct Measure HDL   Date Value Ref Range Status   03/20/2023 58 >=50 mg/dL Final   ]  GFR Estimate   Date Value Ref Range Status   02/04/2024 >90 >60 mL/min/1.73m2 Final   06/22/2021 >60 >60 mL/min/1.73m2 Final     GFR Estimate If Black   Date Value Ref Range Status   06/22/2021 >60 >60 mL/min/1.73m2 Final     Lab Results   Component Value Date    CR 0.54 02/04/2024     No results found for: \"MICROALBUMIN\"    Healthy Eating:  Healthy Eating Assessed Today: Yes  Who cooks/prepares meals for you?: Self  Who purchases food in  your home?: Self  Meals include: Breakfast, Lunch, Dinner  Breakfast: yogurt with 1/2 bagel with cream cheese or english muffin with Orange Juice (4 oz)  Lunch: keto bread - has a sandwich with this (deli turkey with cheese) with Pepsi or Sprite  Dinner: burger (grills)  Snacks: sometimes 1/4 bagel for a snack  Other: was drinking 6/day of soda - now down 2 cans. Has crohn's so is a little more limited with fruits and vegetables  Beverages: Water, Soda, Juice (pepsi and sprite - has 2 cans (tries not to drink them all))  Has patient met with a dietitian in the past?: No    Being Active:  Being Active Assessed Today: Yes  Exercise:: Yes  Days per week of moderate to strenuous exercise (like a brisk walk): 4  On average, minutes per day of exercise at this level: 40  How intense was your typical exercise? : Moderate (like brisk walking)  Exercise Minutes per Week: 160    Monitoring:  Monitoring Assessed Today: Yes  Did patient bring glucose meter to appointment? : Yes  Blood Glucose Meter: CGM (Naila 14 day)  Times checking blood sugar at home (number): 3  Times " checking blood sugar at home (per): Day  Blood glucose trend: Increasing        Taking Medications:  Diabetes Medication(s)       Insulin       insulin glargine (LANTUS PEN) 100 UNIT/ML pen Inject 14 Units Subcutaneous 2 times daily     insulin lispro (HUMALOG KWIKPEN) 100 UNIT/ML (1 unit dial) KWIKPEN Inject 6 units subcutaneously 4 times daily for BG management       Incretin Mimetic Agents       dulaglutide (TRULICITY) 1.5 MG/0.5ML pen Inject 1.5 mg Subcutaneous every 7 days            Taking Medication Assessed Today: Yes  Current Treatments: Insulin Injections, Non-insulin Injectables  Dose schedule: Pre-breakfast, Pre-lunch, Pre-dinner, At bedtime  Given by: Patient  Problems taking diabetes medications regularly?: No  Diabetes medication side effects?: No    Problem Solving:  Problem Solving Assessed Today: Yes              Reducing Risks:  Reducing Risks Assessed Today: Yes  Diabetes Risks: Age over 45 years, Family History  CAD Risks: Diabetes Mellitus, Post-menopausal, Dyslipidemia, Family history  Has dilated eye exam at least once a year?: Yes    Healthy Coping:  Healthy Coping Assessed Today: Yes  Emotional response to diabetes: Ready to learn  Stage of change: ACTION (Actively working towards change)  Patient Activation Measure Survey Score:       No data to display                  Care Plan and Education Provided:  Care Plan: Diabetes   Updates made by Jazmyne Espinoza RD since 2/21/2024 12:00 AM        Problem: HbA1C Not In Goal         Goal: Establish Regular Follow-Ups with PCP         Task: Discuss with PCP the recommended timing for patient's next follow up visit(s)    Responsible User: Jazmyne Espinoza RD        Task: Discuss schedule for PCP visits with patient    Responsible User: Jazmyne Espinoza RD        Goal: Get HbA1C Level in Goal         Task: Educate patient on diabetes education self-management topics    Responsible User: Jazmyne Espinoza RD        Task: Educate  patient on benefits of regular glucose monitoring    Responsible User: Jazmyne Espinoza RD        Task: Refer patient to appropriate extended care team member, as needed (Medication Therapy Management, Behavioral Health, Physical Therapy, etc.)    Responsible User: Jazmyne Espinoza RD        Task: Discuss diabetes treatment plan with patient    Responsible User: Jazmyne Espinoza RD        Problem: Diabetes Self-Management Education Needed to Optimize Self-Care Behaviors         Goal: Understand diabetes pathophysiology and disease progression         Task: Provide education on diabetes pathophysiology and disease progression specfic to patient's diabetes type Completed 2/21/2024   Responsible User: Jazymne Espinoza RD        Goal: Healthy Eating - follow a healthy eating pattern for diabetes         Task: Provide education on portion control and consistency in amount, composition and timing of food intake Completed 2/21/2024   Responsible User: Jazmyne Espinoza RD        Task: Provide education on managing carbohydrate intake (carbohydrate counting, plate planning method, etc.)    Responsible User: Jazmyne Espinoza RD        Task: Provide education on weight management    Responsible User: Jazmyne Espinoza RD        Task: Provide education on heart healthy eating    Responsible User: Jazmyne Espinoza RD        Task: Provide education on eating out    Responsible User: Jazmyne Espinoza RD        Task: Develop individualized healthy eating plan with patient    Responsible User: Jazmyne Espinoza RD        Goal: Being Active - get regular physical activity, working up to at least 150 minutes per week         Task: Provide education on relationship of activity to glucose and precautions to take if at risk for low glucose    Responsible User: Jazmyne Espinoza RD        Task: Discuss barriers to physical activity with patient    Responsible User: Jazmyne Espinoza RD         Task: Develop physical activity plan with patient    Responsible User: Jazmyne Espinoza RD        Task: Explore community resources including walking groups, assistance programs, and home videos    Responsible User: Jazmyne Espinoza RD        Goal: Monitoring - monitor glucose and ketones as directed    This Visit's Progress: 80%   Note:    Check Glucose 3-4 times daily       Task: Provide education on blood glucose monitoring (purpose, proper technique, frequency, glucose targets, interpreting results, when to use glucose control solution, sharps disposal)    Responsible User: Jazmyne Espinoza RD        Task: Provide education on continuous glucose monitoring (sensor placement, use of isabel or /reader, understanding glucose trends, alerts and alarms, differences between sensor glucose and blood glucose)    Responsible User: Jazmyne Espinoza RD        Task: Provide education on ketone monitoring (when to monitor, frequency, etc.)    Responsible User: Jazmyne Espinoza RD        Goal: Taking Medication - patient is consistently taking medications as directed         Task: Provide education on action of prescribed medication, including when to take and possible side effects    Responsible User: Jazmyne Espinoza RD        Task: Provide education on insulin and injectable diabetes medications, including administration, storage, site selection and rotation for injection sites Completed 2/21/2024   Responsible User: Jazmyne Espinoza RD        Task: Discuss barriers to medication adherence with patient and provide management technique ideas as appropriate    Responsible User: Jazmyne Espinoza RD        Task: Provide education on frequency and refill details of medications    Responsible User: Jazmyne Espinoza RD        Goal: Problem Solving - know how to prevent and manage short-term diabetes complications         Task: Provide education on high blood glucose - causes,  signs/symptoms, prevention and treatment    Responsible User: Jazmyne Espinzoa RD        Task: Provide education on low blood glucose - causes, signs/symptoms, prevention, treatment, carrying a carbohydrate source at all times, and medical identification Completed 2/21/2024   Responsible User: Jazmyne Espinoza RD        Task: Provide education on safe travel with diabetes    Responsible User: Jazmyne Espinoza RD        Task: Provide education on how to care for diabetes on sick days    Responsible User: Jazmyne Espinoza RD        Task: Provide education on when to call a health care provider    Responsible User: Jazmyne Espinoza RD        Goal: Reducing Risks - know how to prevent and treat long-term diabetes complications         Task: Provide education on major complications of diabetes, prevention, early diagnostic measures and treatment of complications Completed 2/21/2024   Responsible User: Jazmyne Espinoza RD        Task: Provide education on recommended care for dental, eye and foot health    Responsible User: Jazmyne Espinoza RD        Task: Provide education on Hemoglobin A1c - goals and relationship to blood glucose levels Completed 2/21/2024   Responsible User: Jazmyne Espinoza RD        Task: Provide education on recommendations for heart health - lipid levels and goals, blood pressure and goals, and aspirin therapy, if indicated    Responsible User: Jazmyne Espinoza RD        Task: Provide education on tobacco cessation    Responsible User: Jazmyne Espinoza RD        Goal: Healthy Coping - use available resources to cope with the challenges of managing diabetes         Task: Discuss recognizing feelings about having diabetes Completed 2/21/2024   Responsible User: Jazmyne Espinoza RD        Task: Provide education on the benefits of making appropriate lifestyle changes    Responsible User: Jazmyne Espinoza RD        Task: Provide education on benefits of  utilizing support systems    Responsible User: Jazmyne Espinoza RD        Task: Discuss methods for coping with stress    Responsible User: Jazmyne Espinoza RD        Task: Provide education on when to seek professional counseling    Responsible User: Jazmyne Espinoza RD            Time Spent: 45 minutes  Encounter Type: Individual    Any diabetes medication dose changes were made via the CDE Protocol per the patient's referring provider. A copy of this encounter was shared with the provider.

## 2024-02-21 NOTE — LETTER
2/21/2024         RE: Zara Webster  [address] [place]  [place] [address]        Dear Colleague,    Thank you for referring your patient, Zara Webster, to the [facility]. Please see a copy of my visit note below.    Diabetes Self-Management Education & Support    Presents for: Individual review    Type of Service: Telephone Visit    Originating Location (Patient Location): Home  Distant Location (Provider Location): Offsite  Mode of Communication:  Telephone    Telephone Visit Start Time:  09:00 AM  Telephone Visit End Time (telephone visit stop time): 09:46 AM    How would patient like to obtain AVS? [facility]      REPORTS:  Patient is unable to download Naila CGM reader device. Patient reported glucose data from the device:       Average Glucose: 304 mg/dL (last 7 days)   12am-6am: 295 mg/dL   6am-12pm: 311 mg/dL     Time in Target (last 7 days)   Above: 100%   In Target: 0%   Below: 0%     Patient reports just starting a new sensor    Pt verbalized understanding of concepts discussed and recommendations provided today.       Continue education with the following diabetes management concepts: Healthy Eating, Monitoring, Taking Medication, Problem Solving, and Reducing Risks    ASSESSMENT:  Met with Zara today regarding her diabetes. Zara is using the Naila 14 day sensor/reader and is ready to upgrade in March as it has been 5 years. We will discuss at next visit as she has a few months worth of sensors currently. Encouraged patient to scan more often. 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 She does feel like her glucose has come down now that she has started on Trulicity. Patient has not increased her insulin dosage in some time - she is on the same dosage as she was back in September 2023. Patient with new diagnosis of Liver cancer - hoping to improve her glucose control.  Patient was drinking 6 cans of soda/day and has  been working on reducing - she is now down to 2 cans/day. Congratulated her on this change.   Reviewed insulin use and correction scale. Patient has never used one, but this could help improve glucose control.   Glucose Patterns & Trends:  Hyperglycemia, weekend- premeal, postmeal, and nocturnal and weekday- premeal, postmeal, and nocturnal  Time in Target: 0%    PLAN  Check glucose 4 times daily  Increase Lantus by 10% to 15 units BID  Increase Humalog by 10% to 8 units TID plus correction scale 1:40 >150 mg/dL  4.   Continue Trulicity at 1.5 mg/week    Topics to cover at upcoming visits: Healthy Eating, Being Active, Monitoring, Taking Medication, Problem Solving, Reducing Risks, and Healthy Coping    Follow-up: 4 weeks    See Care Plan for co-developed, patient-state behavior change goals.  AVS provided for patient today.    Education Materials Provided:  [employer] Healthy Living with Diabetes Book and My Plate Planner  Will mail    SUBJECTIVE/OBJECTIVE:  Presents for: Individual review  Accompanied by: Self  Focus of Visit: Monitoring, Taking Medication  Diabetes type: Type 2  Date of diagnosis: 6 years  Disease course: Improving  How confident are you filling out medical forms by yourself:: Quite a bit  Diabetes management related comments/concerns: How to improve glucose  Difficulty affording diabetes medication?: No  Difficulty affording diabetes testing supplies?: No  Other concerns:: None  Cultural Influences/Ethnic Background:  Not  or       Diabetes Symptoms & Complications:  Diabetes Related Symptoms: Polydipsia (increased thirst), Polyphagia (increased hunger), Polyuria (increased urination)  Weight trend: Stable  Symptom course: Stable  Disease course: Improving  Complications assessed today?: No    Patient Problem List and Family Medical History reviewed for relevant medical history, current medical status, and diabetes risk factors.    Vitals:  LMP  (LMP Unknown)   Estimated  "body mass index is 26.21 kg/m  as calculated from the following:    Height as of 1/23/24: 1.645 m (5' 4.75\").    Weight as of 1/26/24: 70.9 kg (156 lb 4.8 oz).   Last 3 BP:   BP Readings from Last 3 Encounters:   02/04/24 132/65   01/26/24 103/57   01/23/24 126/65       History   Smoking Status    Never   Smokeless Tobacco    Never       Labs:  Lab Results   Component Value Date    A1C 12.2 01/17/2024     Lab Results   Component Value Date     02/04/2024     02/04/2024     03/21/2022     Lab Results   Component Value Date     03/20/2023     04/27/2021     Direct Measure HDL   Date Value Ref Range Status   03/20/2023 58 >=50 mg/dL Final   ]  GFR Estimate   Date Value Ref Range Status   02/04/2024 >90 >60 mL/min/1.73m2 Final   06/22/2021 >60 >60 mL/min/1.73m2 Final     GFR Estimate If Black   Date Value Ref Range Status   06/22/2021 >60 >60 mL/min/1.73m2 Final     Lab Results   Component Value Date    CR 0.54 02/04/2024     No results found for: \"MICROALBUMIN\"    Healthy Eating:  Healthy Eating Assessed Today: Yes  Who cooks/prepares meals for you?: Self  Who purchases food in  your home?: Self  Meals include: Breakfast, Lunch, Dinner  Breakfast: yogurt with 1/2 bagel with cream cheese or english muffin with Orange Juice (4 oz)  Lunch: keto bread - has a sandwich with this (deli turkey with cheese) with Pepsi or Sprite  Dinner: burger (grills)  Snacks: sometimes 1/4 bagel for a snack  Other: was drinking 6/day of soda - now down 2 cans. Has crohn's so is a little more limited with fruits and vegetables  Beverages: Water, Soda, Juice (pepsi and sprite - has 2 cans (tries not to drink them all))  Has patient met with a dietitian in the past?: No    Being Active:  Being Active Assessed Today: Yes  Exercise:: Yes  Days per week of moderate to strenuous exercise (like a brisk walk): 4  On average, minutes per day of exercise at this level: 40  How intense was your typical exercise? : " Moderate (like brisk walking)  Exercise Minutes per Week: 160    Monitoring:  Monitoring Assessed Today: Yes  Did patient bring glucose meter to appointment? : Yes  Blood Glucose Meter: CGM (Naila 14 day)  Times checking blood sugar at home (number): 3  Times checking blood sugar at home (per): Day  Blood glucose trend: Increasing        Taking Medications:  Diabetes Medication(s)       Insulin       insulin glargine (LANTUS PEN) 100 UNIT/ML pen Inject 14 Units Subcutaneous 2 times daily     insulin lispro (HUMALOG KWIKPEN) 100 UNIT/ML (1 unit dial) KWIKPEN Inject 6 units subcutaneously 4 times daily for BG management       Incretin Mimetic Agents       dulaglutide (TRULICITY) 1.5 MG/0.5ML pen Inject 1.5 mg Subcutaneous every 7 days            Taking Medication Assessed Today: Yes  Current Treatments: Insulin Injections, Non-insulin Injectables  Dose schedule: Pre-breakfast, Pre-lunch, Pre-dinner, At bedtime  Given by: Patient  Problems taking diabetes medications regularly?: No  Diabetes medication side effects?: No    Problem Solving:  Problem Solving Assessed Today: Yes              Reducing Risks:  Reducing Risks Assessed Today: Yes  Diabetes Risks: Age over 45 years, Family History  CAD Risks: Diabetes Mellitus, Post-menopausal, Dyslipidemia, Family history  Has dilated eye exam at least once a year?: Yes    Healthy Coping:  Healthy Coping Assessed Today: Yes  Emotional response to diabetes: Ready to learn  Stage of change: ACTION (Actively working towards change)  Patient Activation Measure Survey Score:       No data to display                  Care Plan and Education Provided:  Care Plan: Diabetes   Updates made by Jazmyne Espinoza RD since 2/21/2024 12:00 AM        Problem: HbA1C Not In Goal         Goal: Establish Regular Follow-Ups with PCP         Task: Discuss with PCP the recommended timing for patient's next follow up visit(s)    Responsible User: Jazmyne Espinoza RD        Task: Discuss  schedule for PCP visits with patient    Responsible User: Jazmyne Espinoza RD        Goal: Get HbA1C Level in Goal         Task: Educate patient on diabetes education self-management topics    Responsible User: Jazmyne Espinoza RD        Task: Educate patient on benefits of regular glucose monitoring    Responsible User: Jazmyne Espinoza RD        Task: Refer patient to appropriate extended care team member, as needed (Medication Therapy Management, Behavioral Health, Physical Therapy, etc.)    Responsible User: Jazmyne Espinoza RD        Task: Discuss diabetes treatment plan with patient    Responsible User: Jazmyne Espinoza RD        Problem: Diabetes Self-Management Education Needed to Optimize Self-Care Behaviors         Goal: Understand diabetes pathophysiology and disease progression         Task: Provide education on diabetes pathophysiology and disease progression specfic to patient's diabetes type Completed 2/21/2024   Responsible User: Jazmyne Espinoza RD        Goal: Healthy Eating - follow a healthy eating pattern for diabetes         Task: Provide education on portion control and consistency in amount, composition and timing of food intake Completed 2/21/2024   Responsible User: Jazmyne Espinoza RD        Task: Provide education on managing carbohydrate intake (carbohydrate counting, plate planning method, etc.)    Responsible User: Jazmyne Espinoza RD        Task: Provide education on weight management    Responsible User: Jazmyne Espinoza RD        Task: Provide education on heart healthy eating    Responsible User: Jazmyne Espinoza RD        Task: Provide education on eating out    Responsible User: Jazmyne Espinoza RD        Task: Develop individualized healthy eating plan with patient    Responsible User: Jazmyne Espinoza RD        Goal: Being Active - get regular physical activity, working up to at least 150 minutes per week         Task: Provide  education on relationship of activity to glucose and precautions to take if at risk for low glucose    Responsible User: Jazmyne Espinoza RD        Task: Discuss barriers to physical activity with patient    Responsible User: Jazmyne Espinoza RD        Task: Develop physical activity plan with patient    Responsible User: Jazmyne Espinoza RD        Task: Explore community resources including walking groups, assistance programs, and home videos    Responsible User: Jazmyne Espinoza RD        Goal: Monitoring - monitor glucose and ketones as directed    This Visit's Progress: 80%   Note:    Check Glucose 3-4 times daily       Task: Provide education on blood glucose monitoring (purpose, proper technique, frequency, glucose targets, interpreting results, when to use glucose control solution, sharps disposal)    Responsible User: Jazmyne Espinoza RD        Task: Provide education on continuous glucose monitoring (sensor placement, use of isabel or /reader, understanding glucose trends, alerts and alarms, differences between sensor glucose and blood glucose)    Responsible User: Jazmyne Espinoza RD        Task: Provide education on ketone monitoring (when to monitor, frequency, etc.)    Responsible User: Jazmyne Espinoza RD        Goal: Taking Medication - patient is consistently taking medications as directed         Task: Provide education on action of prescribed medication, including when to take and possible side effects    Responsible User: Jazmyne Espinoza RD        Task: Provide education on insulin and injectable diabetes medications, including administration, storage, site selection and rotation for injection sites Completed 2/21/2024   Responsible User: Jazmyne Espinoza RD        Task: Discuss barriers to medication adherence with patient and provide management technique ideas as appropriate    Responsible User: Jazmyne Espinoza RD        Task: Provide education on  frequency and refill details of medications    Responsible User: Jazmyne Espinoza RD        Goal: Problem Solving - know how to prevent and manage short-term diabetes complications         Task: Provide education on high blood glucose - causes, signs/symptoms, prevention and treatment    Responsible User: Jazmyne Espinoza RD        Task: Provide education on low blood glucose - causes, signs/symptoms, prevention, treatment, carrying a carbohydrate source at all times, and medical identification Completed 2/21/2024   Responsible User: Jazmyne Espinoza RD        Task: Provide education on safe travel with diabetes    Responsible User: Jazmyne Espinoza RD        Task: Provide education on how to care for diabetes on sick days    Responsible User: Jazmyne Espinoza RD        Task: Provide education on when to call a health care provider    Responsible User: Jazmyne Espinoza RD        Goal: Reducing Risks - know how to prevent and treat long-term diabetes complications         Task: Provide education on major complications of diabetes, prevention, early diagnostic measures and treatment of complications Completed 2/21/2024   Responsible User: Jazmyne Espinoza RD        Task: Provide education on recommended care for dental, eye and foot health    Responsible User: Jazmyne Espinoza RD        Task: Provide education on Hemoglobin A1c - goals and relationship to blood glucose levels Completed 2/21/2024   Responsible User: Jazmyne Espinoza RD        Task: Provide education on recommendations for heart health - lipid levels and goals, blood pressure and goals, and aspirin therapy, if indicated    Responsible User: Jazmyne Espinoza RD        Task: Provide education on tobacco cessation    Responsible User: Jazmyne Espinoza RD        Goal: Healthy Coping - use available resources to cope with the challenges of managing diabetes         Task: Discuss recognizing feelings about having  diabetes Completed 2/21/2024   Responsible User: Jazmyne Espinoza RD        Task: Provide education on the benefits of making appropriate lifestyle changes    Responsible User: Jazmyne Espinoza RD        Task: Provide education on benefits of utilizing support systems    Responsible User: Jazmyne Espinoza RD        Task: Discuss methods for coping with stress    Responsible User: Jazmyne Espinoza RD        Task: Provide education on when to seek professional counseling    Responsible User: Jazmyne Espinoza RD            Time Spent: 45 minutes  Encounter Type: Individual    Any diabetes medication dose changes were made via the CDE Protocol per the patient's referring provider. A copy of this encounter was shared with the provider.

## 2024-02-21 NOTE — PATIENT INSTRUCTIONS
Goals for Diabetes Care:    1. Eat 3 balanced meals each day - Monitor carb intake and aim for 45-60 grams per meal  This would be equal to about 4 choices of carbohydrates. Carbohydrate 1 choice = 15 grams  Aim to eat the plate method style - half your plate fruits/veggies, 1/4 the plate protein and 1/4 plate starch (rice, potato, pasta)    2. Check blood sugars at least 4 times each day at varying times   Blood Glucose Targets:   1. Fasting and before meal target is 80 - 130   2. 2 hours after a meal target is < 180  Always remember to bring meter and log book to all appointments.    3. Activity really helps improve blood sugars. Try to Incorporate 30 minutes activity into each day - does not need to be all at one time & walking counts!    4. Treating low BG. Rule of 15 = BG 50-70 mg/dL = 15 gram carb (4 oz juice, 4 glucose tabs, OR 4 oz pop), then recheck BG in 15 minutes. If still low repeat. (If BG <50 mg/dL = 8 oz pop/juice or 8 glucose tabs).    5. Take diabetes medications as prescribed   -Increase insulin  -Lantus 15 units twice a day  - Humalog 8 units before meals PLUS correction dose  Correction Scale:  1 unit lower your blood sugar 40 mg/dL  151-190 mg/dL = 1 unit  191-230 mg/dL = 2 units  231-270 mg/dL = 3 units  271-310 mg/dL = 4 units  311-350 mg/dL = 5 units  351-390 mg/dL = 6 units  391-430 mg/dL = 7 units  431-470 mg/dL = 8 units  471-510 mg/dL = 9 units  Call if higher than 500 mg/dL x2 and not feeling well call or go to ER      Follow up with your Diabetic Educator to assess BG targets and need for modifications to medications and/or lifestyle.    Call with any questions.  Thank you!  Jazmyne Espinoza RDN, LD, [facility]   Certified Diabetes Care &   [facility]  Triage [phone]

## 2024-02-21 NOTE — TELEPHONE ENCOUNTER
"  General Call    Contacts         Type Contact Phone/Fax    02/21/2024 03:33 PM CST Phone (Incoming) Zara Webster (Self) [phone] (M)          Reason for Call:  patient called in and wanted to know if she could go back down on the Trulicity to .075MG. She stated they do not have the current prescribed dosage at [facility], nor do they have any in warehouse to fill this for her. She is leaving for out of town for a while next week. She stated her recent hospital visit was probably due to her \"overdoing the sweets lately.\"  Please urgently advise      Could we send this information to you in [facility] or would you prefer to receive a phone call?:   Patient would prefer a phone call   Okay to leave a detailed message?: Yes at Cell number on file:    Telephone Information:   Mobile [phone]      "

## 2024-02-21 NOTE — TELEPHONE ENCOUNTER
Called and spoke with patient, relayed info that Trulicity 0.75 mg was sent over to [facility] pharmacy as requested. Patient stated understanding and had no further questions or concerns.    Natasha Roth RN

## 2024-02-25 ENCOUNTER — HEALTH MAINTENANCE LETTER (OUTPATIENT)
Age: 74
End: 2024-02-25

## 2024-03-05 ENCOUNTER — MEDICAL CORRESPONDENCE (OUTPATIENT)
Dept: HEALTH INFORMATION MANAGEMENT | Facility: CLINIC | Age: 74
End: 2024-03-05
Payer: COMMERCIAL

## 2024-03-13 DIAGNOSIS — E11.41 TYPE 2 DIABETES MELLITUS WITH DIABETIC MONONEUROPATHY, WITH LONG-TERM CURRENT USE OF INSULIN (H): ICD-10-CM

## 2024-03-13 DIAGNOSIS — Z79.4 TYPE 2 DIABETES MELLITUS WITH DIABETIC MONONEUROPATHY, WITH LONG-TERM CURRENT USE OF INSULIN (H): ICD-10-CM

## 2024-03-13 RX ORDER — FLUCONAZOLE 150 MG/1
TABLET ORAL
Qty: 2 TABLET | Refills: 0 | Status: SHIPPED | OUTPATIENT
Start: 2024-03-13 | End: 2024-04-09

## 2024-03-13 NOTE — TELEPHONE ENCOUNTER
Requested Prescriptions   Pending Prescriptions Disp Refills    fluconazole (DIFLUCAN) 150 MG tablet [Pharmacy Med Name: Fluconazole Oral Tablet 150 MG] 2 tablet 0     Sig: Take 1 tablet by mouth by mouth now, and if no improvement in 3 days, take another.       Antifungal Agents Failed - 3/13/2024 10:13 AM        Failed - Not Fluconazole or Terconazole      If oral Fluconazole or Terconazole, may refill if indicated in progress notes.           Passed - Recent (12 mo) or future (30 days) visit within the authorizing provider's specialty     The patient must have completed an in-person or virtual visit within the past 12 months or has a future visit scheduled within the next 90 days with the authorizing provider s specialty.  Urgent care and e-visits do not quality as an office visit for this protocol.          Passed - Medication is active on med list

## 2024-03-22 ENCOUNTER — OFFICE VISIT (OUTPATIENT)
Dept: FAMILY MEDICINE | Facility: CLINIC | Age: 74
End: 2024-03-22
Payer: COMMERCIAL

## 2024-03-22 VITALS
SYSTOLIC BLOOD PRESSURE: 120 MMHG | HEIGHT: 65 IN | TEMPERATURE: 97.8 F | HEART RATE: 78 BPM | DIASTOLIC BLOOD PRESSURE: 61 MMHG | BODY MASS INDEX: 25.16 KG/M2 | WEIGHT: 151 LBS | RESPIRATION RATE: 16 BRPM | OXYGEN SATURATION: 96 %

## 2024-03-22 DIAGNOSIS — E11.65 UNCONTROLLED TYPE 2 DIABETES MELLITUS WITH HYPERGLYCEMIA (H): ICD-10-CM

## 2024-03-22 DIAGNOSIS — K50.118 CROHN'S DISEASE OF COLON WITH OTHER COMPLICATION (H): ICD-10-CM

## 2024-03-22 DIAGNOSIS — C22.0 HCC (HEPATOCELLULAR CARCINOMA) (H): ICD-10-CM

## 2024-03-22 DIAGNOSIS — Z93.3 COLOSTOMY STATUS (H): ICD-10-CM

## 2024-03-22 DIAGNOSIS — Z01.818 PREOPERATIVE EXAMINATION: Primary | ICD-10-CM

## 2024-03-22 PROCEDURE — 99214 OFFICE O/P EST MOD 30 MIN: CPT | Performed by: FAMILY MEDICINE

## 2024-03-22 RX ORDER — RESPIRATORY SYNCYTIAL VIRUS VACCINE 120MCG/0.5
0.5 KIT INTRAMUSCULAR ONCE
Qty: 1 EACH | Refills: 0 | Status: CANCELLED | OUTPATIENT
Start: 2024-03-22 | End: 2024-03-22

## 2024-03-22 NOTE — PROGRESS NOTES
Preoperative Evaluation  [facility]  [address] [facility] [address]  Phone: [phone]  Fax: [phone]  Primary Provider: Obdulia Chamorro  Pre-op Performing Provider: SUNDAY DELCID  Mar 22, 2024       Zara is a 73 year old, presenting for the following:  Pre-Op Exam        3/22/2024     8:33 AM   Additional Questions   Roomed by Vibha HARRINGTON CMA     Surgical Information  Surgery/Procedure: Liver Microwave ablation with MRI   Surgery Location: [facility]   Surgeon: TBLEN Interventional radiologist   Surgery Date: 3/25/2024  Time of Surgery: 8:00 am   Where patient plans to recover: At home with family  Fax number for surgical facility: [phone]    Assessment & Plan     The proposed surgical procedure is considered LOW risk.    Preoperative examination  HCC (hepatocellular carcinoma) (H)    Patient is approved for the procedure  Reviewed the potential risks as her hemoglobin A1c was most recently 12.2%  Discussed potential risks of poorly controlled diabetes including increased risk of infection as well as potential for delayed wound healing  She recently tolerated this procedure when her blood sugars were higher  Recommend avoiding NSAIDs and OTC supplements until surgery  She will not take trulicity within 7 days of the procedure  Recommend decreasing evening Lantus and morning Lantus to 80% of her current dose of 14 units twice daily  She can take Lantus 10 units on the evening before procedure and 10 units on the morning of her procedure  She will hold her morning Humalog 5 the day of the procedure  Recent hemoglobin 11.5  Electrolytes were normal with a GFR >90    Uncontrolled type 2 diabetes mellitus with hyperglycemia (H)    Reviewed her diabetes control  Her hemoglobin A1c improved to 12.2%  See plan as noted above    Crohn's disease of colon with other complication (H)  Post Colectomy               Risks and Recommendations  The patient has  the following additional risks and recommendations for perioperative complications:   - No identified additional risk factors other than previously addressed    Antiplatelet or Anticoagulation Medication Instructions   - Patient is on no antiplatelet or anticoagulation medications.    Additional Medication Instructions  Patient is to take all scheduled medications on the day of surgery EXCEPT for modifications listed below:  See medication changes as noted above    Recommendation  APPROVAL GIVEN to proceed with proposed procedure, without further diagnostic evaluation.    Review of external notes as documented elsewhere in note      Subjective       HPI related to upcoming procedure:     Zara is a pleasant 73-year-old female who presents to clinic for preoperative evaluation.  Her primary physician is Dr. Chamorro.  She has a history of hepatocellular carcinoma and is a candidate for a liver ablation procedure which she has had previously.  She has tolerated anesthesia previously.    Medical history is notable for type 2 diabetes mellitus which has been poorly controlled.  However, since starting Trulicity she has made significant improvement.  Her most recent hemoglobin A1c was 12.2%.  She denies hypoglycemic episodes.  She is treated with Trulicity, Lantus 14 units twice daily and Humalog with meals.    She has a history of Crohn's disease with previous colectomy.    She denies recent chest pain, shortness of breath, palpitations, or other concerns.          3/22/2024     8:14 AM   Preop Questions   1. Have you ever had a heart attack or stroke? No   2. Have you ever had surgery on your heart or blood vessels, such as a stent placement, a coronary artery bypass, or surgery on an artery in your head, neck, heart, or legs? No   3. Do you have chest pain with activity? No   4. Do you have a history of  heart failure? No   5. Do you currently have a cold, bronchitis or symptoms of other infection? No   6. Do you have a  cough, shortness of breath, or wheezing? No   7. Do you or anyone in your family have previous history of blood clots? No   8. Do you or does anyone in your family have a serious bleeding problem such as prolonged bleeding following surgeries or cuts? No   9. Have you ever had problems with anemia or been told to take iron pills? No   10. Have you had any abnormal blood loss such as black, tarry or bloody stools, or abnormal vaginal bleeding? No   11. Have you ever had a blood transfusion? No   12. Are you willing to have a blood transfusion if it is medically needed before, during, or after your surgery? Yes   13. Have you or any of your relatives ever had problems with anesthesia? No   14. Do you have sleep apnea, excessive snoring or daytime drowsiness? No   15. Do you have any artifical heart valves or other implanted medical devices like a pacemaker, defibrillator, or continuous glucose monitor? No   16. Do you have artificial joints? No   17. Are you allergic to latex? No       Health Care Directive  Patient does not have a Health Care Directive or Living Will: Patient states has Advance Directive and will bring in a copy to clinic.    Preoperative Review of    reviewed - no record of controlled substances prescribed.      Status of Chronic Conditions:  See problem list for active medical problems.  Problems all longstanding and stable, except as noted/documented.  See ROS for pertinent symptoms related to these conditions.    Patient Active Problem List    Diagnosis Date Noted    Nausea and vomiting, unspecified vomiting type 02/04/2024     Priority: Medium    Intractable vomiting 02/04/2024     Priority: Medium    Atypical chest pain 12/21/2023     Priority: Medium    Peripheral edema 12/21/2023     Priority: Medium    Lesion of liver 09/26/2023     Priority: Medium    HCC (hepatocellular carcinoma) (H) 08/31/2023     Priority: Medium    Essential tremor 05/30/2023     Priority: Medium    Disorder of  biliary tract 04/04/2022     Priority: Medium    Abnormal liver ultrasound 12/14/2021     Priority: Medium    Ileostomy status (H) 09/22/2021     Priority: Medium    Non-alcoholic cirrhosis (H) 09/22/2021     Priority: Medium    Nonalcoholic steatohepatitis 07/19/2021     Priority: Medium    Other cirrhosis of liver (H) - unclear etiology,[facility] 10/22/2020     Priority: Medium    Type II diabetes mellitus (H)      Priority: Medium     Created by Conversion        Gastrointestinal hemorrhage 04/29/2020     Priority: Medium    Crohn's disease of colon (H) 11/03/2019     Priority: Medium    Aspirin contraindicated 04/02/2019     Priority: Medium    Hypercholesteremia 10/06/2016     Priority: Medium    BMI 28.0-28.9,adult 10/06/2016     Priority: Medium    Post Colectomy      Priority: Medium     Created by Conversion          Past Medical History:   Diagnosis Date    Anemia     Atypical chest pain     Crohn's disease (H)     Diabetes mellitus (H)     Disorder of biliary tract     Essential tremor     Gastrointestinal hemorrhage     Hepatocellular carcinoma (H)     Hypercholesteremia     Ileostomy status (H)     Lesion of liver     Non-alcoholic cirrhosis (H)      Past Surgical History:   Procedure Laterality Date    BREAST EXCISIONAL BIOPSY Left 1980    BREAST EXCISIONAL BIOPSY Left 1985    ESOPHAGOSCOPY, GASTROSCOPY, DUODENOSCOPY (EGD), COMBINED N/A 1/26/2024    Procedure: ENDOSCOPIC ULTRASOUND;  Surgeon: Santi Pradhan MD;  [place] [facility] [place]    HYSTERECTOMY  1991    OOPHORECTOMY Bilateral 1991    OTHER SURGICAL HISTORY      KIDNEY STONE REMOVALNOT SURE WHICH SIDE, DR. CHANEY    HI PROCTOSIGMOIDOSCOPY,RIGID,DIAGNOS N/A 6/17/2020    Procedure: ILEOSTOMY REVISION, PROCTOSCOPY, ILEOSCOPY;  Surgeon: Devang Moon MD;  Location: [facility];  Service: General    ZZC REMOVAL COLON/ILEOSTOMY      Description: Total Abdominal Colectomy;  Recorded: 12/15/2011;    ZZC REMOVAL  COLON/PROCTECTOMY/ILEOSTOMY      Description: Total Proctocolectomy;  Recorded: 08/10/2011;    ZC VAG HYST, W/VAGINECTOMY      Description: Vaginal Hysterectomy With Colpectomy;  Recorded: 08/14/2014;     Current Outpatient Medications   Medication Sig Dispense Refill    clobetasol (TEMOVATE) 0.05 % external ointment Apply topically as needed      continuous blood glucose monitoring (FREESTYLE VIDA) sensor 1 Units daily 2 each 3    diphenoxylate-atropine (LOMOTIL) 2.5-0.025 MG tablet Take 1 tablet by mouth 4 times daily as needed for diarrhea 30 tablet 3    dulaglutide (TRULICITY) 0.75 MG/0.5ML pen Inject 0.75 mg Subcutaneous every 7 days 2 mL 3    dulaglutide (TRULICITY) 1.5 MG/0.5ML pen Inject 1.5 mg Subcutaneous every 7 days 6 mL 0    flash glucose scanning reader (FREESTYLE VIDA 14 DAY READER) Misc [FLASH GLUCOSE SCANNING READER (FREESTYLE VIDA 14 DAY READER) MISC] Use 1 Units As Directed every 14 (fourteen) days. 1 each 0    fluconazole (DIFLUCAN) 150 MG tablet Take 1 tablet by mouth by mouth now, and if no improvement in 3 days, take another. 2 tablet 0    insulin glargine (LANTUS PEN) 100 UNIT/ML pen Inject 14 Units Subcutaneous 2 times daily 30 mL 0    insulin lispro (HUMALOG KWIKPEN) 100 UNIT/ML (1 unit dial) KWIKPEN Inject 6 units subcutaneously 4 times daily for BG management 30 mL 0    insulin pen needle (32G X 4 MM) 32G X 4 MM miscellaneous Use 8 pen needles daily or as directed. 800 each 0    loperamide (IMODIUM A-D) 2 MG tablet Take 1 tablet (2 mg) by mouth 4 times daily as needed for diarrhea 120 tablet 3    omeprazole (PRILOSEC) 40 MG DR capsule Take 1 capsule (40 mg) by mouth at bedtime 90 capsule 3    ostomy adhesive Pste [OSTOMY ADHESIVE PSTE] Apply as needed 4 Tube 3       Allergies   Allergen Reactions    Prochlorperazine Edisylate [Prochlorperazine] Other (See Comments)     Saw things    Compazine [Prochlorperazine] Unknown        Social History     Tobacco Use    Smoking status: Never      "Passive exposure: Never    Smokeless tobacco: Never   Substance Use Topics    Alcohol use: Not Currently     Family History   Problem Relation Age of Onset    Diabetes Mother     Heart Disease Mother     Hyperlipidemia Mother     Diabetes Father     Cancer Father         malignant gallbladder    No Known Problems Sister     No Known Problems Sister      History   Drug Use No         Review of Systems    Review of Systems  Constitutional, HEENT, cardiovascular, pulmonary, gi and gu systems are negative, except as otherwise noted.    Objective    /61 (BP Location: Left arm, Patient Position: Sitting, Cuff Size: Adult Regular)   Pulse 78   Temp 97.8  F (36.6  C) (Oral)   Resp 16   Ht 1.645 m (5' 4.75\")   Wt 68.5 kg (151 lb)   LMP  (LMP Unknown)   SpO2 96%   BMI 25.32 kg/m     Estimated body mass index is 25.32 kg/m  as calculated from the following:    Height as of this encounter: 1.645 m (5' 4.75\").    Weight as of this encounter: 68.5 kg (151 lb).  Physical Exam  GENERAL: alert and no distress  EYES: Eyes grossly normal to inspection, PERRL and conjunctivae and sclerae normal  RESP: lungs clear to auscultation - no rales, rhonchi or wheezes  CV: regular rate and rhythm, normal S1 S2, no S3 or S4, no murmur, click or rub, no peripheral edema  NEURO: Normal strength and tone, mentation intact and speech normal  PSYCH: mentation appears normal, affect normal/bright    Recent Labs   Lab Test 02/04/24  0932 02/03/24  2159 01/23/24  0919 01/17/24  0745 12/05/23  1403 03/20/23  0829 11/15/22  0812 09/19/22  0820 03/24/22  1202   HGB 12.4 12.1   < >  --   --    < >  --    < >  --     197   < >  --   --   --   --    < >  --    INR  --   --   --   --   --   --  1.03  --  1.0    137   < >  --   --    < >  --    < >  --    POTASSIUM 3.5 4.0   < >  --   --    < >  --    < >  --    CR 0.54 0.61   < >  --   --    < > 0.58   < >  --    A1C  --   --   --  12.2* 14.1*   < >  --    < >  --     < > = values in " this interval not displayed.        Diagnostics  No results found for this or any previous visit (from the past 720 hour(s)).   Sinus bradycardia with 1st degree A-V block   Left axis deviation     Revised Cardiac Risk Index (RCRI)  The patient has the following serious cardiovascular risks for perioperative complications:   - Diabetes Mellitus (on Insulin) = 1 point     RCRI Interpretation: 1 point: Class II (low risk - 0.9% complication rate)         Signed Electronically by: Ernesto Hernandez MD  Copy of this evaluation report is provided to requesting physician.       Humalog  Recent hemoglobin was 11.5

## 2024-04-09 ENCOUNTER — OFFICE VISIT (OUTPATIENT)
Dept: ENDOCRINOLOGY | Facility: CLINIC | Age: 74
End: 2024-04-09
Payer: COMMERCIAL

## 2024-04-09 VITALS
WEIGHT: 151 LBS | HEART RATE: 104 BPM | OXYGEN SATURATION: 98 % | SYSTOLIC BLOOD PRESSURE: 118 MMHG | DIASTOLIC BLOOD PRESSURE: 64 MMHG | BODY MASS INDEX: 25.32 KG/M2

## 2024-04-09 DIAGNOSIS — E11.41 TYPE 2 DIABETES MELLITUS WITH DIABETIC MONONEUROPATHY, WITH LONG-TERM CURRENT USE OF INSULIN (H): Primary | ICD-10-CM

## 2024-04-09 DIAGNOSIS — Z79.4 TYPE 2 DIABETES MELLITUS WITH DIABETIC MONONEUROPATHY, WITH LONG-TERM CURRENT USE OF INSULIN (H): Primary | ICD-10-CM

## 2024-04-09 DIAGNOSIS — B37.31 CANDIDIASIS OF VAGINA: ICD-10-CM

## 2024-04-09 PROBLEM — R79.9 ABNORMAL BLOOD CHEMISTRY LEVEL: Status: ACTIVE | Noted: 2023-11-17

## 2024-04-09 PROBLEM — R79.89 ABNORMAL LFTS: Status: ACTIVE | Noted: 2024-04-09

## 2024-04-09 PROBLEM — K83.1 COMMON BILE DUCT OBSTRUCTION (H): Status: ACTIVE | Noted: 2023-11-20

## 2024-04-09 PROBLEM — K83.1 BILE DUCT STRICTURE (H): Status: ACTIVE | Noted: 2024-04-09

## 2024-04-09 PROCEDURE — G2211 COMPLEX E/M VISIT ADD ON: HCPCS | Performed by: NURSE PRACTITIONER

## 2024-04-09 PROCEDURE — 99214 OFFICE O/P EST MOD 30 MIN: CPT | Performed by: NURSE PRACTITIONER

## 2024-04-09 RX ORDER — OXYCODONE HYDROCHLORIDE 5 MG/1
5 TABLET ORAL
COMMUNITY
Start: 2024-03-25

## 2024-04-09 RX ORDER — PROPRANOLOL HCL 60 MG
CAPSULE, EXTENDED RELEASE 24HR ORAL
COMMUNITY
Start: 2023-12-06

## 2024-04-09 RX ORDER — FLUCONAZOLE 150 MG/1
TABLET ORAL
Qty: 2 TABLET | Refills: 2 | Status: SHIPPED | OUTPATIENT
Start: 2024-04-09 | End: 2024-10-07

## 2024-04-09 RX ORDER — AMOXICILLIN 500 MG/1
CAPSULE ORAL
COMMUNITY
Start: 2024-02-06

## 2024-04-09 RX ORDER — UREA 20 %
CREAM (GRAM) TOPICAL
COMMUNITY

## 2024-04-09 NOTE — LETTER
"    4/9/2024         RE: Zara Webster  [address] [place]  [place] [address]        Dear Colleague,    Thank you for referring your patient, Zara Webster, to the [facility]. Please see a copy of my visit note below.    [facility] ENDOCRINOLOGY    Diabetes Note 4/10/2024    Zara Webster, 1950, [id number]          Reason for visit      1. Type 2 diabetes mellitus with diabetic mononeuropathy, with long-term current use of insulin (H)    2. Candidiasis of vagina        HPI     Zara Webster is a very pleasant 73 year old old female who presents for follow up.  SUMMARY:    Zara returns today in follow-up for DM 2. Her current A1c is 12.2 and improved from her previous of 14.1. She is wearing the Freestyle Naila 14 day CGM, which was downloaded and data was reviewed.     Unfortunately, she is not keeping the reader within 20 feet at all times, so it is not connecting. Active time was only 28% of the time. She is not scanning regularly. She did not have any lows registered.     She continues to take the same doses of insulin that she has done for a very long time. 6 with meals, 6 for correction. She takes Lantus, 14 units BID. She is taking Trulicity, between 0.75 and 1.5, depending upon what she can get at the Pharmacy. She was completely without it for a month.     She is having Calista Phenomena and her FBS are well north of 250, and often 350.     She reports that she has \"given up soda\" for the most part.  She readily admits that her high BG are dependent upon her diet. She has always had a CHO heavy diet 2/2 to her Colostomy and how her gut handles food.     She is taking Diflucan, approximately 3 tablets monthly for regular Vaginal Candidiasis.     She has undergone Microwave ablations for a Hepatic lesion.    Noticeable essential tremor in her R hand today.     Blood glucose data:      Past Medical History     Patient Active Problem List   Diagnosis     Type II " diabetes mellitus (H)     Post Colectomy     Hypercholesteremia     BMI 28.0-28.9,adult     Aspirin contraindicated     Crohn's disease of colon (H)     Gastrointestinal hemorrhage     Other cirrhosis of liver (H) - unclear etiology,[facility]     Ileostomy status (H)     Non-alcoholic cirrhosis (H)     Abnormal liver ultrasound     Nonalcoholic steatohepatitis     Disorder of biliary tract     Essential tremor     HCC (hepatocellular carcinoma) (H)     Lesion of liver     Atypical chest pain     Peripheral edema     Nausea and vomiting, unspecified vomiting type     Intractable vomiting     Abnormal blood chemistry level     Abnormal LFTs     Bile duct stricture (H28)     Common bile duct obstruction (H28)     Candidiasis of vagina        Family History       family history includes Cancer in her father; Diabetes in her father and mother; Heart Disease in her mother; Hyperlipidemia in her mother; No Known Problems in her sister and sister.    Social History      reports that she has never smoked. She has never been exposed to tobacco smoke. She has never used smokeless tobacco. She reports that she does not currently use alcohol. She reports that she does not use drugs.      Review of Systems     Patient has no polyuria or polydipsia, no chest pain, dyspnea or TIA's, no numbness, tingling or pain in extremities  Remainder negative except as noted in HPI.    Vital Signs     /64 (BP Location: Right arm, Patient Position: Sitting, Cuff Size: Adult Regular)   Pulse 104   Wt 68.5 kg (151 lb)   LMP  (LMP Unknown)   SpO2 98%   BMI 25.32 kg/m    Wt Readings from Last 3 Encounters:   04/09/24 68.5 kg (151 lb)   03/22/24 68.5 kg (151 lb)   01/26/24 70.9 kg (156 lb 4.8 oz)       Physical Exam     Constitutional:  Well developed, Well nourished  HENT:  Normocephalic,   Neck: normal in appearance  Eyes:  PERRL, Conjunctiva pink  Respiratory:  No respiratory distress  Skin: No acanthosis nigricans, lipoatrophy or  "lipodystrophy  Neurologic:  Alert & oriented x 3, nonfocal  Psychiatric:  Affect, Mood, Insight appropriate        Assessment     1. Type 2 diabetes mellitus with diabetic mononeuropathy, with long-term current use of insulin (H)    2. Candidiasis of vagina        Plan     Recommended a high protein snack at HS to help with early morning elevations. She will have one \"occasionally\" when her BG are lower in the evening. Recommended that she do this regularly.     Will increase her Lantus dose to 20 units BID.     Also recommended that she keep the [employer] closer than her purse and to use it regularly rather than absentmindedly.     Will refill her Diflucan today.     Follow-up with me in 6 months.           Katiana Goodwin NP  [facility]  4/10/2024  6:16 AM      Lab Results     Microalbumin Urine mg/dL   Date Value Ref Range Status   04/27/2021 0.73 0.00 - 1.99 mg/dL Final       Cholesterol   Date Value Ref Range Status   03/20/2023 243 (H) <200 mg/dL Final     Direct Measure HDL   Date Value Ref Range Status   03/20/2023 58 >=50 mg/dL Final     Triglycerides   Date Value Ref Range Status   03/20/2023 238 (H) <150 mg/dL Final             Current Medications     Outpatient Medications Prior to Visit   Medication Sig Dispense Refill     amoxicillin (AMOXIL) 500 MG capsule TAKE 1 CAPSULE by mouth 3 TIMES DAILY UNTIL GONE.*       clobetasol (TEMOVATE) 0.05 % external ointment Apply topically as needed       continuous blood glucose monitoring (FREESTYLE VIDA) sensor 1 Units daily 2 each 3     dulaglutide (TRULICITY) 0.75 MG/0.5ML pen Inject 0.75 mg Subcutaneous every 7 days 2 mL 3     dulaglutide (TRULICITY) 1.5 MG/0.5ML pen Inject 1.5 mg Subcutaneous every 7 days 6 mL 0     flash glucose scanning reader (FREESTYLE VIDA 14 DAY READER) Misc [FLASH GLUCOSE SCANNING READER (FREESTYLE VIDA 14 DAY READER) MISC] Use 1 Units As Directed every 14 (fourteen) days. 1 each 0     insulin glargine (LANTUS PEN) 100 UNIT/ML pen " Inject 14 Units Subcutaneous 2 times daily 30 mL 0     insulin lispro (HUMALOG KWIKPEN) 100 UNIT/ML (1 unit dial) KWIKPEN Inject 6 units subcutaneously 4 times daily for BG management 30 mL 0     insulin pen needle (32G X 4 MM) 32G X 4 MM miscellaneous Use 8 pen needles daily or as directed. 800 each 0     loperamide (IMODIUM A-D) 2 MG tablet Take 1 tablet (2 mg) by mouth 4 times daily as needed for diarrhea 120 tablet 3     omeprazole (PRILOSEC) 40 MG DR capsule Take 1 capsule (40 mg) by mouth at bedtime 90 capsule 3     ostomy adhesive Pste [OSTOMY ADHESIVE PSTE] Apply as needed 4 Tube 3     propranolol ER (INDERAL LA) 60 MG 24 hr capsule TAKE 1 CAPSULE (60 MG) BY MOUTH ONCE DAILY*       oxyCODONE (ROXICODONE) 5 MG tablet Take 5 mg by mouth (Patient not taking: Reported on 4/9/2024)       UREA 20 INTENSIVE HYDRATING 20 % external cream Apply to affected area: foot twice daily* (Patient not taking: Reported on 4/9/2024)       diphenoxylate-atropine (LOMOTIL) 2.5-0.025 MG tablet Take 1 tablet by mouth 4 times daily as needed for diarrhea (Patient not taking: Reported on 4/9/2024) 30 tablet 3     fluconazole (DIFLUCAN) 150 MG tablet Take 1 tablet by mouth by mouth now, and if no improvement in 3 days, take another. 2 tablet 0     No facility-administered medications prior to visit.           Again, thank you for allowing me to participate in the care of your patient.        Sincerely,        Katiana Goodwin NP

## 2024-04-09 NOTE — PROGRESS NOTES
"[facility] ENDOCRINOLOGY    Diabetes Note 4/10/2024    Zara Webster, 1950, [id number]          Reason for visit      1. Type 2 diabetes mellitus with diabetic mononeuropathy, with long-term current use of insulin (H)    2. Candidiasis of vagina        HPI     Zara Webster is a very pleasant 73 year old old female who presents for follow up.  SUMMARY:    Zara returns today in follow-up for DM 2. Her current A1c is 12.2 and improved from her previous of 14.1. She is wearing the Freestyle Naila 14 day CGM, which was downloaded and data was reviewed.     Unfortunately, she is not keeping the reader within 20 feet at all times, so it is not connecting. Active time was only 28% of the time. She is not scanning regularly. She did not have any lows registered.     She continues to take the same doses of insulin that she has done for a very long time. 6 with meals, 6 for correction. She takes Lantus, 14 units BID. She is taking Trulicity, between 0.75 and 1.5, depending upon what she can get at the Pharmacy. She was completely without it for a month.     She is having Calista Phenomena and her FBS are well north of 250, and often 350.     She reports that she has \"given up soda\" for the most part.  She readily admits that her high BG are dependent upon her diet. She has always had a CHO heavy diet 2/2 to her Colostomy and how her gut handles food.     She is taking Diflucan, approximately 3 tablets monthly for regular Vaginal Candidiasis.     She has undergone Microwave ablations for a Hepatic lesion.    Noticeable essential tremor in her R hand today.     Blood glucose data:      Past Medical History     Patient Active Problem List   Diagnosis    Type II diabetes mellitus (H)    Post Colectomy    Hypercholesteremia    BMI 28.0-28.9,adult    Aspirin contraindicated    Crohn's disease of colon (H)    Gastrointestinal hemorrhage    Other cirrhosis of liver (H) - unclear etiology,[facility]    Ileostomy status (H)    " Non-alcoholic cirrhosis (H)    Abnormal liver ultrasound    Nonalcoholic steatohepatitis    Disorder of biliary tract    Essential tremor    HCC (hepatocellular carcinoma) (H)    Lesion of liver    Atypical chest pain    Peripheral edema    Nausea and vomiting, unspecified vomiting type    Intractable vomiting    Abnormal blood chemistry level    Abnormal LFTs    Bile duct stricture (H28)    Common bile duct obstruction (H28)    Candidiasis of vagina        Family History       family history includes Cancer in her father; Diabetes in her father and mother; Heart Disease in her mother; Hyperlipidemia in her mother; No Known Problems in her sister and sister.    Social History      reports that she has never smoked. She has never been exposed to tobacco smoke. She has never used smokeless tobacco. She reports that she does not currently use alcohol. She reports that she does not use drugs.      Review of Systems     Patient has no polyuria or polydipsia, no chest pain, dyspnea or TIA's, no numbness, tingling or pain in extremities  Remainder negative except as noted in HPI.    Vital Signs     /64 (BP Location: Right arm, Patient Position: Sitting, Cuff Size: Adult Regular)   Pulse 104   Wt 68.5 kg (151 lb)   LMP  (LMP Unknown)   SpO2 98%   BMI 25.32 kg/m    Wt Readings from Last 3 Encounters:   04/09/24 68.5 kg (151 lb)   03/22/24 68.5 kg (151 lb)   01/26/24 70.9 kg (156 lb 4.8 oz)       Physical Exam     Constitutional:  Well developed, Well nourished  HENT:  Normocephalic,   Neck: normal in appearance  Eyes:  PERRL, Conjunctiva pink  Respiratory:  No respiratory distress  Skin: No acanthosis nigricans, lipoatrophy or lipodystrophy  Neurologic:  Alert & oriented x 3, nonfocal  Psychiatric:  Affect, Mood, Insight appropriate        Assessment     1. Type 2 diabetes mellitus with diabetic mononeuropathy, with long-term current use of insulin (H)    2. Candidiasis of vagina        Plan     Recommended a high  "protein snack at HS to help with early morning elevations. She will have one \"occasionally\" when her BG are lower in the evening. Recommended that she do this regularly.     Will increase her Lantus dose to 20 units BID.     Also recommended that she keep the Nelson closer than her purse and to use it regularly rather than absentmindedly.     Will refill her Diflucan today.     Follow-up with me in 6 months.           Katiana Goodwin NP  [facility]  4/10/2024  6:16 AM      Lab Results     Microalbumin Urine mg/dL   Date Value Ref Range Status   04/27/2021 0.73 0.00 - 1.99 mg/dL Final       Cholesterol   Date Value Ref Range Status   03/20/2023 243 (H) <200 mg/dL Final     Direct Measure HDL   Date Value Ref Range Status   03/20/2023 58 >=50 mg/dL Final     Triglycerides   Date Value Ref Range Status   03/20/2023 238 (H) <150 mg/dL Final             Current Medications     Outpatient Medications Prior to Visit   Medication Sig Dispense Refill    amoxicillin (AMOXIL) 500 MG capsule TAKE 1 CAPSULE by mouth 3 TIMES DAILY UNTIL GONE.*      clobetasol (TEMOVATE) 0.05 % external ointment Apply topically as needed      continuous blood glucose monitoring (FREESTYLE VIDA) sensor 1 Units daily 2 each 3    dulaglutide (TRULICITY) 0.75 MG/0.5ML pen Inject 0.75 mg Subcutaneous every 7 days 2 mL 3    dulaglutide (TRULICITY) 1.5 MG/0.5ML pen Inject 1.5 mg Subcutaneous every 7 days 6 mL 0    flash glucose scanning reader (FREESTYLE VIDA 14 DAY READER) Misc [FLASH GLUCOSE SCANNING READER (FREESTYLE VIDA 14 DAY READER) MISC] Use 1 Units As Directed every 14 (fourteen) days. 1 each 0    insulin glargine (LANTUS PEN) 100 UNIT/ML pen Inject 14 Units Subcutaneous 2 times daily 30 mL 0    insulin lispro (HUMALOG KWIKPEN) 100 UNIT/ML (1 unit dial) KWIKPEN Inject 6 units subcutaneously 4 times daily for BG management 30 mL 0    insulin pen needle (32G X 4 MM) 32G X 4 MM miscellaneous Use 8 pen needles daily or as directed. 800 each " 0    loperamide (IMODIUM A-D) 2 MG tablet Take 1 tablet (2 mg) by mouth 4 times daily as needed for diarrhea 120 tablet 3    omeprazole (PRILOSEC) 40 MG DR capsule Take 1 capsule (40 mg) by mouth at bedtime 90 capsule 3    ostomy adhesive Pste [OSTOMY ADHESIVE PSTE] Apply as needed 4 Tube 3    propranolol ER (INDERAL LA) 60 MG 24 hr capsule TAKE 1 CAPSULE (60 MG) BY MOUTH ONCE DAILY*      oxyCODONE (ROXICODONE) 5 MG tablet Take 5 mg by mouth (Patient not taking: Reported on 4/9/2024)      UREA 20 INTENSIVE HYDRATING 20 % external cream Apply to affected area: foot twice daily* (Patient not taking: Reported on 4/9/2024)      diphenoxylate-atropine (LOMOTIL) 2.5-0.025 MG tablet Take 1 tablet by mouth 4 times daily as needed for diarrhea (Patient not taking: Reported on 4/9/2024) 30 tablet 3    fluconazole (DIFLUCAN) 150 MG tablet Take 1 tablet by mouth by mouth now, and if no improvement in 3 days, take another. 2 tablet 0     No facility-administered medications prior to visit.

## 2024-04-10 PROBLEM — B37.31 CANDIDIASIS OF VAGINA: Status: ACTIVE | Noted: 2024-04-10

## 2024-05-20 ENCOUNTER — TELEPHONE (OUTPATIENT)
Dept: FAMILY MEDICINE | Facility: CLINIC | Age: 74
End: 2024-05-20
Payer: COMMERCIAL

## 2024-05-20 DIAGNOSIS — K50.118 CROHN'S DISEASE OF COLON WITH OTHER COMPLICATION (H): Primary | ICD-10-CM

## 2024-05-20 RX ORDER — DIPHENOXYLATE HCL/ATROPINE 2.5-.025MG
1 TABLET ORAL 4 TIMES DAILY PRN
Qty: 120 TABLET | Refills: 3 | Status: SHIPPED | OUTPATIENT
Start: 2024-05-20

## 2024-05-20 NOTE — TELEPHONE ENCOUNTER
General Call    Contacts         Type Contact Phone/Fax    05/20/2024 09:22 AM CDT Phone (Incoming) Zara Webster (Self) [phone] (M)          Reason for Call:  patient needs a prior authorization sent in for diphenoxylete-atropine. 2.5 2-025MG, taking 1 pill at lest 2 times per week.   She had a colostomy 40 years ago.     Could we send this information to you in [employer] or would you prefer to receive a phone call?:   Patient would prefer a phone call   Okay to leave a detailed message?: Yes at Cell number on file:    Telephone Information:   Mobile [phone]

## 2024-05-20 NOTE — TELEPHONE ENCOUNTER
I see that Lomotil was discontinued by Endocrine on 4/9/24. I don't think this was intentional?    Medication Changes         Patient not taking: Reported on 4/9/2024    IF she is to continue please re-prescribe and then we will be able to submit PA.

## 2024-06-20 DIAGNOSIS — Z79.4 TYPE 2 DIABETES MELLITUS WITH DIABETIC MONONEUROPATHY, WITH LONG-TERM CURRENT USE OF INSULIN (H): ICD-10-CM

## 2024-06-20 DIAGNOSIS — E11.41 TYPE 2 DIABETES MELLITUS WITH DIABETIC MONONEUROPATHY, WITH LONG-TERM CURRENT USE OF INSULIN (H): ICD-10-CM

## 2024-06-21 RX ORDER — DULAGLUTIDE 0.75 MG/.5ML
INJECTION, SOLUTION SUBCUTANEOUS
Qty: 2 ML | Refills: 0 | Status: SHIPPED | OUTPATIENT
Start: 2024-06-21

## 2024-06-21 RX ORDER — INSULIN GLARGINE 100 [IU]/ML
INJECTION, SOLUTION SUBCUTANEOUS
Qty: 30 ML | Refills: 0 | Status: SHIPPED | OUTPATIENT
Start: 2024-06-21

## 2024-07-11 ENCOUNTER — PATIENT OUTREACH (OUTPATIENT)
Dept: CARE COORDINATION | Facility: CLINIC | Age: 74
End: 2024-07-11
Payer: COMMERCIAL

## 2024-07-11 DIAGNOSIS — E11.41 TYPE 2 DIABETES MELLITUS WITH DIABETIC MONONEUROPATHY, WITH LONG-TERM CURRENT USE OF INSULIN (H): ICD-10-CM

## 2024-07-11 DIAGNOSIS — Z79.4 TYPE 2 DIABETES MELLITUS WITH DIABETIC MONONEUROPATHY, WITH LONG-TERM CURRENT USE OF INSULIN (H): ICD-10-CM

## 2024-07-11 NOTE — TELEPHONE ENCOUNTER
Patient agreeable to increasing Trulicity.    Sukhwinder Card RN     [facility]

## 2024-07-11 NOTE — TELEPHONE ENCOUNTER
Patient called back and I relayed the message from Dr. Chamorro about dosage. She is taking 0.75MG once weekly. Patient stated this is working well for her.

## 2024-07-11 NOTE — TELEPHONE ENCOUNTER
I am wanting to know if she wants to go up on the dose?  To 1.5mg.  Her most recent A1C was still really high.  If she is tolerating it fine, then I would recommend going up

## 2024-07-11 NOTE — TELEPHONE ENCOUNTER
Please find out if she wants to go up on the dose?  (She has been seeing endo so I'm out of the loop)

## 2024-07-12 RX ORDER — DULAGLUTIDE 0.75 MG/.5ML
INJECTION, SOLUTION SUBCUTANEOUS
Qty: 2 ML | Refills: 0 | OUTPATIENT
Start: 2024-07-12

## 2024-08-27 DIAGNOSIS — E11.41 TYPE 2 DIABETES MELLITUS WITH DIABETIC MONONEUROPATHY, WITH LONG-TERM CURRENT USE OF INSULIN (H): ICD-10-CM

## 2024-08-27 DIAGNOSIS — Z79.4 TYPE 2 DIABETES MELLITUS WITH DIABETIC MONONEUROPATHY, WITH LONG-TERM CURRENT USE OF INSULIN (H): ICD-10-CM

## 2024-08-27 RX ORDER — INSULIN LISPRO 100 [IU]/ML
INJECTION, SOLUTION INTRAVENOUS; SUBCUTANEOUS
Qty: 30 ML | Refills: 0 | Status: SHIPPED | OUTPATIENT
Start: 2024-08-27

## 2024-09-19 ENCOUNTER — MEDICAL CORRESPONDENCE (OUTPATIENT)
Dept: HEALTH INFORMATION MANAGEMENT | Facility: CLINIC | Age: 74
End: 2024-09-19
Payer: COMMERCIAL

## 2024-09-22 ENCOUNTER — HEALTH MAINTENANCE LETTER (OUTPATIENT)
Age: 74
End: 2024-09-22

## 2024-10-04 ENCOUNTER — TRANSFERRED RECORDS (OUTPATIENT)
Dept: HEALTH INFORMATION MANAGEMENT | Facility: CLINIC | Age: 74
End: 2024-10-04
Payer: COMMERCIAL

## 2024-10-07 ENCOUNTER — LAB (OUTPATIENT)
Dept: LAB | Facility: CLINIC | Age: 74
End: 2024-10-07
Payer: COMMERCIAL

## 2024-10-07 DIAGNOSIS — Z79.4 TYPE 2 DIABETES MELLITUS WITH DIABETIC MONONEUROPATHY, WITH LONG-TERM CURRENT USE OF INSULIN (H): ICD-10-CM

## 2024-10-07 DIAGNOSIS — E11.41 TYPE 2 DIABETES MELLITUS WITH DIABETIC MONONEUROPATHY, WITH LONG-TERM CURRENT USE OF INSULIN (H): ICD-10-CM

## 2024-10-07 LAB
ALBUMIN SERPL BCG-MCNC: 3.2 G/DL (ref 3.5–5.2)
ALP SERPL-CCNC: 329 U/L (ref 40–150)
ALT SERPL W P-5'-P-CCNC: 38 U/L (ref 0–50)
ANION GAP SERPL CALCULATED.3IONS-SCNC: 7 MMOL/L (ref 7–15)
AST SERPL W P-5'-P-CCNC: 85 U/L (ref 0–45)
BILIRUB SERPL-MCNC: 1.5 MG/DL
BUN SERPL-MCNC: 9.3 MG/DL (ref 8–23)
CALCIUM SERPL-MCNC: 8.9 MG/DL (ref 8.8–10.4)
CHLORIDE SERPL-SCNC: 105 MMOL/L (ref 98–107)
CREAT SERPL-MCNC: 0.61 MG/DL (ref 0.51–0.95)
EGFRCR SERPLBLD CKD-EPI 2021: >90 ML/MIN/1.73M2
EST. AVERAGE GLUCOSE BLD GHB EST-MCNC: 260 MG/DL
GLUCOSE SERPL-MCNC: 240 MG/DL (ref 70–99)
HBA1C MFR BLD: 10.7 % (ref 0–5.6)
HCO3 SERPL-SCNC: 25 MMOL/L (ref 22–29)
LDLC SERPL DIRECT ASSAY-MCNC: 137 MG/DL
POTASSIUM SERPL-SCNC: 4.4 MMOL/L (ref 3.4–5.3)
PROT SERPL-MCNC: 6.7 G/DL (ref 6.4–8.3)
SODIUM SERPL-SCNC: 137 MMOL/L (ref 135–145)

## 2024-10-07 PROCEDURE — 83721 ASSAY OF BLOOD LIPOPROTEIN: CPT

## 2024-10-07 PROCEDURE — 36415 COLL VENOUS BLD VENIPUNCTURE: CPT

## 2024-10-07 PROCEDURE — 83036 HEMOGLOBIN GLYCOSYLATED A1C: CPT

## 2024-10-07 PROCEDURE — 80053 COMPREHEN METABOLIC PANEL: CPT

## 2024-10-07 RX ORDER — FLUCONAZOLE 150 MG/1
TABLET ORAL
Qty: 2 TABLET | Refills: 0 | Status: SHIPPED | OUTPATIENT
Start: 2024-10-07 | End: 2024-10-16

## 2024-10-07 NOTE — TELEPHONE ENCOUNTER
She is taking Diflucan, approximately 3 tablets monthly for regular Vaginal Candidiasis.          Requested Prescriptions   Pending Prescriptions Disp Refills    fluconazole (DIFLUCAN) 150 MG tablet [Pharmacy Med Name: Fluconazole Oral Tablet 150 MG] 2 tablet 0     Sig: Take 1 tablet by mouth by mouth now, and if no improvement in 3 days, take another.       Antifungal Agents Failed - 10/7/2024  2:50 PM        Failed - Always Fail Criteria        Passed - Medication is active on med list        Passed - Recent (12 mo) or future (90 days) visit within the authorizing provider's specialty     The patient must have completed an in-person or virtual visit within the past 12 months or has a future visit scheduled within the next 90 days with the authorizing provider s specialty.  Urgent care and e-visits do not quality as an office visit for this protocol.

## 2024-10-11 DIAGNOSIS — Z79.4 TYPE 2 DIABETES MELLITUS WITH DIABETIC MONONEUROPATHY, WITH LONG-TERM CURRENT USE OF INSULIN (H): ICD-10-CM

## 2024-10-11 DIAGNOSIS — E11.41 TYPE 2 DIABETES MELLITUS WITH DIABETIC MONONEUROPATHY, WITH LONG-TERM CURRENT USE OF INSULIN (H): ICD-10-CM

## 2024-10-11 RX ORDER — INSULIN GLARGINE 100 [IU]/ML
INJECTION, SOLUTION SUBCUTANEOUS
Qty: 30 ML | Refills: 0 | Status: SHIPPED | OUTPATIENT
Start: 2024-10-11

## 2024-10-16 ENCOUNTER — OFFICE VISIT (OUTPATIENT)
Dept: ENDOCRINOLOGY | Facility: CLINIC | Age: 74
End: 2024-10-16
Payer: COMMERCIAL

## 2024-10-16 VITALS
DIASTOLIC BLOOD PRESSURE: 66 MMHG | OXYGEN SATURATION: 98 % | HEART RATE: 78 BPM | SYSTOLIC BLOOD PRESSURE: 121 MMHG | WEIGHT: 153.9 LBS | BODY MASS INDEX: 25.81 KG/M2

## 2024-10-16 DIAGNOSIS — Z79.4 TYPE 2 DIABETES MELLITUS WITH DIABETIC MONONEUROPATHY, WITH LONG-TERM CURRENT USE OF INSULIN (H): ICD-10-CM

## 2024-10-16 DIAGNOSIS — E11.41 TYPE 2 DIABETES MELLITUS WITH DIABETIC MONONEUROPATHY, WITH LONG-TERM CURRENT USE OF INSULIN (H): ICD-10-CM

## 2024-10-16 PROBLEM — K83.1 OBSTRUCTION OF COMMON BILE DUCT (H): Status: ACTIVE | Noted: 2023-11-20

## 2024-10-16 PROCEDURE — 99214 OFFICE O/P EST MOD 30 MIN: CPT | Performed by: NURSE PRACTITIONER

## 2024-10-16 RX ORDER — FLUCONAZOLE 150 MG/1
TABLET ORAL
Qty: 2 TABLET | Refills: 3 | Status: SHIPPED | OUTPATIENT
Start: 2024-10-16

## 2024-10-16 NOTE — LETTER
"10/16/2024      Zara Webster  [address] [place]  [place] [address]      Dear Colleague,    Thank you for referring your patient, Zara Webster, to the [facility]. Please see a copy of my visit note below.    [facility] ENDOCRINOLOGY    Diabetes Note 10/16/2024    Zara Webster, 1950, [id number]          Reason for visit      1. Type 2 diabetes mellitus with diabetic mononeuropathy, with long-term current use of insulin (H)        HPI     Zara Webster is a very pleasant 74 year old old female who presents for follow up.  SUMMARY:    Zara is seen today in follow-up for DM 2. Her current A1c is 10.7 and improved from her last that was 12.2. She is using the Freestyle Naila 14 CGM, which was downloaded and data was reviewed.     Her sensor was only active 12% of the time over the last two weeks. Which means that she hasn't been using it. She gives herself 6 units of insulin with meals, and doesn't base it upon what she is eating or what her BG are at the start of the meal.     She continues to eat high CHO meals because of her History of Crohn's disease and her Ostomy. She cannot eat high fiber. She reports that she is \"down to 2 small cans of Pepsi a day\", which is no small feat.     She reports that her Liver Cancer treatments are going well. She is having ablations to the lesions as they arise. She notes no side effects at the present time. She is seeing Oncology and GI for this.    Blood glucose data:      Past Medical History     Patient Active Problem List   Diagnosis     Type II diabetes mellitus (H)     Post Colectomy     Hypercholesteremia     BMI 28.0-28.9,adult     Aspirin contraindicated     Crohn's disease of colon (H)     Gastrointestinal hemorrhage     Other cirrhosis of liver (H) - unclear etiology,[facility]     Ileostomy status (H)     Non-alcoholic cirrhosis (H)     Abnormal liver ultrasound     Nonalcoholic steatohepatitis     Disorder of biliary tract "     Essential tremor     HCC (hepatocellular carcinoma) (H)     Lesion of liver     Atypical chest pain     Peripheral edema     Nausea and vomiting, unspecified vomiting type     Intractable vomiting     Abnormal blood chemistry level     Abnormal LFTs     Bile duct stricture (H)     Common bile duct obstruction (H)     Candidiasis of vagina     Obstruction of common bile duct (H)        Family History       family history includes Cancer in her father; Diabetes in her father and mother; Heart Disease in her mother; Hyperlipidemia in her mother; No Known Problems in her sister and sister.    Social History      reports that she has never smoked. She has never been exposed to tobacco smoke. She has never used smokeless tobacco. She reports that she does not currently use alcohol. She reports that she does not use drugs.      Review of Systems     Patient has no polyuria or polydipsia, no chest pain, dyspnea or TIA's, no numbness, tingling or pain in extremities  Remainder negative except as noted in HPI.    Vital Signs     /66 (BP Location: Left arm)   Pulse 78   Wt 69.8 kg (153 lb 14.4 oz)   LMP  (LMP Unknown)   SpO2 98%   BMI 25.81 kg/m    Wt Readings from Last 3 Encounters:   10/16/24 69.8 kg (153 lb 14.4 oz)   04/09/24 68.5 kg (151 lb)   03/22/24 68.5 kg (151 lb)       Physical Exam     Constitutional:  Well developed, Well nourished  HENT:  Normocephalic,   Neck: normal in appearance  Eyes:  PERRL, Conjunctiva pink  Respiratory:  No respiratory distress  Skin: No acanthosis nigricans, lipoatrophy or lipodystrophy  Neurologic:  Alert & oriented x 3, nonfocal  Psychiatric:  Affect, Mood, Insight appropriate        Assessment     1. Type 2 diabetes mellitus with diabetic mononeuropathy, with long-term current use of insulin (H)        Plan     No changes to her medication regimen today. She is working her plan.           Katiana Goodwin NP   Endocrinology  10/16/2024  7:22 AM        Lab Results      Microalbumin Urine mg/dL   Date Value Ref Range Status   04/27/2021 0.73 0.00 - 1.99 mg/dL Final       Cholesterol   Date Value Ref Range Status   03/20/2023 243 (H) <200 mg/dL Final     Direct Measure HDL   Date Value Ref Range Status   03/20/2023 58 >=50 mg/dL Final     Triglycerides   Date Value Ref Range Status   03/20/2023 238 (H) <150 mg/dL Final       [unfilled]      Current Medications     Outpatient Medications Prior to Visit   Medication Sig Dispense Refill     clobetasol (TEMOVATE) 0.05 % external ointment Apply topically as needed       continuous blood glucose monitoring (FREESTYLE VIDA) sensor 1 Units daily 2 each 3     diphenoxylate-atropine (LOMOTIL) 2.5-0.025 MG tablet Take 1 tablet by mouth 4 times daily as needed for diarrhea 120 tablet 3     flash glucose scanning reader (FREESTYLE VIDA 14 DAY READER) Misc [FLASH GLUCOSE SCANNING READER (FREESTYLE VIDA 14 DAY READER) MISC] Use 1 Units As Directed every 14 (fourteen) days. 1 each 0     insulin lispro (HUMALOG KWIKPEN) 100 UNIT/ML (1 unit dial) KWIKPEN Inject 6 units subcutaneously 4 times daily for blood glucose management 30 mL 0     insulin pen needle (32G X 4 MM) 32G X 4 MM miscellaneous Use 8 pen needles daily or as directed. 800 each 0     LANTUS SOLOSTAR 100 UNIT/ML soln Inject 14 Units Subcutaneous 2 times daily 30 mL 0     loperamide (IMODIUM A-D) 2 MG tablet Take 1 tablet (2 mg) by mouth 4 times daily as needed for diarrhea 120 tablet 3     omeprazole (PRILOSEC) 40 MG DR capsule Take 1 capsule (40 mg) by mouth at bedtime 90 capsule 3     ostomy adhesive Pste [OSTOMY ADHESIVE PSTE] Apply as needed 4 Tube 3     propranolol ER (INDERAL LA) 60 MG 24 hr capsule TAKE 1 CAPSULE (60 MG) BY MOUTH ONCE DAILY*       TRULICITY 0.75 MG/0.5ML pen INJECT 0.75 MG UNDER THE SKIN EVERY 7 DAYS 2 mL 0     amoxicillin (AMOXIL) 500 MG capsule TAKE 1 CAPSULE by mouth 3 TIMES DAILY UNTIL GONE.*       dulaglutide (TRULICITY) 1.5 MG/0.5ML pen Inject 1.5 mg  subcutaneously every 7 days 6 mL 0     fluconazole (DIFLUCAN) 150 MG tablet Take 1 tablet by mouth by mouth now, and if no improvement in 3 days, take another. 2 tablet 0     oxyCODONE (ROXICODONE) 5 MG tablet Take 5 mg by mouth.       UREA 20 INTENSIVE HYDRATING 20 % external cream Apply to affected area: foot twice daily* (Patient not taking: Reported on 4/9/2024)       No facility-administered medications prior to visit.                 Again, thank you for allowing me to participate in the care of your patient.        Sincerely,        Katiana Goodwin NP

## 2024-10-16 NOTE — PROGRESS NOTES
"[facility] ENDOCRINOLOGY    Diabetes Note 10/16/2024    Zara Webster, 1950, [id number]          Reason for visit      1. Type 2 diabetes mellitus with diabetic mononeuropathy, with long-term current use of insulin (H)        HPI     Zara Webster is a very pleasant 74 year old old female who presents for follow up.  SUMMARY:    Zara is seen today in follow-up for DM 2. Her current A1c is 10.7 and improved from her last that was 12.2. She is using the Freestyle Naila 14 CGM, which was downloaded and data was reviewed.     Her sensor was only active 12% of the time over the last two weeks. Which means that she hasn't been using it. She gives herself 6 units of insulin with meals, and doesn't base it upon what she is eating or what her BG are at the start of the meal.     She reports that she stopped taking Trulicity because she started vomiting in the middle of the night. After cessation, she stopped the vomiting.     She continues to eat high CHO meals because of her History of Crohn's disease and her Ostomy. She cannot eat high fiber. She reports that she is \"down to 2 small cans of Pepsi a day\", which is no small feat.     She reports that her Liver Cancer treatments are going well. She is having ablations to the lesions as they arise. She notes no side effects at the present time. She is seeing Oncology and GI for this.    Blood glucose data:      Past Medical History     Patient Active Problem List   Diagnosis    Type II diabetes mellitus (H)    Post Colectomy    Hypercholesteremia    BMI 28.0-28.9,adult    Aspirin contraindicated    Crohn's disease of colon (H)    Gastrointestinal hemorrhage    Other cirrhosis of liver (H) - unclear etiology,[facility]    Ileostomy status (H)    Non-alcoholic cirrhosis (H)    Abnormal liver ultrasound    Nonalcoholic steatohepatitis    Disorder of biliary tract    Essential tremor    HCC (hepatocellular carcinoma) (H)    Lesion of liver    Atypical chest pain    " Peripheral edema    Nausea and vomiting, unspecified vomiting type    Intractable vomiting    Abnormal blood chemistry level    Abnormal LFTs    Bile duct stricture (H)    Common bile duct obstruction (H)    Candidiasis of vagina    Obstruction of common bile duct (H)        Family History       family history includes Cancer in her father; Diabetes in her father and mother; Heart Disease in her mother; Hyperlipidemia in her mother; No Known Problems in her sister and sister.    Social History      reports that she has never smoked. She has never been exposed to tobacco smoke. She has never used smokeless tobacco. She reports that she does not currently use alcohol. She reports that she does not use drugs.      Review of Systems     Patient has no polyuria or polydipsia, no chest pain, dyspnea or TIA's, no numbness, tingling or pain in extremities  Remainder negative except as noted in HPI.    Vital Signs     /66 (BP Location: Left arm)   Pulse 78   Wt 69.8 kg (153 lb 14.4 oz)   LMP  (LMP Unknown)   SpO2 98%   BMI 25.81 kg/m    Wt Readings from Last 3 Encounters:   10/16/24 69.8 kg (153 lb 14.4 oz)   04/09/24 68.5 kg (151 lb)   03/22/24 68.5 kg (151 lb)       Physical Exam     Constitutional:  Well developed, Well nourished  HENT:  Normocephalic,   Neck: normal in appearance  Eyes:  PERRL, Conjunctiva pink  Respiratory:  No respiratory distress  Skin: No acanthosis nigricans, lipoatrophy or lipodystrophy  Neurologic:  Alert & oriented x 3, nonfocal  Psychiatric:  Affect, Mood, Insight appropriate        Assessment     1. Type 2 diabetes mellitus with diabetic mononeuropathy, with long-term current use of insulin (H)        Plan     No changes to her medication regimen today. She is working her plan.           Katiana Goodwin NP   Endocrinology  10/16/2024  7:22 AM        Lab Results     Microalbumin Urine mg/dL   Date Value Ref Range Status   04/27/2021 0.73 0.00 - 1.99 mg/dL Final       Cholesterol    Date Value Ref Range Status   03/20/2023 243 (H) <200 mg/dL Final     Direct Measure HDL   Date Value Ref Range Status   03/20/2023 58 >=50 mg/dL Final     Triglycerides   Date Value Ref Range Status   03/20/2023 238 (H) <150 mg/dL Final       [unfilled]      Current Medications     Outpatient Medications Prior to Visit   Medication Sig Dispense Refill    clobetasol (TEMOVATE) 0.05 % external ointment Apply topically as needed      continuous blood glucose monitoring (FREESTYLE VIDA) sensor 1 Units daily 2 each 3    diphenoxylate-atropine (LOMOTIL) 2.5-0.025 MG tablet Take 1 tablet by mouth 4 times daily as needed for diarrhea 120 tablet 3    flash glucose scanning reader (FREESTYLE VIDA 14 DAY READER) Misc [FLASH GLUCOSE SCANNING READER (FREESTYLE VIDA 14 DAY READER) MISC] Use 1 Units As Directed every 14 (fourteen) days. 1 each 0    insulin lispro (HUMALOG KWIKPEN) 100 UNIT/ML (1 unit dial) KWIKPEN Inject 6 units subcutaneously 4 times daily for blood glucose management 30 mL 0    insulin pen needle (32G X 4 MM) 32G X 4 MM miscellaneous Use 8 pen needles daily or as directed. 800 each 0    LANTUS SOLOSTAR 100 UNIT/ML soln Inject 14 Units Subcutaneous 2 times daily 30 mL 0    loperamide (IMODIUM A-D) 2 MG tablet Take 1 tablet (2 mg) by mouth 4 times daily as needed for diarrhea 120 tablet 3    omeprazole (PRILOSEC) 40 MG DR capsule Take 1 capsule (40 mg) by mouth at bedtime 90 capsule 3    ostomy adhesive Pste [OSTOMY ADHESIVE PSTE] Apply as needed 4 Tube 3    propranolol ER (INDERAL LA) 60 MG 24 hr capsule TAKE 1 CAPSULE (60 MG) BY MOUTH ONCE DAILY*      TRULICITY 0.75 MG/0.5ML pen INJECT 0.75 MG UNDER THE SKIN EVERY 7 DAYS 2 mL 0    amoxicillin (AMOXIL) 500 MG capsule TAKE 1 CAPSULE by mouth 3 TIMES DAILY UNTIL GONE.*      dulaglutide (TRULICITY) 1.5 MG/0.5ML pen Inject 1.5 mg subcutaneously every 7 days 6 mL 0    fluconazole (DIFLUCAN) 150 MG tablet Take 1 tablet by mouth by mouth now, and if no improvement  in 3 days, take another. 2 tablet 0    oxyCODONE (ROXICODONE) 5 MG tablet Take 5 mg by mouth.      UREA 20 INTENSIVE HYDRATING 20 % external cream Apply to affected area: foot twice daily* (Patient not taking: Reported on 4/9/2024)       No facility-administered medications prior to visit.

## 2024-11-12 DIAGNOSIS — K90.9 DIARRHEA DUE TO MALABSORPTION: ICD-10-CM

## 2024-11-12 DIAGNOSIS — R19.7 DIARRHEA DUE TO MALABSORPTION: ICD-10-CM

## 2024-11-13 RX ORDER — BISMUTH SUBSALICYLATE 525 MG/15ML
1 SUSPENSION ORAL 4 TIMES DAILY PRN
Qty: 120 TABLET | Refills: 0 | Status: SHIPPED | OUTPATIENT
Start: 2024-11-13

## 2024-11-14 DIAGNOSIS — K50.118 CROHN'S DISEASE OF COLON WITH OTHER COMPLICATION (H): ICD-10-CM

## 2024-11-14 NOTE — TELEPHONE ENCOUNTER
Zara has a history of a colostomy.  Zara was told to take Imodium OTC.  The Imodium product does not work as well as the medication diphenoxylate-atropine (LOMOTIL) 2.5-0.025 MG tablet.  She is in need of a refill of diphenoxylate-atropine (LOMOTIL) 2.5-0.025 MG tablet     Requested medication pended below.

## 2024-11-15 RX ORDER — DIPHENOXYLATE HYDROCHLORIDE AND ATROPINE SULFATE 2.5; .025 MG/1; MG/1
1 TABLET ORAL 4 TIMES DAILY PRN
Qty: 120 TABLET | Refills: 3 | Status: SHIPPED | OUTPATIENT
Start: 2024-11-15

## 2024-12-23 ENCOUNTER — MEDICAL CORRESPONDENCE (OUTPATIENT)
Dept: HEALTH INFORMATION MANAGEMENT | Facility: CLINIC | Age: 74
End: 2024-12-23
Payer: COMMERCIAL

## 2025-01-10 ENCOUNTER — TELEPHONE (OUTPATIENT)
Dept: FAMILY MEDICINE | Facility: CLINIC | Age: 75
End: 2025-01-10

## 2025-01-10 NOTE — TELEPHONE ENCOUNTER
Call attempt 1/3.    LVM for patient to schedule annual wellness exam due Now. On callback, please assist patient in scheduling Medicare AWV.    Please close encounter when scheduled.        Kranthi Gonzalez

## 2025-01-13 ENCOUNTER — OFFICE VISIT (OUTPATIENT)
Dept: FAMILY MEDICINE | Facility: CLINIC | Age: 75
End: 2025-01-13
Payer: COMMERCIAL

## 2025-01-13 ENCOUNTER — TELEPHONE (OUTPATIENT)
Dept: FAMILY MEDICINE | Facility: CLINIC | Age: 75
End: 2025-01-13

## 2025-01-13 VITALS
HEART RATE: 81 BPM | BODY MASS INDEX: 25.19 KG/M2 | HEIGHT: 65 IN | SYSTOLIC BLOOD PRESSURE: 110 MMHG | OXYGEN SATURATION: 97 % | DIASTOLIC BLOOD PRESSURE: 54 MMHG | RESPIRATION RATE: 16 BRPM | WEIGHT: 151.2 LBS | TEMPERATURE: 97.8 F

## 2025-01-13 DIAGNOSIS — C22.0 HCC (HEPATOCELLULAR CARCINOMA) (H): ICD-10-CM

## 2025-01-13 DIAGNOSIS — Z79.4 TYPE 2 DIABETES MELLITUS WITHOUT COMPLICATION, WITH LONG-TERM CURRENT USE OF INSULIN (H): ICD-10-CM

## 2025-01-13 DIAGNOSIS — E11.9 TYPE 2 DIABETES MELLITUS WITHOUT COMPLICATION, WITH LONG-TERM CURRENT USE OF INSULIN (H): ICD-10-CM

## 2025-01-13 DIAGNOSIS — Z01.818 PREOP GENERAL PHYSICAL EXAM: Primary | ICD-10-CM

## 2025-01-13 PROCEDURE — G2211 COMPLEX E/M VISIT ADD ON: HCPCS

## 2025-01-13 PROCEDURE — 99214 OFFICE O/P EST MOD 30 MIN: CPT

## 2025-01-13 NOTE — PROGRESS NOTES
Preoperative Evaluation  [facility]  [address] [facility] [address]  Phone: [phone]  Fax: [phone]  Primary Provider: Obdulia Chamorro MD  Pre-op Performing Provider: Cabrera Partida DO  Jan 13, 2025 1/13/2025   Surgical Information   What procedure is being done? history and physical   Facility or Hospital where procedure/surgery will be performed: [facility]   Who is doing the procedure / surgery? Dr Mckinnon   Date of surgery / procedure: 1 14 25   Time of surgery / procedure: 8 30   Where do you plan to recover after surgery? at home with family     Fax number for surgical facility: [phone]    Assessment & Plan     The proposed surgical procedure is considered INTERMEDIATE risk.    Preop general physical exam  HCC (hepatocellular carcinoma) (H)  Pleasant 74-year-old female here for preop exam  She reports that she is doing well  Has no complaints today  Has been battling HCC for approximately 1 year that was found incidentally  They have tried to do micro ablations however this was not successful so they will be doing an IR procedure with radioactive beads through her left groin  Patient does have a colostomy for 40 years and manages well with this  She did have a yeast infection approximately 1 week ago which is since resolved  She did receive flu shot this year  No recent fever or chills  No respiratory symptoms  No recent bleeding  I do not foresee any complications for this patient's upcoming procedure  Will fax note to provider at [facility]    Type 2 diabetes mellitus without complication, with long-term current use of insulin (H)  Poorly controlled diabetes  Last A1c was 10.7 in 10/2024  Currently does NovoLog 6 before meals and at bedtime  Does Lantus 14 units in the morning at night  Procedures tomorrow so tonight and tomorrow morning 80% of the Lantus which is 10 units  Do not take them NovoLog before the procedure tomorrow  morning  CGM interpretation  100% above target over the last 30 days  Last 7-day average was 401  Because this procedure is minimally invasive I do not believe that this will affect her procedure  She should follow-up with her primary care to get better control of her diabetes     The longitudinal plan of care for the diagnosis(es)/condition(s) as documented were addressed during this visit. Due to the added complexity in care, I will continue to support Zara in the subsequent management and with ongoing continuity of care.    Risks and Recommendations  The patient has the following additional risks and recommendations for perioperative complications:  Diabetes:  - Patient is on insulin therapy; diabetic NPO guidelines provided and discussed.    Antiplatelet or Anticoagulation Medication Instructions   - Patient is on no antiplatelet or anticoagulation medications.    Additional Medication Instructions  Take all scheduled medications on the day of surgery EXCEPT for modifications listed below:   - Long acting insulin (e.g. glargine, detemir): Take 80% of the usual evening or morning dose before surgery.     - short acting insulin (e.g. regular, lispro, aspart): DO NOT TAKE on the morning of surgery.       Recommendation  Approval given to proceed with proposed procedure, without further diagnostic evaluation.    Subjective   Zara is a 74 year old, presenting for the following:  Pre-Op Exam (X ray procedure on liver - putting beads in liver to kill cancer. 1/14/2025@[facility] )          1/13/2025     8:48 AM   Additional Questions   Roomed by jose powell     Via the Health Maintenance questionnaire, the patient has reported the following services have been completed -Eye Exam: [facility] 2024-01-01, this information has been sent to the abstraction team.  HPI related to upcoming procedure:   Has been battling cancer for about a year  Has tried microablations in the past and that did not work  Has been  feeling well - found incidentally  Did well with other procedures  No problems with anesthesia in the past    Average glucose was 401  Has not taken trulicity since May due to vomitting each night    14 lantus am/pm  Homolog 6 before meals and before bed    Did receive Flu shot this year  Has received shingles vaccine    100% above time last 30 days  7 day average of 401    GENERAL: No fever, chills, weight loss or gain  HEENT: No headache, trauma, changes in vision, hearing,  CARDIAC: Denies chest pain or shortness of breath  LUNG: Denies dyspnea on exertion or chest pain  ABD: Chronic colostomy  : yeast infection last week  SKIN: Denies new rashes  NEURO: No numbness, weakness, tingling   MSK: No weakness  PSYCH: No changes in mood, no HI or SI          1/13/2025   Pre-Op Questionnaire   Have you ever had a heart attack or stroke? No   Have you ever had surgery on your heart or blood vessels, such as a stent placement, a coronary artery bypass, or surgery on an artery in your head, neck, heart, or legs? No   Do you have chest pain with activity? No   Do you have a history of heart failure? No   Do you currently have a cold, bronchitis or symptoms of other infection? No   Do you have a cough, shortness of breath, or wheezing? No   Do you or anyone in your family have previous history of blood clots? No   Do you or does anyone in your family have a serious bleeding problem such as prolonged bleeding following surgeries or cuts? No   Have you ever had problems with anemia or been told to take iron pills? No   Have you had any abnormal blood loss such as black, tarry or bloody stools, or abnormal vaginal bleeding? No   Have you ever had a blood transfusion? No   Are you willing to have a blood transfusion if it is medically needed before, during, or after your surgery? Yes   Have you or any of your relatives ever had problems with anesthesia? No   Do you have sleep apnea, excessive snoring or daytime drowsiness? No    Do you have any artifical heart valves or other implanted medical devices like a pacemaker, defibrillator, or continuous glucose monitor? No   Do you have artificial joints? No   Are you allergic to latex? No     Health Care Directive  Patient does not have a Health Care Directive: Patient states has Advance Directive and will bring in a copy to clinic.    Preoperative Review of    reviewed - no record of controlled substances prescribed.      Status of Chronic Conditions:  DIABETES - Patient has a longstanding history of DiabetesType Type II . Patient is being treated with insulin injections and denies significant side effects. Control has been poor.   Patient Active Problem List    Diagnosis Date Noted    Candidiasis of vagina 04/10/2024     Priority: Medium    Abnormal LFTs 04/09/2024     Priority: Medium    Bile duct stricture (H) 04/09/2024     Priority: Medium    Nausea and vomiting, unspecified vomiting type 02/04/2024     Priority: Medium    Intractable vomiting 02/04/2024     Priority: Medium    Atypical chest pain 12/21/2023     Priority: Medium    Peripheral edema 12/21/2023     Priority: Medium    Common bile duct obstruction (H) 11/20/2023     Priority: Medium    Obstruction of common bile duct (H) 11/20/2023     Priority: Medium    Abnormal blood chemistry level 11/17/2023     Priority: Medium    Lesion of liver 09/26/2023     Priority: Medium    HCC (hepatocellular carcinoma) (H) 08/31/2023     Priority: Medium    Essential tremor 05/30/2023     Priority: Medium    Disorder of biliary tract 04/04/2022     Priority: Medium    Abnormal liver ultrasound 12/14/2021     Priority: Medium    Ileostomy status (H) 09/22/2021     Priority: Medium    Non-alcoholic cirrhosis (H) 09/22/2021     Priority: Medium    Nonalcoholic steatohepatitis 07/19/2021     Priority: Medium    Other cirrhosis of liver (H) - unclear etiology,[facility] 10/22/2020     Priority: Medium    Type II diabetes mellitus (H)       Priority: Medium     Created by Conversion        Gastrointestinal hemorrhage 04/29/2020     Priority: Medium    Crohn's disease of colon (H) 11/03/2019     Priority: Medium    Aspirin contraindicated 04/02/2019     Priority: Medium    Hypercholesteremia 10/06/2016     Priority: Medium    BMI 28.0-28.9,adult 10/06/2016     Priority: Medium    Post Colectomy      Priority: Medium     Created by Conversion          Past Medical History:   Diagnosis Date    Anemia     Atypical chest pain     Crohn's disease (H)     Diabetes mellitus (H)     Disorder of biliary tract     Essential tremor     Gastrointestinal hemorrhage     Hepatocellular carcinoma (H)     Hypercholesteremia     Ileostomy status (H)     Lesion of liver     Non-alcoholic cirrhosis (H)      Past Surgical History:   Procedure Laterality Date    BREAST EXCISIONAL BIOPSY Left 1980    BREAST EXCISIONAL BIOPSY Left 1985    ESOPHAGOSCOPY, GASTROSCOPY, DUODENOSCOPY (EGD), COMBINED N/A 1/26/2024    Procedure: ENDOSCOPIC ULTRASOUND;  Surgeon: Santi Pradhan MD;  Location: [facility]    HYSTERECTOMY  1991    OOPHORECTOMY Bilateral 1991    OTHER SURGICAL HISTORY      KIDNEY STONE REMOVALNOT SURE WHICH SIDE, DR. CHANEY    CT PROCTOSIGMOIDOSCOPY,RIGID,DIAGNOS N/A 6/17/2020    Procedure: ILEOSTOMY REVISION, PROCTOSCOPY, ILEOSCOPY;  Surgeon: Devang Moon MD;  Location: [facility];  Service: General    ZZC REMOVAL COLON/ILEOSTOMY      Description: Total Abdominal Colectomy;  Recorded: 12/15/2011;    ZZC REMOVAL COLON/PROCTECTOMY/ILEOSTOMY      Description: Total Proctocolectomy;  Recorded: 08/10/2011;    ZZC VAG HYST, W/VAGINECTOMY      Description: Vaginal Hysterectomy With Colpectomy;  Recorded: 08/14/2014;     Current Outpatient Medications   Medication Sig Dispense Refill    ANTI-DIARRHEAL 2 MG tablet Take 1 tablet (2 mg) by mouth 4 times daily as needed for diarrhea 120 tablet 0    clobetasol (TEMOVATE) 0.05 % external ointment Apply  "topically as needed      continuous blood glucose monitoring (FREESTYLE VIDA) sensor 1 Units daily 2 each 3    diphenoxylate-atropine (LOMOTIL) 2.5-0.025 MG tablet Take 1 tablet by mouth 4 times daily as needed for diarrhea. 120 tablet 3    flash glucose scanning reader (FREESTYLE VIDA 14 DAY READER) Misc [FLASH GLUCOSE SCANNING READER (FREESTYLE VIDA 14 DAY READER) MISC] Use 1 Units As Directed every 14 (fourteen) days. 1 each 0    fluconazole (DIFLUCAN) 150 MG tablet Take 1 tablet by mouth by mouth now, and if no improvement in 3 days, take another. 2 tablet 3    insulin lispro (HUMALOG KWIKPEN) 100 UNIT/ML (1 unit dial) KWIKPEN Inject 6 units subcutaneously 4 times daily for blood glucose management 30 mL 0    insulin pen needle (32G X 4 MM) 32G X 4 MM miscellaneous Use 8 pen needles daily or as directed. 800 each 0    LANTUS SOLOSTAR 100 UNIT/ML soln Inject 14 Units Subcutaneous 2 times daily 30 mL 0    omeprazole (PRILOSEC) 40 MG DR capsule Take 1 capsule (40 mg) by mouth at bedtime 90 capsule 3    ostomy adhesive Pste [OSTOMY ADHESIVE PSTE] Apply as needed 4 Tube 3    propranolol ER (INDERAL LA) 60 MG 24 hr capsule TAKE 1 CAPSULE (60 MG) BY MOUTH ONCE DAILY*      TRULICITY 0.75 MG/0.5ML pen INJECT 0.75 MG UNDER THE SKIN EVERY 7 DAYS 2 mL 0       Allergies   Allergen Reactions    Prochlorperazine Edisylate [Prochlorperazine] Other (See Comments)     Saw things    Compazine [Prochlorperazine] Unknown        Social History     Tobacco Use    Smoking status: Never     Passive exposure: Never    Smokeless tobacco: Never   Substance Use Topics    Alcohol use: Not Currently       History   Drug Use No               Objective    /54   Pulse 81   Temp 97.8  F (36.6  C) (Oral)   Resp 16   Ht 1.645 m (5' 4.76\")   Wt 68.6 kg (151 lb 3.2 oz)   LMP  (LMP Unknown)   SpO2 97%   BMI 25.34 kg/m     Estimated body mass index is 25.34 kg/m  as calculated from the following:    Height as of this encounter: 1.645 m " "(5' 4.76\").    Weight as of this encounter: 68.6 kg (151 lb 3.2 oz).  Physical Exam  Vitals reviewed.   Constitutional:       Appearance: Normal appearance.   HENT:      Head: Normocephalic and atraumatic.      Right Ear: Tympanic membrane, ear canal and external ear normal.      Left Ear: Tympanic membrane, ear canal and external ear normal.      Nose: Nose normal.      Mouth/Throat:      Mouth: Mucous membranes are moist.   Eyes:      Extraocular Movements: Extraocular movements intact.      Pupils: Pupils are equal, round, and reactive to light.   Cardiovascular:      Rate and Rhythm: Normal rate and regular rhythm.      Heart sounds: Normal heart sounds.   Pulmonary:      Effort: Pulmonary effort is normal.      Breath sounds: Normal breath sounds.   Abdominal:      General: Abdomen is flat. Bowel sounds are normal.      Palpations: Abdomen is soft.   Musculoskeletal:      Cervical back: Normal range of motion and neck supple.      Comments: Appropriate strength in tested fields   Skin:     General: Skin is warm and dry.   Neurological:      General: No focal deficit present.      Mental Status: She is alert.   Psychiatric:         Mood and Affect: Mood normal.         Behavior: Behavior normal.           Recent Labs   Lab Test 10/07/24  0726 02/04/24  0932 02/03/24  2159 01/23/24  0919 01/17/24  0745   HGB  --  12.4 12.1   < >  --    PLT  --  193 197   < >  --     139 137   < >  --    POTASSIUM 4.4 3.5 4.0   < >  --    CR 0.61 0.54 0.61   < >  --    A1C 10.7*  --   --   --  12.2*    < > = values in this interval not displayed.        Diagnostics  No labs were ordered during this visit.   No EKG required for low risk surgery (cataract, skin procedure, breast biopsy, etc).    Revised Cardiac Risk Index (RCRI)  The patient has the following serious cardiovascular risks for perioperative complications:   - Diabetes Mellitus (on Insulin) = 1 point     RCRI Interpretation: 1 point: Class II (low risk - 0.9% " complication rate)         Signed Electronically by: Cabrera Partida DO  A copy of this evaluation report is provided to the requesting physician.

## 2025-01-13 NOTE — PATIENT INSTRUCTIONS
How to Take Your Medication Before Surgery  Preoperative Medication Instructions   Antiplatelet or Anticoagulation Medication Instructions   - Patient is on no antiplatelet or anticoagulation medications.   - Bleeding risk is low for this procedure (e.g. dental, skin, cataract).    Additional Medication Instructions  Take all scheduled medications on the day of surgery EXCEPT for modifications listed below:   - Beta Blockers (atenolol, metoprolol, propranolol) : Continue taking on the day of surgery.   - Long acting insulin (e.g. glargine, detemir): Take 80% of the usual evening or morning dose before surgery.     - short acting insulin (e.g. regular, lispro, aspart): DO NOT TAKE on the morning of surgery.    - Continuous Glucose Monitor (CGM): Patient was made aware on the day of surgery, they should be prepared to remove the Continuous Glucose Monitor (CGM) prior to the operation in order to avoid damage to the equipment during the procedure. The CGM will not be the source of glucose monitoring during the operation.          Patient Education   Preparing for Your Surgery  For Adults  Getting started  In most cases, a nurse will call to review your health history and instructions. They will give you an arrival time based on your scheduled surgery time. Please be ready to share:  Your doctor's clinic name and phone number  Your medical, surgical, and anesthesia history  A list of allergies and sensitivities  A list of medicines, including herbal treatments and over-the-counter drugs  Whether the patient has a legal guardian (ask how to send us the papers in advance)  Note: You may not receive a call if you were seen at our PAC (Preoperative Assessment Center).  Please tell us if you're pregnant--or if there's any chance you might be pregnant. Some surgeries may injure a fetus (unborn baby), so they require a pregnancy test. Surgeries that are safe for a fetus don't always need a test, and you can choose whether to  have one.   Preparing for surgery  Within 10 to 30 days of surgery: Have a pre-op exam (sometimes called an H&P, or History and Physical). This can be done at a clinic or pre-operative center.  If you're having a , you may not need this exam. Talk to your care team.  At your pre-op exam, talk to your care team about all medicines you take. (This includes CBD oil and any drugs, such as THC, marijuana, and other forms of cannabis.) If you need to stop any medicine before surgery, ask when to start taking it again.  This is for your safety. Many medicines and drugs can make you bleed too much during surgery. Some change how well surgery (anesthesia) drugs work.  Call your insurance company to let them know you're having surgery. (If you don't have insurance, call [phone].)  Call your clinic if there's any change in your health. This includes a scrape or scratch near the surgery site, or any signs of a cold (sore throat, runny nose, cough, rash, fever).  Eating and drinking guidelines  For your safety: Unless your surgeon tells you otherwise, follow the guidelines below.  Eat and drink as normal until 8 hours before you arrive for surgery. After that, no food or milk. You can spit out gum when you arrive.  Drink clear liquids until 2 hours before you arrive. These are liquids you can see through, like water, Gatorade, and Propel Water. They also include plain black coffee and tea (no cream or milk).  No alcohol for 24 hours before you arrive. The night before surgery, stop any drinks that contain THC.  If your care team tells you to take medicine on the morning of surgery, it's okay to take it with a sip of water. No other medicines or drugs are allowed (including CBD oil)--follow your care team's instructions.  If you have questions the day of surgery, call your hospital or surgery center.   Preventing infection  Shower or bathe the night before and the morning of surgery. Follow the instructions your  clinic gave you. (If no instructions, use regular soap.)  Don't shave or clip hair near your surgery site. We'll remove the hair if needed.  Don't smoke or vape the morning of surgery. No chewing tobacco for 6 hours before you arrive. A nicotine patch is okay. You may spit out nicotine gum when you arrive.  For some surgeries, the surgeon will tell you to fully quit smoking and nicotine.  We will make every effort to keep you safe from infection. We will:  Clean our hands often with soap and water (or an alcohol-based hand rub).  Clean the skin at your surgery site with a special soap that kills germs.  Give you a special gown to keep you warm. (Cold raises the risk of infection.)  Wear hair covers, masks, gowns, and gloves during surgery.  Give antibiotic medicine, if prescribed. Not all surgeries need this medicine.  What to bring on the day of surgery  Photo ID and insurance card  Copy of your health care directive, if you have one  Glasses and hearing aids (bring cases)  You can't wear contacts during surgery  Inhaler and eye drops, if you use them (tell us about these when you arrive)  CPAP machine or breathing device, if you use them  A few personal items, if spending the night  If you have . . .  A pacemaker, ICD (cardiac defibrillator), or other implant: Bring the ID card.  An implanted stimulator: Bring the remote control.  A legal guardian: Bring a copy of the certified (court-stamped) guardianship papers.  Please remove any jewelry, including body piercings. Leave jewelry and other valuables at home.  If you're going home the day of surgery  You must have a responsible adult drive you home. They should stay with you overnight as well.  If you don't have someone to stay with you, and you aren't safe to go home alone, we may keep you overnight. Insurance often won't pay for this.  After surgery  If it's hard to control your pain or you need more pain medicine, please call your surgeon's office.  Questions?    If you have any questions for your care team, list them here:   ____________________________________________________________________________________________________________________________________________________________________________________________________________________________________________________________  For informational purposes only. Not to replace the advice of your health care provider. Copyright   2003, 2019 [facility]. All rights reserved. Clinically reviewed by To Gonzalez MD. [employer] [id number] - REV 08/24.  Instructions About Your Continuous Glucose Monitor  You should be prepared to remove the Continuous Glucose Monitor prior to the operation in order to avoid damage to the equipment during the procedure. Your Continuous Glucose Monitor will not be the source of glucose monitoring during the operation.    How to Manage Your Diabetes Before Surgery  If you use insulin for your diabetes, follow these steps to keep your blood sugar in a safe range before surgery, when you ve been told not to eat or drink:   Check your blood sugar every 4 hours   If your blood sugar is high, take a corrective dose (not a meal dose) of sliding-scale insulin, if that is what you re used to doing  If your blood sugar is below 100, or you have symptoms of hypoglycemia, follow these steps:   Have 1 item from this list:  4 oz (1/2 cup) of fruit juice without pulp    4 oz (1/2 cup) of regular soda (not diet soda)    3 glucose gels    5 sugar cubes or sugar packets   Check your blood sugar again after 15 minutes  Repeat steps 1 and 2 again until your blood sugar is greater than 100

## 2025-01-27 NOTE — TELEPHONE ENCOUNTER
Call attempt 2/3.    LVM for patient to schedule annual wellness exam due Now. On callback, please assist patient in scheduling Medicare AWV.    Please close encounter when scheduled.      Kranthi Gonzalez

## 2025-01-28 ENCOUNTER — HOSPITAL ENCOUNTER (OUTPATIENT)
Facility: HOSPITAL | Age: 75
Setting detail: OBSERVATION
Discharge: HOME | End: 2025-01-29
Attending: EMERGENCY MEDICINE | Admitting: STUDENT IN AN ORGANIZED HEALTH CARE EDUCATION/TRAINING PROGRAM
Payer: COMMERCIAL

## 2025-01-28 ENCOUNTER — NURSE TRIAGE (OUTPATIENT)
Dept: FAMILY MEDICINE | Facility: CLINIC | Age: 75
End: 2025-01-28
Payer: COMMERCIAL

## 2025-01-28 DIAGNOSIS — I21.4 NSTEMI (NON-ST ELEVATED MYOCARDIAL INFARCTION) (H): ICD-10-CM

## 2025-01-28 DIAGNOSIS — I47.10 SVT (SUPRAVENTRICULAR TACHYCARDIA): ICD-10-CM

## 2025-01-28 LAB
ANION GAP SERPL CALCULATED.3IONS-SCNC: 8 MMOL/L (ref 7–15)
BUN SERPL-MCNC: 14.7 MG/DL (ref 8–23)
CALCIUM SERPL-MCNC: 7.9 MG/DL (ref 8.8–10.4)
CHLORIDE SERPL-SCNC: 105 MMOL/L (ref 98–107)
CREAT SERPL-MCNC: 1.01 MG/DL (ref 0.51–0.95)
EGFRCR SERPLBLD CKD-EPI 2021: 58 ML/MIN/1.73M2
ERYTHROCYTE [DISTWIDTH] IN BLOOD BY AUTOMATED COUNT: 17.4 % (ref 10–15)
GLUCOSE BLDC GLUCOMTR-MCNC: 177 MG/DL (ref 70–99)
GLUCOSE BLDC GLUCOMTR-MCNC: 316 MG/DL (ref 70–99)
GLUCOSE SERPL-MCNC: 310 MG/DL (ref 70–99)
HCO3 SERPL-SCNC: 21 MMOL/L (ref 22–29)
HCT VFR BLD AUTO: 38 % (ref 35–47)
HGB BLD-MCNC: 12.7 G/DL (ref 11.7–15.7)
HOLD SPECIMEN: NORMAL
MAGNESIUM SERPL-MCNC: 1.8 MG/DL (ref 1.7–2.3)
MCH RBC QN AUTO: 32.5 PG (ref 26.5–33)
MCHC RBC AUTO-ENTMCNC: 33.4 G/DL (ref 31.5–36.5)
MCV RBC AUTO: 97 FL (ref 78–100)
PLATELET # BLD AUTO: 249 10E3/UL (ref 150–450)
POTASSIUM SERPL-SCNC: 4.3 MMOL/L (ref 3.4–5.3)
RBC # BLD AUTO: 3.91 10E6/UL (ref 3.8–5.2)
SODIUM SERPL-SCNC: 134 MMOL/L (ref 135–145)
TROPONIN T SERPL HS-MCNC: 245 NG/L
TROPONIN T SERPL HS-MCNC: 343 NG/L
TSH SERPL DL<=0.005 MIU/L-ACNC: 2.28 UIU/ML (ref 0.3–4.2)
WBC # BLD AUTO: 7.2 10E3/UL (ref 4–11)

## 2025-01-28 PROCEDURE — 96360 HYDRATION IV INFUSION INIT: CPT

## 2025-01-28 PROCEDURE — 82565 ASSAY OF CREATININE: CPT | Performed by: STUDENT IN AN ORGANIZED HEALTH CARE EDUCATION/TRAINING PROGRAM

## 2025-01-28 PROCEDURE — 250N000013 HC RX MED GY IP 250 OP 250 PS 637: Performed by: INTERNAL MEDICINE

## 2025-01-28 PROCEDURE — 96361 HYDRATE IV INFUSION ADD-ON: CPT

## 2025-01-28 PROCEDURE — 80048 BASIC METABOLIC PNL TOTAL CA: CPT | Performed by: STUDENT IN AN ORGANIZED HEALTH CARE EDUCATION/TRAINING PROGRAM

## 2025-01-28 PROCEDURE — 80051 ELECTROLYTE PANEL: CPT | Performed by: STUDENT IN AN ORGANIZED HEALTH CARE EDUCATION/TRAINING PROGRAM

## 2025-01-28 PROCEDURE — 250N000011 HC RX IP 250 OP 636: Performed by: EMERGENCY MEDICINE

## 2025-01-28 PROCEDURE — 85027 COMPLETE CBC AUTOMATED: CPT | Performed by: EMERGENCY MEDICINE

## 2025-01-28 PROCEDURE — 258N000003 HC RX IP 258 OP 636: Performed by: EMERGENCY MEDICINE

## 2025-01-28 PROCEDURE — 83735 ASSAY OF MAGNESIUM: CPT | Performed by: EMERGENCY MEDICINE

## 2025-01-28 PROCEDURE — 36415 COLL VENOUS BLD VENIPUNCTURE: CPT | Performed by: EMERGENCY MEDICINE

## 2025-01-28 PROCEDURE — 250N000012 HC RX MED GY IP 250 OP 636 PS 637: Performed by: STUDENT IN AN ORGANIZED HEALTH CARE EDUCATION/TRAINING PROGRAM

## 2025-01-28 PROCEDURE — 96374 THER/PROPH/DIAG INJ IV PUSH: CPT

## 2025-01-28 PROCEDURE — 120N000004 HC R&B MS OVERFLOW

## 2025-01-28 PROCEDURE — 36415 COLL VENOUS BLD VENIPUNCTURE: CPT | Performed by: STUDENT IN AN ORGANIZED HEALTH CARE EDUCATION/TRAINING PROGRAM

## 2025-01-28 PROCEDURE — 84443 ASSAY THYROID STIM HORMONE: CPT | Performed by: EMERGENCY MEDICINE

## 2025-01-28 PROCEDURE — 84484 ASSAY OF TROPONIN QUANT: CPT | Performed by: EMERGENCY MEDICINE

## 2025-01-28 PROCEDURE — 999N000054 ECG 12-LEAD WITH MUSE (LHE): Performed by: EMERGENCY MEDICINE

## 2025-01-28 PROCEDURE — 99291 CRITICAL CARE FIRST HOUR: CPT | Mod: 25

## 2025-01-28 PROCEDURE — 99223 1ST HOSP IP/OBS HIGH 75: CPT | Performed by: STUDENT IN AN ORGANIZED HEALTH CARE EDUCATION/TRAINING PROGRAM

## 2025-01-28 PROCEDURE — 258N000003 HC RX IP 258 OP 636: Performed by: STUDENT IN AN ORGANIZED HEALTH CARE EDUCATION/TRAINING PROGRAM

## 2025-01-28 PROCEDURE — 99222 1ST HOSP IP/OBS MODERATE 55: CPT | Performed by: INTERNAL MEDICINE

## 2025-01-28 PROCEDURE — 92960 CARDIOVERSION ELECTRIC EXT: CPT

## 2025-01-28 RX ORDER — METOPROLOL SUCCINATE 25 MG/1
25 TABLET, EXTENDED RELEASE ORAL 2 TIMES DAILY
Status: DISCONTINUED | OUTPATIENT
Start: 2025-01-28 | End: 2025-01-29 | Stop reason: HOSPADM

## 2025-01-28 RX ORDER — FLUCONAZOLE 150 MG/1
150 TABLET ORAL
COMMUNITY

## 2025-01-28 RX ORDER — AMOXICILLIN 250 MG
1 CAPSULE ORAL 2 TIMES DAILY PRN
Status: DISCONTINUED | OUTPATIENT
Start: 2025-01-28 | End: 2025-01-29 | Stop reason: HOSPADM

## 2025-01-28 RX ORDER — ONDANSETRON 4 MG/1
4 TABLET, ORALLY DISINTEGRATING ORAL EVERY 6 HOURS PRN
Status: DISCONTINUED | OUTPATIENT
Start: 2025-01-28 | End: 2025-01-29 | Stop reason: HOSPADM

## 2025-01-28 RX ORDER — SOTALOL HYDROCHLORIDE 80 MG/1
80 TABLET ORAL EVERY 12 HOURS SCHEDULED
Status: DISCONTINUED | OUTPATIENT
Start: 2025-01-28 | End: 2025-01-28

## 2025-01-28 RX ORDER — ASPIRIN 81 MG/1
81 TABLET, CHEWABLE ORAL DAILY
Status: DISCONTINUED | OUTPATIENT
Start: 2025-01-28 | End: 2025-01-29 | Stop reason: HOSPADM

## 2025-01-28 RX ORDER — ONDANSETRON 2 MG/ML
4 INJECTION INTRAMUSCULAR; INTRAVENOUS EVERY 6 HOURS PRN
Status: DISCONTINUED | OUTPATIENT
Start: 2025-01-28 | End: 2025-01-29 | Stop reason: HOSPADM

## 2025-01-28 RX ORDER — NICOTINE POLACRILEX 4 MG
15-30 LOZENGE BUCCAL
Status: DISCONTINUED | OUTPATIENT
Start: 2025-01-28 | End: 2025-01-29 | Stop reason: HOSPADM

## 2025-01-28 RX ORDER — DIPHENOXYLATE HYDROCHLORIDE AND ATROPINE SULFATE 2.5; .025 MG/1; MG/1
1 TABLET ORAL 4 TIMES DAILY PRN
Status: DISCONTINUED | OUTPATIENT
Start: 2025-01-28 | End: 2025-01-29 | Stop reason: HOSPADM

## 2025-01-28 RX ORDER — LIDOCAINE 40 MG/G
CREAM TOPICAL
Status: DISCONTINUED | OUTPATIENT
Start: 2025-01-28 | End: 2025-01-29 | Stop reason: HOSPADM

## 2025-01-28 RX ORDER — DEXTROSE MONOHYDRATE 25 G/50ML
25-50 INJECTION, SOLUTION INTRAVENOUS
Status: DISCONTINUED | OUTPATIENT
Start: 2025-01-28 | End: 2025-01-29 | Stop reason: HOSPADM

## 2025-01-28 RX ORDER — PANTOPRAZOLE SODIUM 40 MG/1
40 TABLET, DELAYED RELEASE ORAL 2 TIMES DAILY PRN
Status: DISCONTINUED | OUTPATIENT
Start: 2025-01-28 | End: 2025-01-29 | Stop reason: HOSPADM

## 2025-01-28 RX ORDER — SODIUM CHLORIDE 9 MG/ML
INJECTION, SOLUTION INTRAVENOUS CONTINUOUS
Status: DISCONTINUED | OUTPATIENT
Start: 2025-01-28 | End: 2025-01-29 | Stop reason: HOSPADM

## 2025-01-28 RX ORDER — ADENOSINE 3 MG/ML
12 INJECTION, SOLUTION INTRAVENOUS ONCE
Status: DISCONTINUED | OUTPATIENT
Start: 2025-01-28 | End: 2025-01-28

## 2025-01-28 RX ORDER — ADENOSINE 3 MG/ML
6 INJECTION, SOLUTION INTRAVENOUS ONCE
Status: COMPLETED | OUTPATIENT
Start: 2025-01-28 | End: 2025-01-28

## 2025-01-28 RX ORDER — ROSUVASTATIN CALCIUM 10 MG/1
10 TABLET, COATED ORAL DAILY
Status: DISCONTINUED | OUTPATIENT
Start: 2025-01-28 | End: 2025-01-29 | Stop reason: HOSPADM

## 2025-01-28 RX ORDER — AMOXICILLIN 250 MG
2 CAPSULE ORAL 2 TIMES DAILY PRN
Status: DISCONTINUED | OUTPATIENT
Start: 2025-01-28 | End: 2025-01-29 | Stop reason: HOSPADM

## 2025-01-28 RX ORDER — OMEPRAZOLE 40 MG/1
40 CAPSULE, DELAYED RELEASE ORAL
COMMUNITY

## 2025-01-28 RX ADMIN — ADENOSINE 6 MG: 3 INJECTION, SOLUTION INTRAVENOUS at 11:02

## 2025-01-28 RX ADMIN — METOPROLOL SUCCINATE 25 MG: 25 TABLET, EXTENDED RELEASE ORAL at 21:10

## 2025-01-28 RX ADMIN — SODIUM CHLORIDE: 9 INJECTION, SOLUTION INTRAVENOUS at 17:13

## 2025-01-28 RX ADMIN — ROSUVASTATIN 10 MG: 10 TABLET, FILM COATED ORAL at 18:21

## 2025-01-28 RX ADMIN — INSULIN ASPART 2 UNITS: 100 INJECTION, SOLUTION INTRAVENOUS; SUBCUTANEOUS at 18:19

## 2025-01-28 RX ADMIN — INSULIN GLARGINE 14 UNITS: 100 INJECTION, SOLUTION SUBCUTANEOUS at 21:12

## 2025-01-28 RX ADMIN — ASPIRIN 81 MG CHEWABLE TABLET 81 MG: 81 TABLET CHEWABLE at 17:09

## 2025-01-28 RX ADMIN — SODIUM CHLORIDE 1000 ML: 9 INJECTION, SOLUTION INTRAVENOUS at 11:14

## 2025-01-28 ASSESSMENT — ACTIVITIES OF DAILY LIVING (ADL)
ADLS_ACUITY_SCORE: 42
ADLS_ACUITY_SCORE: 41
ADLS_ACUITY_SCORE: 42
ADLS_ACUITY_SCORE: 41
ADLS_ACUITY_SCORE: 42
ADLS_ACUITY_SCORE: 42
ADLS_ACUITY_SCORE: 41
ADLS_ACUITY_SCORE: 42
ADLS_ACUITY_SCORE: 41

## 2025-01-28 ASSESSMENT — COLUMBIA-SUICIDE SEVERITY RATING SCALE - C-SSRS
2. HAVE YOU ACTUALLY HAD ANY THOUGHTS OF KILLING YOURSELF IN THE PAST MONTH?: NO
1. IN THE PAST MONTH, HAVE YOU WISHED YOU WERE DEAD OR WISHED YOU COULD GO TO SLEEP AND NOT WAKE UP?: NO
6. HAVE YOU EVER DONE ANYTHING, STARTED TO DO ANYTHING, OR PREPARED TO DO ANYTHING TO END YOUR LIFE?: NO

## 2025-01-28 NOTE — ED TRIAGE NOTES
Pt has had right chest pain since last night rated 8.5/10. hr is 199 in triage.  Sats 97%  Pmh-being treated for liver cancer.

## 2025-01-28 NOTE — H&P
[facility]    History and Physical - Hospitalist Service       Date of Admission:  1/28/2025    Assessment & Plan      Zara Webster is a 74 year old female admitted on 1/28/2025. She presented with palpitation that woke her up from sleep last night. She also had shortness of breath and left sided chest tightness that radiated to jaw. In the ED, she was found to be tachycardic and hypotensive with EKG showing SVT. She responded to 6 mg of adenosine. She remained in sinus rhythm and asymptomatic. Troponin was elevated with repeat up trending. Cardiology consulted in ED. Patient will be admitted for SVT, elevated troponin r/o ACS.    SVT  Hypotension-resolved  -- palpitation, sob, cp  -- S/p Adenosine 6 mg. Converted to sinus  -- S/p 1L NS  -- Cardiology consult appreciated: metoprolol, consider ablation after upcoming radioactive bead procedure, gorge if recurrent SVT. Stop propranolol.    Elevated troponin  Type II MI vs NSTEMI  -- Troponin 245>>343. Chest pain resolved. EKG: Non specific ST changes.  -- Likely 2/2 demand ischemia from SVT.   -- Cardiology consult appreciated: Plan for Coronary CT angiogram on 01/19. Daily asa 81 mg    CINTIA  -- Likely prerenal 2/2 decreased intake and increased ileostomy output  -- IVF  -- Monitor I/O    Hyponatremia, mild  -- Likely 2/2 decreased intake. IVF  -- Monitor    HCC  PRAJAPATI Cirrhosis s/p TIPS  -- Planned radio embolization with radioactive beads    Crohn's disease s/p proctocolectomy with end ileostomy    Type II DM  -- PTA insulin Lantus 14 bid, 6 units subcutaneous 4 times daily.  -- Inpatienent: Accu-checks, Lantus 14 units bid, high sliding scale, I:C 1:10, Hypoglycemia protocol    GERD  -- C/w PPI              Diet: Combination Diet Moderate Consistent Carb (60 g CHO per Meal) Diet; 2 gm NA Diet  DVT Prophylaxis: Pneumatic Compression Devices  Caro Catheter: Not present  Lines: None     Cardiac Monitoring: None  Code Status: Full  Code    Clinically Significant Risk Factors Present on Admission         # Hyponatremia: Lowest Na = 134 mmol/L in last 2 days, will monitor as appropriate                    # DMII: A1C = N/A within past 6 months              Disposition Plan     Medically Ready for Discharge: Anticipated in 2-4 Days           Tracy Mejia MD  Hospitalist Service  [facility]  Securely message with [employer] (more info)  Text page via [employer] Paging/Directory     ______________________________________________________________________    Chief Complaint   Palpitation, shortness of breath, chest pain    History is obtained from the patient    History of Present Illness   Zara Webster is a 74 year old female who presented with palpitation that woke her up from sleep last night. She also had shortness of breath and left sided chest tightness that radiated to jaw. In the ED, she was found to be tachycardic and hypotensive with EKG showing SVT. She responded to 6 mg of adenosine. She remained in sinus rhythm and asymptomatic. Troponin was elevated with repeat up trending. Cardiology consulted in ED. Patient will be admitted for SVT, elevated troponin r/o ACS.      Past Medical History    Past Medical History:   Diagnosis Date    Anemia     Atypical chest pain     Crohn's disease (H)     Diabetes mellitus (H)     Disorder of biliary tract     Essential tremor     Gastrointestinal hemorrhage     Hepatocellular carcinoma (H)     Hypercholesteremia     Ileostomy status (H)     Lesion of liver     Non-alcoholic cirrhosis (H)        Past Surgical History   Past Surgical History:   Procedure Laterality Date    BREAST EXCISIONAL BIOPSY Left 1980    BREAST EXCISIONAL BIOPSY Left 1985    ESOPHAGOSCOPY, GASTROSCOPY, DUODENOSCOPY (EGD), COMBINED N/A 1/26/2024    Procedure: ENDOSCOPIC ULTRASOUND;  Surgeon: Santi Pradhan MD;  Location: [facility] OR    HYSTERECTOMY  1991    OOPHORECTOMY Bilateral 1991     OTHER SURGICAL HISTORY      KIDNEY STONE REMOVALNOT SURE WHICH SIDE, DR. SHIV SALINAS PROCTOSIGMOIDOSCOPY,RIGID,DIAGNOS N/A 2020    Procedure: ILEOSTOMY REVISION, PROCTOSCOPY, ILEOSCOPY;  Surgeon: Devang Moon MD;  Location: [facility];  Service: General    [facility] REMOVAL COLON/ILEOSTOMY      Description: Total Abdominal Colectomy;  Recorded: 12/15/2011;    ZZC REMOVAL COLON/PROCTECTOMY/ILEOSTOMY      Description: Total Proctocolectomy;  Recorded: 08/10/2011;    ZZC VAG HYST, W/VAGINECTOMY      Description: Vaginal Hysterectomy With Colpectomy;  Recorded: 2014;       Prior to Admission Medications   Prior to Admission Medications   Prescriptions Last Dose Informant Patient Reported? Taking?   LANTUS SOLOSTAR 100 UNIT/ML soln 2025 Morning  No Yes   Sig: Inject 14 Units Subcutaneous 2 times daily   clobetasol (TEMOVATE) 0.05 % external ointment Past Month Self Yes Yes   Sig: Apply topically as needed   continuous blood glucose monitoring (FREESTYLE VIDA) sensor  Self No No   Si Units daily   diphenoxylate-atropine (LOMOTIL) 2.5-0.025 MG tablet 2025 Evening  No Yes   Sig: Take 1 tablet by mouth 4 times daily as needed for diarrhea.   flash glucose scanning reader (FREESTYLE VIDA 14 DAY READER) Misc  Self No No   Sig: [FLASH GLUCOSE SCANNING READER (FREESTYLE VIDA 14 DAY READER) MISC] Use 1 Units As Directed every 14 (fourteen) days.   fluconazole (DIFLUCAN) 150 MG tablet Past Month  Yes Yes   Sig: Take 150 mg by mouth once as needed.   insulin lispro (HUMALOG KWIKPEN) 100 UNIT/ML (1 unit dial) KWIKPEN 2025 Morning  No Yes   Sig: Inject 6 units subcutaneously 4 times daily for blood glucose management   insulin pen needle (32G X 4 MM) 32G X 4 MM miscellaneous   No No   Sig: Use 8 pen needles daily or as directed.   omeprazole (PRILOSEC) 40 MG DR capsule Past Month Evening  Yes Yes   Sig: Take 40 mg by mouth nightly as needed.   ostomy adhesive Pste  Self No No   Sig: [OSTOMY  ADHESIVE PSTE] Apply as needed   propranolol ER (INDERAL LA) 60 MG 24 hr capsule Past Month Morning  Yes Yes   Sig: Take 60 mg by mouth daily as needed.      Facility-Administered Medications: None        Review of Systems    The 10 point Review of Systems is negative other than noted in the HPI or here.      Physical Exam   Vital Signs: Temp: 98.4  F (36.9  C) Temp src: Tympanic BP: 117/56 Pulse: 82   Resp: 18 SpO2: 100 %      Weight: 151 lbs 0 oz    GEN: Alert and oriented. Not in acute distress.  HEENT: Atraumatic, mucous membrane- moist and pink.  Chest: Bilateral air entry.  CVS: S1S2 regular.   Abdomen: Soft. Non-tender, non-distended. No organomegaly. No guarding or rigidity. Bowel sounds active.   Extremities: trace b/l pedal edema.  CNS: No involuntary movements.  Skin: no cyanosis or clubbing.     Medical Decision Making       78 MINUTES SPENT BY ME on the date of service doing chart review, history, exam, documentation & further activities per the note.      Data

## 2025-01-28 NOTE — CONSULTS
Thank you,  No ref. provider found, for asking the [facility] Heart Care team to see Ms. Zara Webster to evaluate       Assessment/Recommendations   Assessment/Plan:  Type II MI vs NSTEMI. Elevated troponin (245-->343) with resolved chest pressure, non-specific ECG changes. Possibly 2/2 SVT as below. Plan for coronary CT angiogram 1/29, start rosuvastatin 10mg given hepato celluar carcinoma and  aspirin 81 mg daily.   SVT narrow complex,  recurrent, with RVR to 199, resolved s/p 6 mg adenosine, first episode in 12/23 per pt . Will start metoprolol succinate 25mg bid,  consider ablation after upcoming radioactive bead procedure, especially if recurrent SVT.    Stop propanolol.     Can follow up with myself, Dr. Gorman or with Cardiology at [facility] (noted  having tx at [facility] for cancer)        Physician Attestation   I, Tim Nuñez MD, was present with the medical/SUSHILA student who participated in the service and in the documentation of the note.  I have verified the history and personally performed the physical exam and medical decision making.  I agree with the assessment and plan of care as documented in the note.      Key findings: as above         Tim Nuñez MD  Date of Service (when I saw the patient): 01/28/25      History of Present Illness/Subjective    Ms. Zara Webster is a 74 year old female with history of HCC, HLD, DM2, anemia, Crohn's with end ileostomy (30+ years), TIPs procedure, and a recent oncology procedure, who presents with  Johnny for right sided chest pain and palpitations since last night. On presentation to the emergency department, HR was 199 and ECG showed SVT. Heart rate resolved to 84 and BP 95/52 after 6 mg adenosine and 1L fluid bolus, with resolution of symptoms at that time. Troponins elevated at 245-->343. Patient denies any current chest pain, pressure, or dyspnea. She has never had symptoms like this but has had milder chest pressure with  "laying down in the evenings over the past couple weeks (since oncology procedure, though  notes a significant amount of worrying in this time).        Cardiac study findings     ECG Today:   10:51 am: SVT with ST depression in anterolateral leads 190 bpm   11:05 am: Sinus rhythm with nonspecific T wave changes in lateral leads 84 bpm    Prior findings:   2/3/2024 ECG: Sinus ceci with 1st degree AV block,     2/3/2024 CT ANGIOGRAM CHEST, ABDOMEN, AND PELVIS: No aortic wall hematoma, aneurysm or dissection. Mild aortic atherosclerotic calcifications. Celiac artery, SMA, single bilateral renal arteries, VENUS and bilateral iliofemoral arteries are fully patent.     12/5/2023 Echo:   Interpretation Summary  Left ventricular size, wall motion and function are normal. The ejection  fraction is 60-65%.  Normal right ventricle size and systolic function.  No hemodynamically significant valvular abnormalities on 2D or color flow  imaging.    10/21/2022 Stress test:     The exercise nuclear stress test is negative for inducible myocardial ischemia or infarction.    The stress electrocardiogram is negative for inducible ischemic EKG changes.    The patient's exercise capacity is average for age and gender. The patient exercised for 4:30 minutes on the Doron protocol, achieving 6.4 METs and 95% the age-predicted maximum heart rate. The patient did not experience chest pain.    The left ventricular ejection fraction at stress is greater than 70%.    The patient is at a low risk of future cardiac ischemic events.    There is no prior study for comparison.    Physical Examination Review of Systems   BP 99/55   Pulse 84   Temp 98.4  F (36.9  C) (Tympanic)   Resp 21   Ht 1.676 m (5' 6\")   Wt 68.5 kg (151 lb)   LMP  (LMP Unknown)   SpO2 100%   BMI 24.37 kg/m    Body mass index is 24.37 kg/m .  Wt Readings from Last 3 Encounters:   01/28/25 68.5 kg (151 lb)   01/13/25 68.6 kg (151 lb 3.2 oz)   10/16/24 69.8 kg (153 lb " 14.4 oz)     No intake or output data in the 24 hours ending 01/28/25 1408  General Appearance:   no distress, normal body habitus   ENT/Mouth: membranes moist, no oral lesions or bleeding gums.      EYES:  no scleral icterus, normal conjunctivae   Neck: No noted JVD   Chest/Lungs:   Breathing comfortably on room air   Cardiovascular:   Regular rate and rhythm, no murmurs    Extremities: No ELÍAS   Skin: warm   Psychiatric: alert and oriented x3, calm     Review of Systems - 12 points nega other than above      Medical History  Surgical History Family History Social History   Past Medical History:   Diagnosis Date    Anemia     Atypical chest pain     Crohn's disease (H)     Diabetes mellitus (H)     Disorder of biliary tract     Essential tremor     Gastrointestinal hemorrhage     Hepatocellular carcinoma (H)     Hypercholesteremia     Ileostomy status (H)     Lesion of liver     Non-alcoholic cirrhosis (H)     Past Surgical History:   Procedure Laterality Date    BREAST EXCISIONAL BIOPSY Left 1980    BREAST EXCISIONAL BIOPSY Left 1985    ESOPHAGOSCOPY, GASTROSCOPY, DUODENOSCOPY (EGD), COMBINED N/A 1/26/2024    Procedure: ENDOSCOPIC ULTRASOUND;  Surgeon: Santi Pradhan MD;  Location: [facility] [place]    HYSTERECTOMY  1991    OOPHORECTOMY Bilateral 1991    OTHER SURGICAL HISTORY      KIDNEY STONE REMOVALNOT SURE WHICH SIDE, DR. CHANEY    RI PROCTOSIGMOIDOSCOPY,RIGID,DIAGNOS N/A 6/17/2020    Procedure: ILEOSTOMY REVISION, PROCTOSCOPY, ILEOSCOPY;  Surgeon: Devang Moon MD;  Location: [facility];  Service: General    ZZC REMOVAL COLON/ILEOSTOMY      Description: Total Abdominal Colectomy;  Recorded: 12/15/2011;    ZZC REMOVAL COLON/PROCTECTOMY/ILEOSTOMY      Description: Total Proctocolectomy;  Recorded: 08/10/2011;    ZZC VAG HYST, W/VAGINECTOMY      Description: Vaginal Hysterectomy With Colpectomy;  Recorded: 08/14/2014;    Family History   Problem Relation Age of Onset    Diabetes Mother      Heart Disease Mother     Hyperlipidemia Mother     Diabetes Father     Cancer Father         malignant gallbladder    No Known Problems Sister     No Known Problems Sister     Social History     Socioeconomic History    Marital status:      Spouse name: Not on file    Number of children: 2    Years of education: 16    Highest education level: Bachelor's degree (e.g., BA, AB, BS)   Occupational History    Occupation: Retired   Tobacco Use    Smoking status: Never     Passive exposure: Never    Smokeless tobacco: Never   Vaping Use    Vaping status: Never Used   Substance and Sexual Activity    Alcohol use: Not Currently    Drug use: No    Sexual activity: Not on file   Other Topics Concern    Not on file   Social History Narrative    Not on file     Social Drivers of Health     Financial Resource Strain: Low Risk  (1/23/2024)    Financial Resource Strain     Within the past 12 months, have you or your family members you live with been unable to get utilities (heat, electricity) when it was really needed?: No   Food Insecurity: Low Risk  (1/23/2024)    Food Insecurity     Within the past 12 months, did you worry that your food would run out before you got money to buy more?: No     Within the past 12 months, did the food you bought just not last and you didn t have money to get more?: No   Transportation Needs: Low Risk  (1/23/2024)    Transportation Needs     Within the past 12 months, has lack of transportation kept you from medical appointments, getting your medicines, non-medical meetings or appointments, work, or from getting things that you need?: No   Physical Activity: Not on file   Stress: Not on file   Social Connections: Unknown (11/17/2023)    Received from [facility]    Social Connections     Frequency of Communication with Friends and Family: Not on file   Interpersonal Safety: Low Risk  (1/13/2025)    Interpersonal  Safety     Do you feel physically and emotionally safe where you currently live?: Yes     Within the past 12 months, have you been hit, slapped, kicked or otherwise physically hurt by someone?: No     Within the past 12 months, have you been humiliated or emotionally abused in other ways by your partner or ex-partner?: No   Housing Stability: Low Risk  (1/23/2024)    Housing Stability     Do you have housing? : Yes     Are you worried about losing your housing?: No        Results for orders placed or performed during the hospital encounter of 01/28/25   [place] Draw     Status: None    Narrative    The following orders were created for panel order [place] Draw.  Procedure                               Abnormality         Status                     ---------                               -----------         ------                     Extra Blue Top Tube[[id number]]                              Final result               Extra Red Top Tube[[id number]]                               Final result               Extra Green Top (Lithium...[[id number]]                      Final result               Extra Purple Top Tube[[id number]]                            Final result               Extra Green Top (Lithium...[[id number]]                      Final result                 Please view results for these tests on the individual orders.   Extra Blue Top Tube     Status: None   Result Value Ref Range    Hold Specimen JIC    Extra Red Top Tube     Status: None   Result Value Ref Range    Hold Specimen JIC    Extra Green Top (Lithium Heparin) Tube     Status: None   Result Value Ref Range    Hold Specimen JIC    Extra Purple Top Tube     Status: None   Result Value Ref Range    Hold Specimen JIC    Extra Green Top (Lithium Heparin) ON ICE     Status: None   Result Value Ref Range    Hold Specimen JIC    Magnesium     Status: Normal   Result Value Ref Range    Magnesium 1.8 1.7 - 2.3 mg/dL   CBC with platelets     Status: Abnormal    Result Value Ref Range    WBC Count 7.2 4.0 - 11.0 10e3/uL    RBC Count 3.91 3.80 - 5.20 10e6/uL    Hemoglobin 12.7 11.7 - 15.7 g/dL    Hematocrit 38.0 35.0 - 47.0 %    MCV 97 78 - 100 fL    MCH 32.5 26.5 - 33.0 pg    MCHC 33.4 31.5 - 36.5 g/dL    RDW 17.4 (H) 10.0 - 15.0 %    Platelet Count 249 150 - 450 10e3/uL   Troponin T, High Sensitivity     Status: Abnormal   Result Value Ref Range    Troponin T, High Sensitivity 245 (HH) <=14 ng/L   TSH with free T4 reflex     Status: Normal   Result Value Ref Range    TSH 2.28 0.30 - 4.20 uIU/mL   Basic metabolic panel     Status: Abnormal   Result Value Ref Range    Sodium 134 (L) 135 - 145 mmol/L    Potassium 4.3 3.4 - 5.3 mmol/L    Chloride 105 98 - 107 mmol/L    Carbon Dioxide (CO2) 21 (L) 22 - 29 mmol/L    Anion Gap 8 7 - 15 mmol/L    Urea Nitrogen 14.7 8.0 - 23.0 mg/dL    Creatinine 1.01 (H) 0.51 - 0.95 mg/dL    GFR Estimate 58 (L) >60 mL/min/1.73m2    Calcium 7.9 (L) 8.8 - 10.4 mg/dL    Glucose 310 (H) 70 - 99 mg/dL   Troponin T, High Sensitivity     Status: Abnormal   Result Value Ref Range    Troponin T, High Sensitivity 343 (HH) <=14 ng/L     Sharee Ghosh, MS4 1/28/2025

## 2025-01-28 NOTE — PHARMACY-ADMISSION MEDICATION HISTORY
Pharmacist Admission Medication History    Admission medication history is complete. The information provided in this note is only as accurate as the sources available at the time of the update.    Information Source(s): Patient via in-person    Pertinent Information: Patient took 1 dose of 6 units of Humalog insulin this morning. She also got one dose of 14 units of lantus insulin in this morning.     Changes made to PTA medication list:  Added: None  Deleted: None  Changed: Omeprazole and Propranolol changed from daily to daily prn .     Allergies reviewed with patient and updates made in EHR: yes    Medication History Completed By: MAHENDRA ASKEW RPH 1/28/2025 3:11 PM    PTA Med List   Medication Sig Last Dose/Taking    clobetasol (TEMOVATE) 0.05 % external ointment Apply topically as needed Past Month    diphenoxylate-atropine (LOMOTIL) 2.5-0.025 MG tablet Take 1 tablet by mouth 4 times daily as needed for diarrhea. 1/27/2025 Evening    fluconazole (DIFLUCAN) 150 MG tablet Take 150 mg by mouth once as needed. Past Month    insulin lispro (HUMALOG KWIKPEN) 100 UNIT/ML (1 unit dial) KWIKPEN Inject 6 units subcutaneously 4 times daily for blood glucose management 1/28/2025 Morning    LANTUS SOLOSTAR 100 UNIT/ML soln Inject 14 Units Subcutaneous 2 times daily 1/28/2025 Morning    omeprazole (PRILOSEC) 40 MG DR capsule Take 40 mg by mouth nightly as needed. Past Month Evening    propranolol ER (INDERAL LA) 60 MG 24 hr capsule Take 60 mg by mouth daily as needed. Past Month Morning

## 2025-01-28 NOTE — ED NOTES
Dr Nuñez talking to patient.patient remains pain free. Second troponin has elevated from initial one

## 2025-01-28 NOTE — TELEPHONE ENCOUNTER
"Priority call   Patient agreeable to er now, routing to pcp for awareness    Situation   Chest pain last night, patient is asking to be seen by pcp     Background     Recent Angiogram of liver due to liver ca   Radiology beads into liver, leave a stent in there    Hx of atypical chest pain  Diabetic     Assessment   Patient states : \"Last night I had such chest pain I thought I was dead. My heart was beating so fast.  I could not sit or lay down.  I threw up a lot.\"  She explains that she was \"Really miserable, real close to calling 911.\"   Patient states she didn't want to go there during the night    Patient states she can breathe better now  Felt SOB during the chest pain     Chest pain and SOB lasted from 2am-6am   Chest pain and symptoms are not present anymore (time of call 830am)     Recommendations   Patient declined 911 despite rationale.   Agreeable to Er now. Explained rationale. She has someone with her and that can drive her.  Explained sounds like a possible Life threatening issue rule out Heart attack, afib, PE, other cardiac concerns.   Wear a mask at er given hx        Reason for Disposition   Sounds like a life-threatening emergency to the triager   Chest pain lasting longer than 5 minutes and occurred in last 3 days (72 hours) (Exception: Feels exactly the same as previously diagnosed heartburn and has accompanying sour taste in mouth.)   Cancer treatment in the past two months (or has cancer now)   Chest pain lasting longer than 5 minutes and ANY of the following:         Pain is crushing, pressure-like, or heavy         Over 44 years old          Over 30 years old and one cardiac risk factor (e.g diabetes, high blood pressure, high cholesterol, smoker, or family history of heart disease)         History of heart disease (e.g. angina, heart attack, heart failure, bypass surgery, takes nitroglycerin)     Not current but 2am-6am    Additional Information   Negative: SEVERE difficulty breathing " (e.g., struggling for each breath, speaks in single words)   Negative: Difficult to awaken or acting confused (e.g., disoriented, slurred speech)   Negative: Shock suspected (e.g., cold/pale/clammy skin, too weak to stand, low BP, rapid pulse)   Negative: Passed out (i.e., lost consciousness, collapsed and was not responding)   Negative: Heart beating < 50 beats per minute OR > 140 beats per minute   Negative: Visible sweat on face or sweat dripping down face   Commented on: Heart beating irregularly or very rapidly     Was present from 2am-6am    Protocols used: Chest Pain-A-OH

## 2025-01-28 NOTE — ED PROVIDER NOTES
EMERGENCY DEPARTMENT ENCOUNTER      NAME: Zara Webster  AGE: 74 year old female  YOB: 1950  MRN: [id number]  EVALUATION DATE & TIME: 1/28/2025 10:47 AM    PCP: Obdulia Chamorro    ED PROVIDER: Pilar Santiago MD    Chief Complaint   Patient presents with    Chest Pain         FINAL IMPRESSION:  1. SVT (supraventricular tachycardia)    2. NSTEMI (non-ST elevated myocardial infarction) (H)          ED COURSE & MEDICAL DECISION MAKING:    Pertinent Labs & Imaging studies reviewed. (See chart for details)  74 year old female with history of DM, Crohn's, hepatocellular carcinoma who presents to the Emergency Department for evaluation of palpitations with racing heart feeling since around midnight, with associated chest discomfort.  Patient arrives tachycardic with heart rate in the 190s, borderline hypotensive in the 90s to 100s.  No known history of underlying arrhythmia but with her rate high suspicious for same.  Differential clues dehydration, electrolyte abnormality, thyroid abnormality, ACS.    Patient placed on monitor, IV established and blood obtained.  Twelve-lead EKG shows SVT.  Patient given 6 mg of adenosine with successful cardioversion from SVT to sinus rhythm.  EKG repeated, no ischemic changes.  As soon as her rate is controlled patient is asymptomatic.  CBC, BMP, magnesium, TSH unremarkable other than hyperglycemia in a diabetic patient.  Troponin elevated at 245.  Functionally she has failed her own stress test but I think that this is clearly rate related, and she is asymptomatic at this time.  Case discussed with cardiology.  Patient is hopeful that she can go home because she is due to have some cancer management through [facility].  Plan for delta troponin and if this is downtrending, outpatient CT coronary angiogram.  Unfortunately her troponin is uptrending on repeat from 245-343.  Asymptomatic after adenosine cardioversion.  Case discussed with cardiology again.  Will hold  on anticoagulation at this time as she is asymptomatic.  I think at this point she warrants hospital admission for further management.      ED Course as of 01/28/25 1505   Tue Jan 28, 2025   1218 Glucose(!): 310  Known DM   1221 Troponin T, High Sensitivity(!!): 245   1248 Case discussed with teodoro Huggins, who will see pt but anticipate if trop downtrending outpt ct coronary angio   1423 Troponin T, High Sensitivity(!!): 343  Up from 245   1504 Admitted to Dr. Mejia       Medical Decision Making  Obtained supplemental history:Supplemental history obtained?: Family Member/Significant Other  Reviewed external records: External records reviewed?: Outpatient Record: 01/08/2025: [facility] impacted by chronic illness:Cancer/Chemotherapy and Diabetes  Did you consider but not order tests?: Work up considered but not performed and documented in chart, if applicable  Did you interpret images independently?: Independent interpretation of ECG and images noted in documentation, when applicable.  Consultation discussion with other provider:Did you involve another provider (consultant, , pharmacy, etc.)?: I discussed the care with another health care provider, see documentation for details.  Admit.    MIPS: Not Applicable      At the conclusion of the encounter I discussed the results of all of the tests and the disposition. The questions were answered. The patient or family acknowledged understanding and was agreeable with the care plan.    CRITICAL CARE:  Critical Care  Performed by: Pilar Santiago MD  Authorized by: Pilar Santiago MD     Total critical care time: 42 minutes  Criticalcare time was exclusive of separately billable procedures and treating other patients.  Critical care was necessary to treat or prevent imminent or life-threatening deterioration of the following conditions: SVT, non-STEMI  Critical care was time spent personally by me on the following  activities: development of treatment plan with patient or surrogate, discussions with consultants, examination of patient, evaluation of patient's response totreatment, obtaining history from patient or surrogate, ordering and performing treatments and interventions, ordering and review of laboratory studies, ordering and review of radiographic studies and re-evaluation ofpatient's condition, this excludes any separately billable procedures.      MEDICATIONS GIVEN IN THE EMERGENCY:  Medications   adenosine (ADENOCARD) injection 6 mg (6 mg Intravenous $Given 1/28/25 1102)   sodium chloride 0.9% BOLUS 1,000 mL (0 mLs Intravenous Stopped 1/28/25 1300)       NEW PRESCRIPTIONS STARTED AT TODAY'S ER VISIT  New Prescriptions    No medications on file          =================================================================    HPI    Patient information was obtained from: Patient, spouse    Use of Intrepreter: N/A        Zara Webster is a 74 year old female with pertinent medical history of DM2, Chron's disease, non-alcoholic cirrhosis, and hepatocellular carcinoma who presents for evaluation of chest pain.     Patient follows with [facility] [place] for liver cancer, and was told by them to go to her local ED after telling them her symptoms of palpitations, lightheadedness, and shortness of breath that started at midnight last night and persisted to today. Patient has no history of abnormal heart rhythms, and is otherwise healthy.     Patient denies any other complaints at this time.       Chart Review:  01/08/2025: Patient was seen at [facility], for a hepatocellular carcinoma consult. Due to the proximity of the tumor to the bile duct, CT-guided microwave ablation may lead to further biliary stricture thus referred to interventional radiology for consideration of radioembolization.Patient endorsed intermittent, ongoing chest pain typically in the evening about once per week which she  treats with PRN Propanolol. Further testing was scheduled, and patient agreeable with plan.     PAST MEDICAL HISTORY:  Past Medical History:   Diagnosis Date    Anemia     Atypical chest pain     Crohn's disease (H)     Diabetes mellitus (H)     Disorder of biliary tract     Essential tremor     Gastrointestinal hemorrhage     Hepatocellular carcinoma (H)     Hypercholesteremia     Ileostomy status (H)     Lesion of liver     Non-alcoholic cirrhosis (H)        PAST SURGICAL HISTORY:  Past Surgical History:   Procedure Laterality Date    BREAST EXCISIONAL BIOPSY Left     BREAST EXCISIONAL BIOPSY Left     ESOPHAGOSCOPY, GASTROSCOPY, DUODENOSCOPY (EGD), COMBINED N/A 2024    Procedure: ENDOSCOPIC ULTRASOUND;  Surgeon: Santi Pradhan MD;  Location: [facility] [place]    HYSTERECTOMY      OOPHORECTOMY Bilateral     OTHER SURGICAL HISTORY      KIDNEY STONE REMOVALNOT SURE WHICH SIDE, DR. CHANEY    IL PROCTOSIGMOIDOSCOPY,RIGID,DIAGNOS N/A 2020    Procedure: ILEOSTOMY REVISION, PROCTOSCOPY, ILEOSCOPY;  Surgeon: Devang Moon MD;  Location: [facility];  Service: General    ZZC REMOVAL COLON/ILEOSTOMY      Description: Total Abdominal Colectomy;  Recorded: 12/15/2011;    ZZC REMOVAL COLON/PROCTECTOMY/ILEOSTOMY      Description: Total Proctocolectomy;  Recorded: 08/10/2011;    ZZC VAG HYST, W/VAGINECTOMY      Description: Vaginal Hysterectomy With Colpectomy;  Recorded: 2014;       CURRENT MEDICATIONS:    Prior to Admission Medications   Prescriptions Last Dose Informant Patient Reported? Taking?   ANTI-DIARRHEAL 2 MG tablet   No No   Sig: Take 1 tablet (2 mg) by mouth 4 times daily as needed for diarrhea   LANTUS SOLOSTAR 100 UNIT/ML soln   No No   Sig: Inject 14 Units Subcutaneous 2 times daily   clobetasol (TEMOVATE) 0.05 % external ointment  Self Yes No   Sig: Apply topically as needed   continuous blood glucose monitoring (FREESTYLE VIDA) sensor  Self No No   Si  Units daily   diphenoxylate-atropine (LOMOTIL) 2.5-0.025 MG tablet   No No   Sig: Take 1 tablet by mouth 4 times daily as needed for diarrhea.   flash glucose scanning reader (FREESTYLE VIDA 14 DAY READER) Misc  Self No No   Sig: [FLASH GLUCOSE SCANNING READER (FREESTYLE VIDA 14 DAY READER) MISC] Use 1 Units As Directed every 14 (fourteen) days.   fluconazole (DIFLUCAN) 150 MG tablet   No No   Sig: Take 1 tablet by mouth by mouth now, and if no improvement in 3 days, take another.   insulin lispro (HUMALOG KWIKPEN) 100 UNIT/ML (1 unit dial) KWIKPEN   No No   Sig: Inject 6 units subcutaneously 4 times daily for blood glucose management   insulin pen needle (32G X 4 MM) 32G X 4 MM miscellaneous   No No   Sig: Use 8 pen needles daily or as directed.   omeprazole (PRILOSEC) 40 MG DR capsule   No No   Sig: Take 1 capsule (40 mg) by mouth at bedtime   ostomy adhesive Pste  Self No No   Sig: [OSTOMY ADHESIVE PSTE] Apply as needed   propranolol ER (INDERAL LA) 60 MG 24 hr capsule   Yes No   Sig: TAKE 1 CAPSULE (60 MG) BY MOUTH ONCE DAILY*      Facility-Administered Medications: None       ALLERGIES:  Allergies   Allergen Reactions    Prochlorperazine Edisylate [Prochlorperazine] Other (See Comments)     Saw things    Compazine [Prochlorperazine] Unknown       FAMILY HISTORY:  Family History   Problem Relation Age of Onset    Diabetes Mother     Heart Disease Mother     Hyperlipidemia Mother     Diabetes Father     Cancer Father         malignant gallbladder    No Known Problems Sister     No Known Problems Sister        SOCIAL HISTORY:  Social History     Tobacco Use    Smoking status: Never     Passive exposure: Never    Smokeless tobacco: Never   Vaping Use    Vaping status: Never Used   Substance Use Topics    Alcohol use: Not Currently    Drug use: No        VITALS:  Patient Vitals for the past 24 hrs:   BP Temp Temp src Pulse Resp SpO2 Height Weight   01/28/25 1447 111/53 -- -- 85 28 100 % -- --   01/28/25 1430  "105/54 -- -- 84 14 100 % -- --   01/28/25 1415 107/58 -- -- 83 17 100 % -- --   01/28/25 1400 105/56 -- -- 82 17 100 % -- --   01/28/25 1346 99/55 -- -- 84 21 100 % -- --   01/28/25 1331 101/55 -- -- 84 18 100 % -- --   01/28/25 1316 99/53 -- -- 84 18 100 % -- --   01/28/25 1247 95/52 -- -- 84 19 100 % -- --   01/28/25 1232 92/51 -- -- 85 25 100 % -- --   01/28/25 1217 (!) 85/50 -- -- 85 22 100 % -- --   01/28/25 1145 90/51 -- -- 89 20 100 % -- --   01/28/25 1130 (!) 85/49 -- -- 90 18 100 % -- --   01/28/25 1115 91/53 -- -- 92 28 100 % -- --   01/28/25 1042 115/60 98.4  F (36.9  C) Tympanic (!) 197 12 94 % 1.676 m (5' 6\") 68.5 kg (151 lb)       PHYSICAL EXAM    General Appearance: Well-appearing, well-nourished, no acute distress, appears younger than stated age.  Head:  Normocephalic, atraumatic  Eyes:  PERRL, conjunctiva/corneas clear, EOM's intact  ENT:  Lips, mucosa, and tongue normal; membranes are moist without pallor  Neck:  Supple, symmetrical, trachea midline, no adenopathy  Chest:  No tenderness or deformity, no crepitus  Cardio:  Regular rhythm, no murmur/gallop/rub, 2+ pulses symmetric in all extremities, tachycardic, narrow complex, regular tachycardic in 140s.   Pulm:  No respiratory distress, clear to auscultation bilaterally  Back:  No midline tenderness to palpation, no paraspinal tenderness, No CVA tenderness, normal ROM  Abdomen:  Soft, non-tender, non distended,no rebound or guarding.  Extremities:  Extremities normal, atraumatic, no cyanosis or edema, full ROM and motor tone intact, bilateral pulses intact upper and lower  Skin:  Skin warm, dry, no rashes  Neuro:  Alert and oriented ×3, moving all extremities, no gross sensory defects     RADIOLOGY/LABS:  Reviewed all pertinent imaging. Please see official radiology report. All pertinent labs reviewed and interpreted.    Results for orders placed or performed during the hospital encounter of 01/28/25   Extra Blue Top Tube   Result Value Ref " Range    Hold Specimen JIC    Extra Red Top Tube   Result Value Ref Range    Hold Specimen JIC    Extra Green Top (Lithium Heparin) Tube   Result Value Ref Range    Hold Specimen JIC    Extra Purple Top Tube   Result Value Ref Range    Hold Specimen JIC    Extra Green Top (Lithium Heparin) ON ICE   Result Value Ref Range    Hold Specimen JIC    Result Value Ref Range    Magnesium 1.8 1.7 - 2.3 mg/dL   CBC with platelets   Result Value Ref Range    WBC Count 7.2 4.0 - 11.0 10e3/uL    RBC Count 3.91 3.80 - 5.20 10e6/uL    Hemoglobin 12.7 11.7 - 15.7 g/dL    Hematocrit 38.0 35.0 - 47.0 %    MCV 97 78 - 100 fL    MCH 32.5 26.5 - 33.0 pg    MCHC 33.4 31.5 - 36.5 g/dL    RDW 17.4 (H) 10.0 - 15.0 %    Platelet Count 249 150 - 450 10e3/uL   Result Value Ref Range    Troponin T, High Sensitivity 245 (HH) <=14 ng/L   TSH with free T4 reflex   Result Value Ref Range    TSH 2.28 0.30 - 4.20 uIU/mL   Basic metabolic panel   Result Value Ref Range    Sodium 134 (L) 135 - 145 mmol/L    Potassium 4.3 3.4 - 5.3 mmol/L    Chloride 105 98 - 107 mmol/L    Carbon Dioxide (CO2) 21 (L) 22 - 29 mmol/L    Anion Gap 8 7 - 15 mmol/L    Urea Nitrogen 14.7 8.0 - 23.0 mg/dL    Creatinine 1.01 (H) 0.51 - 0.95 mg/dL    GFR Estimate 58 (L) >60 mL/min/1.73m2    Calcium 7.9 (L) 8.8 - 10.4 mg/dL    Glucose 310 (H) 70 - 99 mg/dL   Result Value Ref Range    Troponin T, High Sensitivity 343 (HH) <=14 ng/L       EKG:  Narrow complex regular tachycardia rate 190 consistent with SVT.  QRS 66 QTc 412 versus 2/3/2024 SVT is new.      Repeat EKG post adenosine cardioversion reveals sinus rhythm rate 92.  SVT is resolved.    I have independently reviewed and interpreted the EKG(s) documented above.    PROCEDURES:  PROCEDURE: Chemical Cardioversion    INDICATIONS: SVT   CARDIOVERSION TYPE: Chemical, Adenosine   CARDIOVERSION PROVIDER: Dr Pilar Santiago   SEDATION: None   CONSENT: Risks, benefits and alternatives were discussed with and Verbal consent was obtained  from Patient.   PROCEDURE SPECIFIC CHECKLIST COMPLETED: Yes   TIME OUT: Universal protocol was followed. TIME OUT conducted just prior to starting procedure confirmed patient identity, site/side, procedure, patient position, and availability of correct equipment. Yes.   MEDICATIONS GIVEN: 6 mg Adenosine, IV push, followed by rapid flush     MONITORING: Heart rate, cardiac monitor, continuous pulse oximeter, frequent blood pressure checks, level of consciousness checks, IV access, constant attendance by RN until patient is recovered and aconstant attendance by MD until patient is stable   RESPONSE: Vital signs stable, airway patent and O2 saturations remained >92%     Brief pause, then sinus rhythm   COMPLICATIONS: Patient tolerated procedure well, without complication         The creation of this record is based on the scribe s observations of the work being performed by Pilar Santiago MD and the provider s statements to them. It was created on her behalf by Eliot Gonzalez, a trained medical scribe. This document has been checked and approved by the attending provider.    Pilar Santiago MD  Emergency Medicine  [facility] EMERGENCY DEPARTMENT  [facility] [facility] [place] [id number] [phone]  Dept: [phone]     Pilar Santiago MD  01/28/25 0517       Pilar Santiago MD  01/28/25 6887

## 2025-01-28 NOTE — TELEPHONE ENCOUNTER
Provider Recommendation Follow Up:   Reached patient/caregiver. Informed of provider's recommendations. Patient verbalized understanding and agrees with the plan.     Natasha Roth RN

## 2025-01-29 ENCOUNTER — APPOINTMENT (OUTPATIENT)
Dept: CT IMAGING | Facility: HOSPITAL | Age: 75
End: 2025-01-29
Attending: INTERNAL MEDICINE
Payer: COMMERCIAL

## 2025-01-29 ENCOUNTER — ORDERS ONLY (AUTO-RELEASED) (OUTPATIENT)
Dept: CARDIOLOGY | Facility: CLINIC | Age: 75
End: 2025-01-29

## 2025-01-29 VITALS
HEART RATE: 74 BPM | HEIGHT: 66 IN | WEIGHT: 155 LBS | DIASTOLIC BLOOD PRESSURE: 50 MMHG | RESPIRATION RATE: 18 BRPM | SYSTOLIC BLOOD PRESSURE: 103 MMHG | TEMPERATURE: 98.4 F | OXYGEN SATURATION: 100 % | BODY MASS INDEX: 24.91 KG/M2

## 2025-01-29 DIAGNOSIS — I47.10 SVT (SUPRAVENTRICULAR TACHYCARDIA): ICD-10-CM

## 2025-01-29 DIAGNOSIS — I47.10 SVT (SUPRAVENTRICULAR TACHYCARDIA): Primary | ICD-10-CM

## 2025-01-29 LAB
ANION GAP SERPL CALCULATED.3IONS-SCNC: 6 MMOL/L (ref 7–15)
BASOPHILS # BLD AUTO: 0 10E3/UL (ref 0–0.2)
BASOPHILS NFR BLD AUTO: 1 %
BSA FOR ECHO PROCEDURE: 0 M2
BUN SERPL-MCNC: 12.2 MG/DL (ref 8–23)
CALCIUM SERPL-MCNC: 8.2 MG/DL (ref 8.8–10.4)
CHLORIDE SERPL-SCNC: 108 MMOL/L (ref 98–107)
CREAT SERPL-MCNC: 0.63 MG/DL (ref 0.51–0.95)
EGFRCR SERPLBLD CKD-EPI 2021: >90 ML/MIN/1.73M2
EOSINOPHIL # BLD AUTO: 0.1 10E3/UL (ref 0–0.7)
EOSINOPHIL NFR BLD AUTO: 2 %
ERYTHROCYTE [DISTWIDTH] IN BLOOD BY AUTOMATED COUNT: 17.4 % (ref 10–15)
GLUCOSE BLDC GLUCOMTR-MCNC: 176 MG/DL (ref 70–99)
GLUCOSE BLDC GLUCOMTR-MCNC: 217 MG/DL (ref 70–99)
GLUCOSE SERPL-MCNC: 215 MG/DL (ref 70–99)
HCO3 SERPL-SCNC: 22 MMOL/L (ref 22–29)
HCT VFR BLD AUTO: 31.1 % (ref 35–47)
HGB BLD-MCNC: 10.6 G/DL (ref 11.7–15.7)
IMM GRANULOCYTES # BLD: 0 10E3/UL
IMM GRANULOCYTES NFR BLD: 1 %
LYMPHOCYTES # BLD AUTO: 1.1 10E3/UL (ref 0.8–5.3)
LYMPHOCYTES NFR BLD AUTO: 19 %
MCH RBC QN AUTO: 32.3 PG (ref 26.5–33)
MCHC RBC AUTO-ENTMCNC: 34.1 G/DL (ref 31.5–36.5)
MCV RBC AUTO: 95 FL (ref 78–100)
MONOCYTES # BLD AUTO: 0.6 10E3/UL (ref 0–1.3)
MONOCYTES NFR BLD AUTO: 11 %
NEUTROPHILS # BLD AUTO: 3.8 10E3/UL (ref 1.6–8.3)
NEUTROPHILS NFR BLD AUTO: 67 %
NRBC # BLD AUTO: 0 10E3/UL
NRBC BLD AUTO-RTO: 0 /100
NT-PROBNP SERPL-MCNC: 647 PG/ML (ref 0–900)
PLATELET # BLD AUTO: 138 10E3/UL (ref 150–450)
POTASSIUM SERPL-SCNC: 4 MMOL/L (ref 3.4–5.3)
RBC # BLD AUTO: 3.28 10E6/UL (ref 3.8–5.2)
SODIUM SERPL-SCNC: 136 MMOL/L (ref 135–145)
WBC # BLD AUTO: 5.7 10E3/UL (ref 4–11)

## 2025-01-29 PROCEDURE — 250N000013 HC RX MED GY IP 250 OP 250 PS 637: Performed by: INTERNAL MEDICINE

## 2025-01-29 PROCEDURE — G0378 HOSPITAL OBSERVATION PER HR: HCPCS

## 2025-01-29 PROCEDURE — 250N000013 HC RX MED GY IP 250 OP 250 PS 637: Performed by: STUDENT IN AN ORGANIZED HEALTH CARE EDUCATION/TRAINING PROGRAM

## 2025-01-29 PROCEDURE — 99239 HOSP IP/OBS DSCHRG MGMT >30: CPT | Performed by: STUDENT IN AN ORGANIZED HEALTH CARE EDUCATION/TRAINING PROGRAM

## 2025-01-29 PROCEDURE — 75574 CT ANGIO HRT W/3D IMAGE: CPT

## 2025-01-29 PROCEDURE — 36415 COLL VENOUS BLD VENIPUNCTURE: CPT | Performed by: STUDENT IN AN ORGANIZED HEALTH CARE EDUCATION/TRAINING PROGRAM

## 2025-01-29 PROCEDURE — 85014 HEMATOCRIT: CPT | Performed by: STUDENT IN AN ORGANIZED HEALTH CARE EDUCATION/TRAINING PROGRAM

## 2025-01-29 PROCEDURE — 250N000011 HC RX IP 250 OP 636: Performed by: INTERNAL MEDICINE

## 2025-01-29 PROCEDURE — 75574 CT ANGIO HRT W/3D IMAGE: CPT | Mod: 26 | Performed by: INTERNAL MEDICINE

## 2025-01-29 PROCEDURE — 80048 BASIC METABOLIC PNL TOTAL CA: CPT | Performed by: STUDENT IN AN ORGANIZED HEALTH CARE EDUCATION/TRAINING PROGRAM

## 2025-01-29 PROCEDURE — 85041 AUTOMATED RBC COUNT: CPT | Performed by: STUDENT IN AN ORGANIZED HEALTH CARE EDUCATION/TRAINING PROGRAM

## 2025-01-29 PROCEDURE — 96375 TX/PRO/DX INJ NEW DRUG ADDON: CPT | Mod: 59

## 2025-01-29 PROCEDURE — 999N000248 HC STATISTIC IV INSERT WITH US BY RN

## 2025-01-29 PROCEDURE — 83880 ASSAY OF NATRIURETIC PEPTIDE: CPT | Performed by: STUDENT IN AN ORGANIZED HEALTH CARE EDUCATION/TRAINING PROGRAM

## 2025-01-29 PROCEDURE — 250N000013 HC RX MED GY IP 250 OP 250 PS 637

## 2025-01-29 PROCEDURE — 84295 ASSAY OF SERUM SODIUM: CPT | Performed by: STUDENT IN AN ORGANIZED HEALTH CARE EDUCATION/TRAINING PROGRAM

## 2025-01-29 PROCEDURE — 250N000009 HC RX 250: Performed by: INTERNAL MEDICINE

## 2025-01-29 PROCEDURE — 258N000003 HC RX IP 258 OP 636: Performed by: STUDENT IN AN ORGANIZED HEALTH CARE EDUCATION/TRAINING PROGRAM

## 2025-01-29 PROCEDURE — 85025 COMPLETE CBC W/AUTO DIFF WBC: CPT | Performed by: STUDENT IN AN ORGANIZED HEALTH CARE EDUCATION/TRAINING PROGRAM

## 2025-01-29 PROCEDURE — 96361 HYDRATE IV INFUSION ADD-ON: CPT

## 2025-01-29 PROCEDURE — 99232 SBSQ HOSP IP/OBS MODERATE 35: CPT | Performed by: INTERNAL MEDICINE

## 2025-01-29 RX ORDER — NITROGLYCERIN 0.4 MG/1
0.4 TABLET SUBLINGUAL
Status: DISCONTINUED | OUTPATIENT
Start: 2025-01-29 | End: 2025-01-29 | Stop reason: HOSPADM

## 2025-01-29 RX ORDER — ASPIRIN 81 MG/1
81 TABLET, CHEWABLE ORAL DAILY
Qty: 30 TABLET | Refills: 1 | Status: SHIPPED | OUTPATIENT
Start: 2025-01-30

## 2025-01-29 RX ORDER — IOPAMIDOL 755 MG/ML
90 INJECTION, SOLUTION INTRAVASCULAR ONCE
Status: COMPLETED | OUTPATIENT
Start: 2025-01-29 | End: 2025-01-29

## 2025-01-29 RX ORDER — METOPROLOL TARTRATE 1 MG/ML
5-20 INJECTION, SOLUTION INTRAVENOUS
Status: DISCONTINUED | OUTPATIENT
Start: 2025-01-29 | End: 2025-01-29 | Stop reason: HOSPADM

## 2025-01-29 RX ORDER — ROSUVASTATIN CALCIUM 10 MG/1
10 TABLET, COATED ORAL DAILY
Qty: 30 TABLET | Refills: 1 | Status: SHIPPED | OUTPATIENT
Start: 2025-01-30

## 2025-01-29 RX ORDER — METOPROLOL SUCCINATE 25 MG/1
25 TABLET, EXTENDED RELEASE ORAL 2 TIMES DAILY
Qty: 60 TABLET | Refills: 1 | Status: SHIPPED | OUTPATIENT
Start: 2025-01-29

## 2025-01-29 RX ADMIN — METOPROLOL SUCCINATE 25 MG: 25 TABLET, EXTENDED RELEASE ORAL at 07:52

## 2025-01-29 RX ADMIN — DIPHENOXYLATE HYDROCHLORIDE AND ATROPINE SULFATE 1 TABLET: 2.5; .025 TABLET ORAL at 10:27

## 2025-01-29 RX ADMIN — INSULIN ASPART 4 UNITS: 100 INJECTION, SOLUTION INTRAVENOUS; SUBCUTANEOUS at 12:17

## 2025-01-29 RX ADMIN — IOPAMIDOL 90 ML: 755 INJECTION, SOLUTION INTRAVENOUS at 13:07

## 2025-01-29 RX ADMIN — NITROGLYCERIN 0.4 MG: 0.4 TABLET, ORALLY DISINTEGRATING SUBLINGUAL at 12:50

## 2025-01-29 RX ADMIN — METOPROLOL TARTRATE 5 MG: 5 INJECTION INTRAVENOUS at 12:40

## 2025-01-29 RX ADMIN — Medication 1 LOZENGE: at 00:42

## 2025-01-29 RX ADMIN — ASPIRIN 81 MG CHEWABLE TABLET 81 MG: 81 TABLET CHEWABLE at 07:52

## 2025-01-29 RX ADMIN — METOPROLOL TARTRATE 5 MG: 5 INJECTION INTRAVENOUS at 12:55

## 2025-01-29 RX ADMIN — ROSUVASTATIN 10 MG: 10 TABLET, FILM COATED ORAL at 07:52

## 2025-01-29 RX ADMIN — METOPROLOL TARTRATE 5 MG: 5 INJECTION INTRAVENOUS at 12:46

## 2025-01-29 RX ADMIN — INSULIN GLARGINE 14 UNITS: 100 INJECTION, SOLUTION SUBCUTANEOUS at 07:53

## 2025-01-29 RX ADMIN — SODIUM CHLORIDE: 9 INJECTION, SOLUTION INTRAVENOUS at 06:39

## 2025-01-29 RX ADMIN — INSULIN ASPART 2 UNITS: 100 INJECTION, SOLUTION INTRAVENOUS; SUBCUTANEOUS at 07:52

## 2025-01-29 ASSESSMENT — ACTIVITIES OF DAILY LIVING (ADL)
ADLS_ACUITY_SCORE: 24
ADLS_ACUITY_SCORE: 42
ADLS_ACUITY_SCORE: 24
ADLS_ACUITY_SCORE: 42
ADLS_ACUITY_SCORE: 42
ADLS_ACUITY_SCORE: 24
ADLS_ACUITY_SCORE: 42
ADLS_ACUITY_SCORE: 24
ADLS_ACUITY_SCORE: 24
ADLS_ACUITY_SCORE: 42
ADLS_ACUITY_SCORE: 24
ADLS_ACUITY_SCORE: 24

## 2025-01-29 NOTE — PLAN OF CARE
Problem: Chest Pain  Goal: Resolution of Chest Pain Symptoms  Outcome: Progressing   Goal Outcome Evaluation:    Patient feeling well today. Asking about discharge after CTA Angio. MD had ordered ECHO but then at end of shift it was cancelled and Cards note says OK to discharge.  Hospitalist notified via text page. He states that Cards is ordering Zio patch and OP ECHO. Will likely discharge later today.

## 2025-01-29 NOTE — PLAN OF CARE
[facility] care 1900 to 0730. A&O x 4. Independent. Tele is NSR. Denies pain. Room air. Up to toilet. NaCl @ 75 mL/hr. Call light within reach, able to make needs known. Bed alarm on for safety.    Problem: Cardiac Catheterization (Diagnostic/Interventional)  Goal: Optimal Pain Control and Function  Intervention: Prevent or Manage Pain  Recent Flowsheet Documentation  Taken 1/29/2025 0345 by Kiera Navarrete RN  Pain Management Interventions: medication (see MAR)  Taken 1/29/2025 0042 by Kiera Navarrete RN  Pain Management Interventions: medication (see MAR)  Taken 1/28/2025 2111 by Kiera Navarrete RN  Pain Management Interventions: medication (see MAR)    Problem: Cardiac Catheterization (Diagnostic/Interventional)  Goal: Absence of Embolism Signs and Symptoms  Intervention: Prevent or Manage Embolism  Recent Flowsheet Documentation  Taken 1/29/2025 0345 by Kiera Navarrete RN  VTE Prevention/Management: SCDs off (sequential compression devices)  Taken 1/29/2025 0042 by Kiera Navarrete RN  VTE Prevention/Management: SCDs off (sequential compression devices)  Taken 1/28/2025 2111 by Kiera Navarrete RN  VTE Prevention/Management: SCDs off (sequential compression devices)

## 2025-01-29 NOTE — PROGRESS NOTES
Pt admitted from ED at 1800. A/Ox4. Denied pain and SOB. VSS. Settled pt to room and oriented to surroundings. NS at 75ml/hr. Tele NSR.

## 2025-01-29 NOTE — PROGRESS NOTES
"Imp/plan:  Ms. Zara Webster is a 74 year old female with history of HCC, HLD, DM2, anemia, Crohn's with end ileostomy (30+ years), TIPs procedure, and a recent oncology procedure, who presents with  [facility] for right sided chest pain and palpitations, found to be in SVT with heart rate 199, now resolved, and with troponin elevation.    Type II MI vs NSTEMI. Elevated troponin (245-->343) with resolved chest pressure, non-specific ECG changes. Possibly 2/2 SVT as below. coronary CT angio no severe dz, started rosuvastatin 10mg given hepatocelluar carcinoma and  aspirin 81 mg daily.   SVT narrow complex,  recurrent, with RVR to 199, resolved s/p 6 mg adenosine, first episode in 12/23 per pt. Started metoprolol succinate 25mg bid. Stopped propanolol. Consider ablation after upcoming radioactive bead procedure, especially if recurrent SVT.   Will arrange for zio patch and echo as outpt     Can follow up with myself, Dr. Gorman or with Cardiology at [facility] (noted  having tx at [facility] for cancer)    Can be discharged today from cardiology standpoint will sign off         Physician Attestation   I, Tim Nuñez MD, was present with the medical/SUSHILA student who participated in the service and in the documentation of the note.  I have verified the history and personally performed the physical exam and medical decision making.  I agree with the assessment and plan of care as documented in the note.      Yoon findi above    Tim Nuñez MD  Date of Service (when I saw the patient): 01/29/25    Review of Systems: 12 points negative other than above    /56 (BP Location: Right arm)   Pulse 85   Temp 98.7  F (37.1  C) (Oral)   Resp 19   Ht 1.676 m (5' 6\")   Wt 70.4 kg (155 lb 4.8 oz)   LMP  (LMP Unknown)   SpO2 96%   BMI 25.07 kg/m    No JVP noted  Lungs clear  Cor RRR no murmurs  Abs soft +BS, no mass  Ext no c,c,e    Lab Results   Component Value Date    HGB 10.6 (L) 01/29/2025     Lab " Results   Component Value Date     (L) 01/29/2025     Creatinine   Date Value Ref Range Status   01/29/2025 0.63 0.51 - 0.95 mg/dL Final     Potassium   Date Value Ref Range Status   01/29/2025 4.0 3.4 - 5.3 mmol/L Final   03/21/2022 4.0 3.5 - 5.0 mmol/L Final       CTA  ANGIOGRAM CORONARY ARTERY    (Results Pending)       Sharee Ghosh, MS4 1/29/2025

## 2025-01-29 NOTE — PROGRESS NOTES
CCTA completed as ordered.  3 doses of metoprolol given per protocol.  Vital signs stable.  Interpretation pending.  Mi Mcgill RN

## 2025-01-29 NOTE — DISCHARGE SUMMARY
"[facility]  Hospitalist Discharge Summary      Date of Admission:  1/28/2025  Date of Discharge:  1/29/2025  Discharging Provider: Tracy Mejia MD  Discharge Service: Hospitalist Service    Discharge Diagnoses   SVT  Type II MI    Clinically Significant Risk Factors     # DMII: A1C = N/A within past 6 months  # Overweight: Estimated body mass index is 25.02 kg/m  as calculated from the following:    Height as of this encounter: 1.676 m (5' 6\").    Weight as of this encounter: 70.3 kg (155 lb).       Follow-ups Needed After Discharge   Follow-up Appointments       Follow Up      Follow up with cardiology clinic as scheduled. Cardiology to arrange zio patch and echocardiogram as an outpatient.        Hospital Follow-up with Existing Primary Care Provider (PCP)      Please see details below         Schedule Primary Care visit within: 7 Days               Unresulted Labs Ordered in the Past 30 Days of this Admission       No orders found for last 31 day(s).            Discharge Disposition   Discharged to home  Condition at discharge: Stable    Hospital Course      Zara Webster is a 74 year old female admitted on 1/28/2025. She presented with palpitation that woke her up from sleep last night. She also had shortness of breath and left sided chest tightness that radiated to jaw. In the ED, she was found to be tachycardic and hypotensive with EKG showing SVT. She responded to 6 mg of adenosine. She remained in sinus rhythm and asymptomatic. Troponin was elevated with repeat up trending. Cardiology consulted in ED. Patient was admitted for SVT, elevated troponin r/o ACS.  -- Coronary angio did not show severe disease. ASA and crestor started by cards.   -- Ziopatch will be arranged by cards  -- CINTIA and hyponatremia resolved with hydration  -- Details below:     SVT- resolved  Hypotension-resolved  -- palpitation, sob, cp  -- S/p Adenosine 6 mg. Converted to sinus  -- S/p 1L NS  -- " Cardiology consult appreciated: metoprolol, consider ablation after upcoming radioactive bead procedure, gorge if recurrent SVT. Stop propranolol.  -- Ziopatch will be arranged by cards.  Elevated troponin  Type II MI vs NSTEMI  -- Troponin 245>>343. Chest pain resolved. EKG: Non specific ST changes.  -- Likely 2/2 demand ischemia from SVT.   -- Cardiology consult appreciated: Plan for Coronary CT angiogram on 01/19. Daily asa 81 mg  -- Coronary CT angiogram did not show severe disease.   -- ASA and Crestor started per cards  -- probnp: 647. Echo will be arranged by cards.  CINTIA-resolved  -- Prerenal  -- Likely prerenal 2/2 decreased intake and increased ileostomy output  -- IVF  -- Monitor I/O  Hyponatremia, mild- resolved  -- Likely 2/2 decreased intake. S/p IVF  -- Monitor  HCC  PRAJAPATI Cirrhosis s/p TIPS  -- Planned radio embolization with radioactive beads  Crohn's disease s/p proctocolectomy with end ileostomy  Type II DM  -- PTA insulin Lantus 14 bid, 6 units subcutaneous 4 times daily.  -- Inpatienent: Accu-checks, Lantus 14 units bid, high sliding scale, I:C 1:10, Hypoglycemia protocol  GERD  -- C/w PPI  Patient is clinically and hemodynamically stable for discharge. Medication reconciliation was done. Medications sent to patient's preferred pharmacy. All labs and radiologic findings discussed with patient. Follow up appointments and recommendations as shown below. Patient verbalized understanding and agreed to plan of care. All questions answered.       Consultations This Hospital Stay   None    Code Status   Full Code    Time Spent on this Encounter   ITracy MD, personally saw the patient today and spent greater than 30 minutes discharging this patient.       Tracy Mejia MD  [facility]  [address] [place] [id number]  Phone: [phone]  Fax: [phone]  ______________________________________________________________________    Physical  Exam   Vital Signs: Temp: 98.7  F (37.1  C) Temp src: Oral BP: 93/41 Pulse: 72   Resp: 19 SpO2: 96 % O2 Device: None (Room air)    Weight: 155 lbs 0 oz  GEN: Alert and oriented. Not in acute distress.  HEENT: Atraumatic, mucous membrane- moist and pink.  Chest: Bilateral air entry. B/l crepitations basally.  CVS: S1S2 regular.   Abdomen: Soft. Non-tender, non-distended. No organomegaly. No guarding or rigidity. Bowel sounds active.   Extremities: b/l trace LE edema.  CNS: No involuntary movements.  Skin: no cyanosis or clubbing.        Primary Care Physician   Obdulia Chamorro    Discharge Orders      Reason for your hospital stay    SVT     Activity    Your activity upon discharge: activity as tolerated     Follow Up    Follow up with cardiology clinic as scheduled. Cardiology to arrange zio patch and echocardiogram as an outpatient.     Diet    Follow this diet upon discharge: Current Diet:Orders Placed This Encounter      No Caffeine Diet     Hospital Follow-up with Existing Primary Care Provider (PCP)    Please see details below            Significant Results and Procedures       Discharge Medications   Current Discharge Medication List        START taking these medications    Details   aspirin (ASA) 81 MG chewable tablet Take 1 tablet (81 mg) by mouth daily.  Qty: 30 tablet, Refills: 1    Associated Diagnoses: NSTEMI (non-ST elevated myocardial infarction) (H)      metoprolol succinate ER (TOPROL XL) 25 MG 24 hr tablet Take 1 tablet (25 mg) by mouth 2 times daily.  Qty: 60 tablet, Refills: 1    Associated Diagnoses: SVT (supraventricular tachycardia)      rosuvastatin (CRESTOR) 10 MG tablet Take 1 tablet (10 mg) by mouth daily.  Qty: 30 tablet, Refills: 1    Associated Diagnoses: NSTEMI (non-ST elevated myocardial infarction) (H)           CONTINUE these medications which have NOT CHANGED    Details   clobetasol (TEMOVATE) 0.05 % external ointment Apply topically as needed      diphenoxylate-atropine (LOMOTIL)  2.5-0.025 MG tablet Take 1 tablet by mouth 4 times daily as needed for diarrhea.  Qty: 120 tablet, Refills: 3    Associated Diagnoses: Crohn's disease of colon with other complication (H)      fluconazole (DIFLUCAN) 150 MG tablet Take 150 mg by mouth once as needed.      insulin lispro (HUMALOG KWIKPEN) 100 UNIT/ML (1 unit dial) KWIKPEN Inject 6 units subcutaneously 4 times daily for blood glucose management  Qty: 30 mL, Refills: 0    Associated Diagnoses: Type 2 diabetes mellitus with diabetic mononeuropathy, with long-term current use of insulin (H)      LANTUS SOLOSTAR 100 UNIT/ML soln Inject 14 Units Subcutaneous 2 times daily  Qty: 30 mL, Refills: 0    Comments: If Lantus is not covered by insurance, may substitute Basaglar or Semglee or other insulin glargine product per insurance preference at same dose and frequency.    Associated Diagnoses: Type 2 diabetes mellitus with diabetic mononeuropathy, with long-term current use of insulin (H)      omeprazole (PRILOSEC) 40 MG DR capsule Take 40 mg by mouth nightly as needed.      continuous blood glucose monitoring (FREESTYLE VIDA) sensor 1 Units daily  Qty: 2 each, Refills: 3    Associated Diagnoses: Type II diabetes mellitus (H)      flash glucose scanning reader (FREESTYLE VIDA 14 DAY READER) Misc [FLASH GLUCOSE SCANNING READER (FREESTYLE VIDA 14 DAY READER) MISC] Use 1 Units As Directed every 14 (fourteen) days.  Qty: 1 each, Refills: 0    Associated Diagnoses: Type II diabetes mellitus (H)      insulin pen needle (32G X 4 MM) 32G X 4 MM miscellaneous Use 8 pen needles daily or as directed.  Qty: 800 each, Refills: 0    Associated Diagnoses: Type 2 diabetes mellitus with diabetic mononeuropathy, with long-term current use of insulin (H)      ostomy adhesive Pste [OSTOMY ADHESIVE PSTE] Apply as needed  Qty: 4 Tube, Refills: 3           STOP taking these medications       propranolol ER (INDERAL LA) 60 MG 24 hr capsule Comments:   Reason for Stopping:              Allergies   Allergies   Allergen Reactions    Prochlorperazine Edisylate [Prochlorperazine] Other (See Comments)     Saw things    Compazine [Prochlorperazine] Unknown

## 2025-01-29 NOTE — PLAN OF CARE
Goal Outcome Evaluation:    AVS and discharge instructions reviewed with patient. Pt eager to go home, awaiting daughter for transport. Denies pain, tele is NSR.

## 2025-02-01 LAB
ATRIAL RATE - MUSE: 192 BPM
ATRIAL RATE - MUSE: 92 BPM
DIASTOLIC BLOOD PRESSURE - MUSE: 51 MMHG
DIASTOLIC BLOOD PRESSURE - MUSE: NORMAL MMHG
INTERPRETATION ECG - MUSE: NORMAL
INTERPRETATION ECG - MUSE: NORMAL
P AXIS - MUSE: 53 DEGREES
P AXIS - MUSE: NORMAL DEGREES
PR INTERVAL - MUSE: 192 MS
PR INTERVAL - MUSE: NORMAL MS
QRS DURATION - MUSE: 66 MS
QRS DURATION - MUSE: 76 MS
QT - MUSE: 232 MS
QT - MUSE: 336 MS
QTC - MUSE: 412 MS
QTC - MUSE: 415 MS
R AXIS - MUSE: -38 DEGREES
R AXIS - MUSE: -45 DEGREES
SYSTOLIC BLOOD PRESSURE - MUSE: 76 MMHG
SYSTOLIC BLOOD PRESSURE - MUSE: NORMAL MMHG
T AXIS - MUSE: 68 DEGREES
T AXIS - MUSE: 89 DEGREES
VENTRICULAR RATE- MUSE: 190 BPM
VENTRICULAR RATE- MUSE: 92 BPM

## 2025-02-03 DIAGNOSIS — Z79.4 TYPE 2 DIABETES MELLITUS WITH DIABETIC MONONEUROPATHY, WITH LONG-TERM CURRENT USE OF INSULIN (H): ICD-10-CM

## 2025-02-03 DIAGNOSIS — E11.41 TYPE 2 DIABETES MELLITUS WITH DIABETIC MONONEUROPATHY, WITH LONG-TERM CURRENT USE OF INSULIN (H): ICD-10-CM

## 2025-02-04 RX ORDER — INSULIN GLARGINE 100 [IU]/ML
INJECTION, SOLUTION SUBCUTANEOUS
Qty: 30 ML | Refills: 0 | Status: SHIPPED | OUTPATIENT
Start: 2025-02-04

## 2025-02-06 ENCOUNTER — OFFICE VISIT (OUTPATIENT)
Dept: FAMILY MEDICINE | Facility: CLINIC | Age: 75
End: 2025-02-06
Payer: COMMERCIAL

## 2025-02-06 VITALS
HEART RATE: 80 BPM | DIASTOLIC BLOOD PRESSURE: 54 MMHG | BODY MASS INDEX: 25.01 KG/M2 | TEMPERATURE: 97.6 F | OXYGEN SATURATION: 100 % | RESPIRATION RATE: 16 BRPM | WEIGHT: 155.6 LBS | SYSTOLIC BLOOD PRESSURE: 115 MMHG | HEIGHT: 66 IN

## 2025-02-06 DIAGNOSIS — C22.0 HCC (HEPATOCELLULAR CARCINOMA) (H): ICD-10-CM

## 2025-02-06 DIAGNOSIS — E78.00 HYPERCHOLESTEREMIA: ICD-10-CM

## 2025-02-06 DIAGNOSIS — K74.69 OTHER CIRRHOSIS OF LIVER (H): ICD-10-CM

## 2025-02-06 DIAGNOSIS — Z79.4 TYPE 2 DIABETES MELLITUS WITH DIABETIC MONONEUROPATHY, WITH LONG-TERM CURRENT USE OF INSULIN (H): ICD-10-CM

## 2025-02-06 DIAGNOSIS — E11.41 TYPE 2 DIABETES MELLITUS WITH DIABETIC MONONEUROPATHY, WITH LONG-TERM CURRENT USE OF INSULIN (H): ICD-10-CM

## 2025-02-06 DIAGNOSIS — Z93.3 COLOSTOMY STATUS (H): ICD-10-CM

## 2025-02-06 DIAGNOSIS — K50.118 CROHN'S DISEASE OF COLON WITH OTHER COMPLICATION (H): ICD-10-CM

## 2025-02-06 DIAGNOSIS — I21.4 NSTEMI (NON-ST ELEVATED MYOCARDIAL INFARCTION) (H): ICD-10-CM

## 2025-02-06 DIAGNOSIS — Z01.818 PREOP GENERAL PHYSICAL EXAM: Primary | ICD-10-CM

## 2025-02-06 DIAGNOSIS — I47.10 SVT (SUPRAVENTRICULAR TACHYCARDIA): ICD-10-CM

## 2025-02-06 PROBLEM — K83.9 DISORDER OF BILIARY TRACT: Status: RESOLVED | Noted: 2022-04-04 | Resolved: 2025-02-06

## 2025-02-06 PROBLEM — N18.31 STAGE 3A CHRONIC KIDNEY DISEASE (H): Status: ACTIVE | Noted: 2020-11-03

## 2025-02-06 PROBLEM — K83.1 OBSTRUCTION OF COMMON BILE DUCT (H): Status: RESOLVED | Noted: 2023-11-20 | Resolved: 2025-02-06

## 2025-02-06 PROBLEM — K83.1 COMMON BILE DUCT OBSTRUCTION (H): Status: RESOLVED | Noted: 2023-11-20 | Resolved: 2025-02-06

## 2025-02-06 NOTE — PROGRESS NOTES
Preoperative Evaluation  [facility]  [address] [facility] [address]  Phone: [phone]  Fax: [phone]  Primary Provider: Obdulia Chamorro MD  Pre-op Performing Provider: Obdulia Chamorro MD  Feb 6, 2025 2/6/2025   Surgical Information   What procedure is being done? radio injection of beads to the liver under fluro   Facility or Hospital where procedure/surgery will be performed: [facility]   Who is doing the procedure / surgery? Dr Mckinnon   Date of surgery / procedure: 02 11 2025   Time of surgery / procedure: 9am   Where do you plan to recover after surgery? at home with family     Fax number for surgical facility:     1. Preop general physical exam (Primary)  Zara is approved for surgery with general anesthesia.  The cardiologist to give her clearance to have this done prior to further workup of her most recent SVT.  Her most recent A1c is 10.7 and ideally would like this lower, however, she has not been lower in the last many years.  She was instructed to hold her insulin the morning of and do approximately two thirds of her nighttime insulin.    2. HCC (hepatocellular carcinoma) (H)      3. Type 2 diabetes mellitus with diabetic mononeuropathy, with long-term current use of insulin (H)  Most recent A1c is 10.7.  I had her trying Trulicity over the summer and she states she was having persistent vomiting so had to go off of it.  She follows with endocrinology and has not been under good control for many years for unclear reasons.  I talked to her today about potentially seeing her diabetes educator after her cardiac workup is done to see if we can get her numbers better or potentially an insulin pump.    4. Crohn's disease of colon with other complication (H)  She has a colostomy.    5. Colostomy status (H)  As above    6. Other cirrhosis of liver (H)      7. NSTEMI (non-ST elevated myocardial infarction) (H)  She recently was admitted for SVT and had a  small MI.  Her workup did not show significant obstruction.    8. SVT (supraventricular tachycardia)  She will be following up with cardiology.  She currently has a Zio patch.  She has not had any symptoms since discharge from hospital.    9. Hypercholesteremia  Stable on Crestor.      Subjective   Zara is a 74 year old, presenting for the following:  Pre-Op Exam          2/6/2025    12:39 PM   Additional Questions   Roomed by      Via the Health Maintenance questionnaire, the patient has reported the following services have been completed -Eye Exam: [facility] eye [facility] 2024-01-05, this information has been sent to the abstraction team.  HPI related to upcoming procedure:   She comes in today for preop physical.  She has had a similar procedure in the past and tolerated well without any adverse side effects.  I have not seen her since her most recent hospitalization.  She was hospitalized at [facility] from 1/28 through 1/29 for SVT and a small MI.  She cardioverted with adenosine.  They now have her on rate control.  They are going to work her up after her procedure and cardiology said it was okay to proceed.  She has not had any symptoms since discharge.  She said no recent illnesses or exposures.  The longitudinal plan of care for the diagnosis(es)/condition(s) as documented were addressed during this visit. Due to the added complexity in care, I will continue to support Zara in the subsequent management and with ongoing continuity of care.       2/6/2025   Pre-Op Questionnaire   Have you ever had a heart attack or stroke? (!) YES MI   Have you ever had surgery on your heart or blood vessels, such as a stent placement, a coronary artery bypass, or surgery on an artery in your head, neck, heart, or legs? No   Do you have chest pain with activity? No   Do you have a history of heart failure? No   Do you currently have a cold, bronchitis or symptoms of other infection? No   Do you have a cough, shortness of  breath, or wheezing? No   Do you or anyone in your family have previous history of blood clots? No   Do you or does anyone in your family have a serious bleeding problem such as prolonged bleeding following surgeries or cuts? No   Have you ever had problems with anemia or been told to take iron pills? No   Have you had any abnormal blood loss such as black, tarry or bloody stools, or abnormal vaginal bleeding? No   Have you ever had a blood transfusion? No   Are you willing to have a blood transfusion if it is medically needed before, during, or after your surgery? Yes   Have you or any of your relatives ever had problems with anesthesia? No   Do you have sleep apnea, excessive snoring or daytime drowsiness? No   Do you have any artifical heart valves or other implanted medical devices like a pacemaker, defibrillator, or continuous glucose monitor? No   Do you have artificial joints? No   Are you allergic to latex? No     Health Care Directive  Patient does not have a Health Care Directive: Discussed advance care planning with patient; information given to patient to review.    Preoperative Review of    reviewed - no record of controlled substances prescribed.          Patient Active Problem List    Diagnosis Date Noted    SVT (supraventricular tachycardia) 01/28/2025     Priority: Medium    NSTEMI (non-ST elevated myocardial infarction) (H) 01/28/2025     Priority: Medium    Candidiasis of vagina 04/10/2024     Priority: Medium    Abnormal LFTs 04/09/2024     Priority: Medium    Bile duct stricture (H) 04/09/2024     Priority: Medium    Nausea and vomiting, unspecified vomiting type 02/04/2024     Priority: Medium    Intractable vomiting 02/04/2024     Priority: Medium    Atypical chest pain 12/21/2023     Priority: Medium    Peripheral edema 12/21/2023     Priority: Medium    Common bile duct obstruction (H) 11/20/2023     Priority: Medium    Obstruction of common bile duct (H) 11/20/2023     Priority:  Medium    Abnormal blood chemistry level 11/17/2023     Priority: Medium    Lesion of liver 09/26/2023     Priority: Medium    HCC (hepatocellular carcinoma) (H) 08/31/2023     Priority: Medium    Essential tremor 05/30/2023     Priority: Medium    Disorder of biliary tract 04/04/2022     Priority: Medium    Abnormal liver ultrasound 12/14/2021     Priority: Medium    Ileostomy status (H) 09/22/2021     Priority: Medium    Non-alcoholic cirrhosis (H) 09/22/2021     Priority: Medium    Nonalcoholic steatohepatitis 07/19/2021     Priority: Medium    Other cirrhosis of liver (H) - unclear etiology,[facility] 10/22/2020     Priority: Medium    Type II diabetes mellitus (H)      Priority: Medium     Created by Conversion        Gastrointestinal hemorrhage 04/29/2020     Priority: Medium    Crohn's disease of colon (H) 11/03/2019     Priority: Medium    Aspirin contraindicated 04/02/2019     Priority: Medium    Hypercholesteremia 10/06/2016     Priority: Medium    BMI 28.0-28.9,adult 10/06/2016     Priority: Medium    Post Colectomy      Priority: Medium     Created by Conversion          Past Medical History:   Diagnosis Date    Anemia     Atypical chest pain     Crohn's disease (H)     Diabetes mellitus (H)     Disorder of biliary tract     Essential tremor     Gastrointestinal hemorrhage     Hepatocellular carcinoma (H)     Hypercholesteremia     Ileostomy status (H)     Lesion of liver     Non-alcoholic cirrhosis (H)      Past Surgical History:   Procedure Laterality Date    BREAST EXCISIONAL BIOPSY Left 1980    BREAST EXCISIONAL BIOPSY Left 1985    ESOPHAGOSCOPY, GASTROSCOPY, DUODENOSCOPY (EGD), COMBINED N/A 1/26/2024    Procedure: ENDOSCOPIC ULTRASOUND;  Surgeon: Santi Pradhan MD;  Location: [facility] OR    HYSTERECTOMY  1991    OOPHORECTOMY Bilateral 1991    OTHER SURGICAL HISTORY      KIDNEY STONE REMOVALNOT SURE WHICH SIDE, DR. SHIV SALINAS PROCTOSIGMOIDOSCOPY,RIGID,DIAGNOS N/A 6/17/2020     Procedure: ILEOSTOMY REVISION, PROCTOSCOPY, ILEOSCOPY;  Surgeon: Devang Moon MD;  Location: [facility];  Service: General    ZZC REMOVAL COLON/ILEOSTOMY      Description: Total Abdominal Colectomy;  Recorded: 12/15/2011;    ZZC REMOVAL COLON/PROCTECTOMY/ILEOSTOMY      Description: Total Proctocolectomy;  Recorded: 08/10/2011;    ZZC VAG HYST, W/VAGINECTOMY      Description: Vaginal Hysterectomy With Colpectomy;  Recorded: 08/14/2014;     Current Outpatient Medications   Medication Sig Dispense Refill    aspirin (ASA) 81 MG chewable tablet Take 1 tablet (81 mg) by mouth daily. 30 tablet 1    clobetasol (TEMOVATE) 0.05 % external ointment Apply topically as needed      continuous blood glucose monitoring (FREESTYLE VIDA) sensor 1 Units daily 2 each 3    diphenoxylate-atropine (LOMOTIL) 2.5-0.025 MG tablet Take 1 tablet by mouth 4 times daily as needed for diarrhea. 120 tablet 3    flash glucose scanning reader (FREESTYLE VIDA 14 DAY READER) Misc [FLASH GLUCOSE SCANNING READER (FREESTYLE VIDA 14 DAY READER) MISC] Use 1 Units As Directed every 14 (fourteen) days. 1 each 0    fluconazole (DIFLUCAN) 150 MG tablet Take 150 mg by mouth once as needed.      insulin lispro (HUMALOG KWIKPEN) 100 UNIT/ML (1 unit dial) KWIKPEN Inject 6 units subcutaneously 4 times daily for blood glucose management 30 mL 0    insulin pen needle (32G X 4 MM) 32G X 4 MM miscellaneous Use 8 pen needles daily or as directed. 800 each 0    LANTUS SOLOSTAR 100 UNIT/ML soln Inject 14 Units Subcutaneous 2 times daily 30 mL 0    metoprolol succinate ER (TOPROL XL) 25 MG 24 hr tablet Take 1 tablet (25 mg) by mouth 2 times daily. 60 tablet 1    omeprazole (PRILOSEC) 40 MG DR capsule Take 40 mg by mouth nightly as needed.      ostomy adhesive Pste [OSTOMY ADHESIVE PSTE] Apply as needed 4 Tube 3    rosuvastatin (CRESTOR) 10 MG tablet Take 1 tablet (10 mg) by mouth daily. 30 tablet 1       Allergies   Allergen Reactions    Prochlorperazine  "Edisylate [Prochlorperazine] Other (See Comments)     Saw things    Compazine [Prochlorperazine] Unknown        Social History     Tobacco Use    Smoking status: Never     Passive exposure: Never    Smokeless tobacco: Never   Substance Use Topics    Alcohol use: Not Currently       History   Drug Use No             Review of Systems  Constitutional, HEENT, cardiovascular, pulmonary, gi and gu systems are negative, except as otherwise noted.    Objective    /54   Pulse 80   Temp 97.6  F (36.4  C)   Resp 16   Ht 1.676 m (5' 6\")   Wt 70.6 kg (155 lb 9.6 oz)   LMP  (LMP Unknown)   SpO2 100%   BMI 25.11 kg/m     Estimated body mass index is 25.11 kg/m  as calculated from the following:    Height as of this encounter: 1.676 m (5' 6\").    Weight as of this encounter: 70.6 kg (155 lb 9.6 oz).  Physical Exam  GENERAL: alert and no distress  EYES: Eyes grossly normal to inspection, PERRL and conjunctivae and sclerae normal  HENT: ear canals and TM's normal, nose and mouth without ulcers or lesions  NECK: no adenopathy, no asymmetry, masses, or scars  RESP: lungs clear to auscultation - no rales, rhonchi or wheezes  CV: regular rate and rhythm, normal S1 S2, no S3 or S4, no murmur, click or rub, no peripheral edema  ABDOMEN: soft, nontender, no hepatosplenomegaly, no masses and bowel sounds normal  MS: no gross musculoskeletal defects noted, no edema  SKIN: no suspicious lesions or rashes  NEURO: Normal strength and tone, mentation intact and speech normal  PSYCH: mentation appears normal, affect normal/bright    Recent Labs   Lab Test 01/29/25  0451 01/28/25  1148 01/28/25  1053 10/07/24  0726   HGB 10.6*  --  12.7  --    *  --  249  --     134*  --  137   POTASSIUM 4.0 4.3  --  4.4   CR 0.63 1.01*  --  0.61   A1C  --   --   --  10.7*        Diagnostics  No labs were ordered during this visit. Labs recently done at hospital  No EKG this visit, completed in the last 90 days.    Revised Cardiac Risk " Index (RCRI)  The patient has the following serious cardiovascular risks for perioperative complications:   - Coronary Artery Disease (MI, positive stress test, angina, Qs on EKG) = 1 point   - Diabetes Mellitus (on Insulin) = 1 point     RCRI Interpretation: 2 points: Class III (moderate risk - 6.6% complication rate)     Estimated Functional Capacity: Performs 4 METS exercise without symptoms (e.g., light housework, stairs, 4 mph walk, 7 mph bike, slow step dance)           Signed Electronically by: Obdulia Chamorro MD  A copy of this evaluation report is provided to the requesting physician.

## 2025-02-07 ENCOUNTER — TELEPHONE (OUTPATIENT)
Dept: CARDIOLOGY | Facility: CLINIC | Age: 75
End: 2025-02-07
Payer: COMMERCIAL

## 2025-02-07 NOTE — LETTER
February 10, 2025      Zara Webster  [address] [place]  [place] [address]        To Whom It May Concern,       She is intermediate risk for CV event - including SVT - would keep adenosine 6mg and 12mg available if SVT returns during surgery and post op in recovery.  Continue metoprolol pre and post op.        Sincerely,        Tim Nuñez MD    Electronically signed

## 2025-02-07 NOTE — TELEPHONE ENCOUNTER
[facility] Call Center    Phone Message    May a detailed message be left on voicemail: yes     Reason for Call: Other: Pt would like  a call back to discuss getting a cardiac clearance for interventional radiology for tumor removal procedure and she is wondering if that can just be faxed over or if she needs to be fit into a slot to be seen, the procedure is on Tuesday 02/11 , Fax number to [facility] is [phone], reach out to pt to discuss      Action Taken: Other: Cardio    Travel Screening: Not Applicable     Date of Service:

## 2025-02-07 NOTE — TELEPHONE ENCOUNTER
"Dr Nuñez, You consulted on this patient with her recent visit to the ED for SVT. She is currently wearing the Zio and has upcoming appts for the echo and follow-up with you later this month. She has an appt 2/11 at [facility] and they are asking for cardiac risk assessment. She is asking if this is something you can provide for her. I cautioned her it may require the follow-up results and appt, but would fwd update to you. See PC with patient for details. Please advise.  -sea    =====  PC to patient to discuss upcoming procedure. She states is having radiation beads implanted at [facility] 2/11. She is asking for cardiac risk assessment. She has had her pre-op H&P with her PCP who has \"cleared\" her, per patient report. [facility] is asking for cardiac risk assessment based on her recent ED visit 1/28 for palpitations and was treated for SVT. She is currently wearing the recommended Zio monitor for 7 days, and her echo is scheduled 2/20 with a follow-up appt with Dr Nuñez 2/27. Dr Nuñez consulted on this patient during her ED visit, and she is asking for his input if able. Will fwd request to Dr Nuñez and call with his response. No further questions at this time.  -sea  "

## 2025-02-10 NOTE — TELEPHONE ENCOUNTER
[facility] Call Center    Phone Message    May a detailed message be left on voicemail: yes     Reason for Call: Other: Pt is calling to inform that [facility] did not receive cardio clearance last week.  Please refax asap as procedure is tomorrow.  [phone]      Action Taken: Other: cardio    Travel Screening: Not Applicable    Thank you!  [facility]       Date of Service:

## 2025-02-10 NOTE — TELEPHONE ENCOUNTER
Letter faxed to new fax number provided. Patient notified with this update. No further questions or concerns.  -sea

## 2025-02-10 NOTE — TELEPHONE ENCOUNTER
Letter faxed to [facility]. Patient notified with this update. No further questions or concerns.  -sea    ========  ----- Message -----  From: Tim Nuñez MD  Sent: 2/7/2025   4:31 PM CST  To: [facility] Cv Rn Team Y    She is intermediate risk for CV event - including SVT - would keep adenosine 6mg and 12mg available if SVT returns during surgery and post op in recovery.  Continue metoprolol pre and post op.

## 2025-02-11 ENCOUNTER — TRANSFERRED RECORDS (OUTPATIENT)
Dept: HEALTH INFORMATION MANAGEMENT | Facility: CLINIC | Age: 75
End: 2025-02-11
Payer: COMMERCIAL

## 2025-02-18 LAB — CV ZIO PRELIM RESULTS: NORMAL

## 2025-02-20 ENCOUNTER — HOSPITAL ENCOUNTER (OUTPATIENT)
Dept: CARDIOLOGY | Facility: CLINIC | Age: 75
End: 2025-02-20
Attending: INTERNAL MEDICINE
Payer: COMMERCIAL

## 2025-02-20 DIAGNOSIS — I47.10 SVT (SUPRAVENTRICULAR TACHYCARDIA): ICD-10-CM

## 2025-02-20 DIAGNOSIS — E11.41 TYPE 2 DIABETES MELLITUS WITH DIABETIC MONONEUROPATHY, WITH LONG-TERM CURRENT USE OF INSULIN (H): ICD-10-CM

## 2025-02-20 DIAGNOSIS — Z79.4 TYPE 2 DIABETES MELLITUS WITH DIABETIC MONONEUROPATHY, WITH LONG-TERM CURRENT USE OF INSULIN (H): ICD-10-CM

## 2025-02-20 DIAGNOSIS — B37.31 CANDIDIASIS OF VAGINA: Primary | ICD-10-CM

## 2025-02-20 LAB — LVEF ECHO: NORMAL

## 2025-02-20 PROCEDURE — 93306 TTE W/DOPPLER COMPLETE: CPT

## 2025-02-20 RX ORDER — INSULIN LISPRO 100 [IU]/ML
INJECTION, SOLUTION INTRAVENOUS; SUBCUTANEOUS
Qty: 30 ML | Refills: 4 | Status: SHIPPED | OUTPATIENT
Start: 2025-02-20

## 2025-02-20 RX ORDER — FLUCONAZOLE 150 MG/1
TABLET ORAL
Qty: 2 TABLET | Refills: 0 | Status: SHIPPED | OUTPATIENT
Start: 2025-02-20

## 2025-02-21 ENCOUNTER — LAB (OUTPATIENT)
Dept: LAB | Facility: HOSPITAL | Age: 75
End: 2025-02-21
Payer: COMMERCIAL

## 2025-02-21 DIAGNOSIS — D75.1 ERYTHROCYTOSIS DUE TO HEPATOMA (H): ICD-10-CM

## 2025-02-21 DIAGNOSIS — C22.0 ERYTHROCYTOSIS DUE TO HEPATOMA (H): ICD-10-CM

## 2025-02-21 LAB
ALBUMIN SERPL BCG-MCNC: 2.6 G/DL (ref 3.5–5.2)
ALP SERPL-CCNC: 460 U/L (ref 40–150)
ALT SERPL W P-5'-P-CCNC: 55 U/L (ref 0–50)
AST SERPL W P-5'-P-CCNC: 99 U/L (ref 0–45)
BILIRUB DIRECT SERPL-MCNC: 2.24 MG/DL (ref 0–0.3)
BILIRUB SERPL-MCNC: 3.1 MG/DL
CREAT SERPL-MCNC: 0.65 MG/DL (ref 0.51–0.95)
EGFRCR SERPLBLD CKD-EPI 2021: >90 ML/MIN/1.73M2
ERYTHROCYTE [DISTWIDTH] IN BLOOD BY AUTOMATED COUNT: 17.6 % (ref 10–15)
HCT VFR BLD AUTO: 38 % (ref 35–47)
HGB BLD-MCNC: 11.9 G/DL (ref 11.7–15.7)
INR PPP: 1.16 (ref 0.85–1.15)
MCH RBC QN AUTO: 32.2 PG (ref 26.5–33)
MCHC RBC AUTO-ENTMCNC: 31.3 G/DL (ref 31.5–36.5)
MCV RBC AUTO: 103 FL (ref 78–100)
PLATELET # BLD AUTO: 117 10E3/UL (ref 150–450)
PROT SERPL-MCNC: 7.2 G/DL (ref 6.4–8.3)
RBC # BLD AUTO: 3.69 10E6/UL (ref 3.8–5.2)
WBC # BLD AUTO: 4.3 10E3/UL (ref 4–11)

## 2025-02-21 PROCEDURE — 85610 PROTHROMBIN TIME: CPT

## 2025-02-21 PROCEDURE — 82565 ASSAY OF CREATININE: CPT

## 2025-02-21 PROCEDURE — 82247 BILIRUBIN TOTAL: CPT

## 2025-02-21 PROCEDURE — 36415 COLL VENOUS BLD VENIPUNCTURE: CPT

## 2025-02-21 PROCEDURE — 82040 ASSAY OF SERUM ALBUMIN: CPT

## 2025-02-21 PROCEDURE — 85014 HEMATOCRIT: CPT

## 2025-02-24 DIAGNOSIS — I51.7 ENLARGED RV (RIGHT VENTRICLE): ICD-10-CM

## 2025-02-24 DIAGNOSIS — R93.1 ECHOCARDIOGRAM ABNORMAL: ICD-10-CM

## 2025-02-25 ENCOUNTER — HOSPITAL ENCOUNTER (OUTPATIENT)
Dept: CT IMAGING | Facility: HOSPITAL | Age: 75
Discharge: HOME | End: 2025-02-25
Attending: INTERNAL MEDICINE
Payer: COMMERCIAL

## 2025-02-25 ENCOUNTER — LAB (OUTPATIENT)
Dept: LAB | Facility: HOSPITAL | Age: 75
End: 2025-02-25
Payer: COMMERCIAL

## 2025-02-25 DIAGNOSIS — R93.1 ECHOCARDIOGRAM ABNORMAL: ICD-10-CM

## 2025-02-25 DIAGNOSIS — R79.89 ELEVATED D-DIMER: Primary | ICD-10-CM

## 2025-02-25 DIAGNOSIS — R79.89 ELEVATED D-DIMER: ICD-10-CM

## 2025-02-25 DIAGNOSIS — I26.99 PULMONARY EMBOLISM (H): Primary | ICD-10-CM

## 2025-02-25 DIAGNOSIS — R93.89 ABNORMAL CT OF THE CHEST: ICD-10-CM

## 2025-02-25 DIAGNOSIS — I26.99 PULMONARY EMBOLI (H): Primary | ICD-10-CM

## 2025-02-25 DIAGNOSIS — I51.7 ENLARGED RV (RIGHT VENTRICLE): ICD-10-CM

## 2025-02-25 DIAGNOSIS — R91.8 ABNORMAL CT LUNG SCREENING: ICD-10-CM

## 2025-02-25 LAB
D DIMER PPP FEU-MCNC: 3.5 UG/ML FEU (ref 0–0.5)
RADIOLOGIST FLAGS: ABNORMAL

## 2025-02-25 PROCEDURE — 36415 COLL VENOUS BLD VENIPUNCTURE: CPT

## 2025-02-25 PROCEDURE — 250N000009 HC RX 250: Performed by: INTERNAL MEDICINE

## 2025-02-25 PROCEDURE — 85379 FIBRIN DEGRADATION QUANT: CPT

## 2025-02-25 PROCEDURE — 250N000011 HC RX IP 250 OP 636: Performed by: INTERNAL MEDICINE

## 2025-02-25 PROCEDURE — 71275 CT ANGIOGRAPHY CHEST: CPT

## 2025-02-25 RX ORDER — IOPAMIDOL 755 MG/ML
52 INJECTION, SOLUTION INTRAVASCULAR ONCE
Status: COMPLETED | OUTPATIENT
Start: 2025-02-25 | End: 2025-02-25

## 2025-02-25 RX ADMIN — IOPAMIDOL 52 ML: 755 INJECTION, SOLUTION INTRAVENOUS at 15:11

## 2025-02-25 RX ADMIN — SODIUM CHLORIDE 97 ML: 9 INJECTION, SOLUTION INTRAVENOUS at 15:14

## 2025-02-26 ENCOUNTER — DOCUMENTATION ONLY (OUTPATIENT)
Dept: ANTICOAGULATION | Facility: CLINIC | Age: 75
End: 2025-02-26
Payer: COMMERCIAL

## 2025-02-26 NOTE — PROGRESS NOTES
Anticoagulant Therapeutic Duplication    Duplicate orders identified: identical order(s)    The duplicate anticoagulant order(s) has been discontinued    Active anticoagulant: apixaban (Eliquis)    Plan made per [facility] anticoagulation protocol.    Evangelina Daugherty RN  2/26/2025

## 2025-02-27 ENCOUNTER — OFFICE VISIT (OUTPATIENT)
Dept: CARDIOLOGY | Facility: CLINIC | Age: 75
End: 2025-02-27
Payer: COMMERCIAL

## 2025-02-27 VITALS
DIASTOLIC BLOOD PRESSURE: 46 MMHG | HEIGHT: 65 IN | HEART RATE: 85 BPM | BODY MASS INDEX: 24.44 KG/M2 | WEIGHT: 146.7 LBS | RESPIRATION RATE: 20 BRPM | SYSTOLIC BLOOD PRESSURE: 118 MMHG | OXYGEN SATURATION: 98 %

## 2025-02-27 DIAGNOSIS — I47.10 SVT (SUPRAVENTRICULAR TACHYCARDIA): Primary | ICD-10-CM

## 2025-02-27 RX ORDER — ONDANSETRON 4 MG/1
4 TABLET, ORALLY DISINTEGRATING ORAL EVERY 8 HOURS PRN
COMMUNITY
Start: 2025-02-11

## 2025-02-27 RX ORDER — OXYCODONE HYDROCHLORIDE 5 MG/1
5 TABLET ORAL EVERY 6 HOURS PRN
COMMUNITY
Start: 2025-02-11

## 2025-02-27 NOTE — PROGRESS NOTES
"  Thank you, Dr. Chamorro, for asking the [facility] team to see Ms. Zara Webster to evaluate       Assessment/Recommendations   Assessment/Plan:  PE - small but given risks and enlarged RV - started eliquis 5mg bid given wt/age/Cr but noted some cirrhosis on CT of liver - will ask for input from Dr. Chamorro and oncology - along iw follow up re hepatic findings and smaller size of tumor  SVT - cont eliquis, if returns, take extra metoprolol and call us, possibly could have ablation, might need zio patch  CAD 50% in setting DM, needs LDL <70, woujld restart rosuvastatin or change or atorvastatin, or change to zetia 10mg to prevent CVA/MI/death, stop asp given on eliquis  Muscle cramps at night - trial Mg oxide 200mg with pickle juice, watch for diarrhea.  Try to control DM     Discussion only 20 min     History of Present Illness/Subjective    Ms. 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          Physical Examination Review of Systems   /46 (BP Location: Right arm, Patient Position: Sitting, Cuff Size: Adult Regular)   Pulse 85   Resp 20   Ht 1.651 m (5' 5\")   Wt 66.5 kg (146 lb 11.2 oz)   LMP  (LMP Unknown)   SpO2 98%   BMI 24.41 kg/m    Body mass index is 24.41 kg/m .  Wt Readings from Last 3 Encounters:   02/27/25 66.5 kg (146 lb 11.2 oz)   02/06/25 70.6 kg (155 lb 9.6 oz)   01/29/25 70.3 kg (155 lb)     [unfilled]  General Appearance:      ENT/Mouth:    EYES:     Neck:    Chest/Lungs:      Cardiovascular:      Abdomen:     Extremities:    Skin:    Neurologic:    Psychiatric:     Review of Systems - 12 points nega other than above "      Medical History  Surgical History Family History Social History   Past Medical History:   Diagnosis Date    Anemia     Atypical chest pain     Common bile duct obstruction (H) 11/20/2023    Crohn's disease (H)     Diabetes mellitus (H)     Disorder of biliary tract     Essential tremor     Gastrointestinal hemorrhage     Hepatocellular carcinoma (H)     Hypercholesteremia     Ileostomy status (H)     Lesion of liver     Non-alcoholic cirrhosis (H)     Obstruction of common bile duct (H) 11/20/2023    Past Surgical History:   Procedure Laterality Date    BREAST EXCISIONAL BIOPSY Left 1980    BREAST EXCISIONAL BIOPSY Left 1985    ESOPHAGOSCOPY, GASTROSCOPY, DUODENOSCOPY (EGD), COMBINED N/A 1/26/2024    Procedure: ENDOSCOPIC ULTRASOUND;  Surgeon: Santi Pradhan MD;  Location: [facility] [place]    HYSTERECTOMY  1991    OOPHORECTOMY Bilateral 1991    OTHER SURGICAL HISTORY      KIDNEY STONE REMOVALNOT SURE WHICH SIDE, DR. CHANEY    [place] PROCTOSIGMOIDOSCOPY,RIGID,DIAGNOS N/A 6/17/2020    Procedure: ILEOSTOMY REVISION, PROCTOSCOPY, ILEOSCOPY;  Surgeon: Devang Moon MD;  Location: [facility];  Service: General    ZZC REMOVAL COLON/ILEOSTOMY      Description: Total Abdominal Colectomy;  Recorded: 12/15/2011;    ZZC REMOVAL COLON/PROCTECTOMY/ILEOSTOMY      Description: Total Proctocolectomy;  Recorded: 08/10/2011;    ZZC VAG HYST, W/VAGINECTOMY      Description: Vaginal Hysterectomy With Colpectomy;  Recorded: 08/14/2014;    Family History   Problem Relation Age of Onset    Diabetes Mother     Heart Disease Mother     Hyperlipidemia Mother     Diabetes Father     Cancer Father         malignant gallbladder    No Known Problems Sister     No Known Problems Sister     Social History     Socioeconomic History    Marital status:      Spouse name: Not on file    Number of children: 2    Years of education: 16    Highest education level: Bachelor's degree (e.g., BA, AB, BS)   Occupational History     Occupation: Retired   Tobacco Use    Smoking status: Never     Passive exposure: Never    Smokeless tobacco: Never   Vaping Use    Vaping status: Never Used   Substance and Sexual Activity    Alcohol use: Not Currently    Drug use: No    Sexual activity: Not on file   Other Topics Concern    Not on file   Social History Narrative    Not on file     Social Drivers of Health     Financial Resource Strain: Low Risk  (1/29/2025)    Financial Resource Strain     Within the past 12 months, have you or your family members you live with been unable to get utilities (heat, electricity) when it was really needed?: No   Food Insecurity: Low Risk  (1/29/2025)    Food Insecurity     Within the past 12 months, did you worry that your food would run out before you got money to buy more?: No     Within the past 12 months, did the food you bought just not last and you didn t have money to get more?: No   Transportation Needs: Low Risk  (1/29/2025)    Transportation Needs     Within the past 12 months, has lack of transportation kept you from medical appointments, getting your medicines, non-medical meetings or appointments, work, or from getting things that you need?: No   Physical Activity: Not on file   Stress: Not on file   Social Connections: Unknown (11/17/2023)    Received from [facility], [facility]    Social Connections     Frequency of Communication with Friends and Family: Not on file   Interpersonal Safety: Low Risk  (1/29/2025)    Interpersonal Safety     Do you feel physically and emotionally safe where you currently live?: Yes     Within the past 12 months, have you been hit, slapped, kicked or otherwise physically hurt by someone?: No     Within the past 12 months, have you been humiliated or emotionally abused in other ways by your partner or ex-partner?: Patient unable to answer   Housing Stability: Low Risk  (1/29/2025)    Housing Stability     Do  you have housing? : Yes     Are you worried about losing your housing?: No          Medications  Allergies   Scheduled Meds:  No current facility-administered medications for this visit.     Continuous Infusions:  No current facility-administered medications for this visit.     PRN Meds:.  No current facility-administered medications for this visit.    Allergies   Allergen Reactions    Prochlorperazine Edisylate [Prochlorperazine] Other (See Comments)     Saw things    Compazine [Prochlorperazine] Unknown         Lab Results    Chemistry/lipid CBC Cardiac Enzymes/BNP/TSH/INR   Lab Results   Component Value Date    CHOL 243 (H) 03/20/2023    HDL 58 03/20/2023    TRIG 238 (H) 03/20/2023    BUN 12.2 01/29/2025     01/29/2025    CO2 22 01/29/2025    Lab Results   Component Value Date    WBC 4.3 02/21/2025    HGB 11.9 02/21/2025    HCT 38.0 02/21/2025     (H) 02/21/2025     (L) 02/21/2025    Lab Results   Component Value Date    TSH 2.28 01/28/2025    INR 1.16 (H) 02/21/2025              Tim Nuñez MD  Interventional Cardiology  [facility]

## 2025-02-27 NOTE — PATIENT INSTRUCTIONS
Study Result    Narrative & Impression   EXAM: CT CHEST PULMONARY EMBOLISM W CONTRAST  LOCATION: [facility]  DATE: 2/25/2025     INDICATION: Elevated d-dimer  COMPARISON: CTA chest, abdomen, and pelvis from 2/3/2024  TECHNIQUE: CT chest pulmonary angiogram during arterial phase injection of IV contrast. Multiplanar reformats and MIP reconstructions were performed. Dose reduction techniques were used.   CONTRAST: 52 ML ISOVUE 370     FINDINGS:  ANGIOGRAM CHEST: Small pulmonary artery filling defects in the right lower lobar artery extending into the segmental branches. No convincing left-sided pulmonary artery filling defects. No evidence of right heart strain. No evidence of aortic dissection.        LUNGS AND PLEURA: Minimal scattered areas of atelectasis and/or scarring. Mild bronchiectasis. No effusions or new/enlarging pulmonary nodules.     MEDIASTINUM/AXILLAE: Heart size is normal. No adenopathy.     CORONARY ARTERY CALCIFICATION: None.     UPPER ABDOMEN: Cirrhosis. TIPS is in place. Decreased size of the left hepatic lobe hypodense lesion measuring 4.3 x 4.2 cm compared to 5.5 x 4.5 cm previously (series 3/377). There is persistent intrahepatic biliary ductal dilation predominantly in the   left hepatic lobe. This is slightly increased when compared to prior exam. The gallbladder is contracted.     MUSCULOSKELETAL: Degenerative changes of the spine. No convincing osseous lesions.                                                                      IMPRESSION:  1.  Small volume right lower lobe pulmonary emboli. No evidence of right heart strain.  2.  Cirrhosis. TIPS is in place.  3.  Decreased size of the left hepatic lesion.   4.  Persistent but slightly increased and hepatic biliary ductal dilation predominantly left lobe of the liver.        [Critical Result: New diagnosis of pulmonary embolism]     Finding was identified on 2/25/2025 3:38 PM CST.      Dr. Tim Nuñez was  contacted by me on 2/25/2025 3:44 PM CST and verbalized understanding of the critical result.

## 2025-02-27 NOTE — LETTER
"2/27/2025    Obdulia Chamorro MD  [address]  [facility] [address]    RE: Zara Webster       Dear Colleague,     I had the pleasure of seeing Zara Webster in the [facility].    Thank you, Dr. Chamorro, for asking the [facility] team to see Ms. Zara Webster to evaluate       Assessment/Recommendations   Assessment/Plan:  PE - small but given risks and enlarged RV - started eliquis 5mg bid given wt/age/Cr but noted some cirrhosis on CT of liver - will ask for input from Dr. Chamorro and oncology - along [facility] follow up re hepatic findings and smaller size of tumor  SVT - cont eliquis, if returns, take extra metoprolol and call us, possibly could have ablation, might need zio patch  CAD 50% in setting DM, needs LDL <70, woujld restart rosuvastatin or change or atorvastatin, or change to zetia 10mg to prevent CVA/MI/death, stop asp given on eliquis  Muscle cramps at night - trial Mg oxide 200mg with pickle juice, watch for diarrhea.  Try to control DM     Discussion only 20 min     History of Present Illness/Subjective    Ms. 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          Physical Examination Review of Systems   /46 (BP Location: Right arm, Patient Position: Sitting, Cuff Size: Adult Regular)   Pulse 85   Resp 20   Ht 1.651 m (5' 5\")   Wt 66.5 kg (146 lb 11.2 oz)   LMP  (LMP Unknown)   SpO2 98%   BMI 24.41 kg/m    Body mass index is 24.41 kg/m .  Wt Readings from Last 3 Encounters:   02/27/25 66.5 kg (146 lb 11.2 oz)   02/06/25 70.6 kg (155 lb 9.6 oz)   01/29/25 70.3 kg (155 lb)     [unfilled]  General " Appearance:      ENT/Mouth:    EYES:     Neck:    Chest/Lungs:      Cardiovascular:      Abdomen:     Extremities:    Skin:    Neurologic:    Psychiatric:     Review of Systems - 12 points nega other than above      Medical History  Surgical History Family History Social History   Past Medical History:   Diagnosis Date     Anemia      Atypical chest pain      Common bile duct obstruction (H) 11/20/2023     Crohn's disease (H)      Diabetes mellitus (H)      Disorder of biliary tract      Essential tremor      Gastrointestinal hemorrhage      Hepatocellular carcinoma (H)      Hypercholesteremia      Ileostomy status (H)      Lesion of liver      Non-alcoholic cirrhosis (H)      Obstruction of common bile duct (H) 11/20/2023    Past Surgical History:   Procedure Laterality Date     BREAST EXCISIONAL BIOPSY Left 1980     BREAST EXCISIONAL BIOPSY Left 1985     ESOPHAGOSCOPY, GASTROSCOPY, DUODENOSCOPY (EGD), COMBINED N/A 1/26/2024    Procedure: ENDOSCOPIC ULTRASOUND;  Surgeon: Santi Pradhan MD;  Location: [facility] [place] [place]     HYSTERECTOMY  1991     OOPHORECTOMY Bilateral 1991     OTHER SURGICAL HISTORY      KIDNEY STONE REMOVALNOT SURE WHICH SIDE, DR. CHANEY     [place] PROCTOSIGMOIDOSCOPY,RIGID,DIAGNOS N/A 6/17/2020    Procedure: ILEOSTOMY REVISION, PROCTOSCOPY, ILEOSCOPY;  Surgeon: Devang Moon MD;  Location: [facility];  Service: General     ZZC REMOVAL COLON/ILEOSTOMY      Description: Total Abdominal Colectomy;  Recorded: 12/15/2011;     ZZC REMOVAL COLON/PROCTECTOMY/ILEOSTOMY      Description: Total Proctocolectomy;  Recorded: 08/10/2011;     ZZC VAG HYST, W/VAGINECTOMY      Description: Vaginal Hysterectomy With Colpectomy;  Recorded: 08/14/2014;    Family History   Problem Relation Age of Onset     Diabetes Mother      Heart Disease Mother      Hyperlipidemia Mother      Diabetes Father      Cancer Father         malignant gallbladder     No Known Problems Sister      No Known Problems Sister      Social History     Socioeconomic History     Marital status:      Spouse name: Not on file     Number of children: 2     Years of education: 16     Highest education level: Bachelor's degree (e.g., BA, AB, BS)   Occupational History     Occupation: Retired   Tobacco Use     Smoking status: Never     Passive exposure: Never     Smokeless tobacco: Never   Vaping Use     Vaping status: Never Used   Substance and Sexual Activity     Alcohol use: Not Currently     Drug use: No     Sexual activity: Not on file   Other Topics Concern     Not on file   Social History Narrative     Not on file     Social Drivers of Health     Financial Resource Strain: Low Risk  (1/29/2025)    Financial Resource Strain      Within the past 12 months, have you or your family members you live with been unable to get utilities (heat, electricity) when it was really needed?: No   Food Insecurity: Low Risk  (1/29/2025)    Food Insecurity      Within the past 12 months, did you worry that your food would run out before you got money to buy more?: No      Within the past 12 months, did the food you bought just not last and you didn t have money to get more?: No   Transportation Needs: Low Risk  (1/29/2025)    Transportation Needs      Within the past 12 months, has lack of transportation kept you from medical appointments, getting your medicines, non-medical meetings or appointments, work, or from getting things that you need?: No   Physical Activity: Not on file   Stress: Not on file   Social Connections: Unknown (11/17/2023)    Received from [facility], [facility]    Social Connections      Frequency of Communication with Friends and Family: Not on file   Interpersonal Safety: Low Risk  (1/29/2025)    Interpersonal Safety      Do you feel physically and emotionally safe where you currently live?: Yes      Within the past 12 months, have you been hit, slapped, kicked or  otherwise physically hurt by someone?: No      Within the past 12 months, have you been humiliated or emotionally abused in other ways by your partner or ex-partner?: Patient unable to answer   Housing Stability: Low Risk  (1/29/2025)    Housing Stability      Do you have housing? : Yes      Are you worried about losing your housing?: No          Medications  Allergies   Scheduled Meds:  No current facility-administered medications for this visit.     Continuous Infusions:  No current facility-administered medications for this visit.     PRN Meds:.  No current facility-administered medications for this visit.    Allergies   Allergen Reactions     Prochlorperazine Edisylate [Prochlorperazine] Other (See Comments)     Saw things     Compazine [Prochlorperazine] Unknown         Lab Results    Chemistry/lipid CBC Cardiac Enzymes/BNP/TSH/INR   Lab Results   Component Value Date    CHOL 243 (H) 03/20/2023    HDL 58 03/20/2023    TRIG 238 (H) 03/20/2023    BUN 12.2 01/29/2025     01/29/2025    CO2 22 01/29/2025    Lab Results   Component Value Date    WBC 4.3 02/21/2025    HGB 11.9 02/21/2025    HCT 38.0 02/21/2025     (H) 02/21/2025     (L) 02/21/2025    Lab Results   Component Value Date    TSH 2.28 01/28/2025    INR 1.16 (H) 02/21/2025              Tim Nuñez MD  Interventional Cardiology  [facility]              Thank you for allowing me to participate in the care of your patient.      Sincerely,     Tim Nuñez MD     [facility]  cc:   Obdulia Chamorro MD  [address] [place] [address]

## 2025-03-15 ENCOUNTER — HEALTH MAINTENANCE LETTER (OUTPATIENT)
Age: 75
End: 2025-03-15

## 2025-03-19 NOTE — TELEPHONE ENCOUNTER
Call attempt 3/3.    LVM for patient to schedule annual wellness exam due Now. On callback, please assist patient in scheduling Medicare AWV.        Kranthi Gonzalez

## 2025-04-14 ENCOUNTER — OFFICE VISIT (OUTPATIENT)
Dept: CARDIOLOGY | Facility: CLINIC | Age: 75
End: 2025-04-14
Attending: INTERNAL MEDICINE
Payer: COMMERCIAL

## 2025-04-14 ENCOUNTER — DOCUMENTATION ONLY (OUTPATIENT)
Dept: CARDIOLOGY | Facility: CLINIC | Age: 75
End: 2025-04-14

## 2025-04-14 VITALS
RESPIRATION RATE: 16 BRPM | WEIGHT: 146 LBS | HEART RATE: 84 BPM | BODY MASS INDEX: 24.3 KG/M2 | DIASTOLIC BLOOD PRESSURE: 60 MMHG | SYSTOLIC BLOOD PRESSURE: 116 MMHG

## 2025-04-14 DIAGNOSIS — I47.10 SVT (SUPRAVENTRICULAR TACHYCARDIA): Primary | ICD-10-CM

## 2025-04-14 DIAGNOSIS — I47.10 SVT (SUPRAVENTRICULAR TACHYCARDIA): ICD-10-CM

## 2025-04-14 PROCEDURE — 3074F SYST BP LT 130 MM HG: CPT | Performed by: INTERNAL MEDICINE

## 2025-04-14 PROCEDURE — 3078F DIAST BP <80 MM HG: CPT | Performed by: INTERNAL MEDICINE

## 2025-04-14 PROCEDURE — 99205 OFFICE O/P NEW HI 60 MIN: CPT | Performed by: INTERNAL MEDICINE

## 2025-04-14 RX ORDER — SODIUM CHLORIDE 9 MG/ML
100 INJECTION, SOLUTION INTRAVENOUS CONTINUOUS
OUTPATIENT
Start: 2025-04-14

## 2025-04-14 RX ORDER — LIDOCAINE 40 MG/G
CREAM TOPICAL
OUTPATIENT
Start: 2025-04-14

## 2025-04-14 RX ORDER — FENTANYL CITRATE 50 UG/ML
25 INJECTION, SOLUTION INTRAMUSCULAR; INTRAVENOUS
OUTPATIENT
Start: 2025-04-14

## 2025-04-14 NOTE — PROGRESS NOTES
AC: Eliquis-   Continue AAD: Metoprolol-Hold 5 days  Diuretics: None  DM Meds:  short and long acting insulin  GLP-1:None  SGLT2 Inhibitors: None  ED Meds: None   Dear team,         Please schedule Zara for an SVT ablation.     Ensite X   Monitored anesthesia care   CBC, BMP on day of procedure   Hold AVN blocking agents/AADs 5 days prior to procedure       Mackenzie,   CHRISTINA  Zara Webster, 1950, [id number]  Home:[phone] (home) Cell:[phone] (mobile)  Emergency Contact: Sukhwinder Sanders [phone]  PCP: Obdulia Chamorro, [phone]    Important patient information for CSC/Cath Lab staff : None    [facility] EP Cath Lab Procedure Order   Ablation Type:Supraventricular Tachycardia  Ordering Provider: Dr Head  Date Ordered and Prepped: 4/14/2025 Gaviota Martinez RN  Anticipated Case Duration:  Standard ( Case per day SA 2:1, DW 4:1, CHRISTINA 3:1)   Scheduling Timeframe:  Next Available  Scheduling Restrictions: None  Scheduling Contact: Please contact pt to schedule, if you are unable to schedule date within the next 24 hours please contact pt to update on scheduling process  EP RN Follow Up Apt: Dr Amanda or Dr Acosta Ablations, do NOT need RN PC follow-up post procedure. Dr Head's PVC/VT ablations need 3-4 day EP RN PC post procedure.  Cardiology Follow Up Apt s/p: AVN-General Card @ 6mo + if EF <50 or dx of HF HF SUSHILA at time of device PO or if EF >50% no dx of HF EP SUSHILA at time of device PO, SVT/AFL- EP SUSHILA @ 8 wks and General Card @ 6mo, VT- EP MD @ 6 wks, PVC- Dr Acosta 1 wk MCTO/Zio @ 4 wks and Dr Head 3 day Zio @ 4 wks with EP MD @ 8 wks for both  Current Device/Device Co Needed for Procedure: None NoneNone  Pre-Procedural Testing needed: None  Mapping System Required:  [place]/Ensite/[place] (Adilene Amanda)  ICE Needed:  No  Anesthesia:  MAC- Monitored Anesthesia Care    [facility] EP Cath Lab Prep   H&P:  Compled by cardiology on 4/14 if scheduled within 30 days, pt to schedule with PMD if procedure  outside of this timeframe  Pre-Procedure Labs/T&S: For VT & PVC Ablations only schedule lab visit at [facility] lab within 3 days prior to procedure for T&S, BMP, CBC, HcG is appropriate, and INR if on warfarin. All other ablations pre-procedure lab work will be done the morning of the procedure.  Medical Records Pertinent for Procedure:  None  Iodinated Contrast Dye Allergies (Does not include Shellfish, Egg, and/or Iodine Allergy): None  GLP-1 Protocol: If on Dulaglutide (Trulicity) (weekly)- Injection hold 7 days prior to procedure  , Exenatide extended release (Bydureon bcise) (weekly)- Injection hold 7 days prior to procedure, Exenatide (Byetta) (twice daily)- Oral Tablet hold day prior and morning of procedure and for Injection hold 7 days prior to procedure, Semaglutide (Ozempic) (weekly)- Injection and Oral hold 7 days prior to procedure, Liraglutide (Victoza, Saxenda) (daily)- Injection hold day prior and morning of procedure  SGLT2 Inhibitors Protocol: ertugliflozin (Steglatro)- Hold 4 days prior to procedure for risk of ketoacidosis. canagliflozin (Invokana), dapagliflozin (Farxiga), and empagliflozin (Jardiance)- Hold 3 days prior to procedure for risk of ketoacidosis. If pt taking for pulmonary HTN, continue.   ED Meds Protocol:  If taking Sildenafil (Viagra), Tadalafill (Cialis, Adcirca), or Vardnafil (Levitra, Staxyn)- Hold 3 days prior to procedure  Follow Up S/P: AFL and SVT Ablation EP SUSHILA apt at 6 wk (CV lab  to schedule at time of case scheduling), VT Ablation 3-4 day EP RN PC Visit & EP MD apt at 6 wk (CV lab  to schedule at time of case scheduling), PVC Ablation 3-4 day EP RN PC Visit & 3 day Zio at 4 wks for KA and 1 wk ZIO at 4 wks for DW with EP MD apt at 8 wks (CV lab  to schedule at time of case scheduling).    Allergies   Allergen Reactions    Prochlorperazine Edisylate [Prochlorperazine] Other (See Comments)     Saw things    Compazine [Prochlorperazine] Unknown        Current Outpatient Medications:     apixaban ANTICOAGULANT (ELIQUIS) 5 MG tablet, Take 1 tablet (5 mg) by mouth 2 times daily., Disp: 180 tablet, Rfl: 3    clobetasol (TEMOVATE) 0.05 % external ointment, Apply topically as needed, Disp: , Rfl:     continuous blood glucose monitoring (FREESTYLE VIDA) sensor, 1 Units daily, Disp: 2 each, Rfl: 3    diphenoxylate-atropine (LOMOTIL) 2.5-0.025 MG tablet, Take 1 tablet by mouth 4 times daily as needed for diarrhea., Disp: 120 tablet, Rfl: 3    flash glucose scanning reader (FREESTYLE VIDA 14 DAY READER) Misc, [FLASH GLUCOSE SCANNING READER (FREESTYLE VIDA 14 DAY READER) MISC] Use 1 Units As Directed every 14 (fourteen) days., Disp: 1 each, Rfl: 0    fluconazole (DIFLUCAN) 150 MG tablet, Take 1 tablet by mouth by mouth now, and if no improvement in 3 days, take another. (Patient not taking: Reported on 4/14/2025), Disp: 2 tablet, Rfl: 0    insulin lispro (HUMALOG KWIKPEN) 100 UNIT/ML (1 unit dial) KWIKPEN, Inject 6 units subcutaneously 4 times daily for blood glucose management, Disp: 30 mL, Rfl: 4    insulin pen needle (32G X 4 MM) 32G X 4 MM miscellaneous, Use 8 pen needles daily or as directed., Disp: 800 each, Rfl: 0    LANTUS SOLOSTAR 100 UNIT/ML soln, Inject 14 Units Subcutaneous 2 times daily, Disp: 30 mL, Rfl: 0    metoprolol succinate ER (TOPROL XL) 25 MG 24 hr tablet, Take 1 tablet (25 mg) by mouth 2 times daily., Disp: 60 tablet, Rfl: 3    omeprazole (PRILOSEC) 40 MG DR capsule, Take 40 mg by mouth nightly as needed., Disp: , Rfl:     ondansetron (ZOFRAN ODT) 4 MG ODT tab, Place 4 mg under the tongue every 8 hours as needed for nausea. (Patient not taking: Reported on 4/14/2025), Disp: , Rfl:     ostomy adhesive Pste, [OSTOMY ADHESIVE PSTE] Apply as needed, Disp: 4 Tube, Rfl: 3    oxyCODONE (ROXICODONE) 5 MG tablet, Take 5 mg by mouth every 6 hours as needed for breakthrough pain. (Patient not taking: Reported on 4/14/2025), Disp: , Rfl:     rosuvastatin  (CRESTOR) 10 MG tablet, Take 1 tablet (10 mg) by mouth daily. (Patient not taking: Reported on 4/14/2025), Disp: 30 tablet, Rfl: 1    Documentation Date:4/14/2025 10:58 AM  Gaviota Martinez RN

## 2025-04-14 NOTE — PROGRESS NOTES
HEART CARE ENCOUNTER CONSULTATON NOTE      [facility]  [phone]      Assessment/Recommendations   Assessment/Plan:    Zara Webster is a very pleasant 74 year old female with past medical history of hepatocellular carcinoma and cirrhosis,PE, Crohn's disease s/p total proctocolectomy with end ileostomy, type 2 diabetes mellitus, GERD, SVT who presents today to the EP clinic.    SVT  - symptomatic with palpitations.  Likely AVNRT, though AVRT and AT are possible.  We reviewed SVT mechanisms and reviewed treatment options including electrophysiology study and ablation vs continued medical therapy.  We reviewed the nature of EP studies and ablation for SVT, success rates depending on the specific SVT mechanism, procedural risks (including groin hematoma, tamponade, heart block, stroke) and recovery expectations.  - on Metoprolol XL 25 mg BID with recurrent symptoms    2. PE  - On Eliquis 5 mg BID    3. HCC with cirrhosis  - currently following GI and s/p radio embolization procedure in February 2025  - follow up in May with GI    4. Crohn's disease s/p total proctocolectomy with end ileostomy  - so noted    Time spent: 60 minutes spent on the date of the encounter doing chart review, history and exam, documentation and further activities as noted above.    The longitudinal plan of care for the condition(s) below were addressed during this visit. Due to the added complexity in care, I will continue support in the subsequent management of this condition(s) and with the ongoing continuity of care of this condition(s).        History of Present Illness/Subjective    HPI: Zara Webster is a very pleasant 74 year old female with past medical history of hepatocellular carcinoma and cirrhosis, PE, Crohn's disease s/p total proctocolectomy with end ileostomy, type 2 diabetes mellitus, GERD, SVT who presents today to the EP clinic.    Zara woke up in early January with symptoms of palpitations and  presented to the hospital.  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  Per chart review she was administered 6 mg of adenosine which converted her to normal sinus rhythm. She had another visit to the ER in [place] with a heart rate of 200 according to chart review and was again given adenosine which again terminated the tachycardia  . She has been on metoprolol XL 25 mg twice daily.    She had some other episodes of elevated heart rates for which she took some additional metoprolol.    She had hypotension and bradycardia post a radioembolization procedure in February, this appears to be vasovagal in nature    She is a retired OR nurse at [employer], her daughter in law also works at the OR in [place]    Recent Echocardiogram/MRI Results (personally reviewed):    Interpretation Summary     The left ventricle is normal in size. There is mild concentric left  ventricular hypertrophy.  Left ventricular systolic function is normal. The visual ejection fraction is  55-60%. No regional wall motion abnormalities noted.     The right ventricle is mild to moderately dilated. The right ventricular  systolic function is normal.  Normal left atrial size. Borderline right atrial enlargement.  Compared to prior study, there is no significant change.    Recent Coronary Angiogram Results (personally reviewed):      Labs below reviewed personally     Physical Examination  Review of Systems   Vitals: LMP  (LMP Unknown)   BMI= There is no height or weight on file to calculate BMI.  Wt Readings from Last 3 Encounters:   02/27/25 66.5 kg (146 lb 11.2 oz)   02/06/25 70.6 kg (155 lb 9.6 oz)   01/29/25 70.3 kg (155 lb)       General Appearance:   no distress, normal body habitus   ENT/Mouth: membranes moist, no oral lesions or bleeding gums.      EYES:  no scleral icterus, normal conjunctivae   Neck: no carotid bruits or thyromegaly   Chest/Lungs:   lungs are clear to auscultation, no rales or wheezing, no sternal  scar, equal chest wall expansion    Cardiovascular:   Regular. Normal first and second heart sounds with no murmurs, rubs, or gallops; the carotid, radial and posterior tibial pulses are intact, no edema bilaterally    Abdomen:  no organomegaly, masses, bruits, or tenderness; bowel sounds are present   Extremities: no cyanosis or clubbing   Skin: no xanthelasma, warm.    Neurologic: normal  bilateral, no tremors     Psychiatric: alert and oriented x3, calm        Please refer above for cardiac ROS details.        Medical History  Surgical History Family History Social History   Past Medical History:   Diagnosis Date    Anemia     Atypical chest pain     Common bile duct obstruction (H) 11/20/2023    Crohn's disease (H)     Diabetes mellitus (H)     Disorder of biliary tract     Essential tremor     Gastrointestinal hemorrhage     Hepatocellular carcinoma (H)     Hypercholesteremia     Ileostomy status (H)     Lesion of liver     Non-alcoholic cirrhosis (H)     Obstruction of common bile duct (H) 11/20/2023     Past Surgical History:   Procedure Laterality Date    BREAST EXCISIONAL BIOPSY Left 1980    BREAST EXCISIONAL BIOPSY Left 1985    ESOPHAGOSCOPY, GASTROSCOPY, DUODENOSCOPY (EGD), COMBINED N/A 1/26/2024    Procedure: ENDOSCOPIC ULTRASOUND;  Surgeon: Santi Pradhan MD;  [place] [facility] [place]ERECTOMY  1991    OOPHORECTOMY Bilateral 1991    OTHER SURGICAL HISTORY      KIDNEY STONE REMOVALNOT SURE WHICH SIDE, DR. CHANEY    [place] PROCTOSIGMOIDOSCOPY,RIGID,DIAGNOS N/A 6/17/2020    Procedure: ILEOSTOMY REVISION, PROCTOSCOPY, ILEOSCOPY;  Surgeon: Devang Moon MD;  Location: [facility];  Service: General    ZZC REMOVAL COLON/ILEOSTOMY      Description: Total Abdominal Colectomy;  Recorded: 12/15/2011;    ZZC REMOVAL COLON/PROCTECTOMY/ILEOSTOMY      Description: Total Proctocolectomy;  Recorded: 08/10/2011;    ZZC VAG HYST, W/VAGINECTOMY      Description: Vaginal Hysterectomy With  Colpectomy;  Recorded: 08/14/2014;     Family History   Problem Relation Age of Onset    Diabetes Mother     Heart Disease Mother     Hyperlipidemia Mother     Diabetes Father     Cancer Father         malignant gallbladder    No Known Problems Sister     No Known Problems Sister         Social History     Socioeconomic History    Marital status:      Spouse name: Not on file    Number of children: 2    Years of education: 16    Highest education level: Bachelor's degree (e.g., BA, AB, BS)   Occupational History    Occupation: Retired   Tobacco Use    Smoking status: Never     Passive exposure: Never    Smokeless tobacco: Never   Vaping Use    Vaping status: Never Used   Substance and Sexual Activity    Alcohol use: Not Currently    Drug use: No    Sexual activity: Not on file   Other Topics Concern    Not on file   Social History Narrative    Not on file     Social Drivers of Health     Financial Resource Strain: Low Risk  (1/29/2025)    Financial Resource Strain     Within the past 12 months, have you or your family members you live with been unable to get utilities (heat, electricity) when it was really needed?: No   Food Insecurity: Low Risk  (1/29/2025)    Food Insecurity     Within the past 12 months, did you worry that your food would run out before you got money to buy more?: No     Within the past 12 months, did the food you bought just not last and you didn t have money to get more?: No   Transportation Needs: Low Risk  (1/29/2025)    Transportation Needs     Within the past 12 months, has lack of transportation kept you from medical appointments, getting your medicines, non-medical meetings or appointments, work, or from getting things that you need?: No   Physical Activity: Not on file   Stress: Not on file   Social Connections: Unknown (11/17/2023)    [facility] [employer] [facility]    Social Connections     Frequency of  Communication with Friends and Family: Not on file   Interpersonal Safety: Low Risk  (1/29/2025)    Interpersonal Safety     Do you feel physically and emotionally safe where you currently live?: Yes     Within the past 12 months, have you been hit, slapped, kicked or otherwise physically hurt by someone?: No     Within the past 12 months, have you been humiliated or emotionally abused in other ways by your partner or ex-partner?: Patient unable to answer   Housing Stability: Low Risk  (1/29/2025)    Housing Stability     Do you have housing? : Yes     Are you worried about losing your housing?: No           Medications  Allergies   Current Outpatient Medications   Medication Sig Dispense Refill    apixaban ANTICOAGULANT (ELIQUIS) 5 MG tablet Take 1 tablet (5 mg) by mouth 2 times daily. 180 tablet 3    clobetasol (TEMOVATE) 0.05 % external ointment Apply topically as needed      continuous blood glucose monitoring (FREESTYLE VIDA) sensor 1 Units daily 2 each 3    diphenoxylate-atropine (LOMOTIL) 2.5-0.025 MG tablet Take 1 tablet by mouth 4 times daily as needed for diarrhea. 120 tablet 3    flash glucose scanning reader (FREESTYLE VIDA 14 DAY READER) Misc [FLASH GLUCOSE SCANNING READER (FREESTYLE VIDA 14 DAY READER) MISC] Use 1 Units As Directed every 14 (fourteen) days. 1 each 0    fluconazole (DIFLUCAN) 150 MG tablet Take 1 tablet by mouth by mouth now, and if no improvement in 3 days, take another. (Patient not taking: Reported on 2/27/2025) 2 tablet 0    insulin lispro (HUMALOG KWIKPEN) 100 UNIT/ML (1 unit dial) KWIKPEN Inject 6 units subcutaneously 4 times daily for blood glucose management 30 mL 4    insulin pen needle (32G X 4 MM) 32G X 4 MM miscellaneous Use 8 pen needles daily or as directed. 800 each 0    LANTUS SOLOSTAR 100 UNIT/ML soln Inject 14 Units Subcutaneous 2 times daily 30 mL 0    metoprolol succinate ER (TOPROL XL) 25 MG 24 hr tablet Take 1 tablet (25 mg) by mouth 2 times daily. 60 tablet 3  "   omeprazole (PRILOSEC) 40 MG DR capsule Take 40 mg by mouth nightly as needed.      ondansetron (ZOFRAN ODT) 4 MG ODT tab Place 4 mg under the tongue every 8 hours as needed for nausea. (Patient not taking: Reported on 2/27/2025)      ostomy adhesive Pste [OSTOMY ADHESIVE PSTE] Apply as needed 4 Tube 3    oxyCODONE (ROXICODONE) 5 MG tablet Take 5 mg by mouth every 6 hours as needed for breakthrough pain. (Patient not taking: Reported on 2/27/2025)      rosuvastatin (CRESTOR) 10 MG tablet Take 1 tablet (10 mg) by mouth daily. (Patient not taking: Reported on 2/27/2025) 30 tablet 1       Allergies   Allergen Reactions    Prochlorperazine Edisylate [Prochlorperazine] Other (See Comments)     Saw things    Compazine [Prochlorperazine] Unknown          Lab Results    Chemistry/lipid CBC Cardiac Enzymes/BNP/TSH/INR   Recent Labs   Lab Test 10/07/24  0726 03/20/23  0829   CHOL  --  243*   HDL  --  58   * 137*   TRIG  --  238*     Recent Labs   Lab Test 10/07/24  0726 03/20/23  0829 04/27/21  0849   * 137* 155*     Recent Labs   Lab Test 02/21/25  1506 01/29/25  1211 01/29/25  0727 01/29/25  0451   NA  --   --   --  136   POTASSIUM  --   --   --  4.0   CHLORIDE  --   --   --  108*   CO2  --   --   --  22   GLC  --  217*   < > 215*   BUN  --   --   --  12.2   CR 0.65  --   --  0.63   GFRESTIMATED >90  --   --  >90   HANNA  --   --   --  8.2*    < > = values in this interval not displayed.     Recent Labs   Lab Test 02/21/25  1506 01/29/25  0451 01/28/25  1148   CR 0.65 0.63 1.01*     Recent Labs   Lab Test 10/07/24  0726 01/17/24  0745 12/05/23  1403   A1C 10.7* 12.2* 14.1*          Recent Labs   Lab Test 02/21/25  1506   WBC 4.3   HGB 11.9   HCT 38.0   *   *     Recent Labs   Lab Test 02/21/25  1506 01/29/25  0451 01/28/25  1053   HGB 11.9 10.6* 12.7    No results for input(s): \"TROPONINI\" in the last 91726 hours.  Recent Labs   Lab Test 01/29/25  0451 02/03/24  2159 12/05/23  1403   NTBNPI 647 " <36  --    NTBNP  --   --  61     Recent Labs   Lab Test 01/28/25  1053   TSH 2.28     Recent Labs   Lab Test 02/21/25  1506 11/15/22  0812 03/24/22  1202   INR 1.16* 1.03 1.0        Mi Head MD

## 2025-04-14 NOTE — PATIENT INSTRUCTIONS
[facility]  Cardiac Electrophysiology  [address]  [place] [address]   Office: [phone]  Fax: [phone]       Thank you for seeing us in clinic today - it is a pleasure to be a part of your care team.  Below is a summary of our plan from today's visit.      Continue current meds  We will schedule you for an SVT ablation    Please do not hesitate to be in touch with our office at [phone] with any questions that may arise.      Thank you for trusting us with your care,    Mi Head MD  Clinical Cardiac Electrophysiology  [facility]  [address]  [place] [address]   Office: [phone]  Fax: [phone]

## 2025-04-14 NOTE — LETTER
4/14/2025    Obdulia Chamorro MD  [address]  [facility] [address]    RE: Zara Webster       Dear Colleague,     I had the pleasure of seeing aZra Webster in the [facility].    HEART CARE ENCOUNTER CONSULTATON NOTE      [facility]  [phone]      Assessment/Recommendations   Assessment/Plan:    Zara Webster is a very pleasant 74 year old female with past medical history of hepatocellular carcinoma and cirrhosis,PE, Crohn's disease s/p total proctocolectomy with end ileostomy, type 2 diabetes mellitus, GERD, SVT who presents today to the EP clinic.    SVT  - symptomatic with palpitations.  Likely AVNRT, though AVRT and AT are possible.  We reviewed SVT mechanisms and reviewed treatment options including electrophysiology study and ablation vs continued medical therapy.  We reviewed the nature of EP studies and ablation for SVT, success rates depending on the specific SVT mechanism, procedural risks (including groin hematoma, tamponade, heart block, stroke) and recovery expectations.  - on Metoprolol XL 25 mg BID with recurrent symptoms    2. PE  - On Eliquis 5 mg BID    3. HCC with cirrhosis  - currently following GI and s/p radio embolization procedure in February 2025  - follow up in May with GI    4. Crohn's disease s/p total proctocolectomy with end ileostomy  - so noted    Time spent: 60 minutes spent on the date of the encounter doing chart review, history and exam, documentation and further activities as noted above.    The longitudinal plan of care for the condition(s) below were addressed during this visit. Due to the added complexity in care, I will continue support in the subsequent management of this condition(s) and with the ongoing continuity of care of this condition(s).        History of Present Illness/Subjective    HPI: Zara Webster is a very pleasant 74 year old female with past medical history of hepatocellular carcinoma and cirrhosis, PE,  Crohn's disease s/p total proctocolectomy with end ileostomy, type 2 diabetes mellitus, GERD, SVT who presents today to the EP clinic.    Zara woke up in early January with symptoms of palpitations and presented to the hospital.  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  Per chart review she was administered 6 mg of adenosine which converted her to normal sinus rhythm. She had another visit to the ER in [place] with a heart rate of 200 according to chart review and was again given adenosine which again terminated the tachycardia  . She has been on metoprolol XL 25 mg twice daily.    She had some other episodes of elevated heart rates for which she took some additional metoprolol.    She had hypotension and bradycardia post a radioembolization procedure in February, this appears to be vasovagal in nature    She is a retired OR nurse at [employer], her daughter in law also works at the OR in [place]    Recent Echocardiogram/MRI Results (personally reviewed):    Interpretation Summary     The left ventricle is normal in size. There is mild concentric left  ventricular hypertrophy.  Left ventricular systolic function is normal. The visual ejection fraction is  55-60%. No regional wall motion abnormalities noted.     The right ventricle is mild to moderately dilated. The right ventricular  systolic function is normal.  Normal left atrial size. Borderline right atrial enlargement.  Compared to prior study, there is no significant change.    Recent Coronary Angiogram Results (personally reviewed):      Labs below reviewed personally     Physical Examination  Review of Systems   Vitals: LMP  (LMP Unknown)   BMI= There is no height or weight on file to calculate BMI.  Wt Readings from Last 3 Encounters:   02/27/25 66.5 kg (146 lb 11.2 oz)   02/06/25 70.6 kg (155 lb 9.6 oz)   01/29/25 70.3 kg (155 lb)       General Appearance:   no distress, normal body habitus   ENT/Mouth: membranes moist, no oral  lesions or bleeding gums.      EYES:  no scleral icterus, normal conjunctivae   Neck: no carotid bruits or thyromegaly   Chest/Lungs:   lungs are clear to auscultation, no rales or wheezing, no sternal scar, equal chest wall expansion    Cardiovascular:   Regular. Normal first and second heart sounds with no murmurs, rubs, or gallops; the carotid, radial and posterior tibial pulses are intact, no edema bilaterally    Abdomen:  no organomegaly, masses, bruits, or tenderness; bowel sounds are present   Extremities: no cyanosis or clubbing   Skin: no xanthelasma, warm.    Neurologic: normal  bilateral, no tremors     Psychiatric: alert and oriented x3, calm        Please refer above for cardiac ROS details.        Medical History  Surgical History Family History Social History   Past Medical History:   Diagnosis Date     Anemia      Atypical chest pain      Common bile duct obstruction (H) 11/20/2023     Crohn's disease (H)      Diabetes mellitus (H)      Disorder of biliary tract      Essential tremor      Gastrointestinal hemorrhage      Hepatocellular carcinoma (H)      Hypercholesteremia      Ileostomy status (H)      Lesion of liver      Non-alcoholic cirrhosis (H)      Obstruction of common bile duct (H) 11/20/2023     Past Surgical History:   Procedure Laterality Date     BREAST EXCISIONAL BIOPSY Left 1980     BREAST EXCISIONAL BIOPSY Left 1985     ESOPHAGOSCOPY, GASTROSCOPY, DUODENOSCOPY (EGD), COMBINED N/A 1/26/2024    Procedure: ENDOSCOPIC ULTRASOUND;  Surgeon: Santi Pradhan MD;  Location: [facility]     HYSTERECTOMY  1991     OOPHORECTOMY Bilateral 1991     OTHER SURGICAL HISTORY      KIDNEY STONE REMOVALNOT SURE WHICH SIDE, DR. SHIV SALINAS PROCTOSIGMOIDOSCOPY,RIGID,DIAGNOS N/A 6/17/2020    Procedure: ILEOSTOMY REVISION, PROCTOSCOPY, ILEOSCOPY;  Surgeon: Devang Moon MD;  Location: [facility];  Service: General     ZZC REMOVAL COLON/ILEOSTOMY      Description: Total  Abdominal Colectomy;  Recorded: 12/15/2011;     ZZC REMOVAL COLON/PROCTECTOMY/ILEOSTOMY      Description: Total Proctocolectomy;  Recorded: 08/10/2011;     ZZC VAG HYST, W/VAGINECTOMY      Description: Vaginal Hysterectomy With Colpectomy;  Recorded: 08/14/2014;     Family History   Problem Relation Age of Onset     Diabetes Mother      Heart Disease Mother      Hyperlipidemia Mother      Diabetes Father      Cancer Father         malignant gallbladder     No Known Problems Sister      No Known Problems Sister         Social History     Socioeconomic History     Marital status:      Spouse name: Not on file     Number of children: 2     Years of education: 16     Highest education level: Bachelor's degree (e.g., BA, AB, BS)   Occupational History     Occupation: Retired   Tobacco Use     Smoking status: Never     Passive exposure: Never     Smokeless tobacco: Never   Vaping Use     Vaping status: Never Used   Substance and Sexual Activity     Alcohol use: Not Currently     Drug use: No     Sexual activity: Not on file   Other Topics Concern     Not on file   Social History Narrative     Not on file     Social Drivers of Health     Financial Resource Strain: Low Risk  (1/29/2025)    Financial Resource Strain      Within the past 12 months, have you or your family members you live with been unable to get utilities (heat, electricity) when it was really needed?: No   Food Insecurity: Low Risk  (1/29/2025)    Food Insecurity      Within the past 12 months, did you worry that your food would run out before you got money to buy more?: No      Within the past 12 months, did the food you bought just not last and you didn t have money to get more?: No   Transportation Needs: Low Risk  (1/29/2025)    Transportation Needs      Within the past 12 months, has lack of transportation kept you from medical appointments, getting your medicines, non-medical meetings or appointments, work, or from getting things that you need?:  No   Physical Activity: Not on file   Stress: Not on file   Social Connections: Unknown (11/17/2023)    Received from [facility] [employer] [facility] [employer] [facility]    Social Connections      Frequency of Communication with Friends and Family: Not on file   Interpersonal Safety: Low Risk  (1/29/2025)    Interpersonal Safety      Do you feel physically and emotionally safe where you currently live?: Yes      Within the past 12 months, have you been hit, slapped, kicked or otherwise physically hurt by someone?: No      Within the past 12 months, have you been humiliated or emotionally abused in other ways by your partner or ex-partner?: Patient unable to answer   Housing Stability: Low Risk  (1/29/2025)    Housing Stability      Do you have housing? : Yes      Are you worried about losing your housing?: No           Medications  Allergies   Current Outpatient Medications   Medication Sig Dispense Refill     apixaban ANTICOAGULANT (ELIQUIS) 5 MG tablet Take 1 tablet (5 mg) by mouth 2 times daily. 180 tablet 3     clobetasol (TEMOVATE) 0.05 % external ointment Apply topically as needed       continuous blood glucose monitoring (FREESTYLE VIDA) sensor 1 Units daily 2 each 3     diphenoxylate-atropine (LOMOTIL) 2.5-0.025 MG tablet Take 1 tablet by mouth 4 times daily as needed for diarrhea. 120 tablet 3     flash glucose scanning reader (FREESTYLE VIDA 14 DAY READER) Misc [FLASH GLUCOSE SCANNING READER (FREESTYLE VIDA 14 DAY READER) MISC] Use 1 Units As Directed every 14 (fourteen) days. 1 each 0     fluconazole (DIFLUCAN) 150 MG tablet Take 1 tablet by mouth by mouth now, and if no improvement in 3 days, take another. (Patient not taking: Reported on 2/27/2025) 2 tablet 0     insulin lispro (HUMALOG KWIKPEN) 100 UNIT/ML (1 unit dial) KWIKPEN Inject 6 units subcutaneously 4 times daily for blood glucose management 30 mL 4     insulin pen needle (32G X 4 MM) 32G X 4 MM  miscellaneous Use 8 pen needles daily or as directed. 800 each 0     LANTUS SOLOSTAR 100 UNIT/ML soln Inject 14 Units Subcutaneous 2 times daily 30 mL 0     metoprolol succinate ER (TOPROL XL) 25 MG 24 hr tablet Take 1 tablet (25 mg) by mouth 2 times daily. 60 tablet 3     omeprazole (PRILOSEC) 40 MG DR capsule Take 40 mg by mouth nightly as needed.       ondansetron (ZOFRAN ODT) 4 MG ODT tab Place 4 mg under the tongue every 8 hours as needed for nausea. (Patient not taking: Reported on 2/27/2025)       ostomy adhesive Pste [OSTOMY ADHESIVE PSTE] Apply as needed 4 Tube 3     oxyCODONE (ROXICODONE) 5 MG tablet Take 5 mg by mouth every 6 hours as needed for breakthrough pain. (Patient not taking: Reported on 2/27/2025)       rosuvastatin (CRESTOR) 10 MG tablet Take 1 tablet (10 mg) by mouth daily. (Patient not taking: Reported on 2/27/2025) 30 tablet 1       Allergies   Allergen Reactions     Prochlorperazine Edisylate [Prochlorperazine] Other (See Comments)     Saw things     Compazine [Prochlorperazine] Unknown          Lab Results    Chemistry/lipid CBC Cardiac Enzymes/BNP/TSH/INR   Recent Labs   Lab Test 10/07/24  0726 03/20/23  0829   CHOL  --  243*   HDL  --  58   * 137*   TRIG  --  238*     Recent Labs   Lab Test 10/07/24  0726 03/20/23  0829 04/27/21  0849   * 137* 155*     Recent Labs   Lab Test 02/21/25  1506 01/29/25  1211 01/29/25  0727 01/29/25  0451   NA  --   --   --  136   POTASSIUM  --   --   --  4.0   CHLORIDE  --   --   --  108*   CO2  --   --   --  22   GLC  --  217*   < > 215*   BUN  --   --   --  12.2   CR 0.65  --   --  0.63   GFRESTIMATED >90  --   --  >90   HANNA  --   --   --  8.2*    < > = values in this interval not displayed.     Recent Labs   Lab Test 02/21/25  1506 01/29/25  0451 01/28/25  1148   CR 0.65 0.63 1.01*     Recent Labs   Lab Test 10/07/24  0726 01/17/24  0745 12/05/23  1403   A1C 10.7* 12.2* 14.1*          Recent Labs   Lab Test 02/21/25  1506   WBC 4.3   HGB  "11.9   HCT 38.0   *   *     Recent Labs   Lab Test 02/21/25  1506 01/29/25  0451 01/28/25  1053   HGB 11.9 10.6* 12.7    No results for input(s): \"TROPONINI\" in the last 80501 hours.  Recent Labs   Lab Test 01/29/25  0451 02/03/24  2159 12/05/23  1403   NTBNPI 647 <36  --    NTBNP  --   --  61     Recent Labs   Lab Test 01/28/25  1053   TSH 2.28     Recent Labs   Lab Test 02/21/25  1506 11/15/22  0812 03/24/22  1202   INR 1.16* 1.03 1.0        Mi Head MD                                        Thank you for allowing me to participate in the care of your patient.      Sincerely,     Mi Head MD     [facility]  cc:   Tim Nuñez MD  [address]  [place] [address]      "

## 2025-04-15 ENCOUNTER — LAB (OUTPATIENT)
Dept: LAB | Facility: CLINIC | Age: 75
End: 2025-04-15
Payer: COMMERCIAL

## 2025-04-15 DIAGNOSIS — E11.41 TYPE 2 DIABETES MELLITUS WITH DIABETIC MONONEUROPATHY, WITH LONG-TERM CURRENT USE OF INSULIN (H): ICD-10-CM

## 2025-04-15 DIAGNOSIS — I47.10 SVT (SUPRAVENTRICULAR TACHYCARDIA): ICD-10-CM

## 2025-04-15 DIAGNOSIS — Z79.4 TYPE 2 DIABETES MELLITUS WITH DIABETIC MONONEUROPATHY, WITH LONG-TERM CURRENT USE OF INSULIN (H): ICD-10-CM

## 2025-04-15 LAB
ANION GAP SERPL CALCULATED.3IONS-SCNC: 6 MMOL/L (ref 7–15)
BUN SERPL-MCNC: 7.7 MG/DL (ref 8–23)
CALCIUM SERPL-MCNC: 8.9 MG/DL (ref 8.8–10.4)
CHLORIDE SERPL-SCNC: 104 MMOL/L (ref 98–107)
CREAT SERPL-MCNC: 0.65 MG/DL (ref 0.51–0.95)
EGFRCR SERPLBLD CKD-EPI 2021: >90 ML/MIN/1.73M2
EST. AVERAGE GLUCOSE BLD GHB EST-MCNC: 301 MG/DL
GLUCOSE SERPL-MCNC: 283 MG/DL (ref 70–99)
HBA1C MFR BLD: 12.1 % (ref 0–5.6)
HCO3 SERPL-SCNC: 25 MMOL/L (ref 22–29)
MAGNESIUM SERPL-MCNC: 1.9 MG/DL (ref 1.7–2.3)
POTASSIUM SERPL-SCNC: 4 MMOL/L (ref 3.4–5.3)
SODIUM SERPL-SCNC: 135 MMOL/L (ref 135–145)

## 2025-04-15 PROCEDURE — 80048 BASIC METABOLIC PNL TOTAL CA: CPT

## 2025-04-15 PROCEDURE — 36415 COLL VENOUS BLD VENIPUNCTURE: CPT

## 2025-04-15 PROCEDURE — 83036 HEMOGLOBIN GLYCOSYLATED A1C: CPT

## 2025-04-15 PROCEDURE — 83735 ASSAY OF MAGNESIUM: CPT

## 2025-04-22 ENCOUNTER — OFFICE VISIT (OUTPATIENT)
Dept: ENDOCRINOLOGY | Facility: CLINIC | Age: 75
End: 2025-04-22
Payer: COMMERCIAL

## 2025-04-22 VITALS
DIASTOLIC BLOOD PRESSURE: 67 MMHG | WEIGHT: 145.7 LBS | BODY MASS INDEX: 24.25 KG/M2 | SYSTOLIC BLOOD PRESSURE: 115 MMHG | HEART RATE: 80 BPM | OXYGEN SATURATION: 96 %

## 2025-04-22 DIAGNOSIS — E11.41 TYPE 2 DIABETES MELLITUS WITH DIABETIC MONONEUROPATHY, WITH LONG-TERM CURRENT USE OF INSULIN (H): ICD-10-CM

## 2025-04-22 DIAGNOSIS — Z79.4 TYPE 2 DIABETES MELLITUS WITH DIABETIC MONONEUROPATHY, WITH LONG-TERM CURRENT USE OF INSULIN (H): ICD-10-CM

## 2025-04-22 DIAGNOSIS — B37.31 CANDIDIASIS OF VAGINA: ICD-10-CM

## 2025-04-22 RX ORDER — INSULIN GLARGINE 100 [IU]/ML
INJECTION, SOLUTION SUBCUTANEOUS
Qty: 30 ML | Refills: 3 | Status: SHIPPED | OUTPATIENT
Start: 2025-04-22

## 2025-04-22 RX ORDER — FLUCONAZOLE 150 MG/1
TABLET ORAL
Qty: 2 TABLET | Refills: 5 | Status: SHIPPED | OUTPATIENT
Start: 2025-04-22

## 2025-04-22 NOTE — LETTER
4/22/2025      Zara Webster  [address] [place]  [place] [address]      Dear Colleague,    Thank you for referring your patient, Zara Webster, to the [facility]. Please see a copy of my visit note below.    [facility]    Diabetes Note 4/22/2025    Zara Webster, 1950, [id number]          Reason for visit      1. Type 2 diabetes mellitus with diabetic mononeuropathy, with long-term current use of insulin (H)    2. Candidiasis of vagina        HPI     Zara Webster is a very pleasant 74 year old old female who presents for follow up.  SUMMARY:    Zara is seen today in follow-up for DM 2 and Candidiasis. Her current A1c is 12.1 and up considerably from her previous of 10.7. She notes that she is having polydipsia because of her higher BG.     She is using the Freestyle 14 day CGM, however she has not been utilizing her [place] and there is nothing on there download except one Glucose reading of > 500.     She continues to take only 14 units of Glargine in the morning and evening, and takes 6 units with her meals. She has been doing this for a very long time. We have discussed increasing, and she isn't having any hypoglycemia. It seems that the 6 units is more out of habit than anything. She also notes that she is drinking regular Pepsi (also for a long time) and she is trying to correct for it, but often does so afterwards than before.    She continues to receive treatment for her Liver Cancer. She is also about to undergo a Cardiac Ablation.     She has been having more trouble with Yeast Infections and would like a refill of the Diflucan.       Blood glucose data:      Past Medical History     Patient Active Problem List   Diagnosis     Post Colectomy     Hypercholesteremia     Aspirin contraindicated     Crohn's disease of colon (H)     Gastrointestinal hemorrhage     Other cirrhosis of liver (H) - unclear etiology,[facility]     Nonalcoholic steatohepatitis      Essential tremor     HCC (hepatocellular carcinoma) (H)     Peripheral edema     Bile duct stricture (H)     SVT (supraventricular tachycardia)     NSTEMI (non-ST elevated myocardial infarction) (H)     Type 2 diabetes mellitus with diabetic mononeuropathy, with long-term current use of insulin (H)        Family History       family history includes Cancer in her father; Diabetes in her father and mother; Heart Disease in her mother; Hyperlipidemia in her mother; No Known Problems in her sister and sister.    Social History      reports that she has never smoked. She has never been exposed to tobacco smoke. She has never used smokeless tobacco. She reports that she does not currently use alcohol. She reports that she does not use drugs.      Review of Systems     Patient has no polyuria or polydipsia, no chest pain, dyspnea or TIA's, no numbness, tingling or pain in extremities  Remainder negative except as noted in HPI.    Vital Signs     /67 (BP Location: Right arm, Patient Position: Sitting, Cuff Size: Adult Large)   Pulse 80   Wt 66.1 kg (145 lb 11.2 oz)   LMP  (LMP Unknown)   SpO2 96%   BMI 24.25 kg/m    Wt Readings from Last 3 Encounters:   04/22/25 66.1 kg (145 lb 11.2 oz)   04/14/25 66.2 kg (146 lb)   02/27/25 66.5 kg (146 lb 11.2 oz)       Physical Exam     Constitutional:  Well developed, Well nourished  HENT:  Normocephalic,   Neck: normal in appearance  Eyes:  PERRL, Conjunctiva pink  Respiratory:  No respiratory distress  Skin: No acanthosis nigricans, lipoatrophy or lipodystrophy  Neurologic:  Alert & oriented x 3, nonfocal  Psychiatric:  Affect, Mood, Insight appropriate        Assessment     1. Type 2 diabetes mellitus with diabetic mononeuropathy, with long-term current use of insulin (H)    2. Candidiasis of vagina        Plan     Her BG could likely handle an increase in her prandial insulin, however without numbers, I am reluctant to do so. I will increase her Lantus dose to 20  units BID and see if this helps with some of the hyperglycemia. Follow-up with me in 6 months.         Katiana Goodwin NP  [facility]  4/22/2025  7:34 AM      Lab Results     Microalbumin Urine mg/dL   Date Value Ref Range Status   04/27/2021 0.73 0.00 - 1.99 mg/dL Final       Cholesterol   Date Value Ref Range Status   03/20/2023 243 (H) <200 mg/dL Final     Direct Measure HDL   Date Value Ref Range Status   03/20/2023 58 >=50 mg/dL Final     Triglycerides   Date Value Ref Range Status   03/20/2023 238 (H) <150 mg/dL Final       [unfilled]      Current Medications     Outpatient Medications Prior to Visit   Medication Sig Dispense Refill     apixaban ANTICOAGULANT (ELIQUIS) 5 MG tablet Take 1 tablet (5 mg) by mouth 2 times daily. 180 tablet 3     clobetasol (TEMOVATE) 0.05 % external ointment Apply topically as needed       continuous blood glucose monitoring (FREESTYLE VIDA) sensor 1 Units daily 2 each 3     diphenoxylate-atropine (LOMOTIL) 2.5-0.025 MG tablet Take 1 tablet by mouth 4 times daily as needed for diarrhea. 120 tablet 3     flash glucose scanning reader (FREESTYLE VIDA 14 DAY READER) Misc [FLASH GLUCOSE SCANNING READER (FREESTYLE VIDA 14 DAY READER) MISC] Use 1 Units As Directed every 14 (fourteen) days. 1 each 0     insulin lispro (HUMALOG KWIKPEN) 100 UNIT/ML (1 unit dial) KWIKPEN Inject 6 units subcutaneously 4 times daily for blood glucose management 30 mL 4     insulin pen needle (32G X 4 MM) 32G X 4 MM miscellaneous Use 8 pen needles daily or as directed. 800 each 0     metoprolol succinate ER (TOPROL XL) 25 MG 24 hr tablet Take 1 tablet (25 mg) by mouth 2 times daily. 60 tablet 3     omeprazole (PRILOSEC) 40 MG DR capsule Take 40 mg by mouth nightly as needed.       ondansetron (ZOFRAN ODT) 4 MG ODT tab Place 4 mg under the tongue every 8 hours as needed for nausea.       ostomy adhesive Pste [OSTOMY ADHESIVE PSTE] Apply as needed 4 Tube 3     oxyCODONE (ROXICODONE) 5 MG tablet Take 5 mg  by mouth every 6 hours as needed for breakthrough pain.       rosuvastatin (CRESTOR) 10 MG tablet Take 1 tablet (10 mg) by mouth daily. 30 tablet 1     fluconazole (DIFLUCAN) 150 MG tablet Take 1 tablet by mouth by mouth now, and if no improvement in 3 days, take another. 2 tablet 0     LANTUS SOLOSTAR 100 UNIT/ML soln Inject 14 Units Subcutaneous 2 times daily 30 mL 0     No facility-administered medications prior to visit.           Again, thank you for allowing me to participate in the care of your patient.        Sincerely,        Katiana Goodwin NP    Electronically signed

## 2025-04-22 NOTE — PROGRESS NOTES
[facility] ENDOCRINOLOGY    Diabetes Note 4/22/2025    Zara Webster, 1950, [id number]          Reason for visit      1. Type 2 diabetes mellitus with diabetic mononeuropathy, with long-term current use of insulin (H)    2. Candidiasis of vagina        HPI     Zara Webster is a very pleasant 74 year old old female who presents for follow up.  SUMMARY:    Zara is seen today in follow-up for DM 2 and Candidiasis. Her current A1c is 12.1 and up considerably from her previous of 10.7. She notes that she is having polydipsia because of her higher BG.     She is using the Freestyle 14 day CGM, however she has not been utilizing her [place] and there is nothing on there download except one Glucose reading of > 500.     She continues to take only 14 units of Glargine in the morning and evening, and takes 6 units with her meals. She has been doing this for a very long time. We have discussed increasing, and she isn't having any hypoglycemia. It seems that the 6 units is more out of habit than anything. She also notes that she is drinking regular Pepsi (also for a long time) and she is trying to correct for it, but often does so afterwards than before.    She continues to receive treatment for her Liver Cancer. She is also about to undergo a Cardiac Ablation.     She has been having more trouble with Yeast Infections and would like a refill of the Diflucan.       Blood glucose data:      Past Medical History     Patient Active Problem List   Diagnosis    Post Colectomy    Hypercholesteremia    Aspirin contraindicated    Crohn's disease of colon (H)    Gastrointestinal hemorrhage    Other cirrhosis of liver (H) - unclear etiology,[facility]    Nonalcoholic steatohepatitis    Essential tremor    HCC (hepatocellular carcinoma) (H)    Peripheral edema    Bile duct stricture (H)    SVT (supraventricular tachycardia)    NSTEMI (non-ST elevated myocardial infarction) (H)    Type 2 diabetes mellitus with diabetic  mononeuropathy, with long-term current use of insulin (H)        Family History       family history includes Cancer in her father; Diabetes in her father and mother; Heart Disease in her mother; Hyperlipidemia in her mother; No Known Problems in her sister and sister.    Social History      reports that she has never smoked. She has never been exposed to tobacco smoke. She has never used smokeless tobacco. She reports that she does not currently use alcohol. She reports that she does not use drugs.      Review of Systems     Patient has no polyuria or polydipsia, no chest pain, dyspnea or TIA's, no numbness, tingling or pain in extremities  Remainder negative except as noted in HPI.    Vital Signs     /67 (BP Location: Right arm, Patient Position: Sitting, Cuff Size: Adult Large)   Pulse 80   Wt 66.1 kg (145 lb 11.2 oz)   LMP  (LMP Unknown)   SpO2 96%   BMI 24.25 kg/m    Wt Readings from Last 3 Encounters:   04/22/25 66.1 kg (145 lb 11.2 oz)   04/14/25 66.2 kg (146 lb)   02/27/25 66.5 kg (146 lb 11.2 oz)       Physical Exam     Constitutional:  Well developed, Well nourished  HENT:  Normocephalic,   Neck: normal in appearance  Eyes:  PERRL, Conjunctiva pink  Respiratory:  No respiratory distress  Skin: No acanthosis nigricans, lipoatrophy or lipodystrophy  Neurologic:  Alert & oriented x 3, nonfocal  Psychiatric:  Affect, Mood, Insight appropriate        Assessment     1. Type 2 diabetes mellitus with diabetic mononeuropathy, with long-term current use of insulin (H)    2. Candidiasis of vagina        Plan     Her BG could likely handle an increase in her prandial insulin, however without numbers, I am reluctant to do so. I will increase her Lantus dose to 20 units BID and see if this helps with some of the hyperglycemia. Follow-up with me in 6 months.         Katiana Goodwin NP   Endocrinology  4/22/2025  7:34 AM      Lab Results     Microalbumin Urine mg/dL   Date Value Ref Range Status   04/27/2021  0.73 0.00 - 1.99 mg/dL Final       Cholesterol   Date Value Ref Range Status   03/20/2023 243 (H) <200 mg/dL Final     Direct Measure HDL   Date Value Ref Range Status   03/20/2023 58 >=50 mg/dL Final     Triglycerides   Date Value Ref Range Status   03/20/2023 238 (H) <150 mg/dL Final       [unfilled]      Current Medications     Outpatient Medications Prior to Visit   Medication Sig Dispense Refill    apixaban ANTICOAGULANT (ELIQUIS) 5 MG tablet Take 1 tablet (5 mg) by mouth 2 times daily. 180 tablet 3    clobetasol (TEMOVATE) 0.05 % external ointment Apply topically as needed      continuous blood glucose monitoring (FREESTYLE VIDA) sensor 1 Units daily 2 each 3    diphenoxylate-atropine (LOMOTIL) 2.5-0.025 MG tablet Take 1 tablet by mouth 4 times daily as needed for diarrhea. 120 tablet 3    flash glucose scanning reader (FREESTYLE VIDA 14 DAY READER) Misc [FLASH GLUCOSE SCANNING READER (FREESTYLE VIDA 14 DAY READER) MISC] Use 1 Units As Directed every 14 (fourteen) days. 1 each 0    insulin lispro (HUMALOG KWIKPEN) 100 UNIT/ML (1 unit dial) KWIKPEN Inject 6 units subcutaneously 4 times daily for blood glucose management 30 mL 4    insulin pen needle (32G X 4 MM) 32G X 4 MM miscellaneous Use 8 pen needles daily or as directed. 800 each 0    metoprolol succinate ER (TOPROL XL) 25 MG 24 hr tablet Take 1 tablet (25 mg) by mouth 2 times daily. 60 tablet 3    omeprazole (PRILOSEC) 40 MG DR capsule Take 40 mg by mouth nightly as needed.      ondansetron (ZOFRAN ODT) 4 MG ODT tab Place 4 mg under the tongue every 8 hours as needed for nausea.      ostomy adhesive Pste [OSTOMY ADHESIVE PSTE] Apply as needed 4 Tube 3    oxyCODONE (ROXICODONE) 5 MG tablet Take 5 mg by mouth every 6 hours as needed for breakthrough pain.      rosuvastatin (CRESTOR) 10 MG tablet Take 1 tablet (10 mg) by mouth daily. 30 tablet 1    fluconazole (DIFLUCAN) 150 MG tablet Take 1 tablet by mouth by mouth now, and if no improvement in 3 days,  take another. 2 tablet 0    LANTUS SOLOSTAR 100 UNIT/ML soln Inject 14 Units Subcutaneous 2 times daily 30 mL 0     No facility-administered medications prior to visit.

## 2025-04-23 ENCOUNTER — TELEPHONE (OUTPATIENT)
Dept: CARDIOLOGY | Facility: CLINIC | Age: 75
End: 2025-04-23

## 2025-04-23 ENCOUNTER — HOSPITAL ENCOUNTER (EMERGENCY)
Facility: HOSPITAL | Age: 75
Discharge: HOME | End: 2025-04-23
Attending: STUDENT IN AN ORGANIZED HEALTH CARE EDUCATION/TRAINING PROGRAM | Admitting: STUDENT IN AN ORGANIZED HEALTH CARE EDUCATION/TRAINING PROGRAM
Payer: COMMERCIAL

## 2025-04-23 VITALS
HEIGHT: 66 IN | OXYGEN SATURATION: 97 % | BODY MASS INDEX: 23.24 KG/M2 | RESPIRATION RATE: 14 BRPM | WEIGHT: 144.6 LBS | SYSTOLIC BLOOD PRESSURE: 105 MMHG | HEART RATE: 94 BPM | TEMPERATURE: 97.5 F | DIASTOLIC BLOOD PRESSURE: 56 MMHG

## 2025-04-23 DIAGNOSIS — I47.10 SVT (SUPRAVENTRICULAR TACHYCARDIA): ICD-10-CM

## 2025-04-23 DIAGNOSIS — I47.10 SVT (SUPRAVENTRICULAR TACHYCARDIA): Primary | ICD-10-CM

## 2025-04-23 LAB
ANION GAP SERPL CALCULATED.3IONS-SCNC: 10 MMOL/L (ref 7–15)
BASOPHILS # BLD AUTO: 0 10E3/UL (ref 0–0.2)
BASOPHILS NFR BLD AUTO: 1 %
BUN SERPL-MCNC: 8.1 MG/DL (ref 8–23)
CALCIUM SERPL-MCNC: 8.9 MG/DL (ref 8.8–10.4)
CHLORIDE SERPL-SCNC: 100 MMOL/L (ref 98–107)
CREAT SERPL-MCNC: 0.67 MG/DL (ref 0.51–0.95)
EGFRCR SERPLBLD CKD-EPI 2021: >90 ML/MIN/1.73M2
EOSINOPHIL # BLD AUTO: 0.1 10E3/UL (ref 0–0.7)
EOSINOPHIL NFR BLD AUTO: 1 %
ERYTHROCYTE [DISTWIDTH] IN BLOOD BY AUTOMATED COUNT: 16.1 % (ref 10–15)
GLUCOSE SERPL-MCNC: 334 MG/DL (ref 70–99)
HCO3 SERPL-SCNC: 21 MMOL/L (ref 22–29)
HCT VFR BLD AUTO: 37.6 % (ref 35–47)
HGB BLD-MCNC: 12.3 G/DL (ref 11.7–15.7)
HOLD SPECIMEN: NORMAL
HOLD SPECIMEN: NORMAL
IMM GRANULOCYTES # BLD: 0 10E3/UL
IMM GRANULOCYTES NFR BLD: 0 %
LYMPHOCYTES # BLD AUTO: 2.5 10E3/UL (ref 0.8–5.3)
LYMPHOCYTES NFR BLD AUTO: 31 %
MAGNESIUM SERPL-MCNC: 1.8 MG/DL (ref 1.7–2.3)
MCH RBC QN AUTO: 33.2 PG (ref 26.5–33)
MCHC RBC AUTO-ENTMCNC: 32.7 G/DL (ref 31.5–36.5)
MCV RBC AUTO: 102 FL (ref 78–100)
MONOCYTES # BLD AUTO: 0.6 10E3/UL (ref 0–1.3)
MONOCYTES NFR BLD AUTO: 8 %
NEUTROPHILS # BLD AUTO: 4.8 10E3/UL (ref 1.6–8.3)
NEUTROPHILS NFR BLD AUTO: 59 %
NRBC # BLD AUTO: 0 10E3/UL
NRBC BLD AUTO-RTO: 0 /100
PLATELET # BLD AUTO: 174 10E3/UL (ref 150–450)
POTASSIUM SERPL-SCNC: 4 MMOL/L (ref 3.4–5.3)
POTASSIUM SERPL-SCNC: 6.3 MMOL/L (ref 3.4–5.3)
RBC # BLD AUTO: 3.7 10E6/UL (ref 3.8–5.2)
SODIUM SERPL-SCNC: 131 MMOL/L (ref 135–145)
WBC # BLD AUTO: 8.2 10E3/UL (ref 4–11)

## 2025-04-23 PROCEDURE — 84132 ASSAY OF SERUM POTASSIUM: CPT | Performed by: STUDENT IN AN ORGANIZED HEALTH CARE EDUCATION/TRAINING PROGRAM

## 2025-04-23 PROCEDURE — 96374 THER/PROPH/DIAG INJ IV PUSH: CPT | Performed by: STUDENT IN AN ORGANIZED HEALTH CARE EDUCATION/TRAINING PROGRAM

## 2025-04-23 PROCEDURE — 258N000003 HC RX IP 258 OP 636: Performed by: STUDENT IN AN ORGANIZED HEALTH CARE EDUCATION/TRAINING PROGRAM

## 2025-04-23 PROCEDURE — 85004 AUTOMATED DIFF WBC COUNT: CPT | Performed by: STUDENT IN AN ORGANIZED HEALTH CARE EDUCATION/TRAINING PROGRAM

## 2025-04-23 PROCEDURE — 93005 ELECTROCARDIOGRAM TRACING: CPT | Performed by: STUDENT IN AN ORGANIZED HEALTH CARE EDUCATION/TRAINING PROGRAM

## 2025-04-23 PROCEDURE — 82310 ASSAY OF CALCIUM: CPT | Performed by: STUDENT IN AN ORGANIZED HEALTH CARE EDUCATION/TRAINING PROGRAM

## 2025-04-23 PROCEDURE — 99284 EMERGENCY DEPT VISIT MOD MDM: CPT | Mod: 25 | Performed by: STUDENT IN AN ORGANIZED HEALTH CARE EDUCATION/TRAINING PROGRAM

## 2025-04-23 PROCEDURE — 83735 ASSAY OF MAGNESIUM: CPT | Performed by: STUDENT IN AN ORGANIZED HEALTH CARE EDUCATION/TRAINING PROGRAM

## 2025-04-23 PROCEDURE — 250N000011 HC RX IP 250 OP 636: Performed by: STUDENT IN AN ORGANIZED HEALTH CARE EDUCATION/TRAINING PROGRAM

## 2025-04-23 PROCEDURE — 96361 HYDRATE IV INFUSION ADD-ON: CPT | Performed by: STUDENT IN AN ORGANIZED HEALTH CARE EDUCATION/TRAINING PROGRAM

## 2025-04-23 PROCEDURE — 80048 BASIC METABOLIC PNL TOTAL CA: CPT | Performed by: STUDENT IN AN ORGANIZED HEALTH CARE EDUCATION/TRAINING PROGRAM

## 2025-04-23 PROCEDURE — 36415 COLL VENOUS BLD VENIPUNCTURE: CPT | Performed by: STUDENT IN AN ORGANIZED HEALTH CARE EDUCATION/TRAINING PROGRAM

## 2025-04-23 RX ORDER — ADENOSINE 3 MG/ML
6 INJECTION, SOLUTION INTRAVENOUS ONCE
Status: COMPLETED | OUTPATIENT
Start: 2025-04-23 | End: 2025-04-23

## 2025-04-23 RX ORDER — METOPROLOL SUCCINATE 50 MG/1
50 TABLET, EXTENDED RELEASE ORAL 2 TIMES DAILY
Qty: 60 TABLET | Refills: 3 | Status: SHIPPED | OUTPATIENT
Start: 2025-04-23

## 2025-04-23 RX ORDER — ADENOSINE 3 MG/ML
12 INJECTION, SOLUTION INTRAVENOUS ONCE
Status: COMPLETED | OUTPATIENT
Start: 2025-04-23 | End: 2025-04-23

## 2025-04-23 RX ADMIN — SODIUM CHLORIDE 1000 ML: 0.9 INJECTION, SOLUTION INTRAVENOUS at 06:05

## 2025-04-23 RX ADMIN — ADENOSINE 12 MG: 3 INJECTION INTRAVENOUS at 06:11

## 2025-04-23 RX ADMIN — ADENOSINE 6 MG: 3 INJECTION INTRAVENOUS at 06:06

## 2025-04-23 ASSESSMENT — COLUMBIA-SUICIDE SEVERITY RATING SCALE - C-SSRS
2. HAVE YOU ACTUALLY HAD ANY THOUGHTS OF KILLING YOURSELF IN THE PAST MONTH?: NO
6. HAVE YOU EVER DONE ANYTHING, STARTED TO DO ANYTHING, OR PREPARED TO DO ANYTHING TO END YOUR LIFE?: NO
1. IN THE PAST MONTH, HAVE YOU WISHED YOU WERE DEAD OR WISHED YOU COULD GO TO SLEEP AND NOT WAKE UP?: NO

## 2025-04-23 ASSESSMENT — ACTIVITIES OF DAILY LIVING (ADL)
ADLS_ACUITY_SCORE: 47
ADLS_ACUITY_SCORE: 47

## 2025-04-23 NOTE — TELEPHONE ENCOUNTER
Return call from patient, reviewed recommendations.  She states agreement and understanding, she will increase her Metoprolol.  Instructed her to monitor her blood pressure closely and to stay hydrated.  Instructed her to call with any concerns.  New Rx sent to her pharmacy.

## 2025-04-23 NOTE — TELEPHONE ENCOUNTER
Phone call from patient, hx of SVT, is scheduled for SVT ablation on 5-6-25 with Dr. Head.  She states she was in the ER today for another episode with heart rates up to 220 bpm.  She was cardioverted with 3 doses of adenosine.  She is feeling better but is nervous that she may have another episode prior to her procedure.     Pt continues metoprolol succinate 25 mg bid.      Zio patch on 1-29-25 showed avg hr 83.  BP at home is usually low 100's systolic, she has never had high blood pressure.    She also notes that she is currently being treated for liver CA.  She has had beads placed in her abdomen and her abdomen feels uncomfortable all the time, she doesn't feel like eating and has lost 20 lb in the last 6 weeks.      Will review with Dr. Head and call pt with recommendations.

## 2025-04-23 NOTE — TELEPHONE ENCOUNTER
Mi Head MD Johnson, Caroline, RN  Caller: Unspecified (Today, 10:28 AM)  We could ask her to consider Metoprolol 50 mg BID if her BP tolerates.

## 2025-04-23 NOTE — ED PROVIDER NOTES
EMERGENCY DEPARTMENT ENCOUNTER      NAME: Zara Webster  AGE: 74 year old female  YOB: 1950  MRN: [id number]  EVALUATION DATE & TIME: 4/23/2025  5:52 AM    PCP: Obdulia Chamorro    ED PROVIDER: Car Gudino MD      Chief Complaint   Patient presents with    Tachycardia    Shortness of Breath         FINAL IMPRESSION:  1. SVT (supraventricular tachycardia)          ED COURSE & MEDICAL DECISION MAKING:    Pertinent Labs & Imaging studies reviewed. (See chart for details)  74 year old female presents to the Emergency Department for evaluation of rapid heart rate    ED Course as of 04/23/25 0801   Wed Apr 23, 2025   0601 Patient is a 74-year-old female with history of paroxysmal supraventricular tachycardia requiring chemical cardioversion the past presenting to emergency department for rapid heart rate and feelings of shortness of breath and some lightheadedness.  Upon initial triage vital signs heart rate of 220 beats a minute borderline hypotensive at 95/53.    Brought immediately back to a treatment room.  She is mentating well.  She is warm and well-perfused extremities with a strong palpable radial pulse.  Extreme tachycardia with a regular rhythm.  Lungs are clear without any wheezes or rales.  Abdomen soft and nontender.    Initial EKG shows what appears to be a reentrant tachycardia consistent with supraventricular tachycardia narrow complex at 200 beats a minute similar to prior episodes of SVT captured on EKG.   0614 Patient received 6 mg of adenosine with brief reversion back to sinus rhythm but then popped again back into SVT.  Received subsequent dose of 12 mg of adenosine with sinus pause and seems to have reverted back to sinus rhythm.   0614 Repeat EKG after cardioversion shows a sinus rhythm at 100 beats a minute, left axis deviation, MN interval 198 ms, narrow QRS, normal QTc, no ST elevations or acute ischemic ST changes.   0654 Reviewed and interpreted myself.  CBC shows a normal white  count and hemoglobin.  Initial chemistry shows hyperkalemia but I suspect this is secondary to hemolyzed specimen we will repeat.   0654 After chemical cardioversion patient notes significant improvement in her symptoms.  No ongoing chest pain shortness of breath, lightheadedness or dizziness.  Low suspicion for underlying coronary ischemia as cause of her symptoms.       Repeat potassium within normal limits at 4.  Patient's now asymptomatic after resolution of her SVT and she is remained in sinus rhythm during her time in the emergency department.  Ultimately feels comfortable discharge home and has an upcoming ablation scheduled cardiology in about 2 weeks.  Recommend she continue with her previously prescribed metoprolol.  Patient discharged stable condition.      5:51 AM I met with the patient, obtained history, performed an initial exam, and discussed options and plan for diagnostics and treatment here in the ED.   6:06 AM Chemical cardioversion initiated. 6 mg adenosine.  6:20 AM I spoke with cardiology.    We discussed plans for discharge including supportive cares, symptomatic treatment, outpatient follow up, and reasons to return to the emergency department.        Medical Decision Making  I obtained history from Family Member/Significant Other  Discharge. No recommendations on prescription strength medication(s). I considered admission, but discharged patient after significant clinical improvement.    MIPS (CTPE, Dental pain, Caro, Sinusitis, Asthma/COPD, Head Trauma): Not Applicable    SEPSIS: None           At the conclusion of the encounter I discussed the results of all of the tests and the disposition. The questions were answered. The patient or family acknowledged understanding and was agreeable with the care plan.     31 minutes of critical care time     MEDICATIONS GIVEN IN THE EMERGENCY:  Medications   adenosine (ADENOCARD) injection 6 mg (6 mg Intravenous $Given 4/23/25 0606)   sodium chloride  0.9% BOLUS 1,000 mL (0 mLs Intravenous Stopped 4/23/25 9422)   adenosine (ADENOCARD) injection 12 mg (12 mg Intravenous $Given 4/23/25 3035)       NEW PRESCRIPTIONS STARTED AT TODAY'S ER VISIT  Discharge Medication List as of 4/23/2025  7:26 AM             =================================================================    [facility]    Patient information was obtained from: the patient and her spouse    Use of : N/A         Zara Webster is a 74 year old female with a pertinent history of paroxysmal SVT, NSTEMI, type 2 diabetes and hepatocellular carcinoma, who presents to this ED by walk-in with her  for evaluation of tachycardia and lightheadedness.    Per the patient and her , she woke up at around 5:00 AM this morning (4/23/2025) with palpitations, lightheadedness and dizziness. She took one metoprolol at 5:20 AM. She was seen in the emergency department about two months ago with similar symptoms diagnosed with paroxysmal SVT. With this previous episode, she underwent a pharmacological cardioversion. She is scheduled for an ablation next month.     Per Chart Review, the patient was admitted to [facility] from 1/28/2025-1/29/2025. She had woken up from her sleep with palpitations, shortness of breath and left-sided chest tightness radiating into her jaw.  In the emergency department, she was found to be tachycardic and hypotensive.  An EKG showed SVT.  She responded to 6 mg of adenosine. She remained in sinus rhythm and was asymptomatic .Her troponin was elevated with repeat trending up. She was admitted for SVT, elevated troponin r/o ACS. Coronary angiogram did not show severe disease. She was started on ASA and crestor per cardiology. Planned to arrange for Ziopatch. CINTIA and hyponatremia resolved with hydration.      REVIEW OF SYSTEMS   Refer to the [facility]    PAST MEDICAL HISTORY:  Past Medical History:   Diagnosis Date    Anemia     Atypical chest pain     Common bile duct  obstruction (H) 11/20/2023    Crohn's disease (H)     Diabetes mellitus (H)     Disorder of biliary tract     Essential tremor     Gastrointestinal hemorrhage     Hepatocellular carcinoma (H)     Hypercholesteremia     Ileostomy status (H)     Lesion of liver     Non-alcoholic cirrhosis (H)     Obstruction of common bile duct (H) 11/20/2023       PAST SURGICAL HISTORY:  Past Surgical History:   Procedure Laterality Date    BREAST EXCISIONAL BIOPSY Left 1980    BREAST EXCISIONAL BIOPSY Left 1985    ESOPHAGOSCOPY, GASTROSCOPY, DUODENOSCOPY (EGD), COMBINED N/A 1/26/2024    Procedure: ENDOSCOPIC ULTRASOUND;  Surgeon: Santi Pradhan MD;  Location: [facility]    HYSTERECTOMY  1991    OOPHORECTOMY Bilateral 1991    OTHER SURGICAL HISTORY      KIDNEY STONE REMOVALNOT SURE WHICH SIDE, DR. CHANEY    AR PROCTOSIGMOIDOSCOPY,RIGID,DIAGNOS N/A 6/17/2020    Procedure: ILEOSTOMY REVISION, PROCTOSCOPY, ILEOSCOPY;  Surgeon: Devang Moon MD;  Location: [facility];  Service: General    ZZC REMOVAL COLON/ILEOSTOMY      Description: Total Abdominal Colectomy;  Recorded: 12/15/2011;    ZZC REMOVAL COLON/PROCTECTOMY/ILEOSTOMY      Description: Total Proctocolectomy;  Recorded: 08/10/2011;    ZZC VAG HYST, W/VAGINECTOMY      Description: Vaginal Hysterectomy With Colpectomy;  Recorded: 08/14/2014;           CURRENT MEDICATIONS:    apixaban ANTICOAGULANT (ELIQUIS) 5 MG tablet  clobetasol (TEMOVATE) 0.05 % external ointment  continuous blood glucose monitoring (FREESTYLE VIDA) sensor  diphenoxylate-atropine (LOMOTIL) 2.5-0.025 MG tablet  flash glucose scanning reader (FREESTYLE VIDA 14 DAY READER) Misc  fluconazole (DIFLUCAN) 150 MG tablet  insulin glargine (LANTUS SOLOSTAR) 100 UNIT/ML pen  insulin lispro (HUMALOG KWIKPEN) 100 UNIT/ML (1 unit dial) KWIKPEN  insulin pen needle (32G X 4 MM) 32G X 4 MM miscellaneous  metoprolol succinate ER (TOPROL XL) 25 MG 24 hr tablet  omeprazole (PRILOSEC) 40 MG   capsule  ondansetron (ZOFRAN ODT) 4 MG ODT tab  ostomy adhesive Pste  oxyCODONE (ROXICODONE) 5 MG tablet  rosuvastatin (CRESTOR) 10 MG tablet        ALLERGIES:  Allergies   Allergen Reactions    Prochlorperazine Edisylate [Prochlorperazine] Other (See Comments)     Saw things    Compazine [Prochlorperazine] Unknown       FAMILY HISTORY:  Family History   Problem Relation Age of Onset    Diabetes Mother     Heart Disease Mother     Hyperlipidemia Mother     Diabetes Father     Cancer Father         malignant gallbladder    No Known Problems Sister     No Known Problems Sister        SOCIAL HISTORY:   Social History     Socioeconomic History    Marital status:     Number of children: 2    Years of education: 16    Highest education level: Bachelor's degree (e.g., BA, AB, BS)   Occupational History    Occupation: Retired   Tobacco Use    Smoking status: Never     Passive exposure: Never    Smokeless tobacco: Never   Vaping Use    Vaping status: Never Used   Substance and Sexual Activity    Alcohol use: Not Currently    Drug use: No     Social Drivers of Health     Financial Resource Strain: Low Risk  (1/29/2025)    Financial Resource Strain     Within the past 12 months, have you or your family members you live with been unable to get utilities (heat, electricity) when it was really needed?: No   Food Insecurity: Low Risk  (1/29/2025)    Food Insecurity     Within the past 12 months, did you worry that your food would run out before you got money to buy more?: No     Within the past 12 months, did the food you bought just not last and you didn t have money to get more?: No   Transportation Needs: Low Risk  (1/29/2025)    Transportation Needs     Within the past 12 months, has lack of transportation kept you from medical appointments, getting your medicines, non-medical meetings or appointments, work, or from getting things that you need?: No    Received from [employer] [facility] [employer]  "[facility] & [facility]    Social Connections   Interpersonal Safety: Low Risk  (1/29/2025)    Interpersonal Safety     Do you feel physically and emotionally safe where you currently live?: Yes     Within the past 12 months, have you been hit, slapped, kicked or otherwise physically hurt by someone?: No     Within the past 12 months, have you been humiliated or emotionally abused in other ways by your partner or ex-partner?: Patient unable to answer   Housing Stability: Low Risk  (1/29/2025)    Housing Stability     Do you have housing? : Yes     Are you worried about losing your housing?: No       VITALS:  /56   Pulse 94   Temp 97.5  F (36.4  C) (Oral)   Resp 14   Ht 1.676 m (5' 6\")   Wt 65.6 kg (144 lb 9.6 oz)   LMP  (LMP Unknown)   SpO2 97%   BMI 23.34 kg/m      PHYSICAL EXAM    Constitutional: Well developed, Well nourished, NAD.  HENT: Normocephalic, Atraumatic, mucous membranes moist.  Neck- trachea midline, No stridor.    Eyes:EOMI, Conjunctiva normal, No discharge.   Respiratory: Normal breath sounds, No respiratory distress, No wheezing. No rales.   Cardiovascular: Very tachycardic. Regular rhythm. No murmurs. Strong palpable radial pulse.  Warm and well-perfused extremities, no lower extremity edema  Abdominal: Soft, No tenderness, No rebound or guarding.     Musculoskeletal: no deformity or malalignment.  Integument: Warm, Dry, No erythema.  Neurologic: Alert & oriented x 3.  Psychiatric: Affect normal, Cooperative.      LAB:  All pertinent labs reviewed and interpreted.  Results for orders placed or performed during the hospital encounter of 04/23/25   Basic metabolic panel   Result Value Ref Range    Sodium 131 (L) 135 - 145 mmol/L    Potassium 6.3 (HH) 3.4 - 5.3 mmol/L    Chloride 100 98 - 107 mmol/L    Carbon Dioxide (CO2) 21 (L) 22 - 29 mmol/L    Anion Gap 10 7 - 15 mmol/L    Urea Nitrogen 8.1 8.0 - 23.0 mg/dL    Creatinine 0.67 0.51 - 0.95 mg/dL    GFR Estimate >90 >60 " mL/min/1.73m2    Calcium 8.9 8.8 - 10.4 mg/dL    Glucose 334 (H) 70 - 99 mg/dL   Result Value Ref Range    Magnesium 1.8 1.7 - 2.3 mg/dL   CBC with platelets and differential   Result Value Ref Range    WBC Count 8.2 4.0 - 11.0 10e3/uL    RBC Count 3.70 (L) 3.80 - 5.20 10e6/uL    Hemoglobin 12.3 11.7 - 15.7 g/dL    Hematocrit 37.6 35.0 - 47.0 %     (H) 78 - 100 fL    MCH 33.2 (H) 26.5 - 33.0 pg    MCHC 32.7 31.5 - 36.5 g/dL    RDW 16.1 (H) 10.0 - 15.0 %    Platelet Count 174 150 - 450 10e3/uL    % Neutrophils 59 %    % Lymphocytes 31 %    % Monocytes 8 %    % Eosinophils 1 %    % Basophils 1 %    % Immature Granulocytes 0 %    NRBCs per 100 WBC 0 <1 /100    Absolute Neutrophils 4.8 1.6 - 8.3 10e3/uL    Absolute Lymphocytes 2.5 0.8 - 5.3 10e3/uL    Absolute Monocytes 0.6 0.0 - 1.3 10e3/uL    Absolute Eosinophils 0.1 0.0 - 0.7 10e3/uL    Absolute Basophils 0.0 0.0 - 0.2 10e3/uL    Absolute Immature Granulocytes 0.0 <=0.4 10e3/uL    Absolute NRBCs 0.0 10e3/uL   Extra Blue Top Tube   Result Value Ref Range    Hold Specimen JIC    Extra Red Top Tube   Result Value Ref Range    Hold Specimen JIC    Result Value Ref Range    Potassium 4.0 3.4 - 5.3 mmol/L       RADIOLOGY:  Reviewed all pertinent imaging. Please see official radiology report.  No orders to display       EKG:    Performed at: 5:56 AM, 4/23/2025    Impression: Supraventricular tachycardia. Left axis deviation. Incomplete right bundle branch block. Septal infarct. Marked ST abnormality, possible inferolateral subendocardial injury. Abnormal ECG. When compared with ECG of 1/28/2025, significant changes have occurred    Performed at: 6:13 AM, 4/23/2025    Impression: Sinus rhythm. Left axis deviation. Septal infarct (cited on or before 1/28/2025). Abnormal ECG. When compared with ECG of 4/23/2025 at 05:56, ventricular rate has decreased by 100 bpm, ST less depressed in inferior leads and ST no longer depressed in lateral leads        I have independently  reviewed and interpreted the EKG(s) documented above.    PROCEDURES:     PROCEDURE: Chemical Cardioversion    INDICATIONS: SVT   CARDIOVERSION TYPE: Chemical, Adenosine   CARDIOVERSION PROVIDER: Dr Car Gudino   SEDATION: None   CONSENT: Risks, benefits and alternatives were discussed with and Verbal consent was obtained from Patient.   PROCEDURE SPECIFIC CHECKLIST COMPLETED: Yes   TIME OUT: Universal protocol was followed. TIME OUT conducted just prior to starting procedure confirmed patient identity, site/side, procedure, patient position, and availability of correct equipment. Yes.   MEDICATIONS GIVEN: 6 mg Adenosine, IV push, followed by rapid flush  12 mg Adenosine   MONITORING: Heart rate, cardiac monitor, continuous pulse oximeter, frequent blood pressure checks, level of consciousness checks, IV access, constant attendance by RN until patient is recovered and aconstant attendance by MD until patient is stable   RESPONSE: Vital signs stable, airway patent and O2 saturations remained >92%     Brief pause, then resolution  to sinus rhythm briefly before reentering SVT rhythm.  Subsequent 12 mg dose of adenosine given which resulted in more permanent resolution of reentrant tachycardia and persistence of sinus rhythm.   COMPLICATIONS: Patient tolerated procedure well, without complication        [facility] Documentation:   CMS Diagnoses: None             I, Allison Miranda, am serving as a scribe to document services personally performed by Car Gudino MD based on my observation and the provider's statements to me. I, Car Gudino MD, attest that Allison Miranda is acting in a scribe capacity, has observed my performance of the services and has documented them in accordance with my direction.    Car Gudino MD  [facility] EMERGENCY DEPARTMENT  [address] [place] [id number]  [phone]      Car Gudino MD  04/23/25 0801

## 2025-04-23 NOTE — ED NOTES
Provider in room. Second RN present. Crash cart pads in place.    0606- Adenosine 6mg IV given.   Patient converted to S. Tach and returned to SVT.   0611-Adenosine 12mg given

## 2025-04-23 NOTE — ED TRIAGE NOTES
Pt. Woke feeling severe chest and back pressure felt like her heart was racing. Stated that she is supposed to have an ablation in a few weeks and recently had SVT     Triage Assessment (Adult)       Row Name 04/23/25 9610          Triage Assessment    Airway WDL WDL        Respiratory WDL    Respiratory WDL WDL        Skin Circulation/Temperature WDL    Skin Circulation/Temperature WDL WDL        Cardiac WDL    Cardiac WDL X;chest pain;rhythm     Pulse Rate & Regularity tachycardic        Chest Pain Assessment    Chest Pain Location anterior chest, left;upper back, left;upper back, right     Chest Pain Radiation back;neck     Character crushing;pressure     Precipitating Factors at rest     Alleviating Factors nothing        Peripheral/Neurovascular WDL    Peripheral Neurovascular WDL WDL        Cognitive/Neuro/Behavioral WDL    Cognitive/Neuro/Behavioral WDL WDL

## 2025-04-23 NOTE — DISCHARGE INSTRUCTIONS
You had an episode of supraventricular tachycardia today that was treated with adenosine.  I would continue to take your metoprolol as previously prescribed and follow-up closely with Dr. Nuñez for your scheduled ablation in the near future.

## 2025-04-28 ENCOUNTER — TELEPHONE (OUTPATIENT)
Dept: CARDIOLOGY | Facility: CLINIC | Age: 75
End: 2025-04-28
Payer: COMMERCIAL

## 2025-04-28 NOTE — TELEPHONE ENCOUNTER
Pre-Procedure Education    Procedure: SVT Abaltion with Dr Head on 5/6 with arrival time 10:00 am    Orders: Orderset for procedure verified signed/held    COVID: COVID policy- if pt develops COVID like symptoms prior to procedure, he/she would need to complete an at home with a rapid antigen COVID test 1-2 days prior to your procedure date. If COVID + pt is aware the procedure will need to be rescheduled, and to contact CV scheduling as soon as possible    Pre-Op H&P: Completed- Available in Epic    Education:   Contact: Reviewed via phone with pt  Pre-Procedure Instruction: NPO after midnight pre procedure, Defined NPO with pt, Remove all jewelry and leave all valuables at home, Shower prior to arrival, Sedation plan/orders, Transportation requirements and arrangements post procedure, Post-procedure follow up process, Post-procedure restrictions/expectations, and Pre-procedure letter sent- letter tab  Risks:  Cardiac Ablation  <1% Hypotension, Hemorrhage, Thrombophlebitis, Systemic or pulmonic emboli, Cardiac perforation (tamponade), Infection, Pneumothorax, Arrhythmias, Proarrhythmic effects of drugs, Radiation exposure, Catheter entrapment  <1 % Vascular injury including perforation of vein, artery or heart  1-2% Complete heart block (for AVNRT or septal accessory pathway)  <0.5% CVA or MI, 1% CVA if Left sided ablation  <0.1% death  If external defibrillation or CV is needed, 25% risk for superficial burn  1-2% Tamponade and Aortic puncture with left sided transeptal approach  Risks associated with general anesthesia will be addressed by the Anesthesiology Department      Medication:   Instructions regarding anticoagulants: Eliquis- To continue anticoagulation uninterrupted through their procedure  Instructions regarding antiarrhythmic medication: Beta Blocker; hold 5 days prior to procedure  Instructions given to pt regarding diuretics medication: None  Instructions given to pt regarding Diabetic  medications:   DM- Hold all oral diabetic medications, short acting insulin the morning of the procedure, and take 1/2 of pts scheduled doses of long acting insulin the PM prior and/or AM of procedure  GLP-1- None  SGLT2- None   Instructions given to pt regarding ED medication: None  Instructions for medication, other than anticoagulants and antiarrhythmics listed above, given to pt: Take all medication AM of procedure with small sips of water     Important patient information for staff: None    4/28/2025 1:31 PM  Gaviota Martinez RN

## 2025-05-01 LAB
ATRIAL RATE - MUSE: 100 BPM
ATRIAL RATE - MUSE: 38 BPM
DIASTOLIC BLOOD PRESSURE - MUSE: 46 MMHG
DIASTOLIC BLOOD PRESSURE - MUSE: NORMAL MMHG
INTERPRETATION ECG - MUSE: NORMAL
INTERPRETATION ECG - MUSE: NORMAL
P AXIS - MUSE: 59 DEGREES
P AXIS - MUSE: NORMAL DEGREES
PR INTERVAL - MUSE: 198 MS
PR INTERVAL - MUSE: NORMAL MS
QRS DURATION - MUSE: 78 MS
QRS DURATION - MUSE: 94 MS
QT - MUSE: 256 MS
QT - MUSE: 356 MS
QTC - MUSE: 459 MS
QTC - MUSE: 467 MS
R AXIS - MUSE: -36 DEGREES
R AXIS - MUSE: -39 DEGREES
SYSTOLIC BLOOD PRESSURE - MUSE: 85 MMHG
SYSTOLIC BLOOD PRESSURE - MUSE: NORMAL MMHG
T AXIS - MUSE: 117 DEGREES
T AXIS - MUSE: 64 DEGREES
VENTRICULAR RATE- MUSE: 100 BPM
VENTRICULAR RATE- MUSE: 200 BPM

## 2025-05-04 ENCOUNTER — APPOINTMENT (OUTPATIENT)
Dept: CT IMAGING | Facility: HOSPITAL | Age: 75
End: 2025-05-04
Attending: EMERGENCY MEDICINE
Payer: COMMERCIAL

## 2025-05-04 ENCOUNTER — HOSPITAL ENCOUNTER (EMERGENCY)
Facility: HOSPITAL | Age: 75
Discharge: HOME | End: 2025-05-04
Attending: EMERGENCY MEDICINE | Admitting: EMERGENCY MEDICINE
Payer: COMMERCIAL

## 2025-05-04 VITALS
DIASTOLIC BLOOD PRESSURE: 53 MMHG | WEIGHT: 144 LBS | OXYGEN SATURATION: 94 % | HEART RATE: 75 BPM | SYSTOLIC BLOOD PRESSURE: 105 MMHG | HEIGHT: 65 IN | TEMPERATURE: 98.5 F | BODY MASS INDEX: 23.99 KG/M2 | RESPIRATION RATE: 18 BRPM

## 2025-05-04 DIAGNOSIS — R07.9 CHEST PAIN, UNSPECIFIED TYPE: ICD-10-CM

## 2025-05-04 DIAGNOSIS — Z86.79 HISTORY OF PAROXYSMAL SUPRAVENTRICULAR TACHYCARDIA: ICD-10-CM

## 2025-05-04 LAB
ALBUMIN SERPL BCG-MCNC: 2.2 G/DL (ref 3.5–5.2)
ALBUMIN UR-MCNC: NEGATIVE MG/DL
ALP SERPL-CCNC: 419 U/L (ref 40–150)
ALT SERPL W P-5'-P-CCNC: 46 U/L (ref 0–50)
ANION GAP SERPL CALCULATED.3IONS-SCNC: 5 MMOL/L (ref 7–15)
APPEARANCE UR: ABNORMAL
AST SERPL W P-5'-P-CCNC: 73 U/L (ref 0–45)
B-OH-BUTYR SERPL-SCNC: <0.18 MMOL/L
BACTERIA #/AREA URNS HPF: ABNORMAL /HPF
BASE EXCESS BLDV CALC-SCNC: -0.6 MMOL/L (ref -3–3)
BASOPHILS # BLD AUTO: 0 10E3/UL (ref 0–0.2)
BASOPHILS NFR BLD AUTO: 1 %
BILIRUB DIRECT SERPL-MCNC: 2.87 MG/DL (ref 0–0.3)
BILIRUB SERPL-MCNC: 3.9 MG/DL
BILIRUB UR QL STRIP: NEGATIVE
BUN SERPL-MCNC: 14.2 MG/DL (ref 8–23)
CALCIUM SERPL-MCNC: 8.5 MG/DL (ref 8.8–10.4)
CHLORIDE SERPL-SCNC: 94 MMOL/L (ref 98–107)
COLOR UR AUTO: YELLOW
CREAT SERPL-MCNC: 0.83 MG/DL (ref 0.51–0.95)
EGFRCR SERPLBLD CKD-EPI 2021: 73 ML/MIN/1.73M2
EOSINOPHIL # BLD AUTO: 0.1 10E3/UL (ref 0–0.7)
EOSINOPHIL NFR BLD AUTO: 1 %
ERYTHROCYTE [DISTWIDTH] IN BLOOD BY AUTOMATED COUNT: 15.7 % (ref 10–15)
GLUCOSE BLDC GLUCOMTR-MCNC: 486 MG/DL (ref 70–99)
GLUCOSE BLDC GLUCOMTR-MCNC: 507 MG/DL (ref 70–99)
GLUCOSE SERPL-MCNC: 644 MG/DL (ref 70–99)
GLUCOSE UR STRIP-MCNC: >1000 MG/DL
HCO3 BLDV-SCNC: 25 MMOL/L (ref 21–28)
HCO3 SERPL-SCNC: 23 MMOL/L (ref 22–29)
HCT VFR BLD AUTO: 31.5 % (ref 35–47)
HGB BLD-MCNC: 10.1 G/DL (ref 11.7–15.7)
HGB UR QL STRIP: ABNORMAL
HOLD SPECIMEN: NORMAL
HOLD SPECIMEN: NORMAL
IMM GRANULOCYTES # BLD: 0.1 10E3/UL
IMM GRANULOCYTES NFR BLD: 1 %
KETONES UR STRIP-MCNC: NEGATIVE MG/DL
LEUKOCYTE ESTERASE UR QL STRIP: ABNORMAL
LYMPHOCYTES # BLD AUTO: 0.8 10E3/UL (ref 0.8–5.3)
LYMPHOCYTES NFR BLD AUTO: 10 %
MAGNESIUM SERPL-MCNC: 2 MG/DL (ref 1.7–2.3)
MCH RBC QN AUTO: 33.2 PG (ref 26.5–33)
MCHC RBC AUTO-ENTMCNC: 32.1 G/DL (ref 31.5–36.5)
MCV RBC AUTO: 104 FL (ref 78–100)
MONOCYTES # BLD AUTO: 0.7 10E3/UL (ref 0–1.3)
MONOCYTES NFR BLD AUTO: 9 %
MUCOUS THREADS #/AREA URNS LPF: PRESENT /LPF
NEUTROPHILS # BLD AUTO: 6 10E3/UL (ref 1.6–8.3)
NEUTROPHILS NFR BLD AUTO: 78 %
NITRATE UR QL: POSITIVE
NRBC # BLD AUTO: 0 10E3/UL
NRBC BLD AUTO-RTO: 0 /100
O2/TOTAL GAS SETTING VFR VENT: 21 %
OXYHGB MFR BLDV: 29 % (ref 70–75)
PCO2 BLDV: 43 MM HG (ref 40–50)
PH BLDV: 7.37 [PH] (ref 7.32–7.43)
PH UR STRIP: 6 [PH] (ref 5–7)
PLATELET # BLD AUTO: 135 10E3/UL (ref 150–450)
PO2 BLDV: 22 MM HG (ref 25–47)
POTASSIUM SERPL-SCNC: 5 MMOL/L (ref 3.4–5.3)
PROT SERPL-MCNC: 6.4 G/DL (ref 6.4–8.3)
RBC # BLD AUTO: 3.04 10E6/UL (ref 3.8–5.2)
RBC URINE: 73 /HPF
SAO2 % BLDV: 29.5 % (ref 70–75)
SODIUM SERPL-SCNC: 122 MMOL/L (ref 135–145)
SP GR UR STRIP: 1 (ref 1–1.03)
SQUAMOUS EPITHELIAL: 4 /HPF
TROPONIN T SERPL HS-MCNC: 10 NG/L
TROPONIN T SERPL HS-MCNC: 7 NG/L
UROBILINOGEN UR STRIP-MCNC: NORMAL MG/DL
WBC # BLD AUTO: 7.7 10E3/UL (ref 4–11)
WBC CLUMPS #/AREA URNS HPF: PRESENT /HPF
WBC URINE: >182 /HPF

## 2025-05-04 PROCEDURE — 99285 EMERGENCY DEPT VISIT HI MDM: CPT | Mod: 25

## 2025-05-04 PROCEDURE — 36415 COLL VENOUS BLD VENIPUNCTURE: CPT | Performed by: EMERGENCY MEDICINE

## 2025-05-04 PROCEDURE — 84484 ASSAY OF TROPONIN QUANT: CPT | Performed by: EMERGENCY MEDICINE

## 2025-05-04 PROCEDURE — 258N000003 HC RX IP 258 OP 636: Performed by: EMERGENCY MEDICINE

## 2025-05-04 PROCEDURE — 96360 HYDRATION IV INFUSION INIT: CPT | Mod: 59

## 2025-05-04 PROCEDURE — 87186 SC STD MICRODIL/AGAR DIL: CPT | Performed by: EMERGENCY MEDICINE

## 2025-05-04 PROCEDURE — 250N000012 HC RX MED GY IP 250 OP 636 PS 637: Performed by: EMERGENCY MEDICINE

## 2025-05-04 PROCEDURE — 85025 COMPLETE CBC W/AUTO DIFF WBC: CPT | Performed by: EMERGENCY MEDICINE

## 2025-05-04 PROCEDURE — 250N000011 HC RX IP 250 OP 636: Performed by: EMERGENCY MEDICINE

## 2025-05-04 PROCEDURE — 81003 URINALYSIS AUTO W/O SCOPE: CPT | Performed by: EMERGENCY MEDICINE

## 2025-05-04 PROCEDURE — 71275 CT ANGIOGRAPHY CHEST: CPT

## 2025-05-04 PROCEDURE — 82962 GLUCOSE BLOOD TEST: CPT

## 2025-05-04 PROCEDURE — 82805 BLOOD GASES W/O2 SATURATION: CPT | Performed by: EMERGENCY MEDICINE

## 2025-05-04 PROCEDURE — 93005 ELECTROCARDIOGRAM TRACING: CPT | Performed by: STUDENT IN AN ORGANIZED HEALTH CARE EDUCATION/TRAINING PROGRAM

## 2025-05-04 PROCEDURE — 80048 BASIC METABOLIC PNL TOTAL CA: CPT | Performed by: EMERGENCY MEDICINE

## 2025-05-04 PROCEDURE — 82248 BILIRUBIN DIRECT: CPT | Performed by: EMERGENCY MEDICINE

## 2025-05-04 PROCEDURE — 82010 KETONE BODYS QUAN: CPT | Performed by: EMERGENCY MEDICINE

## 2025-05-04 PROCEDURE — 83735 ASSAY OF MAGNESIUM: CPT | Performed by: EMERGENCY MEDICINE

## 2025-05-04 PROCEDURE — 93005 ELECTROCARDIOGRAM TRACING: CPT | Performed by: EMERGENCY MEDICINE

## 2025-05-04 RX ORDER — IOPAMIDOL 755 MG/ML
90 INJECTION, SOLUTION INTRAVASCULAR ONCE
Status: COMPLETED | OUTPATIENT
Start: 2025-05-04 | End: 2025-05-04

## 2025-05-04 RX ADMIN — IOPAMIDOL 90 ML: 755 INJECTION, SOLUTION INTRAVENOUS at 18:50

## 2025-05-04 RX ADMIN — INSULIN ASPART 6 UNITS: 100 INJECTION, SOLUTION INTRAVENOUS; SUBCUTANEOUS at 19:37

## 2025-05-04 RX ADMIN — SODIUM CHLORIDE 1000 ML: 0.9 INJECTION, SOLUTION INTRAVENOUS at 18:57

## 2025-05-04 ASSESSMENT — ACTIVITIES OF DAILY LIVING (ADL)
ADLS_ACUITY_SCORE: 47

## 2025-05-04 ASSESSMENT — COLUMBIA-SUICIDE SEVERITY RATING SCALE - C-SSRS
6. HAVE YOU EVER DONE ANYTHING, STARTED TO DO ANYTHING, OR PREPARED TO DO ANYTHING TO END YOUR LIFE?: NO
1. IN THE PAST MONTH, HAVE YOU WISHED YOU WERE DEAD OR WISHED YOU COULD GO TO SLEEP AND NOT WAKE UP?: NO
2. HAVE YOU ACTUALLY HAD ANY THOUGHTS OF KILLING YOURSELF IN THE PAST MONTH?: NO

## 2025-05-04 NOTE — ED TRIAGE NOTES
Pt arrives via private car with . Pt developed chest pain localized to left breast around 10am this morning. Hx of liver cancer. She received one treatment where they injected beads and since then, pt states she has been having issues with afib. Ablation scheduled for tues. Pt took her scheduled metoprolol this morning and one more 1 hr prior to arrival. Pt arrives in sinus. Pain is persisting. Nothing seeming to improve or worsen pain. Denies cardiac hx prior to having beads injected. No associated symptoms.     Measured HR at home at 188.

## 2025-05-04 NOTE — ED PROVIDER NOTES
EMERGENCY DEPARTMENT ENCOUNTER       ED Course & Medical Decision Making   6:12 PM I met with the patient to gather history and perform an initial exam.     I saw and examined the patient.  IV was established and she was placed on the monitor.  Diagnostics were ordered.  She was offered pain and nausea medications and she declines.      I did independently interpret EKG done today at 1748.  Normal sinus rhythm with a rate of 75 bpm.  Left axis deviation.  No significant ST elevation or depression.  No pathological Q waves.  Similar to EKG from April 23, 2025      She does have some hyponatremia at 122, however she has marked hyperglycemia over 600 and sodium corrects to 135    No anion gap.    I will give her 1 L of normal saline.    She states for a blood sugar this high she would normally take 6 units of regular insulin which was given here.  She had intermediates blood sugar rechecked and it is down around 500.    Still quite elevated but she does not have any ketones and her electrolytes are okay and she is not having symptoms from this.    Her serial troponins returned unremarkable and not uptrending.  She has not had any dysrhythmias here.  Her symptoms resolved.  Her CTA was negative for acute process and shows no clot either previous or current.    This is very reassuring.  She would like to go home and she has prompt follow-up with ablation in 2 days.    She will keep that appointment, however if anything deteriorates or she gets any symptoms in the meantime she would return here    Sepsis: Not suspected    Medical Decision Making  Obtained supplemental history:patient's spouse   Reviewed external records: ER visit for SVT 4/23/2025  Care impacted by chronic illness: Liver cancer, history of SVT  Care significantly affected by social determinants of health:See MDM  Did you consider but not order tests?:See MDM  Did you interpret images independently?:  Any ordered images or EKGs were reviewed and  independently interpreted  Consultation discussion with other provider:See MDM  Admit v. Discharge: See MDM      MIPS: CT Pulmonary Angiogram:Patient is moderate to high risk for PE. and CT PA was ordered for reason(s) other than PE.       Prior to making a final disposition on this patient the results of patient's tests and other diagnostic studies were discussed with the patient. All questions were answered. Patient expressed understanding of the plan and was amenable to it.    Medications   sodium chloride 0.9% BOLUS 1,000 mL (0 mLs Intravenous Stopped 5/4/25 1951)   insulin aspart (NovoLOG) injection (RAPID ACTING) (6 Units Subcutaneous $Given 5/4/25 1937)   iopamidol (ISOVUE-370) solution 90 mL (90 mLs Intravenous $Given 5/4/25 1850)       Final Impression     1. Chest pain, unspecified type    2. History of paroxysmal supraventricular tachycardia            Chief Complaint     Chief Complaint   Patient presents with    Chest Pain    Tachycardia       Pt arrives via private car with . Pt developed chest pain localized to left breast around 10am this morning. Hx of liver cancer. She received one treatment where they injected beads and since then, pt states she has been having issues with afib. Ablation scheduled for tues. Pt took her scheduled metoprolol this morning and one more 1 hr prior to arrival. Pt arrives in sinus. Pain is persisting. Nothing seeming to improve or worsen pain. Denies cardiac hx prior to having beads injected. No associated symptoms.     Measured HR at home at 188.             HPI       Zara Webster is a 74 year old female  with a history of type 2 diabetes, hypercholesterolemia, NSTEMI, SVT, liver cancer, who presents to the emergency department for via walk-in for evaluation of chest pain and tachycardia.     Patient reports having chest pain and tachycardia since having beads placed in for her liver cancer. This morning at 1000 she developed an onset of chest pain localized  "to the left breast. She took her metoprolol today but her spouse says that she had a pulse of 188. Currently, her chest pain hurts and she is having trouble breathing. She took 2 advils around 1700. Patient has an ablation here on 5/6/2025.     Patient further reports having a blood clot in her lung, which she is on eliquis for. She has missed \"weeks of it\" and just started it backup 1 week ago per patient's spouse.     Denies abdominal pain and any other complaints at this time.     Per chart review:  4/23/2025: patient was seen here for rapid heart rate and feelings of shortness of breath and some lightheadedness. Patient had a chemical cardioversion and noted significant improvement in her symptoms. Patient was discharged in stable conditions.       I Marcitj Hall am serving as a scribe to document services personally performed by Pantera Kay M.D. based on my observation and the provider's statements to me. I, Pantera Kay M.D attest that Marci Hall is acting in a scribe capacity, has observed my performance of the services and has documented them in accordance with my direction.    Past Medical History     Past Medical History:   Diagnosis Date    Anemia     Atypical chest pain     Common bile duct obstruction (H) 11/20/2023    Crohn's disease (H)     Diabetes mellitus (H)     Disorder of biliary tract     Essential tremor     Gastrointestinal hemorrhage     Hepatocellular carcinoma (H)     Hypercholesteremia     Ileostomy status (H)     Lesion of liver     Non-alcoholic cirrhosis (H)     Obstruction of common bile duct (H) 11/20/2023     Past Surgical History:   Procedure Laterality Date    BREAST EXCISIONAL BIOPSY Left 1980    BREAST EXCISIONAL BIOPSY Left 1985    ESOPHAGOSCOPY, GASTROSCOPY, DUODENOSCOPY (EGD), COMBINED N/A 1/26/2024    Procedure: ENDOSCOPIC ULTRASOUND;  Surgeon: Santi Pradhan MD;  Location: [facility] OR    HYSTERECTOMY  1991    OOPHORECTOMY Bilateral 1991    OTHER " SURGICAL HISTORY      KIDNEY STONE REMOVALNOT SURE WHICH SIDE, DR. SHIV SALINAS PROCTOSIGMOIDOSCOPY,RIGID,DIAGNOS N/A 6/17/2020    Procedure: ILEOSTOMY REVISION, PROCTOSCOPY, ILEOSCOPY;  Surgeon: Devang Moon MD;  Location: [facility];  Service: General    ZZC REMOVAL COLON/ILEOSTOMY      Description: Total Abdominal Colectomy;  Recorded: 12/15/2011;    ZZC REMOVAL COLON/PROCTECTOMY/ILEOSTOMY      Description: Total Proctocolectomy;  Recorded: 08/10/2011;    ZZC VAG HYST, W/VAGINECTOMY      Description: Vaginal Hysterectomy With Colpectomy;  Recorded: 08/14/2014;     Family History   Problem Relation Age of Onset    Diabetes Mother     Heart Disease Mother     Hyperlipidemia Mother     Diabetes Father     Cancer Father         malignant gallbladder    No Known Problems Sister     No Known Problems Sister       Social History     Tobacco Use    Smoking status: Never     Passive exposure: Never    Smokeless tobacco: Never   Vaping Use    Vaping status: Never Used   Substance Use Topics    Alcohol use: Not Currently    Drug use: No       Relevant past medical, surgical, family and social history as documented above, has been reviewed and discussed with patient. No changes or additions, unless otherwise noted in the HPI.    Current Medications     apixaban ANTICOAGULANT (ELIQUIS) 5 MG tablet  clobetasol (TEMOVATE) 0.05 % external ointment  continuous blood glucose monitoring (FREESTYLE VIDA) sensor  diphenoxylate-atropine (LOMOTIL) 2.5-0.025 MG tablet  flash glucose scanning reader (FREESTYLE VIDA 14 DAY READER) Misc  fluconazole (DIFLUCAN) 150 MG tablet  insulin glargine (LANTUS SOLOSTAR) 100 UNIT/ML pen  insulin lispro (HUMALOG KWIKPEN) 100 UNIT/ML (1 unit dial) KWIKPEN  insulin pen needle (32G X 4 MM) 32G X 4 MM miscellaneous  metoprolol succinate ER (TOPROL XL) 50 MG 24 hr tablet  omeprazole (PRILOSEC) 40 MG DR capsule  ondansetron (ZOFRAN ODT) 4 MG ODT tab  ostomy adhesive Pste  oxyCODONE (ROXICODONE)  "5 MG tablet  rosuvastatin (CRESTOR) 10 MG tablet        Allergies     Allergies   Allergen Reactions    Prochlorperazine Edisylate [Prochlorperazine] Other (See Comments)     Saw things    Compazine [Prochlorperazine] Unknown       Review of Systems     Review of Systems     Remainder of systems reviewed, unless noted in HPI all others negative.    Physical Exam     /53   Pulse 75   Temp 98.5  F (36.9  C) (Oral)   Resp 18   Ht 1.651 m (5' 5\")   Wt 65.3 kg (144 lb)   LMP  (LMP Unknown)   SpO2 94%   BMI 23.96 kg/m      Physical Exam  Vitals and nursing note reviewed.   HENT:      Head: Normocephalic.      Nose: Nose normal.   Cardiovascular:      Rate and Rhythm: Normal rate and regular rhythm.      Pulses: Normal pulses.   Pulmonary:      Effort: Pulmonary effort is normal.   Neurological:      Mental Status: She is alert. Mental status is at baseline.   Psychiatric:         Mood and Affect: Mood normal.             Labs & Imaging         Labs Ordered and Resulted from Time of ED Arrival to Time of ED Departure   BASIC METABOLIC PANEL - Abnormal       Result Value    Sodium 122 (*)     Potassium 5.0      Chloride 94 (*)     Carbon Dioxide (CO2) 23      Anion Gap 5 (*)     Urea Nitrogen 14.2      Creatinine 0.83      GFR Estimate 73      Calcium 8.5 (*)     Glucose 644 (*)    CBC WITH PLATELETS AND DIFFERENTIAL - Abnormal    WBC Count 7.7      RBC Count 3.04 (*)     Hemoglobin 10.1 (*)     Hematocrit 31.5 (*)      (*)     MCH 33.2 (*)     MCHC 32.1      RDW 15.7 (*)     Platelet Count 135 (*)     % Neutrophils 78      % Lymphocytes 10      % Monocytes 9      % Eosinophils 1      % Basophils 1      % Immature Granulocytes 1      NRBCs per 100 WBC 0      Absolute Neutrophils 6.0      Absolute Lymphocytes 0.8      Absolute Monocytes 0.7      Absolute Eosinophils 0.1      Absolute Basophils 0.0      Absolute Immature Granulocytes 0.1      Absolute NRBCs 0.0     HEPATIC FUNCTION PANEL - Abnormal    " Protein Total 6.4      Albumin 2.2 (*)     Bilirubin Total 3.9 (*)     Alkaline Phosphatase 419 (*)     AST 73 (*)     ALT 46      Bilirubin Direct 2.87 (*)    BLOOD GAS VENOUS - Abnormal    pH Venous 7.37      pCO2 Venous 43      pO2 Venous 22 (*)     Bicarbonate Venous 25      Base Excess/Deficit Venous -0.6      FIO2 21      Oxyhemoglobin Venous 29 (*)     O2 Sat, Venous 29.5 (*)    GLUCOSE BY METER - Abnormal    GLUCOSE BY METER POCT 507 (*)    TROPONIN T, HIGH SENSITIVITY - Normal    Troponin T, High Sensitivity 10     TROPONIN T, HIGH SENSITIVITY - Normal    Troponin T, High Sensitivity 7     KETONE BETA-HYDROXYBUTYRATE QUANTITATIVE, RAPID - Normal    Ketone (Beta-Hydroxybutyrate) Quantitative <0.18     MAGNESIUM - Normal    Magnesium 2.0     ROUTINE UA WITH MICROSCOPIC REFLEX TO CULTURE         Results for orders placed or performed during the hospital encounter of 05/04/25   CT Chest Pulmonary Embolism w Contrast    Impression    IMPRESSION:  1.  Prior seen PE in the right lung is no longer identified.    2.  No PE, dissection, or aneurysm seen on today's CT.    3.  Stable indeterminate masses in the liver, bile duct dilatation in the left lobe of the liver, TIPS, cirrhosis, and splenomegaly.    4.  Minute left pleural effusion.   Basic metabolic panel   Result Value Ref Range    Sodium 122 (L) 135 - 145 mmol/L    Potassium 5.0 3.4 - 5.3 mmol/L    Chloride 94 (L) 98 - 107 mmol/L    Carbon Dioxide (CO2) 23 22 - 29 mmol/L    Anion Gap 5 (L) 7 - 15 mmol/L    Urea Nitrogen 14.2 8.0 - 23.0 mg/dL    Creatinine 0.83 0.51 - 0.95 mg/dL    GFR Estimate 73 >60 mL/min/1.73m2    Calcium 8.5 (L) 8.8 - 10.4 mg/dL    Glucose 644 (HH) 70 - 99 mg/dL   Result Value Ref Range    Troponin T, High Sensitivity 10 <=14 ng/L   CBC with platelets and differential   Result Value Ref Range    WBC Count 7.7 4.0 - 11.0 10e3/uL    RBC Count 3.04 (L) 3.80 - 5.20 10e6/uL    Hemoglobin 10.1 (L) 11.7 - 15.7 g/dL    Hematocrit 31.5 (L) 35.0 -  47.0 %     (H) 78 - 100 fL    MCH 33.2 (H) 26.5 - 33.0 pg    MCHC 32.1 31.5 - 36.5 g/dL    RDW 15.7 (H) 10.0 - 15.0 %    Platelet Count 135 (L) 150 - 450 10e3/uL    % Neutrophils 78 %    % Lymphocytes 10 %    % Monocytes 9 %    % Eosinophils 1 %    % Basophils 1 %    % Immature Granulocytes 1 %    NRBCs per 100 WBC 0 <1 /100    Absolute Neutrophils 6.0 1.6 - 8.3 10e3/uL    Absolute Lymphocytes 0.8 0.8 - 5.3 10e3/uL    Absolute Monocytes 0.7 0.0 - 1.3 10e3/uL    Absolute Eosinophils 0.1 0.0 - 0.7 10e3/uL    Absolute Basophils 0.0 0.0 - 0.2 10e3/uL    Absolute Immature Granulocytes 0.1 <=0.4 10e3/uL    Absolute NRBCs 0.0 10e3/uL   Extra Blue Top Tube   Result Value Ref Range    Hold Specimen [facility]    Extra Red Top Tube   Result Value Ref Range    Hold Specimen [facility]    Hepatic function panel   Result Value Ref Range    Protein Total 6.4 6.4 - 8.3 g/dL    Albumin 2.2 (L) 3.5 - 5.2 g/dL    Bilirubin Total 3.9 (H) <=1.2 mg/dL    Alkaline Phosphatase 419 (H) 40 - 150 U/L    AST 73 (H) 0 - 45 U/L    ALT 46 0 - 50 U/L    Bilirubin Direct 2.87 (H) 0.00 - 0.30 mg/dL   Result Value Ref Range    Troponin T, High Sensitivity 7 <=14 ng/L   Blood gas venous   Result Value Ref Range    pH Venous 7.37 7.32 - 7.43    pCO2 Venous 43 40 - 50 mm Hg    pO2 Venous 22 (L) 25 - 47 mm Hg    Bicarbonate Venous 25 21 - 28 mmol/L    Base Excess/Deficit Venous -0.6 -3.0 - 3.0 mmol/L    FIO2 21     Oxyhemoglobin Venous 29 (L) 70 - 75 %    O2 Sat, Venous 29.5 (L) 70.0 - 75.0 %   Ketone Beta-Hydroxybutyrate Quantitative   Result Value Ref Range    Ketone (Beta-Hydroxybutyrate) Quantitative <0.18 <=0.30 mmol/L   Result Value Ref Range    Magnesium 2.0 1.7 - 2.3 mg/dL   Glucose by meter   Result Value Ref Range    GLUCOSE BY METER POCT 507 () 70 - 99 mg/dL       Pantera Kay MD  Emergency Medicine  [facility] EMERGENCY DEPARTMENT  [facility] [facility] [id number] [phone]  5/4/2025        Pantera Kay  MD NICOLA  05/04/25 2037

## 2025-05-06 ENCOUNTER — APPOINTMENT (OUTPATIENT)
Dept: RADIOLOGY | Facility: HOSPITAL | Age: 75
DRG: 682 | End: 2025-05-06
Attending: STUDENT IN AN ORGANIZED HEALTH CARE EDUCATION/TRAINING PROGRAM
Payer: COMMERCIAL

## 2025-05-06 ENCOUNTER — APPOINTMENT (OUTPATIENT)
Dept: ULTRASOUND IMAGING | Facility: HOSPITAL | Age: 75
DRG: 682 | End: 2025-05-06
Payer: COMMERCIAL

## 2025-05-06 ENCOUNTER — ANESTHESIA (OUTPATIENT)
Dept: CARDIOLOGY | Facility: HOSPITAL | Age: 75
DRG: 682 | End: 2025-05-06
Payer: COMMERCIAL

## 2025-05-06 ENCOUNTER — ANESTHESIA EVENT (OUTPATIENT)
Dept: CARDIOLOGY | Facility: HOSPITAL | Age: 75
DRG: 682 | End: 2025-05-06
Payer: COMMERCIAL

## 2025-05-06 ENCOUNTER — HOSPITAL ENCOUNTER (INPATIENT)
Facility: HOSPITAL | Age: 75
End: 2025-05-06
Attending: INTERNAL MEDICINE | Admitting: STUDENT IN AN ORGANIZED HEALTH CARE EDUCATION/TRAINING PROGRAM
Payer: COMMERCIAL

## 2025-05-06 ENCOUNTER — TELEPHONE (OUTPATIENT)
Dept: CARDIOLOGY | Facility: CLINIC | Age: 75
End: 2025-05-06

## 2025-05-06 DIAGNOSIS — E87.1 HYPONATREMIA: ICD-10-CM

## 2025-05-06 DIAGNOSIS — C22.0 HCC (HEPATOCELLULAR CARCINOMA) (H): ICD-10-CM

## 2025-05-06 DIAGNOSIS — M25.551 HIP PAIN, RIGHT: ICD-10-CM

## 2025-05-06 DIAGNOSIS — I47.10 SVT (SUPRAVENTRICULAR TACHYCARDIA): ICD-10-CM

## 2025-05-06 DIAGNOSIS — N17.9 AKI (ACUTE KIDNEY INJURY): Primary | ICD-10-CM

## 2025-05-06 DIAGNOSIS — K50.118 CROHN'S DISEASE OF COLON WITH OTHER COMPLICATION (H): ICD-10-CM

## 2025-05-06 DIAGNOSIS — D63.8 ANEMIA OF CHRONIC DISEASE: ICD-10-CM

## 2025-05-06 DIAGNOSIS — I21.4 NSTEMI (NON-ST ELEVATED MYOCARDIAL INFARCTION) (H): ICD-10-CM

## 2025-05-06 DIAGNOSIS — Z86.711 HISTORY OF PULMONARY EMBOLISM: ICD-10-CM

## 2025-05-06 DIAGNOSIS — I47.10 SVT (SUPRAVENTRICULAR TACHYCARDIA): Primary | ICD-10-CM

## 2025-05-06 LAB
ALBUMIN SERPL BCG-MCNC: 1.9 G/DL (ref 3.5–5.2)
ALP SERPL-CCNC: 408 U/L (ref 40–150)
ALT SERPL W P-5'-P-CCNC: 45 U/L (ref 0–50)
AMMONIA PLAS-SCNC: 26 UMOL/L (ref 11–51)
ANION GAP SERPL CALCULATED.3IONS-SCNC: 2 MMOL/L (ref 7–15)
ANION GAP SERPL CALCULATED.3IONS-SCNC: 5 MMOL/L (ref 7–15)
ANION GAP SERPL CALCULATED.3IONS-SCNC: 8 MMOL/L (ref 7–15)
AST SERPL W P-5'-P-CCNC: 92 U/L (ref 0–45)
ATRIAL RATE - MUSE: 75 BPM
B-OH-BUTYR SERPL-SCNC: 0.19 MMOL/L
BILIRUB DIRECT SERPL-MCNC: 3.89 MG/DL (ref 0–0.3)
BILIRUB SERPL-MCNC: 5.5 MG/DL
BUN SERPL-MCNC: 19.2 MG/DL (ref 8–23)
BUN SERPL-MCNC: 19.5 MG/DL (ref 8–23)
BUN SERPL-MCNC: 19.5 MG/DL (ref 8–23)
CALCIUM SERPL-MCNC: 7.9 MG/DL (ref 8.8–10.4)
CALCIUM SERPL-MCNC: 8.2 MG/DL (ref 8.8–10.4)
CALCIUM SERPL-MCNC: 8.2 MG/DL (ref 8.8–10.4)
CHLORIDE SERPL-SCNC: 99 MMOL/L (ref 98–107)
CREAT SERPL-MCNC: 0.71 MG/DL (ref 0.51–0.95)
CREAT SERPL-MCNC: 0.9 MG/DL (ref 0.51–0.95)
CREAT SERPL-MCNC: 0.91 MG/DL (ref 0.51–0.95)
DIASTOLIC BLOOD PRESSURE - MUSE: NORMAL MMHG
EGFRCR SERPLBLD CKD-EPI 2021: 65 ML/MIN/1.73M2
EGFRCR SERPLBLD CKD-EPI 2021: 66 ML/MIN/1.73M2
EGFRCR SERPLBLD CKD-EPI 2021: 88 ML/MIN/1.73M2
ERYTHROCYTE [DISTWIDTH] IN BLOOD BY AUTOMATED COUNT: 16.1 % (ref 10–15)
ERYTHROCYTE [DISTWIDTH] IN BLOOD BY AUTOMATED COUNT: 16.1 % (ref 10–15)
GLUCOSE BLDC GLUCOMTR-MCNC: 284 MG/DL (ref 70–99)
GLUCOSE BLDC GLUCOMTR-MCNC: 300 MG/DL (ref 70–99)
GLUCOSE BLDC GLUCOMTR-MCNC: 306 MG/DL (ref 70–99)
GLUCOSE BLDC GLUCOMTR-MCNC: 307 MG/DL (ref 70–99)
GLUCOSE BLDC GLUCOMTR-MCNC: 316 MG/DL (ref 70–99)
GLUCOSE BLDC GLUCOMTR-MCNC: 325 MG/DL (ref 70–99)
GLUCOSE BLDC GLUCOMTR-MCNC: 332 MG/DL (ref 70–99)
GLUCOSE BLDC GLUCOMTR-MCNC: 343 MG/DL (ref 70–99)
GLUCOSE BLDC GLUCOMTR-MCNC: 374 MG/DL (ref 70–99)
GLUCOSE BLDC GLUCOMTR-MCNC: 428 MG/DL (ref 70–99)
GLUCOSE SERPL-MCNC: 374 MG/DL (ref 70–99)
GLUCOSE SERPL-MCNC: 391 MG/DL (ref 70–99)
GLUCOSE SERPL-MCNC: 393 MG/DL (ref 70–99)
HCO3 SERPL-SCNC: 18 MMOL/L (ref 22–29)
HCO3 SERPL-SCNC: 23 MMOL/L (ref 22–29)
HCO3 SERPL-SCNC: 25 MMOL/L (ref 22–29)
HCT VFR BLD AUTO: 27.6 % (ref 35–47)
HCT VFR BLD AUTO: 30.4 % (ref 35–47)
HGB BLD-MCNC: 10 G/DL (ref 11.7–15.7)
HGB BLD-MCNC: 9.6 G/DL (ref 11.7–15.7)
INTERPRETATION ECG - MUSE: NORMAL
LACTATE SERPL-SCNC: 3.8 MMOL/L (ref 0.7–2)
LACTATE SERPL-SCNC: 4.2 MMOL/L (ref 0.7–2)
MAGNESIUM SERPL-MCNC: 1.8 MG/DL (ref 1.7–2.3)
MCH RBC QN AUTO: 33.2 PG (ref 26.5–33)
MCH RBC QN AUTO: 33.4 PG (ref 26.5–33)
MCHC RBC AUTO-ENTMCNC: 32.9 G/DL (ref 31.5–36.5)
MCHC RBC AUTO-ENTMCNC: 34.8 G/DL (ref 31.5–36.5)
MCV RBC AUTO: 101 FL (ref 78–100)
MCV RBC AUTO: 96 FL (ref 78–100)
NT-PROBNP SERPL-MCNC: 349 PG/ML (ref 0–900)
P AXIS - MUSE: 52 DEGREES
PLATELET # BLD AUTO: 125 10E3/UL (ref 150–450)
PLATELET # BLD AUTO: 131 10E3/UL (ref 150–450)
POTASSIUM SERPL-SCNC: 4.9 MMOL/L (ref 3.4–5.3)
POTASSIUM SERPL-SCNC: 5.3 MMOL/L (ref 3.4–5.3)
POTASSIUM SERPL-SCNC: 5.3 MMOL/L (ref 3.4–5.3)
POTASSIUM SERPL-SCNC: 6 MMOL/L (ref 3.4–5.3)
POTASSIUM SERPL-SCNC: 6.9 MMOL/L (ref 3.4–5.3)
PR INTERVAL - MUSE: 196 MS
PROT SERPL-MCNC: 6.5 G/DL (ref 6.4–8.3)
QRS DURATION - MUSE: 86 MS
QT - MUSE: 398 MS
QTC - MUSE: 444 MS
R AXIS - MUSE: -41 DEGREES
RBC # BLD AUTO: 2.87 10E6/UL (ref 3.8–5.2)
RBC # BLD AUTO: 3.01 10E6/UL (ref 3.8–5.2)
SODIUM SERPL-SCNC: 125 MMOL/L (ref 135–145)
SODIUM SERPL-SCNC: 126 MMOL/L (ref 135–145)
SODIUM SERPL-SCNC: 127 MMOL/L (ref 135–145)
SYSTOLIC BLOOD PRESSURE - MUSE: NORMAL MMHG
T AXIS - MUSE: 50 DEGREES
VENTRICULAR RATE- MUSE: 75 BPM
WBC # BLD AUTO: 10.5 10E3/UL (ref 4–11)
WBC # BLD AUTO: 14.5 10E3/UL (ref 4–11)

## 2025-05-06 PROCEDURE — 82010 KETONE BODYS QUAN: CPT

## 2025-05-06 PROCEDURE — 36415 COLL VENOUS BLD VENIPUNCTURE: CPT | Performed by: STUDENT IN AN ORGANIZED HEALTH CARE EDUCATION/TRAINING PROGRAM

## 2025-05-06 PROCEDURE — 258N000003 HC RX IP 258 OP 636

## 2025-05-06 PROCEDURE — 85014 HEMATOCRIT: CPT | Performed by: NURSE PRACTITIONER

## 2025-05-06 PROCEDURE — 80048 BASIC METABOLIC PNL TOTAL CA: CPT | Performed by: NURSE PRACTITIONER

## 2025-05-06 PROCEDURE — 36415 COLL VENOUS BLD VENIPUNCTURE: CPT | Performed by: NURSE PRACTITIONER

## 2025-05-06 PROCEDURE — 250N000011 HC RX IP 250 OP 636

## 2025-05-06 PROCEDURE — 99222 1ST HOSP IP/OBS MODERATE 55: CPT | Performed by: INTERNAL MEDICINE

## 2025-05-06 PROCEDURE — 85027 COMPLETE CBC AUTOMATED: CPT

## 2025-05-06 PROCEDURE — 83735 ASSAY OF MAGNESIUM: CPT

## 2025-05-06 PROCEDURE — 36415 COLL VENOUS BLD VENIPUNCTURE: CPT

## 2025-05-06 PROCEDURE — 250N000011 HC RX IP 250 OP 636: Performed by: STUDENT IN AN ORGANIZED HEALTH CARE EDUCATION/TRAINING PROGRAM

## 2025-05-06 PROCEDURE — 999N000248 HC STATISTIC IV INSERT WITH US BY RN

## 2025-05-06 PROCEDURE — 84132 ASSAY OF SERUM POTASSIUM: CPT

## 2025-05-06 PROCEDURE — 73502 X-RAY EXAM HIP UNI 2-3 VIEWS: CPT

## 2025-05-06 PROCEDURE — 250N000012 HC RX MED GY IP 250 OP 636 PS 637

## 2025-05-06 PROCEDURE — 250N000013 HC RX MED GY IP 250 OP 250 PS 637: Performed by: STUDENT IN AN ORGANIZED HEALTH CARE EDUCATION/TRAINING PROGRAM

## 2025-05-06 PROCEDURE — 250N000013 HC RX MED GY IP 250 OP 250 PS 637

## 2025-05-06 PROCEDURE — 99418 PROLNG IP/OBS E/M EA 15 MIN: CPT | Mod: FS | Performed by: STUDENT IN AN ORGANIZED HEALTH CARE EDUCATION/TRAINING PROGRAM

## 2025-05-06 PROCEDURE — 258N000003 HC RX IP 258 OP 636: Performed by: INTERNAL MEDICINE

## 2025-05-06 PROCEDURE — 80048 BASIC METABOLIC PNL TOTAL CA: CPT

## 2025-05-06 PROCEDURE — 82248 BILIRUBIN DIRECT: CPT

## 2025-05-06 PROCEDURE — 99207 PR APP CREDIT; MD BILLING SHARED VISIT: CPT | Mod: FS

## 2025-05-06 PROCEDURE — 83880 ASSAY OF NATRIURETIC PEPTIDE: CPT | Performed by: NURSE PRACTITIONER

## 2025-05-06 PROCEDURE — 120N000004 HC R&B MS OVERFLOW

## 2025-05-06 PROCEDURE — 93005 ELECTROCARDIOGRAM TRACING: CPT

## 2025-05-06 PROCEDURE — 82310 ASSAY OF CALCIUM: CPT

## 2025-05-06 PROCEDURE — 83605 ASSAY OF LACTIC ACID: CPT

## 2025-05-06 PROCEDURE — 72170 X-RAY EXAM OF PELVIS: CPT

## 2025-05-06 PROCEDURE — 82140 ASSAY OF AMMONIA: CPT | Performed by: STUDENT IN AN ORGANIZED HEALTH CARE EDUCATION/TRAINING PROGRAM

## 2025-05-06 PROCEDURE — 258N000003 HC RX IP 258 OP 636: Performed by: STUDENT IN AN ORGANIZED HEALTH CARE EDUCATION/TRAINING PROGRAM

## 2025-05-06 PROCEDURE — 76705 ECHO EXAM OF ABDOMEN: CPT

## 2025-05-06 PROCEDURE — 258N000001 HC RX 258

## 2025-05-06 PROCEDURE — 83605 ASSAY OF LACTIC ACID: CPT | Performed by: STUDENT IN AN ORGANIZED HEALTH CARE EDUCATION/TRAINING PROGRAM

## 2025-05-06 PROCEDURE — 99223 1ST HOSP IP/OBS HIGH 75: CPT | Mod: FS | Performed by: STUDENT IN AN ORGANIZED HEALTH CARE EDUCATION/TRAINING PROGRAM

## 2025-05-06 PROCEDURE — 93010 ELECTROCARDIOGRAM REPORT: CPT | Performed by: INTERNAL MEDICINE

## 2025-05-06 RX ORDER — SODIUM CHLORIDE 9 MG/ML
100 INJECTION, SOLUTION INTRAVENOUS CONTINUOUS
OUTPATIENT
Start: 2025-05-06

## 2025-05-06 RX ORDER — DEXTROSE MONOHYDRATE 25 G/50ML
25 INJECTION, SOLUTION INTRAVENOUS ONCE
Status: COMPLETED | OUTPATIENT
Start: 2025-05-06 | End: 2025-05-06

## 2025-05-06 RX ORDER — NICOTINE POLACRILEX 4 MG
15-30 LOZENGE BUCCAL
Status: DISCONTINUED | OUTPATIENT
Start: 2025-05-06 | End: 2025-05-06

## 2025-05-06 RX ORDER — METOPROLOL SUCCINATE 50 MG/1
50 TABLET, EXTENDED RELEASE ORAL 2 TIMES DAILY
Status: DISPENSED | OUTPATIENT
Start: 2025-05-06

## 2025-05-06 RX ORDER — ACETAMINOPHEN 650 MG/1
650 SUPPOSITORY RECTAL EVERY 4 HOURS PRN
Status: DISCONTINUED | OUTPATIENT
Start: 2025-05-06 | End: 2025-05-06

## 2025-05-06 RX ORDER — AMOXICILLIN 250 MG
1 CAPSULE ORAL 2 TIMES DAILY PRN
Status: ACTIVE | OUTPATIENT
Start: 2025-05-06

## 2025-05-06 RX ORDER — DEXTROSE MONOHYDRATE 25 G/50ML
25-50 INJECTION, SOLUTION INTRAVENOUS
Status: ACTIVE | OUTPATIENT
Start: 2025-05-06

## 2025-05-06 RX ORDER — FENTANYL CITRATE 50 UG/ML
25 INJECTION, SOLUTION INTRAMUSCULAR; INTRAVENOUS
OUTPATIENT
Start: 2025-05-06

## 2025-05-06 RX ORDER — ACETAMINOPHEN 325 MG/1
650 TABLET ORAL EVERY 4 HOURS PRN
Status: DISCONTINUED | OUTPATIENT
Start: 2025-05-06 | End: 2025-05-06

## 2025-05-06 RX ORDER — SODIUM CHLORIDE 9 MG/ML
INJECTION, SOLUTION INTRAVENOUS CONTINUOUS
Status: DISCONTINUED | OUTPATIENT
Start: 2025-05-06 | End: 2025-05-07

## 2025-05-06 RX ORDER — NALOXONE HYDROCHLORIDE 0.4 MG/ML
0.4 INJECTION, SOLUTION INTRAMUSCULAR; INTRAVENOUS; SUBCUTANEOUS
Status: ACTIVE | OUTPATIENT
Start: 2025-05-06

## 2025-05-06 RX ORDER — OXYCODONE HYDROCHLORIDE 5 MG/1
5 TABLET ORAL EVERY 6 HOURS PRN
Status: DISCONTINUED | OUTPATIENT
Start: 2025-05-06 | End: 2025-05-07

## 2025-05-06 RX ORDER — LIDOCAINE 4 G/G
2 PATCH TOPICAL EVERY 24 HOURS
Status: DISCONTINUED | OUTPATIENT
Start: 2025-05-06 | End: 2025-05-07 | Stop reason: ALTCHOICE

## 2025-05-06 RX ORDER — LIDOCAINE 40 MG/G
CREAM TOPICAL
Status: DISCONTINUED | OUTPATIENT
Start: 2025-05-06 | End: 2025-05-06 | Stop reason: HOSPADM

## 2025-05-06 RX ORDER — PANTOPRAZOLE SODIUM 40 MG/1
40 TABLET, DELAYED RELEASE ORAL DAILY PRN
Status: ACTIVE | OUTPATIENT
Start: 2025-05-06

## 2025-05-06 RX ORDER — CALCIUM CARBONATE 500 MG/1
1000 TABLET, CHEWABLE ORAL 4 TIMES DAILY PRN
Status: ACTIVE | OUTPATIENT
Start: 2025-05-06

## 2025-05-06 RX ORDER — ONDANSETRON 4 MG/1
4 TABLET, ORALLY DISINTEGRATING ORAL EVERY 8 HOURS PRN
Status: ACTIVE | OUTPATIENT
Start: 2025-05-06

## 2025-05-06 RX ORDER — LIDOCAINE 40 MG/G
CREAM TOPICAL
OUTPATIENT
Start: 2025-05-06

## 2025-05-06 RX ORDER — LIDOCAINE 40 MG/G
CREAM TOPICAL
Status: ACTIVE | OUTPATIENT
Start: 2025-05-06

## 2025-05-06 RX ORDER — SODIUM CHLORIDE 9 MG/ML
100 INJECTION, SOLUTION INTRAVENOUS CONTINUOUS
Status: DISCONTINUED | OUTPATIENT
Start: 2025-05-06 | End: 2025-05-06

## 2025-05-06 RX ORDER — NALOXONE HYDROCHLORIDE 0.4 MG/ML
0.2 INJECTION, SOLUTION INTRAMUSCULAR; INTRAVENOUS; SUBCUTANEOUS
Status: ACTIVE | OUTPATIENT
Start: 2025-05-06

## 2025-05-06 RX ORDER — ALBUTEROL SULFATE 0.83 MG/ML
10 SOLUTION RESPIRATORY (INHALATION) ONCE
Status: DISCONTINUED | OUTPATIENT
Start: 2025-05-06 | End: 2025-05-06

## 2025-05-06 RX ORDER — OMEPRAZOLE 20 MG/1
40 CAPSULE, DELAYED RELEASE ORAL EVERY MORNING
Status: DISCONTINUED | OUTPATIENT
Start: 2025-05-07 | End: 2025-05-06

## 2025-05-06 RX ORDER — FENTANYL CITRATE 50 UG/ML
25 INJECTION, SOLUTION INTRAMUSCULAR; INTRAVENOUS
Status: DISCONTINUED | OUTPATIENT
Start: 2025-05-06 | End: 2025-05-06 | Stop reason: HOSPADM

## 2025-05-06 RX ORDER — SODIUM CHLORIDE 9 MG/ML
INJECTION, SOLUTION INTRAVENOUS CONTINUOUS
Status: DISCONTINUED | OUTPATIENT
Start: 2025-05-06 | End: 2025-05-06

## 2025-05-06 RX ORDER — METOPROLOL SUCCINATE 50 MG/1
50 TABLET, EXTENDED RELEASE ORAL 2 TIMES DAILY
Status: DISCONTINUED | OUTPATIENT
Start: 2025-05-06 | End: 2025-05-06

## 2025-05-06 RX ORDER — AMOXICILLIN 250 MG
2 CAPSULE ORAL 2 TIMES DAILY PRN
Status: ACTIVE | OUTPATIENT
Start: 2025-05-06

## 2025-05-06 RX ORDER — NICOTINE POLACRILEX 4 MG
15-30 LOZENGE BUCCAL
Status: ACTIVE | OUTPATIENT
Start: 2025-05-06

## 2025-05-06 RX ORDER — MULTIVIT-MIN/IRON/FOLIC/HRB186 3.3 MG-25
1 TABLET ORAL DAILY
Status: ON HOLD | COMMUNITY

## 2025-05-06 RX ORDER — ONDANSETRON 2 MG/ML
4 INJECTION INTRAMUSCULAR; INTRAVENOUS EVERY 6 HOURS PRN
Status: DISPENSED | OUTPATIENT
Start: 2025-05-06

## 2025-05-06 RX ORDER — DEXTROSE MONOHYDRATE 25 G/50ML
25-50 INJECTION, SOLUTION INTRAVENOUS
Status: DISCONTINUED | OUTPATIENT
Start: 2025-05-06 | End: 2025-05-06

## 2025-05-06 RX ADMIN — SODIUM CHLORIDE 100 ML/HR: 9 INJECTION, SOLUTION INTRAVENOUS at 11:37

## 2025-05-06 RX ADMIN — ONDANSETRON 4 MG: 2 INJECTION, SOLUTION INTRAMUSCULAR; INTRAVENOUS at 20:49

## 2025-05-06 RX ADMIN — METOPROLOL SUCCINATE 50 MG: 50 TABLET, EXTENDED RELEASE ORAL at 21:05

## 2025-05-06 RX ADMIN — SODIUM CHLORIDE 1250 MG: 0.9 INJECTION, SOLUTION INTRAVENOUS at 17:33

## 2025-05-06 RX ADMIN — APIXABAN 5 MG: 5 TABLET, FILM COATED ORAL at 21:05

## 2025-05-06 RX ADMIN — SODIUM CHLORIDE, SODIUM LACTATE, POTASSIUM CHLORIDE, AND CALCIUM CHLORIDE 1000 ML: .6; .31; .03; .02 INJECTION, SOLUTION INTRAVENOUS at 20:19

## 2025-05-06 RX ADMIN — SODIUM CHLORIDE: 0.9 INJECTION, SOLUTION INTRAVENOUS at 15:23

## 2025-05-06 RX ADMIN — INSULIN ASPART 4 UNITS: 100 INJECTION, SOLUTION INTRAVENOUS; SUBCUTANEOUS at 17:34

## 2025-05-06 RX ADMIN — SODIUM CHLORIDE 6.5 UNITS: 0.9 INJECTION, SOLUTION INTRAVENOUS at 21:27

## 2025-05-06 RX ADMIN — SODIUM CHLORIDE 6.5 UNITS: 0.9 INJECTION, SOLUTION INTRAVENOUS at 13:28

## 2025-05-06 RX ADMIN — LIDOCAINE 2 PATCH: 4 PATCH TOPICAL at 17:34

## 2025-05-06 RX ADMIN — DEXTROSE 300 ML: 10 SOLUTION INTRAVENOUS at 21:28

## 2025-05-06 RX ADMIN — OXYCODONE HYDROCHLORIDE 5 MG: 5 TABLET ORAL at 18:56

## 2025-05-06 RX ADMIN — INSULIN GLARGINE 20 UNITS: 100 INJECTION, SOLUTION SUBCUTANEOUS at 21:05

## 2025-05-06 RX ADMIN — SODIUM ZIRCONIUM CYCLOSILICATE 10 G: 10 POWDER, FOR SUSPENSION ORAL at 13:41

## 2025-05-06 RX ADMIN — DEXTROSE MONOHYDRATE 25 G: 25 INJECTION, SOLUTION INTRAVENOUS at 13:29

## 2025-05-06 ASSESSMENT — ACTIVITIES OF DAILY LIVING (ADL)
ADLS_ACUITY_SCORE: 49
ADLS_ACUITY_SCORE: 47
ADLS_ACUITY_SCORE: 31
ADLS_ACUITY_SCORE: 49
ADLS_ACUITY_SCORE: 47
ADLS_ACUITY_SCORE: 31
ADLS_ACUITY_SCORE: 31
ADLS_ACUITY_SCORE: 47
ADLS_ACUITY_SCORE: 31
ADLS_ACUITY_SCORE: 31
ADLS_ACUITY_SCORE: 47
ADLS_ACUITY_SCORE: 49

## 2025-05-06 ASSESSMENT — ENCOUNTER SYMPTOMS: DYSRHYTHMIAS: 1

## 2025-05-06 NOTE — CONSULTS
"  Thank you, Dr. Head, for asking the [facility] team to see Ms. Zara Webster to evaluate       Assessment/Recommendations   Assessment/Plan:  SVT would cont metoprolol 50mg bid  PE in setting hepatic cancer hx - cont eliquis  CAD - avoiding statin/zetia due to cirrhosis   Hyperkalemia/DM control defer to hospitalist.       Reassess for ablation once above stable  Followed by myself in Cardiology     History of Present Illness/Subjective    Ms. Zara Webster is a 74 year old female with hx of PE, SVT, CAD, with hepatic mass, returned to ED 4/25 with SVT and set up for ablation today but on arrival found K 6.9, Cr 0.91, Na 126, glucose 391, Hgb 10 (stable), plt 125 (stable), given therapy and follow up K down 5.3.  5/4 CT noted prior PE has resolved on AC, no PE, mass in liver with signs cirrhosis.  MR abd 5/2/25 at [facility] with cirrohsis and recent evidence of microwvae ablation cavities in left and righ lobes liver and no new liver lesions. No thrombus in the IVC.  UA/cult abn staph stared on vancomycin         Physical Examination Review of Systems   /56 (BP Location: Right arm)   Pulse 83   Temp 98  F (36.7  C) (Oral)   Resp 22   Ht 1.651 m (5' 5\")   Wt 65.3 kg (144 lb)   LMP  (LMP Unknown)   SpO2 98%   BMI 23.96 kg/m    Body mass index is 23.96 kg/m .  Wt Readings from Last 3 Encounters:   05/06/25 65.3 kg (144 lb)   05/04/25 65.3 kg (144 lb)   04/23/25 65.6 kg (144 lb 9.6 oz)     No intake or output data in the 24 hours ending 05/06/25 1630  General Appearance:   no distress, normal body habitus   ENT/Mouth: membranes moist, no oral lesions or bleeding gums.      EYES:  no scleral icterus, normal conjunctivae   Neck: no carotid bruits or thyromegaly   Chest/Lungs:   lungs are clear to auscultation, no rales or wheezing,  sternal scar, equal chest wall expansion    Cardiovascular:   Regular. Normal first and second heart sounds with no murmurs, rubs, or gallops; the " carotid, radial and posterior tibial pulses are intact, Jugular venous pressure , edema bilaterally    Abdomen:  no organomegaly, masses, bruits, or tenderness; bowel sounds are present   Extremities: no cyanosis or clubbing   Skin: no xanthelasma, warm.    Neurologic: normal  bilateral, no tremors     Psychiatric: alert and oriented x3, calm     Review of Systems - 12 points nega other than above      Medical History  Surgical History Family History Social History   Past Medical History:   Diagnosis Date    Anemia     Atypical chest pain     Common bile duct obstruction (H) 11/20/2023    Crohn's disease (H)     Diabetes mellitus (H)     Disorder of biliary tract     Essential tremor     Gastrointestinal hemorrhage     Hepatocellular carcinoma (H)     Hypercholesteremia     Ileostomy status (H)     Lesion of liver     Non-alcoholic cirrhosis (H)     Obstruction of common bile duct (H) 11/20/2023    Past Surgical History:   Procedure Laterality Date    BREAST EXCISIONAL BIOPSY Left 1980    BREAST EXCISIONAL BIOPSY Left 1985    ESOPHAGOSCOPY, GASTROSCOPY, DUODENOSCOPY (EGD), COMBINED N/A 1/26/2024    Procedure: ENDOSCOPIC ULTRASOUND;  Surgeon: Santi Pradhan MD;  Location: [facility]    HYSTERECTOMY  1991    OOPHORECTOMY Bilateral 1991    OTHER SURGICAL HISTORY      KIDNEY STONE REMOVALNOT SURE WHICH SIDE, DR. CHANEY    CT PROCTOSIGMOIDOSCOPY,RIGID,DIAGNOS N/A 6/17/2020    Procedure: ILEOSTOMY REVISION, PROCTOSCOPY, ILEOSCOPY;  Surgeon: Devang Moon MD;  Location: [facility];  Service: General    ZZC REMOVAL COLON/ILEOSTOMY      Description: Total Abdominal Colectomy;  Recorded: 12/15/2011;    ZZC REMOVAL COLON/PROCTECTOMY/ILEOSTOMY      Description: Total Proctocolectomy;  Recorded: 08/10/2011;    ZZC VAG HYST, W/VAGINECTOMY      Description: Vaginal Hysterectomy With Colpectomy;  Recorded: 08/14/2014;    Family History   Problem Relation Age of Onset    Diabetes Mother     Heart  Disease Mother     Hyperlipidemia Mother     Diabetes Father     Cancer Father         malignant gallbladder    No Known Problems Sister     No Known Problems Sister     Social History     Socioeconomic History    Marital status:      Spouse name: Not on file    Number of children: 2    Years of education: 16    Highest education level: Bachelor's degree (e.g., BA, AB, BS)   Occupational History    Occupation: Retired   Tobacco Use    Smoking status: Never     Passive exposure: Never    Smokeless tobacco: Never   Vaping Use    Vaping status: Never Used   Substance and Sexual Activity    Alcohol use: Not Currently    Drug use: No    Sexual activity: Not on file   Other Topics Concern    Not on file   Social History Narrative    Not on file     Social Drivers of Health     Financial Resource Strain: Low Risk  (1/29/2025)    Financial Resource Strain     Within the past 12 months, have you or your family members you live with been unable to get utilities (heat, electricity) when it was really needed?: No   Food Insecurity: Low Risk  (1/29/2025)    Food Insecurity     Within the past 12 months, did you worry that your food would run out before you got money to buy more?: No     Within the past 12 months, did the food you bought just not last and you didn t have money to get more?: No   Transportation Needs: Low Risk  (1/29/2025)    Transportation Needs     Within the past 12 months, has lack of transportation kept you from medical appointments, getting your medicines, non-medical meetings or appointments, work, or from getting things that you need?: No   Physical Activity: Not on file   Stress: Not on file   Social Connections: Unknown (11/17/2023)    [facility]    Social Connections     Frequency of Communication with Friends and Family: Not on file   Interpersonal Safety: Low Risk  (1/29/2025)    Interpersonal Safety      Do you feel physically and emotionally safe where you currently live?: Yes     Within the past 12 months, have you been hit, slapped, kicked or otherwise physically hurt by someone?: No     Within the past 12 months, have you been humiliated or emotionally abused in other ways by your partner or ex-partner?: Patient unable to answer   Housing Stability: Low Risk  (1/29/2025)    Housing Stability     Do you have housing? : Yes     Are you worried about losing your housing?: No          Medications  Allergies   Scheduled Meds:  Current Facility-Administered Medications   Medication Dose Route Frequency Provider Last Rate Last Admin    albuterol (PROVENTIL) neb solution 10 mg  10 mg Nebulization Once Gondal, Saad J, MD        apixaban ANTICOAGULANT (ELIQUIS) tablet 5 mg  5 mg Oral BID Michelle Valenzuela NP        dextrose 10% BOLUS 300 mL  300 mL Intravenous Once Mcihelle Valenzuela NP        insulin aspart (NovoLOG) injection (RAPID ACTING)  1-7 Units Subcutaneous TID AC Michelle Valenzuela NP        insulin aspart (NovoLOG) injection (RAPID ACTING)  1-5 Units Subcutaneous At Bedtime Michelle Valenzuela NP        insulin glargine (LANTUS PEN) injection 20 Units  20 Units Subcutaneous BID Michelle Valenzuela NP        metoprolol succinate ER (TOPROL XL) 24 hr tablet 50 mg  50 mg Oral BID Michelle Valenzuela NP        [START ON 5/7/2025] omeprazole (PriLOSEC) CR capsule 40 mg  40 mg Oral QAM Michelle Valenzuela NP        sodium chloride (PF) 0.9% PF flush 3 mL  3 mL Intracatheter Q8H [facility] Michelle Valenzuela NP   3 mL at 05/06/25 1332    vancomycin (VANCOCIN) 1,250 mg in 0.9% NaCl 262.5 mL intermittent infusion  1,250 mg Intravenous Q24H Gondal, Saad J, MD         Continuous Infusions:  Current Facility-Administered Medications   Medication Dose Route Frequency Provider Last Rate Last Admin    sodium chloride 0.9 % infusion   Intravenous Continuous Gondal, Saad J, MD 60 mL/hr at 05/06/25 1523 New Bag at 05/06/25 1523     PRN Meds:.  Current  Facility-Administered Medications   Medication Dose Route Frequency Provider Last Rate Last Admin    acetaminophen (TYLENOL) tablet 650 mg  650 mg Oral Q4H PRN Michelle Valenzuela NP        Or    acetaminophen (TYLENOL) Suppository 650 mg  650 mg Rectal Q4H PRN Michelle Valenzuela NP        calcium carbonate (TUMS) chewable tablet 1,000 mg  1,000 mg Oral 4x Daily PRN Michelle Valenzuela NP        glucose gel 15-30 g  15-30 g Oral Q15 Min PRN Michelle Valenzuela NP        Or    dextrose 50 % injection 25-50 mL  25-50 mL Intravenous Q15 Min PRN Michelle Valenzuela NP        Or    glucagon injection 1 mg  1 mg Subcutaneous Q15 Min PRN Michelle Valenzuela NP        lidocaine (LMX4) cream   Topical Q1H PRN Michelle Valenzuela NP        lidocaine 1 % 0.1-1 mL  0.1-1 mL Other Q1H PRMichelle Valdez NP        ondansetron (ZOFRAN ODT) ODT tab 4 mg  4 mg Sublingual Q8H PRMichelle Valdez NP        oxyCODONE (ROXICODONE) tablet 5 mg  5 mg Oral Q6H PRN Michelle Valenzuela NP        senna-docusate (SENOKOT-S/PERICOLACE) 8.6-50 MG per tablet 1 tablet  1 tablet Oral BID PRMichelle Valdez NP        Or    senna-docusate (SENOKOT-S/PERICOLACE) 8.6-50 MG per tablet 2 tablet  2 tablet Oral BID PRMichelle Valdez NP        sodium chloride (PF) 0.9% PF flush 3 mL  3 mL Intracatheter q1 min prn Michelle Valenzuela NP        Allergies   Allergen Reactions    Prochlorperazine Edisylate [Prochlorperazine] Other (See Comments)     Saw things    Compazine [Prochlorperazine] Unknown         Lab Results    Chemistry/lipid CBC Cardiac Enzymes/BNP/TSH/INR   Lab Results   Component Value Date    CHOL 243 (H) 03/20/2023    HDL 58 03/20/2023    TRIG 238 (H) 03/20/2023    BUN 19.5 05/06/2025     (L) 05/06/2025    CO2 23 05/06/2025    Lab Results   Component Value Date    WBC 10.5 05/06/2025    HGB 10.0 (L) 05/06/2025    HCT 30.4 (L) 05/06/2025     (H) 05/06/2025     (L) 05/06/2025    Lab Results   Component Value Date    TSH 2.28 01/28/2025    INR 1.16 (H) 02/21/2025               Tim Nuñez MD  Interventional Cardiology  [facility]

## 2025-05-06 NOTE — H&P
[facility]    History and Physical - Hospitalist Service       Date of Admission:  5/6/2025    Assessment & Plan        Zara Webster is a 74 F presents with abnormal electrolytes prior to scheduled ablation for SVT, medical history of crohns s/p total colectomy with ostomy, IDDM2, and liver cancer, and PE. Prior to procedure patient's potassium was 6.9 and sodium 126 glucose over 400.  Patient complaining of abdominal pain and nausea with weakness over the last few days . admitted to hospital for hyperkalemia and hyponatremia.  Cardiology consult for possible ablation when electrolytes stabilize.    # Hyperkalemia  Potassium 6.9 on admit  Lokelma   Insulin 6.5 units IV  Abuterol neb  Recheck BMP at 1400 results pending  Recheck potassium every 4 hours    # Hyponatremia  Sodium 126 on admission  Normal saline 60 mL/h  Check BMP in a.m.    # Diabetic mellitus type II  # Hyperglycemia  Accu-Cheks monitored stable at this time  Sliding scale insulin as needed  Resume home insulin glargine 20 units bid  HGB A1c 12.1    #Urinary tract infection  # Abnormal UA  UA positive  Urine culture positive for staph  Culture sensitivities pending  IV vancomycin started, pharmacy to dose    #Crohn's status post colitis ectomy with ostomy  # Cirrhosis of the liver  #Liver Cancer  # Abdominal pain  # Elevated LFTs  Ultrasound abdomen results pending  Trend LFTs in a.m.  Consider consulting GI    # SVT  EKG obtained reviewed normal sinus rhythm 71 no ectopy or arrhythmias  Ablation scheduled for today 5/6/2025 and postponed due to abnormalities and electrolyte  Cardiology consult  Telemetry monitor  Resume home metoprolol    # History of pulmonary emboli  # Anticoagulation  Resume home Eliquis  Chest CT completed on 5/4/2025 results: Prior seen PE in the right lung is no longer identified. No PE, dissection, or aneurysm seen on today's CT.  Stable indeterminate masses in the liver, bile duct dilatation in the  left lobe of the liver, TIPS, cirrhosis, and splenomegaly Minute left pleural effusion.    # Generalized weakness  #Falls  Patient states she has had generalized weakness and falls over the last few weeks  Fall precautions  PT OT recommendations pending          Diet: NPO for Procedure/Surgery per Anesthesia Guidelines Except for: Meds; Clear liquids before procedure/surgery: ADULT (Age GREATER than or Equal to 18 years) - Clear liquids 2 hours before procedure/surgery    DVT Prophylaxis: DOAC  Caro Catheter: Not present  Lines: None     Cardiac Monitoring: ACTIVE order. Indication: Tachyarrhythmias, acute (48 hours)  Code Status: Full Code      Clinically Significant Risk Factors Present on Admission        # Hyperkalemia: Highest K = 6.9 mmol/L in last 2 days, will monitor as appropriate  # Hyponatremia: Lowest Na = 122 mmol/L in last 2 days, will monitor as appropriate  # Hypochloremia: Lowest Cl = 94 mmol/L in last 2 days, will monitor as appropriate       # Drug Induced Coagulation Defect: home medication list includes an anticoagulant medication  # Thrombocytopenia: Lowest platelets = 125 in last 2 days, will monitor for bleeding        # Anemia: based on hgb <11      # DMII: A1C = 12.1 % (Ref range: 0.0 - 5.6 %) within past 6 months              Disposition Plan     Medically Ready for Discharge: Anticipated in 2-4 Days         The patient's care was discussed with the Attending Physician, Dr. Gondal .    Michelle Valenzuela NP  Hospitalist Service  [facility]  Securely message with [facility] [employer] (more info)  Text page via [facility] Paging/Directory     ______________________________________________________________________    Chief Complaint   Abnormal labs  Abdominal Pain  Nausea   Generalized Weakness    History is obtained from the patient and chart    History of Present Illness   Zara Webster is a 74 F presents with abnormal electrolytes prior to scheduled ablation for SVT, medical history of  crohns s/p total colectomy with ostomy, IDDM2, liver cancer , and PE Prior to procedure patient's potassium was 6.9 and sodium 126 glucose over 400.  Admitted to hospital for hyperkalemia and hyponatremia.  Cardiology consult for possible ablation when electrolytes stabilize, patient states she has been feeling nauseous and weakness the last week .  Patient denies chest pain shortness of breath or dizziness.  Patient denies urinary symptoms.  Patient states.  She has fallen last week due to weakness.  Patient denies hitting head.  Patient denies headache vision changes or confusion.  Patient denies fever chills.  Patient has colostomy denies any change in output.  States she has had left-sided abdominal pain and tenderness over the last few days.        Past Medical History    Past Medical History:   Diagnosis Date    Anemia     Atypical chest pain     Common bile duct obstruction (H) 11/20/2023    Crohn's disease (H)     Diabetes mellitus (H)     Disorder of biliary tract     Essential tremor     Gastrointestinal hemorrhage     Hepatocellular carcinoma (H)     Hypercholesteremia     Ileostomy status (H)     Lesion of liver     Non-alcoholic cirrhosis (H)     Obstruction of common bile duct (H) 11/20/2023       Past Surgical History   Past Surgical History:   Procedure Laterality Date    BREAST EXCISIONAL BIOPSY Left 1980    BREAST EXCISIONAL BIOPSY Left 1985    ESOPHAGOSCOPY, GASTROSCOPY, DUODENOSCOPY (EGD), COMBINED N/A 1/26/2024    Procedure: ENDOSCOPIC ULTRASOUND;  Surgeon: Santi Pradhan MD;  Location: [facility]    HYSTERECTOMY  1991    OOPHORECTOMY Bilateral 1991    OTHER SURGICAL HISTORY      KIDNEY STONE REMOVALNOT SURE WHICH SIDE, DR. SHIV SALINAS PROCTOSIGMOIDOSCOPY,RIGID,DIAGNOS N/A 6/17/2020    Procedure: ILEOSTOMY REVISION, PROCTOSCOPY, ILEOSCOPY;  Surgeon: Devang Moon MD;  Location: [facility];  Service: General    ZZC REMOVAL COLON/ILEOSTOMY      Description: Total  Abdominal Colectomy;  Recorded: 12/15/2011;    [facility] REMOVAL COLON/PROCTECTOMY/ILEOSTOMY      Description: Total Proctocolectomy;  Recorded: 08/10/2011;    C VAG HYST, W/VAGINECTOMY      Description: Vaginal Hysterectomy With Colpectomy;  Recorded: 2014;       Prior to Admission Medications   Prior to Admission Medications   Prescriptions Last Dose Informant Patient Reported? Taking?   apixaban ANTICOAGULANT (ELIQUIS) 5 MG tablet 2025 Morning  No Yes   Sig: Take 1 tablet (5 mg) by mouth 2 times daily.   clobetasol (TEMOVATE) 0.05 % external ointment  Self Yes Yes   Sig: Apply topically as needed   continuous blood glucose monitoring (FREESTYLE VIDA) sensor  Self No Yes   Si Units daily   diphenoxylate-atropine (LOMOTIL) 2.5-0.025 MG tablet   No Yes   Sig: Take 1 tablet by mouth 4 times daily as needed for diarrhea.   flash glucose scanning reader (FREESTYLE VIDA 14 DAY READER) Misc  Self No Yes   Sig: [FLASH GLUCOSE SCANNING READER (FREESTYLE VIDA 14 DAY READER) MISC] Use 1 Units As Directed every 14 (fourteen) days.   fluconazole (DIFLUCAN) 150 MG tablet   No Yes   Sig: Take one tablet now and if no better in 3 days, take another   insulin glargine (LANTUS SOLOSTAR) 100 UNIT/ML pen   No Yes   Sig: Take 20 units BID   Patient taking differently: 2 times daily. Take 20 units BID   insulin lispro (HUMALOG KWIKPEN) 100 UNIT/ML (1 unit dial) KWIKPEN   No Yes   Sig: Inject 6 units subcutaneously 4 times daily for blood glucose management   insulin pen needle (32G X 4 MM) 32G X 4 MM miscellaneous   No Yes   Sig: Use 8 pen needles daily or as directed.   metoprolol succinate ER (TOPROL XL) 50 MG 24 hr tablet 2025 Morning  No Yes   Sig: Take 1 tablet (50 mg) by mouth 2 times daily.   omeprazole (PRILOSEC) 40 MG DR capsule   Yes Yes   Sig: Take 40 mg by mouth nightly as needed.   ondansetron (ZOFRAN ODT) 4 MG ODT tab   Yes Yes   Sig: Place 4 mg under the tongue every 8 hours as needed for nausea.    ostomy adhesive Pste  Self No Yes   Sig: [OSTOMY ADHESIVE PSTE] Apply as needed   oxyCODONE (ROXICODONE) 5 MG tablet   Yes Yes   Sig: Take 5 mg by mouth every 6 hours as needed for breakthrough pain.      Facility-Administered Medications: None        Review of Systems    The 10 point Review of Systems is negative other than noted in the HPI or here.     Social History   I have reviewed this patient's social history and updated it with pertinent information if needed.  Social History     Tobacco Use    Smoking status: Never     Passive exposure: Never    Smokeless tobacco: Never   Vaping Use    Vaping status: Never Used   Substance Use Topics    Alcohol use: Not Currently    Drug use: No         Family History   I have reviewed this patient's family history and updated it with pertinent information if needed.  Family History   Problem Relation Age of Onset    Diabetes Mother     Heart Disease Mother     Hyperlipidemia Mother     Diabetes Father     Cancer Father         malignant gallbladder    No Known Problems Sister     No Known Problems Sister          Allergies   Allergies   Allergen Reactions    Prochlorperazine Edisylate [Prochlorperazine] Other (See Comments)     Saw things    Compazine [Prochlorperazine] Unknown        Physical Exam   Vital Signs: Temp: 98.1  F (36.7  C) Temp src: Oral BP: 91/48 Pulse: 71   Resp: 22 SpO2: 95 %      Weight: 144 lbs 0 oz    Constitutional: awake, alert, cooperative, no apparent distress, and appears stated age  Hematologic / Lymphatic: no cervical lymphadenopathy and no supraclavicular lymphadenopathy  Respiratory: No increased work of breathing, good air exchange, clear to auscultation bilaterally, no crackles or wheezing  Cardiovascular: Normal apical impulse, regular rate and rhythm, normal S1 and S2, no S3 or S4, and no murmur noted  GI: No scars, normal bowel sounds, soft, non-distended, moderate diffuse abdomen tender, no masses palpated, no hepatosplenomegally,  ostomy normal output  Skin: no bruising or bleeding, normal skin color, texture, turgor, and no redness, warmth, or swelling  Musculoskeletal: There is no redness, warmth, or swelling of the joints.  Full range of motion noted.  Motor strength is 5 out of 5 all extremities bilaterally.   Neurologic: Awake, alert, oriented to name, place and time.    Neuropsychiatric: General: normal, calm, and normal eye contact    Medical Decision Making       50 MINUTES SPENT BY ME on the date of service doing chart review, history, exam, documentation & further activities per the note.      Data     I have personally reviewed the following data over the past 24 hrs:    10.5  \   10.0 (L)   / 125 (L)     126 (L) 99 19.5 /  391 (H)   6.9 (HH) 25 0.91 \     Trop: N/A BNP: 349       Imaging results reviewed over the past 24 hrs:   No results found for this or any previous visit (from the past 24 hours).

## 2025-05-06 NOTE — PHARMACY-ADMISSION MEDICATION HISTORY
Pharmacist Admission Medication History    Admission medication history is complete. The information provided in this note is only as accurate as the sources available at the time of the update.    Information Source(s): Patient, Family member, Clinic records, and CareEverywhere/SureScripts via in-person    Pertinent Information:   -When interviewing patient and family member, both stated patient was currently taking 2 tablets of Toprol bid. Stated this was a new increase, within the past few weeks. I did ask if they knew what strength the tablets were, and they did not. I was able to find the outpatient note confirming the increase from 25 mg bid to 50 mg bid. However, patient did not realize that the new tablets they picked up were 50 mg strength and not 25 mg strength, and she believes she dumped the new script in with the rest of her 25 mg tablets (based on fill timing, there likely were not that many 25 mg tabs left). High likelihood that patient has been taking  mg bid instead of 50 mg bid ever since new script picked up on 4/23.     Family member did state he would look at pills in bottle when he went home to investigate if there were different pills in bottle. Suspect the bottle may be mostly 50 mg tabs by now.       Fill history below for reference.        Changes made to PTA medication list:  Added: Hair/Skin/Nails Vit  Deleted: None  Changed: None    Allergies reviewed with patient and updates made in EHR: yes    Medication History Completed By: Jazmyne Schaeffer [facility] 5/6/2025 5:56 PM    PTA Med List   Medication Sig Last Dose/Taking    apixaban ANTICOAGULANT (ELIQUIS) 5 MG tablet Take 1 tablet (5 mg) by mouth 2 times daily. 5/5/2025 Evening    clobetasol (TEMOVATE) 0.05 % external ointment Apply topically as needed Taking As Needed    diphenoxylate-atropine (LOMOTIL) 2.5-0.025 MG tablet Take 1 tablet by mouth 4 times daily as needed for diarrhea. Taking As Needed    fluconazole (DIFLUCAN) 150 MG  tablet Take one tablet now and if no better in 3 days, take another (Patient taking differently: Take 150 mg by mouth as needed. Take one tablet now and if no better in 3 days, take another) Taking Differently    insulin glargine (LANTUS SOLOSTAR) 100 UNIT/ML pen Take 20 units BID (Patient taking differently: Inject 20 Units subcutaneously 2 times daily. Take 20 units BID) 5/5/2025 Bedtime    insulin lispro (HUMALOG KWIKPEN) 100 UNIT/ML (1 unit dial) KWIKPEN Inject 6 units subcutaneously 4 times daily for blood glucose management (Patient taking differently: Inject 6 Units subcutaneously 4 times daily (with meals and nightly). Inject 6 units subcutaneously 4 times daily for blood glucose management  QID with meals and bedtime) 5/5/2025 Bedtime    metoprolol succinate ER (TOPROL XL) 50 MG 24 hr tablet Take 1 tablet (50 mg) by mouth 2 times daily. 5/5/2025 Bedtime    Multiple Vitamins-Minerals (HAIR SKIN & NAILS ADVANCED) TABS Take 1 tablet by mouth daily. 5/5/2025    omeprazole (PRILOSEC) 40 MG DR capsule Take 40 mg by mouth nightly as needed. Taking As Needed    ondansetron (ZOFRAN ODT) 4 MG ODT tab Place 4 mg under the tongue every 8 hours as needed for nausea. Taking As Needed    ostomy adhesive Pste [OSTOMY ADHESIVE PSTE] Apply as needed Taking    oxyCODONE (ROXICODONE) 5 MG tablet Take 5 mg by mouth every 6 hours as needed for breakthrough pain. Taking As Needed

## 2025-05-06 NOTE — ANESTHESIA PREPROCEDURE EVALUATION
Anesthesia Pre-Procedure Evaluation    Patient: Zara Webster   MRN: [id number] : 1950        Procedure : Procedure(s):  Ablation Supraventricular Tachycardia          Past Medical History:   Diagnosis Date     Anemia      Atypical chest pain      Common bile duct obstruction (H) 2023     Crohn's disease (H)      Diabetes mellitus (H)      Disorder of biliary tract      Essential tremor      Gastrointestinal hemorrhage      Hepatocellular carcinoma (H)      Hypercholesteremia      Ileostomy status (H)      Lesion of liver      Non-alcoholic cirrhosis (H)      Obstruction of common bile duct (H) 2023      Past Surgical History:   Procedure Laterality Date     BREAST EXCISIONAL BIOPSY Left      BREAST EXCISIONAL BIOPSY Left      ESOPHAGOSCOPY, GASTROSCOPY, DUODENOSCOPY (EGD), COMBINED N/A 2024    Procedure: ENDOSCOPIC ULTRASOUND;  Surgeon: Santi Pradhan MD;  Location: [facility]     HYSTERECTOMY       OOPHORECTOMY Bilateral      OTHER SURGICAL HISTORY      KIDNEY STONE REMOVALNOT SURE WHICH SIDE, DR. CHANEY     IN PROCTOSIGMOIDOSCOPY,RIGID,DIAGNOS N/A 2020    Procedure: ILEOSTOMY REVISION, PROCTOSCOPY, ILEOSCOPY;  Surgeon: Devang Moon MD;  Location: [facility];  Service: General     ZZC REMOVAL COLON/ILEOSTOMY      Description: Total Abdominal Colectomy;  Recorded: 12/15/2011;     ZZC REMOVAL COLON/PROCTECTOMY/ILEOSTOMY      Description: Total Proctocolectomy;  Recorded: 08/10/2011;     ZZC VAG HYST, W/VAGINECTOMY      Description: Vaginal Hysterectomy With Colpectomy;  Recorded: 2014;      Allergies   Allergen Reactions     Prochlorperazine Edisylate [Prochlorperazine] Other (See Comments)     Saw things     Compazine [Prochlorperazine] Unknown      Social History     Tobacco Use     Smoking status: Never     Passive exposure: Never     Smokeless tobacco: Never   Substance Use Topics     Alcohol use: Not Currently      Wt Readings  "from Last 1 Encounters:   05/06/25 65.3 kg (144 lb)        Anesthesia Evaluation   Pt has had prior anesthetic. Type: General and MAC.    No history of anesthetic complications       ROS/MED HX  ENT/Pulmonary:  - neg pulmonary ROS     Neurologic: Comment: Essential Tremor   (-) no CVA   Cardiovascular:     (+) Dyslipidemia - -  CAD - past MI - -                        dysrhythmias,              METS/Exercise Tolerance:     Hematologic:     (+)      anemia,          Musculoskeletal:       GI/Hepatic:     (+)       Inflammatory bowel disease (Crohn's),      liver disease,       Renal/Genitourinary:       Endo:     (+)  type II DM,   Using insulin,  Normal glucose range: 300-400,               Psychiatric/Substance Use:       Infectious Disease:       Malignancy:       Other:            Physical Exam    Airway        Mallampati: II   TM distance: > 3 FB   Neck ROM: full   Mouth opening: > 3 cm    Respiratory Devices and Support         Dental       (+) Minor Abnormalities - some fillings, tiny chips      Cardiovascular          Rhythm and rate: regular     Pulmonary           breath sounds clear to auscultation       OUTSIDE LABS:  CBC:   Lab Results   Component Value Date    WBC 7.7 05/04/2025    WBC 8.2 04/23/2025    HGB 10.1 (L) 05/04/2025    HGB 12.3 04/23/2025    HCT 31.5 (L) 05/04/2025    HCT 37.6 04/23/2025     (L) 05/04/2025     04/23/2025     BMP:   Lab Results   Component Value Date     (L) 05/04/2025     (L) 04/23/2025    POTASSIUM 5.0 05/04/2025    POTASSIUM 4.0 04/23/2025    CHLORIDE 94 (L) 05/04/2025    CHLORIDE 100 04/23/2025    CO2 23 05/04/2025    CO2 21 (L) 04/23/2025    BUN 14.2 05/04/2025    BUN 8.1 04/23/2025    CR 0.83 05/04/2025    CR 0.67 04/23/2025     (H) 05/04/2025     (HH) 05/04/2025     COAGS:   Lab Results   Component Value Date    INR 1.16 (H) 02/21/2025     POC: No results found for: \"BGM\", \"HCG\", \"HCGS\"  HEPATIC:   Lab Results   Component Value " Date    ALBUMIN 2.2 (L) 05/04/2025    PROTTOTAL 6.4 05/04/2025    ALT 46 05/04/2025    AST 73 (H) 05/04/2025    ALKPHOS 419 (H) 05/04/2025    BILITOTAL 3.9 (H) 05/04/2025     OTHER:   Lab Results   Component Value Date    A1C 12.1 (H) 04/15/2025    HANNA 8.5 (L) 05/04/2025    MAG 2.0 05/04/2025    LIPASE 42 02/03/2024    TSH 2.28 01/28/2025       Anesthesia Plan    ASA Status:  3       Anesthesia Type: MAC.   Induction: Intravenous.   Maintenance: TIVA.        Consents    Anesthesia Plan(s) and associated risks, benefits, and realistic alternatives discussed. Questions answered and patient/representative(s) expressed understanding.     - Discussed:     - Discussed with:  Patient      - Extended Intubation/Ventilatory Support Discussed: No.      - Patient is DNR/DNI Status: Yes             Suspend during perioperative period? Yes.    Use of blood products discussed: Yes.     - Discussed with: Patient.     - Consented: consented to blood products     Postoperative Care       PONV prophylaxis: Ondansetron (or other 5HT-3), Background Propofol Infusion     Comments:    Other Comments: Avoid steroids given poorly controlled DM.         Jazmyne Mann MD    Clinically Significant Risk Factors Present on Admission         # Hyponatremia: Lowest Na = 122 mmol/L in last 2 days, will monitor as appropriate  # Hypochloremia: Lowest Cl = 94 mmol/L in last 2 days, will monitor as appropriate       # Drug Induced Coagulation Defect: home medication list includes an anticoagulant medication             # DMII: A1C = 12.1 % (Ref range: 0.0 - 5.6 %) within past 6 months

## 2025-05-06 NOTE — PROGRESS NOTES
Brief EP progress note:    Patient presented for elective EP procedure today for diagnosis of SVT. Pre-procedure labs reveal significant metabolic derangement with elevated , Na+ 126, and K+ 6.9. No ventricular ectopy or significant T wave abnormality on telemetry. Discussed with Dr. Head who agrees that procedure should be cancelled in light of recent chemistries. Patient to be admitted under Hospitalist service; assistance appreciated. Spoke with Michelle Valenzuela NP who agrees to assume care and gave approval of request for direct admission under their service.      Thank you for the opportunity to participate in the care of this patient.

## 2025-05-06 NOTE — TELEPHONE ENCOUNTER
Mi Head MD  P [facility], located within [facility] Ep Support [facility] team,  Zara presented to the hospital today for an SVT ablation but was found to have abnormal labs. She is being admitted for her electrolyte abnormalities and elevated blood glucose. I recommend she be scheduled for an SVT ablation in the next 4 weeks.  Thanks  KA      Noted.  Scheduling notified.  New order and order set placed.  LEOPOLDO

## 2025-05-06 NOTE — PHARMACY-VANCOMYCIN DOSING SERVICE
Pharmacy Vancomycin Initial Note  Date of Service May 6, 2025  Patient's  1950  74 year old, female    Indication: Urinary Tract Infection    Current estimated CrCl = Estimated Creatinine Clearance: 55.7 mL/min (based on SCr of 0.9 mg/dL).    Creatinine for last 3 days  2025:  5:56 PM Creatinine 0.83 mg/dL  2025: 11:15 AM Creatinine 0.91 mg/dL;  2:36 PM Creatinine 0.90 mg/dL    Recent Vancomycin Level(s) for last 3 days  No results found for requested labs within last 3 days.      Vancomycin IV Administrations (past 72 hours)        No vancomycin orders with administrations in past 72 hours.                    Nephrotoxins and other renal medications (From now, onward)      None            Contrast Orders - past 72 hours (72h ago, onward)      None            [employer] Prediction of Planned Initial Vancomycin Regimen  Loading dose: N/A  Regimen: 1250 mg IV every 24 hours.  Start time: 16:15 on 2025  Exposure target: AUC24 (range)400-600 mg/L.hr   AUC24,ss: 471 mg/L.hr  Probability of AUC24 > 400: 70 %  Ctrough,ss: 13.6 mg/L  Probability of Ctrough,ss > 20: 14 %  Probability of nephrotoxicity (Lodise LINDSAY ): 9 %        Plan:  Start vancomycin 1250 mg IV q24h.   Vancomycin monitoring method: AUC  Vancomycin therapeutic monitoring goal: 400-600 mg*h/L  Pharmacy will check vancomycin levels as appropriate in 1-3 Days.    Serum creatinine levels will be ordered daily for the first week of therapy and at least twice weekly for subsequent weeks.      Jazmyne Schaeffer, PharmD, BCPS 25 4:16 PM

## 2025-05-06 NOTE — PROGRESS NOTES
Ablation cancelled due to labs. Potassium 6.9 and blood sugars have been high. Hospitalist has been consulted and treatment given. Report called to P3 RN and pt tranferred.

## 2025-05-07 ENCOUNTER — APPOINTMENT (OUTPATIENT)
Dept: OCCUPATIONAL THERAPY | Facility: HOSPITAL | Age: 75
DRG: 682 | End: 2025-05-07
Payer: COMMERCIAL

## 2025-05-07 ENCOUNTER — APPOINTMENT (OUTPATIENT)
Dept: PHYSICAL THERAPY | Facility: HOSPITAL | Age: 75
DRG: 682 | End: 2025-05-07
Payer: COMMERCIAL

## 2025-05-07 PROBLEM — K75.81 NONALCOHOLIC STEATOHEPATITIS: Status: ACTIVE | Noted: 2021-07-19

## 2025-05-07 PROBLEM — C22.0 HCC (HEPATOCELLULAR CARCINOMA) (H): Status: ACTIVE | Noted: 2023-08-31

## 2025-05-07 PROBLEM — N39.0 URINARY TRACT INFECTION: Status: ACTIVE | Noted: 2025-05-07

## 2025-05-07 PROBLEM — E87.1 HYPONATREMIA: Status: ACTIVE | Noted: 2025-05-07

## 2025-05-07 PROBLEM — K50.10 CROHN'S DISEASE OF COLON (H): Status: ACTIVE | Noted: 2019-11-03

## 2025-05-07 PROBLEM — E87.5 HYPERKALEMIA: Status: ACTIVE | Noted: 2025-05-07

## 2025-05-07 PROBLEM — Z86.711 HISTORY OF PULMONARY EMBOLISM: Status: ACTIVE | Noted: 2025-05-07

## 2025-05-07 PROBLEM — Z79.4 TYPE 2 DIABETES MELLITUS WITH HYPERGLYCEMIA, WITH LONG-TERM CURRENT USE OF INSULIN (H): Status: ACTIVE | Noted: 2025-05-07

## 2025-05-07 PROBLEM — E11.65 TYPE 2 DIABETES MELLITUS WITH HYPERGLYCEMIA, WITH LONG-TERM CURRENT USE OF INSULIN (H): Status: ACTIVE | Noted: 2025-05-07

## 2025-05-07 LAB
ALBUMIN SERPL BCG-MCNC: 1.9 G/DL (ref 3.5–5.2)
ALP SERPL-CCNC: 381 U/L (ref 40–150)
ALT SERPL W P-5'-P-CCNC: 38 U/L (ref 0–50)
ANION GAP SERPL CALCULATED.3IONS-SCNC: 3 MMOL/L (ref 7–15)
ANION GAP SERPL CALCULATED.3IONS-SCNC: 5 MMOL/L (ref 7–15)
ANION GAP SERPL CALCULATED.3IONS-SCNC: 6 MMOL/L (ref 7–15)
ANION GAP SERPL CALCULATED.3IONS-SCNC: 8 MMOL/L (ref 7–15)
AST SERPL W P-5'-P-CCNC: 82 U/L (ref 0–45)
B-OH-BUTYR SERPL-SCNC: <0.18 MMOL/L
BILIRUB DIRECT SERPL-MCNC: 3.91 MG/DL (ref 0–0.3)
BILIRUB SERPL-MCNC: 5.4 MG/DL
BUN SERPL-MCNC: 19.3 MG/DL (ref 8–23)
BUN SERPL-MCNC: 20.3 MG/DL (ref 8–23)
BUN SERPL-MCNC: 20.6 MG/DL (ref 8–23)
BUN SERPL-MCNC: 22.3 MG/DL (ref 8–23)
CALCIUM SERPL-MCNC: 7.7 MG/DL (ref 8.8–10.4)
CALCIUM SERPL-MCNC: 8 MG/DL (ref 8.8–10.4)
CALCIUM SERPL-MCNC: 8.1 MG/DL (ref 8.8–10.4)
CALCIUM SERPL-MCNC: 8.2 MG/DL (ref 8.8–10.4)
CHLORIDE SERPL-SCNC: 101 MMOL/L (ref 98–107)
CHLORIDE SERPL-SCNC: 101 MMOL/L (ref 98–107)
CHLORIDE SERPL-SCNC: 102 MMOL/L (ref 98–107)
CHLORIDE SERPL-SCNC: 99 MMOL/L (ref 98–107)
CREAT SERPL-MCNC: 0.73 MG/DL (ref 0.51–0.95)
CREAT SERPL-MCNC: 0.82 MG/DL (ref 0.51–0.95)
CREAT SERPL-MCNC: 0.85 MG/DL (ref 0.51–0.95)
CREAT SERPL-MCNC: 0.98 MG/DL (ref 0.51–0.95)
EGFRCR SERPLBLD CKD-EPI 2021: 60 ML/MIN/1.73M2
EGFRCR SERPLBLD CKD-EPI 2021: 71 ML/MIN/1.73M2
EGFRCR SERPLBLD CKD-EPI 2021: 74 ML/MIN/1.73M2
EGFRCR SERPLBLD CKD-EPI 2021: 85 ML/MIN/1.73M2
ERYTHROCYTE [DISTWIDTH] IN BLOOD BY AUTOMATED COUNT: 16.9 % (ref 10–15)
GLUCOSE BLDC GLUCOMTR-MCNC: 188 MG/DL (ref 70–99)
GLUCOSE BLDC GLUCOMTR-MCNC: 242 MG/DL (ref 70–99)
GLUCOSE BLDC GLUCOMTR-MCNC: 243 MG/DL (ref 70–99)
GLUCOSE BLDC GLUCOMTR-MCNC: 259 MG/DL (ref 70–99)
GLUCOSE BLDC GLUCOMTR-MCNC: 289 MG/DL (ref 70–99)
GLUCOSE BLDC GLUCOMTR-MCNC: 321 MG/DL (ref 70–99)
GLUCOSE BLDC GLUCOMTR-MCNC: 324 MG/DL (ref 70–99)
GLUCOSE BLDC GLUCOMTR-MCNC: 340 MG/DL (ref 70–99)
GLUCOSE SERPL-MCNC: 223 MG/DL (ref 70–99)
GLUCOSE SERPL-MCNC: 268 MG/DL (ref 70–99)
GLUCOSE SERPL-MCNC: 319 MG/DL (ref 70–99)
GLUCOSE SERPL-MCNC: 356 MG/DL (ref 70–99)
HCO3 SERPL-SCNC: 18 MMOL/L (ref 22–29)
HCO3 SERPL-SCNC: 20 MMOL/L (ref 22–29)
HCO3 SERPL-SCNC: 20 MMOL/L (ref 22–29)
HCO3 SERPL-SCNC: 21 MMOL/L (ref 22–29)
HCT VFR BLD AUTO: 31.2 % (ref 35–47)
HGB BLD-MCNC: 10.3 G/DL (ref 11.7–15.7)
HOLD SPECIMEN: NORMAL
LACTATE SERPL-SCNC: 2.1 MMOL/L (ref 0.7–2)
LACTATE SERPL-SCNC: 2.8 MMOL/L (ref 0.7–2)
LACTATE SERPL-SCNC: 2.9 MMOL/L (ref 0.7–2)
MCH RBC QN AUTO: 33.3 PG (ref 26.5–33)
MCHC RBC AUTO-ENTMCNC: 33 G/DL (ref 31.5–36.5)
MCV RBC AUTO: 101 FL (ref 78–100)
PLATELET # BLD AUTO: 115 10E3/UL (ref 150–450)
POTASSIUM SERPL-SCNC: 5.1 MMOL/L (ref 3.4–5.3)
POTASSIUM SERPL-SCNC: 5.3 MMOL/L (ref 3.4–5.3)
POTASSIUM SERPL-SCNC: 5.4 MMOL/L (ref 3.4–5.3)
POTASSIUM SERPL-SCNC: 5.4 MMOL/L (ref 3.4–5.3)
PROT SERPL-MCNC: 6.5 G/DL (ref 6.4–8.3)
RBC # BLD AUTO: 3.09 10E6/UL (ref 3.8–5.2)
SODIUM SERPL-SCNC: 125 MMOL/L (ref 135–145)
SODIUM SERPL-SCNC: 125 MMOL/L (ref 135–145)
SODIUM SERPL-SCNC: 127 MMOL/L (ref 135–145)
SODIUM SERPL-SCNC: 127 MMOL/L (ref 135–145)
WBC # BLD AUTO: 11.5 10E3/UL (ref 4–11)

## 2025-05-07 PROCEDURE — 250N000011 HC RX IP 250 OP 636: Performed by: INTERNAL MEDICINE

## 2025-05-07 PROCEDURE — 258N000003 HC RX IP 258 OP 636: Performed by: STUDENT IN AN ORGANIZED HEALTH CARE EDUCATION/TRAINING PROGRAM

## 2025-05-07 PROCEDURE — 97162 PT EVAL MOD COMPLEX 30 MIN: CPT | Mod: GP

## 2025-05-07 PROCEDURE — 82010 KETONE BODYS QUAN: CPT | Performed by: INTERNAL MEDICINE

## 2025-05-07 PROCEDURE — 120N000004 HC R&B MS OVERFLOW

## 2025-05-07 PROCEDURE — 80048 BASIC METABOLIC PNL TOTAL CA: CPT | Performed by: INTERNAL MEDICINE

## 2025-05-07 PROCEDURE — 97530 THERAPEUTIC ACTIVITIES: CPT | Mod: GP

## 2025-05-07 PROCEDURE — 80048 BASIC METABOLIC PNL TOTAL CA: CPT

## 2025-05-07 PROCEDURE — 36415 COLL VENOUS BLD VENIPUNCTURE: CPT

## 2025-05-07 PROCEDURE — 97166 OT EVAL MOD COMPLEX 45 MIN: CPT | Mod: GO

## 2025-05-07 PROCEDURE — 83605 ASSAY OF LACTIC ACID: CPT | Performed by: INTERNAL MEDICINE

## 2025-05-07 PROCEDURE — 250N000009 HC RX 250: Performed by: INTERNAL MEDICINE

## 2025-05-07 PROCEDURE — 85027 COMPLETE CBC AUTOMATED: CPT

## 2025-05-07 PROCEDURE — 97535 SELF CARE MNGMENT TRAINING: CPT | Mod: GO

## 2025-05-07 PROCEDURE — 99233 SBSQ HOSP IP/OBS HIGH 50: CPT | Performed by: INTERNAL MEDICINE

## 2025-05-07 PROCEDURE — 97110 THERAPEUTIC EXERCISES: CPT | Mod: GP

## 2025-05-07 PROCEDURE — 250N000013 HC RX MED GY IP 250 OP 250 PS 637

## 2025-05-07 PROCEDURE — 250N000013 HC RX MED GY IP 250 OP 250 PS 637: Performed by: INTERNAL MEDICINE

## 2025-05-07 PROCEDURE — 83605 ASSAY OF LACTIC ACID: CPT

## 2025-05-07 PROCEDURE — 99231 SBSQ HOSP IP/OBS SF/LOW 25: CPT | Performed by: INTERNAL MEDICINE

## 2025-05-07 PROCEDURE — 36415 COLL VENOUS BLD VENIPUNCTURE: CPT | Performed by: INTERNAL MEDICINE

## 2025-05-07 PROCEDURE — 82248 BILIRUBIN DIRECT: CPT | Performed by: STUDENT IN AN ORGANIZED HEALTH CARE EDUCATION/TRAINING PROGRAM

## 2025-05-07 RX ORDER — FUROSEMIDE 10 MG/ML
40 INJECTION INTRAMUSCULAR; INTRAVENOUS
Status: COMPLETED | OUTPATIENT
Start: 2025-05-07 | End: 2025-05-07

## 2025-05-07 RX ORDER — DIPHENHYDRAMINE HCL 25 MG
25 CAPSULE ORAL EVERY 6 HOURS PRN
Status: DISPENSED | OUTPATIENT
Start: 2025-05-07

## 2025-05-07 RX ORDER — CEFTRIAXONE 1 G/1
1 INJECTION, POWDER, FOR SOLUTION INTRAMUSCULAR; INTRAVENOUS EVERY 24 HOURS
Status: DISPENSED | OUTPATIENT
Start: 2025-05-07

## 2025-05-07 RX ORDER — LIDOCAINE 50 MG/G
OINTMENT TOPICAL 4 TIMES DAILY
Status: DISPENSED | OUTPATIENT
Start: 2025-05-07

## 2025-05-07 RX ORDER — OXYCODONE HYDROCHLORIDE 5 MG/1
5 TABLET ORAL EVERY 4 HOURS PRN
Refills: 0 | Status: ACTIVE | OUTPATIENT
Start: 2025-05-07

## 2025-05-07 RX ORDER — DIPHENHYDRAMINE HYDROCHLORIDE 50 MG/ML
25 INJECTION, SOLUTION INTRAMUSCULAR; INTRAVENOUS EVERY 6 HOURS PRN
Status: ACTIVE | OUTPATIENT
Start: 2025-05-07

## 2025-05-07 RX ORDER — LIDOCAINE 40 MG/G
CREAM TOPICAL 2 TIMES DAILY
Status: DISCONTINUED | OUTPATIENT
Start: 2025-05-07 | End: 2025-05-07 | Stop reason: ALTCHOICE

## 2025-05-07 RX ADMIN — LIDOCAINE: 50 OINTMENT TOPICAL at 16:05

## 2025-05-07 RX ADMIN — CEFTRIAXONE SODIUM 1 G: 1 INJECTION, POWDER, FOR SOLUTION INTRAMUSCULAR; INTRAVENOUS at 10:13

## 2025-05-07 RX ADMIN — INSULIN ASPART 3 UNITS: 100 INJECTION, SOLUTION INTRAVENOUS; SUBCUTANEOUS at 08:15

## 2025-05-07 RX ADMIN — FUROSEMIDE 40 MG: 10 INJECTION, SOLUTION INTRAMUSCULAR; INTRAVENOUS at 18:10

## 2025-05-07 RX ADMIN — METOPROLOL SUCCINATE 50 MG: 50 TABLET, EXTENDED RELEASE ORAL at 08:14

## 2025-05-07 RX ADMIN — APIXABAN 5 MG: 5 TABLET, FILM COATED ORAL at 21:06

## 2025-05-07 RX ADMIN — LIDOCAINE: 50 OINTMENT TOPICAL at 21:08

## 2025-05-07 RX ADMIN — INSULIN GLARGINE 20 UNITS: 100 INJECTION, SOLUTION SUBCUTANEOUS at 08:16

## 2025-05-07 RX ADMIN — SODIUM CHLORIDE: 0.9 INJECTION, SOLUTION INTRAVENOUS at 05:28

## 2025-05-07 RX ADMIN — METOPROLOL SUCCINATE 50 MG: 50 TABLET, EXTENDED RELEASE ORAL at 21:05

## 2025-05-07 RX ADMIN — APIXABAN 5 MG: 5 TABLET, FILM COATED ORAL at 08:14

## 2025-05-07 RX ADMIN — FUROSEMIDE 40 MG: 10 INJECTION, SOLUTION INTRAMUSCULAR; INTRAVENOUS at 11:04

## 2025-05-07 RX ADMIN — INSULIN GLARGINE 20 UNITS: 100 INJECTION, SOLUTION SUBCUTANEOUS at 21:12

## 2025-05-07 RX ADMIN — DIPHENHYDRAMINE HYDROCHLORIDE 25 MG: 25 CAPSULE ORAL at 10:59

## 2025-05-07 ASSESSMENT — ACTIVITIES OF DAILY LIVING (ADL)
ADLS_ACUITY_SCORE: 33
ADLS_ACUITY_SCORE: 31
ADLS_ACUITY_SCORE: 34
ADLS_ACUITY_SCORE: 33
ADLS_ACUITY_SCORE: 31
ADLS_ACUITY_SCORE: 34
DEPENDENT_IADLS:: INDEPENDENT
ADLS_ACUITY_SCORE: 33
ADLS_ACUITY_SCORE: 34
ADLS_ACUITY_SCORE: 33
ADLS_ACUITY_SCORE: 33
ADLS_ACUITY_SCORE: 34
ADLS_ACUITY_SCORE: 34
ADLS_ACUITY_SCORE: 33
ADLS_ACUITY_SCORE: 33
ADLS_ACUITY_SCORE: 34

## 2025-05-07 NOTE — PROVIDER NOTIFICATION
Blood sugar 325 after 2 hrs of hyperkalemia protocol. House Officer updated. D10% stopped per MD. Patient received 175 ml of D10% prior to stopping. Recheck BS in 1 hour.

## 2025-05-07 NOTE — PROGRESS NOTES
Care Management Follow Up    Length of Stay (days): 1    Expected Discharge Date: 05/10/2025     Concerns to be Addressed: Care progression - discharge planning     Patient plan of care discussed at interdisciplinary rounds: Yes    Anticipated Discharge Disposition:  PT rec Transitional care    Anticipated Discharge Services:  Transitional care  Anticipated Discharge DME:  NA    Patient/family educated on Medicare website which has current facility and service quality ratings:  NA  Education Provided on the Discharge Plan:  yes per team  Patient/Family in Agreement with the Plan:  NA    Referrals Placed by CM/SW:  JUDE  Private pay costs discussed: Not applicable    Discussed  Partnership in Safe Discharge Planning  document with patient/family: Yes: patient's Sukhwinder villalpando    Handoff Completed: No, handoff not indicated or clinically appropriate    Additional Information:  Met with patient and her family at bedside to discuss PT discharge rec for Transitional Care.   Patient's family agreed.  Writer provided a list of local skilled nursing facilities [facility] (which includes the medicare.gov website) for patient and family to review.     Submitted for the [facility] auth Request ID [id number]     Next Steps: RNCM to follow for medical progression, recommendations, and final discharge plan.     Amalia Kyle RN

## 2025-05-07 NOTE — PROGRESS NOTES
[facility]    Medicine Progress Note - Hospitalist Service    Date of Admission:  5/6/2025    Assessment & Plan     Zara Webster is a 74 F presents with abnormal electrolytes prior to scheduled ablation for SVT. Medical history significant for crohns s/p total colectomy with ostomy, IDDM2, liver cancer, and PE. Prior to procedure patient's potassium was 6.9 and sodium 126 glucose over 400.  Patient complaining of abdominal pain and nausea with weakness over the last few days . Admitted for hyperkalemia and hyponatremia.       Hyperkalemia  Potassium 6.9 on admit. Decreased to 5.3 after Lokelma, Insulin 6.5 units IV and abuterol neb  - Continue to monitor potassium     Hyponatremia  Sodium 125 on admission. After adjusted for her hyperglycemia, it was 132.  Sodium remains 125-127 since admission. Blood sugar has improved. Sodium level is about 129.   She has bilateral leg edema on exam. Her hyponatremia likely due to fluid retention.   - Fluid restriction  - Plan to give lasix 40 mg x2. Continue to monitor sodium.     Diabetic mellitus type II with hyperglycemia  Blood sugar high at 393 on admission. Recent HbA1c 12.1. PTA Lantus 20 units bid and 6 units tidac.  - Resume PTA lantus 20 units bid  - Novlog carb count 1:15 tidac and sliding scale    Urinary tract infection  Recent UA on 5/4 concerns UTI. Urine culture shows MSSA. Patient reports having dysuria.   - Ceftriaxone    Paroxysmal SVT  EKG on admission showed normal sinus rhythm.   - Telemetry  - Ablation scheduled for 5/6/2025 and postponed due to abnormalities and electrolyte. Rescheduled per cardiology  - Resume home metoprolol    Cirrhosis of the liver, Liver Cancer  Reports having abdominal pain.   Chronically elevated LFTs, similar to her baseline 2 months ago. Total bilirubin slowly trending up since 2 months ago.   Ultrasound abdomen shows no change compared to prior MRIs, cirrhosis with patent TIPS, mild amount of biliary  "sludge. No gallstones or inflammatory gallbladder wall thickening.  - She normally follows up in [facility] for radioembolization. Continue to monitor with supportive care.      History of pulmonary emboli: Resume home Eliquis. Chest CT done this admission shows prior PE in the right lung is no longer identified.      Crohn's status post colitis ectomy with ostomy: ostomy care    Generalized weakness and frequent falls  Patient states she has had generalized weakness and falls over the last few weeks  - Fall precautions  -  PT OT    Plan of care was discussed with her son and significant other by the bedside.         Diet: Regular Diet Adult    DVT Prophylaxis: DOAC  Caro Catheter: Not present  Lines: None     Cardiac Monitoring: ACTIVE order. Indication: Tachyarrhythmias, acute (48 hours)  Code Status: Full Code      Clinically Significant Risk Factors Present on Admission        # Hyperkalemia: Highest K = 6.9 mmol/L in last 2 days, will monitor as appropriate  # Hyponatremia: Lowest Na = 125 mmol/L in last 2 days, will monitor as appropriate       # Hypoalbuminemia: Lowest albumin = 1.9 g/dL at 5/7/2025  4:33 AM, will monitor as appropriate  # Drug Induced Coagulation Defect: home medication list includes an anticoagulant medication  # Thrombocytopenia: Lowest platelets = 115 in last 2 days, will monitor for bleeding        # Anemia: based on hgb <11      # DMII: A1C = 12.1 % (Ref range: 0.0 - 5.6 %) within past 6 months   # Overweight: Estimated body mass index is 25.39 kg/m  as calculated from the following:    Height as of this encounter: 1.651 m (5' 5\").    Weight as of this encounter: 69.2 kg (152 lb 8.9 oz).       # Financial/Environmental Concerns: none         Social Drivers of Health     Received from [facility] Scheduling [employer] & [facility]    Social Connections          Disposition Plan     Medically Ready for Discharge: Anticipated in 2-4 Days             Oni Crow MD  Hospitalist Service  M " [facility]  Securely message with [employer] (more info)  Text page via [employer] Paging/Directory   ______________________________________________________________________    Interval History   Overnight event noted. She developed bilateral hand tremor. Her hand tremor resolved today.  She reports having sudden onset of bilateral palm itching after receiving Ceftriaxone for 15 minutes. No rash. She reports that she was able to tolerate penicillin-like medication in the past.  She also complains of bilateral hip pain from her recent falls.     Physical Exam   Vital Signs: Temp: 98.2  F (36.8  C) Temp src: Oral BP: 101/54 Pulse: 87   Resp: 18 SpO2: 92 % O2 Device: None (Room air)    Weight: 152 lbs 8.93 oz    General appearance: not in acute distress  HEENT: PERRL, EOMI  Lungs: Clear breath sounds in bilateral lung fields  Cardiovascular: Regular rate and rhythm, normal S1-S2  Abdomen: Soft, non tender, no distension  Musculoskeletal: No joint swelling  Skin: No rash. Bilateral leg edema  Neurology: AAO ×3.  Cranial nerves II - XII normal.  Normal muscle strength in all four extremities.     Medical Decision Making       55 MINUTES SPENT BY ME on the date of service doing chart review, history, exam, documentation & further activities per the note.      Data     I have personally reviewed the following data over the past 24 hrs:    11.5 (H)  \   10.3 (L)   / 115 (L)     127 (L) 101 22.3 /  223 (H)   5.4 (H) 18 (L) 0.98 (H) \     ALT: 38 AST: 82 (H) AP: 381 (H) TBILI: 5.4 (H)   ALB: 1.9 (L) TOT PROTEIN: 6.5 LIPASE: N/A     Procal: N/A CRP: N/A Lactic Acid: 2.9 (H)         Imaging results reviewed over the past 24 hrs:   Recent Results (from the past 24 hours)   US Abdomen Limited    Narrative    EXAM: US ABDOMEN LIMITED  LOCATION: [facility]  DATE: 5/6/2025    INDICATION: Elevated LFTs, abdominal pain  COMPARISON: MRI 5/2/2025 and 12/16/2024  TECHNIQUE: Limited abdominal  ultrasound.    FINDINGS:    GALLBLADDER: Mild amount of sludge, otherwise normal. No gallstones, wall thickening, or pericholecystic fluid. Negative sonographic Segura's sign.    BILE DUCTS: There is focal intrahepatic biliary dilatation involving lateral segment left lobe unchanged compared to prior MRIs. The common duct measures 6 mm.    LIVER: Cirrhotic liver. No focal mass. The portal vein is patent with flow in the normal direction. Patent TIPS.    RIGHT KIDNEY: No hydronephrosis.    PANCREAS: The visualized portions are normal.    SPLEEN: Splenomegaly.    No ascites.      Impression    IMPRESSION:  1.  No change compared to prior MRIs.  2.  Cirrhosis with patent TIPS.  3.  Mild amount of biliary sludge. No gallstones or inflammatory gallbladder wall thickening. Focal intrahepatic duct dilatation involving lateral segment left lobe is unchanged.  4.  Splenomegaly.  5.  No ascites.       XR Pelvis 1/2 Views    Narrative    EXAM: XR HIP RIGHT 2-3 VIEWS, XR PELVIS 1/2 VIEWS  LOCATION: [facility]  DATE: 5/6/2025    INDICATION: hip pain  COMPARISON: None.      Impression    IMPRESSION: No acute fracture involving the right hip or pelvis. Mild degenerative narrowing of both hips. Multilevel degenerative changes in the lower lumbar spine and mild degenerative arthritis of both SI joints and pubic symphysis. Embolization coils   in the pelvis.   XR Hip Right 2-3 Views    Narrative    EXAM: XR HIP RIGHT 2-3 VIEWS, XR PELVIS 1/2 VIEWS  LOCATION: [facility]  DATE: 5/6/2025    INDICATION: hip pain  COMPARISON: None.      Impression    IMPRESSION: No acute fracture involving the right hip or pelvis. Mild degenerative narrowing of both hips. Multilevel degenerative changes in the lower lumbar spine and mild degenerative arthritis of both SI joints and pubic symphysis. Embolization coils   in the pelvis.

## 2025-05-07 NOTE — PLAN OF CARE
"  Problem: Adult Inpatient Plan of Care  Goal: Plan of Care Review  Description: The Plan of Care Review/Shift note should be completed every shift.  The Outcome Evaluation is a brief statement about your assessment that the patient is improving, declining, or no change.  This information will be displayed automatically on your shiftnote.  Outcome: Progressing  Flowsheets (Taken 5/6/2025 2325)  Plan of Care Reviewed With:   patient   family  Goal: Patient-Specific Goal (Individualized)  Description: You can add care plan individualizations to a care plan. Examples of Individualization might be:  \"Parent requests to be called daily at 9am for status\", \"I have a hard time hearing out of my right ear\", or \"Do not touch me to wake me up as it startlesme\".  Outcome: Progressing  Goal: Absence of Hospital-Acquired Illness or Injury  Outcome: Progressing  Intervention: Identify and Manage Fall Risk  Recent Flowsheet Documentation  Taken 5/6/2025 1543 by Ernesto Zaidi RN  Safety Promotion/Fall Prevention: activity supervised  Intervention: Prevent Infection  Recent Flowsheet Documentation  Taken 5/6/2025 1543 by Ernesto Zaidi RN  Infection Prevention: hand hygiene promoted  Goal: Optimal Comfort and Wellbeing  Outcome: Progressing  Intervention: Monitor Pain and Promote Comfort  Recent Flowsheet Documentation  Taken 5/6/2025 1856 by Ernesto Zaidi RN  Pain Management Interventions:   medication (see MAR)   MD notified (comment)  Taken 5/6/2025 1600 by Ernesto Zaidi RN  Pain Management Interventions: (Lidocaine patches & ice packs)   MD notified (comment)   medication (see MAR)   cold applied  Goal: Readiness for Transition of Care  Outcome: Progressing  Intervention: Mutually Develop Transition Plan  Recent Flowsheet Documentation  Taken 5/6/2025 1800 by Ernesto Zaidi RN  Equipment Currently Used at Home: none    Goal Outcome Evaluation:      Plan of Care Reviewed With: patient, family    Pt arrived just after 1500 " hours from [facility] in stable condition. Pt initially alert & disoriented to situation, but she has become increasingly drowsy/confused as evening has progressed. Pressures stable & pt saturating well on RA. Tele tracing NSR. Around 1830 pt noted to become increasingly lethargic/confused & with upper extremity tremors - HO notified to come evaluate. Labs rechecked & revealed elevated lactic acid, persistently elevated potassium. 1 liter LR bolus given with improvement in lactic. IV insulin given as part of HyperK protocol & potassium normalized on most recent check. NS infusing at 60 ml/hr. D10 infusing at 75 ml/hr as part of potassium protocol. Pt endorsed bilateral hip pain due to recent fall - improved with oxycodone & xray was negative. Pt also endorsed nausea - improved following IV zofran. Due to void - bladder scanned at 350 ml around 2230, pt up to commode but unable to void spontaneously. Daughter maria eugenia at bedside & supportive. Continue cares.     Ernesto Zaidi RN on 5/6/2025 at 11:34 PM

## 2025-05-07 NOTE — PLAN OF CARE
Problem: Infection  Goal: Absence of Infection Signs and Symptoms  Outcome: Progressing  Intervention: Prevent or Manage Infection  Recent Flowsheet Documentation  Taken 5/7/2025 0403 by Teresa Mayorga RN  Isolation Precautions: contact precautions maintained  Taken 5/7/2025 0000 by Teresa Mayorga RN  Isolation Precautions: contact precautions maintained     Problem: Sepsis/Septic Shock  Goal: Absence of Infection Signs and Symptoms  Intervention: Initiate Sepsis Management  Recent Flowsheet Documentation  Taken 5/7/2025 0403 by Teresa Mayorga RN  Isolation Precautions: contact precautions maintained  Taken 5/7/2025 0000 by Teresa Mayorga RN  Infection Prevention: rest/sleep promoted  Isolation Precautions: contact precautions maintained       Goal Outcome Evaluation:  Patient's drowsy most of overnight. But arouses easily with voice. Follows command after few attempts. Disoriented to time and situation. See MD note. VSS. No fever overnight. On room air. Has NSR. Denied pain at rest. Has some hip discomfort with activity. Declined cold pack and pain medication. Patient up to bedside commode with assist x2 and pivot. Patient able to void 300 ml. Patient does not have urge to void. Bladder scanning per protocol and offering bedside commode. Colostomy intact. Bed alarm on and call-light within reach. Daughter at bedside and supportive.     Labs:    -Lactic acid 3.8--> 2.9  -Sodium 125 --> 127  -Potassium 4.9 --> 5.1--> 5.4. Rechecking BMP q4 hrs. Next one at 08:00 am     Infusions:     -NS 60 ml/hr

## 2025-05-07 NOTE — PROGRESS NOTES
"   05/07/25 1350   Appointment Info   Signing Clinician's Name / Credentials (PT) Elizabeth Gamez PT   Living Environment   People in Home significant other   Current Living Arrangements house   Home Accessibility stairs to enter home   Number of Stairs, Main Entrance 2   Stair Railings, Main Entrance   (front steps may have a rail per the pt's daughter.)   Transportation Anticipated family or friend will provide   Self-Care   Equipment Currently Used at Home none  (They have extra canes and has a fWW.)   Fall history within last six months yes   Number of times patient has fallen within last six months 1   Activity/Exercise/Self-Care Comment Pt is normally indep with mobility and does not use any AD. Pt does not drive.  (Indep with ADLs)   General Information   Onset of Illness/Injury or Date of Surgery 05/06/25   Referring Physician Michelle Valenzuela NP   Patient/Family Therapy Goals Statement (PT) none stated.   Pertinent History of Current Problem (include personal factors and/or comorbidities that impact the POC) Per the chart, aZra Webster is a 74 year old female  with a history of type 2 diabetes, hypercholesterolemia, NSTEMI, SVT, liver cancer, who presents to the emergency department for via walk-in for evaluation of chest pain and tachycardia.      Patient reports having chest pain and tachycardia since having beads placed in for her liver cancer. This morning at 1000 she developed an onset of chest pain localized to the left breast. She took her metoprolol today but her spouse says that she had a pulse of 188. Currently, her chest pain hurts and she is having trouble breathing. She took 2 advils around 1700. Patient has an ablation here on 5/6/2025.      Patient further reports having a blood clot in her lung, which she is on eliquis for. She has missed \"weeks of it\" and just started it backup 1 week ago per patient's spouse.      Denies abdominal pain and any other complaints at this time.      Per chart " review:  4/23/2025: patient was seen here for rapid heart rate and feelings of shortness of breath and some lightheadedness. Patient had a chemical cardioversion and noted significant improvement in her symptoms. Patient was discharged in stable conditions.   Existing Precautions/Restrictions fall   Weight-Bearing Status - LLE weight-bearing as tolerated   Weight-Bearing Status - RLE weight-bearing as tolerated   Cognition   Affect/Mental Status (Cognition)   (sleepy)   Orientation Status (Cognition) oriented to;person;place;time   Cognitive Status Comments Lots of cues to keep her eyes open more.  increased cues and assist with all tasks   Pain Assessment   Patient Currently in Pain   (Pt does have some increased R Hip area pain and gluteal yesica.)   Posture    Posture Comments Some cues for posture.   Range of Motion (ROM)   ROM Comment PROM bilat LEs  with some stiffness R LE and increased discomfort with R HF.   Strength (Manual Muscle Testing)   Strength Comments Increased cues for MMT.  Pt did not move upon request much but was able to WB and walk.   Transfers   Comment, (Transfers) Sit<>stand with min/ A x 2 with FWW with cues for hand placement.   Gait/Stairs (Locomotion)   [place] Level (Gait) minimum assist (75% patient effort);2 person assist   Assistive Device (Gait) walker, front-wheeled  (standing)   Sensory Examination   Sensory Perception Comments Pt could feel light touch bilat LEs   Clinical Impression   Criteria for Skilled Therapeutic Intervention Yes, treatment indicated   PT Diagnosis (PT) Impaired functional mobility.   Influenced by the following impairments weakness, dec bal, dec endurance.  Pt is not at her PLOF.   Functional limitations due to impairments bed mobility, transfers, gait and steps.   Clinical Presentation (PT Evaluation Complexity) stable   Clinical Presentation Rationale Pt presents medically diagnosed.   Clinical Decision Making (Complexity) moderate complexity    Planned Therapy Interventions (PT) balance training;bed mobility training;gait training;home exercise program;stair training;strengthening;transfer training   Risk & Benefits of therapy have been explained evaluation/treatment results reviewed;care plan/treatment goals reviewed;risks/benefits reviewed;participants voiced agreement with care plan;patient;daughter;son;spouse/significant other   PT Total Evaluation Time   PT Eval, Moderate Complexity Minutes (65906) 10   Physical Therapy Goals   PT Frequency 6x/week   PT Predicted Duration/Target Date for Goal Attainment 05/13/25   PT Goals Bed Mobility;Transfers;Gait;Stairs   PT: Bed Mobility Supervision/stand-by assist;Supine to/from sit;Rolling   PT: Transfers Supervision/stand-by assist;Sit to/from stand;Bed to/from chair;Assistive device   PT: Gait Minimal assist;Rolling walker;100 feet   PT: Stairs Minimal assist;3 stairs;Rail on both sides   Interventions   Interventions Quick Adds Therapeutic Activity;Gait Training;Therapeutic Procedure   Therapeutic Procedure/Exercise   Ther. Procedure: strength, endurance, ROM, flexibillity Minutes (66990) 13   Treatment Detail/Skilled Intervention Pt did seated bilat LE ex  x10 reps with PT did educate the pt and family members on home ex and that she should walk with assist and use a FWW at this point.   Therapeutic Activity   Therapeutic Activities: dynamic activities to improve functional performance Minutes (32067) 10   Treatment Detail/Skilled Intervention Sit<>stand with the FWW with min/ A x 2 with cues for hand placement and walker safety with stand to sit.  LEs elevated and call light left by the pt.   Gait Training   Symptoms Noted During/After Treatment (Gait Training) fatigue;increased pain   Treatment Detail/Skilled Intervention Pt walked 15 with the FWW with min A x 2 and she needed assist with the FWW, cues for direction.  Pt did take smaller steps and she did have some c/o R hip area pain.   Distance in  Feet 15'   [place] Level (Gait Training) minimum assist (75% patient effort)   Physical Assistance Level (Gait Training) 2 person assist   Weight Bearing (Gait Training) weight-bearing as tolerated   Assistive Device (Gait Training) rolling walker  (FWW)   Pattern Analysis (Gait Training) swing-through gait   Gait Analysis Deviations decreased claudia;decreased step length   Impairments (Gait Analysis/Training) balance impaired;pain;strength decreased   PT Discharge Planning   PT Plan bed mobility, transfers, gait w FWW, LE ex. steps as able.   PT Discharge Recommendation (DC Rec) Transitional Care Facility   PT Rationale for DC Rec The pt does need assist with all mobility and cues for all tasks.  TCU is recommended at this time.   PT Brief overview of current status PT eval, transfers w fWW with A x 2 and ambulated with FWW w A x2, LE ex. caregiver education with HEP and POC.   PT Total Distance Amb During Session (feet) 15   PT Equipment Needed at Discharge gait belt;walker, rolling   Physical Therapy Time and Intention   Timed Code Treatment Minutes 23   Total Session Time (sum of timed and untimed services) 33

## 2025-05-07 NOTE — CONSULTS
Care Management Initial Consult    General Information  Assessment completed with: Patient, Patient  Type of CM/SW Visit: Initial Assessment    Primary Care Provider verified and updated as needed: Yes   Readmission within the last 30 days: no previous admission in last 30 days      Reason for Consult: discharge planning  Advance Care Planning: Advance Care Planning Reviewed: verified with patient        Communication Assessment  Patient's communication style: spoken language (English or Bilingual)    Hearing Difficulty or Deaf: no   Wear Glasses or Blind: yes    Cognitive  Cognitive/Neuro/Behavioral: .WDL except  Level of Consciousness: lethargic, confused  Arousal Level: opens eyes spontaneously  Orientation: disoriented to, time, situation  Mood/Behavior: cooperative, calm  Best Language: 0 - No aphasia  Speech: clear    Living Environment:   People in home: significant other     Current living Arrangements: house      Able to return to prior arrangements: yes     Family/Social Support:  Care provided by: self, spouse/significant other  Provides care for: no one  Marital Status:   Support system: Significant Other       Johnny  Description of Support System: Supportive    Support Assessment: Adequate family and caregiver support    Current Resources:   Patient receiving home care services: No     Community Resources: None  Equipment currently used at home: none  Supplies currently used at home: None    Employment/Financial:  Employment Status: retired        Financial Concerns: none   Referral to Financial Worker: No     Does the patient's insurance plan have a 3 day qualifying hospital stay waiver?  Yes     Which insurance plan 3 day waiver is available? Alternative insurance waiver    Will the waiver be used for post-acute placement? Undetermined at this time    Lifestyle & Psychosocial Needs:  Social Drivers of Health     Food Insecurity: Low Risk  (1/29/2025)    Food Insecurity     Within the past 12  months, did you worry that your food would run out before you got money to buy more?: No     Within the past 12 months, did the food you bought just not last and you didn t have money to get more?: No   Depression: Not at risk (1/13/2025)    PHQ-2     PHQ-2 Score: 0   Housing Stability: Low Risk  (1/29/2025)    Housing Stability     Do you have housing? : Yes     Are you worried about losing your housing?: No   Tobacco Use: Low Risk  (5/4/2025)    Patient History     Smoking Tobacco Use: Never     Smokeless Tobacco Use: Never     Passive Exposure: Never   Financial Resource Strain: Low Risk  (1/29/2025)    Financial Resource Strain     Within the past 12 months, have you or your family members you live with been unable to get utilities (heat, electricity) when it was really needed?: No   Alcohol Use: Not on file   Transportation Needs: Low Risk  (1/29/2025)    Transportation Needs     Within the past 12 months, has lack of transportation kept you from medical appointments, getting your medicines, non-medical meetings or appointments, work, or from getting things that you need?: No   Physical Activity: Not on file   Interpersonal Safety: Low Risk  (1/29/2025)    Interpersonal Safety     Do you feel physically and emotionally safe where you currently live?: Yes     Within the past 12 months, have you been hit, slapped, kicked or otherwise physically hurt by someone?: No     Within the past 12 months, have you been humiliated or emotionally abused in other ways by your partner or ex-partner?: Patient unable to answer   Stress: Not on file   Social Connections: Unknown (11/17/2023)    Received from [facility]    Social Connections     Frequency of Communication with Friends and Family: Not on file   Health Literacy: Not on file     Functional Status:  Prior to admission patient needed assistance:   Dependent ADLs:: Independent  Dependent IADLs:: Independent    Discussed  Partnership in  Safe Discharge Planning  document with patient/family: Yes: patient's son, Sukhwinder and SO, Johnny    Additional Information:  Writer met with patient and her family at bedside to review role of care management services, discuss goals of care and assess need for any possible services at discharge. Patient alert, answering questions appropriately and engaged in the conversation. Address, phone number and PCP confirmed. Patient reported she has a HCD, but no papers available. Lives with SO in a townhouse. Report independent with ADLs/IADLs. Still drives. No DME and no community resources. Goal is to return home. Anticipate family will transport.       Next Steps: RNCM to follow for medical progression, recommendations, and final discharge plan.     Amalia Kyle RN

## 2025-05-07 NOTE — PROGRESS NOTES
05/07/25 1330   Appointment Info   Signing Clinician's Name / Credentials (OT) Wilda RIBERA OTR/L CLT   Living Environment   People in Home significant other   Current Living Arrangements house   Home Accessibility stairs to enter home   Number of Stairs, Main Entrance 2   Transportation Anticipated family or friend will provide   Self-Care   Activity/Exercise/Self-Care Comment Pt. normally I, active- enjoys golfing   General Information   Onset of Illness/Injury or Date of Surgery 05/06/25   Referring Physician    Additional Occupational Profile Info/Pertinent History of Current Problem Zara Webster is a 74 F presents with abnormal electrolytes prior to scheduled ablation for SVT, medical history of crohns s/p total colectomy with ostomy, IDDM2, and liver cancer, and PE. Prior to procedure patient's potassium was 6.9 and sodium 126 glucose over 400.  Patient complaining of abdominal pain and nausea with weakness over the last few days . admitted to hospital for hyperkalemia and hyponatremia.  Cardiology consult for possible ablation when electrolytes stabilize.   Cognitive Status Examination   Affect/Mental Status (Cognitive) confused   Follows Commands follows one-step commands   Cognitive Status Comments Impulsive, step by step cues   Bed Mobility   Bed Mobility supine-sit   Supine-Sit [place] (Bed Mobility) minimum assist (75% patient effort)   Transfers   Transfers sit-stand transfer   Sit-Stand Transfer   Sit-Stand [place] (Transfers) contact guard;minimum assist (75% patient effort)   Balance   Balance Assessment sitting static balance   Sitting Balance: Static supervision   Activities of Daily Living   BADL Assessment/Intervention grooming  (anticipate will need supervision for safety with ADLs)   Grooming Assessment/Training   [place] Level (Grooming) hair care, combing/brushing;supervision  (seated)   Clinical Impression   Criteria for Skilled Therapeutic  Interventions Met (OT) Yes, treatment indicated   OT Diagnosis decreased ADL i'dence   OT Problem List-Impairments impacting ADL problems related to;cognition;activity tolerance impaired;strength   Assessment of Occupational Performance 1-3 Performance Deficits   Identified Performance Deficits trsfs, standing, cognition, ADLs   Planned Therapy Interventions (OT) ADL retraining;cognition;strengthening;transfer training   Clinical Decision Making Complexity (OT) detailed assessment/moderate complexity   Risk & Benefits of therapy have been explained care plan/treatment goals reviewed   OT Total Evaluation Time   OT Eval, Moderate Complexity Minutes (31629) 8   OT Goals   Therapy Frequency (OT) 5 times/week   OT Predicted Duration/Target Date for Goal Attainment 05/14/25   OT Goals Hygiene/Grooming;Lower Body Dressing;Transfers;Cognition   OT: Hygiene/Grooming supervision/stand-by assist   OT: Lower Body Dressing Supervision/stand-by assist   OT: Transfer Supervision/stand-by assist   OT: Cognitive Patient/caregiver will verbalize understanding of cognitive assessment results/recommendations as needed for safe discharge planning   Interventions   Interventions Quick Adds Self-Care/Home Management   Self-Care/Home Management   Self-Care/Home Mgmt/ADL, Compensatory, Meal Prep Minutes (67647) 8   Treatment Detail/Skilled Intervention Pt. confused but alert and ox3. impulsive requiring simple 1 step commands. C/o of back in R hip/buttock due to a recent fall on toilet (neg xray). Static standing with CGA- cues to maintain standing as patient trying to ambulate. Cues to keep eyes open. Rtn'd to sitting for a rest with CGA of 2. Additional STS with CGA of 2 cues for hand placement. Able to walk around bed to chair with fww and CGA of 2- cues for safety and technique. Chair trsf- CGA of 2.   OT Discharge Planning   OT Plan monitor cognition, trsfs, standing- g/h?   OT Discharge Recommendation (DC Rec) Transitional Care  Facility   OT Rationale for DC Rec patient A of 1-2 safety with mild confusion. Would benefit from TCU to help return to [place]   OT Brief overview of current status CGA of 2   OT Total Distance Amb During Session (feet) 10   Total Session Time   Timed Code Treatment Minutes 8   Total Session Time (sum of timed and untimed services) 16

## 2025-05-07 NOTE — PLAN OF CARE
Problem: Adult Inpatient Plan of Care  Goal: Plan of Care Review  Description: The Plan of Care Review/Shift note should be completed every shift.  The Outcome Evaluation is a brief statement about your assessment that the patient is improving, declining, or no change.  This information will be displayed automatically on your shiftnote.  Outcome: Progressing   Goal Outcome Evaluation:         Pt alert to self only, slow to respond, able to follow most commands, MAEW, denies sensation changes. Denies headache, or vision changes. No tremors noted. Skin jaundice color. Urine dark minnie. Voiding on bedside commode, bladder scan done and less than 300. Stool in colostomy bag. Appetite poor, ate bites for breakfast, no lunch and working on supper. Pt alseep most shift, awake for interactions but eyes will drift shut. Difficult to keep engaged. Pt assist of two up to commode, gait unsteady and weak. Pt needing help to even sit on edge of bed. Pt up to chair today. Was able to walk with therapy around side of bed to recliner. Pt c/o right hip pain, avoiding oxycodone due to lethargy. Can't have tylenol with liver disease. Lidocaine ointment ordered and applied. Imaging was done in ER and negative for fractures, no bruising noted, does have swelling to right hip, very tender to the touch. Lots of family present and visiting and very concerned about mentation changes, weakness and lethargy. Family reports pt was able to walk in from parking lot yesterday. Was having falls at home but both pt and family deny hitting head, discussed with MD due to being on a DOAC. No new orders for imaging. Lung sounds diminished with faint crackles in bilateral bases, IS brought in and pt encouarged to do  hourly when awake, family helpful getting pt to do. IV lasix started due to BLE swelling and crackles in lungs. Pulmonary toilet encouarged. Chair for meals, DIPP in place, falls program in place. Tele: NSR. Pt on 1800 FR. GI and  nephrology consulted. POC ongoing.

## 2025-05-07 NOTE — PROGRESS NOTES
"7:31 PM    Asked by bedside nurse to evaluate due to increased lethargy and upper extremity tremors.     Patient presented for planned SVT ablation though noted to have hyperkalemia of 6.9 and hyponatremia to 126 so procedure was aborted.  Also found to have a new urinary tract infection on vancomycin.  Has known history of type 2 diabetes on insulin and liver cancer.    Patient and family note upper extremity shaking started this afternoon. Patient was given 5 mg oxycodone for pain which helped the upper extremity shaking. Patient and family say it is less pronounced now.     Also note a fall on 5/5 in the bathroom.  Fell into the toilet though denies hitting head. Is on anticoagulation. Tired today though no other reported neurologic changes.    Blood sugar has been high despite insulin correction.     /65 (BP Location: Right arm, Patient Position: Semi-Leigh's, Cuff Size: Adult Small)   Pulse 93   Temp 99  F (37.2  C) (Oral)   Resp 17   Ht 1.651 m (5' 5\")   Wt 65.3 kg (144 lb)   LMP  (LMP Unknown)   SpO2 96%   BMI 23.96 kg/m      Constitutional: awake, cooperative, no apparent distress; intermittent drifting off to sleep during history/exam  Eyes: Lids and lashes normal, sclera clear, conjunctiva normal  ENT: Normocephalic, without obvious abnormality, atraumatic  RESP: No audible wheeze, cough, or visible cyanosis.  No visible retractions or increased work of breathing.    CV: regular rate and rhythm on telemetry  Musculoskeletal: Mild tremor noted bilaterally with outstretched arms, none visible at rest. No clear asterixis. LE with 2-3+ pitting edema of shins.   Neurologic: Awake, alert, oriented to name, place and time.  Cranial nerves II-XII are grossly intact.    Dr. Gondal ordered a lactic acid to evaluate for sepsis and an ammonia with increased lethargy and shaking in setting of liver cancer. Will also evaluate for electrolyte abnormalities that may cause shaking.  Potassium was corrected " on last check though magnesium has not been drawn.  Due to persistent hyperglycemia will do a BMP for anion gap to evaluate for DKA. Will continue to monitor vitals for fever in setting of UTI as well. Reviewed medications and did see note that patient may have been taking higher dose of beta blocker which may exacerbate tremor. No other obvious medication sources.     Plan:  - CBC  - BMP  - Mg  - lactic acid  - ammonia    Lactic acid of 4.2. Likely some component of poor clearance with her liver cancer history though will trend. IV fluids ordered.     9:06 PM  Labs resulted with increase in WBC to 14.5 from 10.5. No new fevers. On vancomycin, will consider further broadening if clinically worsens. Also noted to have a K of 6.0 and Na of 125. Will shift with insulin and dextrose.    Plan:  - shift K with insulin and dextrose  - lactic acid recheck at 10pm   - BMP q4 hours  - consider cortisol in AM to evaluate for adrenal insufficiency    Discussed plan with bedside nurse, patient, and family present at bedside. Also discussed with Dr. Mccray and Dr. Gondal.     Natasha aDvis MD      UPDATE 11:10PM- Notified by RN that K now corrected. Lactate improved to 3.8. Vitals remain stable. Will recheck lactate in 2 hours to ensure not continuing to climb.

## 2025-05-07 NOTE — PROGRESS NOTES
"Imp/plan:  SVT - plan rescedule in 2 weeks after recovery from infection  Hx PE in setting hepatic cancer - on eliquuis  CAD avoiding statin given cirrhosis    Had discussion with pt and family today and all in agreement with above    Review of Systems: 12 points negative other than above    /56   Pulse 87   Temp 98.2  F (36.8  C) (Oral)   Resp 18   Ht 1.651 m (5' 5\")   Wt 69.2 kg (152 lb 8.9 oz)   LMP  (LMP Unknown)   SpO2 94%   BMI 25.39 kg/m      Lab Results   Component Value Date    HGB 10.3 (L) 05/07/2025     Lab Results   Component Value Date     (L) 05/07/2025     No results found for: \"CREATININE\"  No components found for: \"K\"          Tim Nuñez MD  Interventional Cardiology   [facility]  [phone]    "

## 2025-05-08 ENCOUNTER — APPOINTMENT (OUTPATIENT)
Dept: PHYSICAL THERAPY | Facility: HOSPITAL | Age: 75
DRG: 682 | End: 2025-05-08
Attending: INTERNAL MEDICINE
Payer: COMMERCIAL

## 2025-05-08 ENCOUNTER — RESULTS FOLLOW-UP (OUTPATIENT)
Dept: NURSING | Facility: CLINIC | Age: 75
End: 2025-05-08

## 2025-05-08 VITALS
OXYGEN SATURATION: 95 % | SYSTOLIC BLOOD PRESSURE: 94 MMHG | TEMPERATURE: 98.7 F | DIASTOLIC BLOOD PRESSURE: 55 MMHG | RESPIRATION RATE: 18 BRPM | HEIGHT: 65 IN | HEART RATE: 83 BPM | BODY MASS INDEX: 25.34 KG/M2 | WEIGHT: 152.12 LBS

## 2025-05-08 LAB
ALBUMIN SERPL BCG-MCNC: 1.5 G/DL (ref 3.5–5.2)
ALBUMIN UR-MCNC: NEGATIVE MG/DL
ALP SERPL-CCNC: 313 U/L (ref 40–150)
ALT SERPL W P-5'-P-CCNC: 32 U/L (ref 0–50)
ANION GAP SERPL CALCULATED.3IONS-SCNC: 5 MMOL/L (ref 7–15)
APPEARANCE UR: ABNORMAL
AST SERPL W P-5'-P-CCNC: 69 U/L (ref 0–45)
BACTERIA #/AREA URNS HPF: ABNORMAL /HPF
BACTERIA UR CULT: ABNORMAL
BACTERIA UR CULT: ABNORMAL
BILIRUB SERPL-MCNC: 5 MG/DL
BILIRUB UR QL STRIP: ABNORMAL
BUN SERPL-MCNC: 26.9 MG/DL (ref 8–23)
CALCIUM SERPL-MCNC: 7.3 MG/DL (ref 8.8–10.4)
CHLORIDE SERPL-SCNC: 100 MMOL/L (ref 98–107)
CHLORIDE UR-SCNC: 44 MMOL/L
COLOR UR AUTO: YELLOW
CORTIS SERPL-MCNC: 11.2 UG/DL
CREAT SERPL-MCNC: 1.12 MG/DL (ref 0.51–0.95)
EGFRCR SERPLBLD CKD-EPI 2021: 51 ML/MIN/1.73M2
ERYTHROCYTE [DISTWIDTH] IN BLOOD BY AUTOMATED COUNT: 17 % (ref 10–15)
GLUCOSE BLDC GLUCOMTR-MCNC: 180 MG/DL (ref 70–99)
GLUCOSE BLDC GLUCOMTR-MCNC: 182 MG/DL (ref 70–99)
GLUCOSE BLDC GLUCOMTR-MCNC: 205 MG/DL (ref 70–99)
GLUCOSE BLDC GLUCOMTR-MCNC: 233 MG/DL (ref 70–99)
GLUCOSE BLDC GLUCOMTR-MCNC: 248 MG/DL (ref 70–99)
GLUCOSE BLDC GLUCOMTR-MCNC: 249 MG/DL (ref 70–99)
GLUCOSE SERPL-MCNC: 180 MG/DL (ref 70–99)
GLUCOSE UR STRIP-MCNC: NEGATIVE MG/DL
HCO3 SERPL-SCNC: 23 MMOL/L (ref 22–29)
HCT VFR BLD AUTO: 26 % (ref 35–47)
HGB BLD-MCNC: 8.4 G/DL (ref 11.7–15.7)
HGB UR QL STRIP: ABNORMAL
KETONES UR STRIP-MCNC: NEGATIVE MG/DL
LEUKOCYTE ESTERASE UR QL STRIP: ABNORMAL
MCH RBC QN AUTO: 32.8 PG (ref 26.5–33)
MCHC RBC AUTO-ENTMCNC: 32.3 G/DL (ref 31.5–36.5)
MCV RBC AUTO: 102 FL (ref 78–100)
MUCOUS THREADS #/AREA URNS LPF: PRESENT /LPF
NITRATE UR QL: NEGATIVE
OSMOLALITY SERPL: 284 MMOL/KG (ref 280–301)
OSMOLALITY UR: 385 MMOL/KG (ref 100–1200)
PH UR STRIP: 5.5 [PH] (ref 5–7)
PLATELET # BLD AUTO: 127 10E3/UL (ref 150–450)
POTASSIUM SERPL-SCNC: 4.6 MMOL/L (ref 3.4–5.3)
POTASSIUM UR-SCNC: 50.8 MMOL/L
PROT SERPL-MCNC: 5.5 G/DL (ref 6.4–8.3)
RBC # BLD AUTO: 2.56 10E6/UL (ref 3.8–5.2)
RBC URINE: 57 /HPF
SODIUM SERPL-SCNC: 128 MMOL/L (ref 135–145)
SODIUM UR-SCNC: <20 MMOL/L
SP GR UR STRIP: 1.01 (ref 1–1.03)
SQUAMOUS EPITHELIAL: 1 /HPF
TRANSITIONAL EPI: <1 /HPF
TSH SERPL DL<=0.005 MIU/L-ACNC: 1 UIU/ML (ref 0.3–4.2)
UROBILINOGEN UR STRIP-MCNC: NORMAL MG/DL
WBC # BLD AUTO: 9.3 10E3/UL (ref 4–11)
WBC URINE: 52 /HPF

## 2025-05-08 PROCEDURE — 250N000013 HC RX MED GY IP 250 OP 250 PS 637: Performed by: INTERNAL MEDICINE

## 2025-05-08 PROCEDURE — 84244 ASSAY OF RENIN: CPT | Performed by: INTERNAL MEDICINE

## 2025-05-08 PROCEDURE — 250N000011 HC RX IP 250 OP 636

## 2025-05-08 PROCEDURE — 99232 SBSQ HOSP IP/OBS MODERATE 35: CPT | Performed by: INTERNAL MEDICINE

## 2025-05-08 PROCEDURE — 82533 TOTAL CORTISOL: CPT | Performed by: INTERNAL MEDICINE

## 2025-05-08 PROCEDURE — 82088 ASSAY OF ALDOSTERONE: CPT | Performed by: INTERNAL MEDICINE

## 2025-05-08 PROCEDURE — 250N000011 HC RX IP 250 OP 636: Performed by: INTERNAL MEDICINE

## 2025-05-08 PROCEDURE — 83930 ASSAY OF BLOOD OSMOLALITY: CPT | Performed by: INTERNAL MEDICINE

## 2025-05-08 PROCEDURE — 84443 ASSAY THYROID STIM HORMONE: CPT | Performed by: INTERNAL MEDICINE

## 2025-05-08 PROCEDURE — 83935 ASSAY OF URINE OSMOLALITY: CPT | Performed by: INTERNAL MEDICINE

## 2025-05-08 PROCEDURE — 97116 GAIT TRAINING THERAPY: CPT | Mod: GP

## 2025-05-08 PROCEDURE — 99207 PR NO CHARGE LOS: CPT | Performed by: INTERNAL MEDICINE

## 2025-05-08 PROCEDURE — 120N000004 HC R&B MS OVERFLOW

## 2025-05-08 PROCEDURE — 99222 1ST HOSP IP/OBS MODERATE 55: CPT | Performed by: INTERNAL MEDICINE

## 2025-05-08 PROCEDURE — 81001 URINALYSIS AUTO W/SCOPE: CPT | Performed by: INTERNAL MEDICINE

## 2025-05-08 PROCEDURE — 84155 ASSAY OF PROTEIN SERUM: CPT

## 2025-05-08 PROCEDURE — 84300 ASSAY OF URINE SODIUM: CPT | Performed by: INTERNAL MEDICINE

## 2025-05-08 PROCEDURE — 85014 HEMATOCRIT: CPT

## 2025-05-08 PROCEDURE — P9047 ALBUMIN (HUMAN), 25%, 50ML: HCPCS | Performed by: INTERNAL MEDICINE

## 2025-05-08 PROCEDURE — 36415 COLL VENOUS BLD VENIPUNCTURE: CPT

## 2025-05-08 PROCEDURE — 97530 THERAPEUTIC ACTIVITIES: CPT | Mod: GP

## 2025-05-08 PROCEDURE — 84133 ASSAY OF URINE POTASSIUM: CPT | Performed by: INTERNAL MEDICINE

## 2025-05-08 PROCEDURE — 97110 THERAPEUTIC EXERCISES: CPT | Mod: GP

## 2025-05-08 PROCEDURE — 250N000013 HC RX MED GY IP 250 OP 250 PS 637

## 2025-05-08 PROCEDURE — 82436 ASSAY OF URINE CHLORIDE: CPT | Performed by: INTERNAL MEDICINE

## 2025-05-08 PROCEDURE — 36415 COLL VENOUS BLD VENIPUNCTURE: CPT | Performed by: INTERNAL MEDICINE

## 2025-05-08 RX ORDER — ALBUMIN (HUMAN) 12.5 G/50ML
25 SOLUTION INTRAVENOUS ONCE
Status: COMPLETED | OUTPATIENT
Start: 2025-05-08 | End: 2025-05-08

## 2025-05-08 RX ORDER — MIDODRINE HYDROCHLORIDE 2.5 MG/1
2.5 TABLET ORAL
Status: DISPENSED | OUTPATIENT
Start: 2025-05-08

## 2025-05-08 RX ADMIN — APIXABAN 5 MG: 5 TABLET, FILM COATED ORAL at 08:27

## 2025-05-08 RX ADMIN — APIXABAN 5 MG: 5 TABLET, FILM COATED ORAL at 20:43

## 2025-05-08 RX ADMIN — CEFTRIAXONE SODIUM 1 G: 1 INJECTION, POWDER, FOR SOLUTION INTRAMUSCULAR; INTRAVENOUS at 09:03

## 2025-05-08 RX ADMIN — INSULIN GLARGINE 20 UNITS: 100 INJECTION, SOLUTION SUBCUTANEOUS at 20:49

## 2025-05-08 RX ADMIN — METOPROLOL SUCCINATE 50 MG: 50 TABLET, EXTENDED RELEASE ORAL at 08:29

## 2025-05-08 RX ADMIN — ONDANSETRON 4 MG: 2 INJECTION, SOLUTION INTRAMUSCULAR; INTRAVENOUS at 12:13

## 2025-05-08 RX ADMIN — LIDOCAINE: 50 OINTMENT TOPICAL at 17:02

## 2025-05-08 RX ADMIN — LIDOCAINE: 50 OINTMENT TOPICAL at 20:53

## 2025-05-08 RX ADMIN — ALBUMIN HUMAN 25 G: 0.25 SOLUTION INTRAVENOUS at 11:34

## 2025-05-08 RX ADMIN — Medication 1 LOZENGE: at 23:22

## 2025-05-08 RX ADMIN — METOPROLOL SUCCINATE 50 MG: 50 TABLET, EXTENDED RELEASE ORAL at 20:47

## 2025-05-08 RX ADMIN — LIDOCAINE: 50 OINTMENT TOPICAL at 08:36

## 2025-05-08 RX ADMIN — LIDOCAINE: 50 OINTMENT TOPICAL at 12:59

## 2025-05-08 RX ADMIN — INSULIN GLARGINE 20 UNITS: 100 INJECTION, SOLUTION SUBCUTANEOUS at 08:37

## 2025-05-08 RX ADMIN — CARBIDOPA AND LEVODOPA 2.5 MG: 50; 200 TABLET, EXTENDED RELEASE ORAL at 11:45

## 2025-05-08 RX ADMIN — CARBIDOPA AND LEVODOPA 2.5 MG: 50; 200 TABLET, EXTENDED RELEASE ORAL at 17:02

## 2025-05-08 ASSESSMENT — ACTIVITIES OF DAILY LIVING (ADL)
ADLS_ACUITY_SCORE: 47
ADLS_ACUITY_SCORE: 41
ADLS_ACUITY_SCORE: 34
ADLS_ACUITY_SCORE: 47
ADLS_ACUITY_SCORE: 34
ADLS_ACUITY_SCORE: 41
ADLS_ACUITY_SCORE: 47
ADLS_ACUITY_SCORE: 47
ADLS_ACUITY_SCORE: 34
ADLS_ACUITY_SCORE: 47
ADLS_ACUITY_SCORE: 41
ADLS_ACUITY_SCORE: 47
ADLS_ACUITY_SCORE: 34
ADLS_ACUITY_SCORE: 47
ADLS_ACUITY_SCORE: 47
ADLS_ACUITY_SCORE: 41
ADLS_ACUITY_SCORE: 47
ADLS_ACUITY_SCORE: 46

## 2025-05-08 NOTE — PROGRESS NOTES
Care Management Follow Up    Length of Stay (days): 2    Expected Discharge Date: 05/10/2025     Concerns to be Addressed: Care progression - discharge planning     Patient plan of care discussed at interdisciplinary rounds: Yes    Anticipated Discharge Disposition:  PT rec Transitional care    Anticipated Discharge Services:  Transitional care  Anticipated Discharge DME:  NA    Patient/family educated on Medicare website which has current facility and service quality ratings:  NA  Education Provided on the Discharge Plan:  yes per team  Patient/Family in Agreement with the Plan:  NA    Referrals Placed by CM/SW:  NA  Private pay costs discussed: Not applicable    Discussed  Partnership in Safe Discharge Planning  document with patient/family: Yes: patient's daughterKristy    Handoff Completed: No, handoff not indicated or clinically appropriate    Additional Information:  Rec'd a voicemail from patient's daughterKristy, [phone] to return call.    Called and left a voicemail to return call.    0835 rec'd a call from Maite walter'd [facility] auth approved  5/8/25-5/14/25 [id number]    Next Steps: RNCM to follow for medical progression, recommendations, and final discharge plan.     Amalia Kyle RN     Met with patient and her son, Sukhwinder, at bedside to get the TCU choices. Sukhwinder said his sister, Kristy, went to tour a few of the TCU facilities and will give update when she return.    1130 Kristy gave TCU choices for [facility], [facility] and [facility]    Referrals sent

## 2025-05-08 NOTE — CONSULTS
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  Nephrology consulted for elevated creatinine hyponatremia and hyperkalemia    Mild Marcelino  Creatinine slow uptrend likely with diuresis with lasix and mild low BP   Baseline creatinine 0.5-0.6 , creatinine has been ~0.7-0.8 in 5/2025 labs and increased to 0.93 and then 1.12 today   Had 90ml contrast 5/4 , lasix 40mg x2 , admitted with lactic acidosis and severe hyperglycemia and likely pre-renal state   UA WBC , RBC and turbid , stap infection , UTI   Urine sodium <20 , urine potassium 51 and urine chloride 44  serum osm 284   Appears likely consistent with HRS physiology , possibly triggered by diuresis or pre-renal state  --> will give one time albumin 25gm   Midodrine 2.5mg TID hold for SBP >120  --> follow on renal labs daily    Hyponatremia  121 on admission slow improvement  Acute on chronic  Baseline 130s  Likely related to hyperglycemia as well , now close to euglycemia and sodium is ~128  --> continue with fluid restriction , likely has an underlying highADH state  Advised family to avoid free water    Hyperkalemia  Likely with hyperglycemia  And liver disease , in addition with acidosis with elevated lactate   Improved with shifting and Lokelma   Sp lasix x2 doses 5/6    Clinically euvolemic , hypoTN possibly volume behind with intravasc  volume depleted  Albumin as above  Hold off diuresis  No edema     Metabolic acidosis   Lactic acidosis  Improved    Anemia  Likely inflamm anemia  Transfuse if <7    UTI on ceftriaxone  Staph pn Urine Cx    CLD with liver cirrhosis  Likely HRS  Hx of TIPS  Hx of HCC sp ablation   Follow with GI recs    Hx of recent PE on eliquis    SVT plans for ablation per cardiology  On metoprolol    DM with hyperglycemia  HBA1c 12+   BS elevated on admission   On insulin  Plans per primary     Thank you for the consultation  We will henry Alarcon MD  [facility]  [phone]      CC:hyponatremia , hyperkalemia, CINTIA    REASON FOR CONSULTATION: We are asked to see pt by Dr Cheek    HISTORY OF PRESENT ILLNESS:74 year old female  74 year old female with 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  Nephrology consulted for elevated creatinine hyponatremia and hyperkalemia  She reports feeling weak for a few days and noted increased LE swelling as well  She had been in ER 5/4 and 4/23 with Svt needed chemical cardioversion , was supposed to get ablation that was held with abn electrolytes severe hyponatremia and hyperkalemia and hyperglycemia 600+  Has poorly controlled DM with HBA1c 12+  Over the corse of hospitalization has lactica acidosis that improved with fluid , Hyperkalemia improved with shifting , lokelma and lasix and hyponatremia slow  imprvement with lasix and fluid restriction  Energy levels a sbit better  She has had a slow uptrend in creat in last 2 days   UO adequate      REVIEW OF SYSTEMS:  ROS was completely reviewed and otherwise negative and non-contributory    Past Medical History:   Diagnosis Date    Anemia     Atypical chest pain     Common bile duct obstruction (H) 11/20/2023    Crohn's disease (H)     Diabetes mellitus (H)     Disorder of biliary tract     Essential tremor     Gastrointestinal hemorrhage     Hepatocellular carcinoma (H)     Hypercholesteremia     Ileostomy status (H)     Lesion of liver     Non-alcoholic cirrhosis (H)     Obstruction of common bile duct (H) 11/20/2023       Social History     Socioeconomic History    Marital status:      Spouse name: Not on file    Number of children: 2    Years of education: 16    Highest education level: Bachelor's degree (e.g., BA, AB, BS)   Occupational History    Occupation: Retired   Tobacco Use    Smoking status: Never     Passive exposure: Never    Smokeless tobacco: Never   Vaping Use    Vaping status: Never Used   Substance and Sexual Activity    Alcohol use: Not Currently    Drug use: No    Sexual activity: Not on file   Other Topics Concern    Not on file   Social History Narrative    Not on file     Social Drivers of Health     Financial Resource Strain: Low Risk  (1/29/2025)    Financial Resource Strain     Within the past 12 months, have you or your family members you live with been unable to get utilities (heat, electricity) when it was really needed?: No   Food Insecurity: Low Risk  (1/29/2025)    Food Insecurity     Within the past 12 months, did you worry that your food would run out before you got money to buy more?: No     Within the past 12 months, did the food you bought just not last and you didn t have money to get more?: No   Transportation Needs: Low Risk  (1/29/2025)    Transportation Needs     Within the past 12 months, has lack of transportation  kept you from medical appointments, getting your medicines, non-medical meetings or appointments, work, or from getting things that you need?: No   Physical Activity: Not on file   Stress: Not on file   Social Connections: Unknown (11/17/2023)    Received from [facility]    Social Connections     Frequency of Communication with Friends and Family: Not on file   Interpersonal Safety: Low Risk  (1/29/2025)    Interpersonal Safety     Do you feel physically and emotionally safe where you currently live?: Yes     Within the past 12 months, have you been hit, slapped, kicked or otherwise physically hurt by someone?: No     Within the past 12 months, have you been humiliated or emotionally abused in other ways by your partner or ex-partner?: Patient unable to answer   Housing Stability: Low Risk  (1/29/2025)    Housing Stability     Do you have housing? : Yes     Are you worried about losing your housing?: No       Family History   Problem Relation Age of Onset    Diabetes Mother     Heart Disease Mother     Hyperlipidemia Mother     Diabetes Father     Cancer Father         malignant gallbladder    No Known Problems Sister     No Known Problems Sister        Allergies   Allergen Reactions    Prochlorperazine Edisylate [Prochlorperazine] Other (See Comments)     Saw things    Compazine [Prochlorperazine] Hallucination       MEDICATIONS:  Current Facility-Administered Medications   Medication Dose Route Frequency Provider Last Rate Last Admin    apixaban ANTICOAGULANT (ELIQUIS) tablet 5 mg  5 mg Oral BID Michelle Valenzuela NP   5 mg at 05/08/25 0827    cefTRIAXone (ROCEPHIN) 1 g vial to attach to  mL bag for ADULTS or NS 50 mL bag for PEDS  1 g Intravenous Q24H Oni Crow MD   1 g at 05/07/25 1013    dextrose 10% BOLUS 300 mL  300 mL Intravenous Once Michelle Valenzuela NP        insulin aspart (NovoLOG) injection (RAPID ACTING)  1-10 Units Subcutaneous TID AC Oni Crow MD   2 Units  "at 05/08/25 0828    insulin aspart (NovoLOG) injection (RAPID ACTING)  1-7 Units Subcutaneous At Bedtime Oni Crow MD   2 Units at 05/07/25 2108    insulin aspart (NovoLOG) injection (RAPID ACTING)   Subcutaneous TID w/meals Oni Crow MD   1 Units at 05/07/25 1833    insulin glargine (LANTUS PEN) injection 20 Units  20 Units Subcutaneous BID Michelle Valenzuela NP   20 Units at 05/08/25 0837    lidocaine (XYLOCAINE) 5 % ointment   Topical 4x Daily Oni Crow MD   Given at 05/08/25 0836    metoprolol succinate ER (TOPROL XL) 24 hr tablet 50 mg  50 mg Oral BID Michelle Valenzuela NP   50 mg at 05/08/25 0829    sodium chloride (PF) 0.9% PF flush 3 mL  3 mL Intracatheter Q8H KEITH Michelle Valenzuela NP   3 mL at 05/07/25 2108         PHYSICAL EXAM    BP 95/56 (BP Location: Right arm)   Pulse 77   Temp 98.4  F (36.9  C) (Oral)   Resp 18   Ht 1.651 m (5' 5\")   Wt 69 kg (152 lb 1.9 oz)   LMP  (LMP Unknown)   SpO2 92%   BMI 25.31 kg/m        Intake/Output Summary (Last 24 hours) at 5/8/2025 0856  Last data filed at 5/7/2025 2347  Gross per 24 hour   Intake 700 ml   Output 1050 ml   Net -350 ml       Alert/ awake and NAD  HEENT NC/AT; perrla; OP clear without lesions; mmm  Neck supple without LAD, TM  CV; RRR without rub or murmur  Lung: clear and equal; no extra sounds  Ab: soft and NT; not distended; normal bs  Ext: no edema and well perfused  Skin; no rash  Neuro; grossly intact    LABORATORIES    Recent Labs   Lab 05/08/25  0707 05/07/25  0433 05/06/25 1955   WBC 9.3 11.5* 14.5*   HGB 8.4* 10.3* 9.6*   HCT 26.0* 31.2* 27.6*   * 115* 131*     Recent Labs   Lab 05/08/25  0707 05/07/25  1458 05/07/25  0817 05/07/25  0433 05/06/25  1436 05/06/25  1115   * 127* 125* 127*   < > 126*   CO2 23 18* 21* 20*   < > 25   BUN 26.9* 22.3 20.6 20.3   < > 19.5   ALKPHOS 313*  --   --  381*  --  408*   ALT 32  --   --  38  --  45   AST 69*  --   --  82*  --  92*    < > = values in this interval not displayed.     No " "results for input(s): \"INR\", \"PTT\" in the last 168 hours.    Invalid input(s): \"APTT\"  Invalid input(s): \"FERRITIN\"  No results for input(s): \"IRON\" in the last 168 hours.    Invalid input(s): \"TIBC\"    I reviewed all labs and imaging    Thank you for the consultation we will follow    Radha Alarcon MD  [facility]  [phone]     "

## 2025-05-08 NOTE — PROGRESS NOTES
[facility]    Medicine Progress Note - Hospitalist Service    Date of Admission:  5/6/2025    Assessment & Plan     Zara Webster is a 74 F presents with abnormal electrolytes prior to scheduled ablation for SVT. Medical history significant for crohns s/p total colectomy with ostomy, IDDM2, liver cancer, and PE. Prior to procedure patient's potassium was 6.9 and sodium 126 glucose over 400.  Patient complaining of abdominal pain and nausea with weakness over the last few days . Admitted for hyperkalemia and hyponatremia.       Hyperkalemia  Potassium 6.9 on admit. Decreased to 5.3 after Lokelma, Insulin 6.5 units IV and abuterol neb  - Continue to monitor potassium. It is 4.6 today.  - Family would like nephrology consult. Input pending.     Hyponatremia  Sodium 125 on admission. After adjusted for her hyperglycemia, it was 132.  Sodium remains 125-127 since admission. Blood sugar has improved. Sodium level is about 129.   She has bilateral leg edema on exam. Her hyponatremia likely due to fluid retention. Received Lasix  40 mg x2.  - Given hyperkalemia and hyponatremia, will check AM cortisol and TSH.  - Fluid restriction  - Continue to monitor sodium.     Diabetic mellitus type II with hyperglycemia  Blood sugar high at 393 on admission. Recent HbA1c 12.1. PTA Lantus 20 units bid and 6 units tidac.  - Resume PTA lantus 20 units bid  - Novlog carb count 1:10 tidac and sliding scale    Urinary tract infection  Recent UA on 5/4 concerns UTI. Urine culture shows MSSA. Patient reports having dysuria.   - Continue Ceftriaxone    Paroxysmal SVT  EKG on admission showed normal sinus rhythm.   - Telemetry  - Ablation scheduled for 5/6/2025 and postponed due to abnormalities in electrolytes. Rescheduled in 2 weeks per cardiology.  - Resume home metoprolol    Cirrhosis of the liver, Liver Cancer  Reports having abdominal pain.   Chronically elevated LFTs, similar to her baseline 2 months ago. Total  bilirubin slowly trending up since 2 months ago.   Ultrasound abdomen shows no change compared to prior MRIs, cirrhosis with patent TIPS, mild amount of biliary sludge. No gallstones or inflammatory gallbladder wall thickening.  - She normally follows up in [facility] for radioembolization. GI was consulted per family request. Input appreciated.   - Continue to monitor with supportive care.      History of pulmonary emboli: Resume home Eliquis. Chest CT done this admission shows prior PE in the right lung is no longer identified.      Crohn's status post colitis ectomy with ostomy: ostomy care    Generalized weakness and frequent falls  Patient states she has had generalized weakness and falls over the last few weeks. She did not hit her head, but strained her bilateral hips.   - Fall precautions  -  PT OT: recommends TCU.    Plan of care was discussed with her son and significant other by the bedside.         Diet: Moderate Consistent Carb (60 g CHO per Meal) Diet  Fluid restriction 1800 ML FLUID    DVT Prophylaxis: DOAC  Caro Catheter: Not present  Lines: None     Cardiac Monitoring: ACTIVE order. Indication: Tachyarrhythmias, acute (48 hours)  Code Status: Full Code      Clinically Significant Risk Factors        # Hyperkalemia: Highest K = 6 mmol/L in last 2 days, will monitor as appropriate  # Hyponatremia: Lowest Na = 125 mmol/L in last 2 days, will monitor as appropriate       # Hypoalbuminemia: Lowest albumin = 1.5 g/dL at 5/8/2025  7:07 AM, will monitor as appropriate     # Thrombocytopenia: Lowest platelets = 115 in last 2 days, will monitor for bleeding  # Acute Kidney Injury, unspecified: based on a >150% or 0.3 mg/dL increase in last creatinine compared to past 90 day average, will monitor renal function            # DMII: A1C = 12.1 % (Ref range: 0.0 - 5.6 %) within past 6 months   # Overweight: Estimated body mass index is 25.31 kg/m  as calculated from the following:    Height as of this encounter:  "1.651 m (5' 5\").    Weight as of this encounter: 69 kg (152 lb 1.9 oz)., PRESENT ON ADMISSION       # Financial/Environmental Concerns: none         Social Drivers of Health     Received from [employer] [facility]    Social Connections          Disposition Plan     Medically Ready for Discharge: Anticipated in 2-4 Days             Oni Crow MD  Hospitalist Service  [facility]  Securely message with [employer] Exam [employer] Platform (more info)  Text page via [employer] Paging/Directory   ______________________________________________________________________    Interval History   Patient reports feeling a lot better overall today. She no longer has palm itching or hand shaking. No dysuria.     Physical Exam   Vital Signs: Temp: 98.3  F (36.8  C) Temp src: Oral BP: 121/72 Pulse: 88   Resp: 16 SpO2: 94 % O2 Device: None (Room air)    Weight: 152 lbs 1.88 oz    General appearance: not in acute distress  HEENT: PERRL, EOMI  Lungs: Clear breath sounds in bilateral lung fields  Cardiovascular: Regular rate and rhythm, normal S1-S2  Abdomen: Soft, non tender, no distension  Musculoskeletal: No joint swelling  Skin: No rash. Bilateral leg edema  Neurology: AAO ×3.  Cranial nerves II - XII normal.  Normal muscle strength in all four extremities.     Medical Decision Making       48 MINUTES SPENT BY ME on the date of service doing chart review, history, exam, documentation & further activities per the note.      Data     I have personally reviewed the following data over the past 24 hrs:    9.3  \   8.4 (L)   / 127 (L)     128 (L) 100 26.9 (H) /  248 (H)   4.6 23 1.12 (H) \     ALT: 32 AST: 69 (H) AP: 313 (H) TBILI: 5.0 (H)   ALB: 1.5 (L) TOT PROTEIN: 5.5 (L) LIPASE: N/A     TSH: 1.00 T4: N/A A1C: N/A     Procal: N/A CRP: N/A Lactic Acid: 2.1 (H)         Imaging results reviewed over the past 24 hrs:   No results found for this or any previous visit (from the past 24 hours).    "

## 2025-05-08 NOTE — PLAN OF CARE
"  Problem: Adult Inpatient Plan of Care  Goal: Plan of Care Review  Description: The Plan of Care Review/Shift note should be completed every shift.  The Outcome Evaluation is a brief statement about your assessment that the patient is improving, declining, or no change.  This information will be displayed automatically on your shiftnote.  Outcome: Progressing  Goal: Patient-Specific Goal (Individualized)  Description: You can add care plan individualizations to a care plan. Examples of Individualization might be:  \"Parent requests to be called daily at 9am for status\", \"I have a hard time hearing out of my right ear\", or \"Do not touch me to wake me up as it startlesme\".  Outcome: Progressing  Goal: Absence of Hospital-Acquired Illness or Injury  Outcome: Progressing  Intervention: Identify and Manage Fall Risk  Recent Flowsheet Documentation  Taken 5/8/2025 1200 by Kelsea Rosario RN  Safety Promotion/Fall Prevention:   activity supervised   mobility aid in reach   nonskid shoes/slippers when out of bed  Taken 5/8/2025 0841 by Kelsea Rosario RN  Safety Promotion/Fall Prevention:   activity supervised   mobility aid in reach   nonskid shoes/slippers when out of bed  Intervention: Prevent Skin Injury  Recent Flowsheet Documentation  Taken 5/8/2025 1200 by Kelsea Rosario RN  Body Position:   heels elevated   legs elevated   log-rolled  Taken 5/8/2025 0841 by Kelsea Rosario RN  Body Position:   heels elevated   legs elevated   log-rolled  Taken 5/8/2025 0829 by Kelsea Rosario RN  Body Position: turned  Intervention: Prevent Infection  Recent Flowsheet Documentation  Taken 5/8/2025 1200 by Kelsea Rosario RN  Infection Prevention:   environmental surveillance performed   rest/sleep promoted  Taken 5/8/2025 0841 by Kelsea Rosario RN  Infection Prevention:   environmental surveillance performed   rest/sleep promoted  Goal: Optimal Comfort and Wellbeing  Outcome: Progressing  Intervention: Monitor Pain " Patient aware and Promote Comfort  Recent Flowsheet Documentation  Taken 5/8/2025 0829 by Kelsea Rosario, RN  Pain Management Interventions: medication (see MAR)  Intervention: Provide Person-Centered Care  Recent Flowsheet Documentation  Taken 5/8/2025 1200 by Kelsea Rosario, RN  Trust Relationship/Rapport:   care explained   questions answered  Taken 5/8/2025 0841 by Kelsea Rosario, RN  Trust Relationship/Rapport:   care explained   questions answered  Goal: Readiness for Transition of Care  Outcome: Progressing     Problem: Nausea and Vomiting  Goal: Nausea and Vomiting Relief  Outcome: Progressing   Goal Outcome Evaluation:      Patient feeling better today. A/O X4 this am although family states she was confused when she initially woke up and family was here. Patient reports being hungry and had good appetite this am ate some of lunch and became nauseated reported mild heart burn. Had very small emesis improved with Zofran.Patient declined TUMS of Protonix. Patient very thirsty asking for fluids all the time on 1800 FR. Tolerated rocephin without any reaction (had previous burning itching on hands and feet post infusion) on RA sating 95 % fine crackles in LLL base. Assist of 1 to BR. Stool in colostomy soft. Denies ABD pain. Afebrile. Received 2 bags of albumin. Remains in NSR.

## 2025-05-08 NOTE — PLAN OF CARE
Assumed care 1900 to 0730. A&O x 2, disoriented to time and situation. Lethargic. Assist x 2 with a walker and gait belt. Tele is NSR. Denies pain. Room air. Patient repeatedly asked if she was going home today. Daughter at bedside, supportive of patient. Call light within reach, able to make needs known. Bed alarm on for safety.    Problem: Sepsis/Septic Shock  Goal: Absence of Infection Signs and Symptoms  Intervention: Initiate Sepsis Management  Recent Flowsheet Documentation  Taken 5/8/2025 0400 by Kiera Navarrete RN  Infection Prevention:   environmental surveillance performed   rest/sleep promoted  Taken 5/8/2025 0000 by Kiera Navarrete RN  Infection Prevention:   environmental surveillance performed   rest/sleep promoted  Taken 5/7/2025 2105 by Kiera Navarrete RN  Infection Prevention:   environmental surveillance performed   rest/sleep promoted     Problem: Delirium  Goal: Improved Attention and Thought Clarity  Intervention: Maximize Cognitive Function  Recent Flowsheet Documentation  Taken 5/8/2025 0400 by Kiera Navarrete RN  Sensory Stimulation Regulation:   care clustered   lighting decreased  Reorientation Measures: clock in view  Taken 5/8/2025 0000 by Kiera Navarrete RN  Sensory Stimulation Regulation:   care clustered   lighting decreased  Reorientation Measures: clock in view  Taken 5/7/2025 2105 by Kiera Navarrete RN  Sensory Stimulation Regulation:   care clustered   lighting decreased  Reorientation Measures: clock in view

## 2025-05-08 NOTE — CONSULTS
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  Hyperkalemia and hyponatremia: Improved  2.  Reduced mental status: Improved, likely due to UTI.  She is at higher risk for hepatic encephalopathy with infections, given her TIPS history.  She may have baseline grade 1 encephalopathy by history.  She has no ascites.  3.  UTI with Staph aureus on culture  4.  Cirrhosis status post TIPS: Chronic liver test elevation is stable.  No INR to calculate a MELD. TIPS is patent per ultrasound.  Suspect baseline grade 1 hepatic encephalopathy per discussion with family.  5.  History of HCC: Status post CT guided microwave ablation of 2 lesions.  Stable imaging.  6.  Crohn's disease: No issues for many years, no current treatment  7.  SVT: Ablation was deferred due to electrolyte abnormalities  8.  History of PE: Resolved on recent CT scan.     RECOMMENDATIONS   1.  No current inpatient recommendations at this time.  She will need a follow-up in liver clinic in 1 to 2 months and I have asked her family to contact her office for scheduling.  I will also update her hepatologist on her admission.  2.  Discussed the possibility for grade 1 encephalopathy which could be treated with lactulose therapy.  Given her recent electrolyte derangements and clinical improvement already, I will hold off for now and this can be addressed as an outpatient.  3.  I will sign off, please call with questions.       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  Her family reports she has had several weeks of declining clinical status.  She does have chronic dysuria symptoms and has been found to have a UTI.  Just with correction of electrolytes on admission and treatment of her UTI, her family reports she is already looking a lot better.  She has had some recent falls and weakness.  There was some consideration that her beta-blocker may be contributing to the symptoms, which is why they were pursuing ablation.  She has had multiple prior cardioversions.  CT on admission did not show persistent PE.  She has been treated with Eliquis for this.  Even before the last few weeks, her family reports there seems to be some baseline cognitive dysfunction, just a little slowing of thought or repetitive speech at times.  She has not been found to have hepatic encephalopathy previously.  She denies GI bleeding, increased abdominal distention.      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 operating room.  She denies alcohol use and denies ever using illicit drugs.  She follows at [facility] for her liver disease.  She has not had issues with her Crohn's in many years and is not currently on treatment.     PAST HISTORY   Past Medical History:   Diagnosis Date    Anemia     Atypical chest pain     Common bile duct obstruction (H) 11/20/2023    Crohn's disease (H)     Diabetes mellitus (H)     Disorder of biliary tract     Essential tremor     Gastrointestinal hemorrhage     Hepatocellular carcinoma (H)     Hypercholesteremia      Ileostomy status (H)     Lesion of liver     Non-alcoholic cirrhosis (H)     Obstruction of common bile duct (H) 11/20/2023      Past Surgical History:   Procedure Laterality Date    BREAST EXCISIONAL BIOPSY Left 1980    BREAST EXCISIONAL BIOPSY Left 1985    ESOPHAGOSCOPY, GASTROSCOPY, DUODENOSCOPY (EGD), COMBINED N/A 1/26/2024    Procedure: ENDOSCOPIC ULTRASOUND;  Surgeon: Santi Pradhan MD;  Location: [facility] [place]    HYSTERECTOMY  1991    OOPHORECTOMY Bilateral 1991    OTHER SURGICAL HISTORY      KIDNEY STONE REMOVALNOT SURE WHICH SIDE, DR. CHANEY    NC PROCTOSIGMOIDOSCOPY,RIGID,DIAGNOS N/A 6/17/2020    Procedure: ILEOSTOMY REVISION, PROCTOSCOPY, ILEOSCOPY;  Surgeon: Devang Moon MD;  Location: [facility];  Service: General    ZZC REMOVAL COLON/ILEOSTOMY      Description: Total Abdominal Colectomy;  Recorded: 12/15/2011;    ZZC REMOVAL COLON/PROCTECTOMY/ILEOSTOMY      Description: Total Proctocolectomy;  Recorded: 08/10/2011;    ZZC VAG HYST, W/VAGINECTOMY      Description: Vaginal Hysterectomy With Colpectomy;  Recorded: 08/14/2014;        Family History Social History   Family History   Problem Relation Age of Onset    Diabetes Mother     Heart Disease Mother     Hyperlipidemia Mother     Diabetes Father     Cancer Father         malignant gallbladder    No Known Problems Sister     No Known Problems Sister     Social History     Socioeconomic History    Marital status:     Number of children: 2    Years of education: 16    Highest education level: Bachelor's degree (e.g., BA, AB, BS)   Occupational History    Occupation: Retired   Tobacco Use    Smoking status: Never     Passive exposure: Never    Smokeless tobacco: Never   Vaping Use    Vaping status: Never Used   Substance and Sexual Activity    Alcohol use: Not Currently    Drug use: No     Social Drivers of Health     Financial Resource Strain: Low Risk  (1/29/2025)    Financial Resource Strain     Within the past 12  months, have you or your family members you live with been unable to get utilities (heat, electricity) when it was really needed?: No   Food Insecurity: Low Risk  (1/29/2025)    Food Insecurity     Within the past 12 months, did you worry that your food would run out before you got money to buy more?: No     Within the past 12 months, did the food you bought just not last and you didn t have money to get more?: No   Transportation Needs: Low Risk  (1/29/2025)    Transportation Needs     Within the past 12 months, has lack of transportation kept you from medical appointments, getting your medicines, non-medical meetings or appointments, work, or from getting things that you need?: No    [facility]    Social Connections   Interpersonal Safety: Low Risk  (1/29/2025)    Interpersonal Safety     Do you feel physically and emotionally safe where you currently live?: Yes     Within the past 12 months, have you been hit, slapped, kicked or otherwise physically hurt by someone?: No     Within the past 12 months, have you been humiliated or emotionally abused in other ways by your partner or ex-partner?: Patient unable to answer   Housing Stability: Low Risk  (1/29/2025)    Housing Stability     Do you have housing? : Yes     Are you worried about losing your housing?: No         MEDICATIONS & ALLERGIES   Medications Prior to Admission   Medication Sig Dispense Refill Last Dose/Taking    apixaban ANTICOAGULANT (ELIQUIS) 5 MG tablet Take 1 tablet (5 mg) by mouth 2 times daily. 180 tablet 3 5/5/2025 Evening    clobetasol (TEMOVATE) 0.05 % external ointment Apply topically as needed   Taking As Needed    diphenoxylate-atropine (LOMOTIL) 2.5-0.025 MG tablet Take 1 tablet by mouth 4 times daily as needed for diarrhea. 120 tablet 3 Taking As Needed    fluconazole (DIFLUCAN) 150 MG tablet Take one tablet now and if no better in 3 days, take another (Patient taking differently: Take 150  mg by mouth as needed. Take one tablet now and if no better in 3 days, take another) 2 tablet 5 Taking Differently    insulin glargine (LANTUS SOLOSTAR) 100 UNIT/ML pen Take 20 units BID (Patient taking differently: Inject 20 Units subcutaneously 2 times daily. Take 20 units BID) 30 mL 3 5/5/2025 Bedtime    insulin lispro (HUMALOG KWIKPEN) 100 UNIT/ML (1 unit dial) KWIKPEN Inject 6 units subcutaneously 4 times daily for blood glucose management (Patient taking differently: Inject 6 Units subcutaneously 4 times daily (with meals and nightly). Inject 6 units subcutaneously 4 times daily for blood glucose management  QID with meals and bedtime) 30 mL 4 5/5/2025 Bedtime    metoprolol succinate ER (TOPROL XL) 50 MG 24 hr tablet Take 1 tablet (50 mg) by mouth 2 times daily. 60 tablet 3 5/5/2025 Bedtime    Multiple Vitamins-Minerals (HAIR SKIN & NAILS ADVANCED) TABS Take 1 tablet by mouth daily.   5/5/2025    omeprazole (PRILOSEC) 40 MG DR capsule Take 40 mg by mouth nightly as needed.   Taking As Needed    ondansetron (ZOFRAN ODT) 4 MG ODT tab Place 4 mg under the tongue every 8 hours as needed for nausea.   Taking As Needed    oxyCODONE (ROXICODONE) 5 MG tablet Take 5 mg by mouth every 6 hours as needed for breakthrough pain.   Taking As Needed    continuous blood glucose monitoring (FREESTYLE VIDA) sensor 1 Units daily 2 each 3     flash glucose scanning reader (FREESTYLE VIDA 14 DAY READER) Misc [FLASH GLUCOSE SCANNING READER (FREESTYLE VIDA 14 DAY READER) MISC] Use 1 Units As Directed every 14 (fourteen) days. 1 each 0     insulin pen needle (32G X 4 MM) 32G X 4 MM miscellaneous Use 8 pen needles daily or as directed. 800 each 0     ostomy adhesive Pste [OSTOMY ADHESIVE PSTE] Apply as needed 4 Tube 3         ALLERGIES   Allergies   Allergen Reactions    Prochlorperazine Edisylate [Prochlorperazine] Other (See Comments)     Saw things    Compazine [Prochlorperazine] Hallucination         REVIEW OF SYSTEMS    See HPI  "for pertinent positives and negatives. A comprehensive review of systems was performed and was otherwise noncontributory.     OBJECTIVE   Vitals Blood pressure 95/56, pulse 77, temperature 98.4  F (36.9  C), temperature source Oral, resp. rate 18, height 1.651 m (5' 5\"), weight 69 kg (152 lb 1.9 oz), SpO2 92%, not currently breastfeeding.           Physical  Exam  GENERAL: Tired but alert and oriented, no acute distress.    HEENT: atraumatic, anicteric, moist mucous membranes, neck soft/supple    PULMONARY: normal resp effort, breath sounds clear to auscultation bilaterally   CARDIOVASCULAR: normal rate and rhythm, no murmurs   ABDOMEN: soft, no tenderness, no distention, bowel sounds normal. No hepatosplenomegaly.  No ascites.   MUSCULOSKELETAL: joints grossly normal   NEUROLOGICAL: appropriate mental status, grossly intact, no asterixis   PSYCHIATRIC: normal mood, affect and insight   SKIN: warm and dry, no rashes   EXT: no edema        LABORATORY    ELECTROLYTE PANEL   Recent Labs   Lab 05/08/25  0753 05/08/25  0707 05/08/25  0653 05/07/25  1814 05/07/25  1458 05/07/25  1221 05/07/25  0817   NA  --  128*  --   --  127*  --  125*   POTASSIUM  --  4.6  --   --  5.4*  --  5.3   CHLORIDE  --  100  --   --  101  --  101   CO2  --  23  --   --  18*  --  21*   * 180* 182*   < > 223*   < > 268*   CR  --  1.12*  --   --  0.98*  --  0.85   BUN  --  26.9*  --   --  22.3  --  20.6    < > = values in this interval not displayed.      HEMATOLOGY PANEL   Recent Labs   Lab 05/08/25  0707 05/07/25  0433 05/06/25  1955   HGB 8.4* 10.3* 9.6*   * 101* 96   WBC 9.3 11.5* 14.5*   * 115* 131*      LIVER AND PANCREAS PANEL   Recent Labs   Lab 05/08/25  0707 05/07/25  0433 05/06/25  1115   AST 69* 82* 92*   ALT 32 38 45   ALKPHOS 313* 381* 408*   BILITOTAL 5.0* 5.4* 5.5*     IMAGING STUDIES    EXAM: US ABDOMEN LIMITED  LOCATION: [facility]  DATE: 5/6/2025     INDICATION: Elevated LFTs, " abdominal pain  COMPARISON: MRI 5/2/2025 and 12/16/2024  TECHNIQUE: Limited abdominal ultrasound.     FINDINGS:     GALLBLADDER: Mild amount of sludge, otherwise normal. No gallstones, wall thickening, or pericholecystic fluid. Negative sonographic Segura's sign.     BILE DUCTS: There is focal intrahepatic biliary dilatation involving lateral segment left lobe unchanged compared to prior MRIs. The common duct measures 6 mm.     LIVER: Cirrhotic liver. No focal mass. The portal vein is patent with flow in the normal direction. Patent TIPS.     RIGHT KIDNEY: No hydronephrosis.     PANCREAS: The visualized portions are normal.     SPLEEN: Splenomegaly.     No ascites.                                                                      IMPRESSION:  1.  No change compared to prior MRIs.  2.  Cirrhosis with patent TIPS.  3.  Mild amount of biliary sludge. No gallstones or inflammatory gallbladder wall thickening. Focal intrahepatic duct dilatation involving lateral segment left lobe is unchanged.  4.  Splenomegaly.  5.  No ascites.    Narrative & Impression   EXAM: CT CHEST PULMONARY EMBOLISM W CONTRAST  LOCATION: [facility]  DATE: 5/4/2025     INDICATION: Known PE, active liver cancer, chest pain, shortness of breath, missed a few weeks of Eliquis.  COMPARISON: CT 2/25/2025  TECHNIQUE: CT chest pulmonary angiogram during arterial phase injection of IV contrast. Multiplanar reformats and MIP reconstructions were performed. Dose reduction techniques were used.   CONTRAST: Isovue 370 90 mL     FINDINGS:  ANGIOGRAM CHEST: Pulmonary arteries are normal caliber and negative for pulmonary emboli. Thoracic aorta is negative for dissection. No CT evidence of right heart strain.     LUNGS AND PLEURA: Minute left pleural effusion. Tiny bleb in the left upper lobe.     MEDIASTINUM/AXILLAE: Normal.     CORONARY ARTERY CALCIFICATION: No significant.     UPPER ABDOMEN: There is a TIPS and this appears to be in  stable position. Liver masses, intrahepatic bile duct dilatation in the left lobe, and cirrhotic configuration of the liver. Splenomegaly.     MUSCULOSKELETAL: Mild to moderate scattered hypertrophic changes in the spine.                                                                      IMPRESSION:  1.  Prior seen PE in the right lung is no longer identified.     2.  No PE, dissection, or aneurysm seen on today's CT.     3.  Stable indeterminate masses in the liver, bile duct dilatation in the left lobe of the liver, TIPS, cirrhosis, and splenomegaly.     4.  Minute left pleural effusion.     I have reviewed the current diagnostic and laboratory tests.             Total time spent providing patient care: 40 minutes with at least 50% spent in reviewing documentation/test results, decision making, and coordination of care.           Alma Wilkins MD  Thank you for the opportunity to participate in the care of this patient.   Please feel free to call me with any questions or concerns.  Phone number [phone].

## 2025-05-08 NOTE — PROGRESS NOTES
"Imp/plan:  SVT - plan rescedule in 2 weeks after recovery from infection, sent information to EP to reschedule  Hx PE in setting hepatic cancer - on eliquuis  CAD avoiding statin given cirrhosis  Will sign off    Review of Systems: 12 points negative other than above    /72 (BP Location: Left arm)   Pulse 88   Temp 98.3  F (36.8  C) (Oral)   Resp 16   Ht 1.651 m (5' 5\")   Wt 69 kg (152 lb 1.9 oz)   LMP  (LMP Unknown)   SpO2 94%   BMI 25.31 kg/m        Lab Results   Component Value Date    HGB 8.4 (L) 05/08/2025     Lab Results   Component Value Date     (L) 05/08/2025     No results found for: \"CREATININE\"  No components found for: \"K\"          Tim Nuñez MD  Interventional Cardiology   [facility]  [phone]    "

## 2025-05-09 ENCOUNTER — APPOINTMENT (OUTPATIENT)
Dept: PHYSICAL THERAPY | Facility: HOSPITAL | Age: 75
DRG: 682 | End: 2025-05-09
Attending: INTERNAL MEDICINE
Payer: COMMERCIAL

## 2025-05-09 LAB
ALDOST SERPL-MCNC: 20.1 NG/DL (ref 0–31)
ANION GAP SERPL CALCULATED.3IONS-SCNC: 6 MMOL/L (ref 7–15)
BUN SERPL-MCNC: 23.7 MG/DL (ref 8–23)
CALCIUM SERPL-MCNC: 8 MG/DL (ref 8.8–10.4)
CHLORIDE SERPL-SCNC: 101 MMOL/L (ref 98–107)
CREAT SERPL-MCNC: 0.94 MG/DL (ref 0.51–0.95)
EGFRCR SERPLBLD CKD-EPI 2021: 63 ML/MIN/1.73M2
GLUCOSE BLDC GLUCOMTR-MCNC: 178 MG/DL (ref 70–99)
GLUCOSE BLDC GLUCOMTR-MCNC: 187 MG/DL (ref 70–99)
GLUCOSE BLDC GLUCOMTR-MCNC: 198 MG/DL (ref 70–99)
GLUCOSE BLDC GLUCOMTR-MCNC: 204 MG/DL (ref 70–99)
GLUCOSE BLDC GLUCOMTR-MCNC: 211 MG/DL (ref 70–99)
GLUCOSE SERPL-MCNC: 180 MG/DL (ref 70–99)
HCO3 SERPL-SCNC: 22 MMOL/L (ref 22–29)
HOLD SPECIMEN: NORMAL
POTASSIUM SERPL-SCNC: 4.3 MMOL/L (ref 3.4–5.3)
SODIUM SERPL-SCNC: 129 MMOL/L (ref 135–145)

## 2025-05-09 PROCEDURE — 99233 SBSQ HOSP IP/OBS HIGH 50: CPT | Performed by: INTERNAL MEDICINE

## 2025-05-09 PROCEDURE — 99232 SBSQ HOSP IP/OBS MODERATE 35: CPT | Performed by: INTERNAL MEDICINE

## 2025-05-09 PROCEDURE — 250N000013 HC RX MED GY IP 250 OP 250 PS 637: Performed by: INTERNAL MEDICINE

## 2025-05-09 PROCEDURE — 97110 THERAPEUTIC EXERCISES: CPT | Mod: GP

## 2025-05-09 PROCEDURE — P9047 ALBUMIN (HUMAN), 25%, 50ML: HCPCS | Performed by: INTERNAL MEDICINE

## 2025-05-09 PROCEDURE — 36415 COLL VENOUS BLD VENIPUNCTURE: CPT | Performed by: INTERNAL MEDICINE

## 2025-05-09 PROCEDURE — 250N000013 HC RX MED GY IP 250 OP 250 PS 637

## 2025-05-09 PROCEDURE — 250N000011 HC RX IP 250 OP 636: Performed by: INTERNAL MEDICINE

## 2025-05-09 PROCEDURE — 120N000004 HC R&B MS OVERFLOW

## 2025-05-09 PROCEDURE — 97116 GAIT TRAINING THERAPY: CPT | Mod: GP

## 2025-05-09 PROCEDURE — 97530 THERAPEUTIC ACTIVITIES: CPT | Mod: GP

## 2025-05-09 PROCEDURE — 80048 BASIC METABOLIC PNL TOTAL CA: CPT | Performed by: INTERNAL MEDICINE

## 2025-05-09 RX ORDER — ALBUMIN (HUMAN) 12.5 G/50ML
25 SOLUTION INTRAVENOUS ONCE
Status: COMPLETED | OUTPATIENT
Start: 2025-05-09 | End: 2025-05-09

## 2025-05-09 RX ADMIN — OXYCODONE HYDROCHLORIDE 5 MG: 5 TABLET ORAL at 03:46

## 2025-05-09 RX ADMIN — LIDOCAINE: 50 OINTMENT TOPICAL at 08:34

## 2025-05-09 RX ADMIN — APIXABAN 5 MG: 5 TABLET, FILM COATED ORAL at 20:28

## 2025-05-09 RX ADMIN — CEFTRIAXONE SODIUM 1 G: 1 INJECTION, POWDER, FOR SOLUTION INTRAMUSCULAR; INTRAVENOUS at 10:02

## 2025-05-09 RX ADMIN — CARBIDOPA AND LEVODOPA 2.5 MG: 50; 200 TABLET, EXTENDED RELEASE ORAL at 17:28

## 2025-05-09 RX ADMIN — METOPROLOL SUCCINATE 50 MG: 50 TABLET, EXTENDED RELEASE ORAL at 20:28

## 2025-05-09 RX ADMIN — ALBUMIN HUMAN 25 G: 0.25 SOLUTION INTRAVENOUS at 10:04

## 2025-05-09 RX ADMIN — Medication 1 LOZENGE: at 03:46

## 2025-05-09 RX ADMIN — LIDOCAINE: 50 OINTMENT TOPICAL at 12:28

## 2025-05-09 RX ADMIN — LIDOCAINE: 50 OINTMENT TOPICAL at 17:28

## 2025-05-09 RX ADMIN — METOPROLOL SUCCINATE 50 MG: 50 TABLET, EXTENDED RELEASE ORAL at 08:35

## 2025-05-09 RX ADMIN — LIDOCAINE: 50 OINTMENT TOPICAL at 20:28

## 2025-05-09 RX ADMIN — APIXABAN 5 MG: 5 TABLET, FILM COATED ORAL at 08:35

## 2025-05-09 RX ADMIN — INSULIN GLARGINE 20 UNITS: 100 INJECTION, SOLUTION SUBCUTANEOUS at 08:32

## 2025-05-09 RX ADMIN — CARBIDOPA AND LEVODOPA 2.5 MG: 50; 200 TABLET, EXTENDED RELEASE ORAL at 08:35

## 2025-05-09 RX ADMIN — CARBIDOPA AND LEVODOPA 2.5 MG: 50; 200 TABLET, EXTENDED RELEASE ORAL at 12:27

## 2025-05-09 ASSESSMENT — ACTIVITIES OF DAILY LIVING (ADL)
ADLS_ACUITY_SCORE: 46
ADLS_ACUITY_SCORE: 42
ADLS_ACUITY_SCORE: 46
ADLS_ACUITY_SCORE: 42
ADLS_ACUITY_SCORE: 46
ADLS_ACUITY_SCORE: 42
ADLS_ACUITY_SCORE: 46
ADLS_ACUITY_SCORE: 42
ADLS_ACUITY_SCORE: 46
ADLS_ACUITY_SCORE: 42
ADLS_ACUITY_SCORE: 46
ADLS_ACUITY_SCORE: 47
ADLS_ACUITY_SCORE: 46
ADLS_ACUITY_SCORE: 46

## 2025-05-09 NOTE — PLAN OF CARE
"  Problem: Adult Inpatient Plan of Care  Goal: Plan of Care Review  Description: The Plan of Care Review/Shift note should be completed every shift.  The Outcome Evaluation is a brief statement about your assessment that the patient is improving, declining, or no change.  This information will be displayed automatically on your shiftnote.  Outcome: Progressing  Goal: Patient-Specific Goal (Individualized)  Description: You can add care plan individualizations to a care plan. Examples of Individualization might be:  \"Parent requests to be called daily at 9am for status\", \"I have a hard time hearing out of my right ear\", or \"Do not touch me to wake me up as it startlesme\".  Outcome: Progressing  Goal: Absence of Hospital-Acquired Illness or Injury  Intervention: Identify and Manage Fall Risk  Recent Flowsheet Documentation  Taken 5/9/2025 1846 by Candi Ramos RN  Safety Promotion/Fall Prevention:   activity supervised   nonskid shoes/slippers when out of bed   safety round/check completed  Taken 5/9/2025 1250 by Candi Ramos RN  Safety Promotion/Fall Prevention:   activity supervised   nonskid shoes/slippers when out of bed   safety round/check completed  Taken 5/9/2025 0820 by Candi Ramos RN  Safety Promotion/Fall Prevention:   activity supervised   nonskid shoes/slippers when out of bed   safety round/check completed  Intervention: Prevent Skin Injury  Recent Flowsheet Documentation  Taken 5/9/2025 1846 by Candi Ramos RN  Body Position:   position maintained   legs elevated   heels elevated  Taken 5/9/2025 1250 by Candi Ramos RN  Body Position:   position maintained   legs elevated   heels elevated  Taken 5/9/2025 0820 by Candi Ramos RN  Body Position:   position maintained   legs elevated   heels elevated  Intervention: Prevent Infection  Recent Flowsheet Documentation  Taken 5/9/2025 1846 by Candi Ramos RN  Infection Prevention:   cohorting utilized   environmental " surveillance performed   hand hygiene promoted   rest/sleep promoted   single patient room provided  Taken 5/9/2025 1250 by Candi Ramos RN  Infection Prevention:   cohorting utilized   environmental surveillance performed   hand hygiene promoted   rest/sleep promoted   single patient room provided  Taken 5/9/2025 0820 by Candi Ramos RN  Infection Prevention:   cohorting utilized   environmental surveillance performed   hand hygiene promoted   rest/sleep promoted   single patient room provided  Goal: Optimal Comfort and Wellbeing  Outcome: Progressing  Intervention: Monitor Pain and Promote Comfort  Recent Flowsheet Documentation  Taken 5/9/2025 1700 by Candi Ramos RN  Pain Management Interventions: medication (see MAR)  Taken 5/9/2025 1300 by Candi Ramos RN  Pain Management Interventions: medication (see MAR)  Taken 5/9/2025 0800 by Candi Ramos RN  Pain Management Interventions: medication (see MAR)  Intervention: Provide Person-Centered Care  Recent Flowsheet Documentation  Taken 5/9/2025 1846 by Candi Ramos RN  Trust Relationship/Rapport:   care explained   choices provided  Taken 5/9/2025 1250 by Candi Ramos RN  Trust Relationship/Rapport:   care explained   choices provided  Taken 5/9/2025 0820 by Candi Ramos RN  Trust Relationship/Rapport:   care explained   choices provided   Goal Outcome Evaluation:       Able to walk to the bathroom and was up in the recliner verbalized that the Lidocaine cream helped with the Rt hip pain .

## 2025-05-09 NOTE — PROVIDER NOTIFICATION
Pt have a 15 minutes of tachycardia, heart rat between 155's-157's. VSS. Pt is asymptomatic.  Notify HO. Heart rate back to the 83's on it own. EKG ordered.

## 2025-05-09 NOTE — PLAN OF CARE
Problem: Delirium  Goal: Optimal Coping  Outcome: Progressing     Problem: Sepsis/Septic Shock  Goal: Blood Glucose Level Within Targeted Range  Intervention: Optimize Glycemic Control  Recent Flowsheet Documentation  Taken 5/9/2025 0040 by Natalie Draper RN  Hyperglycemia Management: blood glucose monitored  Hypoglycemia Management: blood glucose monitored  Taken 5/8/2025 2040 by Natalie Draper, RN  Hyperglycemia Management: blood glucose monitored  Hypoglycemia Management: blood glucose monitored   Goal Outcome Evaluation:    A/Ox 4. Denies pain or sob. Tele is NSR. On room air. Pt c/o sore throat, notify CC, lozenge ordered and given. Pt have a 15 minutes of SVT( see previous note)    Up with assisting of one with gait and walker. Lidocaine cream applied to right hip. Pt reported cream was helpful.    0346: Pt up to bathroom. C/o of right hip pain at 9/0, PRN oxycodone given with some relief. Colostomy bag intact. Pt is able to known need know. Call light within reach. Bed alarm on for safety.

## 2025-05-09 NOTE — PROGRESS NOTES
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  Nephrology consulted for elevated creatinine hyponatremia and hyperkalemia     Mild Marcelino  Creatinine slow uptrend likely with diuresis with lasix and mild low BP   Baseline creatinine 0.5-0.6 , creatinine has been ~0.7-0.8 in 5/2025 labs and increased to 0.93 and then 1.12 improved to 0.94 today   Had 90ml contrast 5/4 , lasix 40mg x2 , admitted with lactic acidosis and severe hyperglycemia and likely pre-renal state   UA WBC , RBC and turbid , stap infection , UTI   Urine sodium <20 , urine potassium 51 and urine chloride 44  serum osm 284   Appears likely consistent with HRS physiology , possibly triggered by diuresis or pre-renal state  --> Repeat albumin 25gm   Midodrine 2.5mg TID hold for SBP >120  --> follow on renal labs daily     Hyponatremia- stablizing  121 on admission slow improvement to 129  Acute on chronic  Baseline 130s  Likely related to hyperglycemia as well , now close to euglycemia and sodium is ~128  --> continue with fluid restriction , likely has an underlying highADH state  Advised family to avoid free water     Hyperkalemia improved  Likely with hyperglycemia  And liver disease , in addition with acidosis with elevated lactate   Improved with shifting and Lokelma   Sp lasix x2 doses  5/6     Clinically euvolemic , hypoTN possibly volume behind with intravasc volume depleted  Albumin as above  Hold off diuresis  No edema      Metabolic acidosis   Lactic acidosis  Improved     Anemia  Likely inflamm anemia  Transfuse if <7     UTI on ceftriaxone  Staph pn Urine Cx     CLD with liver cirrhosis  Likely HRS  Hx of TIPS  Hx of HCC sp ablation   Follow with GI recs     Hx of recent PE on eliquis     SVT plans for ablation per cardiology  On metoprolol     DM with hyperglycemia  HBA1c 12+   BS elevated on admission   On insulin  Plans per primary    Thank you for the consultation we will follow  Radha Alarcon MD  [facility]  [phone]      Subjective  Seen at bedside , appears more alert , ate most of the breakfast per family , labs reviewed with them  BP low N   Plans for TCU at discharge on Monday  No new events  Reprts swelling on rt leg , she had a fall on rt leg , left lef wo swelling    Objective    Vital signs in last 24 hours  Temp:  [98  F (36.7  C)-99  F (37.2  C)] 98.2  F (36.8  C)  Pulse:  [] 73  Resp:  [16-20] 18  BP: ()/(51-55) 98/53  SpO2:  [92 %-97 %] 97 %  Weight:   [unfilled]    Intake/Output last 3 shifts  I/O last 3 completed shifts:  In: 1380 [P.O.:1180; IV Piggyback:100]  Out: 1726 [Urine:1100; Emesis/NG output:1; Stool:625]  Intake/Output this shift:  I/O this shift:  In: -   Out: 350 [Urine:200; Stool:150]    Physical Exam  Alert/awake, NAD  CV: RRR without murmur or rub  Lung: clear and equal; no extra sounds  Ab: soft and NT; not distended; normal bs  Ext: R>L edema and well perfused  Skin; no rash    Pertinent Labs   Lab Results   Component Value Date    WBC 9.3 05/08/2025    HGB 8.4 (L) 05/08/2025    HCT 26.0 (L) 05/08/2025     (H) 05/08/2025     (L) 05/08/2025     Lab Results   Component Value Date    BUN 23.7 (H) 05/09/2025     (L) 05/09/2025    CO2 22 05/09/2025       Lab Results   Component Value Date    ALBUMIN 1.5  "(L) 05/08/2025     No results found for: \"PHOS\"  I reviewed all lab results  Radha Alarcon MD    "

## 2025-05-09 NOTE — PROGRESS NOTES
[facility]    Medicine Progress Note - Hospitalist Service    Date of Admission:  5/6/2025    Assessment & Plan   Zara Webster is a 74 F presents with abnormal electrolytes prior to scheduled ablation for SVT. Medical history significant for crohns s/p total colectomy with ostomy, IDDM2, liver cancer, and PE. Prior to procedure patient's potassium was 6.9 and sodium 126, glucose over 400.  Hospital course significant for CINTIA which is now resolved.    Anticipate discharge to TCU once electrolyte improved and renal function is stable     Hyperkalemia  Potassium 6.9 on admit. Decreased to 5.3 after Lokelma, Insulin 6.5 units IV and abuterol neb  - Continue to monitor potassium. It is 4.3 today.     Hyponatremia  Sodium 125 on admission. After adjusted for her hyperglycemia, it was 132.  Sodium is slowly improving and close to baseline   She has bilateral leg edema on exam. Her hyponatremia likely due to excess ADH state.  Cortisol and TSH within normal limits  Received Lasix  40 mg x2.  -Continue fluid restriction - can liberalize today to 2000 ml/24 hrs  - Continue to monitor sodium.     CINTIA -likely due to hepatorenal syndrome, possibly triggered by diuresis and hypotension  Renal function improved after albumin and midodrine  Repeating albumin 25 g x 1 today  Midodrine 2.5 mg 3 times daily  Nephrology is following, appreciate recommendations    Diabetic mellitus type II with hyperglycemia  Blood sugar high at 393 on admission. Recent HbA1c 12.1. PTA Lantus 20 units bid and 6 units tidac.  - Increase lantus to 22 units bid  - Increase Novolog carb count 1:6 tidac and sliding scale    Urinary tract infection  Urine culture shows MSSA. Patient reports having dysuria prior to admission   - Continue Ceftriaxone, complete 5 day treatment     Paroxysmal SVT  EKG on admission showed normal sinus rhythm.   - Telemetry  - Ablation originally scheduled for 5/6/2025 and postponed due to abnormalities  "in electrolytes. Rescheduled on 6/9 per cardiology.  - Continue home metoprolol    Cirrhosis of the liver, Liver Cancer  Reports having abdominal pain.   Chronically elevated LFTs, similar to her baseline 2 months ago. Total bilirubin slowly trending up since 2 months ago.   Ultrasound abdomen shows no change compared to prior MRIs, cirrhosis with patent TIPS, mild amount of biliary sludge. No gallstones or inflammatory gallbladder wall thickening.  - She normally follows up in [facility] for radioembolization. GI was consulted per family request. Input appreciated.   - Continue to monitor with supportive care.      History of pulmonary emboli: Resume home Eliquis. Chest CT done this admission shows prior PE in the right lung is no longer identified.      Crohn's status post colitis ectomy with ostomy: ostomy care    Generalized weakness and frequent falls  Patient states she has had generalized weakness and falls over the last few weeks. She did not hit her head, but strained her bilateral hips, especially right hip   - Continue topical lidocaine for the right hip pain  - Fall precautions  - PT OT: recommends TCU.          Diet: Moderate Consistent Carb (60 g CHO per Meal) Diet  Fluid restriction 1800 ML FLUID    DVT Prophylaxis: DOAC  Caro Catheter: Not present  Lines: None     Cardiac Monitoring: ACTIVE order. Indication: Tachyarrhythmias, acute (48 hours)  Code Status: Full Code      Clinically Significant Risk Factors         # Hyponatremia: Lowest Na = 128 mmol/L in last 2 days, will monitor as appropriate       # Hypoalbuminemia: Lowest albumin = 1.5 g/dL at 5/8/2025  7:07 AM, will monitor as appropriate   # Thrombocytopenia: Lowest platelets = 127 in last 2 days, will monitor for bleeding             # DMII: A1C = 12.1 % (Ref range: 0.0 - 5.6 %) within past 6 months   # Overweight: Estimated body mass index is 25.01 kg/m  as calculated from the following:    Height as of this encounter: 1.651 m (5' 5\").    " Weight as of this encounter: 68.2 kg (150 lb 4.8 oz)., PRESENT ON ADMISSION     # Financial/Environmental Concerns: none         Social Drivers of Health     Received from [facility] [facility]    Social Connections          Disposition Plan     Medically Ready for Discharge: Anticipated in 2-4 Days             Vannesa Cooney MD  Hospitalist Service  [facility]  Securely message with [employer] (more info)  Text page via [facility] Paging/Directory   ______________________________________________________________________    Interval History   Another episode of SVT last evening around 11PM which resolved spontaneously.  Patient was asymptomatic.  Patient reports ongoing pain in her right hip.  Topical lidocaine has been effective.  Appetite is little better since admission.  Chronically loose stool, has ileostomy.  Dysuria has resolved.  Cognition has improved since admission per family report    Physical Exam   Vital Signs: Temp: 97.9  F (36.6  C) Temp src: Oral BP: 101/50 Pulse: 78   Resp: 16 SpO2: 95 % O2 Device: None (Room air)    Weight: 150 lbs 4.8 oz    General Appearance: No acute distress, sitting in bed  Respiratory: Respirations unlabored, fine crackles under left scapula, otherwise clear bilaterally  Cardiovascular: Regular rate and rhythm.  Normal S1 and S2.  1+ bilateral lower extremity edema  GI: Abdomen soft, nontender, ileostomy noted  Other: Alert, grossly nonfocal    Medical Decision Making       50 MINUTES SPENT BY ME on the date of service doing chart review, history, exam, documentation & further activities per the note.  MANAGEMENT DISCUSSED with the following over the past 24 hours: Patient, family at bedside, Dr. Alarcon, nursing staff       Data     I have personally reviewed the following data over the past 24 hrs:    N/A  \   N/A   / N/A     129 (L) 101 23.7 (H) /  204 (H)   4.3 22 0.94 \       Imaging results reviewed over the past 24 hrs:   No  results found for this or any previous visit (from the past 24 hours).

## 2025-05-09 NOTE — PROGRESS NOTES
Care Management Follow Up    Length of Stay (days): 3    Expected Discharge Date: 05/10/2025     Concerns to be Addressed: Care progression - discharge planning     Patient plan of care discussed at interdisciplinary rounds: Yes    Anticipated Discharge Disposition:  PT rec Transitional care    Anticipated Discharge Services:  Transitional care - [facility]  Anticipated Discharge DME:  NA    Patient/family educated on Medicare website which has current facility and service quality ratings:  NA  Education Provided on the Discharge Plan:  yes per team  Patient/Family in Agreement with the Plan:  NA    Referrals Placed by CM/SW:  NA  Private pay costs discussed: Not applicable    Discussed  Partnership in Safe Discharge Planning  document with patient/family: Yes: patient's daughterKristy    Handoff Completed: No, handoff not indicated or clinically appropriate    Additional Information:  Met with patient and her daughterKristy, at bedside to update, patient accepted to [facility] for 5/12/25.  They accepted and said family will transport.    Sent a message to Rafael at [facility] to confirm the bed.     Next Steps: RNCM to follow for medical progression, recommendations, and final discharge plan.     Amalia Kyle RN     [phone]

## 2025-05-10 LAB
ANION GAP SERPL CALCULATED.3IONS-SCNC: 2 MMOL/L (ref 7–15)
ATRIAL RATE - MUSE: 75 BPM
BUN SERPL-MCNC: 21.9 MG/DL (ref 8–23)
CALCIUM SERPL-MCNC: 7.5 MG/DL (ref 8.8–10.4)
CHLORIDE SERPL-SCNC: 103 MMOL/L (ref 98–107)
CREAT SERPL-MCNC: 0.83 MG/DL (ref 0.51–0.95)
DIASTOLIC BLOOD PRESSURE - MUSE: 56 MMHG
EGFRCR SERPLBLD CKD-EPI 2021: 73 ML/MIN/1.73M2
GLUCOSE BLDC GLUCOMTR-MCNC: 147 MG/DL (ref 70–99)
GLUCOSE BLDC GLUCOMTR-MCNC: 158 MG/DL (ref 70–99)
GLUCOSE BLDC GLUCOMTR-MCNC: 204 MG/DL (ref 70–99)
GLUCOSE BLDC GLUCOMTR-MCNC: 226 MG/DL (ref 70–99)
GLUCOSE SERPL-MCNC: 168 MG/DL (ref 70–99)
HCO3 SERPL-SCNC: 24 MMOL/L (ref 22–29)
INTERPRETATION ECG - MUSE: NORMAL
P AXIS - MUSE: 70 DEGREES
POTASSIUM SERPL-SCNC: 4.4 MMOL/L (ref 3.4–5.3)
PR INTERVAL - MUSE: 188 MS
QRS DURATION - MUSE: 80 MS
QT - MUSE: 396 MS
QTC - MUSE: 442 MS
R AXIS - MUSE: -47 DEGREES
SODIUM SERPL-SCNC: 129 MMOL/L (ref 135–145)
SYSTOLIC BLOOD PRESSURE - MUSE: 120 MMHG
T AXIS - MUSE: 61 DEGREES
VENTRICULAR RATE- MUSE: 75 BPM

## 2025-05-10 PROCEDURE — 250N000013 HC RX MED GY IP 250 OP 250 PS 637: Performed by: INTERNAL MEDICINE

## 2025-05-10 PROCEDURE — 250N000009 HC RX 250: Performed by: INTERNAL MEDICINE

## 2025-05-10 PROCEDURE — 36415 COLL VENOUS BLD VENIPUNCTURE: CPT | Performed by: INTERNAL MEDICINE

## 2025-05-10 PROCEDURE — 99232 SBSQ HOSP IP/OBS MODERATE 35: CPT | Performed by: INTERNAL MEDICINE

## 2025-05-10 PROCEDURE — 250N000013 HC RX MED GY IP 250 OP 250 PS 637

## 2025-05-10 PROCEDURE — 80048 BASIC METABOLIC PNL TOTAL CA: CPT | Performed by: INTERNAL MEDICINE

## 2025-05-10 PROCEDURE — 120N000004 HC R&B MS OVERFLOW

## 2025-05-10 PROCEDURE — 250N000011 HC RX IP 250 OP 636: Performed by: INTERNAL MEDICINE

## 2025-05-10 RX ORDER — CEPHALEXIN 500 MG/1
500 CAPSULE ORAL 2 TIMES DAILY
Status: DISCONTINUED | OUTPATIENT
Start: 2025-05-11 | End: 2025-05-12 | Stop reason: HOSPADM

## 2025-05-10 RX ADMIN — APIXABAN 5 MG: 5 TABLET, FILM COATED ORAL at 08:58

## 2025-05-10 RX ADMIN — OXYCODONE HYDROCHLORIDE 5 MG: 5 TABLET ORAL at 00:10

## 2025-05-10 RX ADMIN — OXYCODONE HYDROCHLORIDE 2.5 MG: 5 TABLET ORAL at 17:14

## 2025-05-10 RX ADMIN — LIDOCAINE: 50 OINTMENT TOPICAL at 08:58

## 2025-05-10 RX ADMIN — METOPROLOL SUCCINATE 50 MG: 50 TABLET, EXTENDED RELEASE ORAL at 21:11

## 2025-05-10 RX ADMIN — LIDOCAINE: 50 OINTMENT TOPICAL at 21:13

## 2025-05-10 RX ADMIN — LIDOCAINE: 50 OINTMENT TOPICAL at 17:03

## 2025-05-10 RX ADMIN — CARBIDOPA AND LEVODOPA 2.5 MG: 50; 200 TABLET, EXTENDED RELEASE ORAL at 08:59

## 2025-05-10 RX ADMIN — LIDOCAINE: 50 OINTMENT TOPICAL at 12:03

## 2025-05-10 RX ADMIN — CARBIDOPA AND LEVODOPA 2.5 MG: 50; 200 TABLET, EXTENDED RELEASE ORAL at 16:59

## 2025-05-10 RX ADMIN — CARBIDOPA AND LEVODOPA 2.5 MG: 50; 200 TABLET, EXTENDED RELEASE ORAL at 12:00

## 2025-05-10 RX ADMIN — CEFTRIAXONE SODIUM 1 G: 1 INJECTION, POWDER, FOR SOLUTION INTRAMUSCULAR; INTRAVENOUS at 10:08

## 2025-05-10 RX ADMIN — APIXABAN 5 MG: 5 TABLET, FILM COATED ORAL at 21:12

## 2025-05-10 RX ADMIN — METOPROLOL SUCCINATE 50 MG: 50 TABLET, EXTENDED RELEASE ORAL at 08:58

## 2025-05-10 ASSESSMENT — ACTIVITIES OF DAILY LIVING (ADL)
ADLS_ACUITY_SCORE: 39
ADLS_ACUITY_SCORE: 46
ADLS_ACUITY_SCORE: 39
ADLS_ACUITY_SCORE: 46
ADLS_ACUITY_SCORE: 39
ADLS_ACUITY_SCORE: 46
ADLS_ACUITY_SCORE: 46
ADLS_ACUITY_SCORE: 39
ADLS_ACUITY_SCORE: 46
ADLS_ACUITY_SCORE: 46
ADLS_ACUITY_SCORE: 39
ADLS_ACUITY_SCORE: 46
ADLS_ACUITY_SCORE: 39
ADLS_ACUITY_SCORE: 46
ADLS_ACUITY_SCORE: 39

## 2025-05-10 NOTE — PLAN OF CARE
Goal Outcome Evaluation:      Plan of Care Reviewed With: patient    Overall Patient Progress: no changeOverall Patient Progress: no change           Vitals:    05/09/25 0733 05/09/25 1123 05/09/25 1548 05/09/25 1939   BP: 99/51 98/53 101/50 104/52   BP Location: Right arm Right arm Right arm Right arm   Pulse: 73 73 78 74   Resp: 18 18 16 16   Temp: 98.2  F (36.8  C) 98.2  F (36.8  C) 97.9  F (36.6  C) 98.2  F (36.8  C)   TempSrc: Oral Oral Oral Oral   SpO2: 94% 97% 95% 95%   Weight:       Height:        Denies pain. Room air. Alert with some confusion on time. K was 4.3 sodium 129. Able to make needs known. Assist of one with cares. NSR no SVT my shift will continue to monitor. Will follow up with EP in a few weeks to reschedule ablation. Ly Barlow RN

## 2025-05-10 NOTE — PROGRESS NOTES
[facility]    Hospitalist Progress Note    Assessment & Plan   74 F presents with abnormal electrolytes prior to scheduled ablation for SVT. Medical history significant for crohns s/p total colectomy with ostomy, IDDM2, liver cancer, and PE. Prior to procedure patient's potassium was 6.9 and sodium 126, glucose over 400.  Hospital course significant for CINTIA which is now resolved.    Anticipate discharge to TCU once electrolyte improved and renal function is stable     Hyperkalemia  Potassium 6.9 on admit. Decreased to 5.3 after Lokelma, Insulin 6.5 units IV and abuterol neb  - Continue to monitor potassium. It is 4.3 today.     Hyponatremia  Sodium 125 on admission. After adjusted for her hyperglycemia, it was 132.  Sodium is slowly improving and close to baseline   She has bilateral leg edema on exam. Her hyponatremia likely due to excess ADH state.  Cortisol and TSH within normal limits  Received Lasix  40 mg x2.  -Continue fluid restriction - can liberalize today to 2000 ml/24 hrs  - Continue to monitor sodium.      CINTIA -likely due to hepatorenal syndrome, possibly triggered by diuresis and hypotension  Renal function improved after albumin and midodrine  Repeating albumin 25 g x 1 today  Midodrine 2.5 mg 3 times daily  Nephrology is following, appreciate recommendations     Diabetic mellitus type II with hyperglycemia  Blood sugar high at 393 on admission. Recent HbA1c 12.1. PTA Lantus 20 units bid and 6 units tidac.  - Increase lantus to 22 units bid  - Increase Novolog carb count 1:6 tidac and sliding scale     Urinary tract infection  Urine culture shows MSSA. Patient reports having dysuria prior to admission   - Continue Ceftriaxone, complete 5 day treatment      Paroxysmal SVT  EKG on admission showed normal sinus rhythm.   - Telemetry  - Ablation originally scheduled for 5/6/2025 and postponed due to abnormalities in electrolytes. Rescheduled on 6/9 per cardiology.  - Continue home  metoprolol     Cirrhosis of the liver, Liver Cancer  Reports having abdominal pain.   Chronically elevated LFTs, similar to her baseline 2 months ago. Total bilirubin slowly trending up since 2 months ago.   Ultrasound abdomen shows no change compared to prior MRIs, cirrhosis with patent TIPS, mild amount of biliary sludge. No gallstones or inflammatory gallbladder wall thickening.  - She normally follows up in [facility] for radioembolization. GI was consulted per family request. Input appreciated.   - Continue to monitor with supportive care.      History of pulmonary emboli: Resume home Eliquis. Chest CT done this admission shows prior PE in the right lung is no longer identified.       Crohn's status post colitis ectomy with ostomy: ostomy care     Generalized weakness and frequent falls  Patient states she has had generalized weakness and falls over the last few weeks. She did not hit her head, but strained her bilateral hips, especially right hip   - Continue topical lidocaine for the right hip pain  - Fall precautions  - PT OT: recommends TCU.        Diet: Moderate Consistent Carb (60 g CHO per Meal) Diet  Fluid restriction 1800 ML FLUID    DVT Prophylaxis: DOAC  Caro Catheter: Not present  Lines: None     Cardiac Monitoring: ACTIVE order. Indication: Tachyarrhythmias, acute (48 hours)    Impression:   Principal Problem:    Hyperkalemia  Active Problems:    Crohn's disease of colon (H)    Nonalcoholic steatohepatitis    HCC (hepatocellular carcinoma) (H)    SVT (supraventricular tachycardia)    Urinary tract infection    History of pulmonary embolism    Type 2 diabetes mellitus with hyperglycemia, with long-term current use of insulin (H)    Hyponatremia      Plan:  Discussed with Nephrology    DVT Prophylaxis: Low Risk/Ambulatory with no VTE prophylaxis indicated  Code Status: Full Code    Disposition: Expected discharge to TCU probably Monday -- [facility]  Medically Ready for Discharge:  Anticipated in 2-4 Days      Other Diagnoses  Clinically Significant Risk Factors         # Hyponatremia: Lowest Na = 129 mmol/L in last 2 days, will monitor as appropriate       # Hypoalbuminemia: Lowest albumin = 1.5 g/dL at 5/8/2025  7:07 AM, will monitor as appropriate               # DMII: A1C = 12.1 % (Ref range: 0.0 - 5.6 %) within past 6 months       # Financial/Environmental Concerns: none           Ernesto Bernabe MD  Pager [phone]  Cell Phone [phone]  Text Page (7am to 6pm)    Interval History   Feels OK, Dysuria better.     Physical Exam   Temp: 98.7  F (37.1  C) Temp src: Oral BP: 100/53 Pulse: 70   Resp: 20 SpO2: 92 % O2 Device: None (Room air)    Vitals:    05/08/25 0417 05/09/25 0334 05/10/25 0700   Weight: 69 kg (152 lb 1.9 oz) 68.2 kg (150 lb 4.8 oz) 68 kg (149 lb 14.4 oz)     Vital Signs with Ranges  Temp:  [97.9  F (36.6  C)-98.7  F (37.1  C)] 98.7  F (37.1  C)  Pulse:  [69-78] 70  Resp:  [16-20] 20  BP: ()/(50-54) 100/53  SpO2:  [92 %-97 %] 92 %  I/O last 3 completed shifts:  In: 1270 [P.O.:1270]  Out: 1225 [Urine:800; Stool:425]    # Pain Assessment:      5/10/2025     8:47 AM   Current Pain Score   Patient currently in pain? fred hernández pain level was assessed and she currently denies pain.        Constitutional: Awake, alert, cooperative, no apparent distress  Respiratory: Clear to auscultation bilaterally, no crackles or wheezing  Cardiovascular: Regular rate and rhythm, normal S1 and S2, and no murmur noted  GI: Normal bowel sounds, soft, non-distended, non-tender  Extrem: No calf tenderness, no ankle edema  Neuro: Ox3, no focal motor or sensory deficits    Medications   Current Facility-Administered Medications   Medication Dose Route Frequency Provider Last Rate Last Admin     Current Facility-Administered Medications   Medication Dose Route Frequency Provider Last Rate Last Admin    apixaban ANTICOAGULANT (ELIQUIS) tablet 5 mg  5 mg Oral BID Michelle Valenzuela, ADRIANA   5  mg at 05/10/25 0858    cefTRIAXone (ROCEPHIN) 1 g vial to attach to  mL bag for ADULTS or NS 50 mL bag for PEDS  1 g Intravenous Q24H Oni Crow MD   1 g at 05/10/25 1008    dextrose 10% BOLUS 300 mL  300 mL Intravenous Once Michelle Valenzuela NP        insulin aspart (NovoLOG) injection (RAPID ACTING)  1-10 Units Subcutaneous TID AC Oni Crow MD   1 Units at 05/10/25 0851    insulin aspart (NovoLOG) injection (RAPID ACTING)  1-7 Units Subcutaneous At Bedtime Oni Crow MD   2 Units at 05/08/25 2051    insulin aspart (NovoLOG) injection (RAPID ACTING)   Subcutaneous TID w/meals Vannesa Cooney MD   5 Units at 05/10/25 0853    insulin glargine (LANTUS PEN) injection 22 Units  22 Units Subcutaneous BID Vannesa Cooney MD   22 Units at 05/10/25 0856    lidocaine (XYLOCAINE) 5 % ointment   Topical 4x Daily Oni Crow MD   Given at 05/10/25 0858    metoprolol succinate ER (TOPROL XL) 24 hr tablet 50 mg  50 mg Oral BID Michelle Valenzuela NP   50 mg at 05/10/25 0858    midodrine (PROAMATINE) tablet 2.5 mg  2.5 mg Oral TID w/meals Radha Alarcon MD   2.5 mg at 05/10/25 0859    sodium chloride (PF) 0.9% PF flush 3 mL  3 mL Intracatheter Q8H [facility] Michelle Valenzuela NP   3 mL at 05/10/25 0508       Data   Recent Labs   Lab 05/10/25  0742 05/10/25  0450 05/09/25  2120 05/09/25  1147 05/09/25  0809 05/08/25  0753 05/08/25  0707 05/07/25  0741 05/07/25  0433 05/06/25  2103 05/06/25  1955   WBC  --   --   --   --   --   --  9.3  --  11.5*  --  14.5*   HGB  --   --   --   --   --   --  8.4*  --  10.3*  --  9.6*   MCV  --   --   --   --   --   --  102*  --  101*  --  96   PLT  --   --   --   --   --   --  127*  --  115*  --  131*   NA  --  129*  --   --  129*  --  128*   < > 127*   < > 125*   POTASSIUM  --  4.4  --   --  4.3  --  4.6   < > 5.4*   < > 6.0*   CHLORIDE  --  103  --   --  101  --  100   < > 102   < > 99   CO2  --  24  --   --  22  --  23   < > 20*   < > 18*   BUN  --  21.9  --   --  23.7*  --  26.9*   <  > 20.3   < > 19.2   CR  --  0.83  --   --  0.94  --  1.12*   < > 0.82   < > 0.71   ANIONGAP  --  2*  --   --  6*  --  5*   < > 5*   < > 8   HANNA  --  7.5*  --   --  8.0*  --  7.3*   < > 8.2*   < > 8.2*   * 168* 198*   < > 180*   < > 180*   < > 319*   < > 393*   ALBUMIN  --   --   --   --   --   --  1.5*  --  1.9*  --   --    PROTTOTAL  --   --   --   --   --   --  5.5*  --  6.5  --   --    BILITOTAL  --   --   --   --   --   --  5.0*  --  5.4*  --   --    ALKPHOS  --   --   --   --   --   --  313*  --  381*  --   --    ALT  --   --   --   --   --   --  32  --  38  --   --    AST  --   --   --   --   --   --  69*  --  82*  --   --     < > = values in this interval not displayed.       Imaging:   No results found for this or any previous visit (from the past 24 hours).

## 2025-05-10 NOTE — PLAN OF CARE
"  Problem: Adult Inpatient Plan of Care  Goal: Plan of Care Review  Description: The Plan of Care Review/Shift note should be completed every shift.  The Outcome Evaluation is a brief statement about your assessment that the patient is improving, declining, or no change.  This information will be displayed automatically on your shiftnote.  Outcome: Progressing  Goal: Patient-Specific Goal (Individualized)  Description: You can add care plan individualizations to a care plan. Examples of Individualization might be:  \"Parent requests to be called daily at 9am for status\", \"I have a hard time hearing out of my right ear\", or \"Do not touch me to wake me up as it startlesme\".  Outcome: Progressing  Goal: Absence of Hospital-Acquired Illness or Injury  Intervention: Identify and Manage Fall Risk  Recent Flowsheet Documentation  Taken 5/10/2025 1610 by Candi Ramos RN  Safety Promotion/Fall Prevention:   activity supervised   nonskid shoes/slippers when out of bed   safety round/check completed  Taken 5/10/2025 1255 by Candi Ramos RN  Safety Promotion/Fall Prevention:   activity supervised   nonskid shoes/slippers when out of bed   safety round/check completed  Intervention: Prevent Skin Injury  Recent Flowsheet Documentation  Taken 5/10/2025 1610 by Candi Ramos RN  Body Position:   position maintained   legs elevated   heels elevated  Taken 5/10/2025 1255 by Candi Ramos RN  Body Position:   position maintained   legs elevated   heels elevated  Intervention: Prevent Infection  Recent Flowsheet Documentation  Taken 5/10/2025 1610 by Candi Ramos RN  Infection Prevention:   cohorting utilized   environmental surveillance performed   hand hygiene promoted   rest/sleep promoted   single patient room provided  Taken 5/10/2025 1255 by Candi Ramos RN  Infection Prevention:   cohorting utilized   environmental surveillance performed   hand hygiene promoted   rest/sleep promoted   single " patient room provided  Goal: Optimal Comfort and Wellbeing  Outcome: Progressing  Intervention: Monitor Pain and Promote Comfort  Recent Flowsheet Documentation  Taken 5/10/2025 1714 by Candi Ramos, RN  Pain Management Interventions:   medication (see MAR)   emotional support  Intervention: Provide Person-Centered Care  Recent Flowsheet Documentation  Taken 5/10/2025 1610 by Candi Ramos, RN  Trust Relationship/Rapport:   care explained   choices provided   empathic listening provided   emotional support provided   questions answered   questions encouraged   reassurance provided   thoughts/feelings acknowledged  Taken 5/10/2025 1255 by Candi Ramos, RN  Trust Relationship/Rapport:   care explained   choices provided   empathic listening provided   emotional support provided   questions answered   questions encouraged   reassurance provided   thoughts/feelings acknowledged   Goal Outcome Evaluation:       Was up in the chair and walked in the hallway with a walker and a transfer belt  complained of Rt hip pain  6/10 aggravated post   ambulation  in the hallway and given 2.5 mg of Oxycodone noted the urine output to be orange in color and encourage to increase fluid intake fliuid.restriction   now to 2000 ml  BM to soft to liquid consistency.

## 2025-05-10 NOTE — PLAN OF CARE
Goal Outcome Evaluation:      Plan of Care Reviewed With: patient    Overall Patient Progress: improvingOverall Patient Progress: improving             Vitals:    05/09/25 1548 05/09/25 1939 05/09/25 2354 05/10/25 0357   BP: 101/50 104/52 107/54 100/51   BP Location: Right arm Right arm Right arm Right arm   Pulse: 78 74 75 69   Resp: 16 16 20 20   Temp: 97.9  F (36.6  C) 98.2  F (36.8  C) 98.6  F (37  C) 97.9  F (36.6  C)   TempSrc: Oral Oral Oral Oral   SpO2: 95% 95% 94% 93%   Weight:       Height:        Had some pain prn oxy given. Right hip area hurt after walking to bathroom. Rest also helped. Room air. Confused of time and place. Assist of 1. Fluid restriction 2L. NSR. Ly Barlow RN

## 2025-05-10 NOTE — PROGRESS NOTES
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  Nephrology consulted for elevated creatinine hyponatremia and hyperkalemia     Mild Marcelino  Creatinine slow uptrend likely with diuresis with lasix and mild low BP   Baseline creatinine 0.5-0.6 , creatinine has been ~0.7-0.8 in 5/2025 labs and increased to 0.93 and then 1.12 improved to 0.8 today   Had 90ml contrast 5/4 , lasix 40mg x2 , admitted with lactic acidosis and severe hyperglycemia and likely pre-renal state   UA WBC , RBC and turbid , stap infection , UTI   Urine sodium <20 , urine potassium 51 and urine chloride 44  serum osm 284   Appears likely consistent with HRS physiology , possibly triggered by diuresis or pre-renal state  --> x2 albumin 25gm , BP holding now  Midodrine 2.5mg TID hold for SBP >120  --> follow on renal labs      Hyponatremia- stablizing  121 on admission slow improvement to 129  Acute on chronic  Baseline 130s  Likely related to hyperglycemia as well   --> continue with fluid restriction , likely has an underlying highADH state  Advised family to avoid free water     Hyperkalemia improved  Likely with hyperglycemia  And liver disease , in addition with acidosis with elevated lactate   Improved with shifting and Lokelma   Sp lasix x2 doses 5/6     Clinically euvolemic , hypoTN  "possibly volume behind with intravasc volume depleted  Albumin as above  Hold off diuresis  No edema      Metabolic acidosis   Lactic acidosis  Improved     Anemia  Likely inflamm anemia  Transfuse if <7     UTI on ceftriaxone  Staph pn Urine Cx     CLD with liver cirrhosis  Likely HRS  Hx of TIPS  Hx of HCC sp ablation   Follow with GI recs     Hx of recent PE on eliquis     SVT plans for ablation per cardiology  On metoprolol     DM with hyperglycemia  HBA1c 12+   BS elevated on admission   On insulin  Plans per primary    Thank you for the consultation we will follow  Radha Alarcon MD  [facility]  [phone]      Subjective  Seen at bedside , appears more alert , worked with PT  Leg swelling still + but better  Will benefit from lymhedema wraps  Discussed no need for scheduled diuresis with LE edema , work on increase protein intake and may be that will help with swelling    Will sign off    Objective    Vital signs in last 24 hours  Temp:  [97.9  F (36.6  C)-98.7  F (37.1  C)] 98.7  F (37.1  C)  Pulse:  [69-78] 70  Resp:  [16-20] 20  BP: ()/(50-54) 100/53  SpO2:  [92 %-97 %] 92 %  Weight:   [unfilled]    Intake/Output last 3 shifts  I/O last 3 completed shifts:  In: 1270 [P.O.:1270]  Out: 1225 [Urine:800; Stool:425]  Intake/Output this shift:  No intake/output data recorded.    Physical Exam  Alert/awake, NAD  CV: RRR without murmur or rub  Lung: clear and equal; no extra sounds  Ab: soft and NT; not distended; normal bs  Ext: R>L edema and well perfused  Skin; no rash    Pertinent Labs   Lab Results   Component Value Date    WBC 9.3 05/08/2025    HGB 8.4 (L) 05/08/2025    HCT 26.0 (L) 05/08/2025     (H) 05/08/2025     (L) 05/08/2025     Lab Results   Component Value Date    BUN 21.9 05/10/2025     (L) 05/10/2025    CO2 24 05/10/2025       Lab Results   Component Value Date    ALBUMIN 1.5 (L) 05/08/2025     No results found for: \"PHOS\"  I reviewed all lab " results  Radha Alarcon MD

## 2025-05-11 PROBLEM — N17.9 AKI (ACUTE KIDNEY INJURY): Status: ACTIVE | Noted: 2025-05-11

## 2025-05-11 PROBLEM — D63.8 ANEMIA OF CHRONIC DISEASE: Status: ACTIVE | Noted: 2025-05-11

## 2025-05-11 LAB
ANION GAP SERPL CALCULATED.3IONS-SCNC: 4 MMOL/L (ref 7–15)
BUN SERPL-MCNC: 17 MG/DL (ref 8–23)
CALCIUM SERPL-MCNC: 7.6 MG/DL (ref 8.8–10.4)
CHLORIDE SERPL-SCNC: 105 MMOL/L (ref 98–107)
CREAT SERPL-MCNC: 0.76 MG/DL (ref 0.51–0.95)
EGFRCR SERPLBLD CKD-EPI 2021: 81 ML/MIN/1.73M2
ERYTHROCYTE [DISTWIDTH] IN BLOOD BY AUTOMATED COUNT: 17.3 % (ref 10–15)
GLUCOSE BLDC GLUCOMTR-MCNC: 142 MG/DL (ref 70–99)
GLUCOSE BLDC GLUCOMTR-MCNC: 225 MG/DL (ref 70–99)
GLUCOSE BLDC GLUCOMTR-MCNC: 227 MG/DL (ref 70–99)
GLUCOSE SERPL-MCNC: 139 MG/DL (ref 70–99)
HCO3 SERPL-SCNC: 24 MMOL/L (ref 22–29)
HCT VFR BLD AUTO: 27.6 % (ref 35–47)
HGB BLD-MCNC: 8.6 G/DL (ref 11.7–15.7)
MCH RBC QN AUTO: 32.6 PG (ref 26.5–33)
MCHC RBC AUTO-ENTMCNC: 31.2 G/DL (ref 31.5–36.5)
MCV RBC AUTO: 105 FL (ref 78–100)
PLATELET # BLD AUTO: 141 10E3/UL (ref 150–450)
POTASSIUM SERPL-SCNC: 4.3 MMOL/L (ref 3.4–5.3)
RBC # BLD AUTO: 2.64 10E6/UL (ref 3.8–5.2)
RENIN PLAS-CCNC: 16.4 NG/ML/HR
SODIUM SERPL-SCNC: 133 MMOL/L (ref 135–145)
WBC # BLD AUTO: 7.6 10E3/UL (ref 4–11)

## 2025-05-11 PROCEDURE — 120N000004 HC R&B MS OVERFLOW

## 2025-05-11 PROCEDURE — 250N000013 HC RX MED GY IP 250 OP 250 PS 637

## 2025-05-11 PROCEDURE — 250N000013 HC RX MED GY IP 250 OP 250 PS 637: Performed by: INTERNAL MEDICINE

## 2025-05-11 PROCEDURE — 250N000009 HC RX 250: Performed by: INTERNAL MEDICINE

## 2025-05-11 PROCEDURE — 85014 HEMATOCRIT: CPT | Performed by: INTERNAL MEDICINE

## 2025-05-11 PROCEDURE — 80048 BASIC METABOLIC PNL TOTAL CA: CPT | Performed by: INTERNAL MEDICINE

## 2025-05-11 PROCEDURE — 99232 SBSQ HOSP IP/OBS MODERATE 35: CPT | Performed by: INTERNAL MEDICINE

## 2025-05-11 PROCEDURE — 36415 COLL VENOUS BLD VENIPUNCTURE: CPT | Performed by: INTERNAL MEDICINE

## 2025-05-11 RX ADMIN — CARBIDOPA AND LEVODOPA 2.5 MG: 50; 200 TABLET, EXTENDED RELEASE ORAL at 08:51

## 2025-05-11 RX ADMIN — LIDOCAINE: 50 OINTMENT TOPICAL at 17:52

## 2025-05-11 RX ADMIN — OXYCODONE HYDROCHLORIDE 2.5 MG: 5 TABLET ORAL at 18:10

## 2025-05-11 RX ADMIN — APIXABAN 5 MG: 5 TABLET, FILM COATED ORAL at 19:55

## 2025-05-11 RX ADMIN — CEPHALEXIN 500 MG: 500 CAPSULE ORAL at 08:51

## 2025-05-11 RX ADMIN — APIXABAN 5 MG: 5 TABLET, FILM COATED ORAL at 08:51

## 2025-05-11 RX ADMIN — METOPROLOL SUCCINATE 50 MG: 50 TABLET, EXTENDED RELEASE ORAL at 19:56

## 2025-05-11 RX ADMIN — CARBIDOPA AND LEVODOPA 2.5 MG: 50; 200 TABLET, EXTENDED RELEASE ORAL at 17:52

## 2025-05-11 RX ADMIN — LIDOCAINE: 50 OINTMENT TOPICAL at 13:08

## 2025-05-11 RX ADMIN — CEPHALEXIN 500 MG: 500 CAPSULE ORAL at 20:00

## 2025-05-11 RX ADMIN — LIDOCAINE: 50 OINTMENT TOPICAL at 20:00

## 2025-05-11 RX ADMIN — OXYCODONE HYDROCHLORIDE 2.5 MG: 5 TABLET ORAL at 06:31

## 2025-05-11 RX ADMIN — CARBIDOPA AND LEVODOPA 2.5 MG: 50; 200 TABLET, EXTENDED RELEASE ORAL at 13:08

## 2025-05-11 RX ADMIN — LIDOCAINE: 50 OINTMENT TOPICAL at 06:54

## 2025-05-11 ASSESSMENT — ACTIVITIES OF DAILY LIVING (ADL)
ADLS_ACUITY_SCORE: 46
ADLS_ACUITY_SCORE: 43
ADLS_ACUITY_SCORE: 46
ADLS_ACUITY_SCORE: 43
ADLS_ACUITY_SCORE: 46
ADLS_ACUITY_SCORE: 43
ADLS_ACUITY_SCORE: 46

## 2025-05-11 NOTE — PLAN OF CARE
"  Problem: Adult Inpatient Plan of Care  Goal: Plan of Care Review  Description: The Plan of Care Review/Shift note should be completed every shift.  The Outcome Evaluation is a brief statement about your assessment that the patient is improving, declining, or no change.  This information will be displayed automatically on your shiftnote.  Outcome: Progressing  Goal: Patient-Specific Goal (Individualized)  Description: You can add care plan individualizations to a care plan. Examples of Individualization might be:  \"Parent requests to be called daily at 9am for status\", \"I have a hard time hearing out of my right ear\", or \"Do not touch me to wake me up as it startlesme\".  Outcome: Progressing  Goal: Absence of Hospital-Acquired Illness or Injury  Outcome: Progressing  Intervention: Identify and Manage Fall Risk  Recent Flowsheet Documentation  Taken 5/11/2025 1642 by Candi Ramos RN  Safety Promotion/Fall Prevention:   clutter free environment maintained   supervised activity  Taken 5/11/2025 1506 by Candi Ramos RN  Safety Promotion/Fall Prevention:   clutter free environment maintained   supervised activity  Taken 5/11/2025 0806 by Candi Ramos RN  Safety Promotion/Fall Prevention:   clutter free environment maintained   supervised activity  Intervention: Prevent Skin Injury  Recent Flowsheet Documentation  Taken 5/11/2025 1642 by Candi Ramos RN  Body Position: position changed independently  Taken 5/11/2025 1506 by Candi Ramos RN  Body Position: position changed independently  Taken 5/11/2025 0806 by Candi Ramos RN  Body Position: position changed independently  Intervention: Prevent Infection  Recent Flowsheet Documentation  Taken 5/11/2025 1642 by Candi Ramos RN  Infection Prevention:   rest/sleep promoted   hand hygiene promoted  Taken 5/11/2025 1506 by Candi Ramos RN  Infection Prevention:   rest/sleep promoted   hand hygiene promoted  Taken 5/11/2025 0806 " by Candi Ramos, RN  Infection Prevention:   rest/sleep promoted   hand hygiene promoted  Goal: Optimal Comfort and Wellbeing  Outcome: Progressing  Intervention: Provide Person-Centered Care  Recent Flowsheet Documentation  Taken 5/11/2025 1642 by Candi Ramos, RN  Trust Relationship/Rapport: care explained  Taken 5/11/2025 1506 by Candi Ramos, RN  Trust Relationship/Rapport: care explained   Goal Outcome Evaluation:       Was up in the hallway 2 times and tolerated the activity well  continue to complain of Rt hip pain Lidocaine cream and 2.5 mg of Oxycodone helped .family updated  with discharge plans .

## 2025-05-11 NOTE — PLAN OF CARE
Problem: Adult Inpatient Plan of Care  Goal: Absence of Hospital-Acquired Illness or Injury  Intervention: Identify and Manage Fall Risk  Recent Flowsheet Documentation  Taken 5/10/2025 2000 by Abimael Goodwin, RN  Safety Promotion/Fall Prevention:   clutter free environment maintained   supervised activity     Problem: Sepsis/Septic Shock  Goal: Blood Glucose Level Within Targeted Range  Outcome: Progressing     Goal Outcome Evaluation:  A/O x4, room air. Denied pain. 's. NSR. HS BG- 158. Scheduled Lantus, snacks given. Ax1 to the bathroom to void. Self managed Ostomy, 25 ml out. Fluids restricted, ice chips given.

## 2025-05-11 NOTE — PROGRESS NOTES
[facility]    Hospitalist Progress Note    Assessment & Plan   74 F presents with abnormal electrolytes prior to scheduled ablation for SVT. Medical history significant for crohns s/p total colectomy with ostomy, IDDM2, liver cancer, and PE. Prior to procedure patient's potassium was 6.9 and sodium 126, glucose over 400.  Hospital course significant for CINTIA which is now resolved.    Anticipate discharge to TCU once electrolyte improved and renal function is stable     Hyperkalemia  Potassium 6.9 on admit. Decreased to 5.3 after Lokelma, Insulin 6.5 units IV and abuterol neb  - Continue to monitor potassium. It is 4.3 today.     Hyponatremia  Sodium 125 on admission, now improved to 133.   She has bilateral leg edema on exam. Her hyponatremia likely due to excess ADH state.  Cortisol and TSH within normal limits.      CINTIA -likely due to hepatorenal syndrome, possibly triggered by diuresis and hypotension  Renal function improved after albumin and midodrine  Repeating albumin 25 g x 1 today  Midodrine 2.5 mg 3 times daily  Nephrology is following, appreciate recommendations     Diabetic mellitus type II with hyperglycemia  Blood sugar high at 393 on admission. Recent HbA1c 12.1. PTA Lantus 20 units bid and 6 units tidac.  - Increase lantus to 22 units bid  - Increase Novolog carb count 1:6 tidac and sliding scale     Urinary tract infection  Urine culture shows MSSA. Patient reports having dysuria prior to admission   - Continue Ceftriaxone, complete 5 day treatment      Paroxysmal SVT  EKG on admission showed normal sinus rhythm.   - Telemetry  - Ablation originally scheduled for 5/6/2025 and postponed due to abnormalities in electrolytes. Rescheduled on 6/9 per cardiology.  - Continue home metoprolol     Cirrhosis of the liver, Liver Cancer  Reports having abdominal pain.   Chronically elevated LFTs, similar to her baseline 2 months ago. Total bilirubin slowly trending up since 2 months ago.    Ultrasound abdomen shows no change compared to prior MRIs, cirrhosis with patent TIPS, mild amount of biliary sludge. No gallstones or inflammatory gallbladder wall thickening.  - She normally follows up in [facility] for radioembolization. GI was consulted per family request. Input appreciated.   - Continue to monitor with supportive care.      History of pulmonary emboli: Resume home Eliquis. Chest CT done this admission shows prior PE in the right lung is no longer identified.       Crohn's status post colitis ectomy with ostomy: ostomy care     Generalized weakness and frequent falls  Patient states she has had generalized weakness and falls over the last few weeks. She did not hit her head, but strained her bilateral hips, especially right hip   - Continue topical lidocaine for the right hip pain  - Fall precautions  - PT OT: recommends TCU.        Diet: Moderate Consistent Carb (60 g CHO per Meal) Diet  Fluid restriction 1800 ML FLUID    DVT Prophylaxis: DOAC  Caro Catheter: Not present  Lines: None     Cardiac Monitoring: ACTIVE order. Indication: Tachyarrhythmias, acute (48 hours)    Impression:   Principal Problem:    Hyperkalemia  Active Problems:    Crohn's disease of colon (H)    Nonalcoholic steatohepatitis    HCC (hepatocellular carcinoma) (H)    SVT (supraventricular tachycardia)    Urinary tract infection    History of pulmonary embolism    Type 2 diabetes mellitus with hyperglycemia, with long-term current use of insulin (H)    Hyponatremia      Plan:  Discussed with Nephrology -- nephrology will sign off. Updated patient and .     DVT Prophylaxis: Low Risk/Ambulatory with no VTE prophylaxis indicated  Code Status: Full Code    Disposition: Expected discharge to TCU probably Monday -- [facility]  Medically Ready for Discharge: tomorrow      Other Diagnoses  Clinically Significant Risk Factors         # Hyponatremia: Lowest Na = 129 mmol/L in last 2 days, will monitor as  "appropriate       # Hypoalbuminemia: Lowest albumin = 1.5 g/dL at 5/8/2025  7:07 AM, will monitor as appropriate               # DMII: A1C = 12.1 % (Ref range: 0.0 - 5.6 %) within past 6 months   # Overweight: Estimated body mass index is 25.08 kg/m  as calculated from the following:    Height as of this encounter: 1.651 m (5' 5\").    Weight as of this encounter: 68.4 kg (150 lb 11.2 oz).        # Financial/Environmental Concerns: none           Ernesto Bernabe MD  Pager [phone]  Cell Phone [phone]  Text Page (7am to 6pm)    Interval History   Feels OK, Dysuria better.     Physical Exam   Temp: 98.2  F (36.8  C) Temp src: Oral BP: 103/54 Pulse: 76   Resp: 18 SpO2: 97 % O2 Device: None (Room air)    Vitals:    05/09/25 0334 05/10/25 0700 05/11/25 0614   Weight: 68.2 kg (150 lb 4.8 oz) 68 kg (149 lb 14.4 oz) 68.4 kg (150 lb 11.2 oz)     Vital Signs with Ranges  Temp:  [97.9  F (36.6  C)-98.2  F (36.8  C)] 98.2  F (36.8  C)  Pulse:  [64-76] 76  Resp:  [16-20] 18  BP: ()/(49-56) 103/54  SpO2:  [94 %-97 %] 97 %  I/O last 3 completed shifts:  In: 446 [P.O.:440; I.V.:6]  Out: 1750 [Urine:1100; Stool:650]    # Pain Assessment:      5/11/2025    12:00 PM   Current Pain Score   Patient currently in pain? denies   Zara s pain level was assessed and she currently denies pain.        Constitutional: Awake, alert, cooperative, no apparent distress  Respiratory: Clear to auscultation bilaterally, no crackles or wheezing  Cardiovascular: Regular rate and rhythm, normal S1 and S2, and no murmur noted  GI: Normal bowel sounds, soft, non-distended, non-tender  Extrem: No calf tenderness, no ankle edema  Neuro: Ox3, no focal motor or sensory deficits    Medications   Current Facility-Administered Medications   Medication Dose Route Frequency Provider Last Rate Last Admin     Current Facility-Administered Medications   Medication Dose Route Frequency Provider Last Rate Last Admin    apixaban ANTICOAGULANT " (ELIQUIS) tablet 5 mg  5 mg Oral BID Michelle Valenzuela NP   5 mg at 05/11/25 0851    cephALEXin (KEFLEX) capsule 500 mg  500 mg Oral BID Ernesto Cervantes MD   500 mg at 05/11/25 0851    dextrose 10% BOLUS 300 mL  300 mL Intravenous Once Michelle Valenzuela NP        insulin aspart (NovoLOG) injection (RAPID ACTING)  1-10 Units Subcutaneous TID AC Oni Crow MD   1 Units at 05/11/25 1305    insulin aspart (NovoLOG) injection (RAPID ACTING)  1-7 Units Subcutaneous At Bedtime Oni Crow MD   2 Units at 05/08/25 2051    insulin aspart (NovoLOG) injection (RAPID ACTING)   Subcutaneous TID w/meals Vannesa Cooney MD   5 Units at 05/11/25 1305    insulin glargine (LANTUS PEN) injection 22 Units  22 Units Subcutaneous BID Vannesa Cooney MD   22 Units at 05/11/25 0849    lidocaine (XYLOCAINE) 5 % ointment   Topical 4x Daily Oni Crow MD   Given at 05/11/25 1308    metoprolol succinate ER (TOPROL XL) 24 hr tablet 50 mg  50 mg Oral BID Michelle Valenzuela NP   50 mg at 05/10/25 2111    midodrine (PROAMATINE) tablet 2.5 mg  2.5 mg Oral TID w/meals Radha Alarcon MD   2.5 mg at 05/11/25 1308    sodium chloride (PF) 0.9% PF flush 3 mL  3 mL Intracatheter Q8H [facility] Michelle Valenzuela NP   3 mL at 05/11/25 1308       Data   Recent Labs   Lab 05/11/25  0825 05/11/25  0630 05/10/25  2103 05/10/25  0742 05/10/25  0450 05/09/25  1147 05/09/25  0809 05/08/25  0753 05/08/25  0707 05/07/25  0741 05/07/25  0433   WBC  --  7.6  --   --   --   --   --   --  9.3  --  11.5*   HGB  --  8.6*  --   --   --   --   --   --  8.4*  --  10.3*   MCV  --  105*  --   --   --   --   --   --  102*  --  101*   PLT  --  141*  --   --   --   --   --   --  127*  --  115*   NA  --  133*  --   --  129*  --  129*  --  128*   < > 127*   POTASSIUM  --  4.3  --   --  4.4  --  4.3  --  4.6   < > 5.4*   CHLORIDE  --  105  --   --  103  --  101  --  100   < > 102   CO2  --  24  --   --  24  --  22  --  23   < > 20*   BUN  --  17.0  --   --  21.9  --  23.7*   --  26.9*   < > 20.3   CR  --  0.76  --   --  0.83  --  0.94  --  1.12*   < > 0.82   ANIONGAP  --  4*  --   --  2*  --  6*  --  5*   < > 5*   HANNA  --  7.6*  --   --  7.5*  --  8.0*  --  7.3*   < > 8.2*   * 139* 158*   < > 168*   < > 180*   < > 180*   < > 319*   ALBUMIN  --   --   --   --   --   --   --   --  1.5*  --  1.9*   PROTTOTAL  --   --   --   --   --   --   --   --  5.5*  --  6.5   BILITOTAL  --   --   --   --   --   --   --   --  5.0*  --  5.4*   ALKPHOS  --   --   --   --   --   --   --   --  313*  --  381*   ALT  --   --   --   --   --   --   --   --  32  --  38   AST  --   --   --   --   --   --   --   --  69*  --  82*    < > = values in this interval not displayed.       Imaging:   No results found for this or any previous visit (from the past 24 hours).

## 2025-05-11 NOTE — PROGRESS NOTES
Care Management Follow Up    Length of Stay (days): 5    Expected Discharge Date: 05/12/2025    Anticipated Discharge Plan:   TCU    Transportation: Confirmed Family/friend    PT Recommendations: Transitional Care Facility, Per plan established by the PT  OT Recommendations:  Transitional Care Facility     Barriers to Discharge: placement    Prior Living Situation: town home with significant other    Discussed  Partnership in Safe Discharge Planning  document with patient/family: No     Handoff Completed: No, handoff not indicated or clinically appropriate    Patient/Spokesperson Updated: No    Additional Information:    Chart review, discussed during rounds.  Continued plan for patient to discharge on 05/12 to [facility] TCU.  Per previous discussion, family will provide transportation.      Next Steps:     CM continues to follow for discharge needs and support.  Confirm continued medical stability for discharge to TCU on 05/12.  Watch for discharge orders, update [facility].  Confirm ride/transportation time with family.      LYNNETTE HollingsworthW

## 2025-05-11 NOTE — PLAN OF CARE
Goal Outcome Evaluation:      Plan of Care Reviewed With: patient    Overall Patient Progress: improvingOverall Patient Progress: improving     Pt. Alert, oriented x 4. Denied pain until about 0615. Right hip pain from prior fall, still with hard lump present. Given prn oxycodone half-tab and lidocaine gel applied. VSS, lungs clear, diminished posterior bases. Denied dyspnea, nausea, or lightheadedness. Continue to monitor.

## 2025-05-12 VITALS
DIASTOLIC BLOOD PRESSURE: 51 MMHG | HEIGHT: 65 IN | BODY MASS INDEX: 25.22 KG/M2 | OXYGEN SATURATION: 97 % | WEIGHT: 151.4 LBS | SYSTOLIC BLOOD PRESSURE: 101 MMHG | TEMPERATURE: 98.1 F | RESPIRATION RATE: 18 BRPM | HEART RATE: 69 BPM

## 2025-05-12 LAB
ALDOST/RENIN PLAS-RTO: 1.2 {RATIO} (ref 0–25)
GLUCOSE BLDC GLUCOMTR-MCNC: 179 MG/DL (ref 70–99)
GLUCOSE BLDC GLUCOMTR-MCNC: 192 MG/DL (ref 70–99)

## 2025-05-12 PROCEDURE — 99239 HOSP IP/OBS DSCHRG MGMT >30: CPT | Performed by: INTERNAL MEDICINE

## 2025-05-12 PROCEDURE — 250N000013 HC RX MED GY IP 250 OP 250 PS 637: Performed by: INTERNAL MEDICINE

## 2025-05-12 PROCEDURE — 250N000013 HC RX MED GY IP 250 OP 250 PS 637

## 2025-05-12 RX ORDER — AMOXICILLIN 250 MG
1 CAPSULE ORAL 2 TIMES DAILY PRN
DISCHARGE
Start: 2025-05-12

## 2025-05-12 RX ORDER — CEFDINIR 300 MG/1
300 CAPSULE ORAL 2 TIMES DAILY
Qty: 7 CAPSULE | Refills: 0 | Status: SHIPPED | OUTPATIENT
Start: 2025-05-12

## 2025-05-12 RX ORDER — MIDODRINE HYDROCHLORIDE 2.5 MG/1
2.5 TABLET ORAL
DISCHARGE
Start: 2025-05-12 | End: 2025-05-21

## 2025-05-12 RX ORDER — OXYCODONE HYDROCHLORIDE 5 MG/1
5 TABLET ORAL EVERY 6 HOURS PRN
Qty: 12 TABLET | Refills: 0 | Status: SHIPPED | OUTPATIENT
Start: 2025-05-12 | End: 2025-05-15

## 2025-05-12 RX ORDER — DIPHENOXYLATE HYDROCHLORIDE AND ATROPINE SULFATE 2.5; .025 MG/1; MG/1
1 TABLET ORAL 4 TIMES DAILY PRN
Qty: 12 TABLET | Refills: 0 | Status: SHIPPED | OUTPATIENT
Start: 2025-05-12

## 2025-05-12 RX ADMIN — CARBIDOPA AND LEVODOPA 2.5 MG: 50; 200 TABLET, EXTENDED RELEASE ORAL at 11:09

## 2025-05-12 RX ADMIN — LIDOCAINE: 50 OINTMENT TOPICAL at 07:49

## 2025-05-12 RX ADMIN — METOPROLOL SUCCINATE 50 MG: 50 TABLET, EXTENDED RELEASE ORAL at 07:49

## 2025-05-12 RX ADMIN — OXYCODONE HYDROCHLORIDE 2.5 MG: 5 TABLET ORAL at 05:09

## 2025-05-12 RX ADMIN — OXYCODONE HYDROCHLORIDE 2.5 MG: 5 TABLET ORAL at 10:12

## 2025-05-12 RX ADMIN — APIXABAN 5 MG: 5 TABLET, FILM COATED ORAL at 07:49

## 2025-05-12 RX ADMIN — CARBIDOPA AND LEVODOPA 2.5 MG: 50; 200 TABLET, EXTENDED RELEASE ORAL at 07:53

## 2025-05-12 RX ADMIN — CEPHALEXIN 500 MG: 500 CAPSULE ORAL at 07:53

## 2025-05-12 ASSESSMENT — ACTIVITIES OF DAILY LIVING (ADL)
ADLS_ACUITY_SCORE: 43

## 2025-05-12 NOTE — PLAN OF CARE
Goal Outcome Evaluation:      Plan of Care Reviewed With: patient    Overall Patient Progress: improvingOverall Patient Progress: improving     Pt. Alert, oriented x 4. At midnight she denied pain, nausea, dyspnea, or dizziness. Up to BR with 1 assist using walker, gait belt - voiding adequately with no burning, urgency, or frequency.  VSS. At 0500 woke with her right hip hurting where she had hit it last week? in a fall at home. There is still a large hard bump on her hip . Given prn 2.5 mg oxycodone with good relief. Also has been using lidocaine gel over this area with good relief in addition to the oxycodone half-tab. Continue to monitor.

## 2025-05-12 NOTE — PLAN OF CARE
Problem: Pain Acute  Goal: Optimal Pain Control and Function  Outcome: Progressing  Intervention: Prevent or Manage Pain  Recent Flowsheet Documentation  Taken 5/12/2025 9937 by Marychuy Wilson RN  Medication Review/Management: medications reviewed     Pt denies pain but endorses some mild discomfort to right hip and scheduled lidocaine and PRN oxycodone seems to be effective. Pt denies dyspnea and is hoping to be discharged to TCU later today. VSS, no acute changes. Report called to TCU and discharge paperwork went over w/ family and pt.

## 2025-05-12 NOTE — PLAN OF CARE
Problem: Pain Acute  Goal: Optimal Pain Control and Function  Outcome: Progressing   Continues to c/o right hip pain. Too early for another dose per her request. When it was time for another, she was sleeping.

## 2025-05-12 NOTE — PLAN OF CARE
Occupational Therapy Discharge Summary    Reason for therapy discharge:    Discharged to transitional care facility.    Progress towards therapy goal(s). See goals on Care Plan in [facility] electronic health record for goal details.  Goals partially met.  Barriers to achieving goals:   discharge from facility.    Therapy recommendation(s):    Continued therapy is recommended.  Rationale/Recommendations:  To progress ADL's, assess safety/cognition and assist with discharge planning needs.

## 2025-05-12 NOTE — PROVIDER NOTIFICATION
Pt has very large raised hard spot over right hip where she fell. No mention in previous documentation. Paged HO to notify. HO assessed, pt did not want to do an ultrasound tonight. Reassess in AM

## 2025-05-12 NOTE — PLAN OF CARE
Physical Therapy Discharge Summary    Reason for therapy discharge:    Discharged to transitional care facility.    Progress towards therapy goal(s). See goals on Care Plan in [facility] electronic health record for goal details.  Goals partially met.  Barriers to achieving goals:   discharge from facility.    Therapy recommendation(s):    Continued therapy is recommended.  Rationale/Recommendations:  at TCU.

## 2025-05-12 NOTE — PROGRESS NOTES
Care Management Discharge Note    Discharge Date: 05/12/2025       Discharge Disposition:  TCU    Discharge Services:  therapy    Discharge DME:      Discharge Transportation: family or friend will provide    Private pay costs discussed: private room/amenity fees    Does the patient's insurance plan have a 3 day qualifying hospital stay waiver?  Yes     Which insurance plan 3 day waiver is available? Alternative insurance waiver    Will the waiver be used for post-acute placement? No    PAS Confirmation Code: [id number]  Patient/family educated on Medicare website which has current facility and service quality ratings:  Yes    Education Provided on the Discharge Plan:  Yes  Persons Notified of Discharge Plans: pt  Patient/Family in Agreement with the Plan:  yes    Handoff Referral Completed: No, handoff not indicated or clinically appropriate    Additional Information:  [facility]. Cm met with pt in room, she states her son or SO will transport today at 2pm.     Nurse to Nurse # [phone]     Neelam Khalil, GALOSW

## 2025-05-12 NOTE — DISCHARGE SUMMARY
"[facility]  Hospitalist Discharge Summary      Date of Admission:  5/6/2025  Date of Discharge:  5/12/2025  Discharging Provider: Ashwin Barrera MD  Discharge Service: Hospitalist Service    Discharge Diagnoses   Supraventricular tachycardia  Crohn's disease status post total colectomy with ostomy  Type 2 diabetes  Hepatocellular carcinoma  Pulmonary embolism  Hyperkalemia  Hyponatremia  Hyperglycemia  Right hip pain  Acute kidney injury  Suspected urinary tract infection  Cirrhosis of the liver  Generalized weakness  Recurrent falls  Suspected hepatorenal syndrome    Clinically Significant Risk Factors     # DMII: A1C = 12.1 % (Ref range: 0.0 - 5.6 %) within past 6 months  # Overweight: Estimated body mass index is 25.19 kg/m  as calculated from the following:    Height as of this encounter: 1.651 m (5' 5\").    Weight as of this encounter: 68.7 kg (151 lb 6.4 oz).       Follow-ups Needed After Discharge   Follow-up Appointments       Follow Up and recommended labs and tests      Follow up with Nursing home physician.    Outpatient follow-up at orthopedic surgery clinic for further management of hip pain  Outpatient follow-up at the nephrology clinic as arranged  Cefdinir for the next 7 days for possible UTI  Outpatient follow-up at the cardiology clinic to schedule ablation for SVT as arranged by cardiologist                Discharge Disposition   Discharged to short-term care facility  Condition at discharge: Stable    Hospital Course   Zara Webster is a 74-year-old female with history of Crohn s disease s/p total colectomy with ostomy, type 2 diabetes mellitus, cirrhosis with liver cancer, and prior pulmonary embolism admitted on 5/6/25 after preprocedural lab (obtained for scheduled SVT ablation) revealed hyperkalemia, hyponatremia, and hyperglycemia and suspected suspected UTI.     Her hospital course was notable for acute kidney injury likely due to hepatorenal physiology, " which has since resolved following treatment with albumin and midodrine per nephrology recommendations. Hyperkalemia was effectively treated with Lokelma, IV insulin, and albuterol. MSSA urinary tract infection was treated with ceftriaxone.  Antidiabetes regimen was adjusted during hospital stay and she will follow-up at the diabetes clinic for further management of uncontrolled diabetes.    Cardiology service recommended initiating metoprolol 50 mg twice daily for management of supraventricular tachycardia, with plans to reschedule the SVT ablation approximately two weeks after recovery from the current urinary tract infection. She will follow up with electrophysiologist as outpatient for further management of SVT    Patient reported generalized weakness and multiple falls, but no acute injuries were identified. Pelvic X-ray obtained due to mechanical fall about a week prior to admission showed no acute fractures, but did reveal mild bilateral hip joint space narrowing, multilevel lumbar spine degenerative changes, mild degenerative arthritis in both sacroiliac joints and the pubic symphysis, and embolization coils within the pelvis. She will follow up with orthopedics for further management. Symptoms have improved and patient is clinically stable for discharge to TCU at this time.      Consultations This Hospital Stay   ANESTHESIOLOGY IP CONSULT  PHARMACY TO DOSE VANCO  CARDIOLOGY IP CONSULT  PHYSICAL THERAPY ADULT IP CONSULT  OCCUPATIONAL THERAPY ADULT IP CONSULT  CARE MANAGEMENT / SOCIAL WORK IP CONSULT  NEPHROLOGY IP CONSULT  GASTROENTEROLOGY IP CONSULT  ORTHOPEDIC SURGERY IP CONSULT  PHYSICAL THERAPY ADULT IP CONSULT  OCCUPATIONAL THERAPY ADULT IP CONSULT    Code Status   Full Code    Time Spent on this Encounter   IAshwin MD, personally saw the patient today and spent greater than 30 minutes discharging this patient.       Ashwin Barrera MD  [facility] HEART  [address] [address]  Phone: [phone]  Fax: [phone]  ______________________________________________________________________    Physical Exam   Vital Signs: Temp: 98.1  F (36.7  C) Temp src: Oral BP: 101/51 Pulse: 69   Resp: 18 SpO2: 97 % O2 Device: None (Room air)    Weight: 151 lbs 6.4 oz    Constitutional: Awake, alert, cooperative, no apparent distress  Respiratory: Clear to auscultation bilaterally, no crackles or wheezing  Cardiovascular: Regular rate and rhythm, normal S1 and S2, and no murmur noted  GI: Normal bowel sounds, soft, non-distended, non-tender  Extrem: No calf tenderness, no ankle edema  Neuro: Ox3, no focal motor or sensory deficits       Primary Care Physician   Obdulia Chamorro    Discharge Orders      Orthopedic  Referral      Adult Nephrology  Referral      General info for SNF    Length of Stay Estimate: Short Term Care: Estimated # of Days <30  Condition at Discharge: Stable  Level of care:skilled   Rehabilitation Potential: Good  Admission H&P remains valid and up-to-date: Yes  Recent Chemotherapy: N/A  Use Nursing Home Standing Orders: Yes     Follow Up and recommended labs and tests    Follow up with Nursing home physician.    Outpatient follow-up at orthopedic surgery clinic for further management of hip pain  Outpatient follow-up at the nephrology clinic as arranged  Cefdinir for the next 7 days for possible UTI  Outpatient follow-up at the cardiology clinic to schedule ablation for SVT as arranged by cardiologist     Reason for your hospital stay    Supraventricular tachycardia  Crohn's disease status post total colectomy with ostomy  Type 2 diabetes  Hepatocellular carcinoma  Pulmonary embolism  Hyperkalemia  Hyponatremia  Hyperglycemia  Right hip pain  Acute kidney injury  Suspected urinary tract infection  Cirrhosis of the liver  Generalized weakness  Recurrent falls  Suspected hepatorenal syndrome     Colostomy; RLQ Care    ~  Encourage patient participation with ostomy care,  ~ Empty pouch when 1/3 to 1/2 full,   ~ Notify WOC for ongoing ostomy pouch leakage,  ~ Document stoma output volume, color, consistency EVERY shift     Activity - Up with nursing assistance     Physical Therapy Adult Consult    Evaluate and treat as clinically indicated.    Reason: Deconditioning     Occupational Therapy Adult Consult    Evaluate and treat as clinically indicated.    Reason: Deconditioning     Fall precautions     Hip precautions     Diet    Follow this diet upon discharge: Current Diet:Orders Placed This Encounter      Fluid restriction 2000 ML FLUID      Moderate Consistent Carb (60 g CHO per Meal) Diet       Significant Results and Procedures   Most Recent 3 CBC's:  Recent Labs   Lab Test 05/11/25  0630 05/08/25  0707 05/07/25  0433   WBC 7.6 9.3 11.5*   HGB 8.6* 8.4* 10.3*   * 102* 101*   * 127* 115*     Most Recent 3 BMP's:  Recent Labs   Lab Test 05/12/25  1056 05/12/25  0717 05/11/25 2057 05/11/25  0825 05/11/25  0630 05/10/25  0742 05/10/25  0450 05/09/25  1147 05/09/25  0809   NA  --   --   --   --  133*  --  129*  --  129*   POTASSIUM  --   --   --   --  4.3  --  4.4  --  4.3   CHLORIDE  --   --   --   --  105  --  103  --  101   CO2  --   --   --   --  24  --  24  --  22   BUN  --   --   --   --  17.0  --  21.9  --  23.7*   CR  --   --   --   --  0.76  --  0.83  --  0.94   ANIONGAP  --   --   --   --  4*  --  2*  --  6*   HANNA  --   --   --   --  7.6*  --  7.5*  --  8.0*   * 179* 225*   < > 139*   < > 168*   < > 180*    < > = values in this interval not displayed.   ,   Results for orders placed or performed during the hospital encounter of 05/06/25   US Abdomen Limited    Narrative    EXAM: US ABDOMEN LIMITED  LOCATION: [facility]  DATE: 5/6/2025    INDICATION: Elevated LFTs, abdominal pain  COMPARISON: MRI 5/2/2025 and 12/16/2024  TECHNIQUE: Limited abdominal  ultrasound.    FINDINGS:    GALLBLADDER: Mild amount of sludge, otherwise normal. No gallstones, wall thickening, or pericholecystic fluid. Negative sonographic Segura's sign.    BILE DUCTS: There is focal intrahepatic biliary dilatation involving lateral segment left lobe unchanged compared to prior MRIs. The common duct measures 6 mm.    LIVER: Cirrhotic liver. No focal mass. The portal vein is patent with flow in the normal direction. Patent TIPS.    RIGHT KIDNEY: No hydronephrosis.    PANCREAS: The visualized portions are normal.    SPLEEN: Splenomegaly.    No ascites.      Impression    IMPRESSION:  1.  No change compared to prior MRIs.  2.  Cirrhosis with patent TIPS.  3.  Mild amount of biliary sludge. No gallstones or inflammatory gallbladder wall thickening. Focal intrahepatic duct dilatation involving lateral segment left lobe is unchanged.  4.  Splenomegaly.  5.  No ascites.       XR Pelvis 1/2 Views    Narrative    EXAM: XR HIP RIGHT 2-3 VIEWS, XR PELVIS 1/2 VIEWS  LOCATION: [facility]  DATE: 5/6/2025    INDICATION: hip pain  COMPARISON: None.      Impression    IMPRESSION: No acute fracture involving the right hip or pelvis. Mild degenerative narrowing of both hips. Multilevel degenerative changes in the lower lumbar spine and mild degenerative arthritis of both SI joints and pubic symphysis. Embolization coils   in the pelvis.   XR Hip Right 2-3 Views    Narrative    EXAM: XR HIP RIGHT 2-3 VIEWS, XR PELVIS 1/2 VIEWS  LOCATION: [facility]  DATE: 5/6/2025    INDICATION: hip pain  COMPARISON: None.      Impression    IMPRESSION: No acute fracture involving the right hip or pelvis. Mild degenerative narrowing of both hips. Multilevel degenerative changes in the lower lumbar spine and mild degenerative arthritis of both SI joints and pubic symphysis. Embolization coils   in the pelvis.       Discharge Medications   Current Discharge Medication List         START taking these medications    Details   cefdinir (OMNICEF) 300 MG capsule Take 1 capsule (300 mg) by mouth 2 times daily.  Qty: 7 capsule, Refills: 0    Associated Diagnoses: SVT (supraventricular tachycardia); Anemia of chronic disease; CINTIA (acute kidney injury); NSTEMI (non-ST elevated myocardial infarction) (H); Hyponatremia; History of pulmonary embolism; HCC (hepatocellular carcinoma) (H)      midodrine (PROAMATINE) 2.5 MG tablet Take 1 tablet (2.5 mg) by mouth 3 times daily (with meals).    Associated Diagnoses: SVT (supraventricular tachycardia); Anemia of chronic disease; CINTIA (acute kidney injury); NSTEMI (non-ST elevated myocardial infarction) (H); Hyponatremia; History of pulmonary embolism; HCC (hepatocellular carcinoma) (H)      senna-docusate (SENOKOT-S/PERICOLACE) 8.6-50 MG tablet Take 1 tablet by mouth 2 times daily as needed for constipation.    Associated Diagnoses: SVT (supraventricular tachycardia); Anemia of chronic disease; CINTIA (acute kidney injury); NSTEMI (non-ST elevated myocardial infarction) (H); Hyponatremia; History of pulmonary embolism; HCC (hepatocellular carcinoma) (H)           CONTINUE these medications which have CHANGED    Details   oxyCODONE (ROXICODONE) 5 MG tablet Take 1 tablet (5 mg) by mouth every 6 hours as needed for pain.  Qty: 12 tablet, Refills: 0    Associated Diagnoses: SVT (supraventricular tachycardia); Anemia of chronic disease; CINTIA (acute kidney injury); NSTEMI (non-ST elevated myocardial infarction) (H); Hyponatremia; History of pulmonary embolism; HCC (hepatocellular carcinoma) (H)           CONTINUE these medications which have NOT CHANGED    Details   apixaban ANTICOAGULANT (ELIQUIS) 5 MG tablet Take 1 tablet (5 mg) by mouth 2 times daily.  Qty: 180 tablet, Refills: 3    Associated Diagnoses: Elevated d-dimer; Abnormal CT of the chest; Pulmonary emboli (H)      clobetasol (TEMOVATE) 0.05 % external ointment Apply topically as needed       diphenoxylate-atropine (LOMOTIL) 2.5-0.025 MG tablet Take 1 tablet by mouth 4 times daily as needed for diarrhea.  Qty: 120 tablet, Refills: 3    Associated Diagnoses: Crohn's disease of colon with other complication (H)      insulin glargine (LANTUS SOLOSTAR) 100 UNIT/ML pen Take 20 units BID  Qty: 30 mL, Refills: 3    Comments: If Lantus is not covered by insurance, may substitute Basaglar or Semglee or other insulin glargine product per insurance preference at same dose and frequency.    Associated Diagnoses: Type 2 diabetes mellitus with diabetic mononeuropathy, with long-term current use of insulin (H)      insulin lispro (HUMALOG KWIKPEN) 100 UNIT/ML (1 unit dial) KWIKPEN Inject 6 units subcutaneously 4 times daily for blood glucose management  Qty: 30 mL, Refills: 4    Associated Diagnoses: Type 2 diabetes mellitus with diabetic mononeuropathy, with long-term current use of insulin (H)      metoprolol succinate ER (TOPROL XL) 50 MG 24 hr tablet Take 1 tablet (50 mg) by mouth 2 times daily.  Qty: 60 tablet, Refills: 3    Associated Diagnoses: SVT (supraventricular tachycardia)      Multiple Vitamins-Minerals (HAIR SKIN & NAILS ADVANCED) TABS Take 1 tablet by mouth daily.      omeprazole (PRILOSEC) 40 MG DR capsule Take 40 mg by mouth nightly as needed.      ondansetron (ZOFRAN ODT) 4 MG ODT tab Place 4 mg under the tongue every 8 hours as needed for nausea.      continuous blood glucose monitoring (FREESTYLE VIDA) sensor 1 Units daily  Qty: 2 each, Refills: 3    Associated Diagnoses: Type II diabetes mellitus (H)      flash glucose scanning reader (FREESTYLE VIDA 14 DAY READER) Misc [FLASH GLUCOSE SCANNING READER (FREESTYLE VIDA 14 DAY READER) MISC] Use 1 Units As Directed every 14 (fourteen) days.  Qty: 1 each, Refills: 0    Associated Diagnoses: Type II diabetes mellitus (H)      insulin pen needle (32G X 4 MM) 32G X 4 MM miscellaneous Use 8 pen needles daily or as directed.  Qty: 800 each, Refills: 0     Associated Diagnoses: Type 2 diabetes mellitus with diabetic mononeuropathy, with long-term current use of insulin (H)      ostomy adhesive Pste [OSTOMY ADHESIVE PSTE] Apply as needed  Qty: 4 Tube, Refills: 3           STOP taking these medications       fluconazole (DIFLUCAN) 150 MG tablet Comments:   Reason for Stopping:             Allergies   Allergies   Allergen Reactions    Prochlorperazine Edisylate [Prochlorperazine] Other (See Comments)     Saw things    Compazine [Prochlorperazine] Hallucination

## 2025-05-13 ENCOUNTER — PATIENT OUTREACH (OUTPATIENT)
Dept: CARE COORDINATION | Facility: CLINIC | Age: 75
End: 2025-05-13

## 2025-05-13 ENCOUNTER — VIRTUAL VISIT (OUTPATIENT)
Dept: ENDOCRINOLOGY | Facility: CLINIC | Age: 75
End: 2025-05-13
Payer: COMMERCIAL

## 2025-05-13 DIAGNOSIS — E11.41 TYPE 2 DIABETES MELLITUS WITH DIABETIC MONONEUROPATHY, WITH LONG-TERM CURRENT USE OF INSULIN (H): Primary | ICD-10-CM

## 2025-05-13 DIAGNOSIS — Z79.4 TYPE 2 DIABETES MELLITUS WITH DIABETIC MONONEUROPATHY, WITH LONG-TERM CURRENT USE OF INSULIN (H): Primary | ICD-10-CM

## 2025-05-13 PROCEDURE — 98005 SYNCH AUDIO-VIDEO EST LOW 20: CPT | Performed by: NURSE PRACTITIONER

## 2025-05-13 RX ORDER — ACYCLOVIR 400 MG/1
TABLET ORAL
Qty: 9 EACH | Refills: 1 | Status: SHIPPED | OUTPATIENT
Start: 2025-05-13

## 2025-05-13 RX ORDER — INFLUENZA A VIRUS A/VICTORIA/4897/2022 IVR-238 (H1N1) ANTIGEN (FORMALDEHYDE INACTIVATED), INFLUENZA A VIRUS A/CALIFORNIA/122/2022 SAN-022 (H3N2) ANTIGEN (FORMALDEHYDE INACTIVATED), AND INFLUENZA B VIRUS B/MICHIGAN/01/2021 ANTIGEN (FORMALDEHYDE INACTIVATED) 60; 60; 60 UG/.5ML; UG/.5ML; UG/.5ML
INJECTION, SUSPENSION INTRAMUSCULAR
COMMUNITY
Start: 2024-11-01

## 2025-05-13 NOTE — LETTER
"5/13/2025      Zara Webster  [address] [place]  [place] [address]      Dear Colleague,    Thank you for referring your patient, Zara Webster, to the [facility]. Please see a copy of my visit note below.    [facility] ENDOCRINOLOGY    Diabetes Note 5/13/2025    Zara Webster, 1950, [id number]          Reason for visit      1. Type 2 diabetes mellitus with diabetic mononeuropathy, with long-term current use of insulin (H)        HPI     Zara Webster is a very pleasant 74 year old old female who presents for follow up.  SUMMARY:    Zara is seen today via Video Visit as a post Hospital visit. She is joined by her daughter and her son.     Unfortunately, as Zara went in for a cardiac ablation this past week, she was found with hyperglycemia, hyperkalemia and hyponatremia. The procedure was aborted and they worked to stabilize both her BG and her Electrolytes.     She has been using the [employer]ail[employer] Freestyle CGM for some time. Unfortunately, the downloads have not been forthcoming in what her BG have actually been doing because she is not really using it as designed. There is no data on the download today and the sensor has only been active 5% of the time in the last two weeks.     Because her children are \"intervening\" and are going to help her keep her DM better managed, they need to be able to interface with her data on a regular daily basis. Because of this, we are going to see if we can get the Dexcom CGM, so her data can be \"shared\".     At her last appointment, I had directed her to double her Lantus dose from 10 units BID to 20 units BID. She did not make this adjustment.      Blood glucose data:      Past Medical History     Patient Active Problem List   Diagnosis     Post Colectomy     Hypercholesteremia     Aspirin contraindicated     Crohn's disease of colon (H)     Gastrointestinal hemorrhage     Other cirrhosis of liver (H) - unclear etiology,[facility]     " Nonalcoholic steatohepatitis w Cirrhosis -- S/P TIPS     Essential tremor     HCC (hepatocellular carcinoma) (H)     Peripheral edema     Bile duct stricture (H)     SVT (supraventricular tachycardia)     NSTEMI (non-ST elevated myocardial infarction) (H)     Type 2 diabetes mellitus with diabetic mononeuropathy, with long-term current use of insulin (H)     Hyperkalemia     Urinary tract infection     Hx of Pulm Embolus -- on Eliquis     DM type 2 on Insulin, Hgb A1C 12.1 on 4/15/25     Hyponatremia     CINTIA (acute kidney injury)     Anemia of chronic disease        Family History       family history includes Cancer in her father; Diabetes in her father and mother; Heart Disease in her mother; Hyperlipidemia in her mother; No Known Problems in her sister and sister.    Social History      reports that she has never smoked. She has never been exposed to tobacco smoke. She has never used smokeless tobacco. She reports that she does not currently use alcohol. She reports that she does not use drugs.      Review of Systems     Patient has no polyuria or polydipsia, no chest pain, dyspnea or TIA's, no numbness, tingling or pain in extremities  Remainder negative except as noted in HPI.    Vital Signs     LMP  (LMP Unknown)   Wt Readings from Last 3 Encounters:   05/12/25 68.7 kg (151 lb 6.4 oz)   05/04/25 65.3 kg (144 lb)   04/23/25 65.6 kg (144 lb 9.6 oz)       Physical Exam     Constitutional:  Well developed, Well nourished  HENT:  Normocephalic,   Neck: normal in appearance  Eyes:  PERRL, Conjunctiva pink  Respiratory:  No respiratory distress  Skin: No acanthosis nigricans, lipoatrophy or lipodystrophy  Neurologic:  Alert & oriented x 3, nonfocal  Psychiatric:  Affect, Mood, Insight appropriate        Assessment     1. Type 2 diabetes mellitus with diabetic mononeuropathy, with long-term current use of insulin (H)        Plan     Will increase her Lantus dose to 20 units BID.  She will also take 10 units BEFORE her  meals of Prandial insulin. Will get the Dexcom G7 ordered so they can all do this together. Referral to CDE as well. Follow-up with me in 3 months.         Katiana Goodwin NP  [facility]  5/13/2025  7:12 AM        Lab Results     Microalbumin Urine mg/dL   Date Value Ref Range Status   04/27/2021 0.73 0.00 - 1.99 mg/dL Final       Cholesterol   Date Value Ref Range Status   03/20/2023 243 (H) <200 mg/dL Final     Direct Measure HDL   Date Value Ref Range Status   03/20/2023 58 >=50 mg/dL Final     Triglycerides   Date Value Ref Range Status   03/20/2023 238 (H) <150 mg/dL Final       [unfilled]      Current Medications     Outpatient Medications Prior to Visit   Medication Sig Dispense Refill     apixaban ANTICOAGULANT (ELIQUIS) 5 MG tablet Take 1 tablet (5 mg) by mouth 2 times daily. 180 tablet 3     cefdinir (OMNICEF) 300 MG capsule Take 1 capsule (300 mg) by mouth 2 times daily. 7 capsule 0     clobetasol (TEMOVATE) 0.05 % external ointment Apply topically as needed       continuous blood glucose monitoring (FREESTYLE VIDA) sensor 1 Units daily 2 each 3     diphenoxylate-atropine (LOMOTIL) 2.5-0.025 MG tablet Take 1 tablet by mouth 4 times daily as needed for diarrhea. 12 tablet 0     flash glucose scanning reader (FREESTYLE VIDA 14 DAY READER) Misc [FLASH GLUCOSE SCANNING READER (FREESTYLE VIDA 14 DAY READER) MISC] Use 1 Units As Directed every 14 (fourteen) days. 1 each 0     insulin glargine (LANTUS SOLOSTAR) 100 UNIT/ML pen Take 20 units BID 30 mL 3     insulin lispro (HUMALOG KWIKPEN) 100 UNIT/ML (1 unit dial) KWIKPEN Inject 6 units subcutaneously 4 times daily for blood glucose management (Patient taking differently: Inject 6 Units subcutaneously 4 times daily (with meals and nightly). Inject 6 units subcutaneously 4 times daily for blood glucose management  QID with meals and bedtime) 30 mL 4     insulin pen needle (32G X 4 MM) 32G X 4 MM miscellaneous Use 8 pen needles daily or as directed. 800  each 0     metoprolol succinate ER (TOPROL XL) 50 MG 24 hr tablet Take 1 tablet (50 mg) by mouth 2 times daily. 60 tablet 3     omeprazole (PRILOSEC) 40 MG DR capsule Take 40 mg by mouth nightly as needed.       ostomy adhesive Pste [OSTOMY ADHESIVE PSTE] Apply as needed 4 Tube 3     oxyCODONE (ROXICODONE) 5 MG tablet Take 1 tablet (5 mg) by mouth every 6 hours as needed for pain. 12 tablet 0     midodrine (PROAMATINE) 2.5 MG tablet Take 1 tablet (2.5 mg) by mouth 3 times daily (with meals).       Multiple Vitamins-Minerals (HAIR SKIN & NAILS ADVANCED) TABS Take 1 tablet by mouth daily. (Patient not taking: Reported on 5/13/2025)       ondansetron (ZOFRAN ODT) 4 MG ODT tab Place 4 mg under the tongue every 8 hours as needed for nausea. (Patient not taking: Reported on 5/13/2025)       senna-docusate (SENOKOT-S/PERICOLACE) 8.6-50 MG tablet Take 1 tablet by mouth 2 times daily as needed for constipation. (Patient not taking: Reported on 5/13/2025)       No facility-administered medications prior to visit.           Virtual Visit Details    Type of service:  Video Visit   Video Start Time: 0700  Video End Time:0720    Originating Location (pt. Location): Home    Distant Location (provider location):  On-site  Platform used for Video Visit: AmWell          Again, thank you for allowing me to participate in the care of your patient.        Sincerely,        Katiana Goodwin NP    Electronically signed

## 2025-05-13 NOTE — PROGRESS NOTES
"[facility] ENDOCRINOLOGY    Diabetes Note 5/13/2025    Zara Webster, 1950, [id number]          Reason for visit      1. Type 2 diabetes mellitus with diabetic mononeuropathy, with long-term current use of insulin (H)        HPI     Zara Webster is a very pleasant 74 year old old female who presents for follow up.  SUMMARY:    Zara is seen today via Video Visit as a post Hospital visit. She is joined by her daughter and her son.     Unfortunately, as Zara went in for a cardiac ablation this past week, she was found with hyperglycemia, hyperkalemia and hyponatremia. The procedure was aborted and they worked to stabilize both her BG and her Electrolytes.     She has been using the Naila Freestyle CGM for some time. Unfortunately, the downloads have not been forthcoming in what her BG have actually been doing because she is not really using it as designed. There is no data on the download today and the sensor has only been active 5% of the time in the last two weeks.     Because her children are \"intervening\" and are going to help her keep her DM better managed, they need to be able to interface with her data on a regular daily basis. Because of this, we are going to see if we can get the Dexcom CGM, so her data can be \"shared\".     At her last appointment, I had directed her to double her Lantus dose from 10 units BID to 20 units BID. She did not make this adjustment.      Blood glucose data:      Past Medical History     Patient Active Problem List   Diagnosis    Post Colectomy    Hypercholesteremia    Aspirin contraindicated    Crohn's disease of colon (H)    Gastrointestinal hemorrhage    Other cirrhosis of liver (H) - unclear etiology,[facility]I    Nonalcoholic steatohepatitis w Cirrhosis -- S/P TIPS    Essential tremor    HCC (hepatocellular carcinoma) (H)    Peripheral edema    Bile duct stricture (H)    SVT (supraventricular tachycardia)    NSTEMI (non-ST elevated myocardial infarction) (H)    Type " 2 diabetes mellitus with diabetic mononeuropathy, with long-term current use of insulin (H)    Hyperkalemia    Urinary tract infection    Hx of Pulm Embolus -- on Eliquis    DM type 2 on Insulin, Hgb A1C 12.1 on 4/15/25    Hyponatremia    CINTIA (acute kidney injury)    Anemia of chronic disease        Family History       family history includes Cancer in her father; Diabetes in her father and mother; Heart Disease in her mother; Hyperlipidemia in her mother; No Known Problems in her sister and sister.    Social History      reports that she has never smoked. She has never been exposed to tobacco smoke. She has never used smokeless tobacco. She reports that she does not currently use alcohol. She reports that she does not use drugs.      Review of Systems     Patient has no polyuria or polydipsia, no chest pain, dyspnea or TIA's, no numbness, tingling or pain in extremities  Remainder negative except as noted in HPI.    Vital Signs     LMP  (LMP Unknown)   Wt Readings from Last 3 Encounters:   05/12/25 68.7 kg (151 lb 6.4 oz)   05/04/25 65.3 kg (144 lb)   04/23/25 65.6 kg (144 lb 9.6 oz)       Physical Exam     Constitutional:  Well developed, Well nourished  HENT:  Normocephalic,   Neck: normal in appearance  Eyes:  PERRL, Conjunctiva pink  Respiratory:  No respiratory distress  Skin: No acanthosis nigricans, lipoatrophy or lipodystrophy  Neurologic:  Alert & oriented x 3, nonfocal  Psychiatric:  Affect, Mood, Insight appropriate        Assessment     1. Type 2 diabetes mellitus with diabetic mononeuropathy, with long-term current use of insulin (H)        Plan     Will increase her Lantus dose to 20 units BID.  She will also take 10 units BEFORE her meals of Prandial insulin. Will get the Dexcom G7 ordered so they can all do this together. Referral to CDE as well. Follow-up with me in 3 months.         Katiana Goodwin NP   Endocrinology  5/13/2025  7:12 AM        Lab Results     Microalbumin Urine mg/dL   Date  Value Ref Range Status   04/27/2021 0.73 0.00 - 1.99 mg/dL Final       Cholesterol   Date Value Ref Range Status   03/20/2023 243 (H) <200 mg/dL Final     Direct Measure HDL   Date Value Ref Range Status   03/20/2023 58 >=50 mg/dL Final     Triglycerides   Date Value Ref Range Status   03/20/2023 238 (H) <150 mg/dL Final       [unfilled]      Current Medications     Outpatient Medications Prior to Visit   Medication Sig Dispense Refill    apixaban ANTICOAGULANT (ELIQUIS) 5 MG tablet Take 1 tablet (5 mg) by mouth 2 times daily. 180 tablet 3    cefdinir (OMNICEF) 300 MG capsule Take 1 capsule (300 mg) by mouth 2 times daily. 7 capsule 0    clobetasol (TEMOVATE) 0.05 % external ointment Apply topically as needed      continuous blood glucose monitoring (FREESTYLE VIDA) sensor 1 Units daily 2 each 3    diphenoxylate-atropine (LOMOTIL) 2.5-0.025 MG tablet Take 1 tablet by mouth 4 times daily as needed for diarrhea. 12 tablet 0    flash glucose scanning reader (FREESTYLE VIDA 14 DAY READER) Misc [FLASH GLUCOSE SCANNING READER (FREESTYLE VIDA 14 DAY READER) MISC] Use 1 Units As Directed every 14 (fourteen) days. 1 each 0    insulin glargine (LANTUS SOLOSTAR) 100 UNIT/ML pen Take 20 units BID 30 mL 3    insulin lispro (HUMALOG KWIKPEN) 100 UNIT/ML (1 unit dial) KWIKPEN Inject 6 units subcutaneously 4 times daily for blood glucose management (Patient taking differently: Inject 6 Units subcutaneously 4 times daily (with meals and nightly). Inject 6 units subcutaneously 4 times daily for blood glucose management  QID with meals and bedtime) 30 mL 4    insulin pen needle (32G X 4 MM) 32G X 4 MM miscellaneous Use 8 pen needles daily or as directed. 800 each 0    metoprolol succinate ER (TOPROL XL) 50 MG 24 hr tablet Take 1 tablet (50 mg) by mouth 2 times daily. 60 tablet 3    omeprazole (PRILOSEC) 40 MG DR capsule Take 40 mg by mouth nightly as needed.      ostomy adhesive Pste [OSTOMY ADHESIVE PSTE] Apply as needed 4 Tube 3     oxyCODONE (ROXICODONE) 5 MG tablet Take 1 tablet (5 mg) by mouth every 6 hours as needed for pain. 12 tablet 0    midodrine (PROAMATINE) 2.5 MG tablet Take 1 tablet (2.5 mg) by mouth 3 times daily (with meals).      Multiple Vitamins-Minerals (HAIR SKIN & NAILS ADVANCED) TABS Take 1 tablet by mouth daily. (Patient not taking: Reported on 5/13/2025)      ondansetron (ZOFRAN ODT) 4 MG ODT tab Place 4 mg under the tongue every 8 hours as needed for nausea. (Patient not taking: Reported on 5/13/2025)      senna-docusate (SENOKOT-S/PERICOLACE) 8.6-50 MG tablet Take 1 tablet by mouth 2 times daily as needed for constipation. (Patient not taking: Reported on 5/13/2025)       No facility-administered medications prior to visit.           Virtual Visit Details    Type of service:  Video Visit   Video Start Time: 0700  Video End Time:0720    Originating Location (pt. Location): Home    Distant Location (provider location):  On-site  Platform used for Video Visit: Della

## 2025-05-13 NOTE — PATIENT INSTRUCTIONS
Start taking 10 units of Mealtime insulin BEFORE you eat - preferably 10 - 15 minutes.    Take 20 units of Lantus twice a day, about 12 hours apart.       Insulin pumps on the Market:    Medtronic 780 G with Guardian CGM    Tandem (T-Slim pump) with Dexcom G7 CGM or Naila 2+    Mobi (through Tandem) with Dexcom G7 CGM    Omnipod insulin pump (tubeless) with Dexcom G7 or G6 CGM, or Naila 2+ CGM    Islet pump with Dexcom G6 CGM    There is one more that is about to launch called the Twist, but not yet available.

## 2025-05-13 NOTE — PROGRESS NOTES
[facility]    Background: Transitional Care Management program identified per system criteria and reviewed by [facility] team for possible outreach.    Assessment: Upon chart review, [facility] Team member will not proceed with patient outreach related to this episode of Transitional Care Management program due to reason below:    Patient has a follow up appointment with an appropriate provider today for hospital discharge.    Plan: Transitional Care Management episode addressed appropriately per reason noted above.      Maricruz Slater MA  [facility]    *Connected Care Resource Team does NOT follow patient ongoing. Referrals are identified based on internal discharge reports and the outreach is to ensure patient has an understanding of their discharge instructions.

## 2025-05-14 ENCOUNTER — PATIENT OUTREACH (OUTPATIENT)
Dept: CARE COORDINATION | Facility: CLINIC | Age: 75
End: 2025-05-14
Payer: COMMERCIAL

## 2025-05-14 ENCOUNTER — TRANSFERRED RECORDS (OUTPATIENT)
Dept: HEALTH INFORMATION MANAGEMENT | Facility: CLINIC | Age: 75
End: 2025-05-14
Payer: COMMERCIAL

## 2025-05-14 DIAGNOSIS — Z79.4 TYPE 2 DIABETES MELLITUS WITH DIABETIC MONONEUROPATHY, WITH LONG-TERM CURRENT USE OF INSULIN (H): ICD-10-CM

## 2025-05-14 DIAGNOSIS — E11.41 TYPE 2 DIABETES MELLITUS WITH DIABETIC MONONEUROPATHY, WITH LONG-TERM CURRENT USE OF INSULIN (H): ICD-10-CM

## 2025-05-15 ENCOUNTER — LAB REQUISITION (OUTPATIENT)
Dept: LAB | Facility: CLINIC | Age: 75
End: 2025-05-15
Payer: COMMERCIAL

## 2025-05-15 ENCOUNTER — PATIENT OUTREACH (OUTPATIENT)
Dept: CARE COORDINATION | Facility: CLINIC | Age: 75
End: 2025-05-15
Payer: COMMERCIAL

## 2025-05-15 DIAGNOSIS — E87.5 HYPERKALEMIA: ICD-10-CM

## 2025-05-15 DIAGNOSIS — K74.69 OTHER CIRRHOSIS OF LIVER (H): ICD-10-CM

## 2025-05-16 ENCOUNTER — LAB REQUISITION (OUTPATIENT)
Dept: LAB | Facility: CLINIC | Age: 75
End: 2025-05-16

## 2025-05-16 DIAGNOSIS — Z13.9 ENCOUNTER FOR SCREENING, UNSPECIFIED: ICD-10-CM

## 2025-05-16 DIAGNOSIS — Z13.0 ENCOUNTER FOR SCREENING FOR DISEASES OF THE BLOOD AND BLOOD-FORMING ORGANS AND CERTAIN DISORDERS INVOLVING THE IMMUNE MECHANISM: ICD-10-CM

## 2025-05-16 DIAGNOSIS — E61.1 IRON DEFICIENCY: ICD-10-CM

## 2025-05-16 DIAGNOSIS — E83.10 DISORDER OF IRON METABOLISM, UNSPECIFIED: ICD-10-CM

## 2025-05-16 LAB
ALBUMIN SERPL BCG-MCNC: 1.9 G/DL (ref 3.5–5.2)
ALBUMIN SERPL BCG-MCNC: 1.9 G/DL (ref 3.5–5.2)
ALP SERPL-CCNC: 534 U/L (ref 40–150)
ALP SERPL-CCNC: 534 U/L (ref 40–150)
ALT SERPL W P-5'-P-CCNC: 42 U/L (ref 0–50)
ALT SERPL W P-5'-P-CCNC: 42 U/L (ref 0–50)
ANION GAP SERPL CALCULATED.3IONS-SCNC: 6 MMOL/L (ref 7–15)
ANION GAP SERPL CALCULATED.3IONS-SCNC: 6 MMOL/L (ref 7–15)
AST SERPL W P-5'-P-CCNC: 126 U/L (ref 0–45)
AST SERPL W P-5'-P-CCNC: 126 U/L (ref 0–45)
BILIRUB SERPL-MCNC: 4.5 MG/DL
BILIRUB SERPL-MCNC: 4.5 MG/DL
BUN SERPL-MCNC: 12.3 MG/DL (ref 8–23)
BUN SERPL-MCNC: 12.3 MG/DL (ref 8–23)
CALCIUM SERPL-MCNC: 7.8 MG/DL (ref 8.8–10.4)
CALCIUM SERPL-MCNC: 7.8 MG/DL (ref 8.8–10.4)
CHLORIDE SERPL-SCNC: 104 MMOL/L (ref 98–107)
CHLORIDE SERPL-SCNC: 104 MMOL/L (ref 98–107)
CREAT SERPL-MCNC: 0.63 MG/DL (ref 0.51–0.95)
CREAT SERPL-MCNC: 0.63 MG/DL (ref 0.51–0.95)
EGFRCR SERPLBLD CKD-EPI 2021: >90 ML/MIN/1.73M2
EGFRCR SERPLBLD CKD-EPI 2021: >90 ML/MIN/1.73M2
ERYTHROCYTE [DISTWIDTH] IN BLOOD BY AUTOMATED COUNT: 18.9 % (ref 10–15)
ERYTHROCYTE [DISTWIDTH] IN BLOOD BY AUTOMATED COUNT: 18.9 % (ref 10–15)
GLUCOSE SERPL-MCNC: 151 MG/DL (ref 70–99)
GLUCOSE SERPL-MCNC: 151 MG/DL (ref 70–99)
HCO3 SERPL-SCNC: 22 MMOL/L (ref 22–29)
HCO3 SERPL-SCNC: 22 MMOL/L (ref 22–29)
HCT VFR BLD AUTO: 26 % (ref 35–47)
HCT VFR BLD AUTO: 26 % (ref 35–47)
HGB BLD-MCNC: 8.2 G/DL (ref 11.7–15.7)
HGB BLD-MCNC: 8.2 G/DL (ref 11.7–15.7)
MCH RBC QN AUTO: 33.7 PG (ref 26.5–33)
MCH RBC QN AUTO: 33.7 PG (ref 26.5–33)
MCHC RBC AUTO-ENTMCNC: 31.5 G/DL (ref 31.5–36.5)
MCHC RBC AUTO-ENTMCNC: 31.5 G/DL (ref 31.5–36.5)
MCV RBC AUTO: 107 FL (ref 78–100)
MCV RBC AUTO: 107 FL (ref 78–100)
PLATELET # BLD AUTO: 125 10E3/UL (ref 150–450)
PLATELET # BLD AUTO: 125 10E3/UL (ref 150–450)
POTASSIUM SERPL-SCNC: 4.5 MMOL/L (ref 3.4–5.3)
POTASSIUM SERPL-SCNC: 4.5 MMOL/L (ref 3.4–5.3)
PROT SERPL-MCNC: 6.2 G/DL (ref 6.4–8.3)
PROT SERPL-MCNC: 6.2 G/DL (ref 6.4–8.3)
RBC # BLD AUTO: 2.43 10E6/UL (ref 3.8–5.2)
RBC # BLD AUTO: 2.43 10E6/UL (ref 3.8–5.2)
SODIUM SERPL-SCNC: 132 MMOL/L (ref 135–145)
SODIUM SERPL-SCNC: 132 MMOL/L (ref 135–145)
WBC # BLD AUTO: 5.3 10E3/UL (ref 4–11)
WBC # BLD AUTO: 5.3 10E3/UL (ref 4–11)

## 2025-05-16 PROCEDURE — 85027 COMPLETE CBC AUTOMATED: CPT | Mod: ORL | Performed by: NURSE PRACTITIONER

## 2025-05-16 PROCEDURE — 80053 COMPREHEN METABOLIC PANEL: CPT | Mod: ORL | Performed by: NURSE PRACTITIONER

## 2025-05-16 PROCEDURE — 36415 COLL VENOUS BLD VENIPUNCTURE: CPT | Mod: ORL | Performed by: NURSE PRACTITIONER

## 2025-05-16 PROCEDURE — P9604 ONE-WAY ALLOW PRORATED TRIP: HCPCS | Mod: ORL | Performed by: NURSE PRACTITIONER

## 2025-05-19 LAB
FERRITIN SERPL-MCNC: 178 NG/ML (ref 11–328)
HGB BLD-MCNC: 7.7 G/DL (ref 11.7–15.7)
IRON BINDING CAPACITY (ROCHE): 134 UG/DL (ref 240–430)
IRON SATN MFR SERPL: 70 % (ref 15–46)
IRON SERPL-MCNC: 94 UG/DL (ref 37–145)
MCV RBC AUTO: 106 FL (ref 78–100)
TRANSFERRIN SERPL-MCNC: 120 MG/DL (ref 200–360)
VIT B12 SERPL-MCNC: 1278 PG/ML (ref 232–1245)

## 2025-05-19 PROCEDURE — P9604 ONE-WAY ALLOW PRORATED TRIP: HCPCS | Mod: ORL | Performed by: NURSE PRACTITIONER

## 2025-05-20 ENCOUNTER — OFFICE VISIT (OUTPATIENT)
Dept: FAMILY MEDICINE | Facility: CLINIC | Age: 75
End: 2025-05-20
Payer: COMMERCIAL

## 2025-05-20 ENCOUNTER — TELEPHONE (OUTPATIENT)
Dept: SCHEDULING | Facility: CLINIC | Age: 75
End: 2025-05-20

## 2025-05-20 VITALS
WEIGHT: 152.4 LBS | OXYGEN SATURATION: 98 % | BODY MASS INDEX: 25.39 KG/M2 | RESPIRATION RATE: 16 BRPM | TEMPERATURE: 97.8 F | SYSTOLIC BLOOD PRESSURE: 100 MMHG | DIASTOLIC BLOOD PRESSURE: 48 MMHG | HEIGHT: 65 IN | HEART RATE: 72 BPM

## 2025-05-20 DIAGNOSIS — Z09 HOSPITAL DISCHARGE FOLLOW-UP: Primary | ICD-10-CM

## 2025-05-20 DIAGNOSIS — R30.0 DYSURIA: ICD-10-CM

## 2025-05-20 DIAGNOSIS — Z79.4 TYPE 2 DIABETES MELLITUS WITH HYPERGLYCEMIA, WITH LONG-TERM CURRENT USE OF INSULIN (H): ICD-10-CM

## 2025-05-20 DIAGNOSIS — D75.1 ERYTHROCYTOSIS DUE TO HEPATOMA (H): ICD-10-CM

## 2025-05-20 DIAGNOSIS — Z78.0 ASYMPTOMATIC POSTMENOPAUSAL STATUS: ICD-10-CM

## 2025-05-20 DIAGNOSIS — N18.31 STAGE 3A CHRONIC KIDNEY DISEASE (H): ICD-10-CM

## 2025-05-20 DIAGNOSIS — E11.65 TYPE 2 DIABETES MELLITUS WITH HYPERGLYCEMIA, WITH LONG-TERM CURRENT USE OF INSULIN (H): ICD-10-CM

## 2025-05-20 DIAGNOSIS — D63.8 ANEMIA OF CHRONIC DISEASE: ICD-10-CM

## 2025-05-20 DIAGNOSIS — K76.6 PORTAL HYPERTENSION (H): ICD-10-CM

## 2025-05-20 DIAGNOSIS — Z87.440 RECENT URINARY TRACT INFECTION: ICD-10-CM

## 2025-05-20 DIAGNOSIS — C22.0 ERYTHROCYTOSIS DUE TO HEPATOMA (H): ICD-10-CM

## 2025-05-20 LAB
ERYTHROCYTE [DISTWIDTH] IN BLOOD BY AUTOMATED COUNT: 19.6 % (ref 10–15)
HCT VFR BLD AUTO: 26.8 % (ref 35–47)
HGB BLD-MCNC: 8.7 G/DL (ref 11.7–15.7)
MCH RBC QN AUTO: 34.4 PG (ref 26.5–33)
MCHC RBC AUTO-ENTMCNC: 32.5 G/DL (ref 31.5–36.5)
MCV RBC AUTO: 106 FL (ref 78–100)
PLATELET # BLD AUTO: 163 10E3/UL (ref 150–450)
RBC # BLD AUTO: 2.53 10E6/UL (ref 3.8–5.2)
WBC # BLD AUTO: 7.2 10E3/UL (ref 4–11)

## 2025-05-20 PROCEDURE — 85027 COMPLETE CBC AUTOMATED: CPT | Performed by: FAMILY MEDICINE

## 2025-05-20 PROCEDURE — 3074F SYST BP LT 130 MM HG: CPT | Performed by: FAMILY MEDICINE

## 2025-05-20 PROCEDURE — 1111F DSCHRG MED/CURRENT MED MERGE: CPT | Performed by: FAMILY MEDICINE

## 2025-05-20 PROCEDURE — 80048 BASIC METABOLIC PNL TOTAL CA: CPT | Performed by: FAMILY MEDICINE

## 2025-05-20 PROCEDURE — 99495 TRANSJ CARE MGMT MOD F2F 14D: CPT | Performed by: FAMILY MEDICINE

## 2025-05-20 PROCEDURE — 3078F DIAST BP <80 MM HG: CPT | Performed by: FAMILY MEDICINE

## 2025-05-20 PROCEDURE — 36415 COLL VENOUS BLD VENIPUNCTURE: CPT | Performed by: FAMILY MEDICINE

## 2025-05-20 PROCEDURE — 87086 URINE CULTURE/COLONY COUNT: CPT | Performed by: FAMILY MEDICINE

## 2025-05-20 NOTE — TELEPHONE ENCOUNTER
General Call    Contacts       Contact Date/Time Type Contact Phone/Fax    05/20/2025 03:54 PM CDT Phone (Incoming) DennisKristy (Emergency Contact) [phone] (H)          Reason for Call:  POA    What are your questions or concerns:  Pt's daughter is wanting to speak with care team to confirm if they have POA listed for herself and brother in regard to their mother/pt. If not, how are they able to provide that documentation to care team.    Date of last appointment with provider: 5/20/25    Could we send this information to you in [place] or would you prefer to receive a phone call?:   Patient would prefer a phone call   Okay to leave a detailed message?: Yes at Other phone number:  [phone]

## 2025-05-21 ENCOUNTER — RESULTS FOLLOW-UP (OUTPATIENT)
Dept: FAMILY MEDICINE | Facility: CLINIC | Age: 75
End: 2025-05-21

## 2025-05-21 ENCOUNTER — ALLIED HEALTH/NURSE VISIT (OUTPATIENT)
Dept: EDUCATION SERVICES | Facility: CLINIC | Age: 75
End: 2025-05-21
Attending: NURSE PRACTITIONER
Payer: COMMERCIAL

## 2025-05-21 ENCOUNTER — MEDICAL CORRESPONDENCE (OUTPATIENT)
Dept: HEALTH INFORMATION MANAGEMENT | Facility: CLINIC | Age: 75
End: 2025-05-21

## 2025-05-21 DIAGNOSIS — I47.10 SVT (SUPRAVENTRICULAR TACHYCARDIA): ICD-10-CM

## 2025-05-21 DIAGNOSIS — N17.9 AKI (ACUTE KIDNEY INJURY): ICD-10-CM

## 2025-05-21 DIAGNOSIS — C22.0 HCC (HEPATOCELLULAR CARCINOMA) (H): ICD-10-CM

## 2025-05-21 DIAGNOSIS — I21.4 NSTEMI (NON-ST ELEVATED MYOCARDIAL INFARCTION) (H): ICD-10-CM

## 2025-05-21 DIAGNOSIS — Z86.711 HISTORY OF PULMONARY EMBOLISM: ICD-10-CM

## 2025-05-21 DIAGNOSIS — E11.9 TYPE II DIABETES MELLITUS (H): ICD-10-CM

## 2025-05-21 DIAGNOSIS — Z79.4 TYPE 2 DIABETES MELLITUS WITH DIABETIC MONONEUROPATHY, WITH LONG-TERM CURRENT USE OF INSULIN (H): ICD-10-CM

## 2025-05-21 DIAGNOSIS — E87.1 HYPONATREMIA: ICD-10-CM

## 2025-05-21 DIAGNOSIS — D63.8 ANEMIA OF CHRONIC DISEASE: ICD-10-CM

## 2025-05-21 DIAGNOSIS — E11.41 TYPE 2 DIABETES MELLITUS WITH DIABETIC MONONEUROPATHY, WITH LONG-TERM CURRENT USE OF INSULIN (H): ICD-10-CM

## 2025-05-21 LAB
ANION GAP SERPL CALCULATED.3IONS-SCNC: 6 MMOL/L (ref 7–15)
BUN SERPL-MCNC: 12.4 MG/DL (ref 8–23)
CALCIUM SERPL-MCNC: 8.1 MG/DL (ref 8.8–10.4)
CHLORIDE SERPL-SCNC: 104 MMOL/L (ref 98–107)
CREAT SERPL-MCNC: 0.68 MG/DL (ref 0.51–0.95)
EGFRCR SERPLBLD CKD-EPI 2021: 90 ML/MIN/1.73M2
GLUCOSE SERPL-MCNC: 188 MG/DL (ref 70–99)
HCO3 SERPL-SCNC: 21 MMOL/L (ref 22–29)
POTASSIUM SERPL-SCNC: 4.6 MMOL/L (ref 3.4–5.3)
SODIUM SERPL-SCNC: 131 MMOL/L (ref 135–145)

## 2025-05-21 PROCEDURE — G0108 DIAB MANAGE TRN  PER INDIV: HCPCS | Performed by: DIETITIAN, REGISTERED

## 2025-05-21 RX ORDER — INSULIN GLARGINE 100 [IU]/ML
INJECTION, SOLUTION SUBCUTANEOUS
OUTPATIENT
Start: 2025-05-21

## 2025-05-21 RX ORDER — MIDODRINE HYDROCHLORIDE 2.5 MG/1
2.5 TABLET ORAL
Qty: 270 TABLET | Refills: 0 | Status: SHIPPED | OUTPATIENT
Start: 2025-05-21

## 2025-05-21 NOTE — TELEPHONE ENCOUNTER
I have no idea. Everything looks fine. Can you try again or screenshot the error??    Sukhwinder Card RN     [facility]

## 2025-05-21 NOTE — TELEPHONE ENCOUNTER
Patient returned call.       Confirmed taking Insulin Glargine (Lantus Solostar) 20 units BID.      Informed Katiana (endocrinology) sent in refill 4/22/25 and there should be refills on file at  the [facility] pharmacy. Informed patient I would be refusing 14 unit request from [facility] pharmacy. Also updated preferred pharmacy as patient [facility] pharmacy was a one time vacation use.

## 2025-05-21 NOTE — LETTER
5/21/2025         RE: Zara Webster  [address] [place]  [place] [address]        Dear Colleague,    Thank you for referring your patient, Zara Webster, to the [facility] DIABETES EDUCATION. Please see a copy of my visit note below.    Diabetes Self-Management Education & Support  Presents for: Individual review  Type of Service: In Person Visit      Assessment  It was a pleasure meeting with Zara and her family.  Her insulin regimen has been adjusted, family is helping her and blood sugars have come down incredibly well. GMI is already at 8 with an average under 200mg/dL.  Zara is very tired and just out of the TCU.  Talked about basal bolus split and insulin action in detail.  Reviewed how rapid acting insulin is for meals and adjustment / holding if eating less.  The low from the 19th into the 20th was likely related to a very small diner, but taking the normal set dose.  Is on the first ra system (14day) and can upgrade - ra 3+ and possibly use her phone - then her children can follow.    Talked about the sensors and would prefer to stay on the ra system over Dexcom since she is familiar with it.   Will have to see if [facility] can switch to ra 3+ or can try [facility].                Goal A1c is less than 8  Lab Results   Component Value Date    A1C 12.1 04/15/2025    A1C 10.7 10/07/2024    A1C 12.2 01/17/2024    A1C 14.1 12/05/2023    A1C 13.5 09/19/2023         Diabetes knowledge and skills assessment:   Patient is knowledgeable in diabetes management concepts related to: Healthy Eating, Being Active, Monitoring, Taking Medication, Problem Solving, Reducing Risks, and Healthy Coping    Based on learning assessment above, most appropriate setting for further diabetes education would be: Individual setting.    Care Plan and Education Provided:  Diabetes Pathophysiology  Healthy Eating: Balanced meals and carbohydrate awareness   Monitoring:  "continuous glucose monitor review   Taking Medication: Action of prescribed medication(s) and When to take medication(s)  Problem Solving: High glucose - causes, signs/symptoms, treatment and prevention, Low glucose - causes, signs/symptoms, treatment and prevention, Rule of 15 and carrying a carbohydrate source at all times in case of low glucose, and When to call a health care provider    Patient verbalized understanding of diabetes self-management education concepts discussed, opportunities for ongoing education and support, and recommendations provided today.    Plan  Continue with positive diet & lifestyle changes    Follow-up on MyChart anytime  / update with any low/lower blood sugars   Upgrade to ra 3+   See detailed AVS  See Care Plan for co-developed, patient-state behavior change goals.      Subjective/Objective  Zara is an 74 year old, presenting for the following diabetes education related to: Individual review  Accompanied by: Self, Daughter  Diabetes education in the past 24mo: No  Focus of Visit: Monitoring, Reducing Risks, Taking Medication  Diabetes type: Type 2  Cultural Influences/Ethnic Background:  Not  or     Diabetes Symptoms & Complications:    Patient Problem List and Family Medical History reviewed for relevant medical history, current medical status, and diabetes risk factors.    Vitals:  Estimated body mass index is 26.08 kg/m  as calculated from the following:    Height as of 5/20/25: 1.651 m (5' 5\").    Weight as of 5/25/25: 71.1 kg (156 lb 11.2 oz).   Last 3 BP:   BP Readings from Last 3 Encounters:   05/26/25 90/52   05/20/25 100/48   05/12/25 101/51     History   Smoking Status     Never   Smokeless Tobacco     Never     Labs:  Lab Results   Component Value Date     10/07/2024     03/20/2023     04/27/2021     Direct Measure HDL   Date Value Ref Range Status   03/20/2023 58 >=50 mg/dL Final     GFR Estimate   Date Value Ref Range Status "   05/25/2025 87 >60 mL/min/1.73m2 Final     Comment:     eGFR calculated using 2021 CKD-EPI equation.   06/22/2021 >60 >60 mL/min/1.73m2 Final     Lab Results   Component Value Date    CR 0.72 05/25/2025     Lab Results   Component Value Date    MICROL <12.0 12/05/2023    UMALCR  12/05/2023      Comment:      Unable to calculate, urine albumin and/or urine creatinine is outside detectable limits.  Microalbuminuria is defined as an albumin:creatinine ratio of 17 to 299 for males and 25 to 299 for females. A ratio of albumin:creatinine of 300 or higher is indicative of overt proteinuria.  Due to biologic variability, positive results should be confirmed by a second, first-morning random or 24-hour timed urine specimen. If there is discrepancy, a third specimen is recommended. When 2 out of 3 results are in the microalbuminuria range, this is evidence for incipient nephropathy and warrants increased efforts at glucose control, blood pressure control, and institution of therapy with an angiotensin-converting-enzyme (ACE) inhibitor (if the patient can tolerate it).      UCRR 23.9 12/05/2023 5/28/2025   Healthy Eating   Healthy Eating Assessed Today Yes         5/28/2025   Being Active   Being Active Assessed Today Yes         5/28/2025   Monitoring   Monitoring Assessed Today Yes     Diabetes Medication(s)       Insulin       insulin glargine (LANTUS SOLOSTAR) 100 UNIT/ML pen Take 20 units BID     insulin lispro (HUMALOG KWIKPEN) 100 UNIT/ML (1 unit dial) KWIKPEN Inject 6 units subcutaneously 4 times daily for blood glucose management     Patient taking differently: Inject 10 Units subcutaneously 3 times daily (before meals).              5/28/2025   Taking Medications   Taking Medication Assessed Today Yes         5/28/2025   Problem Solving   Problem Solving Assessed Today Yes           2/21/2024   Reducing Risks   Reducing Risks Assessed Today Yes   Diabetes Risks Age over 45 years;Family History   CAD Risks  Diabetes Mellitus;Post-menopausal;Dyslipidemia;Family history   Has dilated eye exam at least once a year? Yes         5/28/2025   Healthy Coping: Diabetes Distress Assessment   Healthy Coping Assessed Today Yes       Shi Hitchcock RD, KRISTI, [facility]  Diabetes Education    Time Spent: 40 minutes  Encounter Type: Individual    Any diabetes medication dose changes were made via the [facility] Standing Orders under the patient's referring provider.

## 2025-05-21 NOTE — TELEPHONE ENCOUNTER
Monica from [facility] calling with updates and orders needed  Start of care completed 5/20    Patient refused PT/OT and bath aid today  Requesting nursing order 1X week for 3 weeks    Ablation 6/9, pre-op 6/2    Confirmed Midodrine directions, refill needed    Routing refill request to provider for review/approval because:  Drug not on the [facility] refill protocol         Elly Wilson, RN on 5/21/2025 at 11:20 AM

## 2025-05-21 NOTE — TELEPHONE ENCOUNTER
Called patient and lmtcb,    Please verify insulin glargine dose and then route to PCP.    Sukhwinder Card RN     [facility]

## 2025-05-21 NOTE — TELEPHONE ENCOUNTER
After reviewing patient's chart, they only thing that I found at this time was a Consent to Communicate with patient's daughter and son. I called Kristy (daughter) back and informed her of this. She will be bringing in a copy of legal paper work so that it can be scanned into patient's chart and information updated.

## 2025-05-22 ENCOUNTER — TRANSFERRED RECORDS (OUTPATIENT)
Dept: MULTI SPECIALTY CLINIC | Facility: CLINIC | Age: 75
End: 2025-05-22
Payer: COMMERCIAL

## 2025-05-22 PROBLEM — N18.31 STAGE 3A CHRONIC KIDNEY DISEASE (H): Status: ACTIVE | Noted: 2025-05-22

## 2025-05-22 LAB
BACTERIA UR CULT: NORMAL
INR (EXTERNAL): 1.4 (ref 0.9–1.2)

## 2025-05-22 NOTE — PROGRESS NOTES
Assessment/ Plan     1. Hospital discharge follow-up (Primary)  She was hospitalized at [facility] from 5/6 through 5/12 for supraventricular tachycardia.  She was also found to have hyperkalemia, hyponatremia, hyperglycemia and acute kidney injury.  They thought perhaps she had a UTI but her culture has now come back negative.  She overall was confused and had weakness.  All of this responded well to being in the hospital.  She did have consults with nephrology and cardiology.  There is plans for her to have an ablation next month.  She states that she is felt okay since discharge with no recurrence of the SVT.  She is just felt very fatigued.  Her hemoglobin had just been drifting down and yesterday was 7.7.  It was back up to 8.7 today so hopefully she now has some stability.  They thought perhaps this was due to inflammatory anemia or anemia of chronic disease.    2. Anemia of chronic disease  Recheck levels today at 8.7.  Will recheck again when we see her for her preop.  - CBC with platelets; Future  - CBC with platelets    3. Type 2 diabetes mellitus with hyperglycemia, with long-term current use of insulin (H)  She has had really uncontrolled diabetes for quite some time and follows with endocrinology.  They are now talking about possible an insulin pump.    4. Asymptomatic postmenopausal status  Stable    5. Recent urinary tract infection  Urine culture came back negative    6. Dysuria  As above  - Urine Culture Aerobic Bacterial - lab collect; Future  - Urine Culture Aerobic Bacterial - lab collect    7. Erythrocytosis due to hepatoma (H)    - Basic metabolic panel    8. Stage 3a chronic kidney disease (H)  Stable    9. Portal hypertension (H)  She follows with GI and has had a TIPS procedure      Subjective:      Zara Webster is a 74 year old female who presents for hospital follow-up.  She was recently hospitalized at [facility], please see discharge summary.  This was reviewed in detail.  She then  went to transitional care until this morning.  She was just discharged.  She does feel quite fatigued still.  She finished up her antibiotics and is not having any bladder symptoms.  She is post to have ablation coming up in early June.  They have been watching her hemoglobin as it was drifting down.  The nephrologist thought it was inflammatory related?.  We did review her meds.    The longitudinal plan of care for the diagnosis(es)/condition(s) as documented were addressed during this visit. Due to the added complexity in care, I will continue to support Zara in the subsequent management and with ongoing continuity of care.     MED REC REQUIRED  Post Medication Reconciliation Status: discharge medications reconciled, continue medications without change     Relevant past medical, family, surgical, and social history reviewed with patient, unless noted in HPI, not pertinent for this visit.  Medications were discussed and reconciled.   Review of Systems   A 12 point comprehensive review of systems was negative except as noted.      Current Outpatient Medications   Medication Sig Dispense Refill    apixaban ANTICOAGULANT (ELIQUIS) 5 MG tablet Take 1 tablet (5 mg) by mouth 2 times daily. 180 tablet 3    clobetasol (TEMOVATE) 0.05 % external ointment Apply topically as needed      continuous blood glucose monitoring (FREESTYLE VIDA) sensor 1 Units daily 2 each 3    Continuous Glucose Sensor (DEXCOM G7 SENSOR) MISC Change every 10 days. 9 each 1    flash glucose scanning reader (FREESTYLE VIDA 14 DAY READER) Misc [FLASH GLUCOSE SCANNING READER (FREESTYLE VIDA 14 DAY READER) MISC] Use 1 Units As Directed every 14 (fourteen) days. 1 each 0    insulin glargine (LANTUS SOLOSTAR) 100 UNIT/ML pen Take 20 units BID 30 mL 3    insulin lispro (HUMALOG KWIKPEN) 100 UNIT/ML (1 unit dial) KWIKPEN Inject 6 units subcutaneously 4 times daily for blood glucose management (Patient taking differently: Inject 10 Units subcutaneously 3  "times daily (before meals).) 30 mL 4    insulin pen needle (32G X 4 MM) 32G X 4 MM miscellaneous Use 8 pen needles daily or as directed. 800 each 0    metoprolol succinate ER (TOPROL XL) 50 MG 24 hr tablet Take 1 tablet (50 mg) by mouth 2 times daily. 60 tablet 3    Multiple Vitamins-Minerals (HAIR SKIN & NAILS ADVANCED) TABS Take 1 tablet by mouth daily.      omeprazole (PRILOSEC) 40 MG DR capsule Take 40 mg by mouth nightly as needed.      ostomy adhesive Pste [OSTOMY ADHESIVE PSTE] Apply as needed 4 Tube 3    senna-docusate (SENOKOT-S/PERICOLACE) 8.6-50 MG tablet Take 1 tablet by mouth 2 times daily as needed for constipation.      cefdinir (OMNICEF) 300 MG capsule Take 1 capsule (300 mg) by mouth 2 times daily. 7 capsule 0    diphenoxylate-atropine (LOMOTIL) 2.5-0.025 MG tablet Take 1 tablet by mouth 4 times daily as needed for diarrhea. 12 tablet 0    FLUZONE HIGH-DOSE 0.5 ML injection       midodrine (PROAMATINE) 2.5 MG tablet Take 1 tablet (2.5 mg) by mouth 3 times daily (with meals). 270 tablet 0    ondansetron (ZOFRAN ODT) 4 MG ODT tab Place 4 mg under the tongue every 8 hours as needed for nausea. (Patient not taking: Reported on 5/13/2025)           Objective:     /48   Pulse 72   Temp 97.8  F (36.6  C)   Resp 16   Ht 1.651 m (5' 5\")   Wt 69.1 kg (152 lb 6.4 oz)   LMP  (LMP Unknown)   SpO2 98%   BMI 25.36 kg/m      Body mass index is 25.36 kg/m .       General appearance: alert, appears stated age and cooperative    Lungs: clear to auscultation bilaterally  Heart: regular rate and rhythm, S1, S2 normal, no murmur, click, rub or gallop      Recent Results (from the past week)   CBC with platelets   Result Value Ref Range    WBC Count 7.2 4.0 - 11.0 10e3/uL    RBC Count 2.53 (L) 3.80 - 5.20 10e6/uL    Hemoglobin 8.7 (L) 11.7 - 15.7 g/dL    Hematocrit 26.8 (L) 35.0 - 47.0 %     (H) 78 - 100 fL    MCH 34.4 (H) 26.5 - 33.0 pg    MCHC 32.5 31.5 - 36.5 g/dL    RDW 19.6 (H) 10.0 - 15.0 %    " Platelet Count 163 150 - 450 10e3/uL   Basic metabolic panel   Result Value Ref Range    Sodium 131 (L) 135 - 145 mmol/L    Potassium 4.6 3.4 - 5.3 mmol/L    Chloride 104 98 - 107 mmol/L    Carbon Dioxide (CO2) 21 (L) 22 - 29 mmol/L    Anion Gap 6 (L) 7 - 15 mmol/L    Urea Nitrogen 12.4 8.0 - 23.0 mg/dL    Creatinine 0.68 0.51 - 0.95 mg/dL    GFR Estimate 90 >60 mL/min/1.73m2    Calcium 8.1 (L) 8.8 - 10.4 mg/dL    Glucose 188 (H) 70 - 99 mg/dL   Urine Culture Aerobic Bacterial - lab collect    Specimen: Urine, Clean Catch   Result Value Ref Range    Culture 10,000-50,000 CFU/mL Mixture of Urogenital Connie           This note has been dictated using voice recognition software. Any grammatical or context distortions are unintentional and inherent to the software

## 2025-05-23 ENCOUNTER — TELEPHONE (OUTPATIENT)
Dept: FAMILY MEDICINE | Facility: CLINIC | Age: 75
End: 2025-05-23
Payer: COMMERCIAL

## 2025-05-23 NOTE — TELEPHONE ENCOUNTER
Pharmacy is requesting Prior Auth to be done for     Humalog KwikPen 100 unit/mL Pen-injectors    [employer]/login  Key: [id number]  Patient's Last Name: Darin  : 1950

## 2025-05-25 ENCOUNTER — HOSPITAL ENCOUNTER (EMERGENCY)
Facility: HOSPITAL | Age: 75
Discharge: HOME | End: 2025-05-26
Attending: EMERGENCY MEDICINE | Admitting: EMERGENCY MEDICINE
Payer: COMMERCIAL

## 2025-05-25 DIAGNOSIS — N39.0 URINARY TRACT INFECTION WITHOUT HEMATURIA, SITE UNSPECIFIED: ICD-10-CM

## 2025-05-25 DIAGNOSIS — I47.10 SVT (SUPRAVENTRICULAR TACHYCARDIA): ICD-10-CM

## 2025-05-25 LAB
ANION GAP SERPL CALCULATED.3IONS-SCNC: 7 MMOL/L (ref 7–15)
BASOPHILS # BLD AUTO: 0.1 10E3/UL (ref 0–0.2)
BASOPHILS NFR BLD AUTO: 1 %
BUN SERPL-MCNC: 11.8 MG/DL (ref 8–23)
CALCIUM SERPL-MCNC: 7.9 MG/DL (ref 8.8–10.4)
CHLORIDE SERPL-SCNC: 107 MMOL/L (ref 98–107)
CREAT SERPL-MCNC: 0.72 MG/DL (ref 0.51–0.95)
EGFRCR SERPLBLD CKD-EPI 2021: 87 ML/MIN/1.73M2
EOSINOPHIL # BLD AUTO: 0.2 10E3/UL (ref 0–0.7)
EOSINOPHIL NFR BLD AUTO: 2 %
ERYTHROCYTE [DISTWIDTH] IN BLOOD BY AUTOMATED COUNT: 20.2 % (ref 10–15)
GLUCOSE SERPL-MCNC: 143 MG/DL (ref 70–99)
HCO3 SERPL-SCNC: 23 MMOL/L (ref 22–29)
HCT VFR BLD AUTO: 26.9 % (ref 35–47)
HGB BLD-MCNC: 8.7 G/DL (ref 11.7–15.7)
IMM GRANULOCYTES # BLD: 0.1 10E3/UL
IMM GRANULOCYTES NFR BLD: 1 %
LYMPHOCYTES # BLD AUTO: 1.7 10E3/UL (ref 0.8–5.3)
LYMPHOCYTES NFR BLD AUTO: 23 %
MAGNESIUM SERPL-MCNC: 1.9 MG/DL (ref 1.7–2.3)
MCH RBC QN AUTO: 34.9 PG (ref 26.5–33)
MCHC RBC AUTO-ENTMCNC: 32.3 G/DL (ref 31.5–36.5)
MCV RBC AUTO: 108 FL (ref 78–100)
MONOCYTES # BLD AUTO: 0.7 10E3/UL (ref 0–1.3)
MONOCYTES NFR BLD AUTO: 9 %
NEUTROPHILS # BLD AUTO: 4.9 10E3/UL (ref 1.6–8.3)
NEUTROPHILS NFR BLD AUTO: 65 %
NRBC # BLD AUTO: 0 10E3/UL
NRBC BLD AUTO-RTO: 0 /100
PLATELET # BLD AUTO: 224 10E3/UL (ref 150–450)
POTASSIUM SERPL-SCNC: 4.4 MMOL/L (ref 3.4–5.3)
RBC # BLD AUTO: 2.49 10E6/UL (ref 3.8–5.2)
SODIUM SERPL-SCNC: 137 MMOL/L (ref 135–145)
WBC # BLD AUTO: 7.5 10E3/UL (ref 4–11)

## 2025-05-25 PROCEDURE — 80048 BASIC METABOLIC PNL TOTAL CA: CPT | Performed by: EMERGENCY MEDICINE

## 2025-05-25 PROCEDURE — 250N000011 HC RX IP 250 OP 636

## 2025-05-25 PROCEDURE — 99284 EMERGENCY DEPT VISIT MOD MDM: CPT | Mod: 25 | Performed by: EMERGENCY MEDICINE

## 2025-05-25 PROCEDURE — 96374 THER/PROPH/DIAG INJ IV PUSH: CPT | Performed by: EMERGENCY MEDICINE

## 2025-05-25 PROCEDURE — 93005 ELECTROCARDIOGRAM TRACING: CPT | Performed by: EMERGENCY MEDICINE

## 2025-05-25 PROCEDURE — 83735 ASSAY OF MAGNESIUM: CPT | Performed by: EMERGENCY MEDICINE

## 2025-05-25 PROCEDURE — 36415 COLL VENOUS BLD VENIPUNCTURE: CPT | Performed by: EMERGENCY MEDICINE

## 2025-05-25 PROCEDURE — 85004 AUTOMATED DIFF WBC COUNT: CPT | Performed by: EMERGENCY MEDICINE

## 2025-05-25 RX ORDER — MIDAZOLAM HYDROCHLORIDE 5 MG/ML
INJECTION, SOLUTION INTRAMUSCULAR; INTRAVENOUS
Status: DISCONTINUED
Start: 2025-05-25 | End: 2025-05-25 | Stop reason: WASHOUT

## 2025-05-25 RX ORDER — FENTANYL CITRATE 50 UG/ML
INJECTION, SOLUTION INTRAMUSCULAR; INTRAVENOUS
Status: DISCONTINUED
Start: 2025-05-25 | End: 2025-05-25 | Stop reason: WASHOUT

## 2025-05-25 RX ORDER — ADENOSINE 3 MG/ML
12 INJECTION, SOLUTION INTRAVENOUS ONCE
Status: DISCONTINUED | OUTPATIENT
Start: 2025-05-25 | End: 2025-05-25

## 2025-05-25 RX ORDER — ADENOSINE 3 MG/ML
INJECTION, SOLUTION INTRAVENOUS
Status: COMPLETED
Start: 2025-05-25 | End: 2025-05-25

## 2025-05-25 RX ORDER — ADENOSINE 3 MG/ML
6 INJECTION, SOLUTION INTRAVENOUS ONCE
Status: COMPLETED | OUTPATIENT
Start: 2025-05-25 | End: 2025-05-25

## 2025-05-25 RX ADMIN — ADENOSINE 6 MG: 3 INJECTION, SOLUTION INTRAVENOUS at 22:37

## 2025-05-25 ASSESSMENT — ACTIVITIES OF DAILY LIVING (ADL): ADLS_ACUITY_SCORE: 57

## 2025-05-26 VITALS
SYSTOLIC BLOOD PRESSURE: 90 MMHG | DIASTOLIC BLOOD PRESSURE: 52 MMHG | BODY MASS INDEX: 26.08 KG/M2 | WEIGHT: 156.7 LBS | RESPIRATION RATE: 17 BRPM | HEART RATE: 84 BPM | OXYGEN SATURATION: 98 % | TEMPERATURE: 98.2 F

## 2025-05-26 LAB
ALBUMIN UR-MCNC: 70 MG/DL
APPEARANCE UR: ABNORMAL
ATRIAL RATE - MUSE: 101 BPM
ATRIAL RATE - MUSE: 170 BPM
BACTERIA #/AREA URNS HPF: ABNORMAL /HPF
BILIRUB UR QL STRIP: ABNORMAL
COLOR UR AUTO: ABNORMAL
DIASTOLIC BLOOD PRESSURE - MUSE: 51 MMHG
DIASTOLIC BLOOD PRESSURE - MUSE: 52 MMHG
GLUCOSE UR STRIP-MCNC: 30 MG/DL
HGB UR QL STRIP: ABNORMAL
HYALINE CASTS: 21 /LPF
INTERPRETATION ECG - MUSE: NORMAL
INTERPRETATION ECG - MUSE: NORMAL
KETONES UR STRIP-MCNC: NEGATIVE MG/DL
LEUKOCYTE ESTERASE UR QL STRIP: ABNORMAL
MUCOUS THREADS #/AREA URNS LPF: PRESENT /LPF
NITRATE UR QL: NEGATIVE
P AXIS - MUSE: 73 DEGREES
P AXIS - MUSE: NORMAL DEGREES
PH UR STRIP: 6 [PH] (ref 5–7)
PR INTERVAL - MUSE: 212 MS
PR INTERVAL - MUSE: NORMAL MS
QRS DURATION - MUSE: 70 MS
QRS DURATION - MUSE: 78 MS
QT - MUSE: 272 MS
QT - MUSE: 344 MS
QTC - MUSE: 446 MS
QTC - MUSE: 458 MS
R AXIS - MUSE: -35 DEGREES
R AXIS - MUSE: 35 DEGREES
RBC URINE: 168 /HPF
SP GR UR STRIP: 1.02 (ref 1–1.03)
SQUAMOUS EPITHELIAL: 13 /HPF
SYSTOLIC BLOOD PRESSURE - MUSE: 83 MMHG
SYSTOLIC BLOOD PRESSURE - MUSE: 87 MMHG
T AXIS - MUSE: 69 DEGREES
T AXIS - MUSE: 79 DEGREES
TRANSITIONAL EPI: 3 /HPF
UROBILINOGEN UR STRIP-MCNC: NORMAL MG/DL
VENTRICULAR RATE- MUSE: 101 BPM
VENTRICULAR RATE- MUSE: 171 BPM
WBC URINE: 131 /HPF

## 2025-05-26 PROCEDURE — 81001 URINALYSIS AUTO W/SCOPE: CPT | Performed by: EMERGENCY MEDICINE

## 2025-05-26 PROCEDURE — 250N000013 HC RX MED GY IP 250 OP 250 PS 637: Performed by: EMERGENCY MEDICINE

## 2025-05-26 PROCEDURE — 87086 URINE CULTURE/COLONY COUNT: CPT | Performed by: EMERGENCY MEDICINE

## 2025-05-26 RX ORDER — SULFAMETHOXAZOLE AND TRIMETHOPRIM 800; 160 MG/1; MG/1
1 TABLET ORAL ONCE
Status: COMPLETED | OUTPATIENT
Start: 2025-05-26 | End: 2025-05-26

## 2025-05-26 RX ORDER — SULFAMETHOXAZOLE AND TRIMETHOPRIM 800; 160 MG/1; MG/1
1 TABLET ORAL 2 TIMES DAILY
Qty: 10 TABLET | Refills: 0 | Status: SHIPPED | OUTPATIENT
Start: 2025-05-26 | End: 2025-05-31

## 2025-05-26 RX ADMIN — SULFAMETHOXAZOLE AND TRIMETHOPRIM 1 TABLET: 800; 160 TABLET ORAL at 00:56

## 2025-05-26 ASSESSMENT — ACTIVITIES OF DAILY LIVING (ADL): ADLS_ACUITY_SCORE: 57

## 2025-05-26 NOTE — ED TRIAGE NOTES
Patient reports HR in 200s at home. Was here 4 times last month for similar problem. Has ablation scheduled for 6/9. Reports left sided chest pain and shortness of breath.     Triage Assessment (Adult)       Row Name 05/25/25 222          Triage Assessment    Airway WDL WDL        Respiratory WDL    Respiratory WDL X;rhythm/pattern     Rhythm/Pattern, Respiratory shortness of breath        Cardiac WDL    Cardiac WDL X;chest pain

## 2025-05-26 NOTE — DISCHARGE INSTRUCTIONS
Your urinalysis shows persistent bacteria in your urine which may represent a persistent infection.  He should start Bactrim twice daily for 5 days until results of repeat urine culture are available.  See your primary care physician on Tuesday or Wednesday for results of urine culture.  Otherwise continue medications as prescribed.  Contact Dr. Ulloa to discuss timing of planned ablation.  If recurrent rapid heart rate or any new symptoms including fever, abdominal pain, chest pain or shortness of breath return to the emergency department.

## 2025-05-26 NOTE — ED PROVIDER NOTES
EMERGENCY DEPARTMENT NOTE     Name: Zara Webster    Age/Sex: 74 year old female   MRN: [id number]   Evaluation Date & Time:  5/25/2025 10:21 PM    PCP:    Obdulia Chamorro   ED Provider: Brayan Cordero D.O.       CHIEF COMPLAINT    Tachycardia and Shortness of Breath     HISTORY OF PRESENT ILLNESS BRIEF   Zara Webster is a 74 year old female with a relevant past history of NSTEMI, DMII, pulmonary embolism, CKD3a, GI hemorrhage, and SVT who presents to the ED for evaluation of tachycardia and shortness of breath.    DIAGNOSIS & DISPOSITION/MEDICAL DECISION MAKING   No diagnosis found.    EMERGENCY DEPARTMENT COURSE   10:31 PM I met with the patient to gather history and to perform my initial exam.  We discussed treatment options and the plan for care while in the Emergency Department.  Triage vital signs: BP 83/51  Pulse 171   Temp 98.2  F (36.8  C) (Oral)   Resp 28   Wt 71.1 kg (156 lb 11.2 oz)   LMP  (LMP Unknown)   SpO2 100%   BMI 26.08 kg/m     Differential diagnosis considered included but not limited to: Cardiac arrhythmia/SVT, electrolyte derangement, sepsis    MDM: Patient on initial exam was borderline hypotensive with tachycardia.  Alert with normal mental status.  Cardiac exam with tachycardia but no murmur rub.  Lungs clear.  Abdomen soft and nontender.  Diagnostic studies:  EKG: Narrow complex tachycardia consistent with SVT regular  Laboratory evaluation obtained interpreted by myself.  CBC with hemoglobin 8.7 stable chronic anemia reports no symptoms of GI bleeding, basic metabolic profile within normal limits except for calcium 7.9, glucose 143.  WBC 7.5, magnesium 1.9  Urinalysis showed persistent bacteriuria with pyuria and will prescribe for the 5-day course of Bactrim.  Patient received adenosine 6 mg IV with conversion to sinus rhythm and is remained without recurrent arrhythmia.  Initial borderline hypotension improved.  Discussed case with Dr. Rodriguez for cardiology.  No change in  medications will continue metoprolol twice daily.  She will discuss with the ablation team moving the procedure sooner than scheduled June 9.  Patient will be discharged, continue Bactrim for 5 days until results of urine culture are available at primary care follow-up in 2 to 3 days.  Patient will otherwise continue meds as previously prescribed, will contact cardiology to discuss timing of planned ablation.  If recurrent rapid heart rate or develops any new symptoms, fever, abdominal pain ,chest pain or shortness of breath will return to the emergency department.     Discharge Vital Signs:BP 93/54   Pulse 92   Temp 98.2  F (36.8  C) (Oral)   Resp 15   Wt 71.1 kg (156 lb 11.2 oz)   LMP  (LMP Unknown)   SpO2 99%   BMI 26.08 kg/m     PROCEDURES:   None  Diagnostic studies:  No orders to display     Labs Ordered and Resulted from Time of ED Arrival to Time of ED Departure - No data to display  ED INTERVENTIONS     Medications   adenosine (ADENOCARD) injection 12 mg (has no administration in time range)   adenosine (ADENOCARD) injection 6 mg (has no administration in time range)   adenosine (ADENOCARD) 6 MG/2ML injection (has no administration in time range)   midazolam (VERSED) 5 MG/ML injection (has no administration in time range)     TOTAL CRITICAL CARE TIME (EXCLUDING PROCEDURES): 30 minutes for management of SVT with IV vasoactive medication      DISCHARGE MEDICATIONS        Review of your medicines        UNREVIEWED medicines. Ask your doctor about these medicines        Dose / Directions   apixaban ANTICOAGULANT 5 MG tablet  Commonly known as: ELIQUIS  Used for: Elevated d-dimer, Abnormal CT of the chest, Pulmonary emboli (H)      Dose: 5 mg  Take 1 tablet (5 mg) by mouth 2 times daily.  Quantity: 180 tablet  Refills: 3     cefdinir 300 MG capsule  Commonly known as: OMNICEF  Used for: SVT (supraventricular tachycardia), Anemia of chronic disease, CINTIA (acute kidney injury), NSTEMI (non-ST elevated  myocardial infarction) (H), Hyponatremia, History of pulmonary embolism, HCC (hepatocellular carcinoma) (H)      Dose: 300 mg  Take 1 capsule (300 mg) by mouth 2 times daily.  Quantity: 7 capsule  Refills: 0     clobetasol 0.05 % external ointment  Commonly known as: TEMOVATE      Apply topically as needed  Refills: 0     diphenoxylate-atropine 2.5-0.025 MG tablet  Commonly known as: LOMOTIL  Indication: Diarrhea  Used for: Crohn's disease of colon with other complication (H)      Dose: 1 tablet  Take 1 tablet by mouth 4 times daily as needed for diarrhea.  Quantity: 12 tablet  Refills: 0     Fluzone High-Dose 0.5 ML injection  Generic drug: influenza trivalent vaccine high-dose for ages 65 years and greater      Refills: 0     Hair Skin & Nails Advanced Tabs      Dose: 1 tablet  Take 1 tablet by mouth daily.  Refills: 0     insulin lispro 100 UNIT/ML (1 unit dial) KWIKPEN  Commonly known as: HumaLOG KWIKpen  Used for: Type 2 diabetes mellitus with diabetic mononeuropathy, with long-term current use of insulin (H)      Inject 6 units subcutaneously 4 times daily for blood glucose management  Quantity: 30 mL  Refills: 4     Lantus SoloStar 100 UNIT/ML soln  Used for: Type 2 diabetes mellitus with diabetic mononeuropathy, with long-term current use of insulin (H)  Generic drug: insulin glargine      Take 20 units BID  Quantity: 30 mL  Refills: 3     metoprolol succinate ER 50 MG 24 hr tablet  Commonly known as: TOPROL XL  Used for: SVT (supraventricular tachycardia)      Dose: 50 mg  Take 1 tablet (50 mg) by mouth 2 times daily.  Quantity: 60 tablet  Refills: 3     midodrine 2.5 MG tablet  Commonly known as: PROAMATINE  Used for: SVT (supraventricular tachycardia), Anemia of chronic disease, CINTIA (acute kidney injury), NSTEMI (non-ST elevated myocardial infarction) (H), Hyponatremia, History of pulmonary embolism, HCC (hepatocellular carcinoma) (H)      Dose: 2.5 mg  Take 1 tablet (2.5 mg) by mouth 3 times daily (with  meals).  Quantity: 270 tablet  Refills: 0     omeprazole 40 MG DR capsule  Commonly known as: PriLOSEC      Dose: 40 mg  Take 40 mg by mouth nightly as needed.  Refills: 0     ondansetron 4 MG ODT tab  Commonly known as: ZOFRAN ODT      Dose: 4 mg  Place 4 mg under the tongue every 8 hours as needed for nausea.  Refills: 0     senna-docusate 8.6-50 MG tablet  Commonly known as: SENOKOT-S/PERICOLACE  Used for: SVT (supraventricular tachycardia), Anemia of chronic disease, CINTIA (acute kidney injury), NSTEMI (non-ST elevated myocardial infarction) (H), Hyponatremia, History of pulmonary embolism, HCC (hepatocellular carcinoma) (H)      Dose: 1 tablet  Take 1 tablet by mouth 2 times daily as needed for constipation.  Refills: 0            CONTINUE these medicines which have NOT CHANGED        Dose / Directions   * continuous blood glucose monitoring sensor  Used for: Type II diabetes mellitus (H)      Dose: 1 Units  1 Units daily  Quantity: 2 each  Refills: 3     * Dexcom G7 Sensor Misc  Used for: Type 2 diabetes mellitus with diabetic mononeuropathy, with long-term current use of insulin (H)      Change every 10 days.  Quantity: 9 each  Refills: 1     FreeStyle Naila Divernon Denise  Used for: Type II diabetes mellitus (H)      Dose: 1 Units  [FLASH GLUCOSE SCANNING READER (FREESTYLE NAILA 14 DAY READER) MISC] Use 1 Units As Directed every 14 (fourteen) days.  Quantity: 1 each  Refills: 0     insulin pen needle 32G X 4 MM miscellaneous  Commonly known as: 32G X 4 MM  Used for: Type 2 diabetes mellitus with diabetic mononeuropathy, with long-term current use of insulin (H)      Use 8 pen needles daily or as directed.  Quantity: 800 each  Refills: 0     stomahesive Pste      [OSTOMY ADHESIVE PSTE] Apply as needed  Quantity: 4 Tube  Refills: 3           * This list has 2 medication(s) that are the same as other medications prescribed for you. Read the directions carefully, and ask your doctor or other care provider to review  them with you.                DISPOSITION: Home    At the conclusion of the encounter I discussed the results of all of the tests and the disposition. The questions were answered. The patient or family acknowledged understanding and was agreeable with the care plan.        HISTORY OF PRESENT ILLNESS   Zara Webster is a 74 year old female with a relevant past history of NSTEMI, DMII, pulmonary embolism, CKD3a, GI hemorrhage, and SVT who presents to the ED for evaluation of tachycardia and shortness of breath.    Patient reports tachycardia and shortness of breath at home. She has been in the ER four times in a month for similar issues, requiring IV adenosine to convert into sinus rhythm. Typically she will convert with one dose, but last time required two. She is scheduled for an ablation on June 9th. She takes metoprolol and Eliquis.    Reviewed admission to [facility] from 5/6-5/12. Her hospital course was notable for acute kidney injury likely due to hepatorenal physiology, which has since resolved following treatment with albumin and midodrine per nephrology recommendations. Hyperkalemia was effectively treated with Lokelma, IV insulin, and albuterol. MSSA urinary tract infection was treated with ceftriaxone.  Cardiology service recommended initiating metoprolol 50 mg twice daily for management of supraventricular tachycardia, with plans to reschedule the SVT ablation approximately two weeks after recovery from the current urinary tract infection.  Patient reported generalized weakness and multiple falls, but no acute injuries were identified. Pelvic X-ray obtained due to mechanical fall about a week prior to admission showed no acute fractures. Discharged to TCU.    INFORMATION SOURCE AND LIMITATIONS    History/Exam limitations: NNne  Patient information was obtained from: patient and her   Use of : N/A    Patient presents today with her .    REVIEW OF SYSTEMS:   All other systems  reviewed and are negative except as noted above in HPI.    PATIENT HISTORY     Past Medical History:   Diagnosis Date    Anemia     Atypical chest pain     Common bile duct obstruction (H) 11/20/2023    Crohn's disease (H)     Diabetes mellitus (H)     Disorder of biliary tract     Essential tremor     Gastrointestinal hemorrhage     Hepatocellular carcinoma (H)     Hypercholesteremia     Ileostomy status (H)     Lesion of liver     Non-alcoholic cirrhosis (H)     Obstruction of common bile duct (H) 11/20/2023     Patient Active Problem List   Diagnosis    Post Colectomy    Hypercholesteremia    Aspirin contraindicated    Crohn's disease of colon (H)    Gastrointestinal hemorrhage    Other cirrhosis of liver (H) - unclear etiology,[facility]    Nonalcoholic steatohepatitis w Cirrhosis -- S/P TIPS    Essential tremor    HCC (hepatocellular carcinoma) (H)    Peripheral edema    Bile duct stricture (H)    SVT (supraventricular tachycardia)    NSTEMI (non-ST elevated myocardial infarction) (H)    Type 2 diabetes mellitus with diabetic mononeuropathy, with long-term current use of insulin (H)    Hyperkalemia    Urinary tract infection    Hx of Pulm Embolus -- on Eliquis    DM type 2 on Insulin, Hgb A1C 12.1 on 4/15/25    Hyponatremia    CINTIA (acute kidney injury)    Anemia of chronic disease    Stage 3a chronic kidney disease (H)     Past Surgical History:   Procedure Laterality Date    BREAST EXCISIONAL BIOPSY Left 1980    BREAST EXCISIONAL BIOPSY Left 1985    ESOPHAGOSCOPY, GASTROSCOPY, DUODENOSCOPY (EGD), COMBINED N/A 1/26/2024    Procedure: ENDOSCOPIC ULTRASOUND;  Surgeon: Santi Pradhan MD;  [place] [facility] [place] [facility]    HYSTERECTOMY  1991    OOPHORECTOMY Bilateral 1991    OTHER SURGICAL HISTORY      KIDNEY STONE REMOVALNOT SURE WHICH SIDE, DR. SHIV SALINAS PROCTOSIGMOIDOSCOPY,RIGID,DIAGNOS N/A 6/17/2020    Procedure: ILEOSTOMY REVISION, PROCTOSCOPY, ILEOSCOPY;  Surgeon: Devang Moon MD;  Location:  [facility];  Service: General    C REMOVAL COLON/ILEOSTOMY      Description: Total Abdominal Colectomy;  Recorded: 12/15/2011;    ZZC REMOVAL COLON/PROCTECTOMY/ILEOSTOMY      Description: Total Proctocolectomy;  Recorded: 08/10/2011;    ZZC VAG HYST, W/VAGINECTOMY      Description: Vaginal Hysterectomy With Colpectomy;  Recorded: 08/14/2014;       Allergies   Allergen Reactions    Prochlorperazine Edisylate [Prochlorperazine] Other (See Comments)     Saw things    Compazine [Prochlorperazine] Hallucination       OUTPATIENT MEDICATIONS     New Prescriptions    No medications on file      Vitals:    05/25/25 2225 05/25/25 2228   BP: (!) 83/51    Pulse: (!) 171 (!) 167   Resp: 28 14   Temp: 98.2  F (36.8  C)    TempSrc: Oral    SpO2: 100% 100%   Weight: 71.1 kg (156 lb 11.2 oz)        Physical Exam   Constitutional: Oriented to person, place, and time. Appears well-developed and well-nourished.   Cardiovascular: Tachycardic rate, regular rhythm and normal heart sounds.    Pulmonary/Chest: Normal effort  and breath sounds normal.   Abdominal: Soft. Bowel sounds are normal.  Ostomy bag present left lower quadrant    Skin: Skin is warm and dry.   Psychiatric: Normal mood and affect. Behavior is normal. Thought content normal.     DIAGNOSTICS    LABORATORY FINDINGS (REVIEWED AND INTERPRETED):  Labs Ordered and Resulted from Time of ED Arrival to Time of ED Departure - No data to display      IMAGING (REVIEWED AND INTERPRETED):  No orders to display         ECG (REVIEWED AND INTERPRETED):   ECG 1:   Performed at: 2225 05/25/25   HR:  171 bpm  Rhythm: Complex tachycardia consistent with SVT  Axis:  35   QRS duration: 70 ms  QTC: 458 ms  ST changes: No ST segment elevation or depression, no T wave inversion, No Q wave  Interpretation: SVT  Compared to most recent ECG from: 5/6/25 CT replaces normal sinus rhythm    ECG 2:   Performed at: 2239 05/25/25   HR:  101 bpm  Rhythm: Sinus  Axis:  -35   QRS duration: 78  ms  QTC: 446 ms  ST changes: No ST segment elevation or depression, no T wave inversion,No Q wave  Interpretation: Normal sinus rhythm replaces SVT  Compared to most recent ECG from: 2225 05/25/25     I have reviewed the patient's ECGs, with comments made as listed above. Please see scanned image for full interpretation.     Billing Documentation    I obtained additional history from these independent historians:    I reviewed these outside records:  Reviewed admission to [facility] from 5/6-5/12. Her hospital course was notable for acute kidney injury likely due to hepatorenal physiology, which has since resolved following treatment with albumin and midodrine per nephrology recommendations. Hyperkalemia was effectively treated with Lokelma, IV insulin, and albuterol. MSSA urinary tract infection was treated with ceftriaxone.  Cardiology service recommended initiating metoprolol 50 mg twice daily for management of supraventricular tachycardia, with plans to reschedule the SVT ablation approximately two weeks after recovery from the current urinary tract infection.  Patient reported generalized weakness and multiple falls, but no acute injuries were identified. Pelvic X-ray obtained due to mechanical fall about a week prior to admission showed no acute fractures. Discharged to TCU.  Prior urine culture results reviewed:    >100,000 CFU/mL Staphylococcus aureus Abnormal    10,000-50,000 CFU/mL Staphylococcus aureus Abnormal         Resulting Agency: [facility]     Susceptibility       Staphylococcus aureus (1) Staphylococcus aureus (2)     FIDEL FIDEL     Daptomycin Susceptible Susceptible     Doxycycline Susceptible Susceptible     Gentamicin Susceptible Susceptible     Nitrofurantoin Susceptible Susceptible     Oxacillin Susceptible 1 Susceptible 1     Tetracycline Susceptible Susceptible     Trimethoprim/Sulfamethoxazole Susceptible Susceptible     Vancomycin Susceptible           I noted these abnormal vital signs /  labs:  Heart rate 171 bpm, BP 83/51    Monitor Strip Interpretation:  Narrow complex tachycardia consistent with SVT  12-Lead ECG Interpretation:  SVT  I independently reviewed the following diagnostic studies:  NA  I spoke to the following clinicians regard the patients care:  Cardiology  My disposition decision is based on the following reasons:    Discharge: I considered admission but discharged the patient after current workup and patient's clinical course in the emergency department.  Prescription medications prescribed :none  Compliance Documentation  MIPS Documentation Medical Decision Making  I obtained history from Family Member/Significant Other  I reviewed the EMR: Inpatient Record: See Above  Care impacted by Heart Disease  I considered additional work up, including x-ray or CT of the chest, but deferred secondary low suspicion for acute pulmonary process including PE, pneumonia, CHF  I discussed the care with another health care provider: Cardiology  Discharge. I recommended the patient continue their current prescription strength medication(s): Metoprolol. I considered admission, but discharged patient after significant clinical improvement.    MIPS (CTPE, Dental pain, Caro, Sinusitis, Asthma/COPD, Head Trauma): Not Applicable    SEPSIS: None        At the time of my evaluation, I do not feel the patient s symptoms are caused by sepsis    I, Beverly Alford, am serving as a scribe to document services personally performed by Brayan Cordero D.O., based on my observations and the provider's statements to me.  I, Brayan Cordero D.O., attest that Beverly Alford is acting in a scribe capacity, has observed my performance of the services and has documented them in accordance with my direction.    Brayan Cordero D.O.  EMERGENCY MEDICINE   05/25/25  [facility] EMERGENCY DEPARTMENT  [address] [place] [id number]  [phone]  Dept: [phone]      Brayan Cordero, DO  05/26/25 0026       Brayan Cordero,   05/26/25 0041       Brayan Cordero,   05/26/25 0043

## 2025-05-27 ENCOUNTER — TELEPHONE (OUTPATIENT)
Dept: CARDIOLOGY | Facility: CLINIC | Age: 75
End: 2025-05-27
Payer: COMMERCIAL

## 2025-05-27 ENCOUNTER — PATIENT OUTREACH (OUTPATIENT)
Dept: CARE COORDINATION | Facility: CLINIC | Age: 75
End: 2025-05-27
Payer: COMMERCIAL

## 2025-05-27 ENCOUNTER — TRANSFERRED RECORDS (OUTPATIENT)
Dept: ADMINISTRATIVE | Facility: CLINIC | Age: 75
End: 2025-05-27
Payer: COMMERCIAL

## 2025-05-27 ENCOUNTER — TELEPHONE (OUTPATIENT)
Dept: FAMILY MEDICINE | Facility: CLINIC | Age: 75
End: 2025-05-27
Payer: COMMERCIAL

## 2025-05-27 ENCOUNTER — RESULTS FOLLOW-UP (OUTPATIENT)
Dept: NURSING | Facility: CLINIC | Age: 75
End: 2025-05-27

## 2025-05-27 DIAGNOSIS — I47.10 SVT (SUPRAVENTRICULAR TACHYCARDIA): Primary | ICD-10-CM

## 2025-05-27 DIAGNOSIS — K75.81 NONALCOHOLIC STEATOHEPATITIS: Primary | ICD-10-CM

## 2025-05-27 LAB — BACTERIA UR CULT: NORMAL

## 2025-05-27 RX ORDER — FLECAINIDE ACETATE 100 MG/1
100 TABLET ORAL 2 TIMES DAILY
Qty: 60 TABLET | Refills: 3 | Status: SHIPPED | OUTPATIENT
Start: 2025-05-27

## 2025-05-27 NOTE — PROGRESS NOTES
[facility]    Background: Primary Care-Care Coordination initial outreach identified per system criteria and reviewed by [facility] team.    Assessment: Upon chart review, CCR Team member will not proceed with patient outreach related to this episode of Primary Care-Care Coordination program due to reason below:    Patient is in communication with a clinic team member, nurse or provider within [facility] for reason of discussing hospital follow up plan of care, answering questions patient may have related to discharge instructions, or for symptom concerns. Primary Care Clinic Care Coordination will defer follow-up outreach to specialty care team who are already closely following patient.     Plan: Primary Care-Care Coordination episode addressed appropriately per reason noted above.      Shi Bower MA  [facility], [facility]    *Connected Care Resource Team does NOT follow patient ongoing. Referrals are identified based on internal discharge reports and the outreach is to ensure patient has an understanding of their discharge instructions.

## 2025-05-27 NOTE — TELEPHONE ENCOUNTER
Pt was called, corresponding information/recommendations reviewed, verbalized understanding, has no further questions at this time, contact information was given for further concerns/questions, order(s) were placed, RX was sent to pt pharmacy, and medication list updated   5/27/2025 10:58 AM  Gaviota Martinez RN

## 2025-05-27 NOTE — TELEPHONE ENCOUNTER
Let her complete antibiotics prior to ablation as recommended.  We can try Flecainide 100 mg BID, continue metoprolol and proceed with 6/9 for ablation

## 2025-05-27 NOTE — TELEPHONE ENCOUNTER
Incoming  call from [facility] with [place] looking for hold/bridge orders:    ERCP scheduled for 6/17/25:    Requesting 3 day hold of Eliquis starting on 6/1    Anything less than 3 day approval will need most recent creatinine levels->faxed to [phone]    Sukhwinder Card RN     [facility]

## 2025-05-27 NOTE — TELEPHONE ENCOUNTER
Health Call Center    Phone Message    May a detailed message be left on voicemail: yes     Reason for Call: Symptoms or Concerns     If patient has red-flag symptoms, warm transfer to triage line    Current symptom or concern: Tachycardia episodes    Symptoms have been present for:  a couple of  month(s)    Has patient previously been seen for this? Yes    By : Tim Nuñez MD     Are there any new or worsening symptoms? Yes: Pt has  had another episode  on Sunday  regarding  Tachycardia, and she ws told by ER MD  to reach out to cardiologist. She wanted to know  if upcoming procedure should be moved up since last episode was so recent. She would like to know the next steps regarding her  care plan.     She denied speaking  with triage nurse  Action Taken: Other: cardio    Travel Screening: Not Applicable     Date of Service:     Thank you!  [facility]

## 2025-05-27 NOTE — TELEPHONE ENCOUNTER
Called and relayed verbals for 3 day Eliquis hold prior to ERCP scheduled for 6/17. To clarify, the patient's hold will start on 6/14 (note below mentions 6/1).    Natasha Roth RN

## 2025-05-27 NOTE — TELEPHONE ENCOUNTER
"Noted, labs reviewed, and are better than when case was canceled earlier this month on 5/6 (K- 4.4, MAG -1.9, HBG still low at 8.7 but per ER report this is pts normal due to chronic anemia, Glucose- 143, Anion gap- 7)  Per KA pt was to be rescheduled within the next 4 weeks, pt now calling with increased episodes and wanting to be scheduled sooner after ER visit Saturday 5/25.    Of note, pt also has positive UA from 5/25- \"Urinalysis showed persistent bacteriuria with pyuria and will prescribe for the 5-day course of Bactrim\"    Dr. Mary Carmen kirk to move up case after abx course is completed?  Or would you like pt to be cleared by PMD prior to procedure?  If we are not able to move up procedure, would you recommend med changes to hopefully keep episodes at bay until planned procedure date on 6/9?      Thank you,    Jazmyne  "

## 2025-05-28 NOTE — TELEPHONE ENCOUNTER
Central Prior Authorization Team   Phone: [phone]    PA Initiation    Medication: Humalog KwikPen 100 unit/mL Pen-injectors  Insurance Company: [employer] - Phone [phone] Fax [phone]  Pharmacy Filling the Rx: [id number] - [place] [address]  Filling Pharmacy Phone: [phone]  Filling Pharmacy Fax:    Start Date: 5/28/2025    [facility] KEY: [id number]

## 2025-05-28 NOTE — PROCEDURES
2025        Zara () Darin   [address] [place] [address]      Zara () Darin,  :  1950    I'm writing to let you know about the tests that were taken recently.   Thank you for allowing [facility] the opportunity to take part in your healthcare.  At [facility] we strive to provide each patient with the finest gastroenterology care available.  We hope your experience was pleasant and informative.    INR is elevated on labs- this means your blood is clotting slower than normal.  Prothrombin Time (PT) 2025 16:07   Description Result Units Flags Range   INR 1.4  H 0.9-1.2   Prothrombin Time 15.6 sec H 9.1-12.0   Comments   First Available              Performed At: , [facility]  [address], [place], [place], [id number]  Kian Powell MD, Phone: [phone]   INR:                 Reference interval is for non-anticoagulated patients.                                                                      .                 Suggested INR therapeutic range for Vitamin K                 antagonist therapy:                    Standard Dose (moderate intensity                                   therapeutic range):       2.0 - 3.0                    Higher intensity therapeutic range       2.5 - 3.5         I hope you are feeling well.  If symptoms persist or if you have questions regarding your results, please call our office.       Thank you.    Electronically signed by:  Jo Ann MADERA 2025 01:34 PM  Document generated by:  Jo Ann MADERA  2025  If your provider ordered multiple tests; the results may not become available at the same time.  If multiple test results are received within 14 days of one another, you may receive a duplicate.  cc:  Obdulia Chamorro MD

## 2025-05-29 ENCOUNTER — LAB (OUTPATIENT)
Dept: LAB | Facility: CLINIC | Age: 75
End: 2025-05-29
Payer: COMMERCIAL

## 2025-05-29 DIAGNOSIS — K75.81 NONALCOHOLIC STEATOHEPATITIS: ICD-10-CM

## 2025-05-29 LAB
BASOPHILS # BLD AUTO: 0 10E3/UL (ref 0–0.2)
BASOPHILS NFR BLD AUTO: 1 %
EOSINOPHIL # BLD AUTO: 0.1 10E3/UL (ref 0–0.7)
EOSINOPHIL NFR BLD AUTO: 3 %
ERYTHROCYTE [DISTWIDTH] IN BLOOD BY AUTOMATED COUNT: 19.6 % (ref 10–15)
FOLATE SERPL-MCNC: 10.5 NG/ML (ref 4.6–34.8)
HCT VFR BLD AUTO: 25.3 % (ref 35–47)
HGB BLD-MCNC: 8.1 G/DL (ref 11.7–15.7)
IMM GRANULOCYTES # BLD: 0 10E3/UL
IMM GRANULOCYTES NFR BLD: 0 %
INR PPP: 1.79 (ref 0.85–1.15)
LYMPHOCYTES # BLD AUTO: 0.7 10E3/UL (ref 0.8–5.3)
LYMPHOCYTES NFR BLD AUTO: 19 %
MCH RBC QN AUTO: 34.5 PG (ref 26.5–33)
MCHC RBC AUTO-ENTMCNC: 32 G/DL (ref 31.5–36.5)
MCV RBC AUTO: 108 FL (ref 78–100)
MONOCYTES # BLD AUTO: 0.3 10E3/UL (ref 0–1.3)
MONOCYTES NFR BLD AUTO: 7 %
NEUTROPHILS # BLD AUTO: 2.7 10E3/UL (ref 1.6–8.3)
NEUTROPHILS NFR BLD AUTO: 70 %
PLATELET # BLD AUTO: 122 10E3/UL (ref 150–450)
PROTHROMBIN TIME: 20.5 SECONDS (ref 11.8–14.8)
RBC # BLD AUTO: 2.35 10E6/UL (ref 3.8–5.2)
WBC # BLD AUTO: 3.8 10E3/UL (ref 4–11)

## 2025-05-29 NOTE — PATIENT INSTRUCTIONS
Thank you for visiting!!  Incredible improvement in blood sugars.   The ana is is already estimating A1c down to an 8.     Ana 14 days sytem (first system) --> Ana 3+ with reader (can use phone too).  It looks like [facility] won't let us e-prescribe so we will need to fill out paperwork & fax     Ana 3+   - smaller (size of a rinku)  - single cup pre loaded (no need to load anymore)   - easier to open   - scan to start it and then all numbers will appear without scanning (bluetooth)    -  Set alarms -   LOW alarm change from 70 to 80.   High alarm - change from 250 to 325).  Setting the high alarm too low can cause too many beeps.  Play around with what works best for you.    - signal loss - turn off.  OK to go out or range (ana will backfill all of the data).     If starting to have lows - Please update right away, we often take about 10-20% off of the insulin dose and then re-assess.   No need to target blood sugars < 80. OK if they are all 100+ which leaves a safer buffer   Lows can cause dizziness, weakness, falls etc.  We really try to avoid low blood sugars - keep a carbohydrate source with at all times incase blood sugar starts dropping.       Freestyle Ana 3 or 3+  INITIAL SET UP:  Download the Ana 3 or Ana by [employer] isabel if using your smartphone and create an account.  The isabel will walk you through set up.  If you are using the Ana 3 , turn it on and follow instructions for set up.  Ana 3 isabel:                    There will be a 60 minute warm up for each new sensor.  Each sensor should last 14 days.  Do not take more than 500mg of vitamin C (Ana 3+ more than 1000mg) per day or this can affect sensor accuracy.         DATA SHARING:  If you want to share your sensor data with a loved one (for phone users only), send them an invitation through the Ana 3 isabel.  They will use the Andera isabel and accept your invitation.  Andera isabel:     Our clinic will link to your data as  "well (phone users only). Under the menu (3 lines in the upper left corner), go to connected apps, then touch \"connect\" next to [employer], and then \"connect to a practice\".  Enter our practice ID \"[id number]\" and hit connect.    Patients using the reader can upload from home via desktop or laptop computer on OneDoc or will have the  downloaded in clinic.       Thank you!!!  Please reach out anytime    Chelsi Hitchcock RD, LD, [facility]  Diabetes Education      A Diabetes Education referral is good for a year and your provider can easily add another anytime.  We are here to help!  There are many areas a Diabetes Educator can help with including -  medication regimen review and adjustment, new medications, blood sugars, new technology, new meters, activity, food/diet, goal setting etc.       How to get a hold of us: Please reach out on [employer] anytime with questions.  Our direct scheduling line for appointments is: [phone].  We have virtual and in person options at many locations.  Additionally we have a non urgent M-F triage line for questions or to get a hold of a Diabetes educator : [phone]    "

## 2025-05-29 NOTE — TELEPHONE ENCOUNTER
Prior Authorization Not Needed per Insurance    Medication: Humalog KwikPen 100 unit/mL Pen-injectors  Insurance Company: [employer]arg[employer] - Phone [phone] Fax [phone]  Expected CoPay:      Pharmacy Filling the Rx: [id number] - [place], [place] - [facility]  Pharmacy Notified:  YES

## 2025-05-29 NOTE — PROGRESS NOTES
Diabetes Self-Management Education & Support  Presents for: Individual review  Type of Service: In Person Visit      Assessment  It was a pleasure meeting with Zara and her family.  Her insulin regimen has been adjusted, family is helping her and blood sugars have come down incredibly well. GMI is already at 8 with an average under 200mg/dL.  Zara is very tired and just out of the TCU.  Talked about basal bolus split and insulin action in detail.  Reviewed how rapid acting insulin is for meals and adjustment / holding if eating less.  The low from the 19th into the 20th was likely related to a very small diner, but taking the normal set dose.  Is on the first ra system (14day) and can upgrade - ra 3+ and possibly use her phone - then her children can follow.    Talked about the sensors and would prefer to stay on the ra system over Dexcom since she is familiar with it.   Will have to see if [facility] can switch to ra 3+ or can try [facility].                Goal A1c is less than 8  Lab Results   Component Value Date    A1C 12.1 04/15/2025    A1C 10.7 10/07/2024    A1C 12.2 01/17/2024    A1C 14.1 12/05/2023    A1C 13.5 09/19/2023         Diabetes knowledge and skills assessment:   Patient is knowledgeable in diabetes management concepts related to: Healthy Eating, Being Active, Monitoring, Taking Medication, Problem Solving, Reducing Risks, and Healthy Coping    Based on learning assessment above, most appropriate setting for further diabetes education would be: Individual setting.    Care Plan and Education Provided:  Diabetes Pathophysiology  Healthy Eating: Balanced meals and carbohydrate awareness   Monitoring: continuous glucose monitor review   Taking Medication: Action of prescribed medication(s) and When to take medication(s)  Problem Solving: High glucose - causes, signs/symptoms, treatment and prevention, Low glucose - causes, signs/symptoms, treatment and prevention, Rule  "of 15 and carrying a carbohydrate source at all times in case of low glucose, and When to call a health care provider    Patient verbalized understanding of diabetes self-management education concepts discussed, opportunities for ongoing education and support, and recommendations provided today.    Plan  Continue with positive diet & lifestyle changes    Follow-up on MyChart anytime  / update with any low/lower blood sugars   Upgrade to ra 3+   See detailed AVS  See Care Plan for co-developed, patient-state behavior change goals.      Subjective/Objective  Zara is an 74 year old, presenting for the following diabetes education related to: Individual review  Accompanied by: Self, Daughter  Diabetes education in the past 24mo: No  Focus of Visit: Monitoring, Reducing Risks, Taking Medication  Diabetes type: Type 2  Cultural Influences/Ethnic Background:  Not  or     Diabetes Symptoms & Complications:    Patient Problem List and Family Medical History reviewed for relevant medical history, current medical status, and diabetes risk factors.    Vitals:  Estimated body mass index is 26.08 kg/m  as calculated from the following:    Height as of 5/20/25: 1.651 m (5' 5\").    Weight as of 5/25/25: 71.1 kg (156 lb 11.2 oz).   Last 3 BP:   BP Readings from Last 3 Encounters:   05/26/25 90/52   05/20/25 100/48   05/12/25 101/51     History   Smoking Status    Never   Smokeless Tobacco    Never     Labs:  Lab Results   Component Value Date     10/07/2024     03/20/2023     04/27/2021     Direct Measure HDL   Date Value Ref Range Status   03/20/2023 58 >=50 mg/dL Final     GFR Estimate   Date Value Ref Range Status   05/25/2025 87 >60 mL/min/1.73m2 Final     Comment:     eGFR calculated using 2021 CKD-EPI equation.   06/22/2021 >60 >60 mL/min/1.73m2 Final     Lab Results   Component Value Date    CR 0.72 05/25/2025     Lab Results   Component Value Date    MICROL <12.0 12/05/2023    UMALCR  " 12/05/2023      Comment:      Unable to calculate, urine albumin and/or urine creatinine is outside detectable limits.  Microalbuminuria is defined as an albumin:creatinine ratio of 17 to 299 for males and 25 to 299 for females. A ratio of albumin:creatinine of 300 or higher is indicative of overt proteinuria.  Due to biologic variability, positive results should be confirmed by a second, first-morning random or 24-hour timed urine specimen. If there is discrepancy, a third specimen is recommended. When 2 out of 3 results are in the microalbuminuria range, this is evidence for incipient nephropathy and warrants increased efforts at glucose control, blood pressure control, and institution of therapy with an angiotensin-converting-enzyme (ACE) inhibitor (if the patient can tolerate it).      UCRR 23.9 12/05/2023 5/28/2025   Healthy Eating   Healthy Eating Assessed Today Yes         5/28/2025   Being Active   Being Active Assessed Today Yes         5/28/2025   Monitoring   Monitoring Assessed Today Yes     Diabetes Medication(s)       Insulin       insulin glargine (LANTUS SOLOSTAR) 100 UNIT/ML pen Take 20 units BID     insulin lispro (HUMALOG KWIKPEN) 100 UNIT/ML (1 unit dial) KWIKPEN Inject 6 units subcutaneously 4 times daily for blood glucose management     Patient taking differently: Inject 10 Units subcutaneously 3 times daily (before meals).              5/28/2025   Taking Medications   Taking Medication Assessed Today Yes         5/28/2025   Problem Solving   Problem Solving Assessed Today Yes           2/21/2024   Reducing Risks   Reducing Risks Assessed Today Yes   Diabetes Risks Age over 45 years;Family History   CAD Risks Diabetes Mellitus;Post-menopausal;Dyslipidemia;Family history   Has dilated eye exam at least once a year? Yes         5/28/2025   Healthy Coping: Diabetes Distress Assessment   Healthy Coping Assessed Today Yes       Shi Hitchcock RD, LD, [facility]  Diabetes Education    Time  Spent: 40 minutes  Encounter Type: Individual    Any diabetes medication dose changes were made via the [facility] Standing Orders under the patient's referring provider.

## 2025-06-02 ENCOUNTER — OFFICE VISIT (OUTPATIENT)
Dept: FAMILY MEDICINE | Facility: CLINIC | Age: 75
End: 2025-06-02
Payer: COMMERCIAL

## 2025-06-02 ENCOUNTER — TELEPHONE (OUTPATIENT)
Dept: CARDIOLOGY | Facility: CLINIC | Age: 75
End: 2025-06-02

## 2025-06-02 VITALS
WEIGHT: 159 LBS | HEART RATE: 66 BPM | HEIGHT: 65 IN | BODY MASS INDEX: 26.49 KG/M2 | DIASTOLIC BLOOD PRESSURE: 46 MMHG | TEMPERATURE: 97.8 F | SYSTOLIC BLOOD PRESSURE: 98 MMHG | OXYGEN SATURATION: 100 % | RESPIRATION RATE: 16 BRPM

## 2025-06-02 DIAGNOSIS — C22.0 HCC (HEPATOCELLULAR CARCINOMA) (H): ICD-10-CM

## 2025-06-02 DIAGNOSIS — Z01.818 PREOP GENERAL PHYSICAL EXAM: Primary | ICD-10-CM

## 2025-06-02 DIAGNOSIS — Z79.4 TYPE 2 DIABETES MELLITUS WITH HYPERGLYCEMIA, WITH LONG-TERM CURRENT USE OF INSULIN (H): ICD-10-CM

## 2025-06-02 DIAGNOSIS — Z86.711 HISTORY OF PULMONARY EMBOLISM: ICD-10-CM

## 2025-06-02 DIAGNOSIS — E78.00 HYPERCHOLESTEREMIA: ICD-10-CM

## 2025-06-02 DIAGNOSIS — E11.65 TYPE 2 DIABETES MELLITUS WITH HYPERGLYCEMIA, WITH LONG-TERM CURRENT USE OF INSULIN (H): ICD-10-CM

## 2025-06-02 DIAGNOSIS — N18.31 STAGE 3A CHRONIC KIDNEY DISEASE (H): ICD-10-CM

## 2025-06-02 DIAGNOSIS — D63.8 ANEMIA IN OTHER CHRONIC DISEASES CLASSIFIED ELSEWHERE: ICD-10-CM

## 2025-06-02 DIAGNOSIS — I47.10 SVT (SUPRAVENTRICULAR TACHYCARDIA): ICD-10-CM

## 2025-06-02 NOTE — TELEPHONE ENCOUNTER
Return call to patient, reviewed instructions for holding both Flecainide and Metoprolol 5 days prior to procedure, last dose on 6-3-25.  Reviewed rationale, if she has rapid heart rates and cannot tolerate prior to procedure she should seek care either in the ED or call the nursing team.  Reviewed contact information.

## 2025-06-02 NOTE — H&P (VIEW-ONLY)
Preoperative Evaluation  [facility]  [address] [facility] [address]  Phone: [phone]  Fax: [phone]  Primary Provider: Obdulia Chamorro MD  Pre-op Performing Provider: Obdulia Chamorro MD  Jun 2, 2025 6/2/2025   Surgical Information   What procedure is being done? cardiac ablation   Facility or Hospital where procedure/surgery will be performed: [facility]   Who is doing the procedure / surgery? dr navas   Date of surgery / procedure: june 9   Time of surgery / procedure: 9am   Where do you plan to recover after surgery? at home with family     Fax number for surgical facility: Note does not need to be faxed, will be available electronically in Epic.    1. Preop general physical exam (Primary)  Zara is approved for her ablation procedure.  She will hold her Eliquis at least 2 days prior to her procedure.  She will hold her short and long-acting insulin the morning of the procedure and only administer about half of her nighttime dose.  Metoprolol and flecainide per cardiology (she still taking a nightly flecainide-will check with them about how they want her to proceed as she is having nightly symptoms)  She is chronically anemic with a hemoglobin between 8.0 and 8.5.  Her type 2 diabetes is poorly controlled with the most recent A1c of 12.5 (she follows with endocrine)    2. SVT (supraventricular tachycardia)      3. Type 2 diabetes mellitus with hyperglycemia, with long-term current use of insulin (H)  She is on long-acting and short acting insulin.  Her most recent A1c was over 12.  She has been poorly controlled for many years and follows with endocrine.    4. Hx of Pulm Embolus -- on Eliquis  She will hold her Eliquis at least 2 days prior to procedure.    5. Anemia in other chronic diseases classified elsewhere  She has been stable between 8 and 8.5.    6. Stage 3a chronic kidney disease (H)  This has been very stable and actually somewhat  improved.    7. HCC (hepatocellular carcinoma) (H)  She follows with gastroenterology and will be having another procedure coming up soon.    8. Hypercholesteremia  Stable      Subjective   Zara is a 74 year old, presenting for the following:  Pre-Op Exam          6/2/2025     9:36 AM   Additional Questions   Roomed by      HPI:   Zara comes in today for preop physical.  She keeps having SVT and having admissions.  She is planning on having an ablation.  She feels like she has nightly symptoms so is taking a flecainide every day.  She also has very poorly controlled type 2 diabetes.  She currently just on insulin and follows with endocrine.  She has anemia of chronic disease.  She is also following with GI for hepatocellular carcinoma and procedures related to that.  She does have a past history of MI.  She is currently asymptomatic for chest pain.    The longitudinal plan of care for the diagnosis(es)/condition(s) as documented were addressed during this visit. Due to the added complexity in care, I will continue to support Zara in the subsequent management and with ongoing continuity of care.       6/2/2025   Pre-Op Questionnaire   Have you ever had a heart attack or stroke? No   Have you ever had surgery on your heart or blood vessels, such as a stent placement, a coronary artery bypass, or surgery on an artery in your head, neck, heart, or legs? No   Do you have chest pain with activity? (!) YES  - palpitations   Do you have a history of heart failure? No   Do you currently have a cold, bronchitis or symptoms of other infection? No   Do you have a cough, shortness of breath, or wheezing? (!) YES sob   Do you or anyone in your family have previous history of blood clots? No   Do you or does anyone in your family have a serious bleeding problem such as prolonged bleeding following surgeries or cuts? No   Have you ever had problems with anemia or been told to take iron pills? No   Have you had any abnormal  blood loss such as black, tarry or bloody stools, or abnormal vaginal bleeding? No   Have you ever had a blood transfusion? No   Are you willing to have a blood transfusion if it is medically needed before, during, or after your surgery? Yes   Have you or any of your relatives ever had problems with anesthesia? No   Do you have sleep apnea, excessive snoring or daytime drowsiness? No   Do you have any artifical heart valves or other implanted medical devices like a pacemaker, defibrillator, or continuous glucose monitor? No   Do you have artificial joints? No   Are you allergic to latex? No     Advance Care Planning    Discussed advance care planning with patient; informed AVS has link to [employer].    Preoperative Review of    reviewed - no record of controlled substances prescribed.          Patient Active Problem List    Diagnosis Date Noted    Stage 3a chronic kidney disease (H) 05/22/2025     Priority: Medium    CINTIA (acute kidney injury) 05/11/2025     Priority: Medium    Anemia of chronic disease 05/11/2025     Priority: Medium    Hyperkalemia 05/07/2025     Priority: Medium    Urinary tract infection 05/07/2025     Priority: Medium    Hx of Pulm Embolus -- on Eliquis 05/07/2025     Priority: Medium    DM type 2 on Insulin, Hgb A1C 12.1 on 4/15/25 05/07/2025     Priority: Medium    Hyponatremia 05/07/2025     Priority: Medium    Type 2 diabetes mellitus with diabetic mononeuropathy, with long-term current use of insulin (H) 02/06/2025     Priority: Medium    SVT (supraventricular tachycardia) 01/28/2025     Priority: Medium    NSTEMI (non-ST elevated myocardial infarction) (H) 01/28/2025     Priority: Medium    Bile duct stricture (H) 04/09/2024     Priority: Medium    Peripheral edema 12/21/2023     Priority: Medium    HCC (hepatocellular carcinoma) (H) 08/31/2023     Priority: Medium    Essential tremor 05/30/2023     Priority: Medium    Nonalcoholic steatohepatitis w Cirrhosis -- S/P TIPS  07/19/2021     Priority: Medium    Other cirrhosis of liver (H) - unclear etiology,[facility] 10/22/2020     Priority: Medium    Gastrointestinal hemorrhage 04/29/2020     Priority: Medium    Crohn's disease of colon (H) 11/03/2019     Priority: Medium    Aspirin contraindicated 04/02/2019     Priority: Medium    Hypercholesteremia 10/06/2016     Priority: Medium    Post Colectomy      Priority: Medium     Created by Conversion          Past Medical History:   Diagnosis Date    Anemia     Atypical chest pain     Common bile duct obstruction (H) 11/20/2023    Crohn's disease (H)     Diabetes mellitus (H)     Disorder of biliary tract     Essential tremor     Gastrointestinal hemorrhage     Hepatocellular carcinoma (H)     Hypercholesteremia     Ileostomy status (H)     Lesion of liver     Non-alcoholic cirrhosis (H)     Obstruction of common bile duct (H) 11/20/2023     Past Surgical History:   Procedure Laterality Date    BREAST EXCISIONAL BIOPSY Left 1980    BREAST EXCISIONAL BIOPSY Left 1985    ESOPHAGOSCOPY, GASTROSCOPY, DUODENOSCOPY (EGD), COMBINED N/A 1/26/2024    Procedure: ENDOSCOPIC ULTRASOUND;  Surgeon: Santi Pradhan MD;  Location: [facility]    HYSTERECTOMY  1991    OOPHORECTOMY Bilateral 1991    OTHER SURGICAL HISTORY      KIDNEY STONE REMOVALNOT SURE WHICH SIDE, DR. CHANEY    VA PROCTOSIGMOIDOSCOPY,RIGID,DIAGNOS N/A 6/17/2020    Procedure: ILEOSTOMY REVISION, PROCTOSCOPY, ILEOSCOPY;  Surgeon: Devang Moon MD;  Location: [facility];  Service: General    ZZC REMOVAL COLON/ILEOSTOMY      Description: Total Abdominal Colectomy;  Recorded: 12/15/2011;    ZZC REMOVAL COLON/PROCTECTOMY/ILEOSTOMY      Description: Total Proctocolectomy;  Recorded: 08/10/2011;    ZZC VAG HYST, W/VAGINECTOMY      Description: Vaginal Hysterectomy With Colpectomy;  Recorded: 08/14/2014;     Current Outpatient Medications   Medication Sig Dispense Refill    apixaban ANTICOAGULANT (ELIQUIS) 5 MG tablet Take 1  tablet (5 mg) by mouth 2 times daily. 180 tablet 3    clobetasol (TEMOVATE) 0.05 % external ointment Apply topically as needed      continuous blood glucose monitoring (FREESTYLE VIDA) sensor 1 Units daily 2 each 3    Continuous Glucose Sensor (DEXCOM G7 SENSOR) MISC Change every 10 days. 9 each 1    diphenoxylate-atropine (LOMOTIL) 2.5-0.025 MG tablet Take 1 tablet by mouth 4 times daily as needed for diarrhea. 12 tablet 0    flash glucose scanning reader (FREESTYLE VIDA 14 DAY READER) Misc [FLASH GLUCOSE SCANNING READER (FREESTYLE VIDA 14 DAY READER) MISC] Use 1 Units As Directed every 14 (fourteen) days. 1 each 0    flecainide (TAMBOCOR) 100 MG tablet Take 1 tablet (100 mg) by mouth 2 times daily. 60 tablet 3    insulin glargine (LANTUS SOLOSTAR) 100 UNIT/ML pen Take 20 units BID 30 mL 3    insulin lispro (HUMALOG KWIKPEN) 100 UNIT/ML (1 unit dial) KWIKPEN Inject 6 units subcutaneously 4 times daily for blood glucose management (Patient taking differently: Inject 10 Units subcutaneously 3 times daily (before meals).) 30 mL 4    insulin pen needle (32G X 4 MM) 32G X 4 MM miscellaneous Use 8 pen needles daily or as directed. 800 each 0    metoprolol succinate ER (TOPROL XL) 50 MG 24 hr tablet Take 1 tablet (50 mg) by mouth 2 times daily. 60 tablet 3    midodrine (PROAMATINE) 2.5 MG tablet Take 1 tablet (2.5 mg) by mouth 3 times daily (with meals). 270 tablet 0    Multiple Vitamins-Minerals (HAIR SKIN & NAILS ADVANCED) TABS Take 1 tablet by mouth daily.      omeprazole (PRILOSEC) 40 MG DR capsule Take 40 mg by mouth nightly as needed.      ostomy adhesive Pste [OSTOMY ADHESIVE PSTE] Apply as needed 4 Tube 3    senna-docusate (SENOKOT-S/PERICOLACE) 8.6-50 MG tablet Take 1 tablet by mouth 2 times daily as needed for constipation.      cefdinir (OMNICEF) 300 MG capsule Take 1 capsule (300 mg) by mouth 2 times daily. 7 capsule 0    FLUZONE HIGH-DOSE 0.5 ML injection       ondansetron (ZOFRAN ODT) 4 MG ODT tab Place 4  "mg under the tongue every 8 hours as needed for nausea. (Patient not taking: Reported on 5/13/2025)         Allergies   Allergen Reactions    Prochlorperazine Edisylate [Prochlorperazine] Other (See Comments)     Saw things    Compazine [Prochlorperazine] Hallucination        Social History     Tobacco Use    Smoking status: Never     Passive exposure: Never    Smokeless tobacco: Never   Substance Use Topics    Alcohol use: Not Currently       History   Drug Use No             Review of Systems  Constitutional, HEENT, cardiovascular, pulmonary, gi and gu systems are negative, except as otherwise noted.    Objective    BP 98/46   Pulse 66   Temp 97.8  F (36.6  C)   Resp 16   Ht 1.651 m (5' 5\")   Wt 72.1 kg (159 lb)   LMP  (LMP Unknown)   SpO2 100%   BMI 26.46 kg/m     Estimated body mass index is 26.46 kg/m  as calculated from the following:    Height as of this encounter: 1.651 m (5' 5\").    Weight as of this encounter: 72.1 kg (159 lb).  Physical Exam  GENERAL: alert and no distress  EYES: Eyes grossly normal to inspection, PERRL and conjunctivae and sclerae normal  HENT: ear canals and TM's normal, nose and mouth without ulcers or lesions  NECK: no adenopathy, no asymmetry, masses, or scars  RESP: lungs clear to auscultation - no rales, rhonchi or wheezes  CV: regular rate and rhythm, normal S1 S2, no S3 or S4, no murmur, click or rub, no peripheral edema  ABDOMEN: soft, nontender, no hepatosplenomegaly, no masses and bowel sounds normal  MS: no gross musculoskeletal defects noted, no edema  PSYCH: mentation appears normal, affect normal/bright    Recent Labs   Lab Test 05/29/25  1439 05/25/25  2253 05/22/25  1607 04/23/25  0559 04/15/25  0746 01/28/25  1053 10/07/24  0726   HGB 8.1* 8.7*  --    < >  --    < >  --    * 224  --    < >  --    < >  --    INR 1.79*  --  1.4*  --   --    < >  --     137  --    < > 135   < > 137   POTASSIUM 3.8 4.4  --    < > 4.0   < > 4.4   CR 0.69 0.72  --    < > " 0.65   < > 0.61   A1C  --   --   --   --  12.1*  --  10.7*    < > = values in this interval not displayed.        Diagnostics  Recent Results (from the past 240 hours)   ECG 12-LEAD WITH MUSE ([facility])    Collection Time: 05/25/25 10:25 PM   Result Value Ref Range    Systolic Blood Pressure 83 mmHg    Diastolic Blood Pressure 51 mmHg    Ventricular Rate 171 BPM    Atrial Rate 170 BPM    MN Interval  ms    QRS Duration 70 ms     ms    QTc 458 ms    P Axis  degrees    R AXIS 35 degrees    T Axis 79 degrees    Interpretation ECG       Supraventricular tachycardia  Septal infarct ST & T wave abnormality, consider lateral ischemia  Abnormal ECG  When compared with ECG of 06-May-2025 13:28,  Significant changes have occurred  Confirmed by SEE ED PROVIDER NOTE FOR, ECG INTERPRETATION (4000),  EDNA GARZON ([id number]) on 5/26/2025 7:08:18 AM     ECG 12-LEAD WITH MUSE ([facility])    Collection Time: 05/25/25 10:39 PM   Result Value Ref Range    Systolic Blood Pressure 87 mmHg    Diastolic Blood Pressure 52 mmHg    Ventricular Rate 101 BPM    Atrial Rate 101 BPM    MN Interval 212 ms    QRS Duration 78 ms     ms    QTc 446 ms    P Axis 73 degrees    R AXIS -35 degrees    T Axis 69 degrees    Interpretation ECG       Sinus tachycardia with 1st degree A-V block with Premature atrial complexes  Left axis deviation  Septal infarct Abnormal ECG  When compared with ECG of 25-May-2025 22:25,  Significant changes have occurred  Confirmed by SEE ED PROVIDER NOTE FOR, ECG INTERPRETATION (4000),  EDNA GARZON ([id number]) on 5/26/2025 7:11:51 AM     Basic metabolic panel    Collection Time: 05/25/25 10:53 PM   Result Value Ref Range    Sodium 137 135 - 145 mmol/L    Potassium 4.4 3.4 - 5.3 mmol/L    Chloride 107 98 - 107 mmol/L    Carbon Dioxide (CO2) 23 22 - 29 mmol/L    Anion Gap 7 7 - 15 mmol/L    Urea Nitrogen 11.8 8.0 - 23.0 mg/dL    Creatinine 0.72 0.51 - 0.95 mg/dL    GFR Estimate 87 >60 mL/min/1.73m2    Calcium 7.9  (L) 8.8 - 10.4 mg/dL    Glucose 143 (H) 70 - 99 mg/dL   Magnesium    Collection Time: 05/25/25 10:53 PM   Result Value Ref Range    Magnesium 1.9 1.7 - 2.3 mg/dL   CBC with platelets and differential    Collection Time: 05/25/25 10:53 PM   Result Value Ref Range    WBC Count 7.5 4.0 - 11.0 10e3/uL    RBC Count 2.49 (L) 3.80 - 5.20 10e6/uL    Hemoglobin 8.7 (L) 11.7 - 15.7 g/dL    Hematocrit 26.9 (L) 35.0 - 47.0 %     (H) 78 - 100 fL    MCH 34.9 (H) 26.5 - 33.0 pg    MCHC 32.3 31.5 - 36.5 g/dL    RDW 20.2 (H) 10.0 - 15.0 %    Platelet Count 224 150 - 450 10e3/uL    % Neutrophils 65 %    % Lymphocytes 23 %    % Monocytes 9 %    % Eosinophils 2 %    % Basophils 1 %    % Immature Granulocytes 1 %    NRBCs per 100 WBC 0 <1 /100    Absolute Neutrophils 4.9 1.6 - 8.3 10e3/uL    Absolute Lymphocytes 1.7 0.8 - 5.3 10e3/uL    Absolute Monocytes 0.7 0.0 - 1.3 10e3/uL    Absolute Eosinophils 0.2 0.0 - 0.7 10e3/uL    Absolute Basophils 0.1 0.0 - 0.2 10e3/uL    Absolute Immature Granulocytes 0.1 <=0.4 10e3/uL    Absolute NRBCs 0.0 10e3/uL   UA with Microscopic reflex to Culture    Collection Time: 05/26/25 12:05 AM    Specimen: Urine, Clean Catch   Result Value Ref Range    Color Urine Khloe (A) Colorless, Straw, Light Yellow, Yellow    Appearance Urine Turbid (A) Clear    Glucose Urine 30 (A) Negative mg/dL    Bilirubin Urine 0.5 mg/dL (A) Negative    Ketones Urine Negative Negative mg/dL    Specific Gravity Urine 1.021 1.001 - 1.030    Blood Urine >1.0 mg/dL (A) Negative    pH Urine 6.0 5.0 - 7.0    Protein Albumin Urine 70 (A) Negative mg/dL    Urobilinogen Urine Normal Normal mg/dL    Nitrite Urine Negative Negative    Leukocyte Esterase Urine 250 Angelina/uL (A) Negative    Bacteria Urine Many (A) None Seen /HPF    Mucus Urine Present (A) None Seen /LPF    RBC Urine 168 (H) <=2 /HPF    WBC Urine 131 (H) <=5 /HPF    Squamous Epithelials Urine 13 (H) <=1 /HPF    Transitional Epithelials Urine 3 (H) <=1 /HPF    Hyaline Casts  Urine 21 (H) <=2 /LPF   Urine Culture    Collection Time: 05/26/25 12:05 AM    Specimen: Urine, Clean Catch   Result Value Ref Range    Culture <10,000 CFU/mL Mixture of Urogenital Connie    AFP tumor marker    Collection Time: 05/29/25  2:39 PM   Result Value Ref Range    AFP tumor marker 3.5 <=8.3 ng/mL   Basic metabolic panel    Collection Time: 05/29/25  2:39 PM   Result Value Ref Range    Sodium 136 135 - 145 mmol/L    Potassium 3.8 3.4 - 5.3 mmol/L    Chloride 109 (H) 98 - 107 mmol/L    Carbon Dioxide (CO2) 19 (L) 22 - 29 mmol/L    Anion Gap 8 7 - 15 mmol/L    Urea Nitrogen 10.5 8.0 - 23.0 mg/dL    Creatinine 0.69 0.51 - 0.95 mg/dL    GFR Estimate 90 >60 mL/min/1.73m2    Calcium 8.1 (L) 8.8 - 10.4 mg/dL    Glucose 165 (H) 70 - 99 mg/dL   Ferritin    Collection Time: 05/29/25  2:39 PM   Result Value Ref Range    Ferritin 79 11 - 328 ng/mL   Folate    Collection Time: 05/29/25  2:39 PM   Result Value Ref Range    Folic Acid 10.5 4.6 - 34.8 ng/mL   Hepatic function panel    Collection Time: 05/29/25  2:39 PM   Result Value Ref Range    Protein Total 6.8 6.4 - 8.3 g/dL    Albumin 2.0 (L) 3.5 - 5.2 g/dL    Bilirubin Total 3.2 (H) <=1.2 mg/dL    Alkaline Phosphatase 535 (H) 40 - 150 U/L     (H) 0 - 45 U/L    ALT 51 (H) 0 - 50 U/L    Bilirubin Direct 2.39 (H) 0.00 - 0.45 mg/dL   Iron & Iron Binding Capacity    Collection Time: 05/29/25  2:39 PM   Result Value Ref Range    Iron 73 37 - 145 ug/dL    Iron Binding Capacity 175 (L) 240 - 430 ug/dL    Iron Sat Index 42 15 - 46 %   INR    Collection Time: 05/29/25  2:39 PM   Result Value Ref Range    INR 1.79 (H) 0.85 - 1.15    PT 20.5 (H) 11.8 - 14.8 Seconds   Vitamin B12    Collection Time: 05/29/25  2:39 PM   Result Value Ref Range    Vitamin B12 1,060 232 - 1,245 pg/mL   CBC with platelets and differential    Collection Time: 05/29/25  2:39 PM   Result Value Ref Range    WBC Count 3.8 (L) 4.0 - 11.0 10e3/uL    RBC Count 2.35 (L) 3.80 - 5.20 10e6/uL    Hemoglobin  8.1 (L) 11.7 - 15.7 g/dL    Hematocrit 25.3 (L) 35.0 - 47.0 %     (H) 78 - 100 fL    MCH 34.5 (H) 26.5 - 33.0 pg    MCHC 32.0 31.5 - 36.5 g/dL    RDW 19.6 (H) 10.0 - 15.0 %    Platelet Count 122 (L) 150 - 450 10e3/uL    % Neutrophils 70 %    % Lymphocytes 19 %    % Monocytes 7 %    % Eosinophils 3 %    % Basophils 1 %    % Immature Granulocytes 0 %    Absolute Neutrophils 2.7 1.6 - 8.3 10e3/uL    Absolute Lymphocytes 0.7 (L) 0.8 - 5.3 10e3/uL    Absolute Monocytes 0.3 0.0 - 1.3 10e3/uL    Absolute Eosinophils 0.1 0.0 - 0.7 10e3/uL    Absolute Basophils 0.0 0.0 - 0.2 10e3/uL    Absolute Immature Granulocytes 0.0 <=0.4 10e3/uL      No EKG this visit, completed in the last 90 days.    Revised Cardiac Risk Index (RCRI)  The patient has the following serious cardiovascular risks for perioperative complications:   - Coronary Artery Disease (MI, positive stress test, angina, Qs on EKG) = 1 point   - Diabetes Mellitus (on Insulin) = 1 point     RCRI Interpretation: 2 points: Class III (moderate risk - 6.6% complication rate)     Estimated Functional Capacity: CANNOT perform 4 METS without symptoms           Signed Electronically by: Obdulia Chamorro MD  A copy of this evaluation report is provided to the requesting physician.

## 2025-06-02 NOTE — TELEPHONE ENCOUNTER
[facility] Call Center    Phone Message    May a detailed message be left on voicemail: yes     Reason for Call: Other: Pt is calling to ask if she should continue taking Flecainide until the 6/9/25 ablation.  Please call  back asap to inform     Action Taken: Other: cardio    Travel Screening: Not Applicable    Thank you!  [facility]       Date of Service:

## 2025-06-02 NOTE — PROGRESS NOTES
Preoperative Evaluation  [facility]  [address] [facility] [address]  Phone: [phone]  Fax: [phone]  Primary Provider: Obdulia Chamorro MD  Pre-op Performing Provider: Obdulia Chamorro MD  Jun 2, 2025 6/2/2025   Surgical Information   What procedure is being done? cardiac ablation   Facility or Hospital where procedure/surgery will be performed: [facility]   Who is doing the procedure / surgery? dr navas   Date of surgery / procedure: june 9   Time of surgery / procedure: 9am   Where do you plan to recover after surgery? at home with family     Fax number for surgical facility: Note does not need to be faxed, will be available electronically in Epic.    1. Preop general physical exam (Primary)  Zara is approved for her ablation procedure.  She will hold her Eliquis at least 2 days prior to her procedure.  She will hold her short and long-acting insulin the morning of the procedure and only administer about half of her nighttime dose.  Metoprolol and flecainide per cardiology (she still taking a nightly flecainide-will check with them about how they want her to proceed as she is having nightly symptoms)  She is chronically anemic with a hemoglobin between 8.0 and 8.5.  Her type 2 diabetes is poorly controlled with the most recent A1c of 12.5 (she follows with endocrine)    2. SVT (supraventricular tachycardia)      3. Type 2 diabetes mellitus with hyperglycemia, with long-term current use of insulin (H)  She is on long-acting and short acting insulin.  Her most recent A1c was over 12.  She has been poorly controlled for many years and follows with endocrine.    4. Hx of Pulm Embolus -- on Eliquis  She will hold her Eliquis at least 2 days prior to procedure.    5. Anemia in other chronic diseases classified elsewhere  She has been stable between 8 and 8.5.    6. Stage 3a chronic kidney disease (H)  This has been very stable and actually somewhat  improved.    7. HCC (hepatocellular carcinoma) (H)  She follows with gastroenterology and will be having another procedure coming up soon.    8. Hypercholesteremia  Stable      Subjective   Zara is a 74 year old, presenting for the following:  Pre-Op Exam          6/2/2025     9:36 AM   Additional Questions   Roomed by      HPI:   Zara comes in today for preop physical.  She keeps having SVT and having admissions.  She is planning on having an ablation.  She feels like she has nightly symptoms so is taking a flecainide every day.  She also has very poorly controlled type 2 diabetes.  She currently just on insulin and follows with endocrine.  She has anemia of chronic disease.  She is also following with GI for hepatocellular carcinoma and procedures related to that.  She does have a past history of MI.  She is currently asymptomatic for chest pain.    The longitudinal plan of care for the diagnosis(es)/condition(s) as documented were addressed during this visit. Due to the added complexity in care, I will continue to support Zara in the subsequent management and with ongoing continuity of care.       6/2/2025   Pre-Op Questionnaire   Have you ever had a heart attack or stroke? No   Have you ever had surgery on your heart or blood vessels, such as a stent placement, a coronary artery bypass, or surgery on an artery in your head, neck, heart, or legs? No   Do you have chest pain with activity? (!) YES  - palpitations   Do you have a history of heart failure? No   Do you currently have a cold, bronchitis or symptoms of other infection? No   Do you have a cough, shortness of breath, or wheezing? (!) YES sob   Do you or anyone in your family have previous history of blood clots? No   Do you or does anyone in your family have a serious bleeding problem such as prolonged bleeding following surgeries or cuts? No   Have you ever had problems with anemia or been told to take iron pills? No   Have you had any abnormal  blood loss such as black, tarry or bloody stools, or abnormal vaginal bleeding? No   Have you ever had a blood transfusion? No   Are you willing to have a blood transfusion if it is medically needed before, during, or after your surgery? Yes   Have you or any of your relatives ever had problems with anesthesia? No   Do you have sleep apnea, excessive snoring or daytime drowsiness? No   Do you have any artifical heart valves or other implanted medical devices like a pacemaker, defibrillator, or continuous glucose monitor? No   Do you have artificial joints? No   Are you allergic to latex? No     Advance Care Planning    Discussed advance care planning with patient; informed AVS has link to [employer].    Preoperative Review of    reviewed - no record of controlled substances prescribed.          Patient Active Problem List    Diagnosis Date Noted    Stage 3a chronic kidney disease (H) 05/22/2025     Priority: Medium    CINTIA (acute kidney injury) 05/11/2025     Priority: Medium    Anemia of chronic disease 05/11/2025     Priority: Medium    Hyperkalemia 05/07/2025     Priority: Medium    Urinary tract infection 05/07/2025     Priority: Medium    Hx of Pulm Embolus -- on Eliquis 05/07/2025     Priority: Medium    DM type 2 on Insulin, Hgb A1C 12.1 on 4/15/25 05/07/2025     Priority: Medium    Hyponatremia 05/07/2025     Priority: Medium    Type 2 diabetes mellitus with diabetic mononeuropathy, with long-term current use of insulin (H) 02/06/2025     Priority: Medium    SVT (supraventricular tachycardia) 01/28/2025     Priority: Medium    NSTEMI (non-ST elevated myocardial infarction) (H) 01/28/2025     Priority: Medium    Bile duct stricture (H) 04/09/2024     Priority: Medium    Peripheral edema 12/21/2023     Priority: Medium    HCC (hepatocellular carcinoma) (H) 08/31/2023     Priority: Medium    Essential tremor 05/30/2023     Priority: Medium    Nonalcoholic steatohepatitis w Cirrhosis -- S/P TIPS  07/19/2021     Priority: Medium    Other cirrhosis of liver (H) - unclear etiology,[facility] 10/22/2020     Priority: Medium    Gastrointestinal hemorrhage 04/29/2020     Priority: Medium    Crohn's disease of colon (H) 11/03/2019     Priority: Medium    Aspirin contraindicated 04/02/2019     Priority: Medium    Hypercholesteremia 10/06/2016     Priority: Medium    Post Colectomy      Priority: Medium     Created by Conversion          Past Medical History:   Diagnosis Date    Anemia     Atypical chest pain     Common bile duct obstruction (H) 11/20/2023    Crohn's disease (H)     Diabetes mellitus (H)     Disorder of biliary tract     Essential tremor     Gastrointestinal hemorrhage     Hepatocellular carcinoma (H)     Hypercholesteremia     Ileostomy status (H)     Lesion of liver     Non-alcoholic cirrhosis (H)     Obstruction of common bile duct (H) 11/20/2023     Past Surgical History:   Procedure Laterality Date    BREAST EXCISIONAL BIOPSY Left 1980    BREAST EXCISIONAL BIOPSY Left 1985    ESOPHAGOSCOPY, GASTROSCOPY, DUODENOSCOPY (EGD), COMBINED N/A 1/26/2024    Procedure: ENDOSCOPIC ULTRASOUND;  Surgeon: Santi Pradhan MD;  Location: [facility]    HYSTERECTOMY  1991    OOPHORECTOMY Bilateral 1991    OTHER SURGICAL HISTORY      KIDNEY STONE REMOVALNOT SURE WHICH SIDE, DR. CHANEY    UT PROCTOSIGMOIDOSCOPY,RIGID,DIAGNOS N/A 6/17/2020    Procedure: ILEOSTOMY REVISION, PROCTOSCOPY, ILEOSCOPY;  Surgeon: Devang Moon MD;  Location: [facility];  Service: General    ZZC REMOVAL COLON/ILEOSTOMY      Description: Total Abdominal Colectomy;  Recorded: 12/15/2011;    ZZC REMOVAL COLON/PROCTECTOMY/ILEOSTOMY      Description: Total Proctocolectomy;  Recorded: 08/10/2011;    ZZC VAG HYST, W/VAGINECTOMY      Description: Vaginal Hysterectomy With Colpectomy;  Recorded: 08/14/2014;     Current Outpatient Medications   Medication Sig Dispense Refill    apixaban ANTICOAGULANT (ELIQUIS) 5 MG tablet Take 1  tablet (5 mg) by mouth 2 times daily. 180 tablet 3    clobetasol (TEMOVATE) 0.05 % external ointment Apply topically as needed      continuous blood glucose monitoring (FREESTYLE VIDA) sensor 1 Units daily 2 each 3    Continuous Glucose Sensor (DEXCOM G7 SENSOR) MISC Change every 10 days. 9 each 1    diphenoxylate-atropine (LOMOTIL) 2.5-0.025 MG tablet Take 1 tablet by mouth 4 times daily as needed for diarrhea. 12 tablet 0    flash glucose scanning reader (FREESTYLE VIDA 14 DAY READER) Misc [FLASH GLUCOSE SCANNING READER (FREESTYLE VIDA 14 DAY READER) MISC] Use 1 Units As Directed every 14 (fourteen) days. 1 each 0    flecainide (TAMBOCOR) 100 MG tablet Take 1 tablet (100 mg) by mouth 2 times daily. 60 tablet 3    insulin glargine (LANTUS SOLOSTAR) 100 UNIT/ML pen Take 20 units BID 30 mL 3    insulin lispro (HUMALOG KWIKPEN) 100 UNIT/ML (1 unit dial) KWIKPEN Inject 6 units subcutaneously 4 times daily for blood glucose management (Patient taking differently: Inject 10 Units subcutaneously 3 times daily (before meals).) 30 mL 4    insulin pen needle (32G X 4 MM) 32G X 4 MM miscellaneous Use 8 pen needles daily or as directed. 800 each 0    metoprolol succinate ER (TOPROL XL) 50 MG 24 hr tablet Take 1 tablet (50 mg) by mouth 2 times daily. 60 tablet 3    midodrine (PROAMATINE) 2.5 MG tablet Take 1 tablet (2.5 mg) by mouth 3 times daily (with meals). 270 tablet 0    Multiple Vitamins-Minerals (HAIR SKIN & NAILS ADVANCED) TABS Take 1 tablet by mouth daily.      omeprazole (PRILOSEC) 40 MG DR capsule Take 40 mg by mouth nightly as needed.      ostomy adhesive Pste [OSTOMY ADHESIVE PSTE] Apply as needed 4 Tube 3    senna-docusate (SENOKOT-S/PERICOLACE) 8.6-50 MG tablet Take 1 tablet by mouth 2 times daily as needed for constipation.      cefdinir (OMNICEF) 300 MG capsule Take 1 capsule (300 mg) by mouth 2 times daily. 7 capsule 0    FLUZONE HIGH-DOSE 0.5 ML injection       ondansetron (ZOFRAN ODT) 4 MG ODT tab Place 4  "mg under the tongue every 8 hours as needed for nausea. (Patient not taking: Reported on 5/13/2025)         Allergies   Allergen Reactions    Prochlorperazine Edisylate [Prochlorperazine] Other (See Comments)     Saw things    Compazine [Prochlorperazine] Hallucination        Social History     Tobacco Use    Smoking status: Never     Passive exposure: Never    Smokeless tobacco: Never   Substance Use Topics    Alcohol use: Not Currently       History   Drug Use No             Review of Systems  Constitutional, HEENT, cardiovascular, pulmonary, gi and gu systems are negative, except as otherwise noted.    Objective    BP 98/46   Pulse 66   Temp 97.8  F (36.6  C)   Resp 16   Ht 1.651 m (5' 5\")   Wt 72.1 kg (159 lb)   LMP  (LMP Unknown)   SpO2 100%   BMI 26.46 kg/m     Estimated body mass index is 26.46 kg/m  as calculated from the following:    Height as of this encounter: 1.651 m (5' 5\").    Weight as of this encounter: 72.1 kg (159 lb).  Physical Exam  GENERAL: alert and no distress  EYES: Eyes grossly normal to inspection, PERRL and conjunctivae and sclerae normal  HENT: ear canals and TM's normal, nose and mouth without ulcers or lesions  NECK: no adenopathy, no asymmetry, masses, or scars  RESP: lungs clear to auscultation - no rales, rhonchi or wheezes  CV: regular rate and rhythm, normal S1 S2, no S3 or S4, no murmur, click or rub, no peripheral edema  ABDOMEN: soft, nontender, no hepatosplenomegaly, no masses and bowel sounds normal  MS: no gross musculoskeletal defects noted, no edema  PSYCH: mentation appears normal, affect normal/bright    Recent Labs   Lab Test 05/29/25  1439 05/25/25  2253 05/22/25  1607 04/23/25  0559 04/15/25  0746 01/28/25  1053 10/07/24  0726   HGB 8.1* 8.7*  --    < >  --    < >  --    * 224  --    < >  --    < >  --    INR 1.79*  --  1.4*  --   --    < >  --     137  --    < > 135   < > 137   POTASSIUM 3.8 4.4  --    < > 4.0   < > 4.4   CR 0.69 0.72  --    < > " 0.65   < > 0.61   A1C  --   --   --   --  12.1*  --  10.7*    < > = values in this interval not displayed.        Diagnostics  Recent Results (from the past 240 hours)   ECG 12-LEAD WITH MUSE ([facility])    Collection Time: 05/25/25 10:25 PM   Result Value Ref Range    Systolic Blood Pressure 83 mmHg    Diastolic Blood Pressure 51 mmHg    Ventricular Rate 171 BPM    Atrial Rate 170 BPM    RI Interval  ms    QRS Duration 70 ms     ms    QTc 458 ms    P Axis  degrees    R AXIS 35 degrees    T Axis 79 degrees    Interpretation ECG       Supraventricular tachycardia  Septal infarct ST & T wave abnormality, consider lateral ischemia  Abnormal ECG  When compared with ECG of 06-May-2025 13:28,  Significant changes have occurred  Confirmed by SEE ED PROVIDER NOTE FOR, ECG INTERPRETATION (4000),  EDNA GARZON ([id number]) on 5/26/2025 7:08:18 AM     ECG 12-LEAD WITH MUSE ([facility])    Collection Time: 05/25/25 10:39 PM   Result Value Ref Range    Systolic Blood Pressure 87 mmHg    Diastolic Blood Pressure 52 mmHg    Ventricular Rate 101 BPM    Atrial Rate 101 BPM    RI Interval 212 ms    QRS Duration 78 ms     ms    QTc 446 ms    P Axis 73 degrees    R AXIS -35 degrees    T Axis 69 degrees    Interpretation ECG       Sinus tachycardia with 1st degree A-V block with Premature atrial complexes  Left axis deviation  Septal infarct Abnormal ECG  When compared with ECG of 25-May-2025 22:25,  Significant changes have occurred  Confirmed by SEE ED PROVIDER NOTE FOR, ECG INTERPRETATION (4000),  EDNA GARZON ([id number]) on 5/26/2025 7:11:51 AM     Basic metabolic panel    Collection Time: 05/25/25 10:53 PM   Result Value Ref Range    Sodium 137 135 - 145 mmol/L    Potassium 4.4 3.4 - 5.3 mmol/L    Chloride 107 98 - 107 mmol/L    Carbon Dioxide (CO2) 23 22 - 29 mmol/L    Anion Gap 7 7 - 15 mmol/L    Urea Nitrogen 11.8 8.0 - 23.0 mg/dL    Creatinine 0.72 0.51 - 0.95 mg/dL    GFR Estimate 87 >60 mL/min/1.73m2    Calcium 7.9  (L) 8.8 - 10.4 mg/dL    Glucose 143 (H) 70 - 99 mg/dL   Magnesium    Collection Time: 05/25/25 10:53 PM   Result Value Ref Range    Magnesium 1.9 1.7 - 2.3 mg/dL   CBC with platelets and differential    Collection Time: 05/25/25 10:53 PM   Result Value Ref Range    WBC Count 7.5 4.0 - 11.0 10e3/uL    RBC Count 2.49 (L) 3.80 - 5.20 10e6/uL    Hemoglobin 8.7 (L) 11.7 - 15.7 g/dL    Hematocrit 26.9 (L) 35.0 - 47.0 %     (H) 78 - 100 fL    MCH 34.9 (H) 26.5 - 33.0 pg    MCHC 32.3 31.5 - 36.5 g/dL    RDW 20.2 (H) 10.0 - 15.0 %    Platelet Count 224 150 - 450 10e3/uL    % Neutrophils 65 %    % Lymphocytes 23 %    % Monocytes 9 %    % Eosinophils 2 %    % Basophils 1 %    % Immature Granulocytes 1 %    NRBCs per 100 WBC 0 <1 /100    Absolute Neutrophils 4.9 1.6 - 8.3 10e3/uL    Absolute Lymphocytes 1.7 0.8 - 5.3 10e3/uL    Absolute Monocytes 0.7 0.0 - 1.3 10e3/uL    Absolute Eosinophils 0.2 0.0 - 0.7 10e3/uL    Absolute Basophils 0.1 0.0 - 0.2 10e3/uL    Absolute Immature Granulocytes 0.1 <=0.4 10e3/uL    Absolute NRBCs 0.0 10e3/uL   UA with Microscopic reflex to Culture    Collection Time: 05/26/25 12:05 AM    Specimen: Urine, Clean Catch   Result Value Ref Range    Color Urine Khloe (A) Colorless, Straw, Light Yellow, Yellow    Appearance Urine Turbid (A) Clear    Glucose Urine 30 (A) Negative mg/dL    Bilirubin Urine 0.5 mg/dL (A) Negative    Ketones Urine Negative Negative mg/dL    Specific Gravity Urine 1.021 1.001 - 1.030    Blood Urine >1.0 mg/dL (A) Negative    pH Urine 6.0 5.0 - 7.0    Protein Albumin Urine 70 (A) Negative mg/dL    Urobilinogen Urine Normal Normal mg/dL    Nitrite Urine Negative Negative    Leukocyte Esterase Urine 250 Angelina/uL (A) Negative    Bacteria Urine Many (A) None Seen /HPF    Mucus Urine Present (A) None Seen /LPF    RBC Urine 168 (H) <=2 /HPF    WBC Urine 131 (H) <=5 /HPF    Squamous Epithelials Urine 13 (H) <=1 /HPF    Transitional Epithelials Urine 3 (H) <=1 /HPF    Hyaline Casts  Urine 21 (H) <=2 /LPF   Urine Culture    Collection Time: 05/26/25 12:05 AM    Specimen: Urine, Clean Catch   Result Value Ref Range    Culture <10,000 CFU/mL Mixture of Urogenital Connie    AFP tumor marker    Collection Time: 05/29/25  2:39 PM   Result Value Ref Range    AFP tumor marker 3.5 <=8.3 ng/mL   Basic metabolic panel    Collection Time: 05/29/25  2:39 PM   Result Value Ref Range    Sodium 136 135 - 145 mmol/L    Potassium 3.8 3.4 - 5.3 mmol/L    Chloride 109 (H) 98 - 107 mmol/L    Carbon Dioxide (CO2) 19 (L) 22 - 29 mmol/L    Anion Gap 8 7 - 15 mmol/L    Urea Nitrogen 10.5 8.0 - 23.0 mg/dL    Creatinine 0.69 0.51 - 0.95 mg/dL    GFR Estimate 90 >60 mL/min/1.73m2    Calcium 8.1 (L) 8.8 - 10.4 mg/dL    Glucose 165 (H) 70 - 99 mg/dL   Ferritin    Collection Time: 05/29/25  2:39 PM   Result Value Ref Range    Ferritin 79 11 - 328 ng/mL   Folate    Collection Time: 05/29/25  2:39 PM   Result Value Ref Range    Folic Acid 10.5 4.6 - 34.8 ng/mL   Hepatic function panel    Collection Time: 05/29/25  2:39 PM   Result Value Ref Range    Protein Total 6.8 6.4 - 8.3 g/dL    Albumin 2.0 (L) 3.5 - 5.2 g/dL    Bilirubin Total 3.2 (H) <=1.2 mg/dL    Alkaline Phosphatase 535 (H) 40 - 150 U/L     (H) 0 - 45 U/L    ALT 51 (H) 0 - 50 U/L    Bilirubin Direct 2.39 (H) 0.00 - 0.45 mg/dL   Iron & Iron Binding Capacity    Collection Time: 05/29/25  2:39 PM   Result Value Ref Range    Iron 73 37 - 145 ug/dL    Iron Binding Capacity 175 (L) 240 - 430 ug/dL    Iron Sat Index 42 15 - 46 %   INR    Collection Time: 05/29/25  2:39 PM   Result Value Ref Range    INR 1.79 (H) 0.85 - 1.15    PT 20.5 (H) 11.8 - 14.8 Seconds   Vitamin B12    Collection Time: 05/29/25  2:39 PM   Result Value Ref Range    Vitamin B12 1,060 232 - 1,245 pg/mL   CBC with platelets and differential    Collection Time: 05/29/25  2:39 PM   Result Value Ref Range    WBC Count 3.8 (L) 4.0 - 11.0 10e3/uL    RBC Count 2.35 (L) 3.80 - 5.20 10e6/uL    Hemoglobin  8.1 (L) 11.7 - 15.7 g/dL    Hematocrit 25.3 (L) 35.0 - 47.0 %     (H) 78 - 100 fL    MCH 34.5 (H) 26.5 - 33.0 pg    MCHC 32.0 31.5 - 36.5 g/dL    RDW 19.6 (H) 10.0 - 15.0 %    Platelet Count 122 (L) 150 - 450 10e3/uL    % Neutrophils 70 %    % Lymphocytes 19 %    % Monocytes 7 %    % Eosinophils 3 %    % Basophils 1 %    % Immature Granulocytes 0 %    Absolute Neutrophils 2.7 1.6 - 8.3 10e3/uL    Absolute Lymphocytes 0.7 (L) 0.8 - 5.3 10e3/uL    Absolute Monocytes 0.3 0.0 - 1.3 10e3/uL    Absolute Eosinophils 0.1 0.0 - 0.7 10e3/uL    Absolute Basophils 0.0 0.0 - 0.2 10e3/uL    Absolute Immature Granulocytes 0.0 <=0.4 10e3/uL      No EKG this visit, completed in the last 90 days.    Revised Cardiac Risk Index (RCRI)  The patient has the following serious cardiovascular risks for perioperative complications:   - Coronary Artery Disease (MI, positive stress test, angina, Qs on EKG) = 1 point   - Diabetes Mellitus (on Insulin) = 1 point     RCRI Interpretation: 2 points: Class III (moderate risk - 6.6% complication rate)     Estimated Functional Capacity: CANNOT perform 4 METS without symptoms           Signed Electronically by: Obdulia Chamorro MD  A copy of this evaluation report is provided to the requesting physician.

## 2025-06-04 ENCOUNTER — TELEPHONE (OUTPATIENT)
Dept: FAMILY MEDICINE | Facility: CLINIC | Age: 75
End: 2025-06-04
Payer: COMMERCIAL

## 2025-06-04 NOTE — TELEPHONE ENCOUNTER
Court from [facility] calling for order for okay to discontinue visit this week per patients request  Plan to follow up next week post ablation  Verbal order given    Notice to PCP per protocol    Elly Wilson RN on 6/4/2025 at 10:57 AM

## 2025-06-09 ENCOUNTER — ANESTHESIA (OUTPATIENT)
Dept: CARDIOLOGY | Facility: HOSPITAL | Age: 75
End: 2025-06-09
Payer: COMMERCIAL

## 2025-06-09 ENCOUNTER — HOSPITAL ENCOUNTER (OUTPATIENT)
Facility: HOSPITAL | Age: 75
Discharge: HOME | End: 2025-06-09
Attending: INTERNAL MEDICINE | Admitting: INTERNAL MEDICINE
Payer: COMMERCIAL

## 2025-06-09 ENCOUNTER — ANESTHESIA EVENT (OUTPATIENT)
Dept: CARDIOLOGY | Facility: HOSPITAL | Age: 75
End: 2025-06-09
Payer: COMMERCIAL

## 2025-06-09 VITALS
SYSTOLIC BLOOD PRESSURE: 113 MMHG | HEIGHT: 65 IN | WEIGHT: 159 LBS | HEART RATE: 89 BPM | OXYGEN SATURATION: 97 % | TEMPERATURE: 97.9 F | BODY MASS INDEX: 26.49 KG/M2 | DIASTOLIC BLOOD PRESSURE: 57 MMHG

## 2025-06-09 DIAGNOSIS — I47.10 SVT (SUPRAVENTRICULAR TACHYCARDIA): ICD-10-CM

## 2025-06-09 LAB
ANION GAP SERPL CALCULATED.3IONS-SCNC: 5 MMOL/L (ref 7–15)
ATRIAL RATE - MUSE: 98 BPM
BUN SERPL-MCNC: 9.1 MG/DL (ref 8–23)
CALCIUM SERPL-MCNC: 7.9 MG/DL (ref 8.8–10.4)
CHLORIDE SERPL-SCNC: 105 MMOL/L (ref 98–107)
CREAT SERPL-MCNC: 0.6 MG/DL (ref 0.51–0.95)
DIASTOLIC BLOOD PRESSURE - MUSE: NORMAL MMHG
EGFRCR SERPLBLD CKD-EPI 2021: >90 ML/MIN/1.73M2
ERYTHROCYTE [DISTWIDTH] IN BLOOD BY AUTOMATED COUNT: 16.7 % (ref 10–15)
GLUCOSE BLDC GLUCOMTR-MCNC: 125 MG/DL (ref 70–99)
GLUCOSE SERPL-MCNC: 136 MG/DL (ref 70–99)
HCO3 SERPL-SCNC: 24 MMOL/L (ref 22–29)
HCT VFR BLD AUTO: 25.2 % (ref 35–47)
HGB BLD-MCNC: 8.2 G/DL (ref 11.7–15.7)
INTERPRETATION ECG - MUSE: NORMAL
MCH RBC QN AUTO: 34.5 PG (ref 26.5–33)
MCHC RBC AUTO-ENTMCNC: 32.5 G/DL (ref 31.5–36.5)
MCV RBC AUTO: 106 FL (ref 78–100)
P AXIS - MUSE: 46 DEGREES
PLATELET # BLD AUTO: 104 10E3/UL (ref 150–450)
POTASSIUM SERPL-SCNC: 3.3 MMOL/L (ref 3.4–5.3)
PR INTERVAL - MUSE: 200 MS
QRS DURATION - MUSE: 86 MS
QT - MUSE: 414 MS
QTC - MUSE: 528 MS
R AXIS - MUSE: -43 DEGREES
RBC # BLD AUTO: 2.38 10E6/UL (ref 3.8–5.2)
SODIUM SERPL-SCNC: 134 MMOL/L (ref 135–145)
SYSTOLIC BLOOD PRESSURE - MUSE: NORMAL MMHG
T AXIS - MUSE: 49 DEGREES
VENTRICULAR RATE- MUSE: 98 BPM
WBC # BLD AUTO: 3.2 10E3/UL (ref 4–11)

## 2025-06-09 PROCEDURE — 93010 ELECTROCARDIOGRAM REPORT: CPT | Performed by: INTERNAL MEDICINE

## 2025-06-09 PROCEDURE — 93623 PRGRMD STIMJ&PACG IV RX NFS: CPT | Performed by: INTERNAL MEDICINE

## 2025-06-09 PROCEDURE — 250N000011 HC RX IP 250 OP 636: Performed by: INTERNAL MEDICINE

## 2025-06-09 PROCEDURE — 258N000003 HC RX IP 258 OP 636: Performed by: INTERNAL MEDICINE

## 2025-06-09 PROCEDURE — 36415 COLL VENOUS BLD VENIPUNCTURE: CPT | Performed by: INTERNAL MEDICINE

## 2025-06-09 PROCEDURE — C2630 CATH, EP, COOL-TIP: HCPCS | Performed by: INTERNAL MEDICINE

## 2025-06-09 PROCEDURE — 93005 ELECTROCARDIOGRAM TRACING: CPT

## 2025-06-09 PROCEDURE — 370N000017 HC ANESTHESIA TECHNICAL FEE, PER MIN: Performed by: INTERNAL MEDICINE

## 2025-06-09 PROCEDURE — 250N000009 HC RX 250: Performed by: INTERNAL MEDICINE

## 2025-06-09 PROCEDURE — 85027 COMPLETE CBC AUTOMATED: CPT | Performed by: INTERNAL MEDICINE

## 2025-06-09 PROCEDURE — C1730 CATH, EP, 19 OR FEW ELECT: HCPCS | Performed by: INTERNAL MEDICINE

## 2025-06-09 PROCEDURE — 272N000001 HC OR GENERAL SUPPLY STERILE: Performed by: INTERNAL MEDICINE

## 2025-06-09 PROCEDURE — 250N000011 HC RX IP 250 OP 636: Mod: JZ | Performed by: NURSE ANESTHETIST, CERTIFIED REGISTERED

## 2025-06-09 PROCEDURE — 82435 ASSAY OF BLOOD CHLORIDE: CPT | Performed by: INTERNAL MEDICINE

## 2025-06-09 PROCEDURE — 258N000003 HC RX IP 258 OP 636: Performed by: NURSE ANESTHETIST, CERTIFIED REGISTERED

## 2025-06-09 PROCEDURE — 93653 COMPRE EP EVAL TX SVT: CPT | Performed by: INTERNAL MEDICINE

## 2025-06-09 PROCEDURE — 93623 PRGRMD STIMJ&PACG IV RX NFS: CPT | Mod: 26 | Performed by: INTERNAL MEDICINE

## 2025-06-09 PROCEDURE — 250N000013 HC RX MED GY IP 250 OP 250 PS 637: Performed by: NURSE PRACTITIONER

## 2025-06-09 PROCEDURE — 82962 GLUCOSE BLOOD TEST: CPT

## 2025-06-09 PROCEDURE — 999N000285 HC STATISTIC VASC ACCESS LAB DRAW WITH PIV START

## 2025-06-09 PROCEDURE — 250N000009 HC RX 250: Performed by: NURSE ANESTHETIST, CERTIFIED REGISTERED

## 2025-06-09 PROCEDURE — 999N000054 HC STATISTIC EKG NON-CHARGEABLE

## 2025-06-09 PROCEDURE — C1894 INTRO/SHEATH, NON-LASER: HCPCS | Performed by: INTERNAL MEDICINE

## 2025-06-09 PROCEDURE — 999N000248 HC STATISTIC IV INSERT WITH US BY RN

## 2025-06-09 PROCEDURE — C1732 CATH, EP, DIAG/ABL, 3D/VECT: HCPCS | Performed by: INTERNAL MEDICINE

## 2025-06-09 RX ORDER — SODIUM CHLORIDE 9 MG/ML
100 INJECTION, SOLUTION INTRAVENOUS CONTINUOUS
Status: DISCONTINUED | OUTPATIENT
Start: 2025-06-09 | End: 2025-06-09 | Stop reason: HOSPADM

## 2025-06-09 RX ORDER — ONDANSETRON 2 MG/ML
4 INJECTION INTRAMUSCULAR; INTRAVENOUS EVERY 6 HOURS PRN
Status: DISCONTINUED | OUTPATIENT
Start: 2025-06-09 | End: 2025-06-09 | Stop reason: HOSPADM

## 2025-06-09 RX ORDER — ONDANSETRON 4 MG/1
4 TABLET, ORALLY DISINTEGRATING ORAL EVERY 6 HOURS PRN
Status: DISCONTINUED | OUTPATIENT
Start: 2025-06-09 | End: 2025-06-09 | Stop reason: HOSPADM

## 2025-06-09 RX ORDER — ACETAMINOPHEN 325 MG/1
650 TABLET ORAL EVERY 4 HOURS PRN
Status: DISCONTINUED | OUTPATIENT
Start: 2025-06-09 | End: 2025-06-09 | Stop reason: HOSPADM

## 2025-06-09 RX ORDER — LIDOCAINE HYDROCHLORIDE AND EPINEPHRINE 10; 10 MG/ML; UG/ML
INJECTION, SOLUTION INFILTRATION; PERINEURAL
Status: DISCONTINUED | OUTPATIENT
Start: 2025-06-09 | End: 2025-06-09 | Stop reason: HOSPADM

## 2025-06-09 RX ORDER — POTASSIUM CHLORIDE 1500 MG/1
20 TABLET, EXTENDED RELEASE ORAL ONCE
Status: COMPLETED | OUTPATIENT
Start: 2025-06-09 | End: 2025-06-09

## 2025-06-09 RX ORDER — PROPOFOL 10 MG/ML
INJECTION, EMULSION INTRAVENOUS CONTINUOUS PRN
Status: DISCONTINUED | OUTPATIENT
Start: 2025-06-09 | End: 2025-06-09

## 2025-06-09 RX ORDER — SODIUM CHLORIDE 9 MG/ML
INJECTION, SOLUTION INTRAVENOUS CONTINUOUS PRN
Status: DISCONTINUED | OUTPATIENT
Start: 2025-06-09 | End: 2025-06-09

## 2025-06-09 RX ORDER — LIDOCAINE 40 MG/G
CREAM TOPICAL
Status: DISCONTINUED | OUTPATIENT
Start: 2025-06-09 | End: 2025-06-09 | Stop reason: HOSPADM

## 2025-06-09 RX ORDER — FENTANYL CITRATE 50 UG/ML
25 INJECTION, SOLUTION INTRAMUSCULAR; INTRAVENOUS
Status: DISCONTINUED | OUTPATIENT
Start: 2025-06-09 | End: 2025-06-09 | Stop reason: HOSPADM

## 2025-06-09 RX ADMIN — PROPOFOL 100 MCG/KG/MIN: 10 INJECTION, EMULSION INTRAVENOUS at 13:39

## 2025-06-09 RX ADMIN — SODIUM CHLORIDE 100 ML/HR: 0.9 INJECTION, SOLUTION INTRAVENOUS at 11:09

## 2025-06-09 RX ADMIN — POTASSIUM CHLORIDE 20 MEQ: 1500 TABLET, EXTENDED RELEASE ORAL at 12:30

## 2025-06-09 RX ADMIN — SODIUM CHLORIDE: 9 INJECTION, SOLUTION INTRAVENOUS at 13:33

## 2025-06-09 RX ADMIN — PROPOFOL 20 MG: 10 INJECTION, EMULSION INTRAVENOUS at 13:40

## 2025-06-09 RX ADMIN — PHENYLEPHRINE HYDROCHLORIDE 100 MCG: 10 INJECTION INTRAVENOUS at 14:24

## 2025-06-09 RX ADMIN — PHENYLEPHRINE HYDROCHLORIDE 100 MCG: 10 INJECTION INTRAVENOUS at 14:12

## 2025-06-09 RX ADMIN — SODIUM CHLORIDE 8 MCG: 9 INJECTION, SOLUTION INTRAVENOUS at 13:38

## 2025-06-09 ASSESSMENT — ACTIVITIES OF DAILY LIVING (ADL)
ADLS_ACUITY_SCORE: 57

## 2025-06-09 ASSESSMENT — ENCOUNTER SYMPTOMS: DYSRHYTHMIAS: 1

## 2025-06-09 NOTE — ANESTHESIA PREPROCEDURE EVALUATION
Anesthesia Pre-Procedure Evaluation    Patient: Zara Webster   MRN: [id number] : 1950          Procedure : Procedure(s):  Ablation Supraventricular Tachycardia         Past Medical History:   Diagnosis Date    Anemia     Atypical chest pain     Brain tumor, glioma (H)     Common bile duct obstruction (H) 2023    Crohn's disease (H)     Diabetes mellitus (H)     Diabetes mellitus, type 2 (H)     Disorder of biliary tract     Essential tremor     Gastroesophageal reflux disease     Gastrointestinal hemorrhage     Hepatocellular carcinoma (H)     Hypercholesteremia     Ileostomy status (H)     Lesion of liver     Myocardial infarction (H)     Non-alcoholic cirrhosis (H)     Obstruction of common bile duct (H) 2023      Past Surgical History:   Procedure Laterality Date    BREAST EXCISIONAL BIOPSY Left     BREAST EXCISIONAL BIOPSY Left     ESOPHAGOSCOPY, GASTROSCOPY, DUODENOSCOPY (EGD), COMBINED N/A 2024    Procedure: ENDOSCOPIC ULTRASOUND;  Surgeon: Santi Pradhan MD;  Location: [facility] [place]    HYSTERECTOMY      OOPHORECTOMY Bilateral     OTHER SURGICAL HISTORY      KIDNEY STONE REMOVALNOT SURE WHICH SIDE, DR. CHANEY    [place] PROCTOSIGMOIDOSCOPY,RIGID,DIAGNOS N/A 2020    Procedure: ILEOSTOMY REVISION, PROCTOSCOPY, ILEOSCOPY;  Surgeon: Devang Moon MD;  Location: [facility];  Service: General    ZZC REMOVAL COLON/ILEOSTOMY      Description: Total Abdominal Colectomy;  Recorded: 12/15/2011;    ZZC REMOVAL COLON/PROCTECTOMY/ILEOSTOMY      Description: Total Proctocolectomy;  Recorded: 08/10/2011;    ZZC VAG HYST, W/VAGINECTOMY      Description: Vaginal Hysterectomy With Colpectomy;  Recorded: 2014;      Allergies   Allergen Reactions    Prochlorperazine Edisylate [Prochlorperazine] Other (See Comments)     Saw things    Compazine [Prochlorperazine] Hallucination      Social History     Tobacco Use    Smoking status: Never     Passive exposure:  "Never    Smokeless tobacco: Never   Substance Use Topics    Alcohol use: Not Currently      Wt Readings from Last 1 Encounters:   06/09/25 72.1 kg (159 lb)        Anesthesia Evaluation            ROS/MED HX  ENT/Pulmonary:       Neurologic:       Cardiovascular:     (+) Dyslipidemia - -  CAD - past MI - -                        dysrhythmias,  Irregular Heartbeat/Palpitations,            METS/Exercise Tolerance:     Hematologic:     (+)      anemia,          Musculoskeletal:       GI/Hepatic:     (+) GERD,      Inflammatory bowel disease,      liver disease,       Renal/Genitourinary:     (+) renal disease, type: CRI,            Endo:     (+)  type II DM,                    Psychiatric/Substance Use:       Infectious Disease:       Malignancy:   (+) Malignancy, History of GI.    Other:              Physical Exam  Airway  Mallampati: II  TM distance: >3 FB  Neck ROM: full  Mouth opening: >= 4 cm    Cardiovascular - normal exam   Dental     Pulmonary - normal exam      Neurological   She appears awake.    Other Findings       OUTSIDE LABS:  CBC:   Lab Results   Component Value Date    WBC 3.8 (L) 05/29/2025    WBC 7.5 05/25/2025    HGB 8.1 (L) 05/29/2025    HGB 8.7 (L) 05/25/2025    HCT 25.3 (L) 05/29/2025    HCT 26.9 (L) 05/25/2025     (L) 05/29/2025     05/25/2025     BMP:   Lab Results   Component Value Date     05/29/2025     05/25/2025    POTASSIUM 3.8 05/29/2025    POTASSIUM 4.4 05/25/2025    CHLORIDE 109 (H) 05/29/2025    CHLORIDE 107 05/25/2025    CO2 19 (L) 05/29/2025    CO2 23 05/25/2025    BUN 10.5 05/29/2025    BUN 11.8 05/25/2025    CR 0.69 05/29/2025    CR 0.72 05/25/2025     (H) 05/29/2025     (H) 05/25/2025     COAGS:   Lab Results   Component Value Date    INR 1.79 (H) 05/29/2025     POC: No results found for: \"BGM\", \"HCG\", \"HCGS\"  HEPATIC:   Lab Results   Component Value Date    ALBUMIN 2.0 (L) 05/29/2025    PROTTOTAL 6.8 05/29/2025    ALT 51 (H) 05/29/2025    " " (H) 05/29/2025    ALKPHOS 535 (H) 05/29/2025    BILITOTAL 3.2 (H) 05/29/2025    YOANNA 26 05/06/2025     OTHER:   Lab Results   Component Value Date    LACT 2.1 (H) 05/07/2025    A1C 12.1 (H) 04/15/2025    HANNA 8.1 (L) 05/29/2025    MAG 1.9 05/25/2025    LIPASE 42 02/03/2024    TSH 1.00 05/08/2025       Anesthesia Plan    ASA Status:  2       Anesthesia Type: MAC.  Airway: natural airway.  Induction: intravenous.  Maintenance: TIVA.   Techniques and Equipment:       - Monitoring Plan: standard ASA monitoring     Consents    Anesthesia Plan(s) and associated risks, benefits, and realistic alternatives discussed. Questions answered and patient/representative(s) expressed understanding.     - Discussed: CRNA     - Discussed with:  Patient        - Pt is DNR/DNI Status: no DNR          Postoperative Care         Comments:                   Patrick Celaya MD    I have reviewed the pertinent notes and labs in the chart from the past 30 days and (re)examined the patient.  Any updates or changes from those notes are reflected in this note.    Clinically Significant Risk Factors Present on Admission                # Drug Induced Coagulation Defect: home medication list includes an anticoagulant medication             # DMII: A1C = 12.1 % (Ref range: 0.0 - 5.6 %) within past 6 months   # Overweight: Estimated body mass index is 26.46 kg/m  as calculated from the following:    Height as of this encounter: 1.651 m (5' 5\").    Weight as of this encounter: 72.1 kg (159 lb).       # Financial/Environmental Concerns:                 "

## 2025-06-09 NOTE — INTERVAL H&P NOTE
"I have reviewed the surgical (or preoperative) H&P that is linked to this encounter, and examined the patient. Noted changes include: Increased edema of the lower extremities over the past week; likely multi-factorial in the setting of advanced CKD, hepatocellular CA, and intermittent atrial arrhythmias.Patient denies chest pain, dyspnea at rest, orthopnea, PND or significant weight changes.     Clinical Conditions Present on Arrival:  Clinically Significant Risk Factors Present on Admission         # Hyponatremia: Lowest Na = 131 mmol/L in last 30 days, will monitor as appropriate  # Hyperchloremia: Highest Cl = 109 mmol/L in last 30 days, will monitor as appropriate          # Hypoalbuminemia: Lowest albumin = 2 g/dL in the past 30 days , will monitor as appropriate   # Drug Induced Coagulation Defect: home medication list includes an anticoagulant medication  # Thrombocytopenia: Lowest platelets = 122 in last 30 days, will monitor for bleeding      # DMII: A1C = 12.1 % (Ref range: 0.0 - 5.6 %) within past 6 months  # Overweight: Estimated body mass index is 26.46 kg/m  as calculated from the following:    Height as of this encounter: 1.651 m (5' 5\").    Weight as of this encounter: 72.1 kg (159 lb).       "

## 2025-06-09 NOTE — Clinical Note
[employer] Med system 12 lead EKG, hemodynamics 5 lead, pulse oximetery, NIBP, [employer] hands off defibrillator/external pacer, with 3 monitoring leads to patient. Baseline assessment done.

## 2025-06-09 NOTE — PROGRESS NOTES
Patient discharged home with her significant other in stable condition. Patient and significant other verbalize understanding of discharge instructions and follow up appointment. No questions or concerns at time of discharge.

## 2025-06-09 NOTE — ANESTHESIA CARE TRANSFER NOTE
Patient: Zara Webster    Procedure: Procedure(s):  Ablation Supraventricular Tachycardia       Diagnosis: SVT  Diagnosis Additional Information: No value filed.    Anesthesia Type:   MAC     Note:    Oropharynx: oropharynx clear of all foreign objects and spontaneously breathing  Level of Consciousness: awake  Oxygen Supplementation: room air    Independent Airway: airway patency satisfactory and stable  Dentition: dentition unchanged  Vital Signs Stable: post-procedure vital signs reviewed and stable  Report to RN Given: handoff report given  Patient transferred to: Cardiac Special Care          Vitals:  Vitals Value Taken Time   /54 06/09/25 14:57   Temp 97.9    Pulse 103 06/09/25 14:58   Resp 16    SpO2 99    Vitals shown include unfiled device data.    Electronically Signed By: VENANCIO Bennett CRNA  June 9, 2025  3:00 PM

## 2025-06-09 NOTE — DISCHARGE INSTRUCTIONS
[facility] [place]  Cardiac Electrophysiology  [address]  [place] [address]   Office: [phone]  Fax: [phone]     Cardiac Electrophysiology - Post Ablation Discharge Instructions      PROCEDURE   SVT ablation       MEDICATION INSTRUCTIONS   Stop flecainide  Continue all home meds         DISCHARGE INSTRUCTIONS   General instructions  Have an adult stay with you until tomorrow.  You may resume your normal diet.    You may shower tomorrow.  Do NOT take a bath, or use a hot tub or pool for at least 1 week. Do not scrub the site. Do not use lotion or powder near the puncture site.    Groin care instructions  For the first 24 hrs - check the puncture site every 1-2 hours while awake.  You may keep a bandaid over the puncture sites for 1 or 2 days post-procedure and thereafter may keep these sites uncovered.  Change the bandaid daily.  If there is minor oozing, apply another bandaid and remove it after 12 hours.  For 2 days, when you cough, sneeze, laugh or move your bowels, hold your hand over the puncture site and press firmly.  Mild bruising at the access sites is normal.  If you notice increased swelling, external bleeding, or have other concerns regarding your access sites please consider emergency department evaluation and call your electrophysiology team's office    Activity recommendations  Do not drive for 3 days.  Avoid stooping or squatting more than 90 degrees at the hips for 7 days  Avoid repetitive motions such as loading , vacuuming, raking or shoveling  Avoid heavy lifting (greater than 25lbs) for 1 week    Post ablation instructions  You may have some irregular heartbeats following your ablation.  These episodes should occur less frequently over time.  Pleuritic chest discomfort (chest pain worse with taking deep breaths, worse with laying flat on your back) can occur after ablation, usually coming about within the first 24-48hrs post ablation.  If  this occurs and is severe enough to be troublesome to you, please call us and consider starting a course of ibuprofen 400mg three times daily for 5 to 7 days    Things to watch for  As with any type of procedure, please be more attentive to unusual symptoms post ablation (eg. fever, neurologic changes, pain with swallowing, loss of consciousness, etc) - we recommend ER evaluation for any such symptoms in the first few weeks post procedure.    Consider ER evaluation for the following:  Severe chest pain not relieved by Tylenol or Ibuprofen  You have chills or a fever greater than 101 F (38 C)  Neurologic changes (eg. leg, arm or face weakness or numbness, difficulties with speech or word finding, problems walking or with your balance, vision changes)  Severe difficulty swallowing and/or you are coughing up blood  Shortness of breath  Increased groin pain or a large or growing hard lump around the site  Groin is red, swollen, hot or tender  Blood or fluid is draining from the groin site  Any numbness, coolness or changes in color in your extremities  Groin pain not relieved by Tylenol or Advil      Our office will have a follow-up visit scheduled for you in approximately 6 weeks.  Please do not hesitate to call us before that time should issues arise.        [facility]    [phone]    If you are calling after hours, please listen to the entire voicemail,   a live  will answer at the end of the message.    [phone] to reach the EP nurses working with Dr Head

## 2025-06-09 NOTE — ANESTHESIA POSTPROCEDURE EVALUATION
Patient: Zara Webster    Procedure: Procedure(s):  Ablation Supraventricular Tachycardia       Anesthesia Type:  MAC    Note:  Disposition: Outpatient   Postop Pain Control: Uneventful            Sign Out: Well controlled pain   PONV: No   Neuro/Psych: Uneventful            Sign Out: Acceptable/Baseline neuro status   Airway/Respiratory: Uneventful            Sign Out: Acceptable/Baseline resp. status   CV/Hemodynamics: Uneventful            Sign Out: Acceptable CV status; No obvious hypovolemia; No obvious fluid overload   Other NRE: NONE   DID A NON-ROUTINE EVENT OCCUR? No           Last vitals:  Vitals:    06/09/25 1545 06/09/25 1600 06/09/25 1615   BP: 111/56 114/56 113/57   Pulse: 93 91 89   Temp:      SpO2: 96% 96% 97%       Electronically Signed By: Patrick Celaya MD  June 9, 2025  4:23 PM

## 2025-06-10 ENCOUNTER — TELEPHONE (OUTPATIENT)
Dept: ENDOCRINOLOGY | Facility: CLINIC | Age: 75
End: 2025-06-10
Payer: COMMERCIAL

## 2025-06-10 NOTE — TELEPHONE ENCOUNTER
Spoke to pt and discussed below- the plan was for patient to switch so patients children can follow the patient through the isabel. Pt states they do not know how to set this up and would like to have education on how to work the dexcom and set it up so pts family can follow. Pt will reach out to education again. Will forward message as well.

## 2025-06-10 NOTE — TELEPHONE ENCOUNTER
[facility] Call Center    Phone Message    May a detailed message be left on voicemail: yes     Reason for Call: Other: patient just found out that sensors were sent to [facility] pharmacy couple months ago, which she never uses [facility]. [facility] did not send a reader either. Why is she changing product after 10 years and there's no discussion about it at last appt. Please review and call patient back to discuss.

## 2025-06-10 NOTE — Clinical Note
Looks like you schedule pt for the dexcom G7 start, so I am closing this encounter. Thanks for getting her in with you!  Marium Cevallos RN, [facility]

## 2025-06-11 ENCOUNTER — TELEPHONE (OUTPATIENT)
Dept: ENDOCRINOLOGY | Facility: CLINIC | Age: 75
End: 2025-06-11
Payer: COMMERCIAL

## 2025-06-11 DIAGNOSIS — Z79.4 TYPE 2 DIABETES MELLITUS WITH DIABETIC MONONEUROPATHY, WITH LONG-TERM CURRENT USE OF INSULIN (H): Primary | ICD-10-CM

## 2025-06-11 DIAGNOSIS — E11.41 TYPE 2 DIABETES MELLITUS WITH DIABETIC MONONEUROPATHY, WITH LONG-TERM CURRENT USE OF INSULIN (H): Primary | ICD-10-CM

## 2025-06-11 RX ORDER — ACYCLOVIR 400 MG/1
TABLET ORAL
Qty: 1 EACH | Refills: 0 | Status: SHIPPED | OUTPATIENT
Start: 2025-06-11

## 2025-06-11 NOTE — TELEPHONE ENCOUNTER
Requested Prescriptions   Signed Prescriptions Disp Refills    Continuous Glucose  (DEXCOM G7 ) BRAYAN 1 each 0     Sig: Use to read blood sugars as per 's instructions.       There is no refill protocol information for this order

## 2025-06-11 NOTE — TELEPHONE ENCOUNTER
[facility] Call Center    Phone Message    May a detailed message be left on voicemail: yes     Reason for Call: Other: Patient calling requesting a script for the reader for her new dexcom G7 so she can also monitor her blood sugars. Patient states her pharmacy is [facility] in [place].  Please call with any questions for concerns    Action Taken: Message routed to:  [facility] ([facility] endo    Travel Screening: Not Applicable     Date of Service:

## 2025-06-13 ENCOUNTER — TRANSFERRED RECORDS (OUTPATIENT)
Dept: MULTI SPECIALTY CLINIC | Facility: CLINIC | Age: 75
End: 2025-06-13

## 2025-06-13 DIAGNOSIS — B37.31 CANDIDIASIS OF VAGINA: ICD-10-CM

## 2025-06-13 LAB — RETINOPATHY: NORMAL

## 2025-06-13 NOTE — TELEPHONE ENCOUNTER
Protocol failed. RX discontinued at discharge from hospital. Please advise if okay to continue to send.    Requested Prescriptions   Pending Prescriptions Disp Refills    fluconazole (DIFLUCAN) 150 MG tablet [Pharmacy Med Name: Fluconazole Oral Tablet 150 MG] 2 tablet 0     Sig: Take 1 tablet by mouth by mouth now, and if no improvement in 3 days, take another.       Antifungal Agents Failed - 6/13/2025 11:03 AM        Failed - Medication is active on med list and the sig matches. RN to manually verify dose and sig if red X/fail.     If the protocol passes (green check), you do not need to verify med dose and sig.    A prescription matches if they are the same clinical intention.    For Example: once daily and every morning are the same.    The protocol can not identify upper and lower case letters as matching and will fail.     For Example: Take 1 tablet (50 mg) by mouth daily     TAKE 1 TABLET (50 MG) BY MOUTH DAILY    For all fails (red x), verify dose and sig.    If the refill does match what is on file, the RN can still proceed to approve the refill request.       If they do not match, route to the appropriate provider.             Failed - Medication indicated for associated diagnosis     Medication is associated with one or more of the following diagnoses:     Tinea pedis  Tinea cruris  Tinea corporis  Tinea capitis  Tinea barbae  Tinea faciei  Tinea manuum  Tinea nigra  Onychomycosis  Cutaneous candidias  Pityriasis versicolor          Passed - Recent (12 month) or future (90 days) visit with authorizing provider's specialty (provided they have been seen in the past 15 months)     The patient must have completed an in-person or virtual visit within the past 12 months or has a future visit scheduled within the next 90 days with the authorizing provider s specialty.  Urgent care and e-visits do not qualify as an office visit for this protocol.         Azoles (Oral) Failed - 6/13/2025 11:03 AM        Failed -  Most recent ALT is within normal limits        Failed - Most recent AST is within normal limits        Failed - Medication is active on med list and the sig matches. RN to manually verify dose and sig if red X/fail.     If the protocol passes (green check), you do not need to verify med dose and sig.    A prescription matches if they are the same clinical intention.    For Example: once daily and every morning are the same.    The protocol can not identify upper and lower case letters as matching and will fail.     For Example: Take 1 tablet (50 mg) by mouth daily     TAKE 1 TABLET (50 MG) BY MOUTH DAILY    For all fails (red x), verify dose and sig.    If the refill does match what is on file, the RN can still proceed to approve the refill request.       If they do not match, route to the appropriate provider.             Passed - Recent (3 months) or future (90 days) visit with authorizing provider s specialty (provided they have been seen in the past 6 months)        Passed - Has GFR on file in past 12 months and most recent value is normal        Passed - Medication indicated for associated diagnosis     Medication is associated with one or more of the following diagnoses:    Candidiasis (non-vaginal)  Aspergillosis  Cryptococcosis  Histoplasmosis  Blastomycosis  Coccidioidomycosis          Passed - Liver Function Panel within the past 6 months

## 2025-06-16 ENCOUNTER — TELEPHONE (OUTPATIENT)
Dept: FAMILY MEDICINE | Facility: CLINIC | Age: 75
End: 2025-06-16

## 2025-06-16 ENCOUNTER — OFFICE VISIT (OUTPATIENT)
Dept: FAMILY MEDICINE | Facility: CLINIC | Age: 75
End: 2025-06-16
Payer: COMMERCIAL

## 2025-06-16 VITALS
HEART RATE: 64 BPM | RESPIRATION RATE: 14 BRPM | OXYGEN SATURATION: 99 % | HEIGHT: 65 IN | SYSTOLIC BLOOD PRESSURE: 106 MMHG | TEMPERATURE: 98.1 F | BODY MASS INDEX: 26.11 KG/M2 | WEIGHT: 156.7 LBS | DIASTOLIC BLOOD PRESSURE: 40 MMHG

## 2025-06-16 DIAGNOSIS — R60.0 PERIPHERAL EDEMA: ICD-10-CM

## 2025-06-16 DIAGNOSIS — Z86.79 HX OF SUPRAVENTRICULAR TACHYCARDIA: ICD-10-CM

## 2025-06-16 DIAGNOSIS — D64.9 ANEMIA, UNSPECIFIED TYPE: ICD-10-CM

## 2025-06-16 DIAGNOSIS — Z01.818 PREOP GENERAL PHYSICAL EXAM: Primary | ICD-10-CM

## 2025-06-16 DIAGNOSIS — Z79.4 TYPE 2 DIABETES MELLITUS WITHOUT COMPLICATION, WITH LONG-TERM CURRENT USE OF INSULIN (H): ICD-10-CM

## 2025-06-16 DIAGNOSIS — R06.09 DOE (DYSPNEA ON EXERTION): ICD-10-CM

## 2025-06-16 DIAGNOSIS — N18.31 STAGE 3A CHRONIC KIDNEY DISEASE (H): ICD-10-CM

## 2025-06-16 DIAGNOSIS — C22.0 HCC (HEPATOCELLULAR CARCINOMA) (H): ICD-10-CM

## 2025-06-16 DIAGNOSIS — E11.9 TYPE 2 DIABETES MELLITUS WITHOUT COMPLICATION, WITH LONG-TERM CURRENT USE OF INSULIN (H): ICD-10-CM

## 2025-06-16 PROCEDURE — 99214 OFFICE O/P EST MOD 30 MIN: CPT

## 2025-06-16 PROCEDURE — 3074F SYST BP LT 130 MM HG: CPT

## 2025-06-16 PROCEDURE — 3078F DIAST BP <80 MM HG: CPT

## 2025-06-16 RX ORDER — FLUCONAZOLE 150 MG/1
TABLET ORAL
Qty: 2 TABLET | Refills: 0 | Status: SHIPPED | OUTPATIENT
Start: 2025-06-16

## 2025-06-16 NOTE — PROGRESS NOTES
Preoperative Evaluation  [place] [id number] [facility] [address]  [facility] [address]  Phone: [phone]  Fax: [phone]  Primary Provider: Obdulia Chamorro MD  Pre-op Performing Provider: VENANCIO Jose CNP  Jun 16, 2025 6/16/2025   Surgical Information   What procedure is being done? ENDOSCOPIC RETROGRADE CHOLANGIOPANCREATOGRAPHY WITH CHOLAGIOSCOPY    Facility or Hospital where procedure/surgery will be performed: [facility]   Who is doing the procedure / surgery? Sujit Rodríguez MD    Date of surgery / procedure: 06/30/25   Time of surgery / procedure: 8:30 AM   Where do you plan to recover after surgery? at home with family     Fax number for surgical facility: [phone]    Assessment & Plan     The proposed surgical procedure is considered INTERMEDIATE risk.    Preop general physical exam  HCC (hepatocellular carcinoma)  Medically complex pre-opt with recent cardiology ablation and SVT. I do have concerns with the peripheral edema, SOB, and recent cardiac ablation secondary to SVT. Labs reveal increased alkaline phosphatase, AST, bilirubin and albumin. She follows with GI and is planning for ENDOSCOPIC RETROGRADE CHOLANGIOPANCREATOGRAPHY WITH CHOLAGIOSCOPY. Pending cardiology approval, approved for procedure after completion if they agree. Final clearance  per them. Patient had no questions regarding holding medications prior to procedure. Will update today in regard to Eliquis for 3 days (as long as cardiology agrees but this was GI recommendation) and regarding Lantus dosing. Addendum after discussion.   - Comprehensive metabolic panel (BMP + Alb, Alk Phos, ALT, AST, Total. Bili, TP); Future  - NT-proBNP; Future  - CBC with platelets; Future    MOTA (dyspnea on exertion)  Peripheral edema  Reports increased leg swelling. She has MOTA in addition. Concerns for HF exacerbation. Lung sounds clear. Regular rate and rhythm on  auscultation. Recommend EKG and labs today. Patient declines due to needing to pick grandson up. Discussed that may not be able to clear if not completed. She will schedule. With peripheral edema, MOTA I am checking a BNP. Lung sounds are clear today. Weight is down 3 lbs today. In regard to lab results, physical exam, and concerns from HPI with palpitations, nausea, and chest pain, cardiology clearance needed prior to approval.   - Comprehensive metabolic panel (BMP + Alb, Alk Phos, ALT, AST, Total. Bili, TP); Future  - NT-proBNP; Future  - CBC with platelets; Future    Hx of supraventricular tachycardia  Cardiac ablation 6/9. Feeling well, has had one episode of chest pain since, was having more frequently prior to. No palpitations. No SOB, MOTA. Will need to reach out to cardiology prior to procedure to ensure no concern with procedure and/or stopping Eliquis for 3 days. On chart review, patient in ER 6/19 for SVT, recommend cardiology clearance rapid access prior to 6/30. On review, cardiology aware this request has been made form surgeon. Outside of this, does appear stable with chronic conditions.     Type 2 diabetes mellitus without complication, with long-term current use of insulin (H)  Last A1C 2 months ago 12.1. Taking in Lantus and humalog, working with endocrinology. Repeat A1C today so we have a result within 30 days of procedure. Continue with endocrinology at this time. Elevated A1C will make wound healing more difficult. With cardiac ablation help lantus 50% the night before and the morning of, recommend this again. No Humalog while NPO. She has no further questions. On review, looks like she took 50% of her Lantus in the evening and none the morning of. Will reach out to patient and clarify.  A1C: 7.0.     Anemia  Macrocytic anemia historically. Appears chronic on lab review.  Repeat CBC ordered. Review shows hemoglobin of 9.3, improved from 8.2. Recommend visit with PCP for further work-up as  needed.     Stage 3a CKD  Stable, labs reviewed do not reveal acute changes.         Risks and Recommendations  The patient has the following additional risks and recommendations for perioperative complications:  Cardiovascular:   - Cardiology consulted   Diabetes:  - Patient is on insulin therapy; diabetic NPO guidelines provided and discussed.    Antiplatelet or Anticoagulation Medication Instructions   - apixaban (Eliquis): Hold for 3 days before the procedure.      Additional Medication Instructions   - Beta Blockers (atenolol, metoprolol, propranolol) : Continue taking on the day of surgery.   - Long acting insulin (e.g. glargine, detemir): Take 80% of the usual evening or morning dose before surgery.    - short acting insulin (e.g. regular, lispro, aspart): DO NOT TAKE on the morning of surgery.    - loperamide, diphenoxylate/atropine: DO NOT TAKE day of surgery.    Recommendation  Patient referred to cardiology for evaluation before surgery. Surgery approval pending completion of consultation. Per chart review cardiology already aware.     Yobani Zuñiga is a 74 year old, presenting for the following:  Pre-Op Exam          6/16/2025     1:06 PM   Additional Questions   Roomed by Flakita ATKINS CMA     HPI:       Cardiac: chest pain with activity only once since.     Throwing up in the middle of the night, every night. Throwing up calms the palpitations down. Since the ablation. Maybe a little nausea. Did not throw up last night. The chest pain is in her breasts. It settles down. Also had one episode of elevated HR since the ablation.     Type 2 DM: using insulin. Uses Lantus BID and Humalog QID. No episodes of low sugars. Works with endocrinology for blood sugar management. A1C has been high. No increased thirst, hunger, or urination.     Colostomy for 40 years, secondary to crohn's. Stool consistency is stable. Stoma looks good.     Anemia: History of. No fatigue, does have SOB.     Low calcium in the past  she tells me.     Third time this procedure has been cancelled.She wants to have this completed. She tells me this is to do a biopsy she thinks.     EKG: declined today. Labs today: Declined.         6/16/2025   Pre-Op Questionnaire   Have you ever had a heart attack or stroke? No   Have you ever had surgery on your heart or blood vessels, such as a stent placement, a coronary artery bypass, or surgery on an artery in your head, neck, heart, or legs? No   Do you have chest pain with activity? (!) YES    Do you have a history of heart failure? No   Do you currently have a cold, bronchitis or symptoms of other infection? No   Do you have a cough, shortness of breath, or wheezing? No   Do you or anyone in your family have previous history of blood clots? No   Do you or does anyone in your family have a serious bleeding problem such as prolonged bleeding following surgeries or cuts? No   Have you ever had problems with anemia or been told to take iron pills? No   Have you had any abnormal blood loss such as black, tarry or bloody stools, or abnormal vaginal bleeding? No   Have you ever had a blood transfusion? No   Are you willing to have a blood transfusion if it is medically needed before, during, or after your surgery? Yes   Have you or any of your relatives ever had problems with anesthesia? No   Do you have sleep apnea, excessive snoring or daytime drowsiness? No   Do you have any artifical heart valves or other implanted medical devices like a pacemaker, defibrillator, or continuous glucose monitor? No   Do you have artificial joints? No   Are you allergic to latex? No     Advance Care Planning  Discussed advance care planning with patient; informed AVS has link to [employer].    Preoperative Review of    reviewed - no record of controlled substances prescribed.      Patient Active Problem List    Diagnosis Date Noted    Stage 3a chronic kidney disease (H) 05/22/2025     Priority: Medium    CINTIA (acute  kidney injury) 05/11/2025     Priority: Medium    Anemia of chronic disease 05/11/2025     Priority: Medium    Hyperkalemia 05/07/2025     Priority: Medium    Urinary tract infection 05/07/2025     Priority: Medium    Hx of Pulm Embolus -- on Eliquis 05/07/2025     Priority: Medium    DM type 2 on Insulin, Hgb A1C 12.1 on 4/15/25 05/07/2025     Priority: Medium    Hyponatremia 05/07/2025     Priority: Medium    Type 2 diabetes mellitus with diabetic mononeuropathy, with long-term current use of insulin (H) 02/06/2025     Priority: Medium    SVT (supraventricular tachycardia) 01/28/2025     Priority: Medium    NSTEMI (non-ST elevated myocardial infarction) (H) 01/28/2025     Priority: Medium    Bile duct stricture (H) 04/09/2024     Priority: Medium    Peripheral edema 12/21/2023     Priority: Medium    HCC (hepatocellular carcinoma) (H) 08/31/2023     Priority: Medium    Essential tremor 05/30/2023     Priority: Medium    Nonalcoholic steatohepatitis w Cirrhosis -- S/P TIPS 07/19/2021     Priority: Medium    Other cirrhosis of liver (H) - unclear etiology,[facility] 10/22/2020     Priority: Medium    Gastrointestinal hemorrhage 04/29/2020     Priority: Medium    Crohn's disease of colon (H) 11/03/2019     Priority: Medium    Aspirin contraindicated 04/02/2019     Priority: Medium    Hypercholesteremia 10/06/2016     Priority: Medium    Post Colectomy      Priority: Medium     Created by Conversion          Past Medical History:   Diagnosis Date    Anemia     Atypical chest pain     Brain tumor, glioma (H)     Common bile duct obstruction (H) 11/20/2023    Crohn's disease (H)     Diabetes mellitus (H)     Diabetes mellitus, type 2 (H)     Disorder of biliary tract     Essential tremor     Gastroesophageal reflux disease     Gastrointestinal hemorrhage     Hepatocellular carcinoma (H)     Hypercholesteremia     Ileostomy status (H)     Lesion of liver     Myocardial infarction (H)     Non-alcoholic cirrhosis (H)      Obstruction of common bile duct (H) 11/20/2023     Past Surgical History:   Procedure Laterality Date    BREAST EXCISIONAL BIOPSY Left 1980    BREAST EXCISIONAL BIOPSY Left 1985    EP ABLATION SVT N/A 6/9/2025    Procedure: Ablation Supraventricular Tachycardia;  Surgeon: Mi Head MD;  Location: [facility] LAB CV    ESOPHAGOSCOPY, GASTROSCOPY, DUODENOSCOPY (EGD), COMBINED N/A 1/26/2024    Procedure: ENDOSCOPIC ULTRASOUND;  Surgeon: Santi Pradhan MD;  [place] [facility] [place]    HYSTERECTOMY  1991    OOPHORECTOMY Bilateral 1991    OTHER SURGICAL HISTORY      KIDNEY STONE REMOVALNOT SURE WHICH SIDE, DR. CHANEY    MA PROCTOSIGMOIDOSCOPY,RIGID,DIAGNOS N/A 6/17/2020    Procedure: ILEOSTOMY REVISION, PROCTOSCOPY, ILEOSCOPY;  Surgeon: Devang Moon MD;  Location: [facility];  Service: General    ZZC REMOVAL COLON/ILEOSTOMY      Description: Total Abdominal Colectomy;  Recorded: 12/15/2011;    ZZC REMOVAL COLON/PROCTECTOMY/ILEOSTOMY      Description: Total Proctocolectomy;  Recorded: 08/10/2011;    ZZC VAG HYST, W/VAGINECTOMY      Description: Vaginal Hysterectomy With Colpectomy;  Recorded: 08/14/2014;     Current Outpatient Medications   Medication Sig Dispense Refill    apixaban ANTICOAGULANT (ELIQUIS) 5 MG tablet Take 1 tablet (5 mg) by mouth 2 times daily. 180 tablet 3    diphenoxylate-atropine (LOMOTIL) 2.5-0.025 MG tablet Take 1 tablet by mouth 4 times daily as needed for diarrhea. 12 tablet 0    insulin glargine (LANTUS SOLOSTAR) 100 UNIT/ML pen Take 20 units BID 30 mL 3    insulin lispro (HUMALOG KWIKPEN) 100 UNIT/ML (1 unit dial) KWIKPEN Inject 6 units subcutaneously 4 times daily for blood glucose management 30 mL 4    metoprolol succinate ER (TOPROL XL) 50 MG 24 hr tablet Take 1 tablet (50 mg) by mouth 2 times daily. 60 tablet 3    midodrine (PROAMATINE) 2.5 MG tablet Take 1 tablet (2.5 mg) by mouth 3 times daily (with meals). 270 tablet 0    clobetasol (TEMOVATE) 0.05 %  external ointment Apply topically as needed      continuous blood glucose monitoring (FREESTYLE VIDA) sensor 1 Units daily 2 each 3    Continuous Glucose  (DEXCOM G7 ) BRAYAN Use to read blood sugars as per 's instructions. 1 each 0    Continuous Glucose Sensor (DEXCOM G7 SENSOR) MISC Change every 10 days. 9 each 1    flash glucose scanning reader (FREESTYLE VIDA 14 DAY READER) Misc [FLASH GLUCOSE SCANNING READER (FREESTYLE VIDA 14 DAY READER) MISC] Use 1 Units As Directed every 14 (fourteen) days. 1 each 0    fluconazole (DIFLUCAN) 150 MG tablet Take 1 tablet by mouth by mouth now, and if no improvement in 3 days, take another. (Patient not taking: Reported on 6/16/2025) 2 tablet 0    insulin pen needle (32G X 4 MM) 32G X 4 MM miscellaneous Use 8 pen needles daily or as directed. 800 each 0    Multiple Vitamins-Minerals (HAIR SKIN & NAILS ADVANCED) TABS Take 1 tablet by mouth daily.      omeprazole (PRILOSEC) 40 MG DR capsule Take 40 mg by mouth nightly as needed.      ostomy adhesive Pste [OSTOMY ADHESIVE PSTE] Apply as needed 4 Tube 3       Allergies   Allergen Reactions    Prochlorperazine Edisylate [Prochlorperazine] Other (See Comments)     Saw things    Compazine [Prochlorperazine] Hallucination        Social History     Tobacco Use    Smoking status: Never     Passive exposure: Never    Smokeless tobacco: Never   Substance Use Topics    Alcohol use: Not Currently     Family History   Problem Relation Age of Onset    Diabetes Mother     Heart Disease Mother     Hyperlipidemia Mother     Diabetes Father     Cancer Father         malignant gallbladder    No Known Problems Sister     No Known Problems Sister      History   Drug Use No             Review of Systems  CONSTITUTIONAL: NEGATIVE for fever, chills, change in weight  INTEGUMENTARY/SKIN: NEGATIVE for worrisome rashes, moles or lesions  EYES: NEGATIVE for vision changes or irritation  ENT/MOUTH: NEGATIVE for ear, mouth and  "throat problems  RESP: No cough. SOB with walking fast, chronic  CV: Palpitations and chest pain, one time since the ablation. Pitting edema, not chronic.   GI: Vomiting, colostomy.   : NEGATIVE for frequency, dysuria, or hematuria  MUSCULOSKELETAL: NEGATIVE for significant arthralgias or myalgia  NEURO: NEGATIVE for weakness, dizziness or paresthesias  ENDOCRINE: NEGATIVE for temperature intolerance, skin/hair changes  HEME: NEGATIVE for bleeding problems  PSYCHIATRIC: NEGATIVE for changes in mood or affect    Objective    /40 (BP Location: Right arm, Patient Position: Sitting, Cuff Size: Adult Regular)   Pulse 64   Temp 98.1  F (36.7  C) (Oral)   Resp 14   Ht 1.651 m (5' 5\")   Wt 71.1 kg (156 lb 11.2 oz)   LMP  (LMP Unknown)   SpO2 99%   BMI 26.08 kg/m     Estimated body mass index is 26.08 kg/m  as calculated from the following:    Height as of this encounter: 1.651 m (5' 5\").    Weight as of this encounter: 71.1 kg (156 lb 11.2 oz).  Physical Exam  Vitals reviewed.   Constitutional:       General: She is not in acute distress.  Cardiovascular:      Rate and Rhythm: Normal rate and regular rhythm.      Heart sounds: Normal heart sounds.   Pulmonary:      Effort: Pulmonary effort is normal. No respiratory distress.      Breath sounds: Normal breath sounds. No stridor. No wheezing, rhonchi or rales.   Musculoskeletal:      Comments: 2+ pitting edema BLE   Skin:     General: Skin is warm and dry.   Neurological:      General: No focal deficit present.      Mental Status: She is alert.   Psychiatric:         Mood and Affect: Mood normal.         Thought Content: Thought content normal.       Diagnostics  Recent Results (from the past week)   ECG 12-LEAD WITH MUSE (E)    Collection Time: 06/19/25  5:21 AM   Result Value Ref Range    Systolic Blood Pressure  mmHg    Diastolic Blood Pressure  mmHg    Ventricular Rate 174 BPM    Atrial Rate 120 BPM    KS Interval  ms    QRS Duration 70 ms     ms "    QTc 456 ms    P Axis  degrees    R AXIS 56 degrees    T Axis -64 degrees    Interpretation ECG       Supraventricular tachycardia  Septal infarct (cited on or before 28-Jan-2025)  Marked ST abnormality, possible lateral subendocardial injury  Abnormal ECG  When compared with ECG of 09-Jun-2025 15:21,  Vent. rate has increased by  76 bpm  QRS axis Shifted right  Questionable change in initial forces of Anterior leads  ST now depressed in Anterolateral leads  T wave inversion now evident in Inferior leads  Confirmed by SEE ED PROVIDER NOTE FOR, ECG INTERPRETATION ([id number]),  EDNA GARZON ([id number]) on 6/21/2025 6:13:59 AM     Comprehensive metabolic panel    Collection Time: 06/19/25  5:45 AM   Result Value Ref Range    Sodium 135 135 - 145 mmol/L    Potassium 3.7 3.4 - 5.3 mmol/L    Carbon Dioxide (CO2) 22 22 - 29 mmol/L    Anion Gap 7 7 - 15 mmol/L    Urea Nitrogen 8.8 8.0 - 23.0 mg/dL    Creatinine 0.68 0.51 - 0.95 mg/dL    GFR Estimate 90 >60 mL/min/1.73m2    Calcium 8.2 (L) 8.8 - 10.4 mg/dL    Chloride 106 98 - 107 mmol/L    Glucose 262 (H) 70 - 99 mg/dL    Alkaline Phosphatase 418 (H) 40 - 150 U/L    AST 76 (H) 0 - 45 U/L    ALT 39 0 - 50 U/L    Protein Total 7.5 6.4 - 8.3 g/dL    Albumin 2.3 (L) 3.5 - 5.2 g/dL    Bilirubin Total 2.4 (H) <=1.2 mg/dL   Magnesium    Collection Time: 06/19/25  5:45 AM   Result Value Ref Range    Magnesium 1.8 1.7 - 2.3 mg/dL   CBC with platelets and differential    Collection Time: 06/19/25  5:45 AM   Result Value Ref Range    WBC Count 7.9 4.0 - 11.0 10e3/uL    RBC Count 2.74 (L) 3.80 - 5.20 10e6/uL    Hemoglobin 9.3 (L) 11.7 - 15.7 g/dL    Hematocrit 29.4 (L) 35.0 - 47.0 %     (H) 78 - 100 fL    MCH 33.9 (H) 26.5 - 33.0 pg    MCHC 31.6 31.5 - 36.5 g/dL    RDW 14.9 10.0 - 15.0 %    Platelet Count 146 (L) 150 - 450 10e3/uL    % Neutrophils 60 %    % Lymphocytes 27 %    % Monocytes 9 %    % Eosinophils 4 %    % Basophils 1 %    % Immature Granulocytes 0 %    NRBCs per  100 WBC 0 <1 /100    Absolute Neutrophils 4.7 1.6 - 8.3 10e3/uL    Absolute Lymphocytes 2.1 0.8 - 5.3 10e3/uL    Absolute Monocytes 0.7 0.0 - 1.3 10e3/uL    Absolute Eosinophils 0.3 0.0 - 0.7 10e3/uL    Absolute Basophils 0.1 0.0 - 0.2 10e3/uL    Absolute Immature Granulocytes 0.0 <=0.4 10e3/uL    Absolute NRBCs 0.0 10e3/uL   Comprehensive metabolic panel (BMP + Alb, Alk Phos, ALT, AST, Total. Bili, TP)    Collection Time: 06/20/25  8:22 AM   Result Value Ref Range    Sodium 135 135 - 145 mmol/L    Potassium 3.7 3.4 - 5.3 mmol/L    Carbon Dioxide (CO2) 23 22 - 29 mmol/L    Anion Gap 6 (L) 7 - 15 mmol/L    Urea Nitrogen 9.1 8.0 - 23.0 mg/dL    Creatinine 0.67 0.51 - 0.95 mg/dL    GFR Estimate >90 >60 mL/min/1.73m2    Calcium 8.3 (L) 8.8 - 10.4 mg/dL    Chloride 106 98 - 107 mmol/L    Glucose 258 (H) 70 - 99 mg/dL    Alkaline Phosphatase 356 (H) 40 - 150 U/L    AST 74 (H) 0 - 45 U/L    ALT 35 0 - 50 U/L    Protein Total 7.0 6.4 - 8.3 g/dL    Albumin 2.4 (L) 3.5 - 5.2 g/dL    Bilirubin Total 2.2 (H) <=1.2 mg/dL   NT-proBNP    Collection Time: 06/20/25  8:22 AM   Result Value Ref Range    NT-proBNP 306 0 - 624 pg/mL   CBC with platelets    Collection Time: 06/20/25  8:22 AM   Result Value Ref Range    WBC Count 6.1 4.0 - 11.0 10e3/uL    RBC Count 2.58 (L) 3.80 - 5.20 10e6/uL    Hemoglobin 8.7 (L) 11.7 - 15.7 g/dL    Hematocrit 27.6 (L) 35.0 - 47.0 %     (H) 78 - 100 fL    MCH 33.7 (H) 26.5 - 33.0 pg    MCHC 31.5 31.5 - 36.5 g/dL    RDW 14.7 10.0 - 15.0 %    Platelet Count 142 (L) 150 - 450 10e3/uL      EKG: recommended but patient declines. Addendum: EKG as above is from hospital visit following pre-opt visit.     Revised Cardiac Risk Index (RCRI)  The patient has the following serious cardiovascular risks for perioperative complications:   - Diabetes Mellitus (on Insulin) = 1 point     RCRI Interpretation: 1 point: Class II (low risk - 0.9% complication rate)    Signed Electronically by: VENANCIO Jose  CNP  A copy of this evaluation report is provided to the requesting physician.    At the end of the visit, I confirmed understanding of what was discussed. Zara has no further questions or concerns that were brought up at this time.      Lucas Valadez DNP, APRN, FNP-C

## 2025-06-16 NOTE — TELEPHONE ENCOUNTER
Chelsi has pt scheduled for 6/25 for her dexcom G7 start. No further action needed. Closing encounter.    Marium Cevallos RN, [facility]

## 2025-06-16 NOTE — TELEPHONE ENCOUNTER
Spoke with OLGA RN. Pt needs to hold Eliquis for 3 days prior to ERCP and ERCP will need to be rescheduled.     Called pt. Pt states she will call [facility] to rescedule. Pt asks if she can still have her preop appointment today. This writer informed pt preop appointment needs to be within 30 days of procedure so as long as procedure is rescheduled within 30 days of today, preop can go ahead as scheduled. Pt verbalized understanding and states she will call [facility] to reschedule.

## 2025-06-16 NOTE — TELEPHONE ENCOUNTER
We need to get a hold of Dr. Pradhan team at [facility] to see if they can proceed tomorrow without patient holding Eliquis.     I called her and she is unsure if there is going to be any puncture or not or if they are thinking about biopsying anything.     Please let me know. If they need her off this, the procedure will likely need to be rescheduled.     Lucas Valadez DNP, APRN, FNP-C

## 2025-06-16 NOTE — PATIENT INSTRUCTIONS
How to Take Your Medication Before Surgery  Preoperative Medication Instructions   Antiplatelet or Anticoagulation Medication Instructions   - Eliquis: Bleeding risk is moderate or high for this procedure AND CrCl  (>=) 50 mL/min. DO NOT TAKE 2 days before surgery.     Additional Medication Instructions   - Metoprolol : Continue taking on the day of surgery.   - Midodrine: take as usual.    - Lantus: Take 50% of your usual dose the night before and the morning of.    - Humalog: DO NOT TAKE on the morning of surgery.    - diphenoxylate/atropine: DO NOT TAKE day of surgery.       Patient Education   Preparing for Your Surgery  For Adults  Getting started  In most cases, a nurse will call to review your health history and instructions. They will give you an arrival time based on your scheduled surgery time. Please be ready to share:  Your doctor's clinic name and phone number  Your medical, surgical, and anesthesia history  A list of allergies and sensitivities  A list of medicines, including herbal treatments and over-the-counter drugs  Whether the patient has a legal guardian (ask how to send us the papers in advance)  Note: You may not receive a call if you were seen at our PAC (Preoperative Assessment Center).  Please tell us if you're pregnant--or if there's any chance you might be pregnant. Some surgeries may injure a fetus (unborn baby), so they require a pregnancy test. Surgeries that are safe for a fetus don't always need a test, and you can choose whether to have one.   Preparing for surgery  Within 10 to 30 days of surgery: Have a pre-op exam (sometimes called an H&P, or History and Physical). This can be done at a clinic or pre-operative center.  If you're having a , you may not need this exam. Talk to your care team.  At your pre-op exam, talk to your care team about all medicines you take. (This includes CBD oil and any drugs, such as THC, marijuana, and other forms of cannabis.) If you need to  stop any medicine before surgery, ask when to start taking it again.  This is for your safety. Many medicines and drugs can make you bleed too much during surgery. Some change how well surgery (anesthesia) drugs work.  Call your insurance company to let them know you're having surgery. (If you don't have insurance, call [phone].)  Call your clinic if there's any change in your health. This includes a scrape or scratch near the surgery site, or any signs of a cold (sore throat, runny nose, cough, rash, fever).  Eating and drinking guidelines  For your safety: Unless your surgeon tells you otherwise, follow the guidelines below.  Eat and drink as normal until 8 hours before you arrive for surgery. After that, no food or milk. You can spit out gum when you arrive.  Drink clear liquids until 2 hours before you arrive. These are liquids you can see through, like water, Gatorade, and Propel Water. They also include plain black coffee and tea (no cream or milk).  No alcohol for 24 hours before you arrive. The night before surgery, stop any drinks that contain THC.  If your care team tells you to take medicine on the morning of surgery, it's okay to take it with a sip of water. No other medicines or drugs are allowed (including CBD oil)--follow your care team's instructions.  If you have questions the day of surgery, call your hospital or surgery center.   Preventing infection  Shower or bathe the night before and the morning of surgery. Follow the instructions your clinic gave you. (If no instructions, use regular soap.)  Don't shave or clip hair near your surgery site. We'll remove the hair if needed.  Don't smoke or vape the morning of surgery. No chewing tobacco for 6 hours before you arrive. A nicotine patch is okay. You may spit out nicotine gum when you arrive.  For some surgeries, the surgeon will tell you to fully quit smoking and nicotine.  We will make every effort to keep you safe from infection. We  will:  Clean our hands often with soap and water (or an alcohol-based hand rub).  Clean the skin at your surgery site with a special soap that kills germs.  Give you a special gown to keep you warm. (Cold raises the risk of infection.)  Wear hair covers, masks, gowns, and gloves during surgery.  Give antibiotic medicine, if prescribed. Not all surgeries need this medicine.  What to bring on the day of surgery  Photo ID and insurance card  Copy of your health care directive, if you have one  Glasses and hearing aids (bring cases)  You can't wear contacts during surgery  Inhaler and eye drops, if you use them (tell us about these when you arrive)  CPAP machine or breathing device, if you use them  A few personal items, if spending the night  If you have . . .  A pacemaker, ICD (cardiac defibrillator), or other implant: Bring the ID card.  An implanted stimulator: Bring the remote control.  A legal guardian: Bring a copy of the certified (court-stamped) guardianship papers.  Please remove any jewelry, including body piercings. Leave jewelry and other valuables at home.  If you're going home the day of surgery  You must have a support person drive you home. They should stay with you overnight, and they may need to help with your self-care.  If you don't have a support person, please tells us as soon as possible. We can help.  After surgery  If it's hard to control your pain or you need more pain medicine, please call your surgeon's office.  Questions?   If you have any questions for your care team, list them here:   ____________________________________________________________________________________________________________________________________________________________________________________________________________________________________________________________  For informational purposes only. Not to replace the advice of your health care provider. Copyright   2003, 2019 [facility]. All rights  reserved. Clinically reviewed by To Gonzalez MD. [place] [id number] - REV 02/25.

## 2025-06-19 ENCOUNTER — HOSPITAL ENCOUNTER (EMERGENCY)
Facility: HOSPITAL | Age: 75
Discharge: HOME | End: 2025-06-19
Attending: STUDENT IN AN ORGANIZED HEALTH CARE EDUCATION/TRAINING PROGRAM
Payer: COMMERCIAL

## 2025-06-19 ENCOUNTER — TELEPHONE (OUTPATIENT)
Dept: CARDIOLOGY | Facility: CLINIC | Age: 75
End: 2025-06-19

## 2025-06-19 VITALS
DIASTOLIC BLOOD PRESSURE: 58 MMHG | BODY MASS INDEX: 24.66 KG/M2 | OXYGEN SATURATION: 95 % | SYSTOLIC BLOOD PRESSURE: 110 MMHG | HEIGHT: 65 IN | WEIGHT: 148 LBS | TEMPERATURE: 98 F | HEART RATE: 86 BPM | RESPIRATION RATE: 18 BRPM

## 2025-06-19 DIAGNOSIS — I47.10 SVT (SUPRAVENTRICULAR TACHYCARDIA): ICD-10-CM

## 2025-06-19 LAB
ALBUMIN SERPL BCG-MCNC: 2.3 G/DL (ref 3.5–5.2)
ALP SERPL-CCNC: 418 U/L (ref 40–150)
ALT SERPL W P-5'-P-CCNC: 39 U/L (ref 0–50)
ANION GAP SERPL CALCULATED.3IONS-SCNC: 7 MMOL/L (ref 7–15)
AST SERPL W P-5'-P-CCNC: 76 U/L (ref 0–45)
BASOPHILS # BLD AUTO: 0.1 10E3/UL (ref 0–0.2)
BASOPHILS NFR BLD AUTO: 1 %
BILIRUB SERPL-MCNC: 2.4 MG/DL
BUN SERPL-MCNC: 8.8 MG/DL (ref 8–23)
CALCIUM SERPL-MCNC: 8.2 MG/DL (ref 8.8–10.4)
CHLORIDE SERPL-SCNC: 106 MMOL/L (ref 98–107)
CREAT SERPL-MCNC: 0.68 MG/DL (ref 0.51–0.95)
EGFRCR SERPLBLD CKD-EPI 2021: 90 ML/MIN/1.73M2
EOSINOPHIL # BLD AUTO: 0.3 10E3/UL (ref 0–0.7)
EOSINOPHIL NFR BLD AUTO: 4 %
ERYTHROCYTE [DISTWIDTH] IN BLOOD BY AUTOMATED COUNT: 14.9 % (ref 10–15)
GLUCOSE SERPL-MCNC: 262 MG/DL (ref 70–99)
HCO3 SERPL-SCNC: 22 MMOL/L (ref 22–29)
HCT VFR BLD AUTO: 29.4 % (ref 35–47)
HGB BLD-MCNC: 9.3 G/DL (ref 11.7–15.7)
IMM GRANULOCYTES # BLD: 0 10E3/UL
IMM GRANULOCYTES NFR BLD: 0 %
LYMPHOCYTES # BLD AUTO: 2.1 10E3/UL (ref 0.8–5.3)
LYMPHOCYTES NFR BLD AUTO: 27 %
MAGNESIUM SERPL-MCNC: 1.8 MG/DL (ref 1.7–2.3)
MCH RBC QN AUTO: 33.9 PG (ref 26.5–33)
MCHC RBC AUTO-ENTMCNC: 31.6 G/DL (ref 31.5–36.5)
MCV RBC AUTO: 107 FL (ref 78–100)
MONOCYTES # BLD AUTO: 0.7 10E3/UL (ref 0–1.3)
MONOCYTES NFR BLD AUTO: 9 %
NEUTROPHILS # BLD AUTO: 4.7 10E3/UL (ref 1.6–8.3)
NEUTROPHILS NFR BLD AUTO: 60 %
NRBC # BLD AUTO: 0 10E3/UL
NRBC BLD AUTO-RTO: 0 /100
PLATELET # BLD AUTO: 146 10E3/UL (ref 150–450)
POTASSIUM SERPL-SCNC: 3.7 MMOL/L (ref 3.4–5.3)
PROT SERPL-MCNC: 7.5 G/DL (ref 6.4–8.3)
RBC # BLD AUTO: 2.74 10E6/UL (ref 3.8–5.2)
SODIUM SERPL-SCNC: 135 MMOL/L (ref 135–145)
WBC # BLD AUTO: 7.9 10E3/UL (ref 4–11)

## 2025-06-19 PROCEDURE — 83735 ASSAY OF MAGNESIUM: CPT | Performed by: STUDENT IN AN ORGANIZED HEALTH CARE EDUCATION/TRAINING PROGRAM

## 2025-06-19 PROCEDURE — 36415 COLL VENOUS BLD VENIPUNCTURE: CPT | Performed by: STUDENT IN AN ORGANIZED HEALTH CARE EDUCATION/TRAINING PROGRAM

## 2025-06-19 PROCEDURE — 99291 CRITICAL CARE FIRST HOUR: CPT

## 2025-06-19 PROCEDURE — 93005 ELECTROCARDIOGRAM TRACING: CPT | Performed by: STUDENT IN AN ORGANIZED HEALTH CARE EDUCATION/TRAINING PROGRAM

## 2025-06-19 PROCEDURE — 82310 ASSAY OF CALCIUM: CPT | Performed by: STUDENT IN AN ORGANIZED HEALTH CARE EDUCATION/TRAINING PROGRAM

## 2025-06-19 PROCEDURE — 85004 AUTOMATED DIFF WBC COUNT: CPT | Performed by: STUDENT IN AN ORGANIZED HEALTH CARE EDUCATION/TRAINING PROGRAM

## 2025-06-19 PROCEDURE — 250N000011 HC RX IP 250 OP 636

## 2025-06-19 RX ORDER — ADENOSINE 3 MG/ML
INJECTION, SOLUTION INTRAVENOUS
Status: COMPLETED
Start: 2025-06-19 | End: 2025-06-19

## 2025-06-19 RX ADMIN — ADENOSINE 6 MG: 3 INJECTION, SOLUTION INTRAVENOUS at 05:42

## 2025-06-19 ASSESSMENT — ACTIVITIES OF DAILY LIVING (ADL): ADLS_ACUITY_SCORE: 57

## 2025-06-19 NOTE — ED TRIAGE NOTES
Went to bed feeling okay, woke up this morning to heart palpitations and chest pain/tightness. History of recent ablation secondary to recurrent SVT.     Triage Assessment (Adult)       Row Name 06/19/25 0534          Triage Assessment    Airway WDL WDL        Respiratory WDL    Respiratory WDL X;rhythm/pattern     Rhythm/Pattern, Respiratory shortness of breath        Skin Circulation/Temperature WDL    Skin Circulation/Temperature WDL circulation;temperature;X;all     Skin Circulation pallor     Skin Temperature --  clammy        Cardiac WDL    Cardiac WDL X;chest pain;rhythm     Pulse Rate & Regularity tachycardic     Cardiac Rhythm SVT        Chest Pain Assessment    Chest Pain Location midsternal     Character squeezing;tightness     Precipitating Factors activity        Peripheral/Neurovascular WDL    Peripheral Neurovascular WDL WDL        Cognitive/Neuro/Behavioral WDL    Cognitive/Neuro/Behavioral WDL WDL

## 2025-06-19 NOTE — TELEPHONE ENCOUNTER
Noted in chart pt is s/p SVT ablation 6/9/25. Spoke with pt and discussed her upcoming Endoscopic retrograde cholangiopancreatography. Discussed healing process of SVT and 3 month period to heal. Pt has a call out to her provider performing her procedure to discuss urgency or if this can be put off 3 months for healing post ablation.    Louann Kerns RN

## 2025-06-19 NOTE — TELEPHONE ENCOUNTER
Ashley Rios [facility] Ep Support Pool - Lhe; P [facility] Cv Rn Team Y  Caller: Unspecified (Today,  8:43 AM)  General phone call:    Caller: PATIENT    Primary cardiologist: JHON    Detailed reason for call: JHON APPT ON 7/1/25 CX.D DUE TO AUDRA, PATIENT WAS IN ED AGAIN THIS MORNING W/ RAPID HR, SHE STATES SHE HAD SOME KIND OF A BOLUS OF MEDICATION AND IS NOW BACK IN SINUS RHYTHM. SCHEDULED FOR AN ERCP ON 6/30/25, ASKING IF SHE SHOULD GO AHEAD W/ THIS OR WAIT UNTIL HER HEART ISSUES ARE RESOLVED. PLEASE ADVISE.    Best phone number: [phone]    Best time to contact: ANY    Ok to leave a detailedmessage? YES    Device? NO    Additional Info:

## 2025-06-19 NOTE — ED NOTES
EKG delayed, attempted to obtain EKG in triage, no wave forms transmitting through except in lead 5 which showed a fast hear rate in 170's. Several attempts to trouble shoot did not yield results. Patient brought to room 10, provided requested to meet the patient in the room.

## 2025-06-19 NOTE — ED PROVIDER NOTES
Emergency Department Encounter         FINAL IMPRESSION:  SVT          ED COURSE AND MEDICAL DECISION MAKING       ED Course as of 06/19/25 0620   u Jun 19, 2025   0535 EKG appears supraventricular with a rate of 174, no signs of acute ischemia although she has nonspecific ST changes, QTc is 456, this is faster when compared to June 9, 2025.  No STEMI today.       Patient is a 74-year-old female with a history of recurrent SVT, status post ablation done here at this hospital 2 weeks ago, here from home with sudden onset of palpitations and racing heartbeat reminiscent of previous SVT flares that woke her from sleep.  Reports she is not feeling well otherwise with no fevers, chills, nausea or vomiting.  No diarrhea.  Tolerating p.o.  No abdominal pain.  Low likelihood for electrolyte disturbances.    Arrival she looks well although mildly uncomfortable reporting racing heartbeat and some chest discomfort.  Mild shortness of breath.  Heart rate is tachycardic.  EKG showing SVT with a rate of 170s with some nonspecific ST changes.    Patient was given adenosine x 1 6 mg with improvement of EKG findings.  Will obtain basic blood work most likely discharge back home with outpatient cardiology follow-up.           -Reevaluation approximately 6:20 AM: Patient reports feeling back to baseline.  Vital stable.  Would like to be discharged.        Medical Decision Making      Discharge. No recommendations on prescription strength medication(s). See documentation for any additional details.    MIPS (CTPE, Dental pain, Caro, Sinusitis, Asthma/COPD, Head Trauma): Not Applicable    SEPSIS: None                    Critical Care     Performed by: Gustavo Spaulding or    Authorized by: Gustavo Spaulding  Total critical care time: 30 minutes  Critical care was necessary to treat or prevent imminent or life-threatening deterioration of the following conditions: svt  Critical care was time spent personally by me on the following activities:  development of treatment plan with patient or surrogate, discussions with consultants, examination of patient, evaluation of patient's response to treatment, obtaining history from patient or surrogate, ordering and performing treatments and interventions, ordering and review of laboratory studies, ordering and review of radiographic studies, re-evaluation of patient's condition and monitoring for potential decompensation.  Critical care time was exclusive of separately billable procedures and treating other patients.'    At the conclusion of the encounter I discussed the results of all the tests and the disposition. The questions were answered. The patient or family acknowledged understanding and was agreeable with the care plan.        MEDICATIONS GIVEN IN THE EMERGENCY DEPARTMENT:  Medications   adenosine (ADENOCARD) 6 MG/2ML injection (has no administration in time range)       NEW PRESCRIPTIONS STARTED AT TODAY'S ED VISIT:  New Prescriptions    No medications on file       HPI     74-year-old history of SVT status post ablation 10 days ago, here with sudden onset of palpitations similar to previous episodes.  Reports has been feeling well this week otherwise.          MEDICAL HISTORY     Past Medical History:   Diagnosis Date    Anemia     Atypical chest pain     Brain tumor, glioma (H)     Common bile duct obstruction (H) 11/20/2023    Crohn's disease (H)     Diabetes mellitus (H)     Diabetes mellitus, type 2 (H)     Disorder of biliary tract     Essential tremor     Gastroesophageal reflux disease     Gastrointestinal hemorrhage     Hepatocellular carcinoma (H)     Hypercholesteremia     Ileostomy status (H)     Lesion of liver     Myocardial infarction (H)     Non-alcoholic cirrhosis (H)     Obstruction of common bile duct (H) 11/20/2023       Past Surgical History:   Procedure Laterality Date    BREAST EXCISIONAL BIOPSY Left 1980    BREAST EXCISIONAL BIOPSY Left 1985    EP ABLATION SVT N/A 6/9/2025     Procedure: Ablation Supraventricular Tachycardia;  Surgeon: Mi Head MD;  Location: [facility] CATH LAB CV    ESOPHAGOSCOPY, GASTROSCOPY, DUODENOSCOPY (EGD), COMBINED N/A 1/26/2024    Procedure: ENDOSCOPIC ULTRASOUND;  Surgeon: Santi Pradhan MD;  Location: [facility]    HYSTERECTOMY  1991    OOPHORECTOMY Bilateral 1991    OTHER SURGICAL HISTORY      KIDNEY STONE REMOVALNOT SURE WHICH SIDE, DR. CHANEY    [place] PROCTOSIGMOIDOSCOPY,RIGID,DIAGNOS N/A 6/17/2020    Procedure: ILEOSTOMY REVISION, PROCTOSCOPY, ILEOSCOPY;  Surgeon: Devang Moon MD;  Location: [facility];  Service: General    ZZC REMOVAL COLON/ILEOSTOMY      Description: Total Abdominal Colectomy;  Recorded: 12/15/2011;    ZZC REMOVAL COLON/PROCTECTOMY/ILEOSTOMY      Description: Total Proctocolectomy;  Recorded: 08/10/2011;    ZZC VAG HYST, W/VAGINECTOMY      Description: Vaginal Hysterectomy With Colpectomy;  Recorded: 08/14/2014;       Social History     Tobacco Use    Smoking status: Never     Passive exposure: Never    Smokeless tobacco: Never   Vaping Use    Vaping status: Never Used   Substance Use Topics    Alcohol use: Not Currently    Drug use: No       apixaban ANTICOAGULANT (ELIQUIS) 5 MG tablet  clobetasol (TEMOVATE) 0.05 % external ointment  continuous blood glucose monitoring ([employer]STYLE VIDA) sensor  Continuous Glucose  (DEXCOM G7 ) BRAYAN  Continuous Glucose Sensor (DEXCOM G7 SENSOR) MISC  diphenoxylate-atropine (LOMOTIL) 2.5-0.025 MG tablet  flash glucose scanning reader (FREESTYLE VIDA 14 DAY READER) Misc  fluconazole (DIFLUCAN) 150 MG tablet  insulin glargine (LANTUS SOLOSTAR) 100 UNIT/ML pen  insulin lispro (HUMALOG KWIKPEN) 100 UNIT/ML (1 unit dial) KWIKPEN  insulin pen needle (32G X 4 MM) 32G X 4 MM miscellaneous  metoprolol succinate ER (TOPROL XL) 50 MG 24 hr tablet  midodrine (PROAMATINE) 2.5 MG tablet  Multiple Vitamins-Minerals (HAIR SKIN & NAILS ADVANCED) TABS  omeprazole  (PRILOSEC) 40 MG DR capsule  ostomy adhesive Pste            PHYSICAL EXAM     /55   Pulse (!) 174   Resp 24   LMP  (LMP Unknown)       PHYSICAL EXAM:     General: Patient appears well, nontoxic, comfortable  HEENT: Moist mucous membranes,  No head trauma.    Cardiovascular: Tachycardic normal rhythm, no extremity edema.  No appreciable murmur.  Respiratory: No signs of respiratory distress, lungs are clear to auscultation bilaterally with no wheezes rhonchi or rales.  Abdominal: Soft, nontender, nondistended, no palpable masses, no guarding, no rebound  Musculoskeletal: Full range of motion of joints, no deformities appreciated.  Neurological: Alert and oriented, grossly neurologically intact.  Psychological: Normal affect and mood.  Integument: No rashes appreciated          RESULTS       Labs Ordered and Resulted from Time of ED Arrival to Time of ED Departure - No data to display    No orders to display         PROCEDURES:  Procedures:  Procedures     Gustavo Spaulding DO  Emergency Medicine  [facility] EMERGENCY DEPARTMENT       Chelly, Gustavo Hooker DO  06/19/25 9731

## 2025-06-19 NOTE — ED NOTES
Pt brought back right away from triage. Pt on cardiac monitor and quick combo pads placed-front and back with crash cart in room. Assist of 4 staff and MD in room. Adenosine given, encourage slow deep breathing. Pt converted from SVT to NSR with 1 dose of adenosine IV. 1000 ml/ns up.

## 2025-06-21 LAB
ATRIAL RATE - MUSE: 120 BPM
DIASTOLIC BLOOD PRESSURE - MUSE: NORMAL MMHG
INTERPRETATION ECG - MUSE: NORMAL
P AXIS - MUSE: NORMAL DEGREES
PR INTERVAL - MUSE: NORMAL MS
QRS DURATION - MUSE: 70 MS
QT - MUSE: 268 MS
QTC - MUSE: 456 MS
R AXIS - MUSE: 56 DEGREES
SYSTOLIC BLOOD PRESSURE - MUSE: NORMAL MMHG
T AXIS - MUSE: -64 DEGREES
VENTRICULAR RATE- MUSE: 174 BPM

## 2025-06-23 ENCOUNTER — TRANSFERRED RECORDS (OUTPATIENT)
Dept: HEALTH INFORMATION MANAGEMENT | Facility: CLINIC | Age: 75
End: 2025-06-23

## 2025-06-23 ENCOUNTER — PATIENT OUTREACH (OUTPATIENT)
Dept: CARE COORDINATION | Facility: CLINIC | Age: 75
End: 2025-06-23
Payer: COMMERCIAL

## 2025-06-23 ENCOUNTER — RESULTS FOLLOW-UP (OUTPATIENT)
Dept: FAMILY MEDICINE | Facility: CLINIC | Age: 75
End: 2025-06-23
Payer: COMMERCIAL

## 2025-06-23 LAB — RETINOPATHY: NEGATIVE

## 2025-06-23 NOTE — PROGRESS NOTES
Clinic Care Coordination Contact  Community Health Worker Initial Outreach    CHW Initial Information Gathering:  Referral Source: ED Follow-Up  Current living arrangement:: Not Assessed  No PCP office visit in Past Year: No  CHW Additional Questions  If ED/Hospital discharge, follow-up appointment scheduled as recommended?: Yes  Medication changes made following ED/Hospital discharge?: No  MyChart active?: Yes  Patient sent Social Drivers of Health questionnaire?: No    Patient accepts CC: No, Patient expressed no additional support is needed at this time. Patient will be sent Care Coordination introduction letter for future reference.     Mansi Montero  Community Health Worker    Connected Care Resources   [facility]     *Connected Care Resources Team does NOT   follow patient ongoing. Referrals are identified   based on internal discharge report and the outreach is to ensure   Patient has understanding of their discharge instructions.

## 2025-06-23 NOTE — LETTER
Zara Webster  [address] [place]  [place] [address]    Dear Zara Webster,      I am a team member within the [facility] Care Resource Center with [facility]. I recently tried to reach you to ensure you were doing well following a recent visit within our health system. I also wanted to take this chance to introduce Clinic Care Coordination.     Below is a description of Clinic Care Coordination and how this team can further assist you:       The Clinic Care Coordination team is made up of a Registered Nurse, , Financial Resource Worker, and a Community Health Worker who understand and can help navigate the health care system. The goal of clinic care coordination is to help you manage your health, improve access to care, and achieve optimal health outcomes. They work alongside your provider to assist you in determining your health and social needs, obtain health care and community resources, and provide you with necessary information and education. Clinic Care Coordination can work with you through any barriers and develop a care plan that helps coordinate and strengthen the relationship between you and your care team.    If you wish to connect with the Clinic Care Coordination Team, please let your [facility] Primary Care Provider or Clinic Care Team know and they can place a referral. The Clinic Care Coordination team will then reach out by phone to further support you.    We are focused on providing you with the highest-quality healthcare experience possible.    Sincerely,   Your care team with [facility]'s [address] [place] [address] ([phone]).

## 2025-06-25 ENCOUNTER — ALLIED HEALTH/NURSE VISIT (OUTPATIENT)
Dept: EDUCATION SERVICES | Facility: CLINIC | Age: 75
End: 2025-06-25
Payer: COMMERCIAL

## 2025-06-25 ENCOUNTER — TELEPHONE (OUTPATIENT)
Dept: CARDIOLOGY | Facility: CLINIC | Age: 75
End: 2025-06-25

## 2025-06-25 ENCOUNTER — OFFICE VISIT (OUTPATIENT)
Dept: CARDIOLOGY | Facility: CLINIC | Age: 75
End: 2025-06-25
Payer: COMMERCIAL

## 2025-06-25 VITALS
HEART RATE: 71 BPM | OXYGEN SATURATION: 100 % | BODY MASS INDEX: 25.51 KG/M2 | SYSTOLIC BLOOD PRESSURE: 106 MMHG | DIASTOLIC BLOOD PRESSURE: 63 MMHG | WEIGHT: 153.31 LBS

## 2025-06-25 DIAGNOSIS — E11.41 TYPE 2 DIABETES MELLITUS WITH DIABETIC MONONEUROPATHY, WITH LONG-TERM CURRENT USE OF INSULIN (H): ICD-10-CM

## 2025-06-25 DIAGNOSIS — Z79.4 TYPE 2 DIABETES MELLITUS WITH DIABETIC MONONEUROPATHY, WITH LONG-TERM CURRENT USE OF INSULIN (H): ICD-10-CM

## 2025-06-25 DIAGNOSIS — I47.10 SVT (SUPRAVENTRICULAR TACHYCARDIA): Primary | ICD-10-CM

## 2025-06-25 DIAGNOSIS — E11.41 TYPE 2 DIABETES MELLITUS WITH DIABETIC MONONEUROPATHY, WITH LONG-TERM CURRENT USE OF INSULIN (H): Primary | ICD-10-CM

## 2025-06-25 DIAGNOSIS — E78.2 MIXED HYPERLIPIDEMIA: ICD-10-CM

## 2025-06-25 DIAGNOSIS — Z79.4 TYPE 2 DIABETES MELLITUS WITH DIABETIC MONONEUROPATHY, WITH LONG-TERM CURRENT USE OF INSULIN (H): Primary | ICD-10-CM

## 2025-06-25 DIAGNOSIS — I25.10 CORONARY ARTERY DISEASE INVOLVING NATIVE CORONARY ARTERY OF NATIVE HEART WITHOUT ANGINA PECTORIS: ICD-10-CM

## 2025-06-25 PROCEDURE — G0108 DIAB MANAGE TRN  PER INDIV: HCPCS | Performed by: DIETITIAN, REGISTERED

## 2025-06-25 NOTE — LETTER
6/25/2025    Obdulia Chamorro MD  [address]  [facility] [address]    RE: Zara Webster       Dear Colleague,     I had the pleasure of seeing Zara Webster in the [facility].      Thank you, Dr. Obdulia Chamorro, for asking the [facility] team to see Ms. Zara Webster to follow-up on SVT, coronary artery disease.    Assessment/Recommendations   Assessment:    1.  SVT, status post ablation in May 2025.  Patient has had 1 recurrence of SVT last week requiring adenosine administration in the ED.  No recurrence since.  Was told by EP that this could happen for the 3 months following ablation until things heal.  Would continue to monitor.  No change in beta-blocker dose given low heart rates.  2.  Coronary artery disease with 50% mid RCA stenosis documented on CT coronary angiography.  No symptoms of exertional chest discomfort.  3.  Mixed hyperlipidemia, presently untreated as there has been hesitancy to use statins given her liver disease.  She could be considered for PCSK9 inhibitor such as Repatha.  No lipid profile has been done in the last 2 years.  Suggested when she follows up in our clinic in early August that we repeat her lipid profile at that time.  4.  Hepatocellular carcinoma with cirrhosis.  Patient is status post radioembolization procedure in February 2025 but needs ERCP due to bile duct stricture.  See no cardiac contraindication to her procedure  5.  Status post pulmonary embolus on Eliquis anticoagulation.    Plan:  1.  Continue current medications although patient needs to hold Eliquis for 3 days prior to her procedure.  2.  Okay to proceed with ERCP as planned.  3.  Follow-up with EP the beginning of August.  Will check a lipid profile at that time.       History of Present Illness    Ms. Zara Webster is a 74 year old female with history of moderate single-vessel coronary artery disease by CT coronary angiography, mixed hyperlipidemia, PSVT status post SVT  ablation June 2025, hepatocellular carcinoma status post radioembolization procedure in February 2025 but with subsequent bile duct stricture necessitating an ERCP who presents to the office today for cardiovascular clearance.  Underwent ablation of her SVT several weeks ago but had a recurrent episode of SVT last week necessitating that she go to the ED.  She was given adenosine with prompt conversion to sinus rhythm.  Has not had recurrence over the past week.  Denies any symptoms of exertional chest discomfort or dyspnea.    ECG (personally reviewed): No ECG today    Cardiac Imaging Studies (personally reviewed): No new cardiac imaging     Physical Examination Review of Systems   LMP  (LMP Unknown)   There is no height or weight on file to calculate BMI.  Wt Readings from Last 3 Encounters:   06/19/25 67.1 kg (148 lb)   06/16/25 71.1 kg (156 lb 11.2 oz)   06/09/25 72.1 kg (159 lb)     General Appearance:   Awake, Alert, No acute distress.   HEENT:  No scleral icterus; the mucous membranes were pink and moist.   Neck: No cervical bruits or jugular venous distention    Chest: The spine was straight. The chest was symmetric.   Lungs:   Respirations unlabored; the lungs are clear to auscultation. No wheezing   Cardiovascular:   Regular rate and rhythm.  S1, S2 normal.  No murmur or gallop   Abdomen:  No organomegaly, masses, bruits, or tenderness. Bowels sounds are present   Extremities: No peripheral edema   Skin: No xanthelasma. Warm, Dry.   Musculoskeletal: No tenderness.   Neurologic: Mood and affect are appropriate.                                              Medical History  Surgical History Family History Social History   Past Medical History:   Diagnosis Date     Anemia      Atypical chest pain      Brain tumor, glioma (H)      Common bile duct obstruction (H) 11/20/2023     Crohn's disease (H)      Diabetes mellitus (H)      Diabetes mellitus, type 2 (H)      Disorder of biliary tract      Essential tremor       Gastroesophageal reflux disease      Gastrointestinal hemorrhage      Hepatocellular carcinoma (H)      Hypercholesteremia      Ileostomy status (H)      Lesion of liver      Myocardial infarction (H)      Non-alcoholic cirrhosis (H)      Obstruction of common bile duct (H) 11/20/2023    Past Surgical History:   Procedure Laterality Date     BREAST EXCISIONAL BIOPSY Left 1980     BREAST EXCISIONAL BIOPSY Left 1985     EP ABLATION SVT N/A 6/9/2025    Procedure: Ablation Supraventricular Tachycardia;  Surgeon: Mi Head MD;  Location: [facility] LAB CV     ESOPHAGOSCOPY, GASTROSCOPY, DUODENOSCOPY (EGD), COMBINED N/A 1/26/2024    Procedure: ENDOSCOPIC ULTRASOUND;  Surgeon: Santi Pradhan MD;  Location: [facility] [place]     HYSTERECTOMY  1991     OOPHORECTOMY Bilateral 1991     OTHER SURGICAL HISTORY      KIDNEY STONE REMOVALNOT SURE WHICH SIDE, DR. CHANEY     NH PROCTOSIGMOIDOSCOPY,RIGID,DIAGNOS N/A 6/17/2020    Procedure: ILEOSTOMY REVISION, PROCTOSCOPY, ILEOSCOPY;  Surgeon: Devang Moon MD;  Location: [facility];  Service: General     ZZC REMOVAL COLON/ILEOSTOMY      Description: Total Abdominal Colectomy;  Recorded: 12/15/2011;     ZZC REMOVAL COLON/PROCTECTOMY/ILEOSTOMY      Description: Total Proctocolectomy;  Recorded: 08/10/2011;     ZZC VAG HYST, W/VAGINECTOMY      Description: Vaginal Hysterectomy With Colpectomy;  Recorded: 08/14/2014;    Family History   Problem Relation Age of Onset     Diabetes Mother      Heart Disease Mother      Hyperlipidemia Mother      Diabetes Father      Cancer Father         malignant gallbladder     No Known Problems Sister      No Known Problems Sister     Social History     Socioeconomic History     Marital status:      Spouse name: Not on file     Number of children: 2     Years of education: 16     Highest education level: Bachelor's degree (e.g., BA, AB, BS)   Occupational History     Occupation: Retired   Tobacco Use      Smoking status: Never     Passive exposure: Never     Smokeless tobacco: Never   Vaping Use     Vaping status: Never Used   Substance and Sexual Activity     Alcohol use: Not Currently     Drug use: No     Sexual activity: Not on file   Other Topics Concern     Not on file   Social History Narrative     Not on file     Social Drivers of Health     Financial Resource Strain: Low Risk  (5/9/2025)    Financial Resource Strain      Within the past 12 months, have you or your family members you live with been unable to get utilities (heat, electricity) when it was really needed?: No   Food Insecurity: Low Risk  (5/9/2025)    Food Insecurity      Within the past 12 months, did you worry that your food would run out before you got money to buy more?: No      Within the past 12 months, did the food you bought just not last and you didn t have money to get more?: No   Transportation Needs: Low Risk  (5/9/2025)    Transportation Needs      Within the past 12 months, has lack of transportation kept you from medical appointments, getting your medicines, non-medical meetings or appointments, work, or from getting things that you need?: No   Physical Activity: Not on file   Stress: Not on file   Social Connections: Unknown (11/17/2023)    Received from [facility] [employer] [facility]    Social Connections      Frequency of Communication with Friends and Family: Not on file   Interpersonal Safety: Low Risk  (6/9/2025)    Interpersonal Safety      Do you feel physically and emotionally safe where you currently live?: Yes      Within the past 12 months, have you been hit, slapped, kicked or otherwise physically hurt by someone?: No      Within the past 12 months, have you been humiliated or emotionally abused in other ways by your partner or ex-partner?: No   Housing Stability: High Risk (5/9/2025)    Housing Stability      Do you have housing? : No      Are you worried about losing your housing?: No           Medications  Allergies   Current Outpatient Medications   Medication Sig Dispense Refill     apixaban ANTICOAGULANT (ELIQUIS) 5 MG tablet Take 1 tablet (5 mg) by mouth 2 times daily. 180 tablet 3     clobetasol (TEMOVATE) 0.05 % external ointment Apply topically as needed       continuous blood glucose monitoring (FREESTYLE VIDA) sensor 1 Units daily 2 each 3     Continuous Glucose  (DEXCOM G7 ) BRAYAN Use to read blood sugars as per 's instructions. 1 each 0     Continuous Glucose Sensor (DEXCOM G7 SENSOR) MISC Change every 10 days. 9 each 1     diphenoxylate-atropine (LOMOTIL) 2.5-0.025 MG tablet Take 1 tablet by mouth 4 times daily as needed for diarrhea. 12 tablet 0     flash glucose scanning reader (FREESTYLE VIDA 14 DAY READER) Misc [FLASH GLUCOSE SCANNING READER (FREESTYLE VIDA 14 DAY READER) MISC] Use 1 Units As Directed every 14 (fourteen) days. 1 each 0     fluconazole (DIFLUCAN) 150 MG tablet Take 1 tablet by mouth by mouth now, and if no improvement in 3 days, take another. (Patient not taking: Reported on 6/16/2025) 2 tablet 0     insulin glargine (LANTUS SOLOSTAR) 100 UNIT/ML pen Take 20 units BID 30 mL 3     insulin lispro (HUMALOG KWIKPEN) 100 UNIT/ML (1 unit dial) KWIKPEN Inject 6 units subcutaneously 4 times daily for blood glucose management 30 mL 4     insulin pen needle (32G X 4 MM) 32G X 4 MM miscellaneous Use 8 pen needles daily or as directed. 800 each 0     metoprolol succinate ER (TOPROL XL) 50 MG 24 hr tablet Take 1 tablet (50 mg) by mouth 2 times daily. 60 tablet 3     midodrine (PROAMATINE) 2.5 MG tablet Take 1 tablet (2.5 mg) by mouth 3 times daily (with meals). 270 tablet 0     Multiple Vitamins-Minerals (HAIR SKIN & NAILS ADVANCED) TABS Take 1 tablet by mouth daily.       omeprazole (PRILOSEC) 40 MG DR capsule Take 40 mg by mouth nightly as needed.       ostomy adhesive Pste [OSTOMY ADHESIVE PSTE] Apply as needed 4 Tube 3      Allergies   Allergen  Reactions     Prochlorperazine Edisylate [Prochlorperazine] Other (See Comments)     Saw things     Compazine [Prochlorperazine] Hallucination         Lab Results    Chemistry/lipid CBC Cardiac Enzymes/BNP/TSH/INR   Recent Labs   Lab Test 06/20/25  0822 01/28/25  1148 10/07/24  0726 04/26/23  0910 03/20/23  0829   TRIG  --   --   --   --  238*   LDL  --   --  137*  --  137*   BUN 9.1   < > 9.3   < >  --       < > 137   < >  --    CO2 23   < > 25   < >  --     < > = values in this interval not displayed.    Recent Labs   Lab Test 06/20/25  0822   WBC 6.1   HGB 8.7*   HCT 27.6*   *   *    Recent Labs   Lab Test 05/29/25  1439 05/22/25  1607 05/08/25  0917   TSH  --   --  1.00   INR 1.79*   < >  --     < > = values in this interval not displayed.          The longitudinal plan of care for the diagnosis(es)/condition(s) as documented were addressed during this visit. Due to the added complexity in care, I will continue to support Zara in the subsequent management and with ongoing continuity of care.                      Thank you for allowing me to participate in the care of your patient.      Sincerely,     Bernice Hays MD     [facility]  cc:   Mi Head MD  [address] [place] [id number]

## 2025-06-25 NOTE — Clinical Note
"    6/25/2025         RE: Zara Webster  [address] [place]  [place] [address]        Dear Colleague,    Thank you for referring your patient, Zara Webster, to the [facility] DIABETES EDUCATION. Please see a copy of my visit note below.    Lab Results   Component Value Date    A1C 7.0 06/20/2025    A1C 12.1 04/15/2025    A1C 10.7 10/07/2024    A1C 12.2 01/17/2024    A1C 14.1 12/05/2023     Dexcom Data Sharing with Dexcom Jesenia:    Instructions are below for setting up Dexcom share features, but you can also watch videos to assist with the set up if you would like: [email or web address].     Sharing Dexcom data with your clinic via the jesenia:    You will need to download the jesenia: Dexcom G6 or Dexcom G7 on your phone. If your phone is not compatible with the jesenia, you will need to use the reader which must be ordered separately.  Readers must be manually downloaded with a cable and data cannot automatically be shared. You must stay within 20 feet of your Dexcom for the readings to transmit to the jesenia or reader.    To share data with the clinic/providers please download the Dexcom Clarity Jesenia and log in with your Dexcom username and password.   Dexcom Clarity jesenia:      Tap Profile (bottom right icon).     Tap Manage Data Sharing.     Tap \"Continue\" to Share your CGM data with your clinic.     Enter [id number]    Tap Continue and then Confirm.     [facility] Diabetes Education should be listed as a Connected clinic and you are now sharing your data with your Diabetes Ed provider.     Sharing Dexcom data with friends or family via the jesenia:     Your family can download the \"Dexcom Follow\" jesenia so they can see your blood sugar numbers too (this only works if using the Dexcom Jesenia). Here is a link to assist with this set up: [email or web address]            Dexcom Follow Jesenia    [email or web address]        Diabetes " Self-Management Education & Support  Presents for: Individual review  Type of Service: In Person Visit    Assessment:   Dexcom G7 sensor start with reader this date.  Began set up on phone (poor service in clinic) and can use both phone &  in the future.   Sensor was inserted with no resistance or bleeding at insertion site.     Forgot ra reader for upload, but overall is doing much better with blood sugars in better control.  Discussed follow jesenia on dexcom for family.   Has a 3 month supply.      CGM-specific education provided on:    -- Dexcom sensor: insertion technique, sensor site location and rotation, sensor wear, removal for MRI or CT scans  -- Dexcom Mobile Jesenia, Clarity Jesenia, : sensors alerts and alarms, frequency of viewing glucose data, sharing data with providers, reviewing history and trend information, entering notes in jesenia  -- Use of trends and graphs for pattern management and problem solving  -- Dosing insulin based on sensor glucose results.  -- Troubleshooting sensor issues, sensor replacement information, when to do a fingerstick and how to compare results    Care Plan and Education Provided:  Monitoring - continuous glucose monitor education per above    Patient verbalized understanding of diabetes self-management education concepts discussed, opportunities for ongoing education and support, and recommendations provided today.    Plan      See Care Plan for co-developed, patient-state behavior change goals.        Subjective/Objective  Zara is an 74 year old, presenting for the following diabetes education related to: Individual review  Accompanied by: Self, Daughter  Diabetes education in the past 24mo: No  Focus of Visit: Monitoring, Reducing Risks, Taking Medication  Diabetes type: Type 2  Cultural Influences/Ethnic Background:  Not  or       Diabetes Symptoms & Complications:     Patient Problem List and Family Medical History reviewed for relevant medical  "history, current medical status, and diabetes risk factors.    Vitals:  Estimated body mass index is 25.59 kg/m  as calculated from the following:    Height as of 7/9/25: 1.651 m (5' 5\").    Weight as of 7/9/25: 69.8 kg (153 lb 12.8 oz).   Last 3 BP:   BP Readings from Last 3 Encounters:   07/09/25 (!) 78/35   06/25/25 106/63   06/19/25 110/58     History   Smoking Status     Never   Smokeless Tobacco     Never     Labs:  Lab Results   Component Value Date     10/07/2024     03/20/2023     04/27/2021     Direct Measure HDL   Date Value Ref Range Status   03/20/2023 58 >=50 mg/dL Final     GFR Estimate   Date Value Ref Range Status   06/20/2025 >90 >60 mL/min/1.73m2 Final     Comment:     eGFR calculated using 2021 CKD-EPI equation.   06/22/2021 >60 >60 mL/min/1.73m2 Final     Lab Results   Component Value Date    CR 0.67 06/20/2025     Lab Results   Component Value Date    MICROL <12.0 12/05/2023    UMALCR  12/05/2023      Comment:      Unable to calculate, urine albumin and/or urine creatinine is outside detectable limits.  Microalbuminuria is defined as an albumin:creatinine ratio of 17 to 299 for males and 25 to 299 for females. A ratio of albumin:creatinine of 300 or higher is indicative of overt proteinuria.  Due to biologic variability, positive results should be confirmed by a second, first-morning random or 24-hour timed urine specimen. If there is discrepancy, a third specimen is recommended. When 2 out of 3 results are in the microalbuminuria range, this is evidence for incipient nephropathy and warrants increased efforts at glucose control, blood pressure control, and institution of therapy with an angiotensin-converting-enzyme (ACE) inhibitor (if the patient can tolerate it).      UCRR 23.9 12/05/2023 6/25/2025   Healthy Eating   Healthy Eating Assessed Today No       6/25/2025   Being Active   Being Active Assessed Today No         6/25/2025   Monitoring   Monitoring " Assessed Today Yes     Diabetes Medication(s)       Insulin       insulin glargine (LANTUS SOLOSTAR) 100 UNIT/ML pen Take 20 units BID     insulin lispro (HUMALOG KWIKPEN) 100 UNIT/ML (1 unit dial) KWIKPEN Inject 6 units subcutaneously 4 times daily for blood glucose management            6/25/2025   Taking Medications   Taking Medication Assessed Today Yes       6/25/2025   Problem Solving   Problem Solving Assessed Today Yes         6/25/2025   Reducing Risks   Reducing Risks Assessed Today No       6/25/2025   Healthy Coping: Diabetes Distress Assessment   Healthy Coping Assessed Today Yes     Shi Hitchcock RD, LD, [facility]  Diabetes Education    Time Spent: 45 minutes  Encounter Type: Individual    Any diabetes medication dose changes were made via the [facility] Standing Orders under the patient's referring provider.

## 2025-06-25 NOTE — PATIENT INSTRUCTIONS
Continue current medications   OK to proceed with ERCP  Follow up with Marychuy in August and we will plan to check your lipids at that visit.

## 2025-06-25 NOTE — TELEPHONE ENCOUNTER
Fax OV note from Dr. Hays to Dr. Rodríguez [phone] ok to proceed with ERCP. -[facility]

## 2025-06-25 NOTE — PROGRESS NOTES
"Lab Results   Component Value Date    A1C 7.0 06/20/2025    A1C 12.1 04/15/2025    A1C 10.7 10/07/2024    A1C 12.2 01/17/2024    A1C 14.1 12/05/2023     Dexcom Data Sharing with Dexcom Jesenia:    Instructions are below for setting up Dexcom share features, but you can also watch videos to assist with the set up if you would like: [email or web address].     Sharing Dexcom data with your clinic via the jesenia:    You will need to download the jesenia: Dexcom G6 or Dexcom G7 on your phone. If your phone is not compatible with the jesenia, you will need to use the reader which must be ordered separately.  Readers must be manually downloaded with a cable and data cannot automatically be shared. You must stay within 20 feet of your Dexcom for the readings to transmit to the jesenia or reader.    To share data with the clinic/providers please download the Dexcom Clarity Jesenia and log in with your Dexcom username and password.   Dexcom Clarity jesenia:      Tap Profile (bottom right icon).     Tap Manage Data Sharing.     Tap \"Continue\" to Share your CGM data with your clinic.     Enter [email or web address]    Tap Continue and then Confirm.     [facility] - Diabetes Education should be listed as a Connected clinic and you are now sharing your data with your Diabetes Ed provider.     Sharing Dexcom data with friends or family via the jesenia:     Your family can download the \"Dexcom Follow\" jesenia so they can see your blood sugar numbers too (this only works if using the Dexcom Jesenia). Here is a link to assist with this set up: [email or web address]            Dexcom Follow Jesenia    [email or web address]      " "Medication  Diabetes type: Type 2  Cultural Influences/Ethnic Background:  Not  or       Diabetes Symptoms & Complications:     Patient Problem List and Family Medical History reviewed for relevant medical history, current medical status, and diabetes risk factors.    Vitals:  Estimated body mass index is 25.59 kg/m  as calculated from the following:    Height as of 7/9/25: 1.651 m (5' 5\").    Weight as of 7/9/25: 69.8 kg (153 lb 12.8 oz).   Last 3 BP:   BP Readings from Last 3 Encounters:   07/09/25 (!) 78/35   06/25/25 106/63   06/19/25 110/58     History   Smoking Status    Never   Smokeless Tobacco    Never     Labs:  Lab Results   Component Value Date     10/07/2024     03/20/2023     04/27/2021     Direct Measure HDL   Date Value Ref Range Status   03/20/2023 58 >=50 mg/dL Final     GFR Estimate   Date Value Ref Range Status   06/20/2025 >90 >60 mL/min/1.73m2 Final     Comment:     eGFR calculated using 2021 CKD-EPI equation.   06/22/2021 >60 >60 mL/min/1.73m2 Final     Lab Results   Component Value Date    CR 0.67 06/20/2025     Lab Results   Component Value Date    MICROL <12.0 12/05/2023    UMALCR  12/05/2023      Comment:      Unable to calculate, urine albumin and/or urine creatinine is outside detectable limits.  Microalbuminuria is defined as an albumin:creatinine ratio of 17 to 299 for males and 25 to 299 for females. A ratio of albumin:creatinine of 300 or higher is indicative of overt proteinuria.  Due to biologic variability, positive results should be confirmed by a second, first-morning random or 24-hour timed urine specimen. If there is discrepancy, a third specimen is recommended. When 2 out of 3 results are in the microalbuminuria range, this is evidence for incipient nephropathy and warrants increased efforts at glucose control, blood pressure control, and institution of therapy with an angiotensin-converting-enzyme (ACE) inhibitor (if the patient can " tolerate it).      UCRR 23.9 12/05/2023 6/25/2025   Healthy Eating   Healthy Eating Assessed Today No       6/25/2025   Being Active   Being Active Assessed Today No         6/25/2025   Monitoring   Monitoring Assessed Today Yes     Diabetes Medication(s)       Insulin       insulin glargine (LANTUS SOLOSTAR) 100 UNIT/ML pen Take 20 units BID     insulin lispro (HUMALOG KWIKPEN) 100 UNIT/ML (1 unit dial) KWIKPEN Inject 6 units subcutaneously 4 times daily for blood glucose management            6/25/2025   Taking Medications   Taking Medication Assessed Today Yes       6/25/2025   Problem Solving   Problem Solving Assessed Today Yes         6/25/2025   Reducing Risks   Reducing Risks Assessed Today No       6/25/2025   Healthy Coping: Diabetes Distress Assessment   Healthy Coping Assessed Today Yes     Shi Hitchcock RD, LD, [facility]  Diabetes Education    Time Spent: 45 minutes  Encounter Type: Individual    Any diabetes medication dose changes were made via the [facility] Standing Orders under the patient's referring provider.

## 2025-06-25 NOTE — PROGRESS NOTES
Thank you, Dr. Obdulia Chamorro, for asking the [facility] team to see Ms. Zara Webster to follow-up on SVT, coronary artery disease.    Assessment/Recommendations   Assessment:    1.  SVT, status post ablation in May 2025.  Patient has had 1 recurrence of SVT last week requiring adenosine administration in the ED.  No recurrence since.  Was told by EP that this could happen for the 3 months following ablation until things heal.  Would continue to monitor.  No change in beta-blocker dose given low heart rates.  2.  Coronary artery disease with 50% mid RCA stenosis documented on CT coronary angiography.  No symptoms of exertional chest discomfort.  3.  Mixed hyperlipidemia, presently untreated as there has been hesitancy to use statins given her liver disease.  She could be considered for PCSK9 inhibitor such as Repatha.  No lipid profile has been done in the last 2 years.  Suggested when she follows up in our clinic in early August that we repeat her lipid profile at that time.  4.  Hepatocellular carcinoma with cirrhosis.  Patient is status post radioembolization procedure in February 2025 but needs ERCP due to bile duct stricture.  See no cardiac contraindication to her procedure  5.  Status post pulmonary embolus on Eliquis anticoagulation.    Plan:  1.  Continue current medications although patient needs to hold Eliquis for 3 days prior to her procedure.  2.  Okay to proceed with ERCP as planned.  3.  Follow-up with EP the beginning of August.  Will check a lipid profile at that time.       History of Present Illness    Ms. Zara Webster is a 74 year old female with history of moderate single-vessel coronary artery disease by CT coronary angiography, mixed hyperlipidemia, PSVT status post SVT ablation June 2025, hepatocellular carcinoma status post radioembolization procedure in February 2025 but with subsequent bile duct stricture necessitating an ERCP who presents to the office today for  cardiovascular clearance.  Underwent ablation of her SVT several weeks ago but had a recurrent episode of SVT last week necessitating that she go to the ED.  She was given adenosine with prompt conversion to sinus rhythm.  Has not had recurrence over the past week.  Denies any symptoms of exertional chest discomfort or dyspnea.    ECG (personally reviewed): No ECG today    Cardiac Imaging Studies (personally reviewed): No new cardiac imaging     Physical Examination Review of Systems   LMP  (LMP Unknown)   There is no height or weight on file to calculate BMI.  Wt Readings from Last 3 Encounters:   06/19/25 67.1 kg (148 lb)   06/16/25 71.1 kg (156 lb 11.2 oz)   06/09/25 72.1 kg (159 lb)     General Appearance:   Awake, Alert, No acute distress.   HEENT:  No scleral icterus; the mucous membranes were pink and moist.   Neck: No cervical bruits or jugular venous distention    Chest: The spine was straight. The chest was symmetric.   Lungs:   Respirations unlabored; the lungs are clear to auscultation. No wheezing   Cardiovascular:   Regular rate and rhythm.  S1, S2 normal.  No murmur or gallop   Abdomen:  No organomegaly, masses, bruits, or tenderness. Bowels sounds are present   Extremities: No peripheral edema   Skin: No xanthelasma. Warm, Dry.   Musculoskeletal: No tenderness.   Neurologic: Mood and affect are appropriate.                                              Medical History  Surgical History Family History Social History   Past Medical History:   Diagnosis Date    Anemia     Atypical chest pain     Brain tumor, glioma (H)     Common bile duct obstruction (H) 11/20/2023    Crohn's disease (H)     Diabetes mellitus (H)     Diabetes mellitus, type 2 (H)     Disorder of biliary tract     Essential tremor     Gastroesophageal reflux disease     Gastrointestinal hemorrhage     Hepatocellular carcinoma (H)     Hypercholesteremia     Ileostomy status (H)     Lesion of liver     Myocardial infarction (H)      Non-alcoholic cirrhosis (H)     Obstruction of common bile duct (H) 11/20/2023    Past Surgical History:   Procedure Laterality Date    BREAST EXCISIONAL BIOPSY Left 1980    BREAST EXCISIONAL BIOPSY Left 1985    EP ABLATION SVT N/A 6/9/2025    Procedure: Ablation Supraventricular Tachycardia;  Surgeon: Mi Head MD;  Location: [facility]    ESOPHAGOSCOPY, GASTROSCOPY, DUODENOSCOPY (EGD), COMBINED N/A 1/26/2024    Procedure: ENDOSCOPIC ULTRASOUND;  Surgeon: Santi Pradhan MD;  Location: [facility]    HYSTERECTOMY  1991    OOPHORECTOMY Bilateral 1991    OTHER SURGICAL HISTORY      KIDNEY STONE REMOVALNOT SURE WHICH SIDE, DR. SHIV SALINAS PROCTOSIGMOIDOSCOPY,RIGID,DIAGNOS N/A 6/17/2020    Procedure: ILEOSTOMY REVISION, PROCTOSCOPY, ILEOSCOPY;  Surgeon: Devang Moon MD;  Location: [facility];  Service: General    ZZC REMOVAL COLON/ILEOSTOMY      Description: Total Abdominal Colectomy;  Recorded: 12/15/2011;    ZZC REMOVAL COLON/PROCTECTOMY/ILEOSTOMY      Description: Total Proctocolectomy;  Recorded: 08/10/2011;    ZZC VAG HYST, W/VAGINECTOMY      Description: Vaginal Hysterectomy With Colpectomy;  Recorded: 08/14/2014;    Family History   Problem Relation Age of Onset    Diabetes Mother     Heart Disease Mother     Hyperlipidemia Mother     Diabetes Father     Cancer Father         malignant gallbladder    No Known Problems Sister     No Known Problems Sister     Social History     Socioeconomic History    Marital status:      Spouse name: Not on file    Number of children: 2    Years of education: 16    Highest education level: Bachelor's degree (e.g., BA, AB, BS)   Occupational History    Occupation: Retired   Tobacco Use    Smoking status: Never     Passive exposure: Never    Smokeless tobacco: Never   Vaping Use    Vaping status: Never Used   Substance and Sexual Activity    Alcohol use: Not Currently    Drug use: No    Sexual activity: Not on file   Other  Topics Concern    Not on file   Social History Narrative    Not on file     Social Drivers of Health     Financial Resource Strain: Low Risk  (5/9/2025)    Financial Resource Strain     Within the past 12 months, have you or your family members you live with been unable to get utilities (heat, electricity) when it was really needed?: No   Food Insecurity: Low Risk  (5/9/2025)    Food Insecurity     Within the past 12 months, did you worry that your food would run out before you got money to buy more?: No     Within the past 12 months, did the food you bought just not last and you didn t have money to get more?: No   Transportation Needs: Low Risk  (5/9/2025)    Transportation Needs     Within the past 12 months, has lack of transportation kept you from medical appointments, getting your medicines, non-medical meetings or appointments, work, or from getting things that you need?: No   Physical Activity: Not on file   Stress: Not on file   Social Connections: Unknown (11/17/2023)    Received [facility]    Social Connections     Frequency of Communication with Friends and Family: Not on file   Interpersonal Safety: Low Risk  (6/9/2025)    Interpersonal Safety     Do you feel physically and emotionally safe where you currently live?: Yes     Within the past 12 months, have you been hit, slapped, kicked or otherwise physically hurt by someone?: No     Within the past 12 months, have you been humiliated or emotionally abused in other ways by your partner or ex-partner?: No   Housing Stability: High Risk (5/9/2025)    Housing Stability     Do you have housing? : No     Are you worried about losing your housing?: No          Medications  Allergies   Current Outpatient Medications   Medication Sig Dispense Refill    apixaban ANTICOAGULANT (ELIQUIS) 5 MG tablet Take 1 tablet (5 mg) by mouth 2 times daily. 180 tablet 3    clobetasol (TEMOVATE) 0.05 % external ointment Apply topically as  needed      continuous blood glucose monitoring (FREESTYLE VIDA) sensor 1 Units daily 2 each 3    Continuous Glucose  (DEXCOM G7 ) BRAYAN Use to read blood sugars as per 's instructions. 1 each 0    Continuous Glucose Sensor (DEXCOM G7 SENSOR) MISC Change every 10 days. 9 each 1    diphenoxylate-atropine (LOMOTIL) 2.5-0.025 MG tablet Take 1 tablet by mouth 4 times daily as needed for diarrhea. 12 tablet 0    flash glucose scanning reader (FREESTYLE VIDA 14 DAY READER) Misc [FLASH GLUCOSE SCANNING READER (FREESTYLE VIDA 14 DAY READER) MISC] Use 1 Units As Directed every 14 (fourteen) days. 1 each 0    fluconazole (DIFLUCAN) 150 MG tablet Take 1 tablet by mouth by mouth now, and if no improvement in 3 days, take another. (Patient not taking: Reported on 6/16/2025) 2 tablet 0    insulin glargine (LANTUS SOLOSTAR) 100 UNIT/ML pen Take 20 units BID 30 mL 3    insulin lispro (HUMALOG KWIKPEN) 100 UNIT/ML (1 unit dial) KWIKPEN Inject 6 units subcutaneously 4 times daily for blood glucose management 30 mL 4    insulin pen needle (32G X 4 MM) 32G X 4 MM miscellaneous Use 8 pen needles daily or as directed. 800 each 0    metoprolol succinate ER (TOPROL XL) 50 MG 24 hr tablet Take 1 tablet (50 mg) by mouth 2 times daily. 60 tablet 3    midodrine (PROAMATINE) 2.5 MG tablet Take 1 tablet (2.5 mg) by mouth 3 times daily (with meals). 270 tablet 0    Multiple Vitamins-Minerals (HAIR SKIN & NAILS ADVANCED) TABS Take 1 tablet by mouth daily.      omeprazole (PRILOSEC) 40 MG DR capsule Take 40 mg by mouth nightly as needed.      ostomy adhesive Pste [OSTOMY ADHESIVE PSTE] Apply as needed 4 Tube 3      Allergies   Allergen Reactions    Prochlorperazine Edisylate [Prochlorperazine] Other (See Comments)     Saw things    Compazine [Prochlorperazine] Hallucination         Lab Results    Chemistry/lipid CBC Cardiac Enzymes/BNP/TSH/INR   Recent Labs   Lab Test 06/20/25  0822 01/28/25  1148 10/07/24  2484  04/26/23  0910 03/20/23  0829   TRIG  --   --   --   --  238*   LDL  --   --  137*  --  137*   BUN 9.1   < > 9.3   < >  --       < > 137   < >  --    CO2 23   < > 25   < >  --     < > = values in this interval not displayed.    Recent Labs   Lab Test 06/20/25  0822   WBC 6.1   HGB 8.7*   HCT 27.6*   *   *    Recent Labs   Lab Test 05/29/25  1439 05/22/25  1607 05/08/25  0917   TSH  --   --  1.00   INR 1.79*   < >  --     < > = values in this interval not displayed.          The longitudinal plan of care for the diagnosis(es)/condition(s) as documented were addressed during this visit. Due to the added complexity in care, I will continue to support Zara in the subsequent management and with ongoing continuity of care.

## 2025-07-08 ENCOUNTER — NURSE TRIAGE (OUTPATIENT)
Dept: CARDIOLOGY | Facility: CLINIC | Age: 75
End: 2025-07-08
Payer: COMMERCIAL

## 2025-07-08 DIAGNOSIS — I47.10 SVT (SUPRAVENTRICULAR TACHYCARDIA): Primary | ICD-10-CM

## 2025-07-08 RX ORDER — FLECAINIDE ACETATE 100 MG/1
100 TABLET ORAL 2 TIMES DAILY
Qty: 180 TABLET | Refills: 2 | Status: ON HOLD | OUTPATIENT
Start: 2025-07-08

## 2025-07-08 NOTE — CONFIDENTIAL NOTE
"Received call transferred to Triage for patient regarding Tachycardia.    Patient states she had an ablation done a couple weeks ago. Two days ago she went to the ED for a high heart rate around 200 bpm. She states they did the \"elephant kick\" and she was good again until last night with a high heart rate again. She got herself to settle down and heart rate went back to normal. This AM her heart rate is around 96 bpm. She is usually in the 60's. Patient questions if the ablation worked. She felt as though her heart was beating out of her chest during the night. This morning she states her heart feels like it is beating faster than usual. No current chest pain or SOB. She states she does not have much energy.     Advised this will be sent to her EP cardiac team for further follow-up. She verbalized understanding.     1. DESCRIPTION: \"Please describe your heart rate or heartbeat that you are having\" (e.g., fast/slow, regular/irregular, skipped or extra beats, \"palpitations\") Tachycardia.  2. ONSET: \"When did it start?\" (e.g., minutes, hours, days) Last night.  3. DURATION: \"How long does it last\" (e.g., seconds, minutes, hours)  4. PATTERN \"Does it come and go, or has it been constant since it started?\" \"Does it get worse with exertion?\" \"Are you feeling it now?\" Currently 96 bpm. No exertional symptoms. Not much energy.  5. TAP: \"Using your hand, can you tap out what you are feeling on a chair or table in front of you, so that I can hear?\" Note: Not all patients can do this.  6. HEART RATE: \"Can you tell me your heart rate?\" \"How many beats in 15 seconds?\" Note: Not all patients can do this. 96 bpm.  7. RECURRENT SYMPTOM: \"Have you ever had this before?\" If Yes, ask: \"When was the last time?\" and \"What happened that time?\" ED visit two days ago with high heart rate.  8. CAUSE: \"What do you think is causing the palpitations?\"   9. CARDIAC HISTORY: \"Do you have any history of heart disease?\" (e.g., heart attack, " "angina, bypass surgery, angioplasty, arrhythmia) SVT, CAD.  10. OTHER SYMPTOMS: \"Do you have any other symptoms?\" (e.g., dizziness, chest pain, sweating, difficulty breathing) Not much energy. Heart beating faster than usual.   11. PREGNANCY: \"Is there any chance you are pregnant?\" \"When was your last menstrual period?\"  "

## 2025-07-08 NOTE — TELEPHONE ENCOUNTER
1st Attempt to contact pt, voicemail message was left with contact information and instructing pt to call back.  7/8/2025 11:30 AM  Gaviota Martinez RN

## 2025-07-08 NOTE — CONFIDENTIAL NOTE
Pt returned phone call and discussed recommendations from provider. Pt verbalized understanding and in agreement. Flecainide 100mg BID sent to pharmacy.    Louann Kerns RN

## 2025-07-08 NOTE — TELEPHONE ENCOUNTER
"Patrick Head,     This pt is s/p SVT ablation on 6/9 with you  Remains on metoprolol 50mg BID.  Patient has had 1 recurrence of SVT 6/19 requiring adenosine administration in the ED.      She reports another episode last night lasting about 40 mins, she was \"able to calm herself down\" and episode resolved without intervention.    Today she reports feeling fatigued after the episode but HRS have returned to normal.  BP is labile in 90s/60s which is her baseline.    Reports she is well hydrated.    She will follow up with EP NP 8/1.    Any recommendations?  She's anxious about continuing episodes.    Thank you!  Gabriella"

## 2025-07-09 ENCOUNTER — OFFICE VISIT (OUTPATIENT)
Dept: FAMILY MEDICINE | Facility: CLINIC | Age: 75
End: 2025-07-09
Payer: COMMERCIAL

## 2025-07-09 VITALS
OXYGEN SATURATION: 100 % | WEIGHT: 153.8 LBS | RESPIRATION RATE: 16 BRPM | HEIGHT: 65 IN | BODY MASS INDEX: 25.62 KG/M2 | TEMPERATURE: 98 F | HEART RATE: 65 BPM | DIASTOLIC BLOOD PRESSURE: 35 MMHG | SYSTOLIC BLOOD PRESSURE: 78 MMHG

## 2025-07-09 DIAGNOSIS — C22.0 HCC (HEPATOCELLULAR CARCINOMA) (H): ICD-10-CM

## 2025-07-09 DIAGNOSIS — R30.0 DYSURIA: Primary | ICD-10-CM

## 2025-07-09 DIAGNOSIS — Z79.4 TYPE 2 DIABETES MELLITUS WITH HYPERGLYCEMIA, WITH LONG-TERM CURRENT USE OF INSULIN (H): ICD-10-CM

## 2025-07-09 DIAGNOSIS — I47.10 SVT (SUPRAVENTRICULAR TACHYCARDIA): ICD-10-CM

## 2025-07-09 DIAGNOSIS — E11.65 TYPE 2 DIABETES MELLITUS WITH HYPERGLYCEMIA, WITH LONG-TERM CURRENT USE OF INSULIN (H): ICD-10-CM

## 2025-07-09 DIAGNOSIS — I95.89 OTHER SPECIFIED HYPOTENSION: ICD-10-CM

## 2025-07-09 LAB
ALBUMIN UR-MCNC: 100 MG/DL
APPEARANCE UR: ABNORMAL
BACTERIA #/AREA URNS HPF: ABNORMAL /HPF
BILIRUB UR QL STRIP: ABNORMAL
COLOR UR AUTO: ABNORMAL
GLUCOSE UR STRIP-MCNC: 100 MG/DL
HGB UR QL STRIP: ABNORMAL
HYALINE CASTS #/AREA URNS LPF: ABNORMAL /LPF
KETONES UR STRIP-MCNC: 15 MG/DL
LEUKOCYTE ESTERASE UR QL STRIP: ABNORMAL
NITRATE UR QL: NEGATIVE
PH UR STRIP: 5.5 [PH] (ref 5–8)
RBC #/AREA URNS AUTO: ABNORMAL /HPF
RENAL EPI CELLS #/AREA URNS HPF: ABNORMAL /HPF
SP GR UR STRIP: 1.02 (ref 1–1.03)
SQUAMOUS #/AREA URNS AUTO: ABNORMAL /LPF
TRANS CELLS #/AREA URNS HPF: ABNORMAL /HPF
UROBILINOGEN UR STRIP-ACNC: 0.2 E.U./DL
WBC #/AREA URNS AUTO: ABNORMAL /HPF

## 2025-07-09 PROCEDURE — 81001 URINALYSIS AUTO W/SCOPE: CPT | Performed by: FAMILY MEDICINE

## 2025-07-09 RX ORDER — NITROFURANTOIN 25; 75 MG/1; MG/1
100 CAPSULE ORAL 2 TIMES DAILY
Qty: 14 CAPSULE | Refills: 0 | Status: ON HOLD | OUTPATIENT
Start: 2025-07-09 | End: 2025-07-16

## 2025-07-09 NOTE — PROGRESS NOTES
Assessment/ Plan     1. Dysuria (Primary)  Zara comes in today with fairly nonspecific symptoms that she thinks may be related to a urinary tract infection.  She just has not felt well with fatigue.  She has just very slight dysuria and frequency.  Her last urine culture was negative.  On her UA today, she does have white blood cells but it is not a very clean sample.  Will err on the side of caution and start her on antibiotics while we are waiting for the culture.  - UA Macroscopic with reflex to Microscopic and Culture - Clinic Collect  - Urine Microscopic Exam  - Urine Culture  - nitroFURantoin macrocrystal-monohydrate (MACROBID) 100 MG capsule; Take 1 capsule (100 mg) by mouth 2 times daily for 7 days.  Dispense: 14 capsule; Refill: 0    2. Other specified hypotension  She is more hypotensive than usual today.  Her baseline is usually 90/60 or so.  She has been taking metoprolol 50 mg twice daily for her SVT.  She did started flecainide.  Will have her decrease her metoprolol to 25 mg twice daily and make sure she is pushing fluids.  If she continues to feel lightheaded and dizzy and her blood pressures are running really low, may need to present to the emergency room.    3. SVT (supraventricular tachycardia)  She just had an ablation but has had 1 episodes since then.  Her cardiologist is to starting flecainide and I decreased her metoprolol due to hypotension.    4. Type 2 diabetes mellitus with hyperglycemia, with long-term current use of insulin (H)  She is having really quite high hyperglycemia in the 400-600 range.  She is only taking 10 mg of Lantus twice daily so we will have her increase that back up to 20 twice daily and monitor, she can go up on that if remains in a high level.  She is then doing a sliding scale with meals.  I would encourage her to touch base with her endocrinologist who is actually managing her diabetes which has been poorly controlled for years.    5. HCC (hepatocellular  carcinoma) (H)  She is following with a liver specialist.      Subjective:      Zara Webster is a 74 year old female who presents for possible UTI.  She is really not having specific UTI symptoms but overall just not feeling well.  She has had a lot of hospitalizations and procedures lately and she states after her last endoscopy where they tried to fix some stents, she just has not felt well.  She is also been seen for an ablation for SVT and has had 1 episode of tachycardia since then.  She states she just overall feels really fatigued.  She does have chronic anemia at this point and a lot of complex medical problems.  She has hyperglycemia more so the last week or so.  Her family is concerned because they state the last time this happened she had sepsis from a UTI.  Actually cannot find any record of a positive urine culture.  She has been feeling a little bit of lightheadedness and dizziness last day or so.  She feels like she is pushing fluids and is doing sugar-free Gatorade.  Her blood pressures are lower than typical for her.    The longitudinal plan of care for the diagnosis(es)/condition(s) as documented were addressed during this visit. Due to the added complexity in care, I will continue to support Zara in the subsequent management and with ongoing continuity of care.     Relevant past medical, family, surgical, and social history reviewed with patient, unless noted in HPI, not pertinent for this visit.  Medications were discussed and reconciled.   Review of Systems   A 12 point comprehensive review of systems was negative except as noted.      Current Outpatient Medications   Medication Sig Dispense Refill    apixaban ANTICOAGULANT (ELIQUIS) 5 MG tablet Take 1 tablet (5 mg) by mouth 2 times daily. 180 tablet 3    metoprolol succinate ER (TOPROL XL) 50 MG 24 hr tablet Take 1 tablet (50 mg) by mouth 2 times daily. 60 tablet 3    nitroFURantoin macrocrystal-monohydrate (MACROBID) 100 MG capsule Take 1  "capsule (100 mg) by mouth 2 times daily for 7 days. 14 capsule 0    clobetasol (TEMOVATE) 0.05 % external ointment Apply topically as needed      continuous blood glucose monitoring (FREESTYLE VIDA) sensor 1 Units daily 2 each 3    Continuous Glucose  (DEXCOM G7 ) BRAYAN Use to read blood sugars as per 's instructions. 1 each 0    Continuous Glucose Sensor (DEXCOM G7 SENSOR) MISC Change every 10 days. 9 each 1    diphenoxylate-atropine (LOMOTIL) 2.5-0.025 MG tablet Take 1 tablet by mouth 4 times daily as needed for diarrhea. 12 tablet 0    flash glucose scanning reader (FREESTYLE VIDA 14 DAY READER) Misc [FLASH GLUCOSE SCANNING READER (FREESTYLE VIDA 14 DAY READER) MISC] Use 1 Units As Directed every 14 (fourteen) days. 1 each 0    flecainide (TAMBOCOR) 100 MG tablet Take 1 tablet (100 mg) by mouth 2 times daily. 180 tablet 2    insulin glargine (LANTUS SOLOSTAR) 100 UNIT/ML pen Take 20 units BID 30 mL 3    insulin lispro (HUMALOG KWIKPEN) 100 UNIT/ML (1 unit dial) KWIKPEN Inject 6 units subcutaneously 4 times daily for blood glucose management 30 mL 4    insulin pen needle (32G X 4 MM) 32G X 4 MM miscellaneous Use 8 pen needles daily or as directed. 800 each 0    midodrine (PROAMATINE) 2.5 MG tablet Take 1 tablet (2.5 mg) by mouth 3 times daily (with meals). 270 tablet 0    Multiple Vitamins-Minerals (HAIR SKIN & NAILS ADVANCED) TABS Take 1 tablet by mouth daily.      omeprazole (PRILOSEC) 40 MG DR capsule Take 40 mg by mouth nightly as needed.      ostomy adhesive Pste [OSTOMY ADHESIVE PSTE] Apply as needed 4 Tube 3         Objective:     BP (!) 78/35   Pulse 65   Temp 98  F (36.7  C)   Resp 16   Ht 1.651 m (5' 5\")   Wt 69.8 kg (153 lb 12.8 oz)   LMP  (LMP Unknown)   SpO2 100%   BMI 25.59 kg/m      Body mass index is 25.59 kg/m .       General appearance: alert, appears stated age and cooperative  Lungs: clear to auscultation bilaterally  Heart: regular rate and rhythm, S1, S2 " normal, no murmur, click, rub or gallop      Recent Results (from the past week)   UA Macroscopic with reflex to Microscopic and Culture - Clinic Collect    Specimen: Urine, Clean Catch   Result Value Ref Range    Color Urine Dark Yellow (A) Colorless, Straw, Light Yellow, Yellow    Appearance Urine Cloudy (A) Clear    Glucose Urine 100 (A) Negative mg/dL    Bilirubin Urine Moderate (A) Negative    Ketones Urine 15 (A) Negative mg/dL    Specific Gravity Urine 1.025 1.005 - 1.030    Blood Urine Moderate (A) Negative    pH Urine 5.5 5.0 - 8.0    Protein Albumin Urine 100 (A) Negative mg/dL    Urobilinogen Urine 0.2 0.2, 1.0 E.U./dL    Nitrite Urine Negative Negative    Leukocyte Esterase Urine Moderate (A) Negative   Urine Microscopic Exam   Result Value Ref Range    Bacteria Urine Many (A) None Seen /HPF    RBC Urine 2-5 (A) 0-2 /HPF /HPF    WBC Urine 25-50 (A) 0-5 /HPF /HPF    Squamous Epithelials Urine Few (A) None Seen /LPF    Transitional Epithelials Urine Few (A) None Seen /HPF    Hyaline Casts Urine 2-5 (A) None Seen /LPF    Renal Tubular Epithelials Urine Few (A) None Seen /HPF          This note has been dictated using voice recognition software. Any grammatical or context distortions are unintentional and inherent to the software

## 2025-07-10 ENCOUNTER — APPOINTMENT (OUTPATIENT)
Dept: RADIOLOGY | Facility: HOSPITAL | Age: 75
End: 2025-07-10
Attending: EMERGENCY MEDICINE
Payer: COMMERCIAL

## 2025-07-10 ENCOUNTER — APPOINTMENT (OUTPATIENT)
Dept: CT IMAGING | Facility: HOSPITAL | Age: 75
End: 2025-07-10
Attending: EMERGENCY MEDICINE
Payer: COMMERCIAL

## 2025-07-10 ENCOUNTER — HOSPITAL ENCOUNTER (INPATIENT)
Facility: HOSPITAL | Age: 75
End: 2025-07-10
Attending: EMERGENCY MEDICINE
Payer: COMMERCIAL

## 2025-07-10 ENCOUNTER — RESULTS FOLLOW-UP (OUTPATIENT)
Dept: FAMILY MEDICINE | Facility: CLINIC | Age: 75
End: 2025-07-10
Payer: COMMERCIAL

## 2025-07-10 VITALS
DIASTOLIC BLOOD PRESSURE: 47 MMHG | SYSTOLIC BLOOD PRESSURE: 97 MMHG | TEMPERATURE: 98.3 F | HEIGHT: 66 IN | RESPIRATION RATE: 20 BRPM | OXYGEN SATURATION: 96 % | BODY MASS INDEX: 24.88 KG/M2 | HEART RATE: 60 BPM | WEIGHT: 154.8 LBS

## 2025-07-10 DIAGNOSIS — N39.0 URINARY TRACT INFECTION ASSOCIATED WITH CATHETERIZATION OF URINARY TRACT, UNSPECIFIED INDWELLING URINARY CATHETER TYPE, INITIAL ENCOUNTER: ICD-10-CM

## 2025-07-10 DIAGNOSIS — R73.9 ELEVATED BLOOD SUGAR: ICD-10-CM

## 2025-07-10 DIAGNOSIS — T83.511A URINARY TRACT INFECTION ASSOCIATED WITH CATHETERIZATION OF URINARY TRACT, UNSPECIFIED INDWELLING URINARY CATHETER TYPE, INITIAL ENCOUNTER: ICD-10-CM

## 2025-07-10 DIAGNOSIS — I50.31 ACUTE DIASTOLIC CONGESTIVE HEART FAILURE (H): ICD-10-CM

## 2025-07-10 DIAGNOSIS — J90 BILATERAL PLEURAL EFFUSION: ICD-10-CM

## 2025-07-10 DIAGNOSIS — E86.0 DEHYDRATION: ICD-10-CM

## 2025-07-10 DIAGNOSIS — N17.9 ACUTE KIDNEY INJURY: ICD-10-CM

## 2025-07-10 DIAGNOSIS — N39.0 URINARY TRACT INFECTION WITHOUT HEMATURIA, SITE UNSPECIFIED: Primary | ICD-10-CM

## 2025-07-10 DIAGNOSIS — I47.10 SVT (SUPRAVENTRICULAR TACHYCARDIA): ICD-10-CM

## 2025-07-10 DIAGNOSIS — Z85.05 HISTORY OF LIVER CANCER: ICD-10-CM

## 2025-07-10 LAB
ABO + RH BLD: NORMAL
ALBUMIN SERPL BCG-MCNC: 2.4 G/DL (ref 3.5–5.2)
ALBUMIN UR-MCNC: 20 MG/DL
ALP SERPL-CCNC: 400 U/L (ref 40–150)
ALT SERPL W P-5'-P-CCNC: 32 U/L (ref 0–50)
AMMONIA PLAS-SCNC: 19 UMOL/L (ref 11–51)
ANION GAP SERPL CALCULATED.3IONS-SCNC: 8 MMOL/L (ref 7–15)
APPEARANCE UR: CLEAR
AST SERPL W P-5'-P-CCNC: 50 U/L (ref 0–45)
B-OH-BUTYR SERPL-SCNC: <0.18 MMOL/L
BACTERIA #/AREA URNS HPF: ABNORMAL /HPF
BACTERIA UR CULT: ABNORMAL
BASE EXCESS BLDV CALC-SCNC: -2.4 MMOL/L (ref -3–3)
BASOPHILS # BLD AUTO: 0 10E3/UL (ref 0–0.2)
BASOPHILS NFR BLD AUTO: 1 %
BILIRUB DIRECT SERPL-MCNC: 1.48 MG/DL (ref 0–0.3)
BILIRUB SERPL-MCNC: 2.3 MG/DL
BILIRUB UR QL STRIP: NEGATIVE
BLD GP AB SCN SERPL QL: NEGATIVE
BUN SERPL-MCNC: 21.2 MG/DL (ref 8–23)
CALCIUM SERPL-MCNC: 8.3 MG/DL (ref 8.8–10.4)
CHLORIDE SERPL-SCNC: 103 MMOL/L (ref 98–107)
COLOR UR AUTO: YELLOW
CREAT SERPL-MCNC: 1.35 MG/DL (ref 0.51–0.95)
EGFRCR SERPLBLD CKD-EPI 2021: 41 ML/MIN/1.73M2
EOSINOPHIL # BLD AUTO: 0.1 10E3/UL (ref 0–0.7)
EOSINOPHIL NFR BLD AUTO: 2 %
ERYTHROCYTE [DISTWIDTH] IN BLOOD BY AUTOMATED COUNT: 14.5 % (ref 10–15)
GLUCOSE BLDC GLUCOMTR-MCNC: 384 MG/DL (ref 70–99)
GLUCOSE SERPL-MCNC: 482 MG/DL (ref 70–99)
GLUCOSE UR STRIP-MCNC: >1000 MG/DL
HCO3 BLDV-SCNC: 24 MMOL/L (ref 21–28)
HCO3 SERPL-SCNC: 21 MMOL/L (ref 22–29)
HCT VFR BLD AUTO: 28.8 % (ref 35–47)
HGB BLD-MCNC: 9.3 G/DL (ref 11.7–15.7)
HGB UR QL STRIP: ABNORMAL
HOLD SPECIMEN: NORMAL
HOLD SPECIMEN: NORMAL
HYALINE CASTS: 31 /LPF
IMM GRANULOCYTES # BLD: 0.1 10E3/UL
IMM GRANULOCYTES NFR BLD: 1 %
KETONES UR STRIP-MCNC: NEGATIVE MG/DL
LACTATE SERPL-SCNC: 1.5 MMOL/L (ref 0.7–2)
LEUKOCYTE ESTERASE UR QL STRIP: ABNORMAL
LIPASE SERPL-CCNC: 45 U/L (ref 13–60)
LYMPHOCYTES # BLD AUTO: 0.9 10E3/UL (ref 0.8–5.3)
LYMPHOCYTES NFR BLD AUTO: 14 %
MAGNESIUM SERPL-MCNC: 2 MG/DL (ref 1.7–2.3)
MCH RBC QN AUTO: 32.5 PG (ref 26.5–33)
MCHC RBC AUTO-ENTMCNC: 32.3 G/DL (ref 31.5–36.5)
MCV RBC AUTO: 101 FL (ref 78–100)
MONOCYTES # BLD AUTO: 0.6 10E3/UL (ref 0–1.3)
MONOCYTES NFR BLD AUTO: 9 %
MUCOUS THREADS #/AREA URNS LPF: PRESENT /LPF
NEUTROPHILS # BLD AUTO: 4.7 10E3/UL (ref 1.6–8.3)
NEUTROPHILS NFR BLD AUTO: 74 %
NITRATE UR QL: NEGATIVE
NRBC # BLD AUTO: 0 10E3/UL
NRBC BLD AUTO-RTO: 0 /100
NT-PROBNP SERPL-MCNC: 2607 PG/ML (ref 0–624)
O2/TOTAL GAS SETTING VFR VENT: 21 %
OXYHGB MFR BLDV: 14 % (ref 70–75)
PCO2 BLDV: 47 MM HG (ref 40–50)
PH BLDV: 7.32 [PH] (ref 7.32–7.43)
PH UR STRIP: 5.5 [PH] (ref 5–7)
PLATELET # BLD AUTO: 155 10E3/UL (ref 150–450)
PO2 BLDV: 17 MM HG (ref 25–47)
POTASSIUM SERPL-SCNC: 4.6 MMOL/L (ref 3.4–5.3)
PROT SERPL-MCNC: 7.4 G/DL (ref 6.4–8.3)
RBC # BLD AUTO: 2.86 10E6/UL (ref 3.8–5.2)
RBC URINE: 10 /HPF
SAO2 % BLDV: 14.7 % (ref 70–75)
SODIUM SERPL-SCNC: 132 MMOL/L (ref 135–145)
SP GR UR STRIP: 1.02 (ref 1–1.03)
SPECIMEN EXP DATE BLD: NORMAL
SQUAMOUS EPITHELIAL: <1 /HPF
TROPONIN T SERPL HS-MCNC: 48 NG/L
TROPONIN T SERPL HS-MCNC: 56 NG/L
UROBILINOGEN UR STRIP-MCNC: NORMAL MG/DL
WBC # BLD AUTO: 6.4 10E3/UL (ref 4–11)
WBC CLUMPS #/AREA URNS HPF: PRESENT /HPF
WBC URINE: 68 /HPF

## 2025-07-10 PROCEDURE — 99285 EMERGENCY DEPT VISIT HI MDM: CPT | Mod: 25 | Performed by: EMERGENCY MEDICINE

## 2025-07-10 PROCEDURE — 250N000012 HC RX MED GY IP 250 OP 636 PS 637: Performed by: INTERNAL MEDICINE

## 2025-07-10 PROCEDURE — 258N000003 HC RX IP 258 OP 636: Performed by: INTERNAL MEDICINE

## 2025-07-10 PROCEDURE — 86900 BLOOD TYPING SEROLOGIC ABO: CPT | Performed by: EMERGENCY MEDICINE

## 2025-07-10 PROCEDURE — 85004 AUTOMATED DIFF WBC COUNT: CPT | Performed by: EMERGENCY MEDICINE

## 2025-07-10 PROCEDURE — 258N000003 HC RX IP 258 OP 636: Performed by: EMERGENCY MEDICINE

## 2025-07-10 PROCEDURE — 81001 URINALYSIS AUTO W/SCOPE: CPT | Performed by: EMERGENCY MEDICINE

## 2025-07-10 PROCEDURE — 250N000011 HC RX IP 250 OP 636: Performed by: EMERGENCY MEDICINE

## 2025-07-10 PROCEDURE — 71046 X-RAY EXAM CHEST 2 VIEWS: CPT

## 2025-07-10 PROCEDURE — 99223 1ST HOSP IP/OBS HIGH 75: CPT | Performed by: INTERNAL MEDICINE

## 2025-07-10 PROCEDURE — 36415 COLL VENOUS BLD VENIPUNCTURE: CPT | Performed by: EMERGENCY MEDICINE

## 2025-07-10 PROCEDURE — 82805 BLOOD GASES W/O2 SATURATION: CPT | Performed by: EMERGENCY MEDICINE

## 2025-07-10 PROCEDURE — 87040 BLOOD CULTURE FOR BACTERIA: CPT | Performed by: EMERGENCY MEDICINE

## 2025-07-10 PROCEDURE — 84484 ASSAY OF TROPONIN QUANT: CPT | Performed by: EMERGENCY MEDICINE

## 2025-07-10 PROCEDURE — 82140 ASSAY OF AMMONIA: CPT | Performed by: EMERGENCY MEDICINE

## 2025-07-10 PROCEDURE — 71275 CT ANGIOGRAPHY CHEST: CPT

## 2025-07-10 PROCEDURE — 250N000011 HC RX IP 250 OP 636: Performed by: INTERNAL MEDICINE

## 2025-07-10 PROCEDURE — 82010 KETONE BODYS QUAN: CPT | Performed by: EMERGENCY MEDICINE

## 2025-07-10 PROCEDURE — 120N000001 HC R&B MED SURG/OB

## 2025-07-10 PROCEDURE — 83880 ASSAY OF NATRIURETIC PEPTIDE: CPT | Performed by: EMERGENCY MEDICINE

## 2025-07-10 PROCEDURE — 82248 BILIRUBIN DIRECT: CPT | Performed by: EMERGENCY MEDICINE

## 2025-07-10 PROCEDURE — 93005 ELECTROCARDIOGRAM TRACING: CPT | Performed by: EMERGENCY MEDICINE

## 2025-07-10 PROCEDURE — 83690 ASSAY OF LIPASE: CPT | Performed by: EMERGENCY MEDICINE

## 2025-07-10 PROCEDURE — 250N000013 HC RX MED GY IP 250 OP 250 PS 637: Performed by: INTERNAL MEDICINE

## 2025-07-10 PROCEDURE — 83735 ASSAY OF MAGNESIUM: CPT | Performed by: EMERGENCY MEDICINE

## 2025-07-10 PROCEDURE — 83605 ASSAY OF LACTIC ACID: CPT | Performed by: EMERGENCY MEDICINE

## 2025-07-10 RX ORDER — METOPROLOL SUCCINATE 25 MG/1
25 TABLET, EXTENDED RELEASE ORAL 2 TIMES DAILY
Status: DISCONTINUED | OUTPATIENT
Start: 2025-07-11 | End: 2025-07-13 | Stop reason: HOSPADM

## 2025-07-10 RX ORDER — SODIUM CHLORIDE 9 MG/ML
INJECTION, SOLUTION INTRAVENOUS CONTINUOUS
Status: DISCONTINUED | OUTPATIENT
Start: 2025-07-10 | End: 2025-07-11

## 2025-07-10 RX ORDER — OXYCODONE HYDROCHLORIDE 5 MG/1
5 TABLET ORAL EVERY 4 HOURS PRN
Status: DISCONTINUED | OUTPATIENT
Start: 2025-07-10 | End: 2025-07-13 | Stop reason: HOSPADM

## 2025-07-10 RX ORDER — ACETAMINOPHEN 650 MG/1
650 SUPPOSITORY RECTAL EVERY 4 HOURS PRN
Status: DISCONTINUED | OUTPATIENT
Start: 2025-07-10 | End: 2025-07-13 | Stop reason: HOSPADM

## 2025-07-10 RX ORDER — MIDODRINE HYDROCHLORIDE 2.5 MG/1
2.5 TABLET ORAL
Status: DISCONTINUED | OUTPATIENT
Start: 2025-07-11 | End: 2025-07-13 | Stop reason: HOSPADM

## 2025-07-10 RX ORDER — CALCIUM CARBONATE 500 MG/1
1000 TABLET, CHEWABLE ORAL 4 TIMES DAILY PRN
Status: DISCONTINUED | OUTPATIENT
Start: 2025-07-10 | End: 2025-07-13 | Stop reason: HOSPADM

## 2025-07-10 RX ORDER — DEXTROSE MONOHYDRATE 25 G/50ML
25-50 INJECTION, SOLUTION INTRAVENOUS
Status: DISCONTINUED | OUTPATIENT
Start: 2025-07-10 | End: 2025-07-13 | Stop reason: HOSPADM

## 2025-07-10 RX ORDER — ONDANSETRON 4 MG/1
4 TABLET, ORALLY DISINTEGRATING ORAL EVERY 6 HOURS PRN
Status: DISCONTINUED | OUTPATIENT
Start: 2025-07-10 | End: 2025-07-13 | Stop reason: HOSPADM

## 2025-07-10 RX ORDER — CEFTRIAXONE 2 G/1
2 INJECTION, POWDER, FOR SOLUTION INTRAMUSCULAR; INTRAVENOUS ONCE
Status: COMPLETED | OUTPATIENT
Start: 2025-07-10 | End: 2025-07-10

## 2025-07-10 RX ORDER — PANTOPRAZOLE SODIUM 40 MG/1
40 TABLET, DELAYED RELEASE ORAL 2 TIMES DAILY
Status: DISCONTINUED | OUTPATIENT
Start: 2025-07-10 | End: 2025-07-13 | Stop reason: HOSPADM

## 2025-07-10 RX ORDER — METOPROLOL SUCCINATE 50 MG/1
50 TABLET, EXTENDED RELEASE ORAL 2 TIMES DAILY
Status: DISCONTINUED | OUTPATIENT
Start: 2025-07-10 | End: 2025-07-10

## 2025-07-10 RX ORDER — LIDOCAINE 40 MG/G
CREAM TOPICAL
Status: DISCONTINUED | OUTPATIENT
Start: 2025-07-10 | End: 2025-07-13 | Stop reason: HOSPADM

## 2025-07-10 RX ORDER — NALOXONE HYDROCHLORIDE 0.4 MG/ML
0.2 INJECTION, SOLUTION INTRAMUSCULAR; INTRAVENOUS; SUBCUTANEOUS
Status: DISCONTINUED | OUTPATIENT
Start: 2025-07-10 | End: 2025-07-13 | Stop reason: HOSPADM

## 2025-07-10 RX ORDER — AMOXICILLIN 250 MG
2 CAPSULE ORAL 2 TIMES DAILY PRN
Status: DISCONTINUED | OUTPATIENT
Start: 2025-07-10 | End: 2025-07-13 | Stop reason: HOSPADM

## 2025-07-10 RX ORDER — CEFTRIAXONE 1 G/1
1 INJECTION, POWDER, FOR SOLUTION INTRAMUSCULAR; INTRAVENOUS EVERY 24 HOURS
Status: DISCONTINUED | OUTPATIENT
Start: 2025-07-11 | End: 2025-07-10

## 2025-07-10 RX ORDER — DOXYCYCLINE 100 MG/10ML
100 INJECTION, POWDER, LYOPHILIZED, FOR SOLUTION INTRAVENOUS EVERY 12 HOURS
Status: DISCONTINUED | OUTPATIENT
Start: 2025-07-10 | End: 2025-07-12

## 2025-07-10 RX ORDER — IOPAMIDOL 755 MG/ML
80 INJECTION, SOLUTION INTRAVASCULAR ONCE
Status: COMPLETED | OUTPATIENT
Start: 2025-07-10 | End: 2025-07-10

## 2025-07-10 RX ORDER — ACETAMINOPHEN 325 MG/1
650 TABLET ORAL EVERY 4 HOURS PRN
Status: DISCONTINUED | OUTPATIENT
Start: 2025-07-10 | End: 2025-07-13 | Stop reason: HOSPADM

## 2025-07-10 RX ORDER — MULTIPLE VITAMINS W/ MINERALS TAB 9MG-400MCG
1 TAB ORAL DAILY
Status: DISCONTINUED | OUTPATIENT
Start: 2025-07-11 | End: 2025-07-13 | Stop reason: HOSPADM

## 2025-07-10 RX ORDER — AMOXICILLIN 250 MG
1 CAPSULE ORAL 2 TIMES DAILY PRN
Status: DISCONTINUED | OUTPATIENT
Start: 2025-07-10 | End: 2025-07-13 | Stop reason: HOSPADM

## 2025-07-10 RX ORDER — NICOTINE POLACRILEX 4 MG
15-30 LOZENGE BUCCAL
Status: DISCONTINUED | OUTPATIENT
Start: 2025-07-10 | End: 2025-07-13 | Stop reason: HOSPADM

## 2025-07-10 RX ORDER — NALOXONE HYDROCHLORIDE 0.4 MG/ML
0.4 INJECTION, SOLUTION INTRAMUSCULAR; INTRAVENOUS; SUBCUTANEOUS
Status: DISCONTINUED | OUTPATIENT
Start: 2025-07-10 | End: 2025-07-13 | Stop reason: HOSPADM

## 2025-07-10 RX ORDER — FLECAINIDE ACETATE 100 MG/1
100 TABLET ORAL 2 TIMES DAILY
Status: DISCONTINUED | OUTPATIENT
Start: 2025-07-10 | End: 2025-07-13 | Stop reason: HOSPADM

## 2025-07-10 RX ORDER — ONDANSETRON 2 MG/ML
4 INJECTION INTRAMUSCULAR; INTRAVENOUS EVERY 6 HOURS PRN
Status: DISCONTINUED | OUTPATIENT
Start: 2025-07-10 | End: 2025-07-13 | Stop reason: HOSPADM

## 2025-07-10 RX ADMIN — SODIUM CHLORIDE 500 ML: 0.9 INJECTION, SOLUTION INTRAVENOUS at 19:40

## 2025-07-10 RX ADMIN — DOXYCYCLINE 100 MG: 100 INJECTION, POWDER, LYOPHILIZED, FOR SOLUTION INTRAVENOUS at 22:46

## 2025-07-10 RX ADMIN — IOPAMIDOL 75 ML: 755 INJECTION, SOLUTION INTRAVENOUS at 18:11

## 2025-07-10 RX ADMIN — PANTOPRAZOLE SODIUM 40 MG: 40 TABLET, DELAYED RELEASE ORAL at 22:47

## 2025-07-10 RX ADMIN — CEFTRIAXONE SODIUM 2 G: 2 INJECTION, POWDER, FOR SOLUTION INTRAMUSCULAR; INTRAVENOUS at 19:40

## 2025-07-10 RX ADMIN — SODIUM CHLORIDE: 0.9 INJECTION, SOLUTION INTRAVENOUS at 21:53

## 2025-07-10 RX ADMIN — FLECAINIDE ACETATE 100 MG: 100 TABLET ORAL at 22:00

## 2025-07-10 RX ADMIN — APIXABAN 5 MG: 5 TABLET, FILM COATED ORAL at 21:59

## 2025-07-10 RX ADMIN — INSULIN GLARGINE 20 UNITS: 100 INJECTION, SOLUTION SUBCUTANEOUS at 22:48

## 2025-07-10 RX ADMIN — METOPROLOL SUCCINATE 50 MG: 50 TABLET, EXTENDED RELEASE ORAL at 21:59

## 2025-07-10 ASSESSMENT — COLUMBIA-SUICIDE SEVERITY RATING SCALE - C-SSRS
1. IN THE PAST MONTH, HAVE YOU WISHED YOU WERE DEAD OR WISHED YOU COULD GO TO SLEEP AND NOT WAKE UP?: NO
2. HAVE YOU ACTUALLY HAD ANY THOUGHTS OF KILLING YOURSELF IN THE PAST MONTH?: NO
6. HAVE YOU EVER DONE ANYTHING, STARTED TO DO ANYTHING, OR PREPARED TO DO ANYTHING TO END YOUR LIFE?: NO

## 2025-07-10 ASSESSMENT — ACTIVITIES OF DAILY LIVING (ADL)
ADLS_ACUITY_SCORE: 39
ADLS_ACUITY_SCORE: 57
FALL_HISTORY_WITHIN_LAST_SIX_MONTHS: NO

## 2025-07-10 NOTE — TELEPHONE ENCOUNTER
Patient returning a call. Writer relayed message from PCP. Pt wanting to have blood cultures drawn at her lab appointment tomorrow. Writer unsure if note from PCP is recommending ER. Reached out to [facility] nurse team who is planning to confirm with PCP and call patient back.

## 2025-07-10 NOTE — PATIENT INSTRUCTIONS
"Dexcom Data Sharing with Dexcom Jesenia:    Instructions are below for setting up Dexcom share features, but you can also watch videos to assist with the set up if you would like: [email or web address].     Sharing Dexcom data with your clinic via the jesenia:    You will need to download the jesenia: Dexcom G6 or Dexcom G7 on your phone. If your phone is not compatible with the jesenia, you will need to use the reader which must be ordered separately.  Readers must be manually downloaded with a cable and data cannot automatically be shared. You must stay within 20 feet of your Dexcom for the readings to transmit to the jesenia or reader.    To share data with the clinic/providers please download the Dexcom Clarity Jesenia and log in with your Dexcom username and password.   Dexcom Clarity jesenia:      Tap Profile (bottom right icon).     Tap Manage Data Sharing.     Tap \"Continue\" to Share your CGM data with your clinic.     Enter [id number]    Tap Continue and then Confirm.     [facility] - Diabetes Education should be listed as a Connected clinic and you are now sharing your data with your Diabetes Ed provider.     Sharing Dexcom data with friends or family via the jesenia:     Your family can download the \"Dexcom Follow\" jesenia so they can see your blood sugar numbers too (this only works if using the Dexcom Jesenia). Here is a link to assist with this set up: [email or web address]            Dexcom Follow Jesenia      Dexcom G7    SENSOR BASICS:  Understand that your glucose sensor measures your glucose in your interstitial fluid and your blood sugar measures your glucose in your blood stream. The readings are not meant to be the same.        Your sensor glucose will lag behind your blood glucose.  \"Remember the roller coaster\".  Your blood sugar is always the front car and your sensor glucose is always the last car.  When blood sugar is moving up or down, the more difference " "there will be.          Take it easy while wearing the sensor.  Take extra care while bathing and getting dressed.  Wear loose fitting clothes on your sensor and try to avoid laying on your sensor.  You can shower/bathe with the sensor on.  Gently pat to dry.  The Dexcom G7 is waterproof up to 8 feet.     INITIAL SET UP:  Download the Dexcom G7 isabel if using your smartphone and create an account.  The isabel will walk you through set up.  If you are using the Dexcom G7 , turn it on and follow instructions for set up.  Dexcom G7 isabel:    There will be a 30 minute warm up for each new sensor and each sensor should last 10 days.  It is recommended to use the provided overtape with each sensor.  You can get more overtape free from Dexcom.  The sensor readings in the first 12 hours of every new sensor are sometimes a little \"off\" as it gets to know your body fluids.  Set glucose alarms for highs and lows per your preference or at the recommendations of your diabetes educator.  You will not be able to silence or turn off the urgent low alert at 54 mg/dL.  If an alarm goes off, go to your isabel and hit \"OK\" or it will continue to alarm every 5 minutes.  Your Dexcom isabel or  will notify you when it is time to change your sensor.  Just remove it like a band-aid and throw it in the trash, then place a new sensor but rotate your sites.   If you need help with initial set up, contact [employer] [facility]: [phone].    DAILY ROUTINE:  Keep your phone or  within 20 feet of you to keep data communicating with your device.  If you are away from your device for more than 20 minutes, your device will alarm.  Be curious with what the sensor data shows.  How do your food choices impact your glucose?  Exercise?  Complete a fingerstick glucose check at these times:                          -when you feel differently than the sensor indicates                          -when you are not wearing a sensor                          " "-when you do not see a trend arrow with your sensor glucose reading    DATA SHARING:  If you want to share your sensor data with a loved one (for phone users only), send them an invitation through the Dexcom G7 isabel.  Go to connections, share, and follow on screen instructions.  Loved ones will use the Dexcom Follow isabel.  Our clinic will link to your data as well (phone users only). To do this, go in your G7 isabel and under the connections tab at the bottom, tap [facility] clinic and then continue.  Enter our clinic code of [email or web address] and confirm the sharing with our [place] clinic, and then click done.  Patients using the Dexcom  can upload from home via desktop or laptop computer on the Dexcom Clarity website or will have the  downloaded in clinic.  See instructions for downloading your  from home here:     [email or web address]          OTHER IMPORTANT NOTES:  If the sensor is often falling off before the 10 days are up, there are products than can help.  Talk to your diabetes educator.  You will need to remove your Dexcom sensor for radiology scans: MRIs, diathermy and CT Scans.  Contact [employer] if the sensor does not last the full 10 days for any reason and they will send you a replacement.  Keep your Dexcom sensor box with lot numbers as they often want this information.  Dexcom General Customer Support and education/[phone] (M-F 8am to 7pm CST)  Dexcom Product Troubleshooting and sensor replacement inquiries: [phone] () or go to [email or web address]  Turn off automatic OS updates and do not update your phone's OS until you check your diabetes device 's website to verify that the medical apps you use are compatible with the new OS version. Turn off automatic OS updates by navigating to your system settings, usually accessible through a gear icon, and find the \"software update\" option; within " "this section, look for a toggle switch labeled \"automatic updates\" or similar, and disable it.   After updating your OS or adding a new accessory such as wireless headphones, confirm alert settings and then carefully monitor your medical device isabel to make sure you can receive and hear alerts as expected.   At least once a month, check that your smartphone alerts are configured as expected. Ensure your volume, vibration, notifications, and other relevant settings still work.       Please reach out with any questions!!       A Diabetes Education referral is good for a year and your provider can easily add another anytime.  We are here to help!  There are many areas a Diabetes Educator can help with including -  medication regimen review and adjustment, new medications, blood sugars, new technology, new meters, activity, food/diet, goal setting etc.       How to get a hold of us: Please reach out on [employer] anytime with questions.  Our direct scheduling line for appointments is: [phone].  We have virtual and in person options at many locations.  Additionally we have a non urgent M-F triage line for questions or to get a hold of a Diabetes educator : [phone]    "

## 2025-07-10 NOTE — ED TRIAGE NOTES
Patient presents with dizziness that has occurred over the past few days. Labs revealed a UTI and sent here for blood cultures due to results of UA revealed Her blood pressure in the clinic was 78/35. No fevers or chills. She is a diabetic and usually has good control, but over the past few days it has ranged from 300-650.      Triage Assessment (Adult)       Row Name 07/10/25 1456          Triage Assessment    Airway WDL WDL        Respiratory WDL    Respiratory WDL WDL        Skin Circulation/Temperature WDL    Skin Circulation/Temperature WDL WDL        Cardiac WDL    Cardiac WDL WDL        Peripheral/Neurovascular WDL    Peripheral Neurovascular WDL WDL        Cognitive/Neuro/Behavioral WDL    Cognitive/Neuro/Behavioral WDL WDL

## 2025-07-10 NOTE — TELEPHONE ENCOUNTER
Please call pt. her urine culture from yesterday grew out Staph aureus.  With this bacteria, it is an indication to do blood cultures.  I think the ER would probably be the last place given her low blood pressure and high blood sugars and her other complex medical conditions.  I attempted to call once but there was no answer.

## 2025-07-10 NOTE — TELEPHONE ENCOUNTER
Spoke with patient and relayed message of going to ED instead of waiting till tomorrow to come to [facility]. Patient  stated will go to [facility]      Jyoti Mueller RN

## 2025-07-10 NOTE — ED PROVIDER NOTES
Emergency Department Encounter     Evaluation Date & Time:   No admission date for patient encounter.    CHIEF COMPLAINT:  Dizziness and Hypotension      Triage Note:Patient presents with dizziness that has occurred over the past few days. Labs revealed a UTI and sent here for blood cultures due to results of UA revealed Her blood pressure in the clinic was 78/35. No fevers or chills. She is a diabetic and usually has good control, but over the past few days it has ranged from 300-650.      Triage Assessment (Adult)       Row Name 07/10/25 1456          Triage Assessment    Airway WDL WDL        Respiratory WDL    Respiratory WDL WDL        Skin Circulation/Temperature WDL    Skin Circulation/Temperature WDL WDL        Cardiac WDL    Cardiac WDL WDL        Peripheral/Neurovascular WDL    Peripheral Neurovascular WDL WDL        Cognitive/Neuro/Behavioral WDL    Cognitive/Neuro/Behavioral WDL WDL                         FINAL IMPRESSION:    ICD-10-CM    1. Urinary tract infection associated with catheterization of urinary tract, unspecified indwelling urinary catheter type, initial encounter  T83.511A     N39.0       2. Acute kidney injury  N17.9       3. Dehydration  E86.0       4. Elevated blood sugar  R73.9       5. History of liver cancer  Z85.05       6. Bilateral pleural effusion  J90           Impression and Plan     ED COURSE & MEDICAL DECISION MAKING:    3:03 PM I met with the patient, obtained history, performed an initial exam, and discussed options and plan for diagnostics and treatment here in the ED.   7:22 PM I spoke with Dr. Levy, who accepts the patient for admission and plan for Med/Surg.     ED Course as of 07/1950   Thu Jul 10, 2025   1630 Patient is immunsuppressed due to liver disease.  Has hx/o sepsis related to uti.  I reviewed her chart and see that admission in may was due to staph which is what grew out yesterday, but that ua was hemoconcentrated and contaminated with squamous cells.   So, although grew out staph also, may be contaminant.  Will repeat today and if looks like uti then would treat.  Also some crackles left lung base and litle right so do want to also look for ?effusion/chf/edema and given progressive sob with hx/o pe will do that with ct chest (initial cxr ordered to get sooner images as in waiting room).  Given confusion though hasn't needed lactulose in years will do ammonia and lft's as well as lipase, but on presentation she is very with it and alert and I don't appreciate signs of encephalitis on exam.  No cp in last week or other symptoms to suggest cad, only progressive dyspnea.   1910 Her kidney function is dropped by over half compared to earlier this month.  Her liver function test however appear stable from previous values earlier this year on my review of her chart.  Her hemoglobin also is stable compared to earlier this year.  Her urine test today is not contaminated and does still have white blood cells but no nitrites.  Her culture was staph which would be concerning for contaminant but interestingly her UTI that caused sepsis back in May was also pansensitive Staph aureus so I did order ceftriaxone for her to start to treat that.  Her blood sugars have been elevated but there is no evidence for DKA associated with this to cause her confusion.  Her neuro examination is also nonfocal and so can be explained by the UTI and without headache I think no neuroimaging is necessary at this time with it and otherwise unremarkable neuroexam.  Clinically she did appear a bit dehydrated with tacky dry mucous membranes and with that elevated blood sugar does make her more likely to become dehydrated.    So, with the decreased kidney function and the persistently high blood sugars making her more likely to stay dehydrated and her immunosuppressed state I recommended admission for IV antibiotics and ongoing IV fluids.  Patient is agreeable to it but declines transfer.  Here she has  not had vital sign abnormalities to suggest sepsis or septic shock and her lactic acid today is normal with her appearing well-perfused.   1913 Her lung exam had some mild crackles at the lung base that did not clear with inspiration so I was concern for possible effusion and or early infection.  She has a history of PE so did need to rule out new PE as well with the progressive shortness of breath.  CT scan shows mild effusion with elevated BNP and atelectasis associated with it to explain these physical exam findings.  There is no ischemia on her EKG and her heart rhythm is normal.  Could be CHF or this could be tied to her liver disease as well and her low protein state.  Awaiting repeat troponin.  Plan will be to admit to hospital.  No chest pain to suggest cardiac ischemia.    1914 Repeat trop is 8 points better.  The top end of delta trop cut off is 7 but in setting of uti/renal injury I am not concerned for ischemia as she has no cp.  Will discuss with hospitalist if they want her on tleemetry or not.   1943 Dr. Levy with hospitalist feels comfortable with no telemetry and agrees is not concerning for ischemia.       0 minutes of critical care time        MEDICATIONS GIVEN IN THE EMERGENCY DEPARTMENT:  Medications   cefTRIAXone (ROCEPHIN) 2 g vial to attach to  ml bag for ADULTS or NS 50 ml bag for PEDS (2 g Intravenous $New Bag 7/10/25 1940)   sodium chloride 0.9% BOLUS 500 mL (500 mLs Intravenous $New Bag 7/10/25 1940)   iopamidol (ISOVUE-370) solution 80 mL (75 mLs Intravenous $Given 7/10/25 1811)       NEW PRESCRIPTIONS STARTED AT TODAY'S ED VISIT:  New Prescriptions    No medications on file       HPI     HPI     Zara Webster is a 74 year old female with a pertinent history of NSTEMI, SVT, type 2 diabetes mellitus, and stage 3a Chronic Kidney Disease, liver cancer with long term treatment organized with [facility], as well as her family practice doctor (many years ago had a tips procedure and is no  longer on lactulose) who presents to this ED via walking for evaluation of abnormal lab results.     Patient presents to the emergency department today (7/10/2025) as a referral from [facility] for blood work due to an abnormal urinary analysis from the patient's previous clinic yesterday (07/09/2025) that revealed a urinary tract infection. Today, the patient endorses multiple days of dizziness and blood sugar ranging from 400-600.     So, she lives at home with a male friend send she has been  a few years ago.  Her daughters are here with her today.  Her daughters note that over the course of the past week they have started to notice similar symptoms that she gets when she has a UTI.  Those are in particular fatigue, mild confusion, and this time significant increase in breathlessness.  She has not noticed a new cough.  She has not had fevers but does not necessarily mount a fever and has previously had septicemia related to UTIs in the past.  Because of her immune suppression with her liver cancer they do want to be aggressive on getting her treated urgently for UTI.  Her blood pressure was low in the clinic yesterday as well.  I reviewed the clinic note as well as the lab results and her urine was rather concentrated with mucus transitional and squamous cells so is not really a reliable culture as it is obviously a bit contaminated.  However the culture was abnormal.    She generally will have some emesis at nighttime in the middle of the night which relieves pressure buildup in her abdomen and this has not changed in frequency or quality over the course of the past week.  She does have a colostomy placed many years ago for her Crohn's disease and has not noticed any change in the stool output for that.  No skin breakdown that she has noticed or new new wounds.  She does not generally carry a fluid on her lower lungs but she does have a history of SVT that has been recently  difficult to control with multiple ER visits for adenosine and or cardioversion.    Over the course of the summer she has had some edema in her legs that is primarily treated with compression stockings during the day which has helped her quite a bit.  She also did just recently start back on apixaban for a history of pulmonary embolus previously.      Per Chart Review: The patient was seen on 07/09/2025 [facility] fatigue, dysuria, and urgency. Patient received a urinary analysis and was prescribed nitroFURantoin macrocrystal-monohydrate (MACROBID) 100 MG capsule as a preventative measure. On 07/10/2025, the patient was called by [facility] suggesting that she report to the emergency department due to the results of her urinary analysis.     REVIEW OF SYSTEMS:  Review of Systems  remainder of systems are all otherwise negative.        Medical History     Past Medical History:   Diagnosis Date    Anemia     Atypical chest pain     Brain tumor, glioma (H)     Common bile duct obstruction (H) 11/20/2023    Crohn's disease (H)     Diabetes mellitus (H)     Diabetes mellitus, type 2 (H)     Disorder of biliary tract     Essential tremor     Gastroesophageal reflux disease     Gastrointestinal hemorrhage     Hepatocellular carcinoma (H)     Hypercholesteremia     Ileostomy status (H)     Lesion of liver     Myocardial infarction (H)     Non-alcoholic cirrhosis (H)     Obstruction of common bile duct (H) 11/20/2023       Past Surgical History:   Procedure Laterality Date    BREAST EXCISIONAL BIOPSY Left 1980    BREAST EXCISIONAL BIOPSY Left 1985    EP ABLATION SVT N/A 6/9/2025    Procedure: Ablation Supraventricular Tachycardia;  Surgeon: Mi Head MD;  Location: [facility]    ESOPHAGOSCOPY, GASTROSCOPY, DUODENOSCOPY (EGD), COMBINED N/A 1/26/2024    Procedure: ENDOSCOPIC ULTRASOUND;  Surgeon: Santi Pradhan MD;  Location:   [facility] Main OR    HYSTERECTOMY  1991    OOPHORECTOMY Bilateral 1991    OTHER SURGICAL HISTORY      KIDNEY STONE REMOVALNOT SURE WHICH SIDE, DR. SHIV SALINAS PROCTOSIGMOIDOSCOPY,RIGID,DIAGNOS N/A 6/17/2020    Procedure: ILEOSTOMY REVISION, PROCTOSCOPY, ILEOSCOPY;  Surgeon: Devang Moon MD;  Location: [facility];  Service: General    ZZC REMOVAL COLON/ILEOSTOMY      Description: Total Abdominal Colectomy;  Recorded: 12/15/2011;    ZZC REMOVAL COLON/PROCTECTOMY/ILEOSTOMY      Description: Total Proctocolectomy;  Recorded: 08/10/2011;    ZZC VAG HYST, W/VAGINECTOMY      Description: Vaginal Hysterectomy With Colpectomy;  Recorded: 08/14/2014;       Family History   Problem Relation Age of Onset    Diabetes Mother     Heart Disease Mother     Hyperlipidemia Mother     Diabetes Father     Cancer Father         malignant gallbladder    No Known Problems Sister     No Known Problems Sister        Social History     Tobacco Use    Smoking status: Never     Passive exposure: Never    Smokeless tobacco: Never   Vaping Use    Vaping status: Never Used   Substance Use Topics    Alcohol use: Not Currently    Drug use: No       apixaban ANTICOAGULANT (ELIQUIS) 5 MG tablet  clobetasol (TEMOVATE) 0.05 % external ointment  diphenoxylate-atropine (LOMOTIL) 2.5-0.025 MG tablet  flash glucose scanning reader ([employer] VIDA 14 DAY READER) Misc  flecainide (TAMBOCOR) 100 MG tablet  insulin glargine (LANTUS SOLOSTAR) 100 UNIT/ML pen  insulin lispro (HUMALOG KWIKPEN) 100 UNIT/ML (1 unit dial) KWIKPEN  insulin pen needle (32G X 4 MM) 32G X 4 MM miscellaneous  metoprolol succinate ER (TOPROL XL) 50 MG 24 hr tablet  midodrine (PROAMATINE) 2.5 MG tablet  Multiple Vitamins-Minerals (HAIR SKIN & NAILS ADVANCED) TABS  nitroFURantoin macrocrystal-monohydrate (MACROBID) 100 MG capsule  omeprazole (PRILOSEC) 40 MG DR capsule  ostomy adhesive Pste        Physical Exam     First Vitals:  Patient Vitals for the past 24 hrs:   BP Temp Temp  "src Pulse Resp SpO2 Height Weight   07/10/25 1856 -- -- -- 55 -- 96 % -- --   07/10/25 1731 119/55 -- -- 53 -- 98 % -- --   07/10/25 1454 99/55 97.9  F (36.6  C) Oral 55 16 99 % 1.676 m (5' 6\") 70.2 kg (154 lb 12.8 oz)       PHYSICAL EXAM:   Constitutional:   tan female slender in nad   HENT:  Normocephalic, posterior pharynx wnl, mucous membranes tacky and dark pink   Eyes:  PERRL, EOMI, Conjunctiva normal, No discharge, no scleral icterus.  Respiratory:  Breathing easily, crackles slight r lung base do not clear entirely with deep inspiration.  Breathing easily after walking from chairs to interview room without breathlessness (about 20 feet)  Cardiovascular:  mildly bradycardic regular rhythm nl s1s2 0 murmurs, rubs, or gallops.  Peripheral pulses dp, pt, and radial are wnl.  1plus bilateral  peripheral edema   GI:  Bowel sounds normal, Soft, No tenderness, No flank tenderness, nondistended.  :No CVA tenderness.   Musculoskeletal:  Moves all extremities.  No erythematous or swollen major joints,   Integument:  dark tan in color, dark pink nailbeds.  No obvoius jaundice of eyes but she is very tan.  Neurologic:  Alert & oriented x 3, Normal motor function, Normal sensory function, No focal deficits noted. Normal speech.  Psychiatric:  Affect normal, Judgment normal, Mood normal.      Results     LAB AND RADIOLOGY:  All pertinent labs reviewed and interpreted  Labs Ordered and Resulted from Time of ED Arrival to Time of ED Departure   BASIC METABOLIC PANEL - Abnormal       Result Value    Sodium 132 (*)     Potassium 4.6      Chloride 103      Carbon Dioxide (CO2) 21 (*)     Anion Gap 8      Urea Nitrogen 21.2      Creatinine 1.35 (*)     GFR Estimate 41 (*)     Calcium 8.3 (*)     Glucose 482 (*)    HEPATIC FUNCTION PANEL - Abnormal    Protein Total 7.4      Albumin 2.4 (*)     Bilirubin Total 2.3 (*)     Alkaline Phosphatase 400 (*)     AST 50 (*)     ALT 32      Bilirubin Direct 1.48 (*)    NT-PROBNP - " Abnormal    NT-proBNP 2,607 (*)    TROPONIN T, HIGH SENSITIVITY - Abnormal    Troponin T, High Sensitivity 56 (*)    ROUTINE UA WITH MICROSCOPIC - Abnormal    Color Urine Yellow      Appearance Urine Clear      Glucose Urine >1000 (*)     Bilirubin Urine Negative      Ketones Urine Negative      Specific Gravity Urine 1.023      Blood Urine 0.5 mg/dL (*)     pH Urine 5.5      Protein Albumin Urine 20 (*)     Urobilinogen Urine Normal      Nitrite Urine Negative      Leukocyte Esterase Urine 250 Angelina/uL (*)     Bacteria Urine Few (*)     WBC Clumps Urine Present (*)     Mucus Urine Present (*)     RBC Urine 10 (*)     WBC Urine 68 (*)     Squamous Epithelials Urine <1      Hyaline Casts Urine 31 (*)    BLOOD GAS VENOUS - Abnormal    pH Venous 7.32      pCO2 Venous 47      pO2 Venous 17 (*)     Bicarbonate Venous 24      Base Excess/Deficit Venous -2.4      FIO2 21      Oxyhemoglobin Venous 14 (*)     O2 Sat, Venous 14.7 (*)    CBC WITH PLATELETS AND DIFFERENTIAL - Abnormal    WBC Count 6.4      RBC Count 2.86 (*)     Hemoglobin 9.3 (*)     Hematocrit 28.8 (*)      (*)     MCH 32.5      MCHC 32.3      RDW 14.5      Platelet Count 155      % Neutrophils 74      % Lymphocytes 14      % Monocytes 9      % Eosinophils 2      % Basophils 1      % Immature Granulocytes 1      NRBCs per 100 WBC 0      Absolute Neutrophils 4.7      Absolute Lymphocytes 0.9      Absolute Monocytes 0.6      Absolute Eosinophils 0.1      Absolute Basophils 0.0      Absolute Immature Granulocytes 0.1      Absolute NRBCs 0.0     TROPONIN T, HIGH SENSITIVITY - Abnormal    Troponin T, High Sensitivity 48 (*)    LIPASE - Normal    Lipase 45     LACTIC ACID WHOLE BLOOD WITH 1X REPEAT IN 2 HR WHEN >2 - Normal    Lactic Acid, Initial 1.5     MAGNESIUM - Normal    Magnesium 2.0     AMMONIA - Normal    Ammonia 19     KETONE BETA-HYDROXYBUTYRATE QUANTITATIVE, RAPID - Normal    Ketone (Beta-Hydroxybutyrate) Quantitative <0.18     TYPE AND SCREEN,  ADULT    ABO/RH(D) AB POS      Antibody Screen Negative      SPECIMEN EXPIRATION DATE 7/13/2025 11:59:00 PM CDT     BLOOD CULTURE   BLOOD CULTURE   ABO/RH TYPE AND SCREEN        CT Chest Pulmonary Embolism w Contrast   Final Result   IMPRESSION:   1.  No pulmonary embolism. There are small left pleural effusion with left basilar atelectasis.      XR Chest 2 Views   Final Result   IMPRESSION: Small left pleural effusion with some associated slight infiltrate/atelectasis in the left lung base. Changes most typical for a shunt in the right upper abdomen. The chest is otherwise normal. Since 3/25/2024 the left pleural effusion with    associated mild infiltrate/atelectasis appears new.           See radiology dictation for full report.     ECG:    Performed at: 7/10/2025  1624    Impression: nsb with first degree av block, lad, incomplete rbbb, no acute ischemic changes.  Anterospetal infarct pattern.  Rate of 53, pr 266ms, qrs 114ms, qtc 529ms, prt axes 49 -34 24.  No previous available for comparison.      I have independently reviewed and interpreted the EKS(s) documented above    PROCEDURES:  Procedures:      [facility] Documentation     Medical Decision Making    Admit.    MIPS (CTPE, Dental pain, Caro, Sinusitis, Asthma/COPD, Head Trauma): CT Pulmonary Angiogram:Patient is moderate to high risk for PE.    SEPSIS: None.  Does not meet vital signs criteria for sepsis .    The creation of this record is based on the scribe s observations of the work being performed by Rajni Guerra and the provider s statements to them. This document has been checked and approved by MD Dunia Kuhn MD  Emergency Medicine  [facility] EMERGENCY DEPARTMENT         Dunia Richardson MD  07/10/25 1952

## 2025-07-11 ENCOUNTER — APPOINTMENT (OUTPATIENT)
Dept: CT IMAGING | Facility: HOSPITAL | Age: 75
End: 2025-07-11
Attending: INTERNAL MEDICINE
Payer: COMMERCIAL

## 2025-07-11 ENCOUNTER — APPOINTMENT (OUTPATIENT)
Dept: CARDIOLOGY | Facility: HOSPITAL | Age: 75
End: 2025-07-11
Attending: INTERNAL MEDICINE
Payer: COMMERCIAL

## 2025-07-11 PROBLEM — J90 BILATERAL PLEURAL EFFUSION: Status: ACTIVE | Noted: 2025-07-11

## 2025-07-11 LAB
ANION GAP SERPL CALCULATED.3IONS-SCNC: 5 MMOL/L (ref 7–15)
ATRIAL RATE - MUSE: 53 BPM
BLAIMP ISLT/SPM QL: NOT DETECTED
BLAKPC ISLT/SPM QL: NOT DETECTED
BLAOXA-48 ISLT/SPM QL: NOT DETECTED
BLAVIM ISLT/SPM QL: NOT DETECTED
BUN SERPL-MCNC: 19.6 MG/DL (ref 8–23)
CALCIUM SERPL-MCNC: 8.3 MG/DL (ref 8.8–10.4)
CHLORIDE SERPL-SCNC: 109 MMOL/L (ref 98–107)
CREAT SERPL-MCNC: 0.92 MG/DL (ref 0.51–0.95)
DIASTOLIC BLOOD PRESSURE - MUSE: NORMAL MMHG
EGFRCR SERPLBLD CKD-EPI 2021: 65 ML/MIN/1.73M2
ERYTHROCYTE [DISTWIDTH] IN BLOOD BY AUTOMATED COUNT: 14.6 % (ref 10–15)
GLUCOSE BLDC GLUCOMTR-MCNC: 278 MG/DL (ref 70–99)
GLUCOSE BLDC GLUCOMTR-MCNC: 294 MG/DL (ref 70–99)
GLUCOSE BLDC GLUCOMTR-MCNC: 328 MG/DL (ref 70–99)
GLUCOSE BLDC GLUCOMTR-MCNC: 353 MG/DL (ref 70–99)
GLUCOSE BLDC GLUCOMTR-MCNC: 384 MG/DL (ref 70–99)
GLUCOSE SERPL-MCNC: 334 MG/DL (ref 70–99)
HCO3 SERPL-SCNC: 20 MMOL/L (ref 22–29)
HCT VFR BLD AUTO: 27.6 % (ref 35–47)
HGB BLD-MCNC: 8.9 G/DL (ref 11.7–15.7)
INTERPRETATION ECG - MUSE: NORMAL
MCH RBC QN AUTO: 32.5 PG (ref 26.5–33)
MCHC RBC AUTO-ENTMCNC: 32.2 G/DL (ref 31.5–36.5)
MCV RBC AUTO: 101 FL (ref 78–100)
NDM TARGET DNA: NOT DETECTED
P AXIS - MUSE: 49 DEGREES
PLATELET # BLD AUTO: 134 10E3/UL (ref 150–450)
POTASSIUM SERPL-SCNC: 4.7 MMOL/L (ref 3.4–5.3)
PR INTERVAL - MUSE: 266 MS
QRS DURATION - MUSE: 114 MS
QT - MUSE: 564 MS
QTC - MUSE: 529 MS
R AXIS - MUSE: -34 DEGREES
RBC # BLD AUTO: 2.74 10E6/UL (ref 3.8–5.2)
SODIUM SERPL-SCNC: 134 MMOL/L (ref 135–145)
SYSTOLIC BLOOD PRESSURE - MUSE: NORMAL MMHG
T AXIS - MUSE: 24 DEGREES
VENTRICULAR RATE- MUSE: 53 BPM
WBC # BLD AUTO: 6.5 10E3/UL (ref 4–11)

## 2025-07-11 PROCEDURE — 93306 TTE W/DOPPLER COMPLETE: CPT | Mod: 26 | Performed by: INTERNAL MEDICINE

## 2025-07-11 PROCEDURE — G0378 HOSPITAL OBSERVATION PER HR: HCPCS

## 2025-07-11 PROCEDURE — 999N000208 ECHOCARDIOGRAM COMPLETE

## 2025-07-11 PROCEDURE — 250N000013 HC RX MED GY IP 250 OP 250 PS 637: Performed by: INTERNAL MEDICINE

## 2025-07-11 PROCEDURE — 99233 SBSQ HOSP IP/OBS HIGH 50: CPT | Performed by: INTERNAL MEDICINE

## 2025-07-11 PROCEDURE — 87798 DETECT AGENT NOS DNA AMP: CPT | Performed by: INTERNAL MEDICINE

## 2025-07-11 PROCEDURE — 255N000002 HC RX 255 OP 636: Performed by: INTERNAL MEDICINE

## 2025-07-11 PROCEDURE — 85014 HEMATOCRIT: CPT | Performed by: INTERNAL MEDICINE

## 2025-07-11 PROCEDURE — 82962 GLUCOSE BLOOD TEST: CPT

## 2025-07-11 PROCEDURE — 250N000012 HC RX MED GY IP 250 OP 636 PS 637: Performed by: INTERNAL MEDICINE

## 2025-07-11 PROCEDURE — 250N000011 HC RX IP 250 OP 636: Performed by: INTERNAL MEDICINE

## 2025-07-11 PROCEDURE — 120N000001 HC R&B MED SURG/OB

## 2025-07-11 PROCEDURE — 36415 COLL VENOUS BLD VENIPUNCTURE: CPT | Performed by: INTERNAL MEDICINE

## 2025-07-11 PROCEDURE — 80048 BASIC METABOLIC PNL TOTAL CA: CPT | Performed by: INTERNAL MEDICINE

## 2025-07-11 PROCEDURE — 70450 CT HEAD/BRAIN W/O DYE: CPT

## 2025-07-11 RX ADMIN — DOXYCYCLINE 100 MG: 100 INJECTION, POWDER, LYOPHILIZED, FOR SOLUTION INTRAVENOUS at 11:18

## 2025-07-11 RX ADMIN — CARBIDOPA AND LEVODOPA 2.5 MG: 50; 200 TABLET, EXTENDED RELEASE ORAL at 08:01

## 2025-07-11 RX ADMIN — INSULIN ASPART 3 UNITS: 100 INJECTION, SOLUTION INTRAVENOUS; SUBCUTANEOUS at 08:01

## 2025-07-11 RX ADMIN — PANTOPRAZOLE SODIUM 40 MG: 40 TABLET, DELAYED RELEASE ORAL at 08:01

## 2025-07-11 RX ADMIN — DOXYCYCLINE 100 MG: 100 INJECTION, POWDER, LYOPHILIZED, FOR SOLUTION INTRAVENOUS at 21:18

## 2025-07-11 RX ADMIN — PANTOPRAZOLE SODIUM 40 MG: 40 TABLET, DELAYED RELEASE ORAL at 20:32

## 2025-07-11 RX ADMIN — METOPROLOL SUCCINATE 25 MG: 25 TABLET, EXTENDED RELEASE ORAL at 20:32

## 2025-07-11 RX ADMIN — PERFLUTREN 2 ML: 6.52 INJECTION, SUSPENSION INTRAVENOUS at 14:42

## 2025-07-11 RX ADMIN — APIXABAN 5 MG: 5 TABLET, FILM COATED ORAL at 20:32

## 2025-07-11 RX ADMIN — METOPROLOL SUCCINATE 25 MG: 25 TABLET, EXTENDED RELEASE ORAL at 08:00

## 2025-07-11 RX ADMIN — INSULIN GLARGINE 20 UNITS: 100 INJECTION, SOLUTION SUBCUTANEOUS at 08:02

## 2025-07-11 RX ADMIN — INSULIN ASPART 4 UNITS: 100 INJECTION, SOLUTION INTRAVENOUS; SUBCUTANEOUS at 11:55

## 2025-07-11 RX ADMIN — CARBIDOPA AND LEVODOPA 2.5 MG: 50; 200 TABLET, EXTENDED RELEASE ORAL at 17:23

## 2025-07-11 RX ADMIN — FLECAINIDE ACETATE 100 MG: 100 TABLET ORAL at 20:32

## 2025-07-11 RX ADMIN — CARBIDOPA AND LEVODOPA 2.5 MG: 50; 200 TABLET, EXTENDED RELEASE ORAL at 11:55

## 2025-07-11 RX ADMIN — INSULIN GLARGINE 20 UNITS: 100 INJECTION, SOLUTION SUBCUTANEOUS at 21:19

## 2025-07-11 RX ADMIN — Medication 1 TABLET: at 08:01

## 2025-07-11 RX ADMIN — FLECAINIDE ACETATE 100 MG: 100 TABLET ORAL at 08:01

## 2025-07-11 RX ADMIN — APIXABAN 5 MG: 5 TABLET, FILM COATED ORAL at 08:01

## 2025-07-11 ASSESSMENT — ACTIVITIES OF DAILY LIVING (ADL)
DEPENDENT_IADLS:: INDEPENDENT
ADLS_ACUITY_SCORE: 39

## 2025-07-11 NOTE — PROGRESS NOTES
[facility]    Medicine Progress Note - Hospitalist Service    Date of Admission:  7/10/2025    Assessment & Plan   Zara Webster is a 74 year old female admitted on 7/10/2025. She was admitted thru the ED on 7/10 for confusion and dehydration, with a possible UTI.      Acute Toxic Metabolic Encephalopathy  - presumably due to Acute Staph UTI  - CT head negative for acute abnormalities  - Ammonia levels within normal limits on admission, will recheck in a.m.  - family states this developed ~5-6 days PTA, and prior UTIs have presented in a similar manner  - UA from 7/9 and 7/10 consistent with infection, and Ucx from 7/9 shows staph aureus with pan sensitivity, 5/4 Ucx with Staph as well, pansensitive  - lactate is normal and she is not septic   - Continue on IV doxycycline     Acute Kidney Injury-significantly improved  - presumably pre-renal, due to dehydration  - Baseline creatinine 0.6, creatinine on admission 1.35, down to 0.92 today  - IV fluids stopped due to lower extremity edema  - Avoid nephrotoxins    New? Acute HFpEF  NSTEMI due to demand ischemia, multifactorial: UTI, dehydration  - Presented with shortness of breath, small left pleural effusion on CT, lower extremity edema (has been worse in the recent past)-volume issues may also be related to cirrhosis  - EKG in the ED negative for signs of ischemia  - Troponin on admission 56 > 48  - BNP on admission 2607  - Echo 7/11: LVEF 60%, normal LV size and function, mild RV enlargement with normal systolic function, mild right atrial enlargement, compared to echo from February 2025 findings are similar  - Not on diuretics outpatient  - Cardiology consulted     Cirrhosis s/p TIPS   Hepatocellular carcinoma  - followed closely by [facility], and LFTs appears close to her baseline  - recently underwent long procedure at [facility]  - Had normal ammonia levels on admission, will monitor in a.m.  - GI consulted, hold off on lactulose or rifaximin at  this time, will arrange for outpatient appointment at the liver clinic for follow-up, have signed off    H/O SVT  - in NSR currently  - cont outpt flecianide and metoprolol (her home dose was lowered to 25 bid 7/9)  - cont eliquis      Hypotension  - appears to also be on midodrine (while on metoprolol) - continue and follow vitals  - it is noted in her primary providers note that her normal Bps are 90/60     DM  Hyperglycemia in the setting of infection  - normally well controlled, with BS per report very uncontrolled this past week  -Most recent hemoglobin A1c 6/20/2025 7.0% (down significantly from prior 4/15/2025 12.1%)  - suspect due to acute infection  - Place on glucometers and ISS  - Cont home lantus 20 bid      GERD  - controlled, cont home PPI      Macrocytic Anemia  - she is at her baseline Hgb of ~9, with admit value of 9.3 > 8.9  - Will monitor          Diet: Combination Diet Moderate Consistent Carb (60 g CHO per Meal) Diet; 2 gm NA Diet    DVT Prophylaxis: DOAC  Caro Catheter: Not present  Lines: None     Cardiac Monitoring: None  Code Status: Full Code      Clinically Significant Risk Factors Present on Admission         # Hyponatremia: Lowest Na = 132 mmol/L in last 2 days, will monitor as appropriate  # Hyperchloremia: Highest Cl = 109 mmol/L in last 2 days, will monitor as appropriate          # Hypoalbuminemia: Lowest albumin = 2.4 g/dL at 7/10/2025  4:12 PM, will monitor as appropriate  # Drug Induced Coagulation Defect: home medication list includes an anticoagulant medication         # Anemia: based on hgb <11      # DMII: A1C = 7.0 % (Ref range: 0.0 - 5.6 %) within past 6 months       # Financial/Environmental Concerns: none         Social Drivers of Health    Housing Stability: High Risk (5/9/2025)    Housing Stability     Do you have housing? : No     Are you worried about losing your housing?: No   Interpersonal Safety: High Risk (7/10/2025)    Interpersonal Safety     Do you feel  physically and emotionally safe where you currently live?: No     Within the past 12 months, have you been hit, slapped, kicked or otherwise physically hurt by someone?: No     Within the past 12 months, have you been humiliated or emotionally abused in other ways by your partner or ex-partner?: No    Received from [employer] [facility]    Social Connections          Disposition Plan     Medically Ready for Discharge: Anticipated Tomorrow             Keara Bustillo MD  Hospitalist Service  [facility]  Securely message with [employer] (more info)  Text page via [facility] Paging/Directory   ______________________________________________________________________    Interval History   Patient is alert and oriented x 4.  Does not appear to be at her baseline per son.  Had an elevated BNP and troponin on admission.  Echo ordered without any signs of wall motion abnormality and normal LVEF.  Will consult cardiology as this is likely new for her.  No history of heart failure.  Did present with shortness of breath and lower extremity edema.  CT showed effusions.  Some of the volume issues may be related to cirrhosis.  Son at bedside.    Physical Exam   Vital Signs: Temp: 99.3  F (37.4  C) Temp src: Oral BP: 113/56 Pulse: 66   Resp: 18 SpO2: 93 % O2 Device: None (Room air)    Weight: 154 lbs 12.8 oz    General Appearance: Awake, alert, in no acute distress  Respiratory: CTAB, no wheeze  Cardiovascular: Regular rate, bilateral 2+ lower extremity edema  GI: soft, nontender, non distended, normal bowel sounds  Skin: no jaundice, no rash      Medical Decision Making       50 MINUTES SPENT BY ME on the date of service doing chart review, history, exam, documentation & further activities per the note.      Data     I have personally reviewed the following data over the past 24 hrs:    6.5  \   8.9 (L)   / 134 (L)     134 (L) 109 (H) 19.6 /  294 (H)   4.7 20 (L) 0.92 \     ALT: N/A AST: N/A  AP: N/A TBILI: N/A   ALB: N/A TOT PROTEIN: N/A LIPASE: N/A     Trop: 48 (H) BNP: N/A       Imaging results reviewed over the past 24 hrs:   Recent Results (from the past 24 hours)   CT Chest Pulmonary Embolism w Contrast    Narrative    EXAM: CT CHEST PULMONARY EMBOLISM W CONTRAST  LOCATION: [facility]  DATE: 7/10/2025    INDICATION: increasing dyspnea this week  COMPARISON: None.  TECHNIQUE: CT chest pulmonary angiogram during arterial phase injection of IV contrast. Multiplanar reformats and MIP reconstructions were performed. Dose reduction techniques were used.   CONTRAST: 75 cc isovue 370    FINDINGS:  ANGIOGRAM CHEST: Pulmonary arteries are normal caliber and negative for pulmonary emboli. Normal caliber thoracic aorta. Normal caliber thoracic aorta.    LUNGS AND PLEURA: Small left pleural effusion with atelectasis in the left lung base.    MEDIASTINUM/AXILLAE: Normal.    CORONARY ARTERY CALCIFICATION: None.    UPPER ABDOMEN: Portosystemic shunt in the liver. Advanced cirrhotic configuration. Splenomegaly.    MUSCULOSKELETAL: Normal.      Impression    IMPRESSION:  1.  No pulmonary embolism. There are small left pleural effusion with left basilar atelectasis.   CT Head w/o Contrast    Narrative    EXAM: CT HEAD W/O CONTRAST  LOCATION: [facility]  DATE: 7/11/2025    INDICATION: Acute Confusion  COMPARISON: None.   TECHNIQUE: Routine CT head without IV contrast. Multiplanar reformats. Dose reduction techniques were used.    FINDINGS:  INTRACRANIAL CONTENTS: No acute intracranial hemorrhage. No CT evidence of an acute infarction. No significant mass effect. Mild presumed chronic small vessel ischemic changes. Mild generalized volume loss. No hydrocephalus. Partially empty sella.    VISUALIZED ORBITS/SINUSES/MASTOIDS: No intraorbital abnormality. No significant paranasal sinus mucosal disease. No significant middle ear or mastoid effusion.    BONES/SOFT TISSUES: No  acute abnormality.      Impression    IMPRESSION:  1.  No acute intracranial abnormality.   Echocardiogram Complete    Narrative    [id number]  [id number]  [id number]  [id number]^NASEEM^KENNEDY^     [place] [address]     Name: HUSAM MCGILL  MRN: [id number]  : 1950  Study Date: 2025 02:21 PM  Age: 74 yrs  Gender: Female  Patient Location: [facility]  Reason For Study: Heart Failure  Ordering Physician: KENNEDY GUSMAN  Performed By: LV^^^^     BSA: 1.8 m2  Height: 66 in  Weight: 154 lb  HR: 66  BP: 78/35 mmHg  ______________________________________________________________________________  Procedure  Echocardiogram with two-dimensional, color and spectral Doppler. Definity ([facility]  #[id number]) given intravenously.  ______________________________________________________________________________  Interpretation Summary     1. Normal left ventricular size and systolic performance. The visually  estimated ejection fraction is 60%.  2. No significant valvular heart disease is identified on this study.  3. Probable mild right ventricular enlargement with normal right ventricular  systolic performance (the right ventricle is suboptimally visualized on the  study).  4. There is mild right atrial enlargement.     When compared to the prior real-time echocardiogram dated 2025,  the findings are felt to be fairly similar on both examinations.  ______________________________________________________________________________  Left ventricle:  Normal left ventricular size and systolic performance. The visually estimated  ejection fraction is 60%. There is normal regional wall motion. There is  borderline concentric increase in left ventricular wall thickness.     Assessment of LV Diastolic Function: The cumulative findings suggest normal  diastolic filling [The septal e' velocity is > 7 cm/s & lateral e' velocity  is  > 10 cm/s. The average E/e' is >14. The TR velocity  cannot be determined due  to insufficient tricuspid insufficiency signal. Left atrial volume index is  less than 34 mL/mÂ ].     Right ventricle:  Probable mild right ventricular enlargement with normal right ventricular  systolic performance (the right ventricle is suboptimally visualized on the  study).     Left atrium:  The left atrium is of normal size.     Right atrium:  There is mild right atrial enlargement.     IVC:  The IVC is of normal caliber.     Aortic valve:  The aortic valve is comprised of three cusps. No significant aortic stenosis  or aortic insufficiency is detected on this study.     Mitral valve:  The mitral valve appears morphologically normal. There is trace mitral  insufficiency.     Tricuspid valve:  The tricuspid valve is grossly morphologically normal. There is trace  tricuspid insufficiency.     Pulmonic valve:  The pulmonic valve is grossly morphologically normal.     Thoracic aorta:  The aortic root and proximal ascending aorta are of normal dimension.     Pericardium:  There is no significant pericardial effusion.  ______________________________________________________________________________  ______________________________________________________________________________  MMode/2D Measurements & Calculations  Ao root diam: 3.5 cm  LA dimension: 3.3 cm  asc Aorta Diam: 3.5 cm  LA/Ao: 0.94  LVOT diam: 2.0 cm  LVOT area: 3.1 cm2  Ao root diam index Ht(cm/m): 2.1  Ao root diam index BSA (cm/m2): 2.0  Asc Ao diam index BSA (cm/m2): 2.0  Asc Ao diam index Ht(cm/m): 2.1  EF Biplane: 61.2 %  LA Volume (BP): 50.6 ml     LA Volume Index (BP): 28.3 ml/m2  LA Volume Indexed (AL/bp): 30.3 ml/m2  RV Base: 4.9 cm  TAPSE: 2.9 cm     Time Measurements  MM HR: 66.0 BPM     Doppler Measurements & Calculations  MV E max radha: 123.0 cm/sec  MV A max radha: 85.4 cm/sec  MV E/A: 1.4  MV max P.2 mmHg  MV mean P.0 mmHg  MV V2 VTI: 37.8 cm  MVA(VTI): 2.3 cm2  MV dec slope: 684.0 cm/sec2  MV dec time: 0.18  sec  Ao V2 max: 151.5 cm/sec  Ao max P.0 mmHg  Ao V2 mean: 111.0 cm/sec  Ao mean P.0 mmHg  Ao V2 VTI: 38.3 cm  PAUL(I,D): 2.3 cm2  PAUL(V,D): 2.5 cm2  LV V1 max P.9 mmHg  LV V1 max: 121.0 cm/sec  LV V1 VTI: 28.0 cm  SV(LVOT): 87.8 ml  SI(LVOT): 49.1 ml/m2  PA V2 max: 85.0 cm/sec  PA max P.9 mmHg  PA acc time: 0.06 sec  AV Kelvin Ratio (DI): 0.80  PAUL Index (cm2/m2): 1.3  E/E': 10.1  E/E' av.2  Lateral E/e': 10.1  Medial E/e': 14.3  Peak E' Kelvin: 12.2 cm/sec  RV S Kelvin: 11.4 cm/sec     ______________________________________________________________________________  Report approved by: Carlton Madden MD on 2025 03:35 PM

## 2025-07-11 NOTE — CONSULTS
[facility] consult         Name: Zara Webster    Medical Record #: [id number]    YOB: 1950    Date/Time: 7/11/2025/10:43 AM    Reason for Consultation: Keara Bustillo MD has asked me to evaluate Zara Webster regarding cirrhosis, HCC.    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  Her clinical course has been complicated by HCC in 2023 that has been treated with ablation, therasphere embolization.  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  For treatment of this, she underwent an ERCP at [facility] on 6/30/2025 which was ultimately unsuccessful in traversing the left hepatic duct stricture despite multiple wires and devices.    She is currently admitted to the hospital with confusion, dehydration in the setting of UTI.  She had a similar admission in May 2025 as well.  Was started on antibiotics last night and family reports significant improvement in mental status already.    Currently, LFTs appear to be at baseline and unchanged from June 2025.    Review of Systems (ROS): Complete ROS otherwise negative except for as above.    Past Medical History:  Past Medical History:   Diagnosis Date    Anemia     Atypical chest pain     Brain tumor, glioma (H)     Common bile duct obstruction (H) 11/20/2023    Crohn's disease (H)     Diabetes mellitus (H)     Diabetes mellitus, type 2 (H)     Disorder of biliary tract     Essential tremor     Gastroesophageal reflux disease     Gastrointestinal hemorrhage     Hepatocellular carcinoma (H)     Hypercholesteremia     Ileostomy status (H)     Lesion of liver     Myocardial infarction (H)     Non-alcoholic cirrhosis (H)     Obstruction of common bile duct (H) 11/20/2023       Medications:    Medications Prior to Admission   Medication Sig Dispense Refill Last Dose/Taking    apixaban ANTICOAGULANT (ELIQUIS) 5 MG tablet Take 1 tablet (5 mg) by mouth 2 times daily. 180 tablet 3 7/10/2025 Morning    clobetasol (TEMOVATE) 0.05 % external ointment Apply topically as needed   Taking As Needed    diphenoxylate-atropine (LOMOTIL) 2.5-0.025 MG tablet Take 1 tablet by mouth 4 times daily as needed for diarrhea. 12 tablet 0 Taking As Needed    flash glucose scanning reader (FREESTYLE VIDA 14 DAY READER) Misc [FLASH GLUCOSE SCANNING READER (FREESTYLE VIDA 14 DAY READER) MISC] Use 1 Units As Directed every 14 (fourteen) days. 1 each 0 Past Week    flecainide (TAMBOCOR) 100 MG tablet Take 1 tablet (100 mg) by mouth 2 times daily. 180 tablet 2 7/10/2025 Morning    insulin glargine (LANTUS SOLOSTAR) 100 UNIT/ML pen Take 20 units BID 30 mL 3 7/10/2025 Morning    insulin lispro (HUMALOG KWIKPEN) 100 UNIT/ML (1 unit dial) KWIKPEN Inject 6 units subcutaneously 4 times daily for blood glucose management 30 mL 4 7/10/2025 Evening    insulin pen needle (32G X 4 MM) 32G X 4 MM miscellaneous Use 8 pen needles daily or as directed. 800 each 0 Taking    metoprolol succinate ER (TOPROL XL) 50 MG 24 hr tablet Take 1 tablet (50 mg) by mouth 2 times daily. 60 tablet 3 7/10/2025 Morning    midodrine (PROAMATINE) 2.5 MG tablet Take 1 tablet (2.5 mg) by mouth 3 times daily (with meals). 270 tablet 0 Taking    Multiple Vitamins-Minerals (HAIR SKIN & NAILS ADVANCED) TABS Take 1 tablet by mouth daily.   7/10/2025 Morning    nitroFURantoin macrocrystal-monohydrate (MACROBID) 100 MG capsule Take 1 capsule (100 mg) by mouth 2 times daily for 7 days. 14 capsule 0 7/10/2025 Morning    omeprazole (PRILOSEC) 40 MG DR capsule Take 40 mg by mouth nightly as needed.   7/9/2025 Evening    ostomy adhesive Pste [OSTOMY ADHESIVE PSTE] Apply as needed 4 Tube 3 Taking       Current Facility-Administered Medications:     acetaminophen (TYLENOL) tablet  650 mg, 650 mg, Oral, Q4H PRN **OR** acetaminophen (TYLENOL) Suppository 650 mg, 650 mg, Rectal, Q4H PRN, Dave Levy MD    apixaban ANTICOAGULANT (ELIQUIS) tablet 5 mg, 5 mg, Oral, BID, Dave Levy MD, 5 mg at 07/11/25 0801    calcium carbonate (TUMS) chewable tablet 1,000 mg, 1,000 mg, Oral, 4x Daily PRN, Dave Levy MD    glucose gel 15-30 g, 15-30 g, Oral, Q15 Min PRN **OR** dextrose 50 % injection 25-50 mL, 25-50 mL, Intravenous, Q15 Min PRN **OR** glucagon injection 1 mg, 1 mg, Subcutaneous, Q15 Min PRN, Dave Levy MD    doxycycline (VIBRAMYCIN) 100 mg vial to attach to  mL bag, 100 mg, Intravenous, Q12H, Dave Levy MD, 100 mg at 07/10/25 2246    flecainide (TAMBOCOR) tablet 100 mg, 100 mg, Oral, BID, Dave Levy MD, 100 mg at 07/11/25 0801    insulin aspart (NovoLOG) injection (RAPID ACTING), 1-7 Units, Subcutaneous, TID AC, Dave Levy MD, 3 Units at 07/11/25 0801    insulin aspart (NovoLOG) injection (RAPID ACTING), 1-5 Units, Subcutaneous, At Bedtime, Dave Levy MD, 4 Units at 07/10/25 2157    insulin glargine (LANTUS PEN) injection 20 Units, 20 Units, Subcutaneous, BID, Dave Levy MD, 20 Units at 07/11/25 0802    lidocaine (LMX4) cream, , Topical, Q1H PRN, Dave Levy MD    lidocaine 1 % 0.1-1 mL, 0.1-1 mL, Other, Q1H PRN, Dave Levy MD    melatonin tablet 1 mg, 1 mg, Oral, At Bedtime PRN, Dave Levy MD    metoprolol succinate ER (TOPROL XL) 24 hr tablet 25 mg, 25 mg, Oral, BID, Dave Levy MD, 25 mg at 07/11/25 0800    midodrine (PROAMATINE) tablet 2.5 mg, 2.5 mg, Oral, TID w/meals, Dave Levy MD, 2.5 mg at 07/11/25 0801    multivitamin w/minerals (THERA-VIT-M) tablet 1 tablet, 1 tablet, Oral, Daily, Dave Levy MD, 1 tablet at 07/11/25 0801    naloxone (NARCAN) injection 0.2 mg, 0.2 mg, Intravenous, Q2 Min PRN **OR** naloxone (NARCAN) injection 0.4 mg, 0.4 mg, Intravenous, Q2 Min PRN **OR** naloxone (NARCAN) injection 0.2 mg, 0.2 mg, Intramuscular, Q2 Min PRN  "**OR** naloxone (NARCAN) injection 0.4 mg, 0.4 mg, Intramuscular, Q2 Min PRN, Dave Levy MD    ondansetron (ZOFRAN ODT) ODT tab 4 mg, 4 mg, Oral, Q6H PRN **OR** ondansetron (ZOFRAN) injection 4 mg, 4 mg, Intravenous, Q6H PRN, Dave Levy MD    oxyCODONE (ROXICODONE) tablet 5 mg, 5 mg, Oral, Q4H PRN, Dave Levy MD    oxyCODONE IR (ROXICODONE) half-tab 2.5 mg, 2.5 mg, Oral, Q4H PRN, Dave Levy MD    pantoprazole (PROTONIX) EC tablet 40 mg, 40 mg, Oral, BID, Dave Levy MD, 40 mg at 07/11/25 0801    senna-docusate (SENOKOT-S/PERICOLACE) 8.6-50 MG per tablet 1 tablet, 1 tablet, Oral, BID PRN **OR** senna-docusate (SENOKOT-S/PERICOLACE) 8.6-50 MG per tablet 2 tablet, 2 tablet, Oral, BID PRN, Dave Levy MD    sodium chloride (PF) 0.9% PF flush 3 mL, 3 mL, Intracatheter, Q8H KEITH, Dave Levy MD    sodium chloride (PF) 0.9% PF flush 3 mL, 3 mL, Intracatheter, q1 min prn, Dave Levy MD    sodium chloride 0.9 % infusion, , Intravenous, Continuous, Dave Levy MD, Last Rate: 75 mL/hr at 07/10/25 2153, New Bag at 07/10/25 2153       Allergies: Prochlorperazine edisylate [prochlorperazine] and Compazine [prochlorperazine]    Family History:  Family History   Problem Relation Age of Onset    Diabetes Mother     Heart Disease Mother     Hyperlipidemia Mother     Diabetes Father     Cancer Father         malignant gallbladder    No Known Problems Sister     No Known Problems Sister        Social History:  Social History     Tobacco Use    Smoking status: Never     Passive exposure: Never    Smokeless tobacco: Never   Vaping Use    Vaping status: Never Used   Substance Use Topics    Alcohol use: Not Currently    Drug use: No           Physical Exam: /58 (BP Location: Left arm)   Pulse 61   Temp 98  F (36.7  C) (Oral)   Resp 18   Ht 1.676 m (5' 6\")   Wt 70.2 kg (154 lb 12.8 oz)   LMP  (LMP Unknown)   SpO2 94%   BMI 24.99 kg/m      General: NAD  Eyes: Mild scleral icterus  Gastrointestinal: Soft, " NT, ND, ileostomy in place  Neurologic: Awake, alert, oriented to month/year, place and person.  Mild asterixis on exam    Labs:    CBC RESULTS:   Recent Labs   Lab Test 07/11/25  0657   WBC 6.5   RBC 2.74*   HGB 8.9*   HCT 27.6*   *   MCH 32.5   MCHC 32.2   RDW 14.6   *        CMP Results:   Recent Labs   Lab Test 07/11/25  0750 07/11/25  0657 07/10/25  2157 07/10/25  1612   NA  --  134*  --  132*   POTASSIUM  --  4.7  --  4.6   CHLORIDE  --  109*  --  103   CO2  --  20*  --  21*   ANIONGAP  --  5*  --  8   * 334*   < > 482*   BUN  --  19.6  --  21.2   CR  --  0.92  --  1.35*   BILITOTAL  --   --   --  2.3*   ALKPHOS  --   --   --  400*   ALT  --   --   --  32   AST  --   --   --  50*    < > = values in this interval not displayed.        INR Results:   Recent Labs   Lab Test 05/29/25  1439   INR 1.79*          Radiology: CT Head w/o Contrast  Result Date: 7/11/2025  EXAM: CT HEAD W/O CONTRAST LOCATION: [facility] DATE: 7/11/2025 INDICATION: Acute Confusion COMPARISON: None. TECHNIQUE: Routine CT head without IV contrast. Multiplanar reformats. Dose reduction techniques were used. FINDINGS: INTRACRANIAL CONTENTS: No acute intracranial hemorrhage. No CT evidence of an acute infarction. No significant mass effect. Mild presumed chronic small vessel ischemic changes. Mild generalized volume loss. No hydrocephalus. Partially empty sella. VISUALIZED ORBITS/SINUSES/MASTOIDS: No intraorbital abnormality. No significant paranasal sinus mucosal disease. No significant middle ear or mastoid effusion. BONES/SOFT TISSUES: No acute abnormality.     IMPRESSION: 1.  No acute intracranial abnormality.    CT Chest Pulmonary Embolism w Contrast  Result Date: 7/10/2025  EXAM: CT CHEST PULMONARY EMBOLISM W CONTRAST LOCATION: [facility] DATE: 7/10/2025 INDICATION: increasing dyspnea this week COMPARISON: None. TECHNIQUE: CT chest pulmonary angiogram during arterial  phase injection of IV contrast. Multiplanar reformats and MIP reconstructions were performed. Dose reduction techniques were used. CONTRAST: 75 cc isovue 370 FINDINGS: ANGIOGRAM CHEST: Pulmonary arteries are normal caliber and negative for pulmonary emboli. Normal caliber thoracic aorta. Normal caliber thoracic aorta. LUNGS AND PLEURA: Small left pleural effusion with atelectasis in the left lung base. MEDIASTINUM/AXILLAE: Normal. CORONARY ARTERY CALCIFICATION: None. UPPER ABDOMEN: Portosystemic shunt in the liver. Advanced cirrhotic configuration. Splenomegaly. MUSCULOSKELETAL: Normal.     IMPRESSION: 1.  No pulmonary embolism. There are small left pleural effusion with left basilar atelectasis.    XR Chest 2 Views  Result Date: 7/10/2025  EXAM: XR CHEST 2 VIEWS LOCATION: [facility] DATE: 7/10/2025 INDICATION: dyspnea progressive COMPARISON: 3/25/2024     IMPRESSION: Small left pleural effusion with some associated slight infiltrate/atelectasis in the left lung base. Changes most typical for a shunt in the right upper abdomen. The chest is otherwise normal. Since 3/25/2024 the left pleural effusion with associated mild infiltrate/atelectasis appears new.       Impression:   Acute encephalopathy secondary to UTI  MASH cirrhosis s/p TIPS for peristomal varices, s/p TIPS revision in the past, s/p embolization of peristomal varices, complicated by HCC treated with ablation and radioembolization in the past leading to left hepatic duct stricture causing cholestatic pattern of liver enzyme elevation.  Recent ERCP attempt as outpatient was unsuccessful in traversing the left hepatic duct stricture.  Her LFTs are currently at baseline.  History of Crohn's disease s/p colectomy with ileostomy    Recommendation:   -Continue treating for UTI and encephalopathy.  - Given significant improvement in mental status already with treatment of underlying UTI, will hold off on adding lactulose or rifaximin at  this time  -Nothing further from a liver standpoint as an inpatient at this time.  Her left hepatic duct stricture requires complex procedure planning as an outpatient.  Will arrange outpatient appointment in liver clinic for follow-up.    Will sign off at this time, please do not hesitate to contact us with additional questions or concerns.    Total visit time = 66 minutes                                                  ROBERT Knott  Thank you for the opportunity to participate in the care of this patient.   Please feel free to call me with any questions or concerns.  Phone number [phone].

## 2025-07-11 NOTE — PROGRESS NOTES
Dual Skin Assessment Note:    Patient admitted from ED from to P2.     Comprehensive skin inspection completed by myself and JAMAICA Gonsales RN.     Abnormal skin assessment findings: yes. Skin tear on right arm.    LDA Initiated for skin breakdown/non-blanchable redness: No    Provider notified: Yes    If yes, WOC Consult order obtained: Not applicable    Tamara Valverde RN 10:29 PM

## 2025-07-11 NOTE — CONSULTS
Care Management Initial Consult    General Information  Assessment completed with: Patient, Children, Zara and Daughter Catarina  Type of CM/SW Visit: Initial Assessment    Primary Care Provider verified and updated as needed: Yes   Readmission within the last 30 days: no previous admission in last 30 days      Reason for Consult: discharge planning  Advance Care Planning: Advance Care Planning Reviewed: no concerns identified (Patient has HCD completed, directed to bring to hospital to add to chart)          Communication Assessment  Patient's communication style: spoken language (English or Bilingual)    Hearing Difficulty or Deaf: no   Wear Glasses or Blind: yes    Cognitive  Cognitive/Neuro/Behavioral: WDL                      Living Environment:   People in home: significant other  Johnny Iglesias  Current living Arrangements: house      Able to return to prior arrangements: yes       Family/Social Support:  Care provided by: self  Provides care for: no one  Marital Status: Lives with Significant Other  Support system: Significant Other, Children   Description of Support System: Supportive         Current Resources:   Patient receiving home care services: No        Community Resources: Housekeeping/[employer]ore [employer]  Equipment currently used at home: none  Supplies currently used at home:      Employment/Financial:  Employment Status: retired        Financial Concerns: none       Does the patient's insurance plan have a 3 day qualifying hospital stay waiver?  No    Lifestyle & Psychosocial Needs:  Social Drivers of Health     Food Insecurity: Low Risk  (5/9/2025)    Food Insecurity     Within the past 12 months, did you worry that your food would run out before you got money to buy more?: No     Within the past 12 months, did the food you bought just not last and you didn t have money to get more?: No   Depression: Not at risk (1/13/2025)    PHQ-2     PHQ-2 Score: 0   Housing Stability: High Risk (5/9/2025)    Housing  Stability     Do you have housing? : No     Are you worried about losing your housing?: No   Tobacco Use: Low Risk  (7/9/2025)    Patient History     Smoking Tobacco Use: Never     Smokeless Tobacco Use: Never     Passive Exposure: Never   Financial Resource Strain: Low Risk  (5/9/2025)    Financial Resource Strain     Within the past 12 months, have you or your family members you live with been unable to get utilities (heat, electricity) when it was really needed?: No   Alcohol Use: Not on file   Transportation Needs: Low Risk  (5/9/2025)    Transportation Needs     Within the past 12 months, has lack of transportation kept you from medical appointments, getting your medicines, non-medical meetings or appointments, work, or from getting things that you need?: No   Physical Activity: Not on file   Interpersonal Safety: High Risk (7/10/2025)    Interpersonal Safety     Do you feel physically and emotionally safe where you currently live?: No     Within the past 12 months, have you been hit, slapped, kicked or otherwise physically hurt by someone?: No     Within the past 12 months, have you been humiliated or emotionally abused in other ways by your partner or ex-partner?: No   Stress: Not on file   Social Connections: Unknown (11/17/2023)    Received from [employer] [facility]    Social Connections     Frequency of Communication with Friends and Family: Not on file   Health Literacy: Not on file       Functional Status:  Prior to admission patient needed assistance:   Dependent ADLs:: Independent  Dependent IADLs:: Independent       Mental Health Status:          Chemical Dependency Status:  Chemical Dependency Status: No Current Concerns             Values/Beliefs:  Spiritual, Cultural Beliefs, Christian Practices, Values that affect care:                 Discussed  Partnership in Safe Discharge Planning  document with patient/family: No    Additional Information:  Patient admitted though ED  with confusion, dehydration and concern for UTI. In contact isolation to rule out CP-CRE. Met with patient and daughter Kristy to review role of care management, progression of care and possible need for services at discharge, including OP services, home care, or skilled nursing care. Patient alert, oriented and engaged in the conversation. Verified patient demographics.   Patient is independent at baseline with ADL's and IADL's. Lives in a single family home, 2 steps to enter, with her significant other, Johnny. Patient is not open to home care, but has used home care and TCU services in the past with a recent discharge to [facility] in May. She has community support with housekeeping services. Daughter Kristy mentioned concerns related to medication management due to frequent medication changes. Updated attending to request OT consult.  Patient is currently OBS status, MOON completed, faxed and copy given to patient.     Next Steps: RNCM to follow for medical progression, recommendations, and final discharge plan.     Gaviota Abarca RN

## 2025-07-11 NOTE — PROGRESS NOTES
CP- and Candida auris screening information    This patient was exposed to a person with known CP- or Candida auris. and is potentially at a high risk for carrying CP- and/or Candida auris. The [place] Department of Health ([facility]) and CDC recommend that we test this patient to prevent the spread of these organisms within our healthcare facility. Testing is voluntary and includes collecting an axilla and groin swab for C. auris and a rectal swab for CP-. Due to the potential transmission of these organisms in healthcare settings, Infection Prevention requires that this patient be placed in a private room on Contact Precautions until testing is complete. While the Candida auris testing is pending, bleach or an approved disinfectant (e.g. PDI Prime) should be used clean the patient s room and equipment.    Please notify Infection Prevention if the patient declines testing.  Additional information and resources can be found on the Infection Prevention [employer] Sharepoint page.     7/11/2025  Heather Macias, Infection Prevention

## 2025-07-11 NOTE — PLAN OF CARE
Stand by assist. Ambulated x1 in otero with daughter.  Potassium 4.7, no replacement needed.  IV fluids discontinued by provider, encouraging oral fluids.  Fine crackles bilateral lobes.  Independently empties colostomy bag.   Contact precautions, rule out CP/CRE. Samples sent to lab.

## 2025-07-11 NOTE — MEDICATION SCRIBE - ADMISSION MEDICATION HISTORY
Medication Scribe Admission Medication History    Admission medication history is complete. The information provided in this note is only as accurate as the sources available at the time of the update.    Information Source(s): Patient via in-person    Pertinent Information: patient has taken all meds for the morning. Patient able to confirm med list, allergies and last dosing. Patient currently has CGM sensor on!   Patient had decreased to 10-14 units of LANTUS insulin but sugars have been high lately so she anticipates needed to resumed 20 units bid. Patient had decreased MIDODRINE to bid before surgery but has been holding since surgery on 6-30     Changes made to PTA medication list:  Added: None  Deleted: Duplicate cgm sensors  Changed: None    Allergies reviewed with patient and updates made in EHR: yes    Medication History Completed By: Andrea Kay 7/10/2025 7:48 PM    PTA Med List   Medication Sig Note Last Dose/Taking    apixaban ANTICOAGULANT (ELIQUIS) 5 MG tablet Take 1 tablet (5 mg) by mouth 2 times daily.  7/10/2025 Morning    clobetasol (TEMOVATE) 0.05 % external ointment Apply topically as needed  Taking As Needed    diphenoxylate-atropine (LOMOTIL) 2.5-0.025 MG tablet Take 1 tablet by mouth 4 times daily as needed for diarrhea.  Taking As Needed    flash glucose scanning reader (FREESTYLE VIDA 14 DAY READER) Misc [FLASH GLUCOSE SCANNING READER (FREESTYLE VIDA 14 DAY READER) MISC] Use 1 Units As Directed every 14 (fourteen) days.  Past Week    flecainide (TAMBOCOR) 100 MG tablet Take 1 tablet (100 mg) by mouth 2 times daily.  7/10/2025 Morning    insulin glargine (LANTUS SOLOSTAR) 100 UNIT/ML pen Take 20 units BID 7/10/2025: Patient has been taking 10-14 units but anticipates needing to increase again 7/10/2025 Morning    insulin lispro (HUMALOG KWIKPEN) 100 UNIT/ML (1 unit dial) KWIKPEN Inject 6 units subcutaneously 4 times daily for blood glucose management  7/10/2025 Evening    insulin pen  needle (32G X 4 MM) 32G X 4 MM miscellaneous Use 8 pen needles daily or as directed.  Taking    metoprolol succinate ER (TOPROL XL) 50 MG 24 hr tablet Take 1 tablet (50 mg) by mouth 2 times daily.  7/10/2025 Morning    midodrine (PROAMATINE) 2.5 MG tablet Take 1 tablet (2.5 mg) by mouth 3 times daily (with meals). 7/10/2025: Holding since surgery 6-30 Taking    Multiple Vitamins-Minerals (HAIR SKIN & NAILS ADVANCED) TABS Take 1 tablet by mouth daily.  7/10/2025 Morning    nitroFURantoin macrocrystal-monohydrate (MACROBID) 100 MG capsule Take 1 capsule (100 mg) by mouth 2 times daily for 7 days. 7/10/2025: Aprox 6 days left 7/10/2025 Morning    omeprazole (PRILOSEC) 40 MG DR capsule Take 40 mg by mouth nightly as needed.  7/9/2025 Evening    ostomy adhesive Pste [OSTOMY ADHESIVE PSTE] Apply as needed  Taking

## 2025-07-11 NOTE — H&P
[facility]    History and Physical - Hospitalist Service       Date of Admission:  7/10/2025    Assessment & Plan      Zara Webster is a 74 year old female admitted on 7/10/2025. She was admitted thru the ED on 7/10 for confusion and dehydration, with a possible UTI    Acute Confusion / Acute Toxic Metabolic Encephalopathy, presumably due to Acute Staph UTI  - family states this developed ~5-6 days PTA, and prior UTIs have presented in a similar manner  - UA from 7/9 and 7/10 consistent with infection, and Ucx from 7/9 shows staph aureus  - 5/4 Ucx with Staph as well, pansensitive  - lactate is normal and she is not septic   Plan:  Based on 5/4 Ucx, will start IV doxy and await further sensitives  Will also check Head CT given confusion, order for AM    Acute Kidney Injury  - presumably pre-renal, due to dehydration  Plan  IVFs, follow labs  Avoid nephrotoxins    Liver Ca / Cirrhosis  - followed closely by [facility], and LFTs appears close to her baseline  - recently underwent long procedure at [facility]  - will ask [facility] to review and make any further recs at this time    H/O SVT  - in NSR currently  - cont outpt flecianide and metoprolol (her home dose was lowered to 25 bid yesterday)  - cont eliquis     Hypotension  - appears to also be on midodrine (while on metoprolol) - continue and follow vitals  - it is noted in her primary providers note that her normal Bps are 90/60    DM  Lab Results   Component Value Date    A1C 7.0 06/20/2025    A1C 12.1 04/15/2025    A1C 10.7 10/07/2024    A1C 12.2 01/17/2024    A1C 14.1 12/05/2023   - normally well controlled, with BS per report very uncontrolled this past week  - suspect due to acute infection  Plan  Place on glucometers and ISS  Cont home lantus 20 bid     GERD  - controlled, cont home PPI     Macrocytic Anemia  - she is at her baseline Hgb of ~9, with admit value of 9.3  - no further inpatient workup           Diet:  Regular  DVT Prophylaxis:  "DOAC  Caro Catheter: Not present  Lines: None     Cardiac Monitoring: None  Code Status: Full Code      Clinically Significant Risk Factors Present on Admission         # Hyponatremia: Lowest Na = 132 mmol/L in last 2 days, will monitor as appropriate       # Hypoalbuminemia: Lowest albumin = 2.4 g/dL at 7/10/2025  4:12 PM, will monitor as appropriate  # Drug Induced Coagulation Defect: home medication list includes an anticoagulant medication   # Acute Kidney Injury, unspecified: based on a >150% or 0.3 mg/dL increase in last creatinine compared to past 90 day average, will monitor renal function       # Anemia: based on hgb <11      # DMII: A1C = 7.0 % (Ref range: 0.0 - 5.6 %) within past 6 months       # Financial/Environmental Concerns:           Disposition Plan     Medically Ready for Discharge: Anticipated in 2-4 Days           Dave Levy MD  Hospitalist Service  [facility]  Securely message with [employer] (more info)  Text page via [employer] Paging/Directory     ______________________________________________________________________    Chief Complaint   Confusion / Weakness     History is obtained from the patient, ERMD, EHR    History of Present Illness   Zara Webster is a 74 year old female who cannot provide a reliable history tonight due to her confusion.    Per the EMR - from her clinic appt yesterday:  \"Zara Webster is a 74 year old female who presents for possible UTI.  She is really not having specific UTI symptoms but overall just not feeling well.  She has had a lot of hospitalizations and procedures lately and she states after her last endoscopy where they tried to fix some stents, she just has not felt well.  She is also been seen for an ablation for SVT and has had 1 episode of tachycardia since then.  She states she just overall feels really fatigued.  She does have chronic anemia at this point and a lot of complex medical problems.  She has hyperglycemia more so the " "last week or so.  Her family is concerned because they state the last time this happened she had sepsis from a UTI.  Actually cannot find any record of a positive urine culture.  She has been feeling a little bit of lightheadedness and dizziness last day or so.  She feels like she is pushing fluids and is doing sugar-free Gatorade.  Her blood pressures are lower than typical for her.\"  At this appt, she had a SBP<80, she was sent home on macrobid.    She was called by the clinic and told to come to the ED due to abnormal labs today.    When asking the patient questions, she repeatedly will start with stories about prior episodes of SVT.    The family states she has had confusion for the last 6 days.  They note that she has gotten lost going to the bathroom in the house, and her blood sugars have been very uncontrolled.            Past Medical History    Past Medical History:   Diagnosis Date    Anemia     Atypical chest pain     Brain tumor, glioma (H)     Common bile duct obstruction (H) 11/20/2023    Crohn's disease (H)     Diabetes mellitus (H)     Diabetes mellitus, type 2 (H)     Disorder of biliary tract     Essential tremor     Gastroesophageal reflux disease     Gastrointestinal hemorrhage     Hepatocellular carcinoma (H)     Hypercholesteremia     Ileostomy status (H)     Lesion of liver     Myocardial infarction (H)     Non-alcoholic cirrhosis (H)     Obstruction of common bile duct (H) 11/20/2023       Past Surgical History   Past Surgical History:   Procedure Laterality Date    BREAST EXCISIONAL BIOPSY Left 1980    BREAST EXCISIONAL BIOPSY Left 1985    EP ABLATION SVT N/A 6/9/2025    Procedure: Ablation Supraventricular Tachycardia;  Surgeon: Mi Head MD;  Location: [facility]    ESOPHAGOSCOPY, GASTROSCOPY, DUODENOSCOPY (EGD), COMBINED N/A 1/26/2024    Procedure: ENDOSCOPIC ULTRASOUND;  Surgeon: Santi Pradhan MD;  [place] [facility] [place]    [place]STERECTOMY  1991    " OOPHORECTOMY Bilateral 1991    OTHER SURGICAL HISTORY      KIDNEY STONE REMOVALNOT SURE WHICH SIDE, DR. SHIV SALINAS PROCTOSIGMOIDOSCOPY,RIGID,DIAGNOS N/A 6/17/2020    Procedure: ILEOSTOMY REVISION, PROCTOSCOPY, ILEOSCOPY;  Surgeon: Devang Moon MD;  Location: [facility];  Service: General    ZZC REMOVAL COLON/ILEOSTOMY      Description: Total Abdominal Colectomy;  Recorded: 12/15/2011;    ZZC REMOVAL COLON/PROCTECTOMY/ILEOSTOMY      Description: Total Proctocolectomy;  Recorded: 08/10/2011;    ZZC VAG HYST, W/VAGINECTOMY      Description: Vaginal Hysterectomy With Colpectomy;  Recorded: 08/14/2014;       Prior to Admission Medications   Prior to Admission Medications   Prescriptions Last Dose Informant Patient Reported? Taking?   Multiple Vitamins-Minerals (HAIR SKIN & NAILS ADVANCED) TABS 7/10/2025 Morning  Yes Yes   Sig: Take 1 tablet by mouth daily.   apixaban ANTICOAGULANT (ELIQUIS) 5 MG tablet 7/10/2025 Morning  No Yes   Sig: Take 1 tablet (5 mg) by mouth 2 times daily.   clobetasol (TEMOVATE) 0.05 % external ointment  Self Yes Yes   Sig: Apply topically as needed   diphenoxylate-atropine (LOMOTIL) 2.5-0.025 MG tablet   No Yes   Sig: Take 1 tablet by mouth 4 times daily as needed for diarrhea.   flash glucose scanning reader (FREESTYLE VIDA 14 DAY READER) Misc Past Week Self No Yes   Sig: [FLASH GLUCOSE SCANNING READER (FREESTYLE VIDA 14 DAY READER) MISC] Use 1 Units As Directed every 14 (fourteen) days.   flecainide (TAMBOCOR) 100 MG tablet 7/10/2025 Morning  No Yes   Sig: Take 1 tablet (100 mg) by mouth 2 times daily.   insulin glargine (LANTUS SOLOSTAR) 100 UNIT/ML pen 7/10/2025 Morning  No Yes   Sig: Take 20 units BID   insulin lispro (HUMALOG KWIKPEN) 100 UNIT/ML (1 unit dial) KWIKPEN 7/10/2025 Evening  No Yes   Sig: Inject 6 units subcutaneously 4 times daily for blood glucose management   insulin pen needle (32G X 4 MM) 32G X 4 MM miscellaneous   No Yes   Sig: Use 8 pen needles daily or as  directed.   metoprolol succinate ER (TOPROL XL) 50 MG 24 hr tablet 7/10/2025 Morning  No Yes   Sig: Take 1 tablet (50 mg) by mouth 2 times daily.   midodrine (PROAMATINE) 2.5 MG tablet   No Yes   Sig: Take 1 tablet (2.5 mg) by mouth 3 times daily (with meals).   nitroFURantoin macrocrystal-monohydrate (MACROBID) 100 MG capsule 7/10/2025 Morning  No Yes   Sig: Take 1 capsule (100 mg) by mouth 2 times daily for 7 days.   omeprazole (PRILOSEC) 40 MG DR capsule 7/9/2025 Evening  Yes Yes   Sig: Take 40 mg by mouth nightly as needed.   ostomy adhesive Pste  Self No Yes   Sig: [OSTOMY ADHESIVE PSTE] Apply as needed      Facility-Administered Medications: None        Review of Systems    Review of systems was not needed on this patient    Social History   I have reviewed this patient's social history and updated it with pertinent information if needed.  Social History     Tobacco Use    Smoking status: Never     Passive exposure: Never    Smokeless tobacco: Never   Vaping Use    Vaping status: Never Used   Substance Use Topics    Alcohol use: Not Currently    Drug use: No         Family History   I have reviewed this patient's family history and updated it with pertinent information if needed.  Family History   Problem Relation Age of Onset    Diabetes Mother     Heart Disease Mother     Hyperlipidemia Mother     Diabetes Father     Cancer Father         malignant gallbladder    No Known Problems Sister     No Known Problems Sister          Allergies   Allergies   Allergen Reactions    Prochlorperazine Edisylate [Prochlorperazine] Other (See Comments)     Saw things    Compazine [Prochlorperazine] Hallucination        Physical Exam   Vital Signs: Temp: 97.7  F (36.5  C) Temp src: Oral BP: 112/53 Pulse: 59   Resp: 22 SpO2: 96 % O2 Device: None (Room air)    Weight: 154 lbs 12.8 oz    Constitutional: awake, alert, cooperative, no apparent distress, and appears stated age  Hematologic / Lymphatic: no cervical  lymphadenopathy  Respiratory: No increased work of breathing, good air exchange, clear to auscultation bilaterally, no crackles or wheezing  Cardiovascular: Normal apical impulse, regular rate and rhythm, normal S1 and S2, no S3 or S4, and no murmur noted  GI: No scars, normal bowel sounds, soft, non-distended, non-tender, no masses palpated, no hepatosplenomegally  Neuropsychiatric: General: normal, calm, and normal eye contact.  She is oriented to person and place however is unable to answer direct questions.     Medical Decision Making       75 MINUTES SPENT BY ME on the date of service doing chart review, history, exam, documentation & further activities per the note.      Data     I have personally reviewed the following data over the past 24 hrs:    6.4  \   9.3 (L)   / 155     132 (L) 103 21.2 /  384 (H)   4.6 21 (L) 1.35 (H) \     ALT: 32 AST: 50 (H) AP: 400 (H) TBILI: 2.3 (H)   ALB: 2.4 (L) TOT PROTEIN: 7.4 LIPASE: 45     Trop: 48 (H) BNP: 2,607 (H)     Procal: N/A CRP: N/A Lactic Acid: 1.5         Imaging results reviewed over the past 24 hrs:   Recent Results (from the past 24 hours)   XR Chest 2 Views    Narrative    EXAM: XR CHEST 2 VIEWS  LOCATION: [facility]  DATE: 7/10/2025    INDICATION: dyspnea progressive  COMPARISON: 3/25/2024      Impression    IMPRESSION: Small left pleural effusion with some associated slight infiltrate/atelectasis in the left lung base. Changes most typical for a shunt in the right upper abdomen. The chest is otherwise normal. Since 3/25/2024 the left pleural effusion with   associated mild infiltrate/atelectasis appears new.   CT Chest Pulmonary Embolism w Contrast    Narrative    EXAM: CT CHEST PULMONARY EMBOLISM W CONTRAST  LOCATION: [facility]  DATE: 7/10/2025    INDICATION: increasing dyspnea this week  COMPARISON: None.  TECHNIQUE: CT chest pulmonary angiogram during arterial phase injection of IV contrast. Multiplanar  reformats and MIP reconstructions were performed. Dose reduction techniques were used.   CONTRAST: 75 cc isovue 370    FINDINGS:  ANGIOGRAM CHEST: Pulmonary arteries are normal caliber and negative for pulmonary emboli. Normal caliber thoracic aorta. Normal caliber thoracic aorta.    LUNGS AND PLEURA: Small left pleural effusion with atelectasis in the left lung base.    MEDIASTINUM/AXILLAE: Normal.    CORONARY ARTERY CALCIFICATION: None.    UPPER ABDOMEN: Portosystemic shunt in the liver. Advanced cirrhotic configuration. Splenomegaly.    MUSCULOSKELETAL: Normal.      Impression    IMPRESSION:  1.  No pulmonary embolism. There are small left pleural effusion with left basilar atelectasis.

## 2025-07-11 NOTE — PLAN OF CARE
Problem: Adult Inpatient Plan of Care  Goal: Plan of Care Review  Description: The Plan of Care Review/Shift note should be completed every shift.  The Outcome Evaluation is a brief statement about your assessment that the patient is improving, declining, or no change.  This information will be displayed automatically on your shift  note.  Outcome: Progressing  Goal: Optimal Comfort and Wellbeing  Outcome: Progressing     Problem: Delirium  Goal: Improved Sleep  Outcome: Progressing   Goal Outcome Evaluation:       Pt arrived on unit from ED at 2030 via bed. A/O x3, denies pain. Pt has a skin tear on right forearm. Clean with normal saline and mepilex dressing apply; provider notify. Pt has a colostomy that is self manage.  VSS, continue to monitor. Tamara Valverde RN

## 2025-07-12 ENCOUNTER — APPOINTMENT (OUTPATIENT)
Dept: PHYSICAL THERAPY | Facility: HOSPITAL | Age: 75
End: 2025-07-12
Attending: INTERNAL MEDICINE
Payer: COMMERCIAL

## 2025-07-12 PROBLEM — I50.31 ACUTE DIASTOLIC CONGESTIVE HEART FAILURE (H): Status: ACTIVE | Noted: 2025-07-12

## 2025-07-12 LAB
ALBUMIN SERPL BCG-MCNC: 1.9 G/DL (ref 3.5–5.2)
ALP SERPL-CCNC: 264 U/L (ref 40–150)
ALT SERPL W P-5'-P-CCNC: 21 U/L (ref 0–50)
AMMONIA PLAS-SCNC: 15 UMOL/L (ref 11–51)
ANION GAP SERPL CALCULATED.3IONS-SCNC: 5 MMOL/L (ref 7–15)
AST SERPL W P-5'-P-CCNC: 40 U/L (ref 0–45)
BILIRUB SERPL-MCNC: 2.7 MG/DL
BUN SERPL-MCNC: 16 MG/DL (ref 8–23)
CALCIUM SERPL-MCNC: 8.5 MG/DL (ref 8.8–10.4)
CHLORIDE SERPL-SCNC: 106 MMOL/L (ref 98–107)
CREAT SERPL-MCNC: 0.81 MG/DL (ref 0.51–0.95)
EGFRCR SERPLBLD CKD-EPI 2021: 75 ML/MIN/1.73M2
ERYTHROCYTE [DISTWIDTH] IN BLOOD BY AUTOMATED COUNT: 14.9 % (ref 10–15)
GLUCOSE BLDC GLUCOMTR-MCNC: 137 MG/DL (ref 70–99)
GLUCOSE BLDC GLUCOMTR-MCNC: 170 MG/DL (ref 70–99)
GLUCOSE BLDC GLUCOMTR-MCNC: 204 MG/DL (ref 70–99)
GLUCOSE BLDC GLUCOMTR-MCNC: 253 MG/DL (ref 70–99)
GLUCOSE BLDC GLUCOMTR-MCNC: 318 MG/DL (ref 70–99)
GLUCOSE SERPL-MCNC: 179 MG/DL (ref 70–99)
HCO3 SERPL-SCNC: 23 MMOL/L (ref 22–29)
HCT VFR BLD AUTO: 26.7 % (ref 35–47)
HGB BLD-MCNC: 8.4 G/DL (ref 11.7–15.7)
MCH RBC QN AUTO: 31.7 PG (ref 26.5–33)
MCHC RBC AUTO-ENTMCNC: 31.5 G/DL (ref 31.5–36.5)
MCV RBC AUTO: 101 FL (ref 78–100)
PLATELET # BLD AUTO: 156 10E3/UL (ref 150–450)
POTASSIUM SERPL-SCNC: 4.2 MMOL/L (ref 3.4–5.3)
PROT SERPL-MCNC: 6.1 G/DL (ref 6.4–8.3)
RBC # BLD AUTO: 2.65 10E6/UL (ref 3.8–5.2)
SODIUM SERPL-SCNC: 134 MMOL/L (ref 135–145)
WBC # BLD AUTO: 9.6 10E3/UL (ref 4–11)

## 2025-07-12 PROCEDURE — G0378 HOSPITAL OBSERVATION PER HR: HCPCS

## 2025-07-12 PROCEDURE — 99233 SBSQ HOSP IP/OBS HIGH 50: CPT | Performed by: INTERNAL MEDICINE

## 2025-07-12 PROCEDURE — 250N000011 HC RX IP 250 OP 636: Performed by: INTERNAL MEDICINE

## 2025-07-12 PROCEDURE — 99223 1ST HOSP IP/OBS HIGH 75: CPT | Performed by: INTERNAL MEDICINE

## 2025-07-12 PROCEDURE — 84155 ASSAY OF PROTEIN SERUM: CPT | Performed by: INTERNAL MEDICINE

## 2025-07-12 PROCEDURE — 250N000013 HC RX MED GY IP 250 OP 250 PS 637: Performed by: INTERNAL MEDICINE

## 2025-07-12 PROCEDURE — 36415 COLL VENOUS BLD VENIPUNCTURE: CPT | Performed by: INTERNAL MEDICINE

## 2025-07-12 PROCEDURE — 82140 ASSAY OF AMMONIA: CPT | Performed by: INTERNAL MEDICINE

## 2025-07-12 PROCEDURE — 82962 GLUCOSE BLOOD TEST: CPT

## 2025-07-12 PROCEDURE — 120N000001 HC R&B MED SURG/OB

## 2025-07-12 PROCEDURE — 97161 PT EVAL LOW COMPLEX 20 MIN: CPT | Mod: GP

## 2025-07-12 PROCEDURE — 85014 HEMATOCRIT: CPT | Performed by: INTERNAL MEDICINE

## 2025-07-12 RX ORDER — FUROSEMIDE 10 MG/ML
40 INJECTION INTRAMUSCULAR; INTRAVENOUS EVERY 12 HOURS
Status: DISCONTINUED | OUTPATIENT
Start: 2025-07-12 | End: 2025-07-13

## 2025-07-12 RX ORDER — DOXYCYCLINE 100 MG/10ML
100 INJECTION, POWDER, LYOPHILIZED, FOR SOLUTION INTRAVENOUS EVERY 12 HOURS
Status: COMPLETED | OUTPATIENT
Start: 2025-07-12 | End: 2025-07-13

## 2025-07-12 RX ADMIN — DOXYCYCLINE 100 MG: 100 INJECTION, POWDER, LYOPHILIZED, FOR SOLUTION INTRAVENOUS at 22:06

## 2025-07-12 RX ADMIN — PANTOPRAZOLE SODIUM 40 MG: 40 TABLET, DELAYED RELEASE ORAL at 20:48

## 2025-07-12 RX ADMIN — FUROSEMIDE 40 MG: 10 INJECTION, SOLUTION INTRAMUSCULAR; INTRAVENOUS at 18:13

## 2025-07-12 RX ADMIN — FLECAINIDE ACETATE 100 MG: 100 TABLET ORAL at 20:48

## 2025-07-12 RX ADMIN — FUROSEMIDE 40 MG: 10 INJECTION, SOLUTION INTRAMUSCULAR; INTRAVENOUS at 14:28

## 2025-07-12 RX ADMIN — METOPROLOL SUCCINATE 25 MG: 25 TABLET, EXTENDED RELEASE ORAL at 20:48

## 2025-07-12 RX ADMIN — CARBIDOPA AND LEVODOPA 2.5 MG: 50; 200 TABLET, EXTENDED RELEASE ORAL at 11:41

## 2025-07-12 RX ADMIN — APIXABAN 5 MG: 5 TABLET, FILM COATED ORAL at 20:48

## 2025-07-12 RX ADMIN — INSULIN GLARGINE 20 UNITS: 100 INJECTION, SOLUTION SUBCUTANEOUS at 20:50

## 2025-07-12 RX ADMIN — FLECAINIDE ACETATE 100 MG: 100 TABLET ORAL at 08:16

## 2025-07-12 RX ADMIN — CARBIDOPA AND LEVODOPA 2.5 MG: 50; 200 TABLET, EXTENDED RELEASE ORAL at 17:17

## 2025-07-12 RX ADMIN — INSULIN GLARGINE 20 UNITS: 100 INJECTION, SOLUTION SUBCUTANEOUS at 08:16

## 2025-07-12 RX ADMIN — PANTOPRAZOLE SODIUM 40 MG: 40 TABLET, DELAYED RELEASE ORAL at 08:15

## 2025-07-12 RX ADMIN — DOXYCYCLINE 100 MG: 100 INJECTION, POWDER, LYOPHILIZED, FOR SOLUTION INTRAVENOUS at 10:25

## 2025-07-12 RX ADMIN — CARBIDOPA AND LEVODOPA 2.5 MG: 50; 200 TABLET, EXTENDED RELEASE ORAL at 08:16

## 2025-07-12 RX ADMIN — METOPROLOL SUCCINATE 25 MG: 25 TABLET, EXTENDED RELEASE ORAL at 08:15

## 2025-07-12 RX ADMIN — APIXABAN 5 MG: 5 TABLET, FILM COATED ORAL at 08:15

## 2025-07-12 RX ADMIN — Medication 1 TABLET: at 08:15

## 2025-07-12 ASSESSMENT — ACTIVITIES OF DAILY LIVING (ADL)
ADLS_ACUITY_SCORE: 41

## 2025-07-12 NOTE — PROGRESS NOTES
"PRIMARY DIAGNOSIS: \"GENERIC\" NURSING  OUTPATIENT/OBSERVATION GOALS TO BE MET BEFORE DISCHARGE:  ADLs back to baseline: No    Activity and level of assistance: Up with standby assistance.    Pain status: Pain free.    Return to near baseline physical activity: Yes     Discharge Planner Nurse   Safe discharge environment identified: Yes  Barriers to discharge: Yes       Entered by: Purnima Seo RN 07/12/2025 6:24 AM     Pt denies pain. Disoriented to situation. Colostomy 200mL output. Sleeping most of night.  "

## 2025-07-12 NOTE — CONSULTS
HEART CARE NOTE        Thank you, Dr. Mcclain, for asking the [facility] Heart Care team to see Zara Webster to evaluate ADHF.      Assessment/Recommendations     1. HFpEF c/b severe ADHF  Assessment / Plan  Hypervolemic on physical exam; start IV diuresis; continue to monitor UOP and renal function closely  Patient is high risk for adverse 2/2 advanced age,   GDMT as detailed below; mainstay of treatment for HFpEF includes diuretics and adequate BP control (class I) and SGLT2-I (class 2a); additional medical therapy (ARNI, MRA, ARB) demonstrated less robust evidence for indication but may be considered per guideline recommendations (2b); no indication for BBlockers     Current Pharmacotherapy AHA Guideline-Directed Medical Therapy   Losartan  - not started  ARNI/ARB   Spironolactone - not started  MRA   SGLT2 inhibitor: not started  SGLT2-I    Furosemide IV  Loop diuretic      2. CINTIA  Assessment / Plan  Resolving CINTIA    3. SVT  Assessment / Plan  Currently in NSR - continue to monitor on telemetry    4. DM2  Assessment / Plan  Management per primary team    Clinically Significant Risk Factors Present on Admission         # Hyponatremia: Lowest Na = 132 mmol/L in last 2 days, will monitor as appropriate  # Hyperchloremia: Highest Cl = 109 mmol/L in last 2 days, will monitor as appropriate          # Hypoalbuminemia: Lowest albumin = 2.4 g/dL at 7/10/2025  4:12 PM, will monitor as appropriate  # Drug Induced Coagulation Defect: home medication list includes an anticoagulant medication         # Anemia: based on hgb <11      # DMII: A1C = 7.0 % (Ref range: 0.0 - 5.6 %) within past 6 months       # Financial/Environmental Concerns: none        Cardiac Arrhythmia: Supraventricular tachycardia  Cardiomyopathy  Diastolic acute    hyponatremia and Hypo-osmolality, Fluid overload, unspecified, Other fluid overload, and Other disorders of electrolyte and fluid balance, not elsewhere classified    Acute kidney  "failure, unspecified      85 minutes spent reviewing prior records (including documentation, laboratory studies, cardiac testing/imaging), history and physical exam, planning, and subsequent documentation.      History of Present Illness/Subjective    Ms. Zara Webster is a 74 year old female who (per Epic notation) presented confusion and dehydration, with a possible UTI     Today, Mrs. Webster endorses dyspnea and LE edema; Management plan as detailed above    ECG: Personally reviewed. nonspecific ST and T waves changes, sinus bradycardia, left axis deviation.    ECHO (personnaly Reviewed on 7/12/25):   1. Normal left ventricular size and systolic performance. The visually  estimated ejection fraction is 60%.  2. No significant valvular heart disease is identified on this study.  3. Probable mild right ventricular enlargement with normal right ventricular  systolic performance (the right ventricle is suboptimally visualized on the  study).  4. There is mild right atrial enlargement.     When compared to the prior real-time echocardiogram dated 20 February 2025,  the findings are felt to be fairly similar on both examinations.    Lab results: personally reviewed July 12, 2025; notable for resolving CINTIA    Medical history and pertinent documents reviewed in Care Everywhere please where applicable see details above          Physical Examination Review of Systems   /61 (BP Location: Right arm)   Pulse 64   Temp 98.4  F (36.9  C) (Oral)   Resp 16   Ht 1.676 m (5' 6\")   Wt 70.2 kg (154 lb 12.8 oz)   LMP  (LMP Unknown)   SpO2 93%   BMI 24.99 kg/m    Body mass index is 24.99 kg/m .  Wt Readings from Last 3 Encounters:   07/10/25 70.2 kg (154 lb 12.8 oz)   07/09/25 69.8 kg (153 lb 12.8 oz)   06/25/25 69.5 kg (153 lb 5 oz)     General Appearance:   no distress, normal body habitus   ENT/Mouth: membranes moist, no oral lesions or bleeding gums.      EYES:  no scleral icterus, normal conjunctivae   Neck: no " carotid bruits or thyromegaly   Chest/Lungs:   lungs are clear to auscultation, no rales or wheezing, equal chest wall expansion    Cardiovascular:   Regular. Normal first and second heart sounds with no murmurs, rubs, or gallops; the carotid, radial and posterior tibial pulses are intact, + JVD or LE edema bilaterally    Abdomen:  no organomegaly, masses, bruits, or tenderness; bowel sounds are present   Extremities: no cyanosis or clubbing   Skin: no xanthelasma, warm.    Neurologic: NAD     Psychiatric: alert and oriented x3, calm     A complete 10 systems ROS was reviewed  And is negative except what is listed in the HPI.          Medical History  Surgical History Family History Social History   Past Medical History:   Diagnosis Date    Anemia     Atypical chest pain     Brain tumor, glioma (H)     Common bile duct obstruction (H) 11/20/2023    Crohn's disease (H)     Diabetes mellitus (H)     Diabetes mellitus, type 2 (H)     Disorder of biliary tract     Essential tremor     Gastroesophageal reflux disease     Gastrointestinal hemorrhage     Hepatocellular carcinoma (H)     Hypercholesteremia     Ileostomy status (H)     Lesion of liver     Myocardial infarction (H)     Non-alcoholic cirrhosis (H)     Obstruction of common bile duct (H) 11/20/2023    Past Surgical History:   Procedure Laterality Date    BREAST EXCISIONAL BIOPSY Left 1980    BREAST EXCISIONAL BIOPSY Left 1985    EP ABLATION SVT N/A 6/9/2025    Procedure: Ablation Supraventricular Tachycardia;  Surgeon: Mi Head MD;  [facility] CV    ESOPHAGOSCOPY, GASTROSCOPY, DUODENOSCOPY (EGD), COMBINED N/A 1/26/2024    Procedure: ENDOSCOPIC ULTRASOUND;  Surgeon: Santi Pradhan MD;  Location: [facility] OR    HYSTERECTOMY  1991    OOPHORECTOMY Bilateral 1991    OTHER SURGICAL HISTORY      KIDNEY STONE REMOVALNOT SURE WHICH SIDE, DR. SHIV SALINAS PROCTOSIGMOIDOSCOPY,RIGID,DIAGNOS N/A 6/17/2020    Procedure:  ILEOSTOMY REVISION, PROCTOSCOPY, ILEOSCOPY;  Surgeon: Devang Moon MD;  Location: [facility];  Service: General    ZZC REMOVAL COLON/ILEOSTOMY      Description: Total Abdominal Colectomy;  Recorded: 12/15/2011;    ZZC REMOVAL COLON/PROCTECTOMY/ILEOSTOMY      Description: Total Proctocolectomy;  Recorded: 08/10/2011;    ZZC VAG HYST, W/VAGINECTOMY      Description: Vaginal Hysterectomy With Colpectomy;  Recorded: 08/14/2014;    no family history of premature coronary artery disease Social History     Socioeconomic History    Marital status:      Spouse name: Not on file    Number of children: 2    Years of education: 16    Highest education level: Bachelor's degree (e.g., BA, AB, BS)   Occupational History    Occupation: Retired   Tobacco Use    Smoking status: Never     Passive exposure: Never    Smokeless tobacco: Never   Vaping Use    Vaping status: Never Used   Substance and Sexual Activity    Alcohol use: Not Currently    Drug use: No    Sexual activity: Not on file   Other Topics Concern    Not on file   Social History Narrative    Not on file     Social Drivers of Health     Financial Resource Strain: Low Risk  (7/12/2025)    Financial Resource Strain     Within the past 12 months, have you or your family members you live with been unable to get utilities (heat, electricity) when it was really needed?: No   Food Insecurity: Low Risk  (7/12/2025)    Food Insecurity     Within the past 12 months, did you worry that your food would run out before you got money to buy more?: No     Within the past 12 months, did the food you bought just not last and you didn t have money to get more?: No   Transportation Needs: Low Risk  (7/12/2025)    Transportation Needs     Within the past 12 months, has lack of transportation kept you from medical appointments, getting your medicines, non-medical meetings or appointments, work, or from getting things that you need?: No   Physical Activity: Not on file  "  Stress: Not on file   Social Connections: Unknown (11/17/2023)    [facility]    Social Connections     Frequency of Communication with Friends and Family: Not on file   Interpersonal Safety: High Risk (7/10/2025)    Interpersonal Safety     Do you feel physically and emotionally safe where you currently live?: No     Within the past 12 months, have you been hit, slapped, kicked or otherwise physically hurt by someone?: No     Within the past 12 months, have you been humiliated or emotionally abused in other ways by your partner or ex-partner?: No   Housing Stability: Low Risk  (7/12/2025)    Housing Stability     Do you have housing? : Yes     Are you worried about losing your housing?: No   Recent Concern: Housing Stability - High Risk (5/9/2025)    Housing Stability     Do you have housing? : No     Are you worried about losing your housing?: No           Lab Results    Chemistry/lipid CBC Cardiac Enzymes/BNP/TSH/INR   Lab Results   Component Value Date    CHOL 243 (H) 03/20/2023    HDL 58 03/20/2023    TRIG 238 (H) 03/20/2023    BUN 19.6 07/11/2025     (L) 07/11/2025    CO2 20 (L) 07/11/2025    Lab Results   Component Value Date    WBC 6.5 07/11/2025    HGB 8.9 (L) 07/11/2025    HCT 27.6 (L) 07/11/2025     (H) 07/11/2025     (L) 07/11/2025    Lab Results   Component Value Date    TSH 1.00 05/08/2025    INR 1.79 (H) 05/29/2025     No results found for: \"CKTOTAL\", \"CKMB\", \"TROPONINI\"       Weight:    Wt Readings from Last 3 Encounters:   07/10/25 70.2 kg (154 lb 12.8 oz)   07/09/25 69.8 kg (153 lb 12.8 oz)   06/25/25 69.5 kg (153 lb 5 oz)       Allergies  Allergies   Allergen Reactions    Prochlorperazine Edisylate [Prochlorperazine] Other (See Comments)     Saw things    Compazine [Prochlorperazine] Hallucination         Surgical History  Past Surgical History:   Procedure Laterality Date    BREAST EXCISIONAL BIOPSY Left 1980    BREAST " EXCISIONAL BIOPSY Left 1985    EP ABLATION SVT N/A 6/9/2025    Procedure: Ablation Supraventricular Tachycardia;  Surgeon: Mi Head MD;  Location: [facility] CV    ESOPHAGOSCOPY, GASTROSCOPY, DUODENOSCOPY (EGD), COMBINED N/A 1/26/2024    Procedure: ENDOSCOPIC ULTRASOUND;  Surgeon: Santi Pradhan MD;  Location: [facility]    HYSTERECTOMY  1991    OOPHORECTOMY Bilateral 1991    OTHER SURGICAL HISTORY      KIDNEY STONE REMOVALNOT SURE WHICH SIDE, DR. CHANEY    RI PROCTOSIGMOIDOSCOPY,RIGID,DIAGNOS N/A 6/17/2020    Procedure: ILEOSTOMY REVISION, PROCTOSCOPY, ILEOSCOPY;  Surgeon: Devang Moon MD;  Location: [facility];  Service: General    ZZC REMOVAL COLON/ILEOSTOMY      Description: Total Abdominal Colectomy;  Recorded: 12/15/2011;    ZZC REMOVAL COLON/PROCTECTOMY/ILEOSTOMY      Description: Total Proctocolectomy;  Recorded: 08/10/2011;    ZZC VAG HYST, W/VAGINECTOMY      Description: Vaginal Hysterectomy With Colpectomy;  Recorded: 08/14/2014;       Social History  Tobacco:   History   Smoking Status    Never   Smokeless Tobacco    Never    Alcohol:   Social History    Substance and Sexual Activity      Alcohol use: Not Currently   Illicit Drugs:   History   Drug Use No       Family History  Family History   Problem Relation Age of Onset    Diabetes Mother     Heart Disease Mother     Hyperlipidemia Mother     Diabetes Father     Cancer Father         malignant gallbladder    No Known Problems Sister     No Known Problems Sister           Manny Menjivar MD on 7/12/2025      cc: Obdulia Chamorro,

## 2025-07-12 NOTE — PLAN OF CARE
Pain: Denies  Neuro: Oriented to self and place and time, intermittently oriented to situation. Patient remembers part of current events, but struggles to recall all details.  Resp: Lung sounds clear, diminished.  Cardio: Echo completed yesterday and seen by Cardiology today. New orders for strict I&O, 1800 fluid restriction and lasix.  GI/: Voids freely. Reports emptying colostomy bag independently. Audible flatulence.  Skin: Scattered bruising, especially forearms  Activity: Somewhat unsteady. Stand by assist.  Nutrition/IV: Good oral intake. Family encouraging patient to choose higher protein foods.

## 2025-07-12 NOTE — PLAN OF CARE
Uneventful shift.  Remains in bed over the course of the shift.  Sleepy and lethargic.  Forgetful and pleasantly confused.  Encouraged po fluids.  Denies pain.    Ly Santana RN

## 2025-07-12 NOTE — PROGRESS NOTES
"PRIMARY DIAGNOSIS: \"GENERIC\" NURSING  OUTPATIENT/OBSERVATION GOALS TO BE MET BEFORE DISCHARGE:  ADLs back to baseline: No    Activity and level of assistance: Up with standby assistance.    Pain status: Pain free.    Return to near baseline physical activity: Yes     Discharge Planner Nurse   Safe discharge environment identified: Yes  Barriers to discharge: Yes       Entered by: Purnima Seo RN 07/12/2025 3:11 AM     Pt disoriented to situation. Intermittent confusion overnight. Standby assist. Colostomy medium output. Denies pain.  "

## 2025-07-12 NOTE — PROGRESS NOTES
"PT Evaluation   07/12/25 3286   Appointment Info   Signing Clinician's Name / Credentials (PT) Jacqui Noel PT, DPT   Living Environment   People in Home significant other   Current Living Arrangements house   Home Accessibility stairs to enter home   Number of Stairs, Main Entrance 2   Stair Railings, Main Entrance none   Living Environment Comments Stairs to basement as well, but does not need to use these. Bedroom and bathroom on main level. Tub and walk in shower.   Self-Care   Usual Activity Tolerance good   Current Activity Tolerance good   Equipment Currently Used at Home none   Fall history within last six months no   Activity/Exercise/Self-Care Comment IND with ADLs.   General Information   Onset of Illness/Injury or Date of Surgery 07/10/25   Referring Physician Keara Bustillo MD   Patient/Family Therapy Goals Statement (PT) none stated   Pertinent History of Current Problem (include personal factors and/or comorbidities that impact the POC) Per chart: \" 74 year old female admitted on 7/10/2025. She was admitted thru the ED on 7/10 for confusion and dehydration, with a possible UTI.\"   Existing Precautions/Restrictions cardiac   Cognition   Affect/Mental Status (Cognition) WNL   Follows Commands (Cognition) WNL   Pain Assessment   Patient Currently in Pain No   Posture    Posture Not impaired   Range of Motion (ROM)   Range of Motion ROM is WNL   Strength (Manual Muscle Testing)   Strength (Manual Muscle Testing) strength is WNL   Bed Mobility   Bed Mobility supine-sit;sit-supine   Supine-Sit [place] (Bed Mobility) independent   Sit-Supine [place] (Bed Mobility) independent   Transfers   Transfers sit-stand transfer   Sit-Stand Transfer   Sit-Stand [place] (Transfers) independent   Assistive Device (Sit-Stand Transfers)   (none)   Comment, (Sit-Stand Transfer) steady on feet   Gait/Stairs (Locomotion)   Darling Level (Gait) supervision   Assistive Device (Gait)   (none) "   Distance in Feet (Gait) 300'   Pattern (Gait) step-through;swing-through   Deviations/Abnormal Patterns (Gait) gait speed decreased   Negotiation (Stairs) number of steps;ascending technique;descending technique;stairs independence;handrail location   [place] Level (Stairs) supervision   Handrail Location (Stairs) right side (ascending);other (see comments)  (B sides descending)   Number of Steps (Stairs) 3   Ascending Technique (Stairs) step-over-step   Descending Technique (Stairs) step-over-step   Comment, (Gait/Stairs) SBA per nature of hospital setting. steady on feet throughout. Slightly audible heavier breathing by end of amb distance. Upon return to room, HR 70 bpm, SpO2 90% on RA, increases up to 93% within less than a minute during seated rest. /77 mmHg.   Balance   Balance no deficits were identified   Sensory Examination   Sensory Perception patient reports no sensory changes   Clinical Impression   Criteria for Skilled Therapeutic Intervention Evaluation only   Clinical Presentation (PT Evaluation Complexity) stable   Clinical Presentation Rationale Clinical judgment   Clinical Decision Making (Complexity) low complexity   Risk & Benefits of therapy have been explained evaluation/treatment results reviewed;participants included;patient;participants voiced agreement with care plan   PT Total Evaluation Time   PT Eval, Low Complexity Minutes (89758) 12   PT Discharge Planning   PT Plan No skilled PT needs at this time.   PT Discharge Recommendation (DC Rec) home with outpatient cardiac rehab   PT Rationale for DC Rec Patient appears to be mobilizing near functional baseline, without strength or balance deficits, steady on feet on flat ground & use of stairs. Per chart pt with recent CHF dx, may benefit from outpatient cardiac rehab consult. Recommend patient continue to amb while in hospital to maintain functional mobility. Pt may need assistance at home as needed from spouse for more  strenuous tasks.   PT Brief overview of current status SBA-IND without '   PT Total Distance Amb During Session (feet) 300   PT Equipment Needed at Discharge   (none)   Physical Therapy Time and Intention   Total Session Time (sum of timed and untimed services) 12

## 2025-07-13 ENCOUNTER — APPOINTMENT (OUTPATIENT)
Dept: OCCUPATIONAL THERAPY | Facility: HOSPITAL | Age: 75
End: 2025-07-13
Attending: INTERNAL MEDICINE
Payer: COMMERCIAL

## 2025-07-13 VITALS
BODY MASS INDEX: 24.46 KG/M2 | TEMPERATURE: 98.3 F | OXYGEN SATURATION: 95 % | HEIGHT: 66 IN | RESPIRATION RATE: 20 BRPM | HEART RATE: 69 BPM | DIASTOLIC BLOOD PRESSURE: 56 MMHG | SYSTOLIC BLOOD PRESSURE: 115 MMHG | WEIGHT: 152.2 LBS

## 2025-07-13 PROBLEM — N17.9 ACUTE KIDNEY INJURY: Status: RESOLVED | Noted: 2025-07-10 | Resolved: 2025-07-13

## 2025-07-13 PROBLEM — J90 BILATERAL PLEURAL EFFUSION: Status: RESOLVED | Noted: 2025-07-11 | Resolved: 2025-07-13

## 2025-07-13 PROBLEM — R73.9 ELEVATED BLOOD SUGAR: Status: RESOLVED | Noted: 2025-07-10 | Resolved: 2025-07-13

## 2025-07-13 PROBLEM — T83.511A URINARY TRACT INFECTION ASSOCIATED WITH CATHETERIZATION OF URINARY TRACT, UNSPECIFIED INDWELLING URINARY CATHETER TYPE, INITIAL ENCOUNTER: Status: RESOLVED | Noted: 2025-07-10 | Resolved: 2025-07-13

## 2025-07-13 PROBLEM — N39.0 URINARY TRACT INFECTION ASSOCIATED WITH CATHETERIZATION OF URINARY TRACT, UNSPECIFIED INDWELLING URINARY CATHETER TYPE, INITIAL ENCOUNTER: Status: RESOLVED | Noted: 2025-07-10 | Resolved: 2025-07-13

## 2025-07-13 PROBLEM — E86.0 DEHYDRATION: Status: RESOLVED | Noted: 2025-07-10 | Resolved: 2025-07-13

## 2025-07-13 PROBLEM — I50.31 ACUTE DIASTOLIC CONGESTIVE HEART FAILURE (H): Status: RESOLVED | Noted: 2025-07-12 | Resolved: 2025-07-13

## 2025-07-13 LAB
ANION GAP SERPL CALCULATED.3IONS-SCNC: 5 MMOL/L (ref 7–15)
BUN SERPL-MCNC: 17.9 MG/DL (ref 8–23)
CALCIUM SERPL-MCNC: 8.5 MG/DL (ref 8.8–10.4)
CHLORIDE SERPL-SCNC: 103 MMOL/L (ref 98–107)
CREAT SERPL-MCNC: 0.86 MG/DL (ref 0.51–0.95)
EGFRCR SERPLBLD CKD-EPI 2021: 70 ML/MIN/1.73M2
ERYTHROCYTE [DISTWIDTH] IN BLOOD BY AUTOMATED COUNT: 15 % (ref 10–15)
GLUCOSE BLDC GLUCOMTR-MCNC: 121 MG/DL (ref 70–99)
GLUCOSE BLDC GLUCOMTR-MCNC: 183 MG/DL (ref 70–99)
GLUCOSE BLDC GLUCOMTR-MCNC: 231 MG/DL (ref 70–99)
GLUCOSE SERPL-MCNC: 155 MG/DL (ref 70–99)
HCO3 SERPL-SCNC: 27 MMOL/L (ref 22–29)
HCT VFR BLD AUTO: 28.6 % (ref 35–47)
HGB BLD-MCNC: 9.3 G/DL (ref 11.7–15.7)
MAGNESIUM SERPL-MCNC: 1.2 MG/DL (ref 1.7–2.3)
MCH RBC QN AUTO: 32 PG (ref 26.5–33)
MCHC RBC AUTO-ENTMCNC: 32.5 G/DL (ref 31.5–36.5)
MCV RBC AUTO: 98 FL (ref 78–100)
NT-PROBNP SERPL-MCNC: 582 PG/ML (ref 0–624)
PLATELET # BLD AUTO: 213 10E3/UL (ref 150–450)
POTASSIUM SERPL-SCNC: 3.3 MMOL/L (ref 3.4–5.3)
RBC # BLD AUTO: 2.91 10E6/UL (ref 3.8–5.2)
SODIUM SERPL-SCNC: 135 MMOL/L (ref 135–145)
WBC # BLD AUTO: 11.6 10E3/UL (ref 4–11)

## 2025-07-13 PROCEDURE — 97535 SELF CARE MNGMENT TRAINING: CPT | Mod: GO

## 2025-07-13 PROCEDURE — 99233 SBSQ HOSP IP/OBS HIGH 50: CPT | Performed by: INTERNAL MEDICINE

## 2025-07-13 PROCEDURE — 250N000013 HC RX MED GY IP 250 OP 250 PS 637: Performed by: INTERNAL MEDICINE

## 2025-07-13 PROCEDURE — 97165 OT EVAL LOW COMPLEX 30 MIN: CPT | Mod: GO

## 2025-07-13 PROCEDURE — 85014 HEMATOCRIT: CPT | Performed by: INTERNAL MEDICINE

## 2025-07-13 PROCEDURE — 99239 HOSP IP/OBS DSCHRG MGMT >30: CPT | Performed by: HOSPITALIST

## 2025-07-13 PROCEDURE — 83880 ASSAY OF NATRIURETIC PEPTIDE: CPT | Performed by: HOSPITALIST

## 2025-07-13 PROCEDURE — 80048 BASIC METABOLIC PNL TOTAL CA: CPT | Performed by: INTERNAL MEDICINE

## 2025-07-13 PROCEDURE — 36415 COLL VENOUS BLD VENIPUNCTURE: CPT | Performed by: INTERNAL MEDICINE

## 2025-07-13 PROCEDURE — 83735 ASSAY OF MAGNESIUM: CPT | Performed by: HOSPITALIST

## 2025-07-13 PROCEDURE — 250N000011 HC RX IP 250 OP 636: Performed by: INTERNAL MEDICINE

## 2025-07-13 PROCEDURE — 250N000013 HC RX MED GY IP 250 OP 250 PS 637: Performed by: HOSPITALIST

## 2025-07-13 RX ORDER — METOPROLOL SUCCINATE 50 MG/1
25 TABLET, EXTENDED RELEASE ORAL 2 TIMES DAILY
Qty: 60 TABLET | Refills: 3 | Status: SHIPPED | OUTPATIENT
Start: 2025-07-13

## 2025-07-13 RX ORDER — FUROSEMIDE 20 MG/1
20 TABLET ORAL DAILY
Status: DISCONTINUED | OUTPATIENT
Start: 2025-07-13 | End: 2025-07-13 | Stop reason: HOSPADM

## 2025-07-13 RX ORDER — POTASSIUM CHLORIDE 1500 MG/1
40 TABLET, EXTENDED RELEASE ORAL ONCE
Status: COMPLETED | OUTPATIENT
Start: 2025-07-13 | End: 2025-07-13

## 2025-07-13 RX ORDER — DOXYCYCLINE 100 MG/1
100 CAPSULE ORAL 2 TIMES DAILY
Qty: 5 CAPSULE | Refills: 0 | Status: SHIPPED | OUTPATIENT
Start: 2025-07-13

## 2025-07-13 RX ORDER — POTASSIUM CHLORIDE 750 MG/1
10 TABLET, EXTENDED RELEASE ORAL 2 TIMES DAILY WITH MEALS
Qty: 60 TABLET | Refills: 0 | Status: SHIPPED | OUTPATIENT
Start: 2025-07-13

## 2025-07-13 RX ORDER — MULTIVITAMIN WITH IRON
1 TABLET ORAL DAILY
Qty: 30 TABLET | Refills: 0 | Status: SHIPPED | OUTPATIENT
Start: 2025-07-13

## 2025-07-13 RX ORDER — FUROSEMIDE 20 MG/1
20 TABLET ORAL DAILY
Qty: 30 TABLET | Refills: 0 | Status: SHIPPED | OUTPATIENT
Start: 2025-07-14

## 2025-07-13 RX ADMIN — EMPAGLIFLOZIN 10 MG: 10 TABLET, FILM COATED ORAL at 09:12

## 2025-07-13 RX ADMIN — METOPROLOL SUCCINATE 25 MG: 25 TABLET, EXTENDED RELEASE ORAL at 09:13

## 2025-07-13 RX ADMIN — DOXYCYCLINE 100 MG: 100 INJECTION, POWDER, LYOPHILIZED, FOR SOLUTION INTRAVENOUS at 09:20

## 2025-07-13 RX ADMIN — INSULIN GLARGINE 20 UNITS: 100 INJECTION, SOLUTION SUBCUTANEOUS at 09:18

## 2025-07-13 RX ADMIN — Medication 1 TABLET: at 09:12

## 2025-07-13 RX ADMIN — FUROSEMIDE 40 MG: 10 INJECTION, SOLUTION INTRAMUSCULAR; INTRAVENOUS at 05:27

## 2025-07-13 RX ADMIN — FUROSEMIDE 20 MG: 20 TABLET ORAL at 09:12

## 2025-07-13 RX ADMIN — FLECAINIDE ACETATE 100 MG: 100 TABLET ORAL at 09:11

## 2025-07-13 RX ADMIN — APIXABAN 5 MG: 5 TABLET, FILM COATED ORAL at 09:12

## 2025-07-13 RX ADMIN — PANTOPRAZOLE SODIUM 40 MG: 40 TABLET, DELAYED RELEASE ORAL at 09:12

## 2025-07-13 RX ADMIN — POTASSIUM CHLORIDE 40 MEQ: 1500 TABLET, EXTENDED RELEASE ORAL at 09:38

## 2025-07-13 RX ADMIN — CARBIDOPA AND LEVODOPA 2.5 MG: 50; 200 TABLET, EXTENDED RELEASE ORAL at 11:39

## 2025-07-13 RX ADMIN — CARBIDOPA AND LEVODOPA 2.5 MG: 50; 200 TABLET, EXTENDED RELEASE ORAL at 09:12

## 2025-07-13 ASSESSMENT — ACTIVITIES OF DAILY LIVING (ADL)
ADLS_ACUITY_SCORE: 43
ADLS_ACUITY_SCORE: 41
ADLS_ACUITY_SCORE: 43
ADLS_ACUITY_SCORE: 41
ADLS_ACUITY_SCORE: 41
ADLS_ACUITY_SCORE: 43
ADLS_ACUITY_SCORE: 41
ADLS_ACUITY_SCORE: 41
ADLS_ACUITY_SCORE: 43
ADLS_ACUITY_SCORE: 41
ADLS_ACUITY_SCORE: 43

## 2025-07-13 NOTE — PROGRESS NOTES
"Occupational Therapy        07/13/25 0745   Appointment Info   Signing Clinician's Name / Credentials (OT) Deepali Bennett OT   Living Environment   People in Home significant other   Current Living Arrangements house   Home Accessibility stairs to enter home   Number of Stairs, Main Entrance 2   Stair Railings, Main Entrance none   Transportation Anticipated family or friend will provide  (Pt reports she does drive)   Living Environment Comments Tub shower and walk in shower, comfort height toilet. Pt reports stairs to basement, does not need to use.   Self-Care   Usual Activity Tolerance good   Current Activity Tolerance good   Regular Exercise Yes   Activity/Exercise Type walking   Exercise Amount/Frequency daily   Equipment Currently Used at Home none   Fall history within last six months no   Activity/Exercise/Self-Care Comment Pt reports IND w/ ADLs   Instrumental Activities of Daily Living (IADL)   IADL Comments Pt reports IND w/ IADLs   General Information   Onset of Illness/Injury or Date of Surgery 07/10/25   Referring Physician Keara Bustillo MD   Patient/Family Therapy Goal Statement (OT) D/c home   Additional Occupational Profile Info/Pertinent History of Current Problem Per chart review, pt is a \"74 year old female admitted on 7/10/2025. She was admitted thru the ED on 7/10 for confusion, dehydration, UTI and new HF.\"   Cognitive Status Examination   Orientation Status orientation to person, place and time   Range of Motion Comprehensive   General Range of Motion bilateral upper extremity ROM WFL   Bed Mobility   Bed Mobility supine-sit;sit-supine   Supine-Sit [place] (Bed Mobility) supervision   Sit-Supine [place] (Bed Mobility) supervision   Assistive Device (Bed Mobility) bed rails   Transfers   Transfers sit-stand transfer   Sit-Stand Transfer   Sit-Stand [place] (Transfers) supervision   Balance   Balance Assessment sitting static balance;standing static balance   Sitting " Balance: Static supervision   Position, Sitting Balance sitting edge of bed   Standing Balance: Static supervision   Position/Device Used, Standing Balance unsupported   Activities of Daily Living   BADL Assessment/Intervention grooming;lower body dressing   Additional Documentation Comment, BADL Assessment/Training (Row)  (Based on clinical reasoning, anticipate pt to be able to complete ADL routine w/ supervision assist.)   Clinical Impression   Criteria for Skilled Therapeutic Interventions Met (OT) Yes, treatment indicated   OT Diagnosis Decreased IND w/ ADLs and activity tolerance   OT Problem List-Impairments impacting ADL problems related to;activity tolerance impaired;mobility;strength   Assessment of Occupational Performance 1-3 Performance Deficits   Identified Performance Deficits Activity tolerance; dressing; toileting   Planned Therapy Interventions (OT) ADL retraining;IADL retraining;strengthening;home program guidelines;progressive activity/exercise   Clinical Decision Making Complexity (OT) problem focused assessment/low complexity   OT Total Evaluation Time   OT Eval, Low Complexity Minutes (34499) 8   OT Goals   Therapy Frequency (OT) One time eval and treatment   OT Predicted Duration/Target Date for Goal Attainment 07/13/25   OT Goals Bed Mobility;Transfers;Aerobic Activity   OT: Bed Mobility Supervision/stand-by assist;supine to/from sitting   OT: Transfer Supervision/stand-by assist   OT: Perform aerobic activity with stable cardiovascular response 5 minutes;continuous activity;ambulation   Self-Care/Home Management   Self-Care/Home Mgmt/ADL, Compensatory, Meal Prep Minutes (71338) 15   Treatment Detail/Skilled Intervention Pt greeted in bed and agreeable to complete session. Pt demos ability to complete supine>sit w/ supervision assist. Completes STS trnf w/ supervision assist. Pt demos ability to ambulate in otero ~200 ft w/ SBA to aid in completion of ADL and IADL routines. Upon returning to  room, pt completes STS and sit>supine trnf w/ supervision assist. Pt educated of CHF routine and habit management using self-help handout. See cardiac tab for details. Pt receptive to education and  verbalizes understanding. Pt left in bed, w/ call light in reach and bed alarm on.   Cardiac Education   Education Provided Daily weights;Diagnosis;Diet;Stop light tool   Education Packet Given to Patient Yes   All Patient Education Handouts Reviewed with Patient and/or Family Yes   OT Discharge Planning   OT Plan D/c OT   OT Discharge Recommendation (DC Rec) home with assist   OT Rationale for DC Rec Pt ambulating w/ SBA-supervision, has supportive significant other at home and supportive living environment. Anticipate safe d/c home pending medical readiness.   OT Brief overview of current status Supervision-SBA for all trnf and functional mobility.   OT Total Distance Amb During Session (feet) 200   Total Session Time   Timed Code Treatment Minutes 15   Total Session Time (sum of timed and untimed services) 23

## 2025-07-13 NOTE — DISCHARGE SUMMARY
[facility]  Hospitalist Discharge Summary      Date of Admission:  7/10/2025  Date of Discharge:  7/13/2025  1:51 PM  Discharging Provider: Otto Bueno MD  Discharge Service: Hospitalist Service    Discharge Diagnoses   Uncomplicated UTI  Acute metabolic encephalopathy  CINTIA  Acute heart failure with preserved ejection fraction  Troponin elevation/demand ischemia  HCV cirrhosis status post TIPS    Clinically Significant Risk Factors     # DMII: A1C = 7.0 % (Ref range: 0.0 - 5.6 %) within past 6 months       Follow-ups Needed After Discharge   Follow-up Appointments       Hospital Follow-up with Existing Primary Care Provider (PCP)          Schedule Primary Care visit within: 7 Days       Follow Up      Follow up with cardiology as instructed.    Follow up with GI as instructed to discuss stenting of the blocked bile duct.              Patient instructed to follow-up with GI as an outpatient to discuss treatment of blocked bile duct.    Unresulted Labs Ordered in the Past 30 Days of this Admission       Date and Time Order Name Status Description    7/11/2025  7:52 AM Candida auris by PCR In process     7/10/2025  3:31 PM Blood Culture Peripheral blood (BC) Arm, Right Preliminary     7/10/2025  3:31 PM Blood Culture Peripheral blood (BC) Arm, Right Preliminary         These results will be followed up by hospital medicine    Discharge Disposition   Discharged to home  Condition at discharge: Stable    Hospital Course   Zara Webster is a 74 year old female admitted on 7/10/2025. She was admitted thru the ED on 7/10 for confusion, dehydration, UTI and new HF.      Acute Toxic Metabolic Encephalopathy  Urinary tract infection  - Ucx positive from PTA 7/9 S. aureus  - CT head negative for acute abnormalities  - Ammonia levels within normal limits on admission, will recheck in a.m.  - family states this developed ~5-6 days PTA, and prior UTIs have presented in a similar manner  - UA from  7/9 and 7/10 consistent with infection, and Ucx from 7/9 shows staph aureus with pan sensitivity, 5/4 Ucx with Staph as well, pansensitive  - lactate is normal and she is not septic   - Continue on IV doxycycline to finish 5 days with orals     Acute Kidney Injury-significantly improved  - presumably pre-renal, due to dehydration  - Baseline creatinine ~0.6, creatinine on admission 1.35, down to 0.81 today  - IV fluids stopped due to lower extremity edema  - Avoid nephrotoxins     Acute HFpEF  NSTEMI due to demand ischemia, multifactorial: UTI, dehydration  - Presented with shortness of breath, small left pleural effusion on CT, lower extremity edema (has been worse in the recent past)-volume issues may also be related to cirrhosis  - EKG in the ED negative for signs of ischemia  - Troponin on admission 56 > 48  - BNP on admission 2607  - Echo 7/11: LVEF 60%, normal LV size and function, mild RV enlargement with normal systolic function, mild right atrial enlargement, compared to echo from February 2025 findings are similar  - Not on diuretics outpatient  - Cardiology consulted: IV lasix q12 initially, discharged on oral Lasix  - Started on Jardiance per cardiology     Cirrhosis s/p TIPS   Hepatocellular carcinoma  - followed closely by [facility], and LFTs appears close to her baseline  - recently underwent long procedure at [facility]  - Had normal ammonia levels on admission, will monitor in a.m.  - GI consulted, hold off on lactulose or rifaximin at this time, will arrange for outpatient appointment at the liver clinic for follow-up, have signed off     H/O SVT  - in NSR currently  - cont outpt flecianide and metoprolol (her home dose was lowered to 25 bid 7/9)  - cont eliquis      Hypotension  - appears to also be on midodrine (while on metoprolol) - continue and follow vitals  - it is noted in her primary providers note that her normal Bps are 90/60     DM  Hyperglycemia in the setting of infection  - normally well  controlled, with BS per report very uncontrolled this past week  -Most recent hemoglobin A1c 6/20/2025 7.0% (down significantly from prior 4/15/2025 12.1%)  - suspect due to acute infection  - Place on glucometers and ISS  - Cont home lantus 20 bid      GERD  - controlled, cont home PPI      Macrocytic Anemia  - she is at her baseline Hgb of ~9, with admit value of 9.3 > 8.9 > 8.4  - No signs of bleeding  - Will monitor       Consultations This Hospital Stay   GASTROENTEROLOGY IP CONSULT  CARE MANAGEMENT / SOCIAL WORK IP CONSULT  CARDIOLOGY IP CONSULT  CARDIOLOGY IP CONSULT  PHYSICAL THERAPY ADULT IP CONSULT  OCCUPATIONAL THERAPY ADULT IP CONSULT    Code Status   Prior    Time Spent on this Encounter   I, Otto Bueno MD, personally saw the patient today and spent greater than 30 minutes discharging this patient.       Otto Bueno MD  [address] [address]  Phone: [phone]  Fax: [phone]  ______________________________________________________________________    Physical Exam   Vital Signs:                    Weight: 152 lbs 3.2 oz  Alert, no distress, heart rate regular, right lower lung base diminished, otherwise lungs clear, abdomen nontender, trace leg edema       Primary Care Physician   Obdulia Chamorro    Discharge Orders      Primary Care - Care Coordination Referral      Cardiac Rehab  Referral      Reason for your hospital stay    Heart failure - extra fluid causing congestion of the heart, fluids around lungs and kidney injury.     Activity    Your activity upon discharge: activity as tolerated     Tubes and Drains    Current Tubes and Drains:     Drain  Duration           Colostomy 165 days              Follow Up    Follow up with cardiology as instructed.    Follow up with GI as instructed to discuss stenting of the blocked bile duct.     Monitor and record    Blood pressure: daily.  Pulse: daily.     Discharge  Instructions    We will reduce your metoprolol to avoid unsafe drops in heart rate and because you are ever further from ablation.    Continue lasix daily, it is OK to take afternoon dose if you notice increased swelling on once daily dosing.     Diet    Follow this diet upon discharge: Current Diet:Orders Placed This Encounter      Fluid restriction 1800 ML FLUID      Combination Diet Moderate Consistent Carb (60 g CHO per Meal) Diet; 2 gm NA Diet     Hospital Follow-up with Existing Primary Care Provider (PCP)            Significant Results and Procedures   Most Recent 3 CBC's:  Recent Labs   Lab Test 07/13/25  0802 07/12/25  1010 07/11/25  0657   WBC 11.6* 9.6 6.5   HGB 9.3* 8.4* 8.9*   MCV 98 101* 101*    156 134*     Most Recent 3 BMP's:  Recent Labs   Lab Test 07/13/25  1112 07/13/25  0802 07/13/25  0715 07/12/25  1123 07/12/25  1010 07/11/25  0750 07/11/25  0657   NA  --  135  --   --  134*  --  134*   POTASSIUM  --  3.3*  --   --  4.2  --  4.7   CHLORIDE  --  103  --   --  106  --  109*   CO2  --  27  --   --  23  --  20*   BUN  --  17.9  --   --  16.0  --  19.6   CR  --  0.86  --   --  0.81  --  0.92   ANIONGAP  --  5*  --   --  5*  --  5*   HANNA  --  8.5*  --   --  8.5*  --  8.3*   * 155* 121*   < > 179*   < > 334*    < > = values in this interval not displayed.     Most Recent 2 LFT's:  Recent Labs   Lab Test 07/12/25  1010 07/10/25  1612   AST 40 50*   ALT 21 32   ALKPHOS 264* 400*   BILITOTAL 2.7* 2.3*   ,   Results for orders placed or performed during the hospital encounter of 07/10/25   XR Chest 2 Views    Narrative    EXAM: XR CHEST 2 VIEWS  LOCATION: [facility]  DATE: 7/10/2025    INDICATION: dyspnea progressive  COMPARISON: 3/25/2024      Impression    IMPRESSION: Small left pleural effusion with some associated slight infiltrate/atelectasis in the left lung base. Changes most typical for a shunt in the right upper abdomen. The chest is otherwise normal. Since  3/25/2024 the left pleural effusion with   associated mild infiltrate/atelectasis appears new.   CT Chest Pulmonary Embolism w Contrast    Narrative    EXAM: CT CHEST PULMONARY EMBOLISM W CONTRAST  LOCATION: [facility]  DATE: 7/10/2025    INDICATION: increasing dyspnea this week  COMPARISON: None.  TECHNIQUE: CT chest pulmonary angiogram during arterial phase injection of IV contrast. Multiplanar reformats and MIP reconstructions were performed. Dose reduction techniques were used.   CONTRAST: 75 cc isovue 370    FINDINGS:  ANGIOGRAM CHEST: Pulmonary arteries are normal caliber and negative for pulmonary emboli. Normal caliber thoracic aorta. Normal caliber thoracic aorta.    LUNGS AND PLEURA: Small left pleural effusion with atelectasis in the left lung base.    MEDIASTINUM/AXILLAE: Normal.    CORONARY ARTERY CALCIFICATION: None.    UPPER ABDOMEN: Portosystemic shunt in the liver. Advanced cirrhotic configuration. Splenomegaly.    MUSCULOSKELETAL: Normal.      Impression    IMPRESSION:  1.  No pulmonary embolism. There are small left pleural effusion with left basilar atelectasis.   CT Head w/o Contrast    Narrative    EXAM: CT HEAD W/O CONTRAST  LOCATION: [facility]  DATE: 7/11/2025    INDICATION: Acute Confusion  COMPARISON: None.   TECHNIQUE: Routine CT head without IV contrast. Multiplanar reformats. Dose reduction techniques were used.    FINDINGS:  INTRACRANIAL CONTENTS: No acute intracranial hemorrhage. No CT evidence of an acute infarction. No significant mass effect. Mild presumed chronic small vessel ischemic changes. Mild generalized volume loss. No hydrocephalus. Partially empty sella.    VISUALIZED ORBITS/SINUSES/MASTOIDS: No intraorbital abnormality. No significant paranasal sinus mucosal disease. No significant middle ear or mastoid effusion.    BONES/SOFT TISSUES: No acute abnormality.      Impression    IMPRESSION:  1.  No acute intracranial  abnormality.   Echocardiogram Complete    Narrative    [id number]  [id number]  [id number]  [id number]^NASEEM^[place] [address]     Name: HUSAM MCGILL  MRN: [id number]  : 1950  Study Date: 2025 02:21 PM  Age: 74 yrs  Gender: Female  Patient Location: [facility]  Reason For Study: Heart Failure  Ordering Physician: KENNEDY GUSMAN  Performed By: LV^^^^     BSA: 1.8 m2  Height: 66 in  Weight: 154 lb  HR: 66  BP: 78/35 mmHg  ______________________________________________________________________________  Procedure  Echocardiogram with two-dimensional, color and spectral Doppler. Definity (NDC  #15081-057) given intravenously.  ______________________________________________________________________________  Interpretation Summary     1. Normal left ventricular size and systolic performance. The visually  estimated ejection fraction is 60%.  2. No significant valvular heart disease is identified on this study.  3. Probable mild right ventricular enlargement with normal right ventricular  systolic performance (the right ventricle is suboptimally visualized on the  study).  4. There is mild right atrial enlargement.     When compared to the prior real-time echocardiogram dated 2025,  the findings are felt to be fairly similar on both examinations.  ______________________________________________________________________________  Left ventricle:  Normal left ventricular size and systolic performance. The visually estimated  ejection fraction is 60%. There is normal regional wall motion. There is  borderline concentric increase in left ventricular wall thickness.     Assessment of LV Diastolic Function: The cumulative findings suggest normal  diastolic filling [The septal e' velocity is > 7 cm/s & lateral e' velocity  is  > 10 cm/s. The average E/e' is >14. The TR velocity cannot be determined due  to insufficient tricuspid insufficiency signal. Left  atrial volume index is  less than 34 mL/mÂ ].     Right ventricle:  Probable mild right ventricular enlargement with normal right ventricular  systolic performance (the right ventricle is suboptimally visualized on the  study).     Left atrium:  The left atrium is of normal size.     Right atrium:  There is mild right atrial enlargement.     IVC:  The IVC is of normal caliber.     Aortic valve:  The aortic valve is comprised of three cusps. No significant aortic stenosis  or aortic insufficiency is detected on this study.     Mitral valve:  The mitral valve appears morphologically normal. There is trace mitral  insufficiency.     Tricuspid valve:  The tricuspid valve is grossly morphologically normal. There is trace  tricuspid insufficiency.     Pulmonic valve:  The pulmonic valve is grossly morphologically normal.     Thoracic aorta:  The aortic root and proximal ascending aorta are of normal dimension.     Pericardium:  There is no significant pericardial effusion.  ______________________________________________________________________________  ______________________________________________________________________________  MMode/2D Measurements & Calculations  Ao root diam: 3.5 cm  LA dimension: 3.3 cm  asc Aorta Diam: 3.5 cm  LA/Ao: 0.94  LVOT diam: 2.0 cm  LVOT area: 3.1 cm2  Ao root diam index Ht(cm/m): 2.1  Ao root diam index BSA (cm/m2): 2.0  Asc Ao diam index BSA (cm/m2): 2.0  Asc Ao diam index Ht(cm/m): 2.1  EF Biplane: 61.2 %  LA Volume (BP): 50.6 ml     LA Volume Index (BP): 28.3 ml/m2  LA Volume Indexed (AL/bp): 30.3 ml/m2  RV Base: 4.9 cm  TAPSE: 2.9 cm     Time Measurements  MM HR: 66.0 BPM     Doppler Measurements & Calculations  MV E max radha: 123.0 cm/sec  MV A max radha: 85.4 cm/sec  MV E/A: 1.4  MV max P.2 mmHg  MV mean P.0 mmHg  MV V2 VTI: 37.8 cm  MVA(VTI): 2.3 cm2  MV dec slope: 684.0 cm/sec2  MV dec time: 0.18 sec  Ao V2 max: 151.5 cm/sec  Ao max P.0 mmHg  Ao V2 mean: 111.0 cm/sec  Ao  mean P.0 mmHg  Ao V2 VTI: 38.3 cm  PAUL(I,D): 2.3 cm2  PAUL(V,D): 2.5 cm2  LV V1 max P.9 mmHg  LV V1 max: 121.0 cm/sec  LV V1 VTI: 28.0 cm  SV(LVOT): 87.8 ml  SI(LVOT): 49.1 ml/m2  PA V2 max: 85.0 cm/sec  PA max P.9 mmHg  PA acc time: 0.06 sec  AV Kelvin Ratio (DI): 0.80  PAUL Index (cm2/m2): 1.3  E/E': 10.1  E/E' av.2  Lateral E/e': 10.1  Medial E/e': 14.3  Peak E' Kelvin: 12.2 cm/sec  RV S Kelvin: 11.4 cm/sec     ______________________________________________________________________________  Report approved by: Carlton Madden MD on 2025 03:35 PM             Discharge Medications      Review of your medicines        START taking        Dose / Directions   doxycycline hyclate 100 MG capsule  Commonly known as: VIBRAMYCIN  Used for: Urinary tract infection without hematuria, site unspecified      Dose: 100 mg  Take 1 capsule (100 mg) by mouth 2 times daily.  Quantity: 5 capsule  Refills: 0     empagliflozin 10 MG Tabs tablet  Commonly known as: JARDIANCE  Used for: Acute diastolic congestive heart failure (H)      Dose: 10 mg  Take 1 tablet (10 mg) by mouth daily.  Quantity: 90 tablet  Refills: 1     furosemide 20 MG tablet  Commonly known as: LASIX  Used for: Acute diastolic congestive heart failure (H)      Dose: 20 mg  Take 1 tablet (20 mg) by mouth daily.  Quantity: 30 tablet  Refills: 0     magnesium 250 MG tablet  Used for: Acute diastolic congestive heart failure (H)      Dose: 1 tablet  Take 1 tablet (250 mg) by mouth daily.  Quantity: 30 tablet  Refills: 0     potassium chloride oksana ER 10 MEQ CR tablet  Commonly known as: KLOR-CON M10  Used for: Acute diastolic congestive heart failure (H)      Dose: 10 mEq  Take 1 tablet (10 mEq) by mouth 2 times daily (with meals).  Quantity: 60 tablet  Refills: 0            CHANGE how you take these medications        Dose / Directions   metoprolol succinate ER 50 MG 24 hr tablet  Commonly known as: TOPROL XL  This may have changed: how much to take  Used  for: SVT (supraventricular tachycardia)      Dose: 25 mg  Take 0.5 tablets (25 mg) by mouth 2 times daily.  Quantity: 60 tablet  Refills: 3            CONTINUE these medicines which have NOT CHANGED        Dose / Directions   apixaban ANTICOAGULANT 5 MG tablet  Commonly known as: ELIQUIS  Used for: Elevated d-dimer, Abnormal CT of the chest, Pulmonary emboli (H)      Dose: 5 mg  Take 1 tablet (5 mg) by mouth 2 times daily.  Quantity: 180 tablet  Refills: 3     clobetasol 0.05 % external ointment  Commonly known as: TEMOVATE      Apply topically as needed  Refills: 0     diphenoxylate-atropine 2.5-0.025 MG tablet  Commonly known as: LOMOTIL  Indication: Diarrhea  Used for: Crohn's disease of colon with other complication (H)      Dose: 1 tablet  Take 1 tablet by mouth 4 times daily as needed for diarrhea.  Quantity: 12 tablet  Refills: 0     flecainide 100 MG tablet  Commonly known as: TAMBOCOR  Used for: SVT (supraventricular tachycardia)      Dose: 100 mg  Take 1 tablet (100 mg) by mouth 2 times daily.  Quantity: 180 tablet  Refills: 2     FreeStyle Naila Malakoff Denise  Used for: Type II diabetes mellitus (H)      Dose: 1 Units  [FLASH GLUCOSE SCANNING READER (FREESTYLE NAILA 14 DAY READER) MISC] Use 1 Units As Directed every 14 (fourteen) days.  Quantity: 1 each  Refills: 0     Hair Skin & Nails Advanced Tabs      Dose: 1 tablet  Take 1 tablet by mouth daily.  Refills: 0     insulin lispro 100 UNIT/ML (1 unit dial) KWIKPEN  Commonly known as: HumaLOG KWIKpen  Used for: Type 2 diabetes mellitus with diabetic mononeuropathy, with long-term current use of insulin (H)      Inject 6 units subcutaneously 4 times daily for blood glucose management  Quantity: 30 mL  Refills: 4     insulin pen needle 32G X 4 MM miscellaneous  Commonly known as: 32G X 4 MM  Used for: Type 2 diabetes mellitus with diabetic mononeuropathy, with long-term current use of insulin (H)      Use 8 pen needles daily or as directed.  Quantity: 800  each  Refills: 0     Lantus SoloStar 100 UNIT/ML soln  Used for: Type 2 diabetes mellitus with diabetic mononeuropathy, with long-term current use of insulin (H)  Generic drug: insulin glargine      Take 20 units BID  Quantity: 30 mL  Refills: 3     midodrine 2.5 MG tablet  Commonly known as: PROAMATINE  Used for: SVT (supraventricular tachycardia), Anemia of chronic disease, CINTIA (acute kidney injury), NSTEMI (non-ST elevated myocardial infarction) (H), Hyponatremia, History of pulmonary embolism, HCC (hepatocellular carcinoma) (H)      Dose: 2.5 mg  Take 1 tablet (2.5 mg) by mouth 3 times daily (with meals).  Quantity: 270 tablet  Refills: 0     nitroFURantoin macrocrystal-monohydrate 100 MG capsule  Commonly known as: MACROBID  Used for: Dysuria      Dose: 100 mg  Take 1 capsule (100 mg) by mouth 2 times daily for 7 days.  Quantity: 14 capsule  Refills: 0     omeprazole 40 MG DR capsule  Commonly known as: PriLOSEC      Dose: 40 mg  Take 40 mg by mouth nightly as needed.  Refills: 0     stomahesive Pste      [OSTOMY ADHESIVE PSTE] Apply as needed  Quantity: 4 Tube  Refills: 3               Where to get your medicines        These medications were sent to [id number] - [place], [place] - [facility]  [address] , [facility] [address]      Hours: test fax successfully sent 10/22/03 kr Phone: [phone]   doxycycline hyclate 100 MG capsule  empagliflozin 10 MG Tabs tablet  furosemide 20 MG tablet  magnesium 250 MG tablet  metoprolol succinate ER 50 MG 24 hr tablet  potassium chloride oksana ER 10 MEQ CR tablet       Allergies   Allergies   Allergen Reactions    Prochlorperazine Edisylate [Prochlorperazine] Other (See Comments)     Saw things    Compazine [Prochlorperazine] Hallucination

## 2025-07-13 NOTE — PROGRESS NOTES
HEART CARE NOTE          Assessment/Recommendations   1. HFpEF c/b severe ADHF  Assessment / Plan  Transition to oral diuretic regimen; continue to monitor UOP and renal function closely  Patient is high risk for adverse 2/2 advanced age,   GDMT as detailed below; mainstay of treatment for HFpEF includes diuretics and adequate BP control (class I) and SGLT2-I (class 2a); additional medical therapy (ARNI, MRA, ARB) demonstrated less robust evidence for indication but may be considered per guideline recommendations (2b); no indication for BBlockers      Current Pharmacotherapy AHA Guideline-Directed Medical Therapy   Losartan  - not started  ARNI/ARB   Spironolactone - not started  MRA   SGLT2 inhibitor: Empagliflozin 10 mg daily SGLT2-I    Furosemide 20 mg daily Loop diuretic       2. CINTIA  Assessment / Plan  Resolving CINTIA     3. SVT  Assessment / Plan  Currently in NSR - continue to monitor on telemetry     4. DM2  Assessment / Plan  Management per primary team    Plan of care discussed on July 13, 2025 with patient at bedside; plan communicated to primary team overseeing patient's care via chart    History of Present Illness/Subjective    Ms. Zara Webster is a 74 year old female who (per Epic notation) presented confusion and dehydration, with a possible UTI      Today, Mrs. Webster denies acute cardiac events or complaints; Management plan as detailed above     ECG: Personally reviewed. nonspecific ST and T waves changes, sinus bradycardia, left axis deviation.     ECHO (personnaly Reviewed on 7/12/25):   1. Normal left ventricular size and systolic performance. The visually  estimated ejection fraction is 60%.  2. No significant valvular heart disease is identified on this study.  3. Probable mild right ventricular enlargement with normal right ventricular  systolic performance (the right ventricle is suboptimally visualized on the  study).  4. There is mild right atrial enlargement.     When compared to the  "prior real-time echocardiogram dated 20 February 2025,  the findings are felt to be fairly similar on both examinations.    Lab results: personally reviewed July 13, 2025; notable for renal function wnl    Medical history and pertinent documents reviewed in Care Everywhere please where applicable see details above        Physical Examination Review of Systems   BP 93/51 (BP Location: Left arm)   Pulse 62   Temp 98.7  F (37.1  C) (Oral)   Resp 17   Ht 1.676 m (5' 6\")   Wt 69 kg (152 lb 3.2 oz)   LMP  (LMP Unknown)   SpO2 92%   BMI 24.57 kg/m    Body mass index is 24.57 kg/m .  Wt Readings from Last 3 Encounters:   07/13/25 69 kg (152 lb 3.2 oz)   07/09/25 69.8 kg (153 lb 12.8 oz)   06/25/25 69.5 kg (153 lb 5 oz)     General Appearance:   no distress, normal body habitus   ENT/Mouth: membranes moist, no oral lesions or bleeding gums.      EYES:  no scleral icterus, normal conjunctivae   Neck: no carotid bruits or thyromegaly   Chest/Lungs:   lungs are clear to auscultation, no rales or wheezing, equal chest wall expansion    Cardiovascular:   Regular. Normal first and second heart sounds with no murmurs, rubs, or gallops; the carotid, radial and posterior tibial pulses are intact, no JVD and mild LE edema bilaterally    Abdomen:  no organomegaly, masses, bruits, or tenderness; bowel sounds are present   Extremities: no cyanosis or clubbing   Skin: no xanthelasma, warm.    Neurologic: NAD     Psychiatric: alert and oriented x3, calm     A complete 10 systems ROS was reviewed  And is negative except what is listed in the HPI.          Medical History  Surgical History Family History Social History   Past Medical History:   Diagnosis Date    Anemia     Atypical chest pain     Brain tumor, glioma (H)     Common bile duct obstruction (H) 11/20/2023    Crohn's disease (H)     Diabetes mellitus (H)     Diabetes mellitus, type 2 (H)     Disorder of biliary tract     Essential tremor     Gastroesophageal reflux disease "     Gastrointestinal hemorrhage     Hepatocellular carcinoma (H)     Hypercholesteremia     Ileostomy status (H)     Lesion of liver     Myocardial infarction (H)     Non-alcoholic cirrhosis (H)     Obstruction of common bile duct (H) 11/20/2023    Past Surgical History:   Procedure Laterality Date    BREAST EXCISIONAL BIOPSY Left 1980    BREAST EXCISIONAL BIOPSY Left 1985    EP ABLATION SVT N/A 6/9/2025    Procedure: Ablation Supraventricular Tachycardia;  Surgeon: Mi Head MD;  Location: [facility]    ESOPHAGOSCOPY, GASTROSCOPY, DUODENOSCOPY (EGD), COMBINED N/A 1/26/2024    Procedure: ENDOSCOPIC ULTRASOUND;  Surgeon: Santi Pradhan MD;  Location: [facility]    HYSTERECTOMY  1991    OOPHORECTOMY Bilateral 1991    OTHER SURGICAL HISTORY      KIDNEY STONE REMOVALNOT SURE WHICH SIDE, DR. CHANEY    MD PROCTOSIGMOIDOSCOPY,RIGID,DIAGNOS N/A 6/17/2020    Procedure: ILEOSTOMY REVISION, PROCTOSCOPY, ILEOSCOPY;  Surgeon: Devang Moon MD;  Location: [facility];  Service: General    ZZC REMOVAL COLON/ILEOSTOMY      Description: Total Abdominal Colectomy;  Recorded: 12/15/2011;    ZZC REMOVAL COLON/PROCTECTOMY/ILEOSTOMY      Description: Total Proctocolectomy;  Recorded: 08/10/2011;    ZZC VAG HYST, W/VAGINECTOMY      Description: Vaginal Hysterectomy With Colpectomy;  Recorded: 08/14/2014;    no family history of premature coronary artery disease Social History     Socioeconomic History    Marital status:      Spouse name: Not on file    Number of children: 2    Years of education: 16    Highest education level: Bachelor's degree (e.g., BA, AB, BS)   Occupational History    Occupation: Retired   Tobacco Use    Smoking status: Never     Passive exposure: Never    Smokeless tobacco: Never   Vaping Use    Vaping status: Never Used   Substance and Sexual Activity    Alcohol use: Not Currently    Drug use: No    Sexual activity: Not on file   Other Topics Concern    Not on  file   Social History Narrative    Not on file     Social Drivers of Health     Financial Resource Strain: Low Risk  (7/12/2025)    Financial Resource Strain     Within the past 12 months, have you or your family members you live with been unable to get utilities (heat, electricity) when it was really needed?: No   Food Insecurity: Low Risk  (7/12/2025)    Food Insecurity     Within the past 12 months, did you worry that your food would run out before you got money to buy more?: No     Within the past 12 months, did the food you bought just not last and you didn t have money to get more?: No   Transportation Needs: Low Risk  (7/12/2025)    Transportation Needs     Within the past 12 months, has lack of transportation kept you from medical appointments, getting your medicines, non-medical meetings or appointments, work, or from getting things that you need?: No   Physical Activity: Not on file   Stress: Not on file   Social Connections: Unknown (11/17/2023)    Received from [facility]    Social Connections     Frequency of Communication with Friends and Family: Not on file   Interpersonal Safety: High Risk (7/10/2025)    Interpersonal Safety     Do you feel physically and emotionally safe where you currently live?: No     Within the past 12 months, have you been hit, slapped, kicked or otherwise physically hurt by someone?: No     Within the past 12 months, have you been humiliated or emotionally abused in other ways by your partner or ex-partner?: No   Housing Stability: Low Risk  (7/12/2025)    Housing Stability     Do you have housing? : Yes     Are you worried about losing your housing?: No   Recent Concern: Housing Stability - High Risk (5/9/2025)    Housing Stability     Do you have housing? : No     Are you worried about losing your housing?: No           Lab Results    Chemistry/lipid CBC Cardiac Enzymes/BNP/TSH/INR   Lab Results   Component Value Date    CHOL 243 (H)  "03/20/2023    HDL 58 03/20/2023    TRIG 238 (H) 03/20/2023    BUN 16.0 07/12/2025     (L) 07/12/2025    CO2 23 07/12/2025    Lab Results   Component Value Date    WBC 9.6 07/12/2025    HGB 8.4 (L) 07/12/2025    HCT 26.7 (L) 07/12/2025     (H) 07/12/2025     07/12/2025    Lab Results   Component Value Date    TSH 1.00 05/08/2025    INR 1.79 (H) 05/29/2025     No results found for: \"CKTOTAL\", \"CKMB\", \"TROPONINI\"       Weight:    Wt Readings from Last 3 Encounters:   07/13/25 69 kg (152 lb 3.2 oz)   07/09/25 69.8 kg (153 lb 12.8 oz)   06/25/25 69.5 kg (153 lb 5 oz)       Allergies  Allergies   Allergen Reactions    Prochlorperazine Edisylate [Prochlorperazine] Other (See Comments)     Saw things    Compazine [Prochlorperazine] Hallucination         Surgical History  Past Surgical History:   Procedure Laterality Date    BREAST EXCISIONAL BIOPSY Left 1980    BREAST EXCISIONAL BIOPSY Left 1985    EP ABLATION SVT N/A 6/9/2025    Procedure: Ablation Supraventricular Tachycardia;  Surgeon: Mi Head MD;  Location: [facility]    ESOPHAGOSCOPY, GASTROSCOPY, DUODENOSCOPY (EGD), COMBINED N/A 1/26/2024    Procedure: ENDOSCOPIC ULTRASOUND;  Surgeon: Santi Pradhan MD;  Location: [facility]    HYSTERECTOMY  1991    OOPHORECTOMY Bilateral 1991    OTHER SURGICAL HISTORY      KIDNEY STONE REMOVALNOT SURE WHICH SIDE, DR. SHIV SALINAS PROCTOSIGMOIDOSCOPY,RIGID,DIAGNOS N/A 6/17/2020    Procedure: ILEOSTOMY REVISION, PROCTOSCOPY, ILEOSCOPY;  Surgeon: Devang Moon MD;  Location: [facility];  Service: General    ZZC REMOVAL COLON/ILEOSTOMY      Description: Total Abdominal Colectomy;  Recorded: 12/15/2011;    ZZC REMOVAL COLON/PROCTECTOMY/ILEOSTOMY      Description: Total Proctocolectomy;  Recorded: 08/10/2011;    RUMA VAG HYST, W/VAGINECTOMY      Description: Vaginal Hysterectomy With Colpectomy;  Recorded: 08/14/2014;       Social History  Tobacco:   History   Smoking " Status    Never   Smokeless Tobacco    Never    Alcohol:   Social History    Substance and Sexual Activity      Alcohol use: Not Currently   Illicit Drugs:   History   Drug Use No       Family History  Family History   Problem Relation Age of Onset    Diabetes Mother     Heart Disease Mother     Hyperlipidemia Mother     Diabetes Father     Cancer Father         malignant gallbladder    No Known Problems Sister     No Known Problems Sister           Manny Menjivar MD on 7/13/2025      cc: Obdulia Chamorro

## 2025-07-13 NOTE — PLAN OF CARE
Pain: Denies pain  Neuro: Answers orientation questions appropriately. Aware of situation and recalls pertinent medical details.   Resp: Lung sounds diminished, faint crackles at times posteriorly.   Cardio: IV lasix changed to oral. Patient well within fluid restriction.  GI/: Voids freely. Patient empties colostomy bag independently.  Skin: Scattered bruising on arms  Activity: Independent in room  Nutrition/IV: Good oral intake.        2:03 PM    Patient discharged home via family transport. AVS reviewed with patient, daughter, and significant other.

## 2025-07-13 NOTE — PLAN OF CARE
Problem: Adult Inpatient Plan of Care  Goal: Optimal Comfort and Wellbeing  7/13/2025 0142 by Purnima Seo, RN  Outcome: Progressing  7/12/2025 2224 by Purnima Seo, RN  Outcome: Progressing   Goal Outcome Evaluation:       Pt denies pain. Intermittent confusion. A&O x4 overnight. Stand by assist to bathroom. Strict I&Os.

## 2025-07-13 NOTE — PLAN OF CARE
Discharge Summary    Reason for therapy discharge:    All goals and outcomes met, no further needs identified.    Progress towards therapy goal(s). [facility] for goal details.  Goals met    Therapy recommendation(s):    No further therapy is recommended.

## 2025-07-13 NOTE — PROGRESS NOTES
[facility]    Medicine Progress Note - Hospitalist Service    Date of Admission:  7/10/2025    Assessment & Plan   Zara Webster is a 74 year old female admitted on 7/10/2025. She was admitted thru the ED on 7/10 for confusion, dehydration, UTI and new HF.      Acute Toxic Metabolic Encephalopathy  Urinary tract infection  - Ucx positive from PTA 7/9 S. aureus  - CT head negative for acute abnormalities  - Ammonia levels within normal limits on admission, will recheck in a.m.  - family states this developed ~5-6 days PTA, and prior UTIs have presented in a similar manner  - UA from 7/9 and 7/10 consistent with infection, and Ucx from 7/9 shows staph aureus with pan sensitivity, 5/4 Ucx with Staph as well, pansensitive  - lactate is normal and she is not septic   - Continue on IV doxycycline, to complete 3 days total      Acute Kidney Injury-significantly improved  - presumably pre-renal, due to dehydration  - Baseline creatinine ~0.6, creatinine on admission 1.35, down to 0.81 today  - IV fluids stopped due to lower extremity edema  - Avoid nephrotoxins    Acute HFpEF  NSTEMI due to demand ischemia, multifactorial: UTI, dehydration  - Presented with shortness of breath, small left pleural effusion on CT, lower extremity edema (has been worse in the recent past)-volume issues may also be related to cirrhosis  - EKG in the ED negative for signs of ischemia  - Troponin on admission 56 > 48  - BNP on admission 2607  - Echo 7/11: LVEF 60%, normal LV size and function, mild RV enlargement with normal systolic function, mild right atrial enlargement, compared to echo from February 2025 findings are similar  - Not on diuretics outpatient  - Cardiology consulted: IV lasix q12     Cirrhosis s/p TIPS   Hepatocellular carcinoma  - followed closely by [facility], and LFTs appears close to her baseline  - recently underwent long procedure [facility]  - Had normal ammonia levels on admission, will monitor in  "a.m.  - GI consulted, hold off on lactulose or rifaximin at this time, will arrange for outpatient appointment at the liver clinic for follow-up, have signed off    H/O SVT  - in NSR currently  - cont outpt flecianide and metoprolol (her home dose was lowered to 25 bid 7/9)  - cont eliquis      Hypotension  - appears to also be on midodrine (while on metoprolol) - continue and follow vitals  - it is noted in her primary providers note that her normal Bps are 90/60     DM  Hyperglycemia in the setting of infection  - normally well controlled, with BS per report very uncontrolled this past week  -Most recent hemoglobin A1c 6/20/2025 7.0% (down significantly from prior 4/15/2025 12.1%)  - suspect due to acute infection  - Place on glucometers and ISS  - Cont home lantus 20 bid      GERD  - controlled, cont home PPI      Macrocytic Anemia  - she is at her baseline Hgb of ~9, with admit value of 9.3 > 8.9 > 8.4  - No signs of bleeding  - Will monitor          Diet: Combination Diet Moderate Consistent Carb (60 g CHO per Meal) Diet; 2 gm NA Diet  Fluid restriction 1800 ML FLUID    DVT Prophylaxis: Pneumatic Compression Devices  Caro Catheter: Not present  Lines: None     Cardiac Monitoring: None  Code Status: Full Code      Clinically Significant Risk Factors Present on Admission         # Hyponatremia: Lowest Na = 134 mmol/L in last 2 days, will monitor as appropriate  # Hyperchloremia: Highest Cl = 109 mmol/L in last 2 days, will monitor as appropriate          # Hypoalbuminemia: Lowest albumin = 1.9 g/dL at 7/12/2025 10:10 AM, will monitor as appropriate  # Drug Induced Coagulation Defect: home medication list includes an anticoagulant medication         # Anemia: based on hgb <11      # DMII: A1C = 7.0 % (Ref range: 0.0 - 5.6 %) within past 6 months   # Overweight: Estimated body mass index is 25.24 kg/m  as calculated from the following:    Height as of this encounter: 1.676 m (5' 6\").    Weight as of this " encounter: 70.9 kg (156 lb 6.4 oz).       # Financial/Environmental Concerns: none         Social Drivers of Health    Housing Stability: Low Risk  (7/12/2025)    Housing Stability     Do you have housing? : Yes     Are you worried about losing your housing?: No   Recent Concern: Housing Stability - High Risk (5/9/2025)    Housing Stability     Do you have housing? : No     Are you worried about losing your housing?: No   Interpersonal Safety: High Risk (7/10/2025)    Interpersonal Safety     Do you feel physically and emotionally safe where you currently live?: No     Within the past 12 months, have you been hit, slapped, kicked or otherwise physically hurt by someone?: No     Within the past 12 months, have you been humiliated or emotionally abused in other ways by your partner or ex-partner?: No    Received from [facility]    Social Connections          Disposition Plan     Medically Ready for Discharge: Anticipated in 2-4 Days             Keara Bustillo MD  Hospitalist Service  [facility]  Securely message with [employer] (more info)  Text page via [employer] Paging/Directory   ______________________________________________________________________    Interval History   Doing well today.  and son at bedside. On IV lasix for edema and new HF. Discussed with them today. No complaints.     Physical Exam   Vital Signs: Temp: 98.4  F (36.9  C) Temp src: Oral BP: 131/60 Pulse: 70   Resp: 16 SpO2: 95 % O2 Device: None (Room air)    Weight: 156 lbs 6.4 oz    General Appearance: Awake, alert, in no acute distress  Respiratory: CTAB, no wheeze  Cardiovascular: RRR, 2+ pitting edema  GI: soft, nontender, non distended, normal bowel sounds  Skin: no jaundice, no rash    Medical Decision Making       45 MINUTES SPENT BY ME on the date of service doing chart review, history, exam, documentation & further activities per the note.      Data     I have personally  reviewed the following data over the past 24 hrs:    9.6  \   8.4 (L)   / 156     134 (L) 106 16.0 /  253 (H)   4.2 23 0.81 \     ALT: 21 AST: 40 AP: 264 (H) TBILI: 2.7 (H)   ALB: 1.9 (L) TOT PROTEIN: 6.1 (L) LIPASE: N/A       Imaging results reviewed over the past 24 hrs:   No results found for this or any previous visit (from the past 24 hours).

## 2025-07-14 ENCOUNTER — PATIENT OUTREACH (OUTPATIENT)
Dept: CARE COORDINATION | Facility: CLINIC | Age: 75
End: 2025-07-14
Payer: COMMERCIAL

## 2025-07-14 DIAGNOSIS — Z09 HOSPITAL DISCHARGE FOLLOW-UP: ICD-10-CM

## 2025-07-14 DIAGNOSIS — Z86.711 HISTORY OF PULMONARY EMBOLISM: Primary | ICD-10-CM

## 2025-07-14 NOTE — PROGRESS NOTES
Clinic Care Coordination Contact  Transitions of Care Outreach  Chief Complaint   Patient presents with    Clinic Care Coordination - Post Hospital       Most Recent Admission Date: 7/10/2025   Most Recent Admission Diagnosis: Dehydration - E86.0  Elevated blood sugar - R73.9  History of liver cancer - Z85.05  Bilateral pleural effusion - J90  Acute kidney injury - N17.9  Urinary tract infection associated with catheterization of urinary tract, unspecified indwelling urinary catheter type, initial encounter - T83.511A, N39.0     Most Recent Discharge Date: 7/13/2025   Most Recent Discharge Diagnosis: Urinary tract infection associated with catheterization of urinary tract, unspecified indwelling urinary catheter type, initial encounter - T83.511A, N39.0  Acute kidney injury - N17.9  Dehydration - E86.0  Elevated blood sugar - R73.9  History of liver cancer - Z85.05  Bilateral pleural effusion - J90  SVT (supraventricular tachycardia) - I47.10  Urinary tract infection without hematuria, site unspecified - N39.0  Acute diastolic congestive heart failure (H) - I50.31     Transitions of Care Assessment    Discharge Assessment  How are you doing now that you are home?: pt is 'fine' - confirmed no questions or needs. Pt has all of their medications. Denies need for CCC. Pt is aware of appointment 7/30/25 and is aware of how to contact the clinic for assistance. Pt states they have help from family and friends, happy to be home  How are your symptoms? (Red Flag symptoms escalate to triage hotline per guidelines): Improved  Do you know how to contact your clinic care team if you have future questions or changes to your health status? : Yes  Does the patient have their discharge instructions? : Yes  Does the patient have questions regarding their discharge instructions? : No  Were you started on any new medications or were there changes to any of your previous medications? : Yes  Does the patient have all of their  medications?: Yes  Do you have questions regarding any of your medications? : No         Post-op (Clinicians Only)  Eating & Drinking: eating and drinking without complaints/concerns  PO Intake: regular diet    SWCC Explained and offered Care Coordination support to eligible patients: Yes    Patient accepted? No    Follow up Plan     Discharge Follow-Up  Discharge follow up appointment scheduled in alignment with recommended follow up timeframe or Transitions of Risk Category? (Low = within 30 days; Moderate= within 14 days; High= within 7 days): Yes  Discharge Follow Up Appointment Date: 07/30/25  Discharge Follow Up Appointment Scheduled with?: Primary Care Provider    Future Appointments   Date Time Provider Department Center   7/30/2025 11:30 AM Obdulia Chamorro MD [facility]   8/1/2025  7:50 AM [facility]   8/19/2025  7:30 AM [facility]       Outpatient Plan as outlined on AVS reviewed with patient.    For any urgent concerns, please contact our 24 hour nurse triage line: [phone] ([phone])       MADHAV Meyers

## 2025-07-15 ENCOUNTER — TELEPHONE (OUTPATIENT)
Dept: FAMILY MEDICINE | Facility: CLINIC | Age: 75
End: 2025-07-15
Payer: COMMERCIAL

## 2025-07-15 LAB
BACTERIA SPEC CULT: NO GROWTH
BACTERIA SPEC CULT: NO GROWTH
C AURIS DNA SPEC QL NAA+NON-PROBE: NORMAL

## 2025-07-15 NOTE — TELEPHONE ENCOUNTER
MTM referral from: Transitions of Care (recent hospital discharge, TCU discharge, or ED visit)    MTM referral outreach attempt #1 on July 15, 2025 at 10:24 AM      Outcome: Spoke with patient not interested.     Use bcbs part d map for the carrier/Plan on the flowsheet          See Vasquez PADILLA   [phone]

## 2025-07-30 ENCOUNTER — OFFICE VISIT (OUTPATIENT)
Dept: FAMILY MEDICINE | Facility: CLINIC | Age: 75
End: 2025-07-30
Payer: COMMERCIAL

## 2025-07-30 VITALS
BODY MASS INDEX: 23.63 KG/M2 | HEIGHT: 66 IN | HEART RATE: 62 BPM | TEMPERATURE: 97.8 F | OXYGEN SATURATION: 100 % | DIASTOLIC BLOOD PRESSURE: 46 MMHG | RESPIRATION RATE: 16 BRPM | SYSTOLIC BLOOD PRESSURE: 111 MMHG | WEIGHT: 147 LBS

## 2025-07-30 DIAGNOSIS — D64.9 ANEMIA, UNSPECIFIED TYPE: ICD-10-CM

## 2025-07-30 DIAGNOSIS — Z78.0 ASYMPTOMATIC POSTMENOPAUSAL STATUS: ICD-10-CM

## 2025-07-30 DIAGNOSIS — C22.0 HCC (HEPATOCELLULAR CARCINOMA) (H): ICD-10-CM

## 2025-07-30 DIAGNOSIS — E87.6 HYPOKALEMIA: ICD-10-CM

## 2025-07-30 DIAGNOSIS — I50.30 HEART FAILURE WITH PRESERVED EJECTION FRACTION, NYHA CLASS I (H): ICD-10-CM

## 2025-07-30 DIAGNOSIS — I47.10 SVT (SUPRAVENTRICULAR TACHYCARDIA): ICD-10-CM

## 2025-07-30 DIAGNOSIS — N39.0 RECURRENT UTI: ICD-10-CM

## 2025-07-30 DIAGNOSIS — N18.31 STAGE 3A CHRONIC KIDNEY DISEASE (H): ICD-10-CM

## 2025-07-30 DIAGNOSIS — E83.42 HYPOMAGNESEMIA: ICD-10-CM

## 2025-07-30 DIAGNOSIS — Z79.4 TYPE 2 DIABETES MELLITUS WITH HYPERGLYCEMIA, WITH LONG-TERM CURRENT USE OF INSULIN (H): ICD-10-CM

## 2025-07-30 DIAGNOSIS — Z09 HOSPITAL DISCHARGE FOLLOW-UP: Primary | ICD-10-CM

## 2025-07-30 DIAGNOSIS — E11.65 TYPE 2 DIABETES MELLITUS WITH HYPERGLYCEMIA, WITH LONG-TERM CURRENT USE OF INSULIN (H): ICD-10-CM

## 2025-07-30 DIAGNOSIS — D63.8 ANEMIA OF CHRONIC DISEASE: ICD-10-CM

## 2025-07-30 LAB
ALBUMIN UR-MCNC: NEGATIVE MG/DL
APPEARANCE UR: CLEAR
BACTERIA #/AREA URNS HPF: ABNORMAL /HPF
BILIRUB UR QL STRIP: NEGATIVE
COLOR UR AUTO: YELLOW
ERYTHROCYTE [DISTWIDTH] IN BLOOD BY AUTOMATED COUNT: 16.7 % (ref 10–15)
GLUCOSE UR STRIP-MCNC: >=1000 MG/DL
HCT VFR BLD AUTO: 26.6 % (ref 35–47)
HGB BLD-MCNC: 8.3 G/DL (ref 11.7–15.7)
HGB UR QL STRIP: ABNORMAL
KETONES UR STRIP-MCNC: NEGATIVE MG/DL
LEUKOCYTE ESTERASE UR QL STRIP: NEGATIVE
MCH RBC QN AUTO: 31 PG (ref 26.5–33)
MCHC RBC AUTO-ENTMCNC: 31.2 G/DL (ref 31.5–36.5)
MCV RBC AUTO: 99 FL (ref 78–100)
NITRATE UR QL: NEGATIVE
PH UR STRIP: 6 [PH] (ref 5–8)
PLATELET # BLD AUTO: 123 10E3/UL (ref 150–450)
RBC # BLD AUTO: 2.68 10E6/UL (ref 3.8–5.2)
RBC #/AREA URNS AUTO: ABNORMAL /HPF
SP GR UR STRIP: <=1.005 (ref 1–1.03)
SQUAMOUS #/AREA URNS AUTO: ABNORMAL /LPF
TRANS CELLS #/AREA URNS HPF: ABNORMAL /HPF
UROBILINOGEN UR STRIP-ACNC: 0.2 E.U./DL
WBC # BLD AUTO: 5.7 10E3/UL (ref 4–11)
WBC #/AREA URNS AUTO: ABNORMAL /HPF
YEAST #/AREA URNS HPF: ABNORMAL /HPF

## 2025-07-30 PROCEDURE — 85027 COMPLETE CBC AUTOMATED: CPT | Performed by: FAMILY MEDICINE

## 2025-07-30 PROCEDURE — 36415 COLL VENOUS BLD VENIPUNCTURE: CPT | Performed by: FAMILY MEDICINE

## 2025-07-30 PROCEDURE — 1111F DSCHRG MED/CURRENT MED MERGE: CPT | Performed by: FAMILY MEDICINE

## 2025-07-30 PROCEDURE — 3074F SYST BP LT 130 MM HG: CPT | Performed by: FAMILY MEDICINE

## 2025-07-30 PROCEDURE — 99214 OFFICE O/P EST MOD 30 MIN: CPT | Performed by: FAMILY MEDICINE

## 2025-07-30 PROCEDURE — G2211 COMPLEX E/M VISIT ADD ON: HCPCS | Performed by: FAMILY MEDICINE

## 2025-07-30 PROCEDURE — 80053 COMPREHEN METABOLIC PANEL: CPT | Performed by: FAMILY MEDICINE

## 2025-07-30 PROCEDURE — 3078F DIAST BP <80 MM HG: CPT | Performed by: FAMILY MEDICINE

## 2025-07-30 PROCEDURE — 87086 URINE CULTURE/COLONY COUNT: CPT | Performed by: FAMILY MEDICINE

## 2025-07-30 PROCEDURE — 81001 URINALYSIS AUTO W/SCOPE: CPT | Performed by: FAMILY MEDICINE

## 2025-07-30 NOTE — PROGRESS NOTES
Assessment/ Plan     1. Hospital discharge follow-up (Primary)  Zara is in today for hospital follow-up.  She was hospitalized at [facility] from 7/10 through 11/13.  I had seen her for UTI and she grew out Staph aureus which required blood cultures.  She was also found to be encephalopathic, dehydrated, with some congestive heart failure.  She was admitted for several days and improved.  She did have a head CT which was normal and a chest CT which was negative for PE.  She states that she is feeling much better.  Her blood pressure is more stable with the lower dose of metoprolol and the midodrine.  They did start Lasix along with a potassium and magnesium supplement.  She finished her antibiotics about a week ago when she has had a couple days of dysuria so we will recheck urinalysis today.      2. Heart failure with preserved ejection fraction, NYHA class I (H)  Now stabilized.  She does have follow-up with cardiology.    3. Recurrent UTI  Presents with UTIs now in the last couple of months.  Recheck today as she is having some dysuria.  - UA Macroscopic with reflex to Microscopic and Culture - Lab Collect; Future  - UA Macroscopic with reflex to Microscopic and Culture - Lab Collect    4. Stage 3a chronic kidney disease (H)  This is now stabilized.    5. Type 2 diabetes mellitus with hyperglycemia, with long-term current use of insulin (H)  She is having some improvement now in her glucose with some weight loss that she has had along with starting Jardiance.    6. Asymptomatic postmenopausal status  Stable    7. Anemia of chronic disease  Recheck today.    8. HCC (hepatocellular carcinoma) (H)  she follows with oncology and has follow-up next month.    9. Hypomagnesemia  She was discharged on a supplement so we will recheck values.  - Comprehensive metabolic panel; Future  - Comprehensive metabolic panel    10. Hypokalemia  She was started on a potassium supplement.  Will recheck as she is on a diuretic as  well.  - Comprehensive metabolic panel; Future  - Comprehensive metabolic panel    11. Anemia, unspecified type  She is anemic of chronic disease.  Will recheck  - CBC with platelets; Future  - CBC with platelets    12. SVT (supraventricular tachycardia)  SVT has improved since she had her ablation.  She has not had any episodes since the last time I saw her.      Subjective:      Zara Webster is a 75 year old female who presents for hospital follow-up.  I had seen her in the office for a UTI.  Her culture came back as Staph aureus and she was having some mental status changes so I had her go to the ER.  She was admitted for several days.  I did review the discharge summary.  She states she is feeling better except she has had a couple of days of dysuria.  Reviewed her medications.      The longitudinal plan of care for the diagnosis(es)/condition(s) as documented were addressed during this visit. Due to the added complexity in care, I will continue to support Zara in the subsequent management and with ongoing continuity of care.   MED REC REQUIRED  Post Medication Reconciliation Status: discharge medications reconciled, continue medications without change     Relevant past medical, family, surgical, and social history reviewed with patient, unless noted in HPI, not pertinent for this visit.  Medications were discussed and reconciled.   Review of Systems   A 12 point comprehensive review of systems was negative except as noted.      Current Outpatient Medications   Medication Sig Dispense Refill    apixaban ANTICOAGULANT (ELIQUIS) 5 MG tablet Take 1 tablet (5 mg) by mouth 2 times daily. 180 tablet 3    clobetasol (TEMOVATE) 0.05 % external ointment Apply topically as needed      diphenoxylate-atropine (LOMOTIL) 2.5-0.025 MG tablet Take 1 tablet by mouth 4 times daily as needed for diarrhea. 12 tablet 0    empagliflozin (JARDIANCE) 10 MG TABS tablet Take 1 tablet (10 mg) by mouth daily. 90 tablet 1    flash  "glucose scanning reader (FREESTYLE VIDA 14 DAY READER) Mis [FLASH GLUCOSE SCANNING READER (FREESTYLE VIDA 14 DAY READER) MISC] Use 1 Units As Directed every 14 (fourteen) days. 1 each 0    flecainide (TAMBOCOR) 100 MG tablet Take 1 tablet (100 mg) by mouth 2 times daily. 180 tablet 2    furosemide (LASIX) 20 MG tablet Take 1 tablet (20 mg) by mouth daily. 30 tablet 0    insulin glargine (LANTUS SOLOSTAR) 100 UNIT/ML pen Take 20 units BID 30 mL 3    insulin lispro (HUMALOG KWIKPEN) 100 UNIT/ML (1 unit dial) KWIKPEN Inject 6 units subcutaneously 4 times daily for blood glucose management 30 mL 4    insulin pen needle (32G X 4 MM) 32G X 4 MM miscellaneous Use 8 pen needles daily or as directed. 800 each 0    magnesium 250 MG tablet Take 1 tablet (250 mg) by mouth daily. 30 tablet 0    metoprolol succinate ER (TOPROL XL) 50 MG 24 hr tablet Take 0.5 tablets (25 mg) by mouth 2 times daily. 60 tablet 3    midodrine (PROAMATINE) 2.5 MG tablet Take 1 tablet (2.5 mg) by mouth 3 times daily (with meals). 270 tablet 0    Multiple Vitamins-Minerals (HAIR SKIN & NAILS ADVANCED) TABS Take 1 tablet by mouth daily.      omeprazole (PRILOSEC) 40 MG DR capsule Take 40 mg by mouth nightly as needed.      ostomy adhesive Pste [OSTOMY ADHESIVE PSTE] Apply as needed 4 Tube 3    potassium chloride oksana ER (KLOR-CON M10) 10 MEQ CR tablet Take 1 tablet (10 mEq) by mouth 2 times daily (with meals). 60 tablet 0         Objective:     /46   Pulse 62   Temp 97.8  F (36.6  C)   Resp 16   Ht 1.676 m (5' 6\")   Wt 66.7 kg (147 lb)   LMP  (LMP Unknown)   SpO2 100%   BMI 23.73 kg/m      Body mass index is 23.73 kg/m .       General appearance: alert, appears stated age and cooperative  Lungs: clear to auscultation bilaterally  Heart: regular rate and rhythm, S1, S2 normal, no murmur, click, rub or gallop    Extremities: trace edema      No results found for this or any previous visit (from the past week).       This note has been " dictated using voice recognition software. Any grammatical or context distortions are unintentional and inherent to the software

## 2025-07-31 LAB
ALBUMIN SERPL BCG-MCNC: 2.4 G/DL (ref 3.5–5.2)
ALP SERPL-CCNC: 328 U/L (ref 40–150)
ALT SERPL W P-5'-P-CCNC: 44 U/L (ref 0–50)
ANION GAP SERPL CALCULATED.3IONS-SCNC: 9 MMOL/L (ref 7–15)
AST SERPL W P-5'-P-CCNC: 72 U/L (ref 0–45)
BACTERIA UR CULT: NORMAL
BILIRUB SERPL-MCNC: 2.3 MG/DL
BUN SERPL-MCNC: 14.2 MG/DL (ref 8–23)
CALCIUM SERPL-MCNC: 8.7 MG/DL (ref 8.8–10.4)
CHLORIDE SERPL-SCNC: 100 MMOL/L (ref 98–107)
CREAT SERPL-MCNC: 0.68 MG/DL (ref 0.51–0.95)
EGFRCR SERPLBLD CKD-EPI 2021: 90 ML/MIN/1.73M2
GLUCOSE SERPL-MCNC: 306 MG/DL (ref 70–99)
HCO3 SERPL-SCNC: 24 MMOL/L (ref 22–29)
POTASSIUM SERPL-SCNC: 4.4 MMOL/L (ref 3.4–5.3)
PROT SERPL-MCNC: 7.7 G/DL (ref 6.4–8.3)
SODIUM SERPL-SCNC: 133 MMOL/L (ref 135–145)

## 2025-08-19 ENCOUNTER — OFFICE VISIT (OUTPATIENT)
Dept: ENDOCRINOLOGY | Facility: CLINIC | Age: 75
End: 2025-08-19
Payer: COMMERCIAL

## 2025-08-19 ENCOUNTER — LAB (OUTPATIENT)
Dept: LAB | Facility: CLINIC | Age: 75
End: 2025-08-19
Payer: COMMERCIAL

## 2025-08-19 VITALS
OXYGEN SATURATION: 100 % | BODY MASS INDEX: 24.05 KG/M2 | SYSTOLIC BLOOD PRESSURE: 103 MMHG | HEART RATE: 69 BPM | WEIGHT: 149 LBS | DIASTOLIC BLOOD PRESSURE: 58 MMHG

## 2025-08-19 DIAGNOSIS — Z79.4 TYPE 2 DIABETES MELLITUS WITH DIABETIC MONONEUROPATHY, WITH LONG-TERM CURRENT USE OF INSULIN (H): Primary | ICD-10-CM

## 2025-08-19 DIAGNOSIS — Z79.4 TYPE 2 DIABETES MELLITUS WITH DIABETIC MONONEUROPATHY, WITH LONG-TERM CURRENT USE OF INSULIN (H): ICD-10-CM

## 2025-08-19 DIAGNOSIS — B37.31 CANDIDIASIS OF VAGINA: ICD-10-CM

## 2025-08-19 DIAGNOSIS — E11.41 TYPE 2 DIABETES MELLITUS WITH DIABETIC MONONEUROPATHY, WITH LONG-TERM CURRENT USE OF INSULIN (H): Primary | ICD-10-CM

## 2025-08-19 DIAGNOSIS — E11.41 TYPE 2 DIABETES MELLITUS WITH DIABETIC MONONEUROPATHY, WITH LONG-TERM CURRENT USE OF INSULIN (H): ICD-10-CM

## 2025-08-19 DIAGNOSIS — I50.31 ACUTE DIASTOLIC CONGESTIVE HEART FAILURE (H): ICD-10-CM

## 2025-08-19 LAB
ANION GAP SERPL CALCULATED.3IONS-SCNC: 6 MMOL/L (ref 7–15)
BUN SERPL-MCNC: 15.7 MG/DL (ref 8–23)
CALCIUM SERPL-MCNC: 8.5 MG/DL (ref 8.8–10.4)
CHLORIDE SERPL-SCNC: 109 MMOL/L (ref 98–107)
CREAT SERPL-MCNC: 0.74 MG/DL (ref 0.51–0.95)
EGFRCR SERPLBLD CKD-EPI 2021: 83 ML/MIN/1.73M2
EST. AVERAGE GLUCOSE BLD GHB EST-MCNC: 183 MG/DL
GLUCOSE SERPL-MCNC: 156 MG/DL (ref 70–99)
HBA1C MFR BLD: 8 % (ref 0–5.6)
HCO3 SERPL-SCNC: 21 MMOL/L (ref 22–29)
POTASSIUM SERPL-SCNC: 4.4 MMOL/L (ref 3.4–5.3)
SODIUM SERPL-SCNC: 136 MMOL/L (ref 135–145)

## 2025-08-19 PROCEDURE — 3074F SYST BP LT 130 MM HG: CPT | Performed by: NURSE PRACTITIONER

## 2025-08-19 PROCEDURE — 3078F DIAST BP <80 MM HG: CPT | Performed by: NURSE PRACTITIONER

## 2025-08-19 PROCEDURE — 99214 OFFICE O/P EST MOD 30 MIN: CPT | Performed by: NURSE PRACTITIONER

## 2025-08-19 PROCEDURE — 3052F HG A1C>EQUAL 8.0%<EQUAL 9.0%: CPT | Performed by: NURSE PRACTITIONER

## 2025-08-19 PROCEDURE — 83036 HEMOGLOBIN GLYCOSYLATED A1C: CPT

## 2025-08-19 PROCEDURE — 36415 COLL VENOUS BLD VENIPUNCTURE: CPT

## 2025-08-19 PROCEDURE — 80048 BASIC METABOLIC PNL TOTAL CA: CPT

## 2025-08-19 RX ORDER — OXYCODONE HYDROCHLORIDE 5 MG/1
5 TABLET ORAL EVERY 4 HOURS PRN
COMMUNITY
Start: 2025-02-11

## 2025-08-19 RX ORDER — INSULIN LISPRO 100 [IU]/ML
INJECTION, SOLUTION INTRAVENOUS; SUBCUTANEOUS
Qty: 30 ML | Refills: 4 | Status: SHIPPED | OUTPATIENT
Start: 2025-08-19

## 2025-08-19 RX ORDER — FLUCONAZOLE 150 MG/1
TABLET ORAL
Qty: 2 TABLET | Refills: 3 | Status: SHIPPED | OUTPATIENT
Start: 2025-08-19

## 2025-08-19 RX ORDER — FLUCONAZOLE 150 MG/1
TABLET ORAL
COMMUNITY
Start: 2025-07-31 | End: 2025-08-19

## 2025-08-21 DIAGNOSIS — K50.118 CROHN'S DISEASE OF COLON WITH OTHER COMPLICATION (H): ICD-10-CM

## 2025-08-21 RX ORDER — OMEPRAZOLE 40 MG/1
40 CAPSULE, DELAYED RELEASE ORAL
Qty: 90 CAPSULE | Refills: 3 | Status: SHIPPED | OUTPATIENT
Start: 2025-08-21

## 2025-08-21 RX ORDER — DIPHENOXYLATE HYDROCHLORIDE AND ATROPINE SULFATE 2.5; .025 MG/1; MG/1
1 TABLET ORAL 4 TIMES DAILY PRN
Qty: 12 TABLET | Refills: 0 | Status: SHIPPED | OUTPATIENT
Start: 2025-08-21

## 2025-08-24 ENCOUNTER — HOSPITAL ENCOUNTER (EMERGENCY)
Facility: HOSPITAL | Age: 75
Discharge: HOME | End: 2025-08-25
Attending: EMERGENCY MEDICINE | Admitting: EMERGENCY MEDICINE
Payer: COMMERCIAL

## 2025-08-24 DIAGNOSIS — I21.4 NSTEMI (NON-ST ELEVATED MYOCARDIAL INFARCTION) (H): ICD-10-CM

## 2025-08-24 DIAGNOSIS — I47.10 SVT (SUPRAVENTRICULAR TACHYCARDIA): Primary | ICD-10-CM

## 2025-08-24 DIAGNOSIS — C22.0 HCC (HEPATOCELLULAR CARCINOMA) (H): ICD-10-CM

## 2025-08-24 DIAGNOSIS — N17.9 AKI (ACUTE KIDNEY INJURY): ICD-10-CM

## 2025-08-24 DIAGNOSIS — D63.8 ANEMIA OF CHRONIC DISEASE: ICD-10-CM

## 2025-08-24 DIAGNOSIS — E87.1 HYPONATREMIA: ICD-10-CM

## 2025-08-24 DIAGNOSIS — Z86.711 HISTORY OF PULMONARY EMBOLISM: ICD-10-CM

## 2025-08-24 LAB
ANION GAP SERPL CALCULATED.3IONS-SCNC: 9 MMOL/L (ref 7–15)
BASOPHILS # BLD AUTO: <0.03 10E3/UL (ref 0–0.2)
BASOPHILS NFR BLD AUTO: 0.3 %
BUN SERPL-MCNC: 13.1 MG/DL (ref 8–23)
CALCIUM SERPL-MCNC: 8.5 MG/DL (ref 8.8–10.4)
CHLORIDE SERPL-SCNC: 110 MMOL/L (ref 98–107)
CREAT SERPL-MCNC: 0.73 MG/DL (ref 0.51–0.95)
EGFRCR SERPLBLD CKD-EPI 2021: 85 ML/MIN/1.73M2
EOSINOPHIL # BLD AUTO: 0.1 10E3/UL (ref 0–0.7)
EOSINOPHIL NFR BLD AUTO: 1.7 %
ERYTHROCYTE [DISTWIDTH] IN BLOOD BY AUTOMATED COUNT: 17.2 % (ref 10–15)
GLUCOSE SERPL-MCNC: 121 MG/DL (ref 70–99)
HCO3 SERPL-SCNC: 17 MMOL/L (ref 22–29)
HCT VFR BLD AUTO: 26 % (ref 35–47)
HGB BLD-MCNC: 8.1 G/DL (ref 11.7–15.7)
HOLD SPECIMEN: NORMAL
IMM GRANULOCYTES # BLD: <0.03 10E3/UL
IMM GRANULOCYTES NFR BLD: 0.3 %
LYMPHOCYTES # BLD AUTO: 1.32 10E3/UL (ref 0.8–5.3)
LYMPHOCYTES NFR BLD AUTO: 22.9 %
MAGNESIUM SERPL-MCNC: 2 MG/DL (ref 1.7–2.3)
MCH RBC QN AUTO: 30.3 PG (ref 26.5–33)
MCHC RBC AUTO-ENTMCNC: 31.2 G/DL (ref 31.5–36.5)
MCV RBC AUTO: 97.4 FL (ref 78–100)
MONOCYTES # BLD AUTO: 1 10E3/UL (ref 0–1.3)
MONOCYTES NFR BLD AUTO: 17.4 %
NEUTROPHILS # BLD AUTO: 3.3 10E3/UL (ref 1.6–8.3)
NEUTROPHILS NFR BLD AUTO: 57.4 %
NRBC # BLD AUTO: <0.03 10E3/UL
NRBC BLD AUTO-RTO: 0 /100
PLATELET # BLD AUTO: 158 10E3/UL (ref 150–450)
POTASSIUM SERPL-SCNC: 3.8 MMOL/L (ref 3.4–5.3)
RBC # BLD AUTO: 2.67 10E6/UL (ref 3.8–5.2)
SODIUM SERPL-SCNC: 136 MMOL/L (ref 135–145)
TSH SERPL DL<=0.005 MIU/L-ACNC: 2.35 UIU/ML (ref 0.3–4.2)
WBC # BLD AUTO: 5.76 10E3/UL (ref 4–11)

## 2025-08-24 PROCEDURE — 80048 BASIC METABOLIC PNL TOTAL CA: CPT | Performed by: EMERGENCY MEDICINE

## 2025-08-24 PROCEDURE — 84443 ASSAY THYROID STIM HORMONE: CPT | Performed by: EMERGENCY MEDICINE

## 2025-08-24 PROCEDURE — 250N000011 HC RX IP 250 OP 636: Performed by: EMERGENCY MEDICINE

## 2025-08-24 PROCEDURE — 99284 EMERGENCY DEPT VISIT MOD MDM: CPT | Mod: 25

## 2025-08-24 PROCEDURE — 85025 COMPLETE CBC W/AUTO DIFF WBC: CPT | Performed by: EMERGENCY MEDICINE

## 2025-08-24 PROCEDURE — 93005 ELECTROCARDIOGRAM TRACING: CPT | Performed by: EMERGENCY MEDICINE

## 2025-08-24 PROCEDURE — 96361 HYDRATE IV INFUSION ADD-ON: CPT

## 2025-08-24 PROCEDURE — 250N000013 HC RX MED GY IP 250 OP 250 PS 637: Performed by: EMERGENCY MEDICINE

## 2025-08-24 PROCEDURE — 83735 ASSAY OF MAGNESIUM: CPT | Performed by: EMERGENCY MEDICINE

## 2025-08-24 PROCEDURE — 96374 THER/PROPH/DIAG INJ IV PUSH: CPT

## 2025-08-24 PROCEDURE — 258N000003 HC RX IP 258 OP 636: Performed by: EMERGENCY MEDICINE

## 2025-08-24 PROCEDURE — 36415 COLL VENOUS BLD VENIPUNCTURE: CPT | Performed by: EMERGENCY MEDICINE

## 2025-08-24 RX ORDER — METOPROLOL SUCCINATE 25 MG/1
25 TABLET, EXTENDED RELEASE ORAL ONCE
Status: COMPLETED | OUTPATIENT
Start: 2025-08-24 | End: 2025-08-24

## 2025-08-24 RX ORDER — FLECAINIDE ACETATE 100 MG/1
100 TABLET ORAL 2 TIMES DAILY
Qty: 30 TABLET | Refills: 2 | Status: SHIPPED | OUTPATIENT
Start: 2025-08-24 | End: 2025-08-27

## 2025-08-24 RX ORDER — ADENOSINE 3 MG/ML
6 INJECTION, SOLUTION INTRAVENOUS ONCE
Status: COMPLETED | OUTPATIENT
Start: 2025-08-24 | End: 2025-08-24

## 2025-08-24 RX ORDER — METOPROLOL SUCCINATE 50 MG/1
25 TABLET, EXTENDED RELEASE ORAL 2 TIMES DAILY
Qty: 20 TABLET | Refills: 3 | Status: SHIPPED | OUTPATIENT
Start: 2025-08-24 | End: 2025-08-27

## 2025-08-24 RX ORDER — MIDODRINE HYDROCHLORIDE 2.5 MG/1
2.5 TABLET ORAL
Qty: 30 TABLET | Refills: 0 | Status: SHIPPED | OUTPATIENT
Start: 2025-08-24

## 2025-08-24 RX ORDER — ADENOSINE 3 MG/ML
INJECTION, SOLUTION INTRAVENOUS
Status: COMPLETED
Start: 2025-08-24 | End: 2025-08-24

## 2025-08-24 RX ORDER — MIDODRINE HYDROCHLORIDE 2.5 MG/1
2.5 TABLET ORAL ONCE
Status: COMPLETED | OUTPATIENT
Start: 2025-08-24 | End: 2025-08-24

## 2025-08-24 RX ADMIN — METOPROLOL SUCCINATE 25 MG: 25 TABLET, FILM COATED, EXTENDED RELEASE ORAL at 23:57

## 2025-08-24 RX ADMIN — ADENOSINE 6 MG: 3 INJECTION INTRAVENOUS at 22:24

## 2025-08-24 RX ADMIN — SODIUM CHLORIDE 500 ML: 0.9 INJECTION, SOLUTION INTRAVENOUS at 22:58

## 2025-08-24 RX ADMIN — ADENOSINE 6 MG: 3 INJECTION, SOLUTION INTRAVENOUS at 22:24

## 2025-08-24 RX ADMIN — CARBIDOPA AND LEVODOPA 2.5 MG: 50; 200 TABLET, EXTENDED RELEASE ORAL at 23:00

## 2025-08-24 ASSESSMENT — ACTIVITIES OF DAILY LIVING (ADL)
ADLS_ACUITY_SCORE: 59
ADLS_ACUITY_SCORE: 59

## 2025-08-24 ASSESSMENT — COLUMBIA-SUICIDE SEVERITY RATING SCALE - C-SSRS
6. HAVE YOU EVER DONE ANYTHING, STARTED TO DO ANYTHING, OR PREPARED TO DO ANYTHING TO END YOUR LIFE?: NO
2. HAVE YOU ACTUALLY HAD ANY THOUGHTS OF KILLING YOURSELF IN THE PAST MONTH?: NO
1. IN THE PAST MONTH, HAVE YOU WISHED YOU WERE DEAD OR WISHED YOU COULD GO TO SLEEP AND NOT WAKE UP?: NO

## 2025-08-25 VITALS
DIASTOLIC BLOOD PRESSURE: 55 MMHG | BODY MASS INDEX: 24.66 KG/M2 | SYSTOLIC BLOOD PRESSURE: 103 MMHG | HEIGHT: 65 IN | WEIGHT: 148 LBS | RESPIRATION RATE: 19 BRPM | TEMPERATURE: 98.1 F | HEART RATE: 98 BPM | OXYGEN SATURATION: 100 %

## 2025-08-26 ENCOUNTER — PATIENT OUTREACH (OUTPATIENT)
Dept: CARE COORDINATION | Facility: CLINIC | Age: 75
End: 2025-08-26
Payer: COMMERCIAL

## 2025-08-26 RX ORDER — FUROSEMIDE 20 MG/1
20 TABLET ORAL DAILY
Qty: 30 TABLET | Refills: 4 | Status: SHIPPED | OUTPATIENT
Start: 2025-08-26

## 2025-08-27 ENCOUNTER — LAB (OUTPATIENT)
Dept: CARDIOLOGY | Facility: CLINIC | Age: 75
End: 2025-08-27
Payer: COMMERCIAL

## 2025-08-27 ENCOUNTER — DOCUMENTATION ONLY (OUTPATIENT)
Dept: CARDIOLOGY | Facility: CLINIC | Age: 75
End: 2025-08-27

## 2025-08-27 ENCOUNTER — OFFICE VISIT (OUTPATIENT)
Dept: CARDIOLOGY | Facility: CLINIC | Age: 75
End: 2025-08-27
Payer: COMMERCIAL

## 2025-08-27 ENCOUNTER — PREP FOR PROCEDURE (OUTPATIENT)
Dept: CARDIOLOGY | Facility: CLINIC | Age: 75
End: 2025-08-27

## 2025-08-27 VITALS
WEIGHT: 151.7 LBS | DIASTOLIC BLOOD PRESSURE: 40 MMHG | BODY MASS INDEX: 25.24 KG/M2 | OXYGEN SATURATION: 90 % | RESPIRATION RATE: 16 BRPM | HEART RATE: 62 BPM | SYSTOLIC BLOOD PRESSURE: 94 MMHG

## 2025-08-27 DIAGNOSIS — I47.10 SVT (SUPRAVENTRICULAR TACHYCARDIA): Primary | ICD-10-CM

## 2025-08-27 DIAGNOSIS — I47.10 SVT (SUPRAVENTRICULAR TACHYCARDIA): ICD-10-CM

## 2025-08-27 PROCEDURE — 3074F SYST BP LT 130 MM HG: CPT

## 2025-08-27 PROCEDURE — 3078F DIAST BP <80 MM HG: CPT

## 2025-08-27 PROCEDURE — 99215 OFFICE O/P EST HI 40 MIN: CPT

## 2025-08-27 PROCEDURE — G2211 COMPLEX E/M VISIT ADD ON: HCPCS

## 2025-08-27 RX ORDER — LIDOCAINE 40 MG/G
CREAM TOPICAL
OUTPATIENT
Start: 2025-08-27

## 2025-08-27 RX ORDER — FLECAINIDE ACETATE 100 MG/1
100 TABLET ORAL 2 TIMES DAILY
Qty: 180 TABLET | Refills: 3 | Status: SHIPPED | OUTPATIENT
Start: 2025-08-27

## 2025-08-27 RX ORDER — SODIUM CHLORIDE 9 MG/ML
100 INJECTION, SOLUTION INTRAVENOUS CONTINUOUS
OUTPATIENT
Start: 2025-08-27

## 2025-08-27 RX ORDER — METOPROLOL SUCCINATE 50 MG/1
50 TABLET, EXTENDED RELEASE ORAL 2 TIMES DAILY
Qty: 180 TABLET | Refills: 3 | Status: SHIPPED | OUTPATIENT
Start: 2025-08-27

## 2025-08-28 ENCOUNTER — VIRTUAL VISIT (OUTPATIENT)
Dept: FAMILY MEDICINE | Facility: CLINIC | Age: 75
End: 2025-08-28
Payer: COMMERCIAL

## 2025-08-28 ENCOUNTER — LAB (OUTPATIENT)
Dept: LAB | Facility: CLINIC | Age: 75
End: 2025-08-28
Payer: COMMERCIAL

## 2025-08-28 ENCOUNTER — NURSE TRIAGE (OUTPATIENT)
Dept: FAMILY MEDICINE | Facility: CLINIC | Age: 75
End: 2025-08-28
Payer: COMMERCIAL

## 2025-08-28 DIAGNOSIS — R30.0 DYSURIA: Primary | ICD-10-CM

## 2025-08-28 DIAGNOSIS — R30.0 DYSURIA: ICD-10-CM

## 2025-08-28 LAB
ALBUMIN UR-MCNC: NEGATIVE MG/DL
APPEARANCE UR: CLEAR
ATRIAL RATE - MUSE: 117 BPM
BACTERIA #/AREA URNS HPF: ABNORMAL /HPF
BILIRUB UR QL STRIP: NEGATIVE
COLOR UR AUTO: YELLOW
DIASTOLIC BLOOD PRESSURE - MUSE: 58 MMHG
GLUCOSE UR STRIP-MCNC: >=1000 MG/DL
HGB UR QL STRIP: ABNORMAL
INTERPRETATION ECG - MUSE: NORMAL
KETONES UR STRIP-MCNC: NEGATIVE MG/DL
LEUKOCYTE ESTERASE UR QL STRIP: NEGATIVE
NITRATE UR QL: NEGATIVE
P AXIS - MUSE: 51 DEGREES
PH UR STRIP: 5.5 [PH] (ref 5–8)
PR INTERVAL - MUSE: 188 MS
QRS DURATION - MUSE: 70 MS
QT - MUSE: 322 MS
QTC - MUSE: 449 MS
R AXIS - MUSE: -49 DEGREES
RBC #/AREA URNS AUTO: ABNORMAL /HPF
SP GR UR STRIP: 1.01 (ref 1–1.03)
SQUAMOUS #/AREA URNS AUTO: ABNORMAL /LPF
SYSTOLIC BLOOD PRESSURE - MUSE: 123 MMHG
T AXIS - MUSE: 76 DEGREES
UROBILINOGEN UR STRIP-ACNC: 0.2 E.U./DL
VENTRICULAR RATE- MUSE: 117 BPM
WBC #/AREA URNS AUTO: ABNORMAL /HPF
WBC CLUMPS #/AREA URNS HPF: PRESENT /HPF

## 2025-08-28 RX ORDER — NITROFURANTOIN 25; 75 MG/1; MG/1
100 CAPSULE ORAL 2 TIMES DAILY
Qty: 14 CAPSULE | Refills: 0 | Status: SHIPPED | OUTPATIENT
Start: 2025-08-28 | End: 2025-09-04

## 2025-08-30 LAB — BACTERIA UR CULT: ABNORMAL

## (undated) DEVICE — INTRO SHEATH 8FRX10CM PINNACLE RSS802

## (undated) DEVICE — CATH, QUADRAPOLAR DEFLECTABLE EP 5MM SPACING REPROCESSED

## (undated) DEVICE — SUCTION MANIFOLD NEPTUNE 2 SYS 1 PORT 702-025-000

## (undated) DEVICE — TUBING KIT COOL POINT

## (undated) DEVICE — SHEATH REPROCESSED AGILIS EP MED CURVE 71CM 408310

## (undated) DEVICE — SOL WATER IRRIG 1000ML BOTTLE 2F7114

## (undated) DEVICE — ELECTRODE DEFIB CADENCE 22550R

## (undated) DEVICE — KIT ENSITE SURFACE ELECTRODE ENSITE-SEK-5-01

## (undated) DEVICE — TUBING SUCTION MEDI-VAC 1/4"X20' N620A

## (undated) DEVICE — CATH EP INQUIRY DECAPOLAR 6FR X 110CM LG CRV

## (undated) DEVICE — INTRO SHEATH TERUMO 10FRX25CM PINNACLE RSS006

## (undated) DEVICE — CATH ABLTN TACTIFLEX 2-2-2 MM SPACE D-F CRV L115 A-TFSE-DF

## (undated) DEVICE — ESU GROUND PAD ADULT REM W/15' CORD E7507DB

## (undated) DEVICE — INTRO MICRO MINI STICK 4FR STIFF NITINOL 45-753

## (undated) DEVICE — INTRO TERUMO 7FRX25CM W/MARKER RSB703

## (undated) DEVICE — GUIDEWIRE JTIP 3MM .035 180CM IQ35F180J3

## (undated) DEVICE — CUSTOM PACK EP

## (undated) RX ORDER — FENTANYL CITRATE 50 UG/ML
INJECTION, SOLUTION INTRAMUSCULAR; INTRAVENOUS
Status: DISPENSED
Start: 2024-01-26

## (undated) RX ORDER — DEXTROSE MONOHYDRATE 25 G/50ML
INJECTION, SOLUTION INTRAVENOUS
Status: DISPENSED
Start: 2025-05-06

## (undated) RX ORDER — POTASSIUM CHLORIDE 1500 MG/1
TABLET, EXTENDED RELEASE ORAL
Status: DISPENSED
Start: 2025-06-09